# Patient Record
Sex: FEMALE | Race: BLACK OR AFRICAN AMERICAN | Employment: FULL TIME
[De-identification: names, ages, dates, MRNs, and addresses within clinical notes are randomized per-mention and may not be internally consistent; named-entity substitution may affect disease eponyms.]

---

## 2017-01-06 ENCOUNTER — OFFICE VISIT (OUTPATIENT)
Dept: INTERNAL MEDICINE | Facility: CLINIC | Age: 52
End: 2017-01-06

## 2017-01-06 VITALS
DIASTOLIC BLOOD PRESSURE: 85 MMHG | BODY MASS INDEX: 28.79 KG/M2 | HEIGHT: 68 IN | WEIGHT: 190 LBS | HEART RATE: 82 BPM | SYSTOLIC BLOOD PRESSURE: 123 MMHG

## 2017-01-06 DIAGNOSIS — E78.5 DYSLIPIDEMIA: ICD-10-CM

## 2017-01-06 DIAGNOSIS — R73.03 PREDIABETES: Primary | ICD-10-CM

## 2017-01-06 DIAGNOSIS — R06.02 SHORTNESS OF BREATH: ICD-10-CM

## 2017-01-06 PROCEDURE — 99213 OFFICE O/P EST LOW 20 MIN: CPT | Performed by: INTERNAL MEDICINE

## 2017-01-11 RX ORDER — DICLOFENAC SODIUM 75 MG/1
75 TABLET, DELAYED RELEASE ORAL 2 TIMES DAILY
Qty: 60 TABLET | Refills: 3 | Status: SHIPPED | OUTPATIENT
Start: 2017-01-11 | End: 2017-05-28 | Stop reason: SDUPTHER

## 2017-03-21 RX ORDER — VERAPAMIL HYDROCHLORIDE 40 MG/1
TABLET ORAL
Qty: 90 TABLET | Refills: 3 | Status: SHIPPED | OUTPATIENT
Start: 2017-03-21 | End: 2017-05-30 | Stop reason: SDUPTHER

## 2017-05-12 ENCOUNTER — HOSPITAL ENCOUNTER (OUTPATIENT)
Age: 52
Setting detail: SPECIMEN
Discharge: HOME | End: 2017-05-12
Payer: COMMERCIAL

## 2017-05-12 DIAGNOSIS — E78.5 DYSLIPIDEMIA: ICD-10-CM

## 2017-05-12 LAB
ALBUMIN SERPL-MCNC: 4.3 G/DL (ref 3.5–5.2)
ALBUMIN/GLOBULIN RATIO: 1 (ref 1–2.5)
ALP BLD-CCNC: 95 U/L (ref 35–104)
ALT SERPL-CCNC: 23 U/L (ref 5–33)
ANION GAP SERPL CALCULATED.3IONS-SCNC: 14 MMOL/L (ref 9–17)
AST SERPL-CCNC: 17 U/L
BILIRUB SERPL-MCNC: 0.38 MG/DL (ref 0.3–1.2)
BUN BLDV-MCNC: 10 MG/DL (ref 6–20)
BUN/CREAT BLD: ABNORMAL (ref 9–20)
CALCIUM SERPL-MCNC: 9.5 MG/DL (ref 8.6–10.4)
CHLORIDE BLD-SCNC: 101 MMOL/L (ref 98–107)
CHOLESTEROL/HDL RATIO: 3.8
CHOLESTEROL: 196 MG/DL
CO2: 26 MMOL/L (ref 20–31)
CREAT SERPL-MCNC: 0.67 MG/DL (ref 0.5–0.9)
GFR AFRICAN AMERICAN: >60 ML/MIN
GFR NON-AFRICAN AMERICAN: >60 ML/MIN
GFR SERPL CREATININE-BSD FRML MDRD: ABNORMAL ML/MIN/{1.73_M2}
GFR SERPL CREATININE-BSD FRML MDRD: ABNORMAL ML/MIN/{1.73_M2}
GLUCOSE BLD-MCNC: 87 MG/DL (ref 70–99)
HDLC SERPL-MCNC: 52 MG/DL
LDL CHOLESTEROL: 132 MG/DL (ref 0–130)
POTASSIUM SERPL-SCNC: 3.9 MMOL/L (ref 3.7–5.3)
SODIUM BLD-SCNC: 141 MMOL/L (ref 135–144)
TOTAL PROTEIN: 8.7 G/DL (ref 6.4–8.3)
TRIGL SERPL-MCNC: 62 MG/DL
VLDLC SERPL CALC-MCNC: ABNORMAL MG/DL (ref 1–30)

## 2017-05-12 PROCEDURE — 80053 COMPREHEN METABOLIC PANEL: CPT

## 2017-05-12 PROCEDURE — 36415 COLL VENOUS BLD VENIPUNCTURE: CPT

## 2017-05-12 PROCEDURE — 80061 LIPID PANEL: CPT

## 2017-05-30 ENCOUNTER — OFFICE VISIT (OUTPATIENT)
Dept: INTERNAL MEDICINE | Age: 52
End: 2017-05-30
Payer: COMMERCIAL

## 2017-05-30 VITALS
HEIGHT: 68 IN | HEART RATE: 72 BPM | BODY MASS INDEX: 28.79 KG/M2 | DIASTOLIC BLOOD PRESSURE: 96 MMHG | SYSTOLIC BLOOD PRESSURE: 137 MMHG | WEIGHT: 190 LBS

## 2017-05-30 DIAGNOSIS — R73.03 PREDIABETES: Primary | ICD-10-CM

## 2017-05-30 DIAGNOSIS — Z12.31 ENCOUNTER FOR SCREENING MAMMOGRAM FOR BREAST CANCER: ICD-10-CM

## 2017-05-30 DIAGNOSIS — E78.5 DYSLIPIDEMIA: ICD-10-CM

## 2017-05-30 DIAGNOSIS — H02.826 EYELID CYST, LEFT: ICD-10-CM

## 2017-05-30 PROBLEM — H02.829 EYELID CYST: Status: ACTIVE | Noted: 2017-05-30

## 2017-05-30 PROCEDURE — 99214 OFFICE O/P EST MOD 30 MIN: CPT | Performed by: INTERNAL MEDICINE

## 2017-05-30 RX ORDER — VERAPAMIL HYDROCHLORIDE 40 MG/1
40 TABLET ORAL 3 TIMES DAILY
Qty: 90 TABLET | Refills: 5 | Status: SHIPPED | OUTPATIENT
Start: 2017-05-30 | End: 2017-10-02 | Stop reason: SDUPTHER

## 2017-05-30 RX ORDER — DICLOFENAC SODIUM 75 MG/1
75 TABLET, DELAYED RELEASE ORAL 2 TIMES DAILY
Qty: 60 TABLET | Refills: 3 | Status: SHIPPED | OUTPATIENT
Start: 2017-05-30 | End: 2017-10-02 | Stop reason: SDUPTHER

## 2017-05-30 RX ORDER — DICLOFENAC SODIUM 75 MG/1
TABLET, DELAYED RELEASE ORAL
Qty: 60 TABLET | Refills: 3 | Status: SHIPPED | OUTPATIENT
Start: 2017-05-30 | End: 2017-05-30 | Stop reason: SDUPTHER

## 2017-07-25 ENCOUNTER — HOSPITAL ENCOUNTER (OUTPATIENT)
Dept: MAMMOGRAPHY | Age: 52
Discharge: HOME | End: 2017-07-25
Payer: COMMERCIAL

## 2017-07-25 DIAGNOSIS — Z12.31 ENCOUNTER FOR SCREENING MAMMOGRAM FOR BREAST CANCER: ICD-10-CM

## 2017-07-25 PROCEDURE — 77063 BREAST TOMOSYNTHESIS BI: CPT

## 2017-08-22 ENCOUNTER — OFFICE VISIT (OUTPATIENT)
Dept: INTERNAL MEDICINE | Age: 52
End: 2017-08-22
Payer: COMMERCIAL

## 2017-08-22 VITALS
DIASTOLIC BLOOD PRESSURE: 84 MMHG | WEIGHT: 196 LBS | HEART RATE: 79 BPM | BODY MASS INDEX: 29.7 KG/M2 | SYSTOLIC BLOOD PRESSURE: 118 MMHG | HEIGHT: 68 IN

## 2017-08-22 DIAGNOSIS — G56.02 CARPAL TUNNEL SYNDROME OF LEFT WRIST: ICD-10-CM

## 2017-08-22 DIAGNOSIS — J06.9 UPPER RESPIRATORY TRACT INFECTION, UNSPECIFIED TYPE: Primary | ICD-10-CM

## 2017-08-22 PROCEDURE — 99213 OFFICE O/P EST LOW 20 MIN: CPT | Performed by: STUDENT IN AN ORGANIZED HEALTH CARE EDUCATION/TRAINING PROGRAM

## 2017-08-22 RX ORDER — GUAIFENESIN 600 MG/1
600 TABLET, EXTENDED RELEASE ORAL 2 TIMES DAILY
Qty: 30 TABLET | Refills: 0 | Status: SHIPPED | OUTPATIENT
Start: 2017-08-22 | End: 2017-09-18 | Stop reason: ALTCHOICE

## 2017-08-22 RX ORDER — ACETAMINOPHEN 500 MG
500 TABLET ORAL EVERY 6 HOURS PRN
Qty: 45 TABLET | Refills: 0 | Status: SHIPPED | OUTPATIENT
Start: 2017-08-22 | End: 2019-10-23

## 2017-08-22 RX ORDER — LORATADINE 10 MG/1
10 TABLET ORAL DAILY
Qty: 15 TABLET | Refills: 0 | Status: SHIPPED | OUTPATIENT
Start: 2017-08-22 | End: 2017-09-18 | Stop reason: ALTCHOICE

## 2017-08-22 ASSESSMENT — PATIENT HEALTH QUESTIONNAIRE - PHQ9
4. FEELING TIRED OR HAVING LITTLE ENERGY: 0
SUM OF ALL RESPONSES TO PHQ9 QUESTIONS 1 & 2: 0
9. THOUGHTS THAT YOU WOULD BE BETTER OFF DEAD, OR OF HURTING YOURSELF: 0
SUM OF ALL RESPONSES TO PHQ QUESTIONS 1-9: 2
8. MOVING OR SPEAKING SO SLOWLY THAT OTHER PEOPLE COULD HAVE NOTICED. OR THE OPPOSITE, BEING SO FIGETY OR RESTLESS THAT YOU HAVE BEEN MOVING AROUND A LOT MORE THAN USUAL: 0
1. LITTLE INTEREST OR PLEASURE IN DOING THINGS: 0
6. FEELING BAD ABOUT YOURSELF - OR THAT YOU ARE A FAILURE OR HAVE LET YOURSELF OR YOUR FAMILY DOWN: 0
3. TROUBLE FALLING OR STAYING ASLEEP: 1
10. IF YOU CHECKED OFF ANY PROBLEMS, HOW DIFFICULT HAVE THESE PROBLEMS MADE IT FOR YOU TO DO YOUR WORK, TAKE CARE OF THINGS AT HOME, OR GET ALONG WITH OTHER PEOPLE: 2
2. FEELING DOWN, DEPRESSED OR HOPELESS: 0
5. POOR APPETITE OR OVEREATING: 1
7. TROUBLE CONCENTRATING ON THINGS, SUCH AS READING THE NEWSPAPER OR WATCHING TELEVISION: 0

## 2017-08-30 ENCOUNTER — TELEPHONE (OUTPATIENT)
Dept: INTERNAL MEDICINE | Age: 52
End: 2017-08-30

## 2017-08-30 RX ORDER — AZITHROMYCIN 250 MG/1
TABLET, FILM COATED ORAL
Qty: 1 PACKET | Refills: 0 | Status: SHIPPED | OUTPATIENT
Start: 2017-08-30 | End: 2017-09-09

## 2017-08-30 RX ORDER — GUAIFENESIN/DEXTROMETHORPHAN 100-10MG/5
5 SYRUP ORAL 3 TIMES DAILY PRN
Qty: 120 ML | Refills: 0 | Status: SHIPPED | OUTPATIENT
Start: 2017-08-30 | End: 2017-09-09

## 2017-09-18 ENCOUNTER — OFFICE VISIT (OUTPATIENT)
Dept: FAMILY MEDICINE CLINIC | Age: 52
End: 2017-09-18
Payer: COMMERCIAL

## 2017-09-18 VITALS
TEMPERATURE: 97.7 F | HEIGHT: 66 IN | RESPIRATION RATE: 20 BRPM | WEIGHT: 187 LBS | DIASTOLIC BLOOD PRESSURE: 87 MMHG | SYSTOLIC BLOOD PRESSURE: 139 MMHG | OXYGEN SATURATION: 100 % | BODY MASS INDEX: 30.05 KG/M2 | HEART RATE: 82 BPM

## 2017-09-18 DIAGNOSIS — J98.8 RESPIRATORY TRACT INFECTION: Primary | ICD-10-CM

## 2017-09-18 PROCEDURE — 99213 OFFICE O/P EST LOW 20 MIN: CPT | Performed by: FAMILY MEDICINE

## 2017-09-18 RX ORDER — ALBUTEROL SULFATE 90 UG/1
2 AEROSOL, METERED RESPIRATORY (INHALATION) EVERY 6 HOURS PRN
Qty: 1 INHALER | Refills: 3 | Status: SHIPPED | OUTPATIENT
Start: 2017-09-18 | End: 2019-10-23

## 2017-09-18 RX ORDER — BROMPHENIRAMINE MALEATE, PSEUDOEPHEDRINE HYDROCHLORIDE, AND DEXTROMETHORPHAN HYDROBROMIDE 2; 30; 10 MG/5ML; MG/5ML; MG/5ML
5 SYRUP ORAL 3 TIMES DAILY
Qty: 180 ML | Refills: 0 | Status: SHIPPED | OUTPATIENT
Start: 2017-09-18 | End: 2019-10-23

## 2017-09-18 RX ORDER — FLUTICASONE PROPIONATE 50 MCG
1 SPRAY, SUSPENSION (ML) NASAL 2 TIMES DAILY
Qty: 1 BOTTLE | Refills: 2 | Status: SHIPPED | OUTPATIENT
Start: 2017-09-18 | End: 2017-12-01 | Stop reason: SDUPTHER

## 2017-09-18 RX ORDER — PREDNISONE 50 MG/1
50 TABLET ORAL DAILY
Qty: 7 TABLET | Refills: 0 | Status: SHIPPED | OUTPATIENT
Start: 2017-09-18 | End: 2017-09-25

## 2017-09-18 RX ORDER — LEVOFLOXACIN 500 MG/1
500 TABLET, FILM COATED ORAL DAILY
Qty: 10 TABLET | Refills: 0 | Status: SHIPPED | OUTPATIENT
Start: 2017-09-18 | End: 2017-09-28

## 2017-09-18 ASSESSMENT — ENCOUNTER SYMPTOMS
STRIDOR: 0
SORE THROAT: 1
SINUS PRESSURE: 1
COUGH: 1
TROUBLE SWALLOWING: 0
SHORTNESS OF BREATH: 1
CHOKING: 0
RHINORRHEA: 1
WHEEZING: 1
ALLERGIC/IMMUNOLOGIC NEGATIVE: 1
GASTROINTESTINAL NEGATIVE: 1
EYES NEGATIVE: 1

## 2017-10-02 ENCOUNTER — OFFICE VISIT (OUTPATIENT)
Dept: INTERNAL MEDICINE | Age: 52
End: 2017-10-02
Payer: COMMERCIAL

## 2017-10-02 VITALS
BODY MASS INDEX: 29.73 KG/M2 | DIASTOLIC BLOOD PRESSURE: 84 MMHG | HEART RATE: 97 BPM | HEIGHT: 66 IN | WEIGHT: 185 LBS | SYSTOLIC BLOOD PRESSURE: 119 MMHG

## 2017-10-02 DIAGNOSIS — Z23 NEED FOR IMMUNIZATION AGAINST INFLUENZA: ICD-10-CM

## 2017-10-02 DIAGNOSIS — H02.826 EYELID CYST, LEFT: ICD-10-CM

## 2017-10-02 DIAGNOSIS — J30.2 SEASONAL ALLERGIC RHINITIS, UNSPECIFIED ALLERGIC RHINITIS TRIGGER: ICD-10-CM

## 2017-10-02 DIAGNOSIS — Z11.59 NEED FOR HEPATITIS C SCREENING TEST: ICD-10-CM

## 2017-10-02 DIAGNOSIS — Z11.4 SCREENING FOR HIV (HUMAN IMMUNODEFICIENCY VIRUS): ICD-10-CM

## 2017-10-02 DIAGNOSIS — R73.03 PREDIABETES: Primary | ICD-10-CM

## 2017-10-02 PROCEDURE — 99214 OFFICE O/P EST MOD 30 MIN: CPT | Performed by: INTERNAL MEDICINE

## 2017-10-02 PROCEDURE — 90471 IMMUNIZATION ADMIN: CPT | Performed by: INTERNAL MEDICINE

## 2017-10-02 PROCEDURE — 90688 IIV4 VACCINE SPLT 0.5 ML IM: CPT | Performed by: INTERNAL MEDICINE

## 2017-10-02 RX ORDER — DICLOFENAC SODIUM 75 MG/1
75 TABLET, DELAYED RELEASE ORAL 2 TIMES DAILY
Qty: 60 TABLET | Refills: 3 | Status: SHIPPED | OUTPATIENT
Start: 2017-10-02 | End: 2018-03-07 | Stop reason: SDUPTHER

## 2017-10-02 RX ORDER — VERAPAMIL HYDROCHLORIDE 40 MG/1
40 TABLET ORAL 3 TIMES DAILY
Qty: 90 TABLET | Refills: 5 | Status: SHIPPED | OUTPATIENT
Start: 2017-10-02 | End: 2018-09-27 | Stop reason: SDUPTHER

## 2017-10-02 NOTE — PATIENT INSTRUCTIONS
Medications e-scribed to pharmacy of patient's choice. LABORATORY INSTRUCTIONS    Your doctor has ordered blood or urine testing. You can get this testing done at the Lab located on the first floor of the [facility], or at any other [facility]. Please stop at Main Registration, before going to the lab, as you must be registered first.     Please get this lab done before your next visit. You may eat or drink before this test.    OC-Light hemoccult collection kit given to patient and procedure explained. Return To Clinic 12/1/17. After Visit Summary given and reviewed. JF    It is very important for your care that you keep your appointment. If for some reason you are unable to keep your appointment it is equally important that you call our office at [phone] to cancel your appointment and reschedule. Failure to do so may result in your termination from our practice.

## 2017-10-02 NOTE — MR AVS SNAPSHOT
After Visit Summary             Alejandro Emerson   10/2/2017 1:30 PM   Office Visit    Description:  Female : 1965   Provider:  Sung Mckenzie MD   Department:  [facility] and Future Appointments         Below is a list of your follow-up and future appointments. This may not be a complete list as you may have made appointments directly with providers that we are not aware of or your providers may have made some for you. Please call your providers to confirm appointments. It is important to keep your appointments. Please bring your current insurance card, photo ID, co-pay, and all medication bottles to your appointment. If self-pay, payment is expected at the time of service. Your To-Do List     Future Appointments Provider Department Dept Phone    2017 9:30 AM MD ANA ROSA [address] [phone]    Please arrive 15 minutes prior to appointment time, bring insurance card and photo ID. Future Orders Complete By Expires    Hemoglobin A1C [LAB90 Custom]  2017 10/2/2018    HIV-1 And HIV-2 Antibodies [PEV559 Custom]  2017 10/2/2018    Hepatitis C Antibody [DOS915 Custom]  1/10/2018 10/2/2018    Comments:    Please get this labwork done before your next visit. Follow-Up    Return in about 2 months (around 2017).          Information from Your Visit        Department     Name Address Phone Fax    ANA ROSA [address] [phone]      You Were Seen for:         Comments    Prediabetes   [[id number]]         Vital Signs     Blood Pressure Pulse Height Weight Body Mass Index Smoking Status    119/84 (Site: Left Arm, Position: Sitting, Cuff Size: Medium Adult) 97 5' 6\" (1.676 m) 185 lb (83.9 kg) 29.86 kg/m2 Never Smoker      Additional Information about your Body Mass Index (BMI) Your BMI as listed above is considered overweight (25.0-29.9). BMI is an estimate of body fat, calculated from your height and weight. The higher your BMI, the greater your risk of heart disease, high blood pressure, type 2 diabetes, stroke, gallstones, arthritis, sleep apnea, and certain cancers. BMI is not perfect. It may overestimate body fat in athletes and people who are more muscular. If your body fat is high you can improve your BMI by decreasing your calorie intake and becoming more physically active. Learn more at: [email or web address]          Instructions    Medications e-scribed to pharmacy of patient's choice. LABORATORY INSTRUCTIONS    Your doctor has ordered blood or urine testing. You can get this testing done at the Lab located on the first floor of the [facility], or at any other [facility]. Please stop at Main Registration, before going to the lab, as you must be registered first.     Please get this lab done before your next visit. You may eat or drink before this test.    OC-Light hemoccult collection kit given to patient and procedure explained. Return To Clinic 12/1/17. After Visit Summary given and reviewed. JF    It is very important for your care that you keep your appointment. If for some reason you are unable to keep your appointment it is equally important that you call our office at [phone] to cancel your appointment and reschedule. Failure to do so may result in your termination from our practice. Today's Medication Changes          These changes are accurate as of: 10/2/17  2:08 PM.  If you have any questions, ask your nurse or doctor.                STOP taking these medications           Wrist Brace Misc   Stopped by:  Anju Pal MD            Where to Get Your Medications      These medications were sent to [id number] [facility], [address] [place] [address] [address] [place] [address]     Phone:  [phone]     diclofenac 75 MG EC tablet    verapamil 40 MG tablet               Your Current Medications Are              verapamil (CALAN) 40 MG tablet Take 1 tablet by mouth 3 times daily    diclofenac (VOLTAREN) 75 MG EC tablet Take 1 tablet by mouth 2 times daily    fluticasone (FLONASE) 50 MCG/ACT nasal spray 1 spray by Nasal route 2 times daily for 14 days    brompheniramine-pseudoephedrine-DM 30-2-10 MG/5ML syrup Take 5 mLs by mouth three times daily    albuterol sulfate HFA (VENTOLIN HFA) 108 (90 Base) MCG/ACT inhaler Inhale 2 puffs into the lungs every 6 hours as needed for Wheezing    acetaminophen (TYLENOL) 500 MG tablet Take 1 tablet by mouth every 6 hours as needed for Pain      Allergies           No Known Allergies      We Ordered/Performed the following           INFLUENZA, QUADV, 6 MO AND OLDER, IM, MDV, 0.5ML (FLULAVAL QUADV)     [place] ADMIN INFLUENZA VIRUS VAC          Additional Information        Basic Information     Date Of Birth Sex Race Ethnicity Preferred Language    1965 Female Black Non-/Non  English      Problem List as of 10/2/2017  Date Reviewed: 9/18/2017                Prediabetes    Eyelid cyst    Dyslipidemia      Immunizations as of 10/2/2017     Name Date    Influenza, Jaqueline Oropeza, 3 Years and older, IM 12/12/2016    Influenza, Quadv, 6 mo and older, IM (FLULAVAL QUADV) 10/2/2017    Tdap (Boostrix, Adacel) 5/17/2016      Preventive Care        Date Due    Hepatitis C screening is recommended for all adults regardless of risk factors born between [facility][1] at least once (lifetime) who have never been tested. 1965    HIV screening is recommended for all people regardless of risk factors  aged 15-65 years at least once (lifetime) who have never been HIV tested.  12/1/1979    Colon Cancer Stool Test 8/4/2017    Pap Smear 5/17/2018 Mammograms are recommended every 2 years for low/average risk patients aged 48 - 69, and every year for high risk patients per updated national guidelines. However these guidelines can be individualized by your provider. 7/25/2019    Cholesterol Screening 5/12/2022    Tetanus Combination Vaccine (2 - Td) 5/17/2026            MyChart Signup           Our records indicate that you have declined MyChart signup.

## 2017-10-02 NOTE — PROGRESS NOTES
Subjective:      Patient ID: Lori Lozano is a 45 y.o. female. HPI  patient is here to  follow-up on  Urgent care visit for URI-was given Flonase, Levaquin, prednisone-symptoms much better  Was seen in our office in August 2017 for URI and congestion  Patient was prescribed Flonase but she does not use it on a regular basis    Other medical problems include  H/o mitral valve prolapse, PSVT- on verapamil by last PCP. Does not see cardio  H/o OA-both hips, lower back-stable on Diclofenac  Dyslipidemia not on any meds-repeat lipids appeared much better  Prediabetes-A1c 5.9 in August 2016-currently not on any medications-doing lifestyle modifications    C/o painless lump on left upper eyelid-not increasing in size. she thinks it probably went down in size    Needs screening for HIV, Hep C. Needs flu shot    Review of Systems   Negative for fever  Respiratory: Negative for cough,  wheezing and stridor. Cardiovascular: Negative for chest pain, palpitations and leg swelling. Gastrointestinal: Negative for nausea, vomiting, abdominal pain, diarrhea and constipation. Objective:   Physical Exam  Constitutional: She is oriented to person, place, and time. She appears well-developed. No distress. Eyes- a small swelling present on left eyelid. Non-tender   Pulmonary/Chest: Effort normal and breath sounds normal. No stridor. No respiratory distress. no wheezes. no rales. Abdominal: Soft. Bowel sounds are normal.  no distension. There is no tenderness. There is no rebound and no guarding. Musculoskeletal:  no edema and no tenderness. Assessment: 1. Mitral valve prolapse  2. OA-bilateral hips, LBP  3. Eyelid swelling-most likely a benign cyst-stable  4. Dyslipidemia  5. Prediabetes  6. Needs flu shot  7. Needs screening for HIV and hep C, needs screening for colon cancer  8. Allergic rhinitis  Plan:   1. Continue verapamil (i think she is on it for SVT prophylaxis)  2. Continue Diclofenac.  CMP reviewed from 05/17  3. Continue to monitor. Will refer to opthalmology if increasing in size   4. Continue lifestyle modifications. Lipids reviewed from 05/17  5. Continue lifestyle modifications  6. Flu shot  7. HIV antibody, hep C antibody, fit card  8. I have advised her to use the Flonase every day, she voiced understanding  9. Last PAP neg 09/15. TDAP-05/16 FOBT-neg-08/16 Does not smoke. Arcoqqkqr-jna-89/17  10. Return in about 2 months (around 12/2/2017).

## 2017-11-07 ENCOUNTER — TELEPHONE (OUTPATIENT)
Dept: INTERNAL MEDICINE | Age: 52
End: 2017-11-07

## 2017-11-07 NOTE — TELEPHONE ENCOUNTER
Pt given OC-Light Hemoccult test kit 10/2/17; it has not been returned within  2 weeks. PC to pt; LM on  for pt to ángel back.

## 2017-11-08 LAB
CONTROL: NORMAL
HEMOCCULT STL QL: NEGATIVE

## 2017-11-16 DIAGNOSIS — Z12.11 ENCOUNTER FOR SCREENING COLONOSCOPY: Primary | ICD-10-CM

## 2017-11-16 PROCEDURE — 82274 ASSAY TEST FOR BLOOD FECAL: CPT | Performed by: INTERNAL MEDICINE

## 2017-11-25 ENCOUNTER — HOSPITAL ENCOUNTER (OUTPATIENT)
Age: 52
Discharge: HOME | End: 2017-11-25
Payer: COMMERCIAL

## 2017-11-25 DIAGNOSIS — Z11.59 NEED FOR HEPATITIS C SCREENING TEST: ICD-10-CM

## 2017-11-25 DIAGNOSIS — Z11.4 SCREENING FOR HIV (HUMAN IMMUNODEFICIENCY VIRUS): ICD-10-CM

## 2017-11-25 DIAGNOSIS — R73.03 PREDIABETES: ICD-10-CM

## 2017-11-25 LAB
HEPATITIS C ANTIBODY: NONREACTIVE
HIV AG/AB: NONREACTIVE

## 2017-11-25 PROCEDURE — 86803 HEPATITIS C AB TEST: CPT

## 2017-11-25 PROCEDURE — 87389 HIV-1 AG W/HIV-1&-2 AB AG IA: CPT

## 2017-11-25 PROCEDURE — 83036 HEMOGLOBIN GLYCOSYLATED A1C: CPT

## 2017-11-25 PROCEDURE — 36415 COLL VENOUS BLD VENIPUNCTURE: CPT

## 2017-11-26 LAB
ESTIMATED AVERAGE GLUCOSE: 120 MG/DL
HBA1C MFR BLD: 5.8 % (ref 4–6)

## 2017-12-01 ENCOUNTER — OFFICE VISIT (OUTPATIENT)
Dept: INTERNAL MEDICINE | Age: 52
End: 2017-12-01
Payer: COMMERCIAL

## 2017-12-01 VITALS
SYSTOLIC BLOOD PRESSURE: 125 MMHG | DIASTOLIC BLOOD PRESSURE: 95 MMHG | HEART RATE: 85 BPM | HEIGHT: 68 IN | BODY MASS INDEX: 27.28 KG/M2 | WEIGHT: 180 LBS

## 2017-12-01 DIAGNOSIS — J98.8 RESPIRATORY TRACT INFECTION: ICD-10-CM

## 2017-12-01 PROCEDURE — 99213 OFFICE O/P EST LOW 20 MIN: CPT | Performed by: INTERNAL MEDICINE

## 2017-12-01 RX ORDER — FLUTICASONE PROPIONATE 50 MCG
1 SPRAY, SUSPENSION (ML) NASAL 2 TIMES DAILY
Qty: 1 BOTTLE | Refills: 2 | Status: SHIPPED | OUTPATIENT
Start: 2017-12-01 | End: 2019-10-23

## 2017-12-01 NOTE — PROGRESS NOTES
Subjective:      Patient ID: Nick Dos Santos is a 47 y.o. female. HPI  patient is here to  follow-up on  Allergic rhinitis-Now she is using Flonase on a regular basis and symptoms are much better  H/o mitral valve prolapse, PSVT- on verapamil by last PCP. Does not see cardio  H/o OA-both hips, lower back-stable on Diclofenac  Dyslipidemia not on any meds-repeat lipids appeared much better  Prediabetes-A1c 5.8 in Nov 2017-currently not on any medications-doing lifestyle modifications-has been losing weight successfully     painless lump on left upper eyelid-not increasing in size. she thinks it probably went down in size    Review of Systems   Negative for fever  Respiratory: Negative for cough,  wheezing and stridor. Cardiovascular: Negative for chest pain, palpitations and leg swelling. Gastrointestinal: Negative for nausea, vomiting, abdominal pain, diarrhea and constipation. Objective:   Physical Exam  Constitutional: She is oriented to person, place, and time. She appears well-developed. No distress. Eyes- a small swelling present on left eyelid. Non-tender   Pulmonary/Chest: Effort normal and breath sounds normal. No stridor. No respiratory distress. no wheezes. no rales. Abdominal: Soft. Bowel sounds are normal.  no distension. There is no tenderness. There is no rebound and no guarding. Musculoskeletal:  no edema and no tenderness. Assessment: 1. Mitral valve prolapse  2. OA-bilateral hips, LBP  3. Eyelid swelling-most likely a benign cyst-stable  4. Dyslipidemia  5. Prediabetes  6. Allergic rhinitis  Plan:   1. Continue verapamil (i think she is on it for SVT prophylaxis)  2. Continue Diclofenac. CMP reviewed from 05/17  3. Continue to monitor. Will refer to opthalmology if increasing in size   4. Continue lifestyle modifications. Lipids reviewed from 05/17  5. Continue lifestyle modifications  6. Continue using Flonase every day  7. Last PAP neg 09/15.   TDAP-05/16 FOBT-neg-08/16 Does not smoke. Ohxyokvmw-zfo-71/17  HIV, Hep C-neg-11/17  8. Return in about 4 months (around 4/1/2018).

## 2017-12-01 NOTE — PATIENT INSTRUCTIONS
Follow-up appointment scheduled for 4/2/18, AVS given to patient. It is very important that you keep your appointments, please contact us if you are unable to keep your scheduled appointment.  Thank you bp

## 2018-03-07 RX ORDER — DICLOFENAC SODIUM 75 MG/1
TABLET, DELAYED RELEASE ORAL
Qty: 60 TABLET | Refills: 3 | Status: SHIPPED | OUTPATIENT
Start: 2018-03-07 | End: 2018-07-31 | Stop reason: SDUPTHER

## 2018-03-07 NOTE — TELEPHONE ENCOUNTER
E-scribe request from [employer] for Diclofenac 75 mg.      Health Maintenance   Topic Date Due    Shingles Vaccine (1 of 2 - 2 Dose Series) 12/01/2014    Cervical cancer screen  05/17/2018    Colon Cancer Screen FIT/FOBT  11/08/2018    A1C test (Diabetic or Prediabetic)  11/25/2018    Breast cancer screen  07/25/2019    Lipid screen  05/12/2022    DTaP/Tdap/Td vaccine (2 - Td) 05/17/2026    Flu vaccine  Completed    Hepatitis C screen  Completed    HIV screen  Completed             (applicable per patient's age: Cancer Screenings, Depression Screening, Fall Risk Screening, Immunizations)    Hemoglobin A1C (%)   Date Value   11/25/2017 5.8   08/24/2016 5.9     LDL Cholesterol (mg/dL)   Date Value   05/12/2017 132 (H)     AST (U/L)   Date Value   05/12/2017 17     ALT (U/L)   Date Value   05/12/2017 23     BUN (mg/dL)   Date Value   05/12/2017 10      (goal A1C is < 7)   (goal LDL is <100) need 30-50% reduction from baseline     BP Readings from Last 3 Encounters:   12/01/17 (!) 125/95   10/02/17 119/84   09/18/17 139/87    (goal /80)      All Future Testing planned in [facility]:      Next Visit Date:  Future Appointments  Date Time Provider Malena Amanda   4/2/2018 9:30 AM Jonathan Reed MD [facility]            Patient Active Problem List:     Prediabetes     Eyelid cyst     Dyslipidemia

## 2018-09-17 ENCOUNTER — HOSPITAL ENCOUNTER (OUTPATIENT)
Age: 53
Setting detail: SPECIMEN
Discharge: HOME | End: 2018-09-17
Payer: COMMERCIAL

## 2018-09-17 LAB
-: NORMAL
ALBUMIN SERPL-MCNC: 4.4 G/DL (ref 3.5–5.2)
ALBUMIN/GLOBULIN RATIO: 1.1 (ref 1–2.5)
ALP BLD-CCNC: 91 U/L (ref 35–104)
ALT SERPL-CCNC: 20 U/L (ref 5–33)
AMORPHOUS: NORMAL
ANION GAP SERPL CALCULATED.3IONS-SCNC: 12 MMOL/L (ref 9–17)
AST SERPL-CCNC: 21 U/L
BACTERIA: NORMAL
BILIRUB SERPL-MCNC: 0.21 MG/DL (ref 0.3–1.2)
BILIRUBIN URINE: NEGATIVE
BUN BLDV-MCNC: 13 MG/DL (ref 6–20)
BUN/CREAT BLD: ABNORMAL (ref 9–20)
CALCIUM SERPL-MCNC: 9.3 MG/DL (ref 8.6–10.4)
CASTS UA: NORMAL /LPF (ref 0–8)
CHLORIDE BLD-SCNC: 102 MMOL/L (ref 98–107)
CHOLESTEROL/HDL RATIO: 3.8
CHOLESTEROL: 199 MG/DL
CO2: 26 MMOL/L (ref 20–31)
COLOR: YELLOW
COMMENT UA: ABNORMAL
CREAT SERPL-MCNC: 0.75 MG/DL (ref 0.5–0.9)
CRYSTALS, UA: NORMAL /HPF
EPITHELIAL CELLS UA: NORMAL /HPF (ref 0–5)
GFR AFRICAN AMERICAN: >60 ML/MIN
GFR NON-AFRICAN AMERICAN: >60 ML/MIN
GFR SERPL CREATININE-BSD FRML MDRD: ABNORMAL ML/MIN/{1.73_M2}
GFR SERPL CREATININE-BSD FRML MDRD: ABNORMAL ML/MIN/{1.73_M2}
GLUCOSE BLD-MCNC: 82 MG/DL (ref 70–99)
GLUCOSE URINE: NEGATIVE
HCT VFR BLD CALC: 41.8 % (ref 36.3–47.1)
HDLC SERPL-MCNC: 52 MG/DL
HEMOGLOBIN: 12.8 G/DL (ref 11.9–15.1)
KETONES, URINE: NEGATIVE
LDL CHOLESTEROL: 134 MG/DL (ref 0–130)
LEUKOCYTE ESTERASE, URINE: ABNORMAL
MCH RBC QN AUTO: 27.8 PG (ref 25.2–33.5)
MCHC RBC AUTO-ENTMCNC: 30.6 G/DL (ref 28.4–34.8)
MCV RBC AUTO: 90.9 FL (ref 82.6–102.9)
MUCUS: NORMAL
NITRITE, URINE: NEGATIVE
NRBC AUTOMATED: 0 PER 100 WBC
OTHER OBSERVATIONS UA: NORMAL
PDW BLD-RTO: 14.8 % (ref 11.8–14.4)
PH UA: 6 (ref 5–8)
PLATELET # BLD: 334 K/UL (ref 138–453)
PMV BLD AUTO: 10.3 FL (ref 8.1–13.5)
POTASSIUM SERPL-SCNC: 4.1 MMOL/L (ref 3.7–5.3)
PROTEIN UA: NEGATIVE
RBC # BLD: 4.6 M/UL (ref 3.95–5.11)
RBC UA: NORMAL /HPF (ref 0–4)
RENAL EPITHELIAL, UA: NORMAL /HPF
SODIUM BLD-SCNC: 140 MMOL/L (ref 135–144)
SPECIFIC GRAVITY UA: 1.02 (ref 1–1.03)
TOTAL PROTEIN: 8.5 G/DL (ref 6.4–8.3)
TRICHOMONAS: NORMAL
TRIGL SERPL-MCNC: 65 MG/DL
TURBIDITY: CLEAR
URINE HGB: NEGATIVE
UROBILINOGEN, URINE: NORMAL
VLDLC SERPL CALC-MCNC: ABNORMAL MG/DL (ref 1–30)
WBC # BLD: 3.6 K/UL (ref 3.5–11.3)
WBC UA: NORMAL /HPF (ref 0–5)
YEAST: NORMAL

## 2018-09-18 LAB
CULTURE: NORMAL
Lab: NORMAL
SPECIMEN DESCRIPTION: NORMAL
STATUS: NORMAL

## 2018-09-27 RX ORDER — VERAPAMIL HYDROCHLORIDE 40 MG/1
TABLET ORAL
Qty: 90 TABLET | Refills: 2 | Status: SHIPPED | OUTPATIENT
Start: 2018-09-27 | End: 2019-12-27

## 2018-09-27 NOTE — TELEPHONE ENCOUNTER
escrib request for VERAPAMIL 40 MG TABLET patient does not have scheduled apt contacted pt left message for pt to contact to schedule. Health Maintenance   Topic Date Due    Shingles Vaccine (1 of 2 - 2 Dose Series) 12/01/2014    Cervical cancer screen  05/17/2018    Flu vaccine (1) 09/01/2018    Colon Cancer Screen FIT/FOBT  11/08/2018    A1C test (Diabetic or Prediabetic)  11/25/2018    Breast cancer screen  07/25/2019    Lipid screen  09/17/2023    DTaP/Tdap/Td vaccine (2 - Td) 05/17/2026    Hepatitis C screen  Completed    HIV screen  Completed             (applicable per patient's age: Cancer Screenings, Depression Screening, Fall Risk Screening, Immunizations)    Hemoglobin A1C (%)   Date Value   11/25/2017 5.8   08/24/2016 5.9     LDL Cholesterol (mg/dL)   Date Value   09/17/2018 134 (H)     AST (U/L)   Date Value   09/17/2018 21     ALT (U/L)   Date Value   09/17/2018 20     BUN (mg/dL)   Date Value   09/17/2018 13      (goal A1C is < 7)   (goal LDL is <100) need 30-50% reduction from baseline     BP Readings from Last 3 Encounters:   12/01/17 (!) 125/95   10/02/17 119/84   09/18/17 139/87    (goal /80)      All Future Testing planned in [facility]:      Next Visit Date:  No future appointments.          Patient Active Problem List:     Prediabetes     Eyelid cyst     Dyslipidemia

## 2019-03-04 RX ORDER — VERAPAMIL HYDROCHLORIDE 40 MG/1
TABLET ORAL
Qty: 90 TABLET | Refills: 2 | OUTPATIENT
Start: 2019-03-04

## 2019-09-28 LAB
AVERAGE GLUCOSE: 120
HBA1C MFR BLD: 5.8 %

## 2019-10-23 ENCOUNTER — OFFICE VISIT (OUTPATIENT)
Dept: FAMILY MEDICINE CLINIC | Age: 54
End: 2019-10-23
Payer: COMMERCIAL

## 2019-10-23 VITALS
BODY MASS INDEX: 27.47 KG/M2 | TEMPERATURE: 97.7 F | HEART RATE: 87 BPM | WEIGHT: 175 LBS | OXYGEN SATURATION: 97 % | SYSTOLIC BLOOD PRESSURE: 138 MMHG | DIASTOLIC BLOOD PRESSURE: 80 MMHG | RESPIRATION RATE: 16 BRPM | HEIGHT: 67 IN

## 2019-10-23 DIAGNOSIS — G57.01 PIRIFORMIS SYNDROME OF RIGHT SIDE: ICD-10-CM

## 2019-10-23 DIAGNOSIS — M54.31 BILATERAL SCIATICA: Primary | ICD-10-CM

## 2019-10-23 DIAGNOSIS — R73.03 PRE-DIABETES: ICD-10-CM

## 2019-10-23 DIAGNOSIS — G89.29 CHRONIC BILATERAL LOW BACK PAIN WITHOUT SCIATICA: ICD-10-CM

## 2019-10-23 DIAGNOSIS — I34.1 MITRAL VALVE PROLAPSE: ICD-10-CM

## 2019-10-23 DIAGNOSIS — M54.32 BILATERAL SCIATICA: Primary | ICD-10-CM

## 2019-10-23 DIAGNOSIS — M51.36 DEGENERATIVE LUMBAR DISC: ICD-10-CM

## 2019-10-23 DIAGNOSIS — M15.3 POST-TRAUMATIC OSTEOARTHRITIS OF MULTIPLE JOINTS: ICD-10-CM

## 2019-10-23 DIAGNOSIS — M54.50 CHRONIC BILATERAL LOW BACK PAIN WITHOUT SCIATICA: ICD-10-CM

## 2019-10-23 DIAGNOSIS — M51.36 L4-L5 DISC BULGE: ICD-10-CM

## 2019-10-23 PROCEDURE — 96372 THER/PROPH/DIAG INJ SC/IM: CPT | Performed by: INTERNAL MEDICINE

## 2019-10-23 PROCEDURE — 99214 OFFICE O/P EST MOD 30 MIN: CPT | Performed by: INTERNAL MEDICINE

## 2019-10-23 RX ORDER — KETOROLAC TROMETHAMINE 30 MG/ML
60 INJECTION, SOLUTION INTRAMUSCULAR; INTRAVENOUS ONCE
Status: COMPLETED | OUTPATIENT
Start: 2019-10-23 | End: 2019-10-23

## 2019-10-23 RX ORDER — METAXALONE 800 MG/1
800 TABLET ORAL 3 TIMES DAILY
Qty: 90 TABLET | Refills: 5 | Status: SHIPPED | OUTPATIENT
Start: 2019-10-23 | End: 2019-11-22

## 2019-10-23 RX ADMIN — KETOROLAC TROMETHAMINE 60 MG: 30 INJECTION, SOLUTION INTRAMUSCULAR; INTRAVENOUS at 16:55

## 2019-10-23 ASSESSMENT — PATIENT HEALTH QUESTIONNAIRE - PHQ9
SUM OF ALL RESPONSES TO PHQ QUESTIONS 1-9: 0
2. FEELING DOWN, DEPRESSED OR HOPELESS: 0
SUM OF ALL RESPONSES TO PHQ QUESTIONS 1-9: 0
SUM OF ALL RESPONSES TO PHQ9 QUESTIONS 1 & 2: 0
1. LITTLE INTEREST OR PLEASURE IN DOING THINGS: 0

## 2019-10-26 ASSESSMENT — ENCOUNTER SYMPTOMS
COLOR CHANGE: 0
VOMITING: 0
NAUSEA: 0
ABDOMINAL PAIN: 0
STRIDOR: 0
DIARRHEA: 0
CHEST TIGHTNESS: 0
WHEEZING: 0
RECTAL PAIN: 0
CHOKING: 0
CONSTIPATION: 0
BACK PAIN: 1
BOWEL INCONTINENCE: 0
COUGH: 0
BLOOD IN STOOL: 0
SHORTNESS OF BREATH: 0

## 2019-11-15 ENCOUNTER — OFFICE VISIT (OUTPATIENT)
Dept: FAMILY MEDICINE CLINIC | Age: 54
End: 2019-11-15
Payer: COMMERCIAL

## 2019-11-15 VITALS
HEART RATE: 65 BPM | OXYGEN SATURATION: 98 % | DIASTOLIC BLOOD PRESSURE: 95 MMHG | TEMPERATURE: 97.8 F | SYSTOLIC BLOOD PRESSURE: 147 MMHG

## 2019-11-15 DIAGNOSIS — M54.16 LUMBAR RADICULOPATHY, ACUTE: Primary | ICD-10-CM

## 2019-11-15 PROCEDURE — 99213 OFFICE O/P EST LOW 20 MIN: CPT | Performed by: NURSE PRACTITIONER

## 2019-11-15 RX ORDER — METHYLPREDNISOLONE 4 MG/1
TABLET ORAL
Qty: 1 KIT | Refills: 0 | Status: SHIPPED | OUTPATIENT
Start: 2019-11-15 | End: 2019-11-21

## 2019-11-26 ENCOUNTER — INITIAL CONSULT (OUTPATIENT)
Dept: PAIN MANAGEMENT | Age: 54
End: 2019-11-26
Payer: COMMERCIAL

## 2019-11-26 VITALS
SYSTOLIC BLOOD PRESSURE: 122 MMHG | DIASTOLIC BLOOD PRESSURE: 83 MMHG | WEIGHT: 177 LBS | OXYGEN SATURATION: 99 % | HEIGHT: 67 IN | BODY MASS INDEX: 27.78 KG/M2 | HEART RATE: 87 BPM

## 2019-11-26 DIAGNOSIS — M47.816 LUMBAR FACET ARTHROPATHY: ICD-10-CM

## 2019-11-26 DIAGNOSIS — M54.41 CHRONIC BILATERAL LOW BACK PAIN WITH RIGHT-SIDED SCIATICA: Primary | ICD-10-CM

## 2019-11-26 DIAGNOSIS — M51.36 DDD (DEGENERATIVE DISC DISEASE), LUMBAR: ICD-10-CM

## 2019-11-26 DIAGNOSIS — G89.29 CHRONIC BILATERAL LOW BACK PAIN WITH RIGHT-SIDED SCIATICA: Primary | ICD-10-CM

## 2019-11-26 PROCEDURE — 99244 OFF/OP CNSLTJ NEW/EST MOD 40: CPT | Performed by: ANESTHESIOLOGY

## 2019-11-26 RX ORDER — GABAPENTIN 300 MG/1
300 CAPSULE ORAL NIGHTLY
Qty: 30 CAPSULE | Refills: 1 | Status: SHIPPED | OUTPATIENT
Start: 2019-11-26 | End: 2019-12-09 | Stop reason: ALTCHOICE

## 2019-11-26 RX ORDER — SENNOSIDES 8.6 MG
650 CAPSULE ORAL EVERY 8 HOURS PRN
COMMUNITY

## 2019-11-26 ASSESSMENT — ENCOUNTER SYMPTOMS
BACK PAIN: 1
RESPIRATORY NEGATIVE: 1
SINUS PAIN: 0

## 2019-12-09 ENCOUNTER — OFFICE VISIT (OUTPATIENT)
Dept: FAMILY MEDICINE CLINIC | Age: 54
End: 2019-12-09
Payer: COMMERCIAL

## 2019-12-09 VITALS
RESPIRATION RATE: 16 BRPM | DIASTOLIC BLOOD PRESSURE: 90 MMHG | TEMPERATURE: 97.4 F | HEART RATE: 90 BPM | HEIGHT: 67 IN | OXYGEN SATURATION: 99 % | SYSTOLIC BLOOD PRESSURE: 148 MMHG | BODY MASS INDEX: 27.91 KG/M2 | WEIGHT: 177.8 LBS

## 2019-12-09 DIAGNOSIS — M54.50 CHRONIC BILATERAL LOW BACK PAIN WITHOUT SCIATICA: ICD-10-CM

## 2019-12-09 DIAGNOSIS — M54.16 LUMBAR RADICULOPATHY, ACUTE: Primary | ICD-10-CM

## 2019-12-09 DIAGNOSIS — G89.29 CHRONIC BILATERAL LOW BACK PAIN WITHOUT SCIATICA: ICD-10-CM

## 2019-12-09 DIAGNOSIS — D72.819 LEUKOPENIA, UNSPECIFIED TYPE: ICD-10-CM

## 2019-12-09 DIAGNOSIS — M15.3 POST-TRAUMATIC OSTEOARTHRITIS OF MULTIPLE JOINTS: ICD-10-CM

## 2019-12-09 PROCEDURE — 99213 OFFICE O/P EST LOW 20 MIN: CPT | Performed by: INTERNAL MEDICINE

## 2019-12-09 ASSESSMENT — ENCOUNTER SYMPTOMS
COLOR CHANGE: 0
CHOKING: 0
CONSTIPATION: 0
STRIDOR: 0
BOWEL INCONTINENCE: 0
DIARRHEA: 0
SHORTNESS OF BREATH: 0
ABDOMINAL PAIN: 0
CHEST TIGHTNESS: 0
WHEEZING: 0
COUGH: 0
BACK PAIN: 1

## 2019-12-26 ENCOUNTER — TELEPHONE (OUTPATIENT)
Dept: PAIN MANAGEMENT | Age: 54
End: 2019-12-26

## 2019-12-26 DIAGNOSIS — D72.819 LEUKOPENIA, UNSPECIFIED TYPE: ICD-10-CM

## 2019-12-26 DIAGNOSIS — M54.50 CHRONIC BILATERAL LOW BACK PAIN WITHOUT SCIATICA: ICD-10-CM

## 2019-12-26 DIAGNOSIS — M54.16 LUMBAR RADICULOPATHY, ACUTE: ICD-10-CM

## 2019-12-26 DIAGNOSIS — G89.29 CHRONIC BILATERAL LOW BACK PAIN WITHOUT SCIATICA: ICD-10-CM

## 2019-12-27 ENCOUNTER — TELEPHONE (OUTPATIENT)
Dept: FAMILY MEDICINE CLINIC | Age: 54
End: 2019-12-27

## 2019-12-27 DIAGNOSIS — M15.3 POST-TRAUMATIC OSTEOARTHRITIS OF MULTIPLE JOINTS: ICD-10-CM

## 2019-12-27 DIAGNOSIS — M54.16 LUMBAR RADICULOPATHY, ACUTE: Primary | ICD-10-CM

## 2019-12-27 DIAGNOSIS — G89.29 CHRONIC BILATERAL LOW BACK PAIN WITHOUT SCIATICA: ICD-10-CM

## 2019-12-27 DIAGNOSIS — M54.50 CHRONIC BILATERAL LOW BACK PAIN WITHOUT SCIATICA: ICD-10-CM

## 2019-12-27 RX ORDER — VERAPAMIL HYDROCHLORIDE 40 MG/1
TABLET ORAL
Qty: 90 TABLET | Refills: 2 | Status: SHIPPED | OUTPATIENT
Start: 2019-12-27 | End: 2020-03-20

## 2019-12-29 RX ORDER — DICLOFENAC SODIUM 75 MG/1
TABLET, DELAYED RELEASE ORAL
Qty: 180 TABLET | Refills: 0 | Status: SHIPPED | OUTPATIENT
Start: 2019-12-29 | End: 2020-03-20

## 2019-12-30 DIAGNOSIS — D70.9 NEUTROPENIA, UNSPECIFIED TYPE (HCC): Primary | ICD-10-CM

## 2020-01-13 ENCOUNTER — HOSPITAL ENCOUNTER (OUTPATIENT)
Age: 55
Setting detail: SPECIMEN
Discharge: HOME | End: 2020-01-13
Payer: COMMERCIAL

## 2020-01-13 LAB
ABSOLUTE EOS #: 0.09 K/UL (ref 0–0.4)
ABSOLUTE IMMATURE GRANULOCYTE: 0 K/UL (ref 0–0.3)
ABSOLUTE LYMPH #: 1.93 K/UL (ref 1–4.8)
ABSOLUTE MONO #: 0.12 K/UL (ref 0.1–0.8)
BASOPHILS # BLD: 2 % (ref 0–2)
BASOPHILS ABSOLUTE: 0.06 K/UL (ref 0–0.2)
DIFFERENTIAL TYPE: ABNORMAL
EOSINOPHILS RELATIVE PERCENT: 3 % (ref 1–4)
FOLATE: 18.6 NG/ML
HCT VFR BLD CALC: 37.9 % (ref 36.3–47.1)
HEMOGLOBIN: 12.2 G/DL (ref 11.9–15.1)
IMMATURE GRANULOCYTES: 0 %
LYMPHOCYTES # BLD: 67 % (ref 24–44)
MCH RBC QN AUTO: 28.5 PG (ref 25.2–33.5)
MCHC RBC AUTO-ENTMCNC: 32.2 G/DL (ref 28.4–34.8)
MCV RBC AUTO: 88.6 FL (ref 82.6–102.9)
MONOCYTES # BLD: 4 % (ref 1–7)
MORPHOLOGY: NORMAL
NRBC AUTOMATED: 0 PER 100 WBC
PDW BLD-RTO: 13.8 % (ref 11.8–14.4)
PLATELET # BLD: 364 K/UL (ref 138–453)
PLATELET ESTIMATE: ABNORMAL
PMV BLD AUTO: 9.5 FL (ref 8.1–13.5)
RBC # BLD: 4.28 M/UL (ref 3.95–5.11)
RBC # BLD: ABNORMAL 10*6/UL
SEG NEUTROPHILS: 24 % (ref 36–66)
SEGMENTED NEUTROPHILS ABSOLUTE COUNT: 0.7 K/UL (ref 1.8–7.7)
TSH SERPL DL<=0.05 MIU/L-ACNC: 3.86 MIU/L (ref 0.3–5)
VITAMIN B-12: 813 PG/ML (ref 232–1245)
WBC # BLD: 2.9 K/UL (ref 3.5–11.3)
WBC # BLD: ABNORMAL 10*3/UL

## 2020-01-15 LAB
FLOW CYTOMETRY BL: NORMAL
SURGICAL PATHOLOGY REPORT: NORMAL

## 2020-01-17 LAB
SEND OUT REPORT: NORMAL
TEST NAME: NORMAL

## 2020-02-03 NOTE — TELEPHONE ENCOUNTER
Moe Navarro is requesting a refill on the following medications:   Requested Prescriptions     Pending Prescriptions Disp Refills    gabapentin (NEURONTIN) 300 MG capsule 30 capsule 1     Sig: Take 1 capsule by mouth nightly for 30 doses. Last OV 11/26/19    Future Appointments   Date Time Provider Malena Amanda   2/11/2020  8:00 AM MD [facility]       OARRS report sent to Dr. Dilan Tyler through alternative route for review.

## 2020-02-04 RX ORDER — GABAPENTIN 300 MG/1
300 CAPSULE ORAL NIGHTLY
Qty: 30 CAPSULE | Refills: 1 | Status: SHIPPED | OUTPATIENT
Start: 2020-02-04 | End: 2020-02-11 | Stop reason: SDUPTHER

## 2020-02-11 ENCOUNTER — OFFICE VISIT (OUTPATIENT)
Dept: PAIN MANAGEMENT | Age: 55
End: 2020-02-11
Payer: COMMERCIAL

## 2020-02-11 VITALS
DIASTOLIC BLOOD PRESSURE: 91 MMHG | OXYGEN SATURATION: 100 % | BODY MASS INDEX: 28.06 KG/M2 | HEART RATE: 87 BPM | HEIGHT: 67 IN | SYSTOLIC BLOOD PRESSURE: 139 MMHG | WEIGHT: 178.8 LBS

## 2020-02-11 PROBLEM — M51.369 DDD (DEGENERATIVE DISC DISEASE), LUMBAR: Status: ACTIVE | Noted: 2020-02-11

## 2020-02-11 PROBLEM — M51.36 DDD (DEGENERATIVE DISC DISEASE), LUMBAR: Status: ACTIVE | Noted: 2020-02-11

## 2020-02-11 PROBLEM — G89.29 CHRONIC BILATERAL LOW BACK PAIN WITH BILATERAL SCIATICA: Status: ACTIVE | Noted: 2020-02-11

## 2020-02-11 PROBLEM — Z79.899 MEDICATION MANAGEMENT: Chronic | Status: ACTIVE | Noted: 2020-02-11

## 2020-02-11 PROBLEM — M54.42 CHRONIC BILATERAL LOW BACK PAIN WITH BILATERAL SCIATICA: Status: ACTIVE | Noted: 2020-02-11

## 2020-02-11 PROBLEM — M54.41 CHRONIC BILATERAL LOW BACK PAIN WITH BILATERAL SCIATICA: Status: ACTIVE | Noted: 2020-02-11

## 2020-02-11 PROCEDURE — 99213 OFFICE O/P EST LOW 20 MIN: CPT | Performed by: ANESTHESIOLOGY

## 2020-02-11 RX ORDER — GABAPENTIN 300 MG/1
300 CAPSULE ORAL NIGHTLY
Qty: 60 CAPSULE | Refills: 1 | Status: SHIPPED | OUTPATIENT
Start: 2020-02-11 | End: 2020-03-17 | Stop reason: SDUPTHER

## 2020-02-11 RX ORDER — METAXALONE 800 MG/1
800 TABLET ORAL 3 TIMES DAILY
COMMUNITY
Start: 2020-01-11 | End: 2020-05-11

## 2020-02-11 ASSESSMENT — ENCOUNTER SYMPTOMS
DIARRHEA: 0
CONSTIPATION: 0
SHORTNESS OF BREATH: 0

## 2020-02-11 NOTE — PROGRESS NOTES
The patient is a 53 y. o. Non-/non  female. Chief Complaint   Patient presents with    Follow-up     Would like to discuss procedure vs. surgery    Medication Refill    Leg Pain     Bilateral         HPI   77-year-old woman who was seen in my clinic 3 months back for chronic low back pain and right-sided sciatica  Patient was advised for lumbar epidural injection it was a started on gabapentin  She canceled her injections  Currently taking gabapentin l  states that she find it little bit helpful    Medication Refill:     Pain score Today:  7  Adverse effects (Constipation / Nausea / Sedation / sexual Dysfunction / others) : None  Mood: good  Sleep pattern and quality: good  Activity level: fair    Pill count Today: N/A  Last dose taken  N/A  OARRS report reviewed today: yes  ER/Hospitalizations/PCP visit related to pain since last visit:no   Any legal problems e.g. DUI etc.:No  Satisfied with current management: Yes, but would like to do something more    Opioid Contract: n/a   Last Urine Dug screen dated: n/a    Lab Results   Component Value Date    LABA1C 5.8 09/28/2019     Lab Results   Component Value Date     11/25/2017       Past Medical History, Past Surgical History, Social History, Allergies and Medications reviewed and updated in EPIC as indicated    Family History reviewed and is noncontributory.             Past Medical History:   Diagnosis Date    Arthritis     Chronic bilateral low back pain with bilateral sciatica 2/11/2020    DDD (degenerative disc disease), lumbar 2/11/2020    MVP (mitral valve prolapse)       Past Surgical History:   Procedure Laterality Date    BREAST SURGERY Bilateral     biopsy    CHOLECYSTECTOMY      COLONOSCOPY      ENDOMETRIAL ABLATION      TUBAL LIGATION       Social History     Socioeconomic History    Marital status:      Spouse name: None    Number of children: None    Years of education: None    Highest education level: None Occupational History    None   Social Needs    Financial resource strain: None    Food insecurity:     Worry: None     Inability: None    Transportation needs:     Medical: None     Non-medical: None   Tobacco Use    Smoking status: Never Smoker    Smokeless tobacco: Never Used   Substance and Sexual Activity    Alcohol use: Yes     Comment: rare    Drug use: No    Sexual activity: None   Lifestyle    Physical activity:     Days per week: None     Minutes per session: None    Stress: None   Relationships    Social connections:     Talks on phone: None     Gets together: None     Attends Taoism service: None     Active member of club or organization: None     Attends meetings of clubs or organizations: None     Relationship status: None    Intimate partner violence:     Fear of current or ex partner: None     Emotionally abused: None     Physically abused: None     Forced sexual activity: None   Other Topics Concern    None   Social History Narrative    None     Family History   Problem Relation Age of Onset    High Blood Pressure Mother     High Cholesterol Mother     Other Mother         blood clots      No Known Allergies  Patient has no known allergies. Vitals:    02/11/20 0802   BP: (!) 139/91   Pulse: 87   SpO2: 100%     Current Outpatient Medications   Medication Sig Dispense Refill    metaxalone (SKELAXIN) 800 MG tablet Take 800 mg by mouth 3 times daily      gabapentin (NEURONTIN) 300 MG capsule Take 1 capsule by mouth nightly for 30 doses.  60 capsule 1    diclofenac (VOLTAREN) 75 MG EC tablet take 1 tablet by mouth twice a day 180 tablet 0    verapamil (CALAN) 40 MG tablet take 1 tablet by mouth three times a day 90 tablet 2    acetaminophen (TYLENOL) 650 MG extended release tablet Take 650 mg by mouth every 8 hours as needed for Pain      Elastic Bandages & Supports (LUMBAR BACK BRACE/SUPPORT PAD) MISC 1 Units by Does not apply route daily Pneumatic off loadng lumbar brace 1 each 0     No current facility-administered medications for this visit. Review of Systems   Constitutional: Negative for chills and fever. Respiratory: Negative for shortness of breath. Cardiovascular: Negative for chest pain, palpitations and leg swelling. Gastrointestinal: Negative for constipation and diarrhea. Genitourinary: Negative for difficulty urinating. Musculoskeletal:        Bilateral leg pain   Neurological: Negative for dizziness, light-headedness and headaches. Objective:  General Appearance:  Well-appearing, in no acute distress, in pain and uncomfortable. Vital signs: (most recent): Blood pressure (!) 139/91, pulse 87, height 5' 7\" (1.702 m), weight 178 lb 12.8 oz (81.1 kg), SpO2 100 %. Vital signs are normal.  No fever. Output: Producing urine and producing stool. HEENT: Normal HEENT exam.    Lungs:  Normal effort and normal respiratory rate. Breath sounds clear to auscultation. She is not in respiratory distress. Heart: Normal rate. Abdomen: Abdomen is soft. Extremities: Normal range of motion. Neurological: Patient is alert and oriented to person, place and time. Normal strength. Patient has normal reflexes, normal muscle tone and normal coordination. Pupils:  Pupils are equal, round, and reactive to light. Pupils are equal.   Skin:  Warm, dry and pale. No rash, ecchymosis, cyanosis or ulceration.       Assessment & Plan   74-year-old woman with 3-year history of low back pain  Pain radiates down right leg all the way to the foot  Associated symptoms include intermittent right leg and numbness  No focal neurological deficit  Negative for red flags  No previous lumbar spine injection history  No previous lumbar spine surgical history  Did physical therapy in 2018  Have tried oral steroid pack, muscle relaxant and NSAIDs    MR lumbar without wjuprwft27/23/2019  [employer] System  Result Narrative   Clinical: Back pain and abnormal    Examination:  MRI Lumbar spine    Procedure:  Multiplanar multisequence MR imaging performed through the lumbar spine. Comparison:  X-ray lumbar spine 3/23/2019    Findings:    Lumbar vertebral body height and signal and alignment are normal. Conus medullaris is unremarkable. At L1-2, L2-3, L3-4, there is no disc herniation or spinal stenosis.  Disc space height preserved. At L4-5, there is mild disc bulge and mild facet hypertrophic changes.  No spinal stenosis. At L5-S1, there is mild degenerative change.  There is moderate disc bulge and fairly severe bilateral facet hypertrophic changes. IMPRESSION:  1.  L5-S1 and to a lesser degree L4-5 degenerative changes and disc bulge. 2.  No spinal stenosis. 1. Medication management    2. DDD (degenerative disc disease), lumbar    3. Chronic bilateral low back pain with bilateral sciatica        Will increase gabapentin to twice a day    Epidural steroid injection discussed again today  Information material provided  Patient will contact us if she decide to proceed with this treatment option      No orders of the defined types were placed in this encounter. Orders Placed This Encounter   Medications    gabapentin (NEURONTIN) 300 MG capsule     Sig: Take 1 capsule by mouth nightly for 30 doses. Dispense:  60 capsule     Refill:  1      Controlled Substance Monitoring:    Acute and Chronic Pain Monitoring:   RX Monitoring 2/11/2020   Periodic Controlled Substance Monitoring No signs of potential drug abuse or diversion identified. ;Possible medication side effects, risk of tolerance/dependence & alternative treatments discussed.            Electronically signed by Wiley Grigsby MD on 2/11/2020 at 8:25 AM

## 2020-03-10 RX ORDER — GABAPENTIN 300 MG/1
CAPSULE ORAL
Qty: 60 CAPSULE | Refills: 1 | OUTPATIENT
Start: 2020-03-10

## 2020-03-17 RX ORDER — GABAPENTIN 300 MG/1
CAPSULE ORAL
Qty: 60 CAPSULE | Refills: 1 | OUTPATIENT
Start: 2020-03-17

## 2020-03-17 RX ORDER — GABAPENTIN 300 MG/1
300 CAPSULE ORAL NIGHTLY
Qty: 60 CAPSULE | Refills: 1 | Status: SHIPPED | OUTPATIENT
Start: 2020-03-17 | End: 2020-04-10 | Stop reason: SDUPTHER

## 2020-03-17 NOTE — TELEPHONE ENCOUNTER
Kelly Flores is requesting a refill on the following medications:   Requested Prescriptions     Pending Prescriptions Disp Refills    gabapentin (NEURONTIN) 300 MG capsule 60 capsule 1     Sig: Take 1 capsule by mouth nightly for 30 doses. Last OV 2/11/2020    Future Appointments   Date Time Provider Malena Amanda   4/10/2020  8:20 AM GONZÁLEZ Hammer CNP [facility]       OARRS report sent to Dr. Nayan Tomlinson through alternative route for review.

## 2020-03-20 RX ORDER — DICLOFENAC SODIUM 75 MG/1
TABLET, DELAYED RELEASE ORAL
Qty: 180 TABLET | Refills: 0 | Status: SHIPPED | OUTPATIENT
Start: 2020-03-20 | End: 2020-05-26

## 2020-03-20 RX ORDER — VERAPAMIL HYDROCHLORIDE 40 MG/1
TABLET ORAL
Qty: 90 TABLET | Refills: 2 | Status: SHIPPED | OUTPATIENT
Start: 2020-03-20 | End: 2020-05-26

## 2020-04-06 ENCOUNTER — TELEPHONE (OUTPATIENT)
Dept: PAIN MANAGEMENT | Age: 55
End: 2020-04-06

## 2020-04-10 ENCOUNTER — TELEMEDICINE (OUTPATIENT)
Dept: PAIN MANAGEMENT | Age: 55
End: 2020-04-10
Payer: COMMERCIAL

## 2020-04-10 VITALS — HEIGHT: 67 IN | WEIGHT: 177 LBS | BODY MASS INDEX: 27.78 KG/M2

## 2020-04-10 PROCEDURE — 99213 OFFICE O/P EST LOW 20 MIN: CPT | Performed by: ANESTHESIOLOGY

## 2020-04-10 RX ORDER — GABAPENTIN 300 MG/1
300 CAPSULE ORAL 3 TIMES DAILY
Qty: 90 CAPSULE | Refills: 1 | Status: SHIPPED | OUTPATIENT
Start: 2020-04-16 | End: 2020-06-09 | Stop reason: SDUPTHER

## 2020-04-10 ASSESSMENT — ENCOUNTER SYMPTOMS
RESPIRATORY NEGATIVE: 1
BACK PAIN: 1

## 2020-05-11 RX ORDER — METAXALONE 800 MG/1
TABLET ORAL
Qty: 90 TABLET | Refills: 5 | Status: SHIPPED | OUTPATIENT
Start: 2020-05-11 | End: 2020-12-07

## 2020-05-26 RX ORDER — DICLOFENAC SODIUM 75 MG/1
TABLET, DELAYED RELEASE ORAL
Qty: 180 TABLET | Refills: 0 | Status: SHIPPED | OUTPATIENT
Start: 2020-05-26 | End: 2020-06-17

## 2020-05-26 RX ORDER — VERAPAMIL HYDROCHLORIDE 40 MG/1
TABLET ORAL
Qty: 90 TABLET | Refills: 2 | Status: SHIPPED | OUTPATIENT
Start: 2020-05-26 | End: 2020-06-23

## 2020-05-28 ENCOUNTER — TELEPHONE (OUTPATIENT)
Dept: PAIN MANAGEMENT | Age: 55
End: 2020-05-28

## 2020-06-09 ENCOUNTER — TELEMEDICINE (OUTPATIENT)
Dept: PAIN MANAGEMENT | Age: 55
End: 2020-06-09
Payer: COMMERCIAL

## 2020-06-09 PROCEDURE — 99214 OFFICE O/P EST MOD 30 MIN: CPT | Performed by: ANESTHESIOLOGY

## 2020-06-09 RX ORDER — GABAPENTIN 300 MG/1
300 CAPSULE ORAL 3 TIMES DAILY
Qty: 90 CAPSULE | Refills: 0 | Status: SHIPPED | OUTPATIENT
Start: 2020-06-09 | End: 2020-08-14

## 2020-06-09 ASSESSMENT — ENCOUNTER SYMPTOMS
BACK PAIN: 1
RESPIRATORY NEGATIVE: 1

## 2020-06-09 NOTE — PROGRESS NOTES
Ceftin [cefuroxime axetil]   There were no vitals filed for this visit. Current Outpatient Medications   Medication Sig Dispense Refill    gabapentin (NEURONTIN) 300 MG capsule Take 1 capsule by mouth 3 times daily for 30 doses. 90 capsule 0    verapamil (CALAN) 40 MG tablet take 1 tablet by mouth three times a day 90 tablet 2    diclofenac (VOLTAREN) 75 MG EC tablet take 1 tablet by mouth twice a day 180 tablet 0    metaxalone (SKELAXIN) 800 MG tablet take 1 tablet by mouth three times a day 90 tablet 5    acetaminophen (TYLENOL) 650 MG extended release tablet Take 650 mg by mouth every 8 hours as needed for Pain      Elastic Bandages & Supports (LUMBAR BACK BRACE/SUPPORT PAD) MISC 1 Units by Does not apply route daily Pneumatic off loadng lumbar brace 1 each 0     No current facility-administered medications for this visit. Review of Systems   Constitutional: Negative. Respiratory: Negative. Musculoskeletal: Positive for back pain. Neurological: Positive for numbness. Objective:  General Appearance:  Well-appearing, in no acute distress, uncomfortable and in pain. Vital signs: (most recent): There were no vitals taken for this visit. Output: Producing urine and producing stool. HEENT: (No visible masses in neck  Range of motion appears normal on cervical spine  External ears appears normal)    Lungs:  Normal effort. She is not in respiratory distress. Neurological: Patient is alert and oriented to person, place and time.  (Able to follow command    Psych  Mood is good  Affect is normal). Skin:  No rash or cyanosis.       Assessment & Plan     This is a 67-year-old woman with history of chronic lower back pain  Onset more than 2 years ago  Pain is radiating down right leg all the way to the top of the foot  Associated symptoms include numbness and tingling  Pain aggravated with standing lifting bending twisting turning  Pain is constant intensity fluctuate between 6-8 over 10  Denies any loss of bladder or bowel control  No history of fever chills or weight loss    Patient completed physical therapy and also tried chiropractic treatment  Medications tried include NSAIDs muscle relaxant and gabapentin    None of these interventions have helped manage her symptoms  She is using a lumbar back brace  She had an MRI lumbar spine in October 2019 that showed lumbar degenerative disc disease  Patient had a recent consultation by the spine surgeon who did not advise for any surgery and recommended for epidural injection    Currently she is taking gabapentin 300 mg 3 times a day, denies any side effects  No significant changes in symptoms with the use of medication  1. Chronic bilateral low back pain with right-sided sciatica    2. DDD (degenerative disc disease), lumbar    3. Medication management        -Continue Neurontin  -Continue lumbar back brace Will schedule for right-sided lumbar epidural injection x2  -2 to 3 weeks apart please schedule patient for lumbar epidural injection x2 2 weeks apart and then office visit with me few weeks after the second injection please schedule patient for lumbar epidural injection x2 2 weeks apart and then office visit with me  Orders Placed This Encounter   Procedures    OR INJECT ANES/STEROID FORAMEN LUMBAR/SACRAL W IMG GUIDE ,1 LEVEL      Orders Placed This Encounter   Medications    gabapentin (NEURONTIN) 300 MG capsule     Sig: Take 1 capsule by mouth 3 times daily for 30 doses. Dispense:  90 capsule     Refill:  0        Controlled Substance Monitoring:    Acute and Chronic Pain Monitoring:   RX Monitoring 6/9/2020   Periodic Controlled Substance Monitoring No signs of potential drug abuse or diversion identified. ;Possible medication side effects, risk of tolerance/dependence & alternative treatments discussed.          Mitchel Huntley is a 53 y.o. female being evaluated by a Virtual Visit (video visit) encounter to address concerns as

## 2020-06-12 ENCOUNTER — TELEPHONE (OUTPATIENT)
Dept: PAIN MANAGEMENT | Age: 55
End: 2020-06-12

## 2020-06-17 RX ORDER — DICLOFENAC SODIUM 75 MG/1
TABLET, DELAYED RELEASE ORAL
Qty: 180 TABLET | Refills: 0 | Status: SHIPPED | OUTPATIENT
Start: 2020-06-17 | End: 2020-09-10

## 2020-06-23 RX ORDER — VERAPAMIL HYDROCHLORIDE 40 MG/1
TABLET ORAL
Qty: 90 TABLET | Refills: 2 | Status: SHIPPED | OUTPATIENT
Start: 2020-06-23 | End: 2020-12-31

## 2020-06-28 ENCOUNTER — HOSPITAL ENCOUNTER (OUTPATIENT)
Dept: PREADMISSION TESTING | Age: 55
Setting detail: SPECIMEN
Discharge: HOME | End: 2020-07-02
Payer: COMMERCIAL

## 2020-06-28 PROCEDURE — U0004 COV-19 TEST NON-CDC HGH THRU: HCPCS

## 2020-06-29 LAB
SARS-COV-2, PCR: NOT DETECTED
SARS-COV-2, RAPID: NORMAL
SARS-COV-2: NORMAL
SOURCE: NORMAL

## 2020-07-10 ENCOUNTER — TELEPHONE (OUTPATIENT)
Dept: PAIN MANAGEMENT | Age: 55
End: 2020-07-10

## 2020-07-10 NOTE — TELEPHONE ENCOUNTER
Pt called in concerning her upcoming appts listed on my chart. I tried to reach the office and no one was available. Pt stated she has the following appts. 07/12 Covid Screening  07/15 Procedure follow up  07/16 Procedure    Please confirm with pt the correct appts.

## 2020-07-12 ENCOUNTER — HOSPITAL ENCOUNTER (OUTPATIENT)
Dept: PREADMISSION TESTING | Age: 55
Setting detail: SPECIMEN
Discharge: HOME | End: 2020-07-16
Payer: COMMERCIAL

## 2020-07-12 PROCEDURE — U0004 COV-19 TEST NON-CDC HGH THRU: HCPCS

## 2020-07-13 ENCOUNTER — PATIENT MESSAGE (OUTPATIENT)
Dept: PAIN MANAGEMENT | Age: 55
End: 2020-07-13

## 2020-07-13 RX ORDER — GABAPENTIN 300 MG/1
CAPSULE ORAL
Qty: 90 CAPSULE | Refills: 0 | OUTPATIENT
Start: 2020-07-13

## 2020-07-14 ENCOUNTER — TELEPHONE (OUTPATIENT)
Dept: PRIMARY CARE CLINIC | Age: 55
End: 2020-07-14

## 2020-07-14 LAB
SARS-COV-2, PCR: NOT DETECTED
SARS-COV-2, RAPID: NORMAL
SARS-COV-2: NORMAL
SOURCE: NORMAL

## 2020-07-16 ENCOUNTER — APPOINTMENT (OUTPATIENT)
Dept: GENERAL RADIOLOGY | Age: 55
End: 2020-07-16
Attending: ANESTHESIOLOGY
Payer: COMMERCIAL

## 2020-07-16 ENCOUNTER — HOSPITAL ENCOUNTER (OUTPATIENT)
Age: 55
Setting detail: OUTPATIENT SURGERY
Discharge: HOME | End: 2020-07-16
Attending: ANESTHESIOLOGY | Admitting: ANESTHESIOLOGY
Payer: COMMERCIAL

## 2020-07-16 VITALS
HEIGHT: 67 IN | TEMPERATURE: 97.2 F | OXYGEN SATURATION: 99 % | DIASTOLIC BLOOD PRESSURE: 73 MMHG | BODY MASS INDEX: 27.94 KG/M2 | RESPIRATION RATE: 16 BRPM | WEIGHT: 178 LBS | HEART RATE: 70 BPM | SYSTOLIC BLOOD PRESSURE: 122 MMHG

## 2020-07-16 PROBLEM — M54.41 CHRONIC BILATERAL LOW BACK PAIN WITH RIGHT-SIDED SCIATICA: Chronic | Status: ACTIVE | Noted: 2020-02-11

## 2020-07-16 PROBLEM — G89.29 CHRONIC BILATERAL LOW BACK PAIN WITH RIGHT-SIDED SCIATICA: Chronic | Status: ACTIVE | Noted: 2020-02-11

## 2020-07-16 PROCEDURE — 99152 MOD SED SAME PHYS/QHP 5/>YRS: CPT | Performed by: ANESTHESIOLOGY

## 2020-07-16 PROCEDURE — 6360000002 HC RX W HCPCS: Performed by: ANESTHESIOLOGY

## 2020-07-16 PROCEDURE — 2500000003 HC RX 250 WO HCPCS: Performed by: ANESTHESIOLOGY

## 2020-07-16 PROCEDURE — 3600000050 HC PAIN LEVEL 1 BASE: Performed by: ANESTHESIOLOGY

## 2020-07-16 PROCEDURE — 2580000003 HC RX 258: Performed by: ANESTHESIOLOGY

## 2020-07-16 PROCEDURE — 64484 NJX AA&/STRD TFRM EPI L/S EA: CPT | Performed by: ANESTHESIOLOGY

## 2020-07-16 PROCEDURE — 7100000011 HC PHASE II RECOVERY - ADDTL 15 MIN: Performed by: ANESTHESIOLOGY

## 2020-07-16 PROCEDURE — 7100000010 HC PHASE II RECOVERY - FIRST 15 MIN: Performed by: ANESTHESIOLOGY

## 2020-07-16 PROCEDURE — 3209999900 FLUORO FOR SURGICAL PROCEDURES

## 2020-07-16 PROCEDURE — 6360000004 HC RX CONTRAST MEDICATION: Performed by: ANESTHESIOLOGY

## 2020-07-16 PROCEDURE — 2709999900 HC NON-CHARGEABLE SUPPLY: Performed by: ANESTHESIOLOGY

## 2020-07-16 PROCEDURE — 64483 NJX AA&/STRD TFRM EPI L/S 1: CPT | Performed by: ANESTHESIOLOGY

## 2020-07-16 RX ORDER — MIDAZOLAM HYDROCHLORIDE 1 MG/ML
INJECTION INTRAMUSCULAR; INTRAVENOUS PRN
Status: DISCONTINUED | OUTPATIENT
Start: 2020-07-16 | End: 2020-07-16 | Stop reason: ALTCHOICE

## 2020-07-16 RX ORDER — SODIUM CHLORIDE 0.9 % (FLUSH) 0.9 %
10 SYRINGE (ML) INJECTION EVERY 12 HOURS SCHEDULED
Status: DISCONTINUED | OUTPATIENT
Start: 2020-07-16 | End: 2020-07-16 | Stop reason: HOSPADM

## 2020-07-16 RX ORDER — SODIUM CHLORIDE 0.9 % (FLUSH) 0.9 %
10 SYRINGE (ML) INJECTION PRN
Status: DISCONTINUED | OUTPATIENT
Start: 2020-07-16 | End: 2020-07-16 | Stop reason: HOSPADM

## 2020-07-16 RX ORDER — LIDOCAINE HYDROCHLORIDE 10 MG/ML
INJECTION, SOLUTION INFILTRATION; PERINEURAL PRN
Status: DISCONTINUED | OUTPATIENT
Start: 2020-07-16 | End: 2020-07-16 | Stop reason: ALTCHOICE

## 2020-07-16 RX ORDER — FENTANYL CITRATE 50 UG/ML
INJECTION, SOLUTION INTRAMUSCULAR; INTRAVENOUS PRN
Status: DISCONTINUED | OUTPATIENT
Start: 2020-07-16 | End: 2020-07-16 | Stop reason: ALTCHOICE

## 2020-07-16 RX ORDER — DEXAMETHASONE SODIUM PHOSPHATE 10 MG/ML
INJECTION INTRAMUSCULAR; INTRAVENOUS PRN
Status: DISCONTINUED | OUTPATIENT
Start: 2020-07-16 | End: 2020-07-16 | Stop reason: ALTCHOICE

## 2020-07-16 RX ADMIN — SODIUM CHLORIDE, PRESERVATIVE FREE 10 ML: 5 INJECTION INTRAVENOUS at 08:06

## 2020-07-16 ASSESSMENT — PAIN DESCRIPTION - FREQUENCY: FREQUENCY: CONTINUOUS

## 2020-07-16 ASSESSMENT — PAIN DESCRIPTION - DESCRIPTORS: DESCRIPTORS: ACHING

## 2020-07-16 ASSESSMENT — PAIN DESCRIPTION - ORIENTATION: ORIENTATION: LOWER

## 2020-07-16 ASSESSMENT — PAIN SCALES - GENERAL
PAINLEVEL_OUTOF10: 3
PAINLEVEL_OUTOF10: 1

## 2020-07-16 ASSESSMENT — PAIN DESCRIPTION - LOCATION: LOCATION: BACK

## 2020-07-16 ASSESSMENT — PAIN DESCRIPTION - PAIN TYPE: TYPE: CHRONIC PAIN

## 2020-07-16 NOTE — OP NOTE
Operative Note      Patient: Kirill Goodwin  YOB: 1965  MRN: [id number]    Date of Procedure: 7/16/2020    Pre-Op Diagnosis: DX CHRONIC CHRISTIAN LOW BACK PAIN WITH RIGHT SIDE SCIATICA, DDD LUMBAR    Post-Op Diagnosis: Same       Procedure(s):  RIGHT L4 AND L5 EPIDURAL STEROID INJECTION    Surgeon(s):  Katie Granda MD    Assistant:   * No surgical staff found *    Anesthesia: IV Sedation    Estimated Blood Loss (mL): Minimal    Complications: None    Specimens:   * No specimens in log *    Implants:  * No implants in log *      Drains: * No LDAs found *    Findings: n/a    Detailed Description of Procedure:     Patient Name: Kirill Goodwin   YOB: 1965  Room/Bed: [facility] OR Pool/NONE  Medical Record Number: [id number]  Date: 7/16/2020       Preoperative Diagnosis:    Chronic lower back pain with right-sided sciatica    Postoperative diagnosis:   Chronic lower back pain with right-sided sciatica    Blood Loss: none    Procedure Performed:  Transforaminal lumbar epidural steroid injection at the levels of L4 and L5 on the Right side under fluoroscopic guidance. Procedure: The Patient was seen in the preop area, chart was reviewed, informed consent was obtained. Patient was taken to procedure room and was placed in prone position. Vital signs were monitored through out the  Procedure. A time out was completed. The skin over the back was prepped and draped in sterile manner. The target point was marked in ipsilateral obliques just below the pedicle at the target levels. Skin and deep tissues were anesthetized with 1 % lidocaine. A 20-gauge, spinal needle was advanced  under fluoroscopy guidance in AP / Oblique and lateral views, such that the tip of the needle lies in the neuroforamina at about the 6 o'clock position under the pedicle of the target vertebra. This was confirmed with AP and lateral views of the fluoroscopy.      Then after negative aspiration contrast dye Omnipaque-180 was injected with live fluoroscopy in AP views that showed  spread of the contrast in the epidural space  and no vascular runoff or intrathecal spread. Finally 3 ml of treatment solution containing 5 ml of  1 % PF lidocaine and 1 ml of dexamethasone 10 mg / ml was injected. The needle was removed and a Band-Aid was placed over the needle  insertion site. The patient's vital signs remained stable and the patient tolerated the procedure well. The same procedure was then repeated at right at L 5 level with same technique. The remaining 3 ml of treatment solution was injected at that. The total dose of steroid injected today was dexamethasone 10 mg. Electronically signed by Jade Aponte MD on 7/16/2020 at 9:26 AM  SEDATION NOTE:    ASA CLASSIFICATION  2  MP   CLASSIFICATION  2    · Moderate intravenous conscious sedation was supervised by Dr. Meka Schilling  . The patient was independently monitored by a Registered Nurse assigned to the Procedure Room  . Monitoring included automated blood pressure, continuous EKG, Capnography and continuous pulse oximetry. . The detailed Conscious Record is permanently stored in the [place] [id number].      The following is the conscious sedation record;  Start Time:  0913  End times:  0925  Duration:  12 minutes  MEDS GIVEN 2 MG VERSED  MCG FENTANYL          Electronically signed by Jade Aponte MD on 7/16/2020 at 9:25 AM

## 2020-07-16 NOTE — H&P
History and Physical Service   [facility] 12    HISTORY AND PHYSICAL EXAMINATION            Date of Evaluation: 7/16/2020  Patient name:  Jurgen Clark  MRN:   [id number]  YOB: 1965  PCP:    Roberth Rios DO    History Obtained From:     Patient    History of Present Illness: This is Jurgen Clark a 53 y.o. female who presents today for a right L4 and L5 epidural steroid injection by Dr. Jojo Valencia due to chronic bilateral low back pain with right side sciatica, lumbar DDD. The patient's chief complaint is constant, 4-9/10 low back pain that radiates to bilateral legs. The pt has had low back pain for the past year. The pain is not aggravated by anything in particular; and is minimally relieved with Voltaren and Tylenol. Bilateral legs have numbness and tingling. Pt denies weakness in her legs. Pt denies loss of bowel or urinary function. Denies fever, chills, chest pain, dyspnea, rashes, open sores, and wounds. Pt denies history of diabetes. Voltaren was last taken on 07/15/2020. Past Medical History:     Past Medical History:   Diagnosis Date    Arthritis     Chronic bilateral low back pain with bilateral sciatica 2/11/2020    DDD (degenerative disc disease), lumbar 2/11/2020    MVP (mitral valve prolapse)         Past Surgical History:     Past Surgical History:   Procedure Laterality Date    BREAST SURGERY Bilateral     biopsy    CHOLECYSTECTOMY      COLONOSCOPY      ENDOMETRIAL ABLATION      TUBAL LIGATION          Medications Prior to Admission:     Prior to Admission medications    Medication Sig Start Date End Date Taking?  Authorizing Provider   magnesium citrate solution Take 296 mLs by mouth once   Yes Historical Provider, MD   verapamil (CALAN) 40 MG tablet take 1 tablet by mouth three times a day 6/23/20  Yes GONZÁLEZ Campos CNP   diclofenac (VOLTAREN) 75 MG EC tablet take 1 tablet by mouth twice a day 6/17/20  Yes Roberth Rios DO gabapentin (NEURONTIN) 300 MG capsule Take 1 capsule by mouth 3 times daily for 30 doses. 6/9/20 7/16/20 Yes Laura Cheng MD   [facility]) 800 MG tablet take 1 tablet by mouth three times a day 5/11/20  Yes Manuela Villela DO   acetaminophen (TYLENOL) 650 MG extended release tablet Take 650 mg by mouth every 8 hours as needed for Pain   Yes Historical Provider, MD   Elastic Bandages & Supports (LUMBAR BACK BRACE/SUPPORT PAD) MISC 1 Units by Does not apply route daily Pneumatic off loadng lumbar brace 11/26/19 11/25/20  Laura Cheng MD        Allergies:     Ceftin [cefuroxime axetil]    Social History:     Tobacco:    reports that she has never smoked. She has never used smokeless tobacco.  Alcohol:      reports current alcohol use. Drug Use:  reports no history of drug use. Family History:     Family History   Problem Relation Age of Onset    High Blood Pressure Mother     High Cholesterol Mother     Other Mother         blood clots        Review of Systems:     Positive and Negative as described in HPI. CONSTITUTIONAL: Negative for fevers, chills, sweats, fatigue, and weight loss. HEENT: Pt wears glasses. Negative for hearing changes, rhinorrhea, and throat pain. RESPIRATORY: Negative for shortness of breath, cough, congestion, and wheezing. CARDIOVASCULAR: History of MVP. Negative for chest pain, blood clot, irregular heartbeat, and palpitations. GASTROINTESTINAL: Acid reflux. Negative for nausea, vomiting, diarrhea, constipation, change in bowel habits, and abdominal pain. GENITOURINARY: Negative for difficulty of urination, burning with urination, and frequency. INTEGUMENT: Negative for rash, skin lesions, and easy bruising. HEMATOLOGIC/LYMPHATIC: Negative for swelling/edema. ALLERGIC/IMMUNOLOGIC: Negative for urticaria and itching. ENDOCRINE: Negative for increase in drinking, increase in urination, and heat or cold intolerance. MUSCULOSKELETAL: See HPI.    NEUROLOGICAL: Numbness and tingling in bilateral legs. Negative for headaches, dizziness, and lightheadedness. BEHAVIOR/PSYCH: Anxiety. Negative for depression. Physical Exam:   BP (!) 154/94   Pulse 77   Temp 96.9 °F (36.1 °C) (Temporal)   Resp 20   Ht 5' 7\" (1.702 m)   Wt 178 lb (80.7 kg)   SpO2 100%   BMI 27.88 kg/m²   No LMP recorded. Patient has had an ablation. No obstetric history on file. No results for input(s): POCGLU in the last 72 hours. General Appearance:  Alert, well appearing, and in no acute distress. Mental status: Oriented to person, place, and time. Head: Normocephalic and atraumatic. Eye: No icterus, redness, pupils equal and reactive, extraocular eye movements intact, and conjunctiva clear. Ear: Hearing grossly intact. Nose: No drainage noted. Mouth: Mucous membranes moist.  Neck: Supple and no carotid bruits noted. Lungs: Bilateral equal air entry, clear to auscultation, no wheezing, rales or rhonchi, and normal effort. Cardiovascular: Normal rate, regular rhythm, no murmur, gallop, and rub. Abdomen: Soft, non-tender, non-distended, and active bowel sounds. Neurologic: Normal speech and cranial nerves II through XII grossly intact. Strength 5/5 bilaterally. Skin: No gross lesions, rashes, bruising, or bleeding on exposed skin area. Extremities: Posterior tibial pulses 2+ bilaterally. No pedal edema. No calf tenderness with palpation. Psych: Normal affect. Investigations:      Laboratory Testing:  No results found for this or any previous visit (from the past 24 hour(s)). No results for input(s): HGB, HCT, WBC, MCV, PLATELET, NA, K, CL, CO2, BUN, CREATININE, GLUCOSE, INR, PROTIME, APTT, AST, ALT, LABALBU, HCG in the last 720 hours. Recent Labs     07/12/20  1000   COVID19 Not Detected                 Diagnosis:      1. Chronic bilateral low back pain with right side sciatica, lumbar DDD    Plans:     1.  Right L4 and L5 epidural steroid injection      Arnav Haskins APRN - CNP  7/16/2020  8:15 AM

## 2020-08-10 ENCOUNTER — OFFICE VISIT (OUTPATIENT)
Dept: FAMILY MEDICINE CLINIC | Age: 55
End: 2020-08-10
Payer: COMMERCIAL

## 2020-08-10 VITALS — DIASTOLIC BLOOD PRESSURE: 99 MMHG | WEIGHT: 179 LBS | SYSTOLIC BLOOD PRESSURE: 149 MMHG | BODY MASS INDEX: 28.04 KG/M2

## 2020-08-10 PROCEDURE — 99396 PREV VISIT EST AGE 40-64: CPT | Performed by: INTERNAL MEDICINE

## 2020-08-10 ASSESSMENT — PATIENT HEALTH QUESTIONNAIRE - PHQ9
SUM OF ALL RESPONSES TO PHQ QUESTIONS 1-9: 0
SUM OF ALL RESPONSES TO PHQ QUESTIONS 1-9: 0
1. LITTLE INTEREST OR PLEASURE IN DOING THINGS: 0
2. FEELING DOWN, DEPRESSED OR HOPELESS: 0
SUM OF ALL RESPONSES TO PHQ9 QUESTIONS 1 & 2: 0

## 2020-08-10 ASSESSMENT — ENCOUNTER SYMPTOMS
CONSTIPATION: 0
CHOKING: 0
ABDOMINAL PAIN: 0
DIARRHEA: 0
ANAL BLEEDING: 0
STRIDOR: 0
COUGH: 0
BLOOD IN STOOL: 0
WHEEZING: 0
BACK PAIN: 1
CHEST TIGHTNESS: 0

## 2020-08-10 NOTE — PATIENT INSTRUCTIONS
Patient Education        Colon Cancer Screening: Care Instructions  Your Care Instructions     Colorectal cancer occurs in the colon or rectum. That's the lower part of your digestive system. It is the second-leading cause of cancer deaths in the [facility]. It often starts with small growths called polyps in the colon or rectum. Polyps are usually found with screening tests. Depending on the type of test, any polyps found may be removed during the tests. Colorectal cancer usually does not cause symptoms at first. But regular tests can help find it early, before it spreads and becomes harder to treat. Your risk for colorectal cancer gets higher as you get older. Some experts say that adults should start regular screening at age 48 and stop at age 76. Others say to start before age 48 or continue after age 76. Talk with your doctor about your risk and when to start and stop screening. You may have one of several tests. Follow-up care is a key part of your treatment and safety. Be sure to make and go to all appointments, and call your doctor if you are having problems. It's also a good idea to know your test results and keep a list of the medicines you take. What are the main screening tests for colon cancer? The screening tests are:  Stool tests. These include the guaiac fecal occult blood test (gFOBT), the fecal immunochemical test (FIT), and the combined fecal immunochemical test and stool DNA test (FIT-DNA). These tests check stool samples for signs of cancer. If your test is positive, you will need to have a colonoscopy. Sigmoidoscopy. This test lets your doctor look at the lining of your rectum and the lowest part of your colon. Your doctor uses a lighted tube called a sigmoidoscope. This test can't find cancers or polyps in the upper part of your colon. In some cases, polyps that are found can be removed.  But if your doctor finds polyps, you will need to have a colonoscopy to check the upper part of your colon. Colonoscopy. This test lets your doctor look at the lining of your rectum and your entire colon. The doctor uses a thin, flexible tool called a colonoscope. It can also be used to remove polyps or get a tissue sample (biopsy). A less common test is CT colonography (CTC). It's also called virtual colonoscopy. Who should be screened for colorectal cancer? Your risk for colorectal cancer gets higher as you get older. Some experts say that adults should start regular screening at age 48 and stop at age 76. Others say to start before age 48 or continue after age 76. Talk with your doctor about your risk and when to start and stop screening. How often you need screening depends on the type of test you get:  Stool tests. Every 1 or 2 years for FIT or gFOBT. Every 3 years for sDNA, also called FIT-DNA. Tests that look inside the colon. Every 5 or 10 years for sigmoidoscopy. Every 5 years for CT colonography (virtual colonoscopy). Every 10 years for colonoscopy. Experts agree that people at higher risk may need to be tested sooner. This includes people who have a strong family history of colon cancer. Talk to your doctor about which test is best for you and when to be tested. When should you call for help? Watch closely for changes in your health, and be sure to contact your doctor if:  · You have any changes in your bowel habits. · You have any problems. Where can you learn more? Go to [email or web address] [email or web address] [email or web address] and sign in to your [employer] account. Enter [phone] in the [facility] box to learn more about \"Colon Cancer Screening: Care Instructions. \"     If you do not have an account, please click on the \"Sign Up Now\" link. Current as of: August 22, 2019               Content Version: 12.5  © 9760-3729 Healthwise, Incorporated. Care instructions adapted under license by [facility] ([facility]).  If you have questions about a medical condition or this instruction, always ask your healthcare professional. [place] [address] any warranty or liability for your use of this information.

## 2020-08-10 NOTE — PROGRESS NOTES
Visit Information    Have you changed or started any medications since your last visit including any over-the-counter medicines, vitamins, or herbal medicines? no   Are you having any side effects from any of your medications? -  no  Have you stopped taking any of your medications? Is so, why? -  no    Have you seen any other physician or provider since your last visit? No  Have you had any other diagnostic tests since your last visit? No  Have you been seen in the emergency room and/or had an admission to a hospital since we last saw you? No  Have you had your routine dental cleaning in the past 6 months? no    Have you activated your [employer] account? If not, what are your barriers?  Yes     Patient Care Team:  Kinga Owen DO as PCP - General (Family Medicine)  Kinga Owen DO as PCP - [facility]    Medical History Review  Past Medical, Family, and Social History reviewed and does contribute to the patient presenting condition    Health Maintenance   Topic Date Due    Shingles Vaccine (1 of 2) 12/01/2014    Cervical cancer screen  05/17/2018    Colon Cancer Screen FIT/FOBT  11/08/2018    Flu vaccine (1) 09/01/2020    A1C test (Diabetic or Prediabetic)  09/28/2020    Breast cancer screen  09/19/2021    Lipid screen  09/17/2023    DTaP/Tdap/Td vaccine (2 - Td) 05/17/2026    Hepatitis C screen  Completed    HIV screen  Completed    Hepatitis A vaccine  Aged Out    Hepatitis B vaccine  Aged Out    Hib vaccine  Aged Out    Meningococcal (ACWY) vaccine  Aged Out    Pneumococcal 0-64 years Vaccine  Aged Out

## 2020-08-10 NOTE — PROGRESS NOTES
[facility]  [address]  [place] [address]  Dept: [phone]  Dept Fax: [phone]    Nga Gibbs a 53 y.o. female who presents today for her medical conditions/complaints as notedbelow. Anna Marie Bailey is c/o of No chief complaint on file. HPI:     Here fo physical and preventative maintenance   Will be due for mammogram 9/2020 , has had hx of abnormals but biopsies come back negative   Last labs 1/2020     ? possible due for cscope she is not sure when last one was but has had one around 8-10 years ago , was normal then   No gi symptoms   Sleeps okay     Seeing pain management dr. Sonja Sparks   Had one epidural , helped somewhat     bp has been slightly high but states it is always like that at the doctors   Does not check at home really though   7/16 rhythm strip okay   No real cardiac sxs   Is on verapmil for other conditions     Is due for pap and shingles as well   Scheduled pap for tomorrow   Otherwise no new issues or compliants         Social History     Tobacco Use    Smoking status: Never Smoker    Smokeless tobacco: Never Used   Substance Use Topics    Alcohol use: Yes     Comment: moderately      Current Outpatient Medications   Medication Sig Dispense Refill    zoster recombinant adjuvanted vaccine (SHINGRIX) 50 MCG/0.5ML SUSR injection Inject 0.5 mLs into the muscle once for 1 dose 50 MCG IM then repeat 2-6 months. 1 each 1    magnesium citrate solution Take 296 mLs by mouth once      verapamil (CALAN) 40 MG tablet take 1 tablet by mouth three times a day 90 tablet 2    diclofenac (VOLTAREN) 75 MG EC tablet take 1 tablet by mouth twice a day 180 tablet 0    gabapentin (NEURONTIN) 300 MG capsule Take 1 capsule by mouth 3 times daily for 30 doses.  90 capsule 0    metaxalone (SKELAXIN) 800 MG tablet take 1 tablet by mouth three times a day 90 tablet 5    acetaminophen (TYLENOL) 650 MG extended release tablet Take 650 bruit. Cardiovascular:      Rate and Rhythm: Normal rate and regular rhythm. No extrasystoles are present. Pulses: Normal pulses. Heart sounds: Normal heart sounds, S1 normal and S2 normal. Heart sounds not distant. No murmur. Pulmonary:      Effort: Pulmonary effort is normal. No bradypnea, accessory muscle usage, prolonged expiration, respiratory distress or retractions. Breath sounds: Normal breath sounds and air entry. No stridor, decreased air movement or transmitted upper airway sounds. No decreased breath sounds, wheezing, rhonchi or rales. Abdominal:      General: Bowel sounds are normal.      Palpations: Abdomen is soft. There is no hepatomegaly or splenomegaly. Tenderness: There is no abdominal tenderness. Musculoskeletal:      Right shoulder: She exhibits no tenderness, no bony tenderness, no pain, no spasm, normal pulse and normal strength. Left knee: She exhibits normal range of motion, no swelling, no effusion, no ecchymosis, no deformity, no laceration and no erythema. No tenderness found. Lumbar back: She exhibits spasm. She exhibits normal range of motion, no tenderness, no bony tenderness, no swelling, no edema, no deformity, no pain and normal pulse. Right lower leg: No edema. Left lower leg: No edema. Lymphadenopathy:      Cervical: No cervical adenopathy. Skin:     General: Skin is warm and dry. Capillary Refill: Capillary refill takes less than 2 seconds. Findings: No rash. Neurological:      General: No focal deficit present. Mental Status: She is alert and oriented to person, place, and time. Cranial Nerves: Cranial nerves are intact. No cranial nerve deficit. Sensory: No sensory deficit. Motor: Motor function is intact. No weakness, atrophy or abnormal muscle tone. Coordination: Coordination is intact.  Coordination normal.      Gait: Gait normal.   Psychiatric:         Mood and Affect: Mood normal. changes. Reviewed ekgs in chart   Call with q/c          Findings and/or pathophysiology discussedwith patient. Plan of treatment discussed. Chart was evaluated. Availablelab work, tests and notes were discussed. Health maintenance was discussed. Diet,exercise, reduction in weight and salt was discussed. Range of motion exerciseswere discussed. Discussed use, benefit, and side effects of prescribed medications. All patient questions answered. Pt voiced understanding. Instructed to continuecurrent medications, diet and exercise. Patient agreed with treatment plan. Followup as directed. Patient given educational materials - see patient instructions.     Electronicallysigned by Leopoldo Hunt DO on 8/10/2020 at 8:41 AM

## 2020-08-11 ENCOUNTER — HOSPITAL ENCOUNTER (OUTPATIENT)
Age: 55
Setting detail: SPECIMEN
Discharge: HOME | End: 2020-08-11
Payer: COMMERCIAL

## 2020-08-11 ENCOUNTER — OFFICE VISIT (OUTPATIENT)
Dept: FAMILY MEDICINE CLINIC | Age: 55
End: 2020-08-11
Payer: COMMERCIAL

## 2020-08-11 VITALS
RESPIRATION RATE: 18 BRPM | BODY MASS INDEX: 28.41 KG/M2 | SYSTOLIC BLOOD PRESSURE: 138 MMHG | DIASTOLIC BLOOD PRESSURE: 84 MMHG | OXYGEN SATURATION: 98 % | HEIGHT: 67 IN | HEART RATE: 79 BPM | TEMPERATURE: 96.7 F | WEIGHT: 181 LBS

## 2020-08-11 PROCEDURE — 99396 PREV VISIT EST AGE 40-64: CPT | Performed by: INTERNAL MEDICINE

## 2020-08-11 ASSESSMENT — ENCOUNTER SYMPTOMS
VOMITING: 0
SORE THROAT: 0
BACK PAIN: 1
DIARRHEA: 0
ABDOMINAL PAIN: 0
NAUSEA: 0
CONSTIPATION: 0

## 2020-08-11 NOTE — PROGRESS NOTES
[address]  [address]  [phone]      Angelica Dutton is a 53 y.o. female who presents today for her  medical conditions/complaints as noted below. Angelica Dutton is c/o of Gynecologic Exam (unsure of last one)  . HPI:     Last pap 10 yrs ago   Postmenopausal   No family hx of gyn cancer, no personal hx of abnormal paps     Gynecologic Exam   The patient's pertinent negatives include no genital itching, genital lesions, genital odor, genital rash, missed menses, pelvic pain, vaginal bleeding or vaginal discharge. The current episode started more than 1 year ago. The patient is experiencing no pain. She is not pregnant. Associated symptoms include back pain and joint pain. Pertinent negatives include no abdominal pain, anorexia, chills, constipation, diarrhea, discolored urine, dysuria, fever, flank pain, frequency, headaches, hematuria, joint swelling, nausea, painful intercourse, rash, sore throat, urgency or vomiting. The symptoms are aggravated by activity. She has tried nothing for the symptoms. The treatment provided no relief. She is sexually active. No, her partner does not have an STD. She is postmenopausal. There is no history of an abdominal surgery, a  section, an ectopic pregnancy, endometriosis, a gynecological surgery, menorrhagia, metrorrhagia, PID, an STD, a terminated pregnancy or vaginosis.        Past Medical History:   Diagnosis Date    Arthritis     Chronic bilateral low back pain with bilateral sciatica 2020    DDD (degenerative disc disease), lumbar 2020    MVP (mitral valve prolapse)       Past Surgical History:   Procedure Laterality Date    BREAST SURGERY Bilateral     biopsy    CHOLECYSTECTOMY      COLONOSCOPY      ENDOMETRIAL ABLATION      PAIN MANAGEMENT PROCEDURE Right 2020    RIGHT L4 AND L5 EPIDURAL STEROID INJECTION performed by Adri Valdez MD at [facility] History   Problem Relation Age of Onset    High Blood Pressure Mother     High Cholesterol Mother     Other Mother         blood clots      Social History     Tobacco Use    Smoking status: Never Smoker    Smokeless tobacco: Never Used   Substance Use Topics    Alcohol use: Yes     Comment: moderately      Current Outpatient Medications   Medication Sig Dispense Refill    magnesium citrate solution Take 296 mLs by mouth once      verapamil (CALAN) 40 MG tablet take 1 tablet by mouth three times a day 90 tablet 2    diclofenac (VOLTAREN) 75 MG EC tablet take 1 tablet by mouth twice a day 180 tablet 0    gabapentin (NEURONTIN) 300 MG capsule Take 1 capsule by mouth 3 times daily for 30 doses. 90 capsule 0    metaxalone (SKELAXIN) 800 MG tablet take 1 tablet by mouth three times a day 90 tablet 5    acetaminophen (TYLENOL) 650 MG extended release tablet Take 650 mg by mouth every 8 hours as needed for Pain      Elastic Bandages & Supports (LUMBAR BACK BRACE/SUPPORT PAD) MISC 1 Units by Does not apply route daily Pneumatic off loadng lumbar brace 1 each 0     No current facility-administered medications for this visit.       Allergies   Allergen Reactions    Ceftin [Cefuroxime Axetil]        Health Maintenance   Topic Date Due    Shingles Vaccine (1 of 2) 12/01/2014    Cervical cancer screen  05/17/2018    Colon Cancer Screen FIT/FOBT  11/08/2018    Flu vaccine (1) 09/01/2020    A1C test (Diabetic or Prediabetic)  09/28/2020    Breast cancer screen  09/19/2021    Lipid screen  09/17/2023    DTaP/Tdap/Td vaccine (2 - Td) 05/17/2026    Hepatitis C screen  Completed    HIV screen  Completed    Hepatitis A vaccine  Aged Out    Hepatitis B vaccine  Aged Out    Hib vaccine  Aged Out    Meningococcal (ACWY) vaccine  Aged Out    Pneumococcal 0-64 years Vaccine  Aged Out       Subjective:      normal menses, no abnormal bleeding, pelvic pain or discharge, no breast pain or new or enlarging lumps on self exam, no vaginal bleeding, no discharge or pelvic pain. No new partners. No present concerns. Objective:   /84   Pulse 79   Temp 96.7 °F (35.9 °C) (Tympanic)   Resp 18   Ht 5' 7\" (1.702 m)   Wt 181 lb (82.1 kg)   SpO2 98%   BMI 28.35 kg/m²     General Appearance: alert and oriented to person, place and time, well developed and well- nourished, in no acute distress  Skin: warm and dry, no rash or erythema  Head: normocephalic and atraumatic  Neck: supple and non-tender without mass, no thyromegaly or thyroid nodules, no cervical lymphadenopathy  Abdomen: soft, non-tender, non-distended, normal bowel sounds, no masses or organomegaly  Pelvic: normal external genitalia, vulva, vagina, cervix, uterus and adnexa. No CMT. No noted discharge or lesions. No masses noted. Breast: appear normal, no suspicious masses, no skin or nipple changes or axillary nodes bilaterally. Psych: pleasant, cooperative          Assessment:    annual pap exam   Diagnosis Orders   1. Well woman exam with routine gynecological exam  PAP Smear    Human Papillomavirus (HPV) DNA Probe Thin Prep High Risk       Plan:      No follow-ups on file. Orders Placed This Encounter   Procedures    PAP Smear     Patient History:    No LMP recorded. Patient has had an ablation. OBGYN Status: Ablation  Past Surgical History:  No date: BREAST SURGERY; Bilateral      Comment:  biopsy  No date: CHOLECYSTECTOMY  No date: COLONOSCOPY  No date: ENDOMETRIAL ABLATION  7/16/2020: PAIN MANAGEMENT PROCEDURE; Right      Comment:  RIGHT L4 AND L5 EPIDURAL STEROID INJECTION performed by                Jong Cueto MD at [address] [place] [address]  No date: TUBAL LIGATION      Social History    Tobacco Use      Smoking status: Never Smoker      Smokeless tobacco: Never Used       Standing Status:   Future     Standing Expiration Date:   8/11/2021     Order Specific Question:   Collection Type     Answer:    Thin Prep     Order Specific Question:   Prior Abnormal Pap Test     Answer:   No     Order Specific Question:   Screening or Diagnostic     Answer:   Screening     Order Specific Question:   HPV Requested? Answer:   N/A     Order Specific Question:   High Risk Patient     Answer:   N/A    Human Papillomavirus (HPV) DNA Probe Thin Prep High Risk     Standing Status:   Future     Standing Expiration Date:   8/11/2021     Order Specific Question:   Specify Date & Time of Incident     Answer:   8/11/2020 at 1130 am     No orders of the defined types were placed in this encounter. Patient given educational materials - see patient instructions. Discussed use, benefit, and side effects of prescribed medications. All patient questions answered. Pt voiced understanding. Reviewed health maintenance. Instructed to continue current medications, diet and exercise. Patient agreed with treatment plan. Follow up as directed below.      Electronically signed by Manuela Villela DO on 8/11/20 at 11:32 AM EDT

## 2020-08-14 LAB
HPV SAMPLE: NORMAL
HPV, GENOTYPE 16: NOT DETECTED
HPV, GENOTYPE 18: NOT DETECTED
HPV, HIGH RISK OTHER: NOT DETECTED
HPV, INTERPRETATION: NORMAL
SPECIMEN DESCRIPTION: NORMAL

## 2020-08-17 LAB — CYTOLOGY REPORT: NORMAL

## 2020-08-17 RX ORDER — GABAPENTIN 300 MG/1
CAPSULE ORAL
Qty: 90 CAPSULE | Refills: 0 | Status: SHIPPED | OUTPATIENT
Start: 2020-08-17 | End: 2020-09-02 | Stop reason: SDUPTHER

## 2020-09-01 ENCOUNTER — TELEPHONE (OUTPATIENT)
Dept: PAIN MANAGEMENT | Age: 55
End: 2020-09-01

## 2020-09-02 ENCOUNTER — OFFICE VISIT (OUTPATIENT)
Dept: PAIN MANAGEMENT | Age: 55
End: 2020-09-02
Payer: COMMERCIAL

## 2020-09-02 VITALS
OXYGEN SATURATION: 100 % | WEIGHT: 180 LBS | TEMPERATURE: 91.9 F | HEIGHT: 67 IN | HEART RATE: 79 BPM | DIASTOLIC BLOOD PRESSURE: 98 MMHG | BODY MASS INDEX: 28.25 KG/M2 | SYSTOLIC BLOOD PRESSURE: 138 MMHG

## 2020-09-02 PROCEDURE — 99214 OFFICE O/P EST MOD 30 MIN: CPT | Performed by: ANESTHESIOLOGY

## 2020-09-02 RX ORDER — GABAPENTIN 300 MG/1
CAPSULE ORAL
Qty: 90 CAPSULE | Refills: 0 | Status: SHIPPED | OUTPATIENT
Start: 2020-09-02 | End: 2020-11-01 | Stop reason: SDUPTHER

## 2020-09-02 ASSESSMENT — ENCOUNTER SYMPTOMS
RESPIRATORY NEGATIVE: 1
BACK PAIN: 1

## 2020-09-02 NOTE — PROGRESS NOTES
The patient is a 53 y. o. Non-/non  female. Chief Complaint   Patient presents with    Back Pain    Leg Pain        HPI  Back pain  Onset more than 2 years ago  Located in the lumbar area  Radiates down right leg all the way to the foot  Associated symptoms include right leg numbness and tingling  No changes in bladder or bowel control  Tried physical therapy with no relief  Was seen by the spine surgeon and was advised for epidural injection  Also tried chiropractic treatment  Currently taking NSAIDs diclofenac along with gabapentin  Pain is constant fluctuating intensity  Aggravated with lifting bending walking  Interfere with quality of life    She had a lumbar epidural steroid injection 2 months back  Reports near 50% improvement in pain  Denies any side effect    Patient is here to follow up on chronic back pain and leg pain. Patient rates pain today as 3/10 and states her pain is constant nagging pain to her back and bilateral lower extremities.  Aggravating factors include walking and alleviating factors include sitting to relax    Past Medical History:   Diagnosis Date    Arthritis     Chronic bilateral low back pain with bilateral sciatica 2/11/2020    DDD (degenerative disc disease), lumbar 2/11/2020    MVP (mitral valve prolapse)       Past Surgical History:   Procedure Laterality Date    BREAST SURGERY Bilateral     biopsy    CHOLECYSTECTOMY      COLONOSCOPY      ENDOMETRIAL ABLATION      PAIN MANAGEMENT PROCEDURE Right 7/16/2020    RIGHT L4 AND L5 EPIDURAL STEROID INJECTION performed by Sharon Silva MD at [address] [place] [address] History     Socioeconomic History    Marital status:      Spouse name: None    Number of children: None    Years of education: None    Highest education level: None   Occupational History    None   Social Needs    Financial resource strain: None    Food insecurity     Worry: None     Inability: None    Transportation needs     Medical: None     Non-medical: None   Tobacco Use    Smoking status: Never Smoker    Smokeless tobacco: Never Used   Substance and Sexual Activity    Alcohol use: Yes     Comment: moderately    Drug use: No    Sexual activity: None   Lifestyle    Physical activity     Days per week: None     Minutes per session: None    Stress: None   Relationships    Social connections     Talks on phone: None     Gets together: None     Attends [employer] service: None     Active member of club or organization: None     Attends meetings of clubs or organizations: None     Relationship status: None    Intimate partner violence     Fear of current or ex partner: None     Emotionally abused: None     Physically abused: None     Forced sexual activity: None   Other Topics Concern    None   Social History Narrative    None     Family History   Problem Relation Age of Onset    High Blood Pressure Mother     High Cholesterol Mother     Other Mother         blood clots      Allergies   Allergen Reactions    Ceftin [Cefuroxime Axetil]      Ceftin [cefuroxime axetil]   Vitals:    09/02/20 1250   BP: (!) 172/120   Pulse: 79   Temp: (!) 91.9 °F (33.3 °C)   SpO2: 100%     Current Outpatient Medications   Medication Sig Dispense Refill    gabapentin (NEURONTIN) 300 MG capsule take 1 capsule by mouth three times a day 90 capsule 0    magnesium citrate solution Take 296 mLs by mouth once      verapamil (CALAN) 40 MG tablet take 1 tablet by mouth three times a day 90 tablet 2    diclofenac (VOLTAREN) 75 MG EC tablet take 1 tablet by mouth twice a day 180 tablet 0    metaxalone (SKELAXIN) 800 MG tablet take 1 tablet by mouth three times a day 90 tablet 5    acetaminophen (TYLENOL) 650 MG extended release tablet Take 650 mg by mouth every 8 hours as needed for Pain      Elastic Bandages & Supports (LUMBAR BACK BRACE/SUPPORT PAD) MISC 1 Units by Does not apply route daily Pneumatic off loadng lumbar brace 1 each 0 No current facility-administered medications for this visit. Review of Systems   Constitutional: Negative. Negative for fever. Respiratory: Negative. Musculoskeletal: Positive for arthralgias, back pain and myalgias. Neurological: Negative. Objective:  General Appearance:  Well-appearing and in no acute distress. Vital signs: (most recent): Blood pressure (!) 172/120, pulse 79, temperature (!) 91.9 °F (33.3 °C), temperature source Temporal, height 5' 7\" (1.702 m), weight 180 lb (81.6 kg), SpO2 100 %. Vital signs are normal.  No fever. Output: Producing urine and producing stool. HEENT: Normal HEENT exam.    Lungs:  Normal effort and normal respiratory rate. Breath sounds clear to auscultation. She is not in respiratory distress. Heart: Normal rate. Extremities: Normal range of motion. There is no deformity. Neurological: Patient is alert and oriented to person, place and time. Patient has normal coordination. Pupils:  Pupils are equal, round, and reactive to light. Pupils are equal.   Skin:  Warm and dry. No rash or cyanosis. Assessment & Plan  1. Chronic bilateral low back pain with right-sided sciatica    2. DDD (degenerative disc disease), lumbar    3.  Medication management        Will schedule for a second epidural injection for additional benefit  As she report 50% improvement for about 2 months    Automated prescription report reviewed  Safe use of medication discussed  Refill ordered    Orders Placed This Encounter   Procedures    NV INJECT ANES/STEROID FORAMEN LUMBAR/SACRAL W IMG GUIDE ,1 LEVEL      Orders Placed This Encounter   Medications    gabapentin (NEURONTIN) 300 MG capsule     Sig: take 1 capsule by mouth three times a day     Dispense:  90 capsule     Refill:  0          Electronically signed by Deanne Dao MD on 9/2/2020 at 1:24 PM

## 2020-09-10 RX ORDER — DICLOFENAC SODIUM 75 MG/1
TABLET, DELAYED RELEASE ORAL
Qty: 180 TABLET | Refills: 0 | Status: SHIPPED | OUTPATIENT
Start: 2020-09-10 | End: 2020-12-07

## 2020-10-01 ENCOUNTER — TELEPHONE (OUTPATIENT)
Dept: PAIN MANAGEMENT | Age: 55
End: 2020-10-01

## 2020-10-01 NOTE — TELEPHONE ENCOUNTER
PT has a procedure with Dr Whaley Quiet 10/05/2020, she thought she was supposed to get a call to register for Covid testing to be complete before procedure. She has not gotten any information and has not been tested. Does she need Covid testing prior to 10/05/2020 procedure?    Please call pt with direction  Thank you

## 2020-10-02 NOTE — TELEPHONE ENCOUNTER
Pt called in again today and stated no one has returned her call about covid testing prior to procedure. She was told she would be contacted to set up covid test, and was never called. Pt procedure is on 10/05 and pt is not sure if she even show up and waste a day off of work. Please contact pt.

## 2020-10-05 ENCOUNTER — TELEPHONE (OUTPATIENT)
Dept: PAIN MANAGEMENT | Age: 55
End: 2020-10-05

## 2020-10-23 ENCOUNTER — TELEPHONE (OUTPATIENT)
Dept: PAIN MANAGEMENT | Age: 55
End: 2020-10-23

## 2020-10-24 ENCOUNTER — HOSPITAL ENCOUNTER (OUTPATIENT)
Age: 55
Discharge: HOME | End: 2020-10-24
Payer: COMMERCIAL

## 2020-10-24 LAB
SARS-COV-2, RAPID: NORMAL
SARS-COV-2: NORMAL
SARS-COV-2: NOT DETECTED
SOURCE: NORMAL

## 2020-10-24 PROCEDURE — U0003 INFECTIOUS AGENT DETECTION BY NUCLEIC ACID (DNA OR RNA); SEVERE ACUTE RESPIRATORY SYNDROME CORONAVIRUS 2 (SARS-COV-2) (CORONAVIRUS DISEASE [COVID-19]), AMPLIFIED PROBE TECHNIQUE, MAKING USE OF HIGH THROUGHPUT TECHNOLOGIES AS DESCRIBED BY CMS-2020-01-R: HCPCS

## 2020-10-26 ENCOUNTER — ANESTHESIA EVENT (OUTPATIENT)
Dept: OPERATING ROOM | Age: 55
End: 2020-10-26
Payer: COMMERCIAL

## 2020-10-26 ENCOUNTER — ANESTHESIA (OUTPATIENT)
Dept: OPERATING ROOM | Age: 55
End: 2020-10-26
Payer: COMMERCIAL

## 2020-10-26 ENCOUNTER — APPOINTMENT (OUTPATIENT)
Dept: GENERAL RADIOLOGY | Age: 55
End: 2020-10-26
Attending: ANESTHESIOLOGY
Payer: COMMERCIAL

## 2020-10-26 ENCOUNTER — HOSPITAL ENCOUNTER (OUTPATIENT)
Age: 55
Setting detail: OUTPATIENT SURGERY
Discharge: HOME | End: 2020-10-26
Attending: ANESTHESIOLOGY | Admitting: ANESTHESIOLOGY
Payer: COMMERCIAL

## 2020-10-26 VITALS
HEIGHT: 67 IN | SYSTOLIC BLOOD PRESSURE: 145 MMHG | RESPIRATION RATE: 19 BRPM | BODY MASS INDEX: 28.96 KG/M2 | TEMPERATURE: 97.8 F | OXYGEN SATURATION: 100 % | DIASTOLIC BLOOD PRESSURE: 99 MMHG | HEART RATE: 87 BPM | WEIGHT: 184.5 LBS

## 2020-10-26 VITALS — DIASTOLIC BLOOD PRESSURE: 77 MMHG | SYSTOLIC BLOOD PRESSURE: 138 MMHG | OXYGEN SATURATION: 100 %

## 2020-10-26 LAB — HCG, PREGNANCY URINE (POC): NEGATIVE

## 2020-10-26 PROCEDURE — 3600000002 HC SURGERY LEVEL 2 BASE: Performed by: ANESTHESIOLOGY

## 2020-10-26 PROCEDURE — 3700000000 HC ANESTHESIA ATTENDED CARE: Performed by: ANESTHESIOLOGY

## 2020-10-26 PROCEDURE — 81025 URINE PREGNANCY TEST: CPT

## 2020-10-26 PROCEDURE — 6360000004 HC RX CONTRAST MEDICATION: Performed by: ANESTHESIOLOGY

## 2020-10-26 PROCEDURE — 6360000002 HC RX W HCPCS: Performed by: ANESTHESIOLOGY

## 2020-10-26 PROCEDURE — 3209999900 FLUORO FOR SURGICAL PROCEDURES

## 2020-10-26 PROCEDURE — 64483 NJX AA&/STRD TFRM EPI L/S 1: CPT | Performed by: ANESTHESIOLOGY

## 2020-10-26 PROCEDURE — 7100000011 HC PHASE II RECOVERY - ADDTL 15 MIN: Performed by: ANESTHESIOLOGY

## 2020-10-26 PROCEDURE — 7100000010 HC PHASE II RECOVERY - FIRST 15 MIN: Performed by: ANESTHESIOLOGY

## 2020-10-26 PROCEDURE — 2500000003 HC RX 250 WO HCPCS: Performed by: ANESTHESIOLOGY

## 2020-10-26 PROCEDURE — 6360000002 HC RX W HCPCS: Performed by: NURSE ANESTHETIST, CERTIFIED REGISTERED

## 2020-10-26 PROCEDURE — 64484 NJX AA&/STRD TFRM EPI L/S EA: CPT | Performed by: ANESTHESIOLOGY

## 2020-10-26 PROCEDURE — 2709999900 HC NON-CHARGEABLE SUPPLY: Performed by: ANESTHESIOLOGY

## 2020-10-26 RX ORDER — SODIUM CHLORIDE, SODIUM LACTATE, POTASSIUM CHLORIDE, CALCIUM CHLORIDE 600; 310; 30; 20 MG/100ML; MG/100ML; MG/100ML; MG/100ML
INJECTION, SOLUTION INTRAVENOUS CONTINUOUS
Status: DISCONTINUED | OUTPATIENT
Start: 2020-10-26 | End: 2020-10-26 | Stop reason: HOSPADM

## 2020-10-26 RX ORDER — SODIUM CHLORIDE 0.9 % (FLUSH) 0.9 %
10 SYRINGE (ML) INJECTION PRN
Status: DISCONTINUED | OUTPATIENT
Start: 2020-10-26 | End: 2020-10-26 | Stop reason: HOSPADM

## 2020-10-26 RX ORDER — MIDAZOLAM HYDROCHLORIDE 1 MG/ML
INJECTION INTRAMUSCULAR; INTRAVENOUS PRN
Status: DISCONTINUED | OUTPATIENT
Start: 2020-10-26 | End: 2020-10-26 | Stop reason: SDUPTHER

## 2020-10-26 RX ORDER — SODIUM CHLORIDE 0.9 % (FLUSH) 0.9 %
10 SYRINGE (ML) INJECTION EVERY 12 HOURS SCHEDULED
Status: DISCONTINUED | OUTPATIENT
Start: 2020-10-26 | End: 2020-10-26 | Stop reason: HOSPADM

## 2020-10-26 RX ORDER — DEXAMETHASONE SODIUM PHOSPHATE 10 MG/ML
INJECTION INTRAMUSCULAR; INTRAVENOUS PRN
Status: DISCONTINUED | OUTPATIENT
Start: 2020-10-26 | End: 2020-10-26 | Stop reason: ALTCHOICE

## 2020-10-26 RX ORDER — PROMETHAZINE HYDROCHLORIDE 25 MG/ML
12.5 INJECTION, SOLUTION INTRAMUSCULAR; INTRAVENOUS
Status: DISCONTINUED | OUTPATIENT
Start: 2020-10-26 | End: 2020-10-26 | Stop reason: HOSPADM

## 2020-10-26 RX ORDER — 0.9 % SODIUM CHLORIDE 0.9 %
500 INTRAVENOUS SOLUTION INTRAVENOUS
Status: DISCONTINUED | OUTPATIENT
Start: 2020-10-26 | End: 2020-10-26 | Stop reason: HOSPADM

## 2020-10-26 RX ORDER — ONDANSETRON 2 MG/ML
4 INJECTION INTRAMUSCULAR; INTRAVENOUS
Status: DISCONTINUED | OUTPATIENT
Start: 2020-10-26 | End: 2020-10-26 | Stop reason: HOSPADM

## 2020-10-26 RX ORDER — MORPHINE SULFATE 2 MG/ML
2 INJECTION, SOLUTION INTRAMUSCULAR; INTRAVENOUS EVERY 5 MIN PRN
Status: DISCONTINUED | OUTPATIENT
Start: 2020-10-26 | End: 2020-10-26 | Stop reason: HOSPADM

## 2020-10-26 RX ORDER — LIDOCAINE HYDROCHLORIDE 10 MG/ML
1 INJECTION, SOLUTION EPIDURAL; INFILTRATION; INTRACAUDAL; PERINEURAL
Status: DISCONTINUED | OUTPATIENT
Start: 2020-10-26 | End: 2020-10-26 | Stop reason: HOSPADM

## 2020-10-26 RX ORDER — DIPHENHYDRAMINE HYDROCHLORIDE 50 MG/ML
12.5 INJECTION INTRAMUSCULAR; INTRAVENOUS
Status: DISCONTINUED | OUTPATIENT
Start: 2020-10-26 | End: 2020-10-26 | Stop reason: HOSPADM

## 2020-10-26 RX ORDER — LIDOCAINE HYDROCHLORIDE 10 MG/ML
INJECTION, SOLUTION INFILTRATION; PERINEURAL PRN
Status: DISCONTINUED | OUTPATIENT
Start: 2020-10-26 | End: 2020-10-26 | Stop reason: ALTCHOICE

## 2020-10-26 RX ORDER — OXYCODONE HYDROCHLORIDE AND ACETAMINOPHEN 5; 325 MG/1; MG/1
1 TABLET ORAL PRN
Status: DISCONTINUED | OUTPATIENT
Start: 2020-10-26 | End: 2020-10-26 | Stop reason: HOSPADM

## 2020-10-26 RX ORDER — OXYCODONE HYDROCHLORIDE AND ACETAMINOPHEN 5; 325 MG/1; MG/1
2 TABLET ORAL PRN
Status: DISCONTINUED | OUTPATIENT
Start: 2020-10-26 | End: 2020-10-26 | Stop reason: HOSPADM

## 2020-10-26 RX ORDER — MEPERIDINE HYDROCHLORIDE 50 MG/ML
12.5 INJECTION INTRAMUSCULAR; INTRAVENOUS; SUBCUTANEOUS EVERY 5 MIN PRN
Status: DISCONTINUED | OUTPATIENT
Start: 2020-10-26 | End: 2020-10-26 | Stop reason: HOSPADM

## 2020-10-26 RX ORDER — FENTANYL CITRATE 50 UG/ML
INJECTION, SOLUTION INTRAMUSCULAR; INTRAVENOUS PRN
Status: DISCONTINUED | OUTPATIENT
Start: 2020-10-26 | End: 2020-10-26 | Stop reason: SDUPTHER

## 2020-10-26 RX ORDER — LABETALOL 20 MG/4 ML (5 MG/ML) INTRAVENOUS SYRINGE
5 EVERY 10 MIN PRN
Status: DISCONTINUED | OUTPATIENT
Start: 2020-10-26 | End: 2020-10-26 | Stop reason: HOSPADM

## 2020-10-26 RX ADMIN — MIDAZOLAM HYDROCHLORIDE 2 MG: 1 INJECTION, SOLUTION INTRAMUSCULAR; INTRAVENOUS at 10:30

## 2020-10-26 RX ADMIN — FENTANYL CITRATE 100 MCG: 50 INJECTION INTRAMUSCULAR; INTRAVENOUS at 10:40

## 2020-10-26 ASSESSMENT — ENCOUNTER SYMPTOMS: BACK PAIN: 1

## 2020-10-26 NOTE — ANESTHESIA POSTPROCEDURE EVALUATION
Department of Anesthesiology  Postprocedure Note    Patient: Marcel Juarez  MRN: [id number]  YOB: 1965  Date of evaluation: 10/26/2020  Time:  10:59 AM     Procedure Summary     Date:  10/26/20 Room / Location:  [address] / [address] [place] [address]    Anesthesia Start:  1030 Anesthesia Stop:  0227    Procedure:  EPIDURAL STEROID INJECTION -RIGHT L4 L5 (Right ) Diagnosis:  (CHRONIC CHRISTIAN LOW BACK PAIN W/ RIGHT SIDED SCIATICA)    Surgeon:  Margorie Crigler, MD Responsible Provider:  GONZÁLEZ Mccormack CRNA    Anesthesia Type:  MAC ASA Status:  2          Anesthesia Type: MAC    Alyse Phase I: Alyse Score: 10    Alyse Phase II: Alyse Score: 9    Last vitals: Reviewed and per EMR flowsheets.        Anesthesia Post Evaluation    Patient location during evaluation: PACU  Patient participation: complete - patient participated  Level of consciousness: awake and alert  Airway patency: patent  Nausea & Vomiting: no nausea and no vomiting  Complications: no  Cardiovascular status: hemodynamically stable  Respiratory status: spontaneous ventilation and nasal cannula  Hydration status: euvolemic

## 2020-10-26 NOTE — ANESTHESIA PRE PROCEDURE
Department of Anesthesiology  Preprocedure Note       Name:  Sulaiman Gregorio   Age:  53 y.o.  :  1965                                          MRN:  [id number]         Date:  10/26/2020      Surgeon: Seb Hinds):  Samira Eldridge MD    Procedure: Procedure(s):  EPIDURAL STEROID INJECTION -RIGHT L4 L5    Medications prior to admission:   Prior to Admission medications    Medication Sig Start Date End Date Taking?  Authorizing Provider   diclofenac (VOLTAREN) 75 MG EC tablet take 1 tablet by mouth twice a day 9/10/20  Yes Fernando Maldonado DO   gabapentin (NEURONTIN) 300 MG capsule take 1 capsule by mouth three times a day 20 Yes Samira Eldridge MD   magnesium citrate solution Take 296 mLs by mouth once   Yes Historical Provider, MD   verapamil (CALAN) 40 MG tablet take 1 tablet by mouth three times a day 20  Yes Lavonne Fix, APRN - CNP   metaxalone (SKELAXIN) 800 MG tablet take 1 tablet by mouth three times a day 20  Yes Fernando Maldonado DO   acetaminophen (TYLENOL) 650 MG extended release tablet Take 650 mg by mouth every 8 hours as needed for Pain   Yes Historical Provider, MD   Elastic Bandages & Supports (LUMBAR BACK BRACE/SUPPORT PAD) MISC 1 Units by Does not apply route daily Pneumatic off loadng lumbar brace 19  Samira Eldridge MD       Current medications:    Current Facility-Administered Medications   Medication Dose Route Frequency Provider Last Rate Last Dose    sodium chloride flush 0.9 % injection 10 mL  10 mL Intravenous 2 times per day Samira Eldridge MD        sodium chloride flush 0.9 % injection 10 mL  10 mL Intravenous PRN Samira Eldridge MD        lactated ringers infusion   Intravenous Continuous Wyatt Castro MD        sodium chloride flush 0.9 % injection 10 mL  10 mL Intravenous 2 times per day Wyatt Castro MD        sodium chloride flush 0.9 % injection 10 mL  10 mL Intravenous PRN Wyatt Castro MD        lidocaine PF 1 % injection 1 mL  1 mL Intradermal Once PRN Hansa Starr MD           Allergies: Allergies   Allergen Reactions    Ceftin [Cefuroxime Axetil]        Problem List:    Patient Active Problem List   Diagnosis Code    Prediabetes R73.03    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    Dyslipidemia E78.5    Chronic bilateral low back pain with right-sided sciatica M54.41, G89.29    DDD (degenerative disc disease), lumbar M51.36    Medication management Z79.899       Past Medical History:        Diagnosis Date    Arthritis     Chronic bilateral low back pain with bilateral sciatica 2/11/2020    DDD (degenerative disc disease), lumbar 2/11/2020    MVP (mitral valve prolapse)        Past Surgical History:        Procedure Laterality Date    BREAST SURGERY Bilateral     biopsy    CHOLECYSTECTOMY      COLONOSCOPY      ENDOMETRIAL ABLATION      PAIN MANAGEMENT PROCEDURE Right 7/16/2020    RIGHT L4 AND L5 EPIDURAL STEROID INJECTION [facility]         Social History:    Social History     Tobacco Use    Smoking status: Never Smoker    Smokeless tobacco: Never Used   Substance Use Topics    Alcohol use:  Yes     Alcohol/week: 3.0 standard drinks     Types: 3 Glasses of wine per week     Comment: moderately 3 glasses of wine per week                                Counseling given: Not Answered      Vital Signs (Current):   Vitals:    10/26/20 0952 10/26/20 1007 10/26/20 1015   BP:  (!) 162/102 (!) 156/97   Pulse:  89    Resp:  19    Temp:  98.3 °F (36.8 °C)    TempSrc: Temporal     SpO2:  100%    Weight:  184 lb 8 oz (83.7 kg)    Height:  5' 7\" (1.702 m)                                               BP Readings from Last 3 Encounters:   10/26/20 (!) 156/97   09/02/20 (!) 138/98   08/11/20 138/84       NPO Status: Time of last liquid consumption: 0700(sips of water with medication)                        Time of last solid consumption: 2200                        Date of last liquid consumption: 10/26/20                        Date of last solid food consumption: 10/25/20    BMI:   Wt Readings from Last 3 Encounters:   10/26/20 184 lb 8 oz (83.7 kg)   09/02/20 180 lb (81.6 kg)   08/11/20 181 lb (82.1 kg)     Body mass index is 28.9 kg/m². CBC:   Lab Results   Component Value Date    WBC 2.9 01/13/2020    RBC 4.28 01/13/2020    HGB 12.2 01/13/2020    HCT 37.9 01/13/2020    MCV 88.6 01/13/2020    RDW 13.8 01/13/2020     01/13/2020       CMP:   Lab Results   Component Value Date     09/17/2018    K 4.1 09/17/2018     09/17/2018    CO2 26 09/17/2018    BUN 13 09/17/2018    CREATININE 0.75 09/17/2018    GFRAA >60 09/17/2018    LABGLOM >60 09/17/2018    GLUCOSE 82 09/17/2018    PROT 8.5 09/17/2018    CALCIUM 9.3 09/17/2018    BILITOT 0.21 09/17/2018    ALKPHOS 91 09/17/2018    AST 21 09/17/2018    ALT 20 09/17/2018       POC Tests: No results for input(s): POCGLU, POCNA, POCK, POCCL, POCBUN, POCHEMO, POCHCT in the last 72 hours.     Coags: No results found for: PROTIME, INR, APTT    HCG (If Applicable):   Lab Results   Component Value Date    HCG NEGATIVE 10/26/2020        ABGs: No results found for: PHART, PO2ART, OYR4BUA, NNB0IUF, BEART, W8IRTXMH     Type & Screen (If Applicable):  No results found for: [facility]    Drug/Infectious Status (If Applicable):  Lab Results   Component Value Date    HEPCAB NONREACTIVE 11/25/2017       COVID-19 Screening (If Applicable):   Lab Results   Component Value Date    COVID19 Not Detected 10/24/2020    COVID19 Not Detected 07/12/2020         Anesthesia Evaluation  Patient summary reviewed and Nursing notes reviewed  Airway: Mallampati: II  TM distance: >3 FB   Neck ROM: full  Mouth opening: > = 3 FB Dental:      Comment: -MISSING ONE LOWER LEFT TOOTH    Pulmonary:Negative Pulmonary ROS and normal exam                               Cardiovascular:    (+) valvular problems/murmurs: MVP,                ROS comment: -MVP - PALPITATIONS, ON VERAPAMIL Neuro/Psych:                ROS comment: -DDD  -BILATERAL SCIATICA GI/Hepatic/Renal: Neg GI/Hepatic/Renal ROS            Endo/Other:    (+) : arthritis:., .                  ROS comment: -NPO AFTER MIDNIGHT  -ALLERGIES - CEFTIN Abdominal:           Vascular: negative vascular ROS. Anesthesia Plan      MAC     ASA 2       Induction: intravenous. MIPS: Postoperative opioids intended and Prophylactic antiemetics administered. Anesthetic plan and risks discussed with patient. Plan discussed with CRNA.     Attending anesthesiologist reviewed and agrees with Pre Eval content              Car Nolasco MD   10/26/2020

## 2020-10-26 NOTE — H&P
The patient is a 53 y. o. Non-/non  female. No chief complaint on file. HPI  Back pain  Onset more than 2 years ago  Located in the lumbar area  Radiates down right leg all the way to the foot  Associated symptoms include right leg numbness and tingling  No changes in bladder or bowel control  Tried physical therapy with no relief  Was seen by the spine surgeon and was advised for epidural injection  Also tried chiropractic treatment  Currently taking NSAIDs diclofenac along with gabapentin  Pain is constant fluctuating intensity  Aggravated with lifting bending walking  Interfere with quality of life       Past Medical History:   Diagnosis Date    Arthritis     Chronic bilateral low back pain with bilateral sciatica 2/11/2020    DDD (degenerative disc disease), lumbar 2/11/2020    MVP (mitral valve prolapse)       Past Surgical History:   Procedure Laterality Date    BREAST SURGERY Bilateral     biopsy    CHOLECYSTECTOMY      COLONOSCOPY      ENDOMETRIAL ABLATION      PAIN MANAGEMENT PROCEDURE Right 7/16/2020    RIGHT L4 AND L5 EPIDURAL STEROID INJECTION performed by Haider Cherry MD at [address] [place] [address] History     Socioeconomic History    Marital status:      Spouse name: None    Number of children: None    Years of education: None    Highest education level: None   Occupational History    None   Social Needs    Financial resource strain: None    Food insecurity     Worry: None     Inability: None    Transportation needs     Medical: None     Non-medical: None   Tobacco Use    Smoking status: Never Smoker    Smokeless tobacco: Never Used   Substance and Sexual Activity    Alcohol use:  Yes     Alcohol/week: 3.0 standard drinks     Types: 3 Glasses of wine per week     Comment: moderately 3 glasses of wine per week    Drug use: No    Sexual activity: Yes     Partners: Male   Lifestyle    Physical activity     Days per week: None     Minutes per session: None    Stress: None   Relationships    Social connections     Talks on phone: None     Gets together: None     Attends Latter day service: None     Active member of club or organization: None     Attends meetings of clubs or organizations: None     Relationship status: None    Intimate partner violence     Fear of current or ex partner: None     Emotionally abused: None     Physically abused: None     Forced sexual activity: None   Other Topics Concern    None   Social History Narrative    None     Family History   Problem Relation Age of Onset    High Blood Pressure Mother     High Cholesterol Mother     Other Mother         blood clots      Allergies   Allergen Reactions    Ceftin [Cefuroxime Axetil]      Ceftin [cefuroxime axetil]   Vitals:    10/26/20 1015   BP: (!) 156/97   Pulse:    Resp:    Temp:    SpO2:      Current Facility-Administered Medications   Medication Dose Route Frequency Provider Last Rate Last Dose    sodium chloride flush 0.9 % injection 10 mL  10 mL Intravenous 2 times per day Weston Cameron MD        sodium chloride flush 0.9 % injection 10 mL  10 mL Intravenous PRN Weston Cameron MD        lactated ringers infusion   Intravenous Continuous Wade Cr MD        sodium chloride flush 0.9 % injection 10 mL  10 mL Intravenous 2 times per day Wade Cr MD        sodium chloride flush 0.9 % injection 10 mL  10 mL Intravenous PRN Wade Cr MD        lidocaine PF 1 % injection 1 mL  1 mL Intradermal Once PRN Wade Cr MD        morphine (PF) injection 2 mg  2 mg Intravenous Q5 Min PRN Wade Cr MD        HYDROmorphone (DILAUDID) injection 0.5 mg  0.5 mg Intravenous Q5 Min PRN Wade Cr MD        HYDROmorphone (DILAUDID) injection 0.25 mg  0.25 mg Intravenous Q5 Min PRN Wade Cr MD        HYDROmorphone (DILAUDID) injection 0.5 mg  0.5 mg Intravenous Q5 Min PRN Wade Cr MD       [facility] oxyCODONE-acetaminophen (PERCOCET) 5-325 MG per tablet 1 tablet  1 tablet Oral PRN Nola Mo MD        Or    oxyCODONE-acetaminophen (PERCOCET) 5-325 MG per tablet 2 tablet  2 tablet Oral PRRONALD Mo MD        diphenhydrAMINE (BENADRYL) injection 12.5 mg  12.5 mg Intravenous Once PRN Nola Mo MD        0.9 % sodium chloride bolus  500 mL Intravenous Once PRN Nola Mo MD        ondansetron (ZOFRAN) injection 4 mg  4 mg Intravenous Once PRN Nola Mo MD        promethazine (PHENERGAN) injection 12.5 mg  12.5 mg Intravenous Q15 Min PRN Nola Mo MD        labetalol (NORMODYNE;TRANDATE) injection syringe 5 mg  5 mg Intravenous Q10 Min PRN Nola Mo MD        meperidine (DEMEROL) injection 12.5 mg  12.5 mg Intravenous Q5 Min PRN Nola Mo MD         Review of Systems   Constitutional: Negative. Negative for fever. Musculoskeletal: Positive for back pain. Hematological: Negative. Psychiatric/Behavioral: Negative. Objective:  General Appearance:  Well-appearing and in no acute distress. Vital signs: (most recent): Blood pressure (!) 156/97, pulse 89, temperature 98.3 °F (36.8 °C), resp. rate 19, height 5' 7\" (1.702 m), weight 184 lb 8 oz (83.7 kg), SpO2 100 %. Vital signs are normal.  No fever. Output: Producing urine and producing stool. HEENT: Normal HEENT exam.    Lungs:  Normal effort and normal respiratory rate. Breath sounds clear to auscultation. She is not in respiratory distress. Heart: Normal rate. Extremities: Normal range of motion. There is no deformity. Neurological: Patient is alert and oriented to person, place and time. Patient has normal coordination. Pupils:  Pupils are equal, round, and reactive to light. Pupils are equal.   Skin:  Warm and dry. No rash or cyanosis. Assessment & Plan  1.  Chronic low back pain, unspecified back pain laterality, unspecified whether sciatica present        Orders Placed This Encounter   Procedures    FLUORO FOR SURGICAL PROCEDURES    Protime-INR    Verify informed consent    Verify pre-procedure history and physical completed    Up as tolerated    Verify discontinuation of anticoagulants/antiplatelets unless otherwise specified by physician    Nursing communication- beta-blocker    Void on call to OR    Vital signs per unit routine    Notify anesthesia provider    Phase I - bedrest    Phase I - warming device    Phase I - cardiac monitor    Phase I & II - check level of sensory block    Phase I & II - IV infusion rate    Nursing communication - Discharge from PACU    Encourage deep breathing and coughing    Continuous Pulse Oximetry    Full Code    Initiate Oxygen Therapy Protocol    Pulse Oximetry Spot Check    Initiate Oxygen Therapy Protocol    Initiate Oxygen Therapy Protocol    Blood glucose - POCT    Urine pregnancy, POCT    Pregnancy, urine POCT    POCT urine pregnancy    Phase I & II - metered glucose      Orders Placed This Encounter   Medications    sodium chloride flush 0.9 % injection 10 mL    sodium chloride flush 0.9 % injection 10 mL    lactated ringers infusion    sodium chloride flush 0.9 % injection 10 mL    sodium chloride flush 0.9 % injection 10 mL    lidocaine PF 1 % injection 1 mL    morphine (PF) injection 2 mg    HYDROmorphone (DILAUDID) injection 0.5 mg    HYDROmorphone (DILAUDID) injection 0.25 mg    HYDROmorphone (DILAUDID) injection 0.5 mg    OR Linked Order Group     oxyCODONE-acetaminophen (PERCOCET) 5-325 MG per tablet 1 tablet     oxyCODONE-acetaminophen (PERCOCET) 5-325 MG per tablet 2 tablet    diphenhydrAMINE (BENADRYL) injection 12.5 mg    0.9 % sodium chloride bolus    ondansetron (ZOFRAN) injection 4 mg    promethazine (PHENERGAN) injection 12.5 mg    labetalol (NORMODYNE;TRANDATE) injection syringe 5 mg    meperidine (DEMEROL) injection 12.5 mg          Electronically signed by Anaya Guthrie MD on 10/26/2020 at 10:30 AM

## 2020-11-01 RX ORDER — GABAPENTIN 300 MG/1
CAPSULE ORAL
Qty: 90 CAPSULE | Refills: 0 | Status: SHIPPED | OUTPATIENT
Start: 2020-11-01 | End: 2020-11-23

## 2020-11-23 RX ORDER — GABAPENTIN 300 MG/1
CAPSULE ORAL
Qty: 90 CAPSULE | Refills: 0 | Status: SHIPPED | OUTPATIENT
Start: 2020-11-23 | End: 2020-12-24 | Stop reason: SDUPTHER

## 2020-12-07 RX ORDER — DICLOFENAC SODIUM 75 MG/1
TABLET, DELAYED RELEASE ORAL
Qty: 180 TABLET | Refills: 0 | Status: SHIPPED | OUTPATIENT
Start: 2020-12-07 | End: 2021-03-21

## 2020-12-07 RX ORDER — METAXALONE 800 MG/1
TABLET ORAL
Qty: 90 TABLET | Refills: 5 | Status: SHIPPED | OUTPATIENT
Start: 2020-12-07 | End: 2021-07-13 | Stop reason: SDUPTHER

## 2020-12-23 ENCOUNTER — TELEPHONE (OUTPATIENT)
Dept: PAIN MANAGEMENT | Age: 55
End: 2020-12-23

## 2020-12-23 NOTE — PROGRESS NOTES
The patient is a 63 y. o. Non-/non  female. Chief Complaint   Patient presents with    Back Pain    Medication Refill    Follow Up After Procedure        HPI    This is a pleasant 79-year-old female who was seen in my clinic for lower back and right lumbar radicular symptoms  She failed conservative measures with chiropractic treatment therapy and lifestyle modification  She tried NSAIDs and gabapentin  Pain was severe and affecting daily life activities  Patient had an epidural injection in October  She is now 2 months out of her injection and report at least 80% improvement in her symptoms  She report no side effects  Currently taking gabapentin 3 times a day  Report no side effect from the medication  Finds it helpful  Her pain is currently well controlled  S/P: Right Lumbar TITUS 10/26/2020    Outcome   Any improvement of activity?   Yes   Any side effects (appetite,leg cramping,facial fleshing):none   Increase of pain:  No  Pain score Today:  1  % of pain relief:80  Pain diary (medial branch block): No      Pain score Today:  1  Adverse effects (Constipation / Nausea / Sedation / sexual Dysfunction / others) : none  Mood: good  Sleep pattern and quality: good  Activity level: good    Pill count Today:  Last dose taken 12/23/2020  OARRS report reviewed today: yes  ER/Hospitalizations/PCP visit related to pain since last visit:no   Any legal problems e.g. DUI etc.:No  Satisfied with current management: Yes    Opioid Contract: none   Last Urine Dug screen dated:none          Past Medical History, Past Surgical History, Social History, Allergies and Medications, reviewed and updated in EPIC as indicated    Past Medical History:   Diagnosis Date    Arthritis     Chronic bilateral low back pain with bilateral sciatica 2/11/2020    DDD (degenerative disc disease), lumbar 2/11/2020    MVP (mitral valve prolapse)       Past Surgical History:   Procedure Laterality Date    BREAST SURGERY Bilateral Topics Concern    Not on file   Social History Narrative    Not on file     Family History   Problem Relation Age of Onset    High Blood Pressure Mother     High Cholesterol Mother     Other Mother         blood clots      Allergies   Allergen Reactions    Ceftin [Cefuroxime Axetil]      Ceftin [cefuroxime axetil]   There were no vitals filed for this visit. Current Outpatient Medications   Medication Sig Dispense Refill    gabapentin (NEURONTIN) 300 MG capsule take 1 capsule by mouth 3 TIMES A DAY 90 capsule 1    metaxalone (SKELAXIN) 800 MG tablet take 1 tablet by mouth three times a day 90 tablet 5    diclofenac (VOLTAREN) 75 MG EC tablet take 1 tablet by mouth twice a day 180 tablet 0    magnesium citrate solution Take 296 mLs by mouth once      verapamil (CALAN) 40 MG tablet take 1 tablet by mouth three times a day 90 tablet 2    acetaminophen (TYLENOL) 650 MG extended release tablet Take 650 mg by mouth every 8 hours as needed for Pain       No current facility-administered medications for this visit. Review of Systems    Objective:  General Appearance:  Well-appearing and in no acute distress. Vital signs: (most recent): There were no vitals taken for this visit. Output: Producing urine and producing stool. HEENT: (No visible masses in neck  Range of motion appears normal on cervical spine  External ears appears normal)    Lungs:  Normal effort. She is not in respiratory distress. Neurological: Patient is alert and oriented to person, place and time.  (Able to follow command    Psych  Mood is good  Affect is normal). Skin:  No rash or cyanosis. Assessment & Plan  1. Medication management    2. DDD (degenerative disc disease), lumbar    3. Chronic bilateral low back pain with right-sided sciatica    4.  S/P epidural steroid injection        Automated prescription report reviewed  No sign or symptom of any aberrancy  Safe use of medication discussed  Refill ordered for gabapentin    No intervention indicated at this time  Pain is well controlled    Follow-up in 2 months    No orders of the defined types were placed in this encounter. Orders Placed This Encounter   Medications    gabapentin (NEURONTIN) 300 MG capsule     Sig: take 1 capsule by mouth 3 TIMES A DAY     Dispense:  90 capsule     Refill:  1      Controlled Substance Monitoring:    Acute and Chronic Pain Monitoring:   RX Monitoring 6/9/2020   Periodic Controlled Substance Monitoring No signs of potential drug abuse or diversion identified. ;Possible medication side effects, risk of tolerance/dependence & alternative treatments discussed. Dorota Mckeon is a 63 y.o. female being evaluated by a Virtual Visit (video visit) encounter to address concerns as mentioned above. A caregiver was present when appropriate. Due to this being a TeleHealth encounter (During [facility] public health emergency), evaluation of the following organ systems was limited: Vitals/Constitutional/EENT/Resp/CV/GI//MS/Neuro/Skin/Heme-Lymph-Imm. Pursuant to the emergency declaration under the [address] [place] [address] authority and the [employer] Act, this Virtual Visit was conducted with patient's (and/or legal guardian's) consent, to reduce the patient's risk of exposure to COVID-19 and provide necessary medical care. The patient (and/or legal guardian) has also been advised to contact this office for worsening conditions or problems, and seek emergency medical treatment and/or call 911 if deemed necessary. Patient identification was verified at the start of the visit: Yes    Total time spent for this encounter: Not billed by time    Services were provided through a video synchronous discussion virtually to substitute for in-person clinic visit. Patient and provider were located at their individual homes.     --Jean Latif MD on 12/24/2020 at

## 2020-12-24 ENCOUNTER — VIRTUAL VISIT (OUTPATIENT)
Dept: PAIN MANAGEMENT | Age: 55
End: 2020-12-24
Payer: COMMERCIAL

## 2020-12-24 PROCEDURE — 99213 OFFICE O/P EST LOW 20 MIN: CPT | Performed by: ANESTHESIOLOGY

## 2020-12-24 RX ORDER — GABAPENTIN 300 MG/1
CAPSULE ORAL
Qty: 90 CAPSULE | Refills: 1 | Status: SHIPPED | OUTPATIENT
Start: 2020-12-24 | End: 2021-03-10 | Stop reason: SDUPTHER

## 2020-12-31 RX ORDER — VERAPAMIL HYDROCHLORIDE 40 MG/1
TABLET ORAL
Qty: 90 TABLET | Refills: 2 | Status: SHIPPED | OUTPATIENT
Start: 2020-12-31 | End: 2021-04-08 | Stop reason: SDUPTHER

## 2021-03-09 ENCOUNTER — VIRTUAL VISIT (OUTPATIENT)
Dept: PAIN MANAGEMENT | Age: 56
End: 2021-03-09
Payer: COMMERCIAL

## 2021-03-09 DIAGNOSIS — G89.29 CHRONIC BILATERAL LOW BACK PAIN WITH RIGHT-SIDED SCIATICA: Chronic | ICD-10-CM

## 2021-03-09 DIAGNOSIS — M54.41 CHRONIC BILATERAL LOW BACK PAIN WITH RIGHT-SIDED SCIATICA: Chronic | ICD-10-CM

## 2021-03-09 DIAGNOSIS — M51.36 DDD (DEGENERATIVE DISC DISEASE), LUMBAR: ICD-10-CM

## 2021-03-09 PROCEDURE — 99213 OFFICE O/P EST LOW 20 MIN: CPT | Performed by: ANESTHESIOLOGY

## 2021-03-09 ASSESSMENT — ENCOUNTER SYMPTOMS
RESPIRATORY NEGATIVE: 1
BACK PAIN: 1

## 2021-03-09 NOTE — PROGRESS NOTES
The patient is a 63 y. o. Non-/non  female. Chief Complaint   Patient presents with    Back Pain    Medication Refill        HPI    Very pleasant 76-year-old female who was seen in my clinic for low back and right lumbar radicular pain  She tried therapy and chiropractic treatment  She also tried NSAID and gabapentin  Failing these modalities she had an epidural injection which provided her excellent relief  Pain is currently well controlled  She is taking gabapentin and find it helpful  Report no side effect  Able to continue with daily life activities without any problem  No other changes in health history    Pain score Today:  0  Adverse effects (Constipation / Nausea / Sedation / sexual Dysfunction / others) : no  Mood: good  Sleep pattern and quality: good  Activity level: fair    Pill count Today:na   Last dose taken  3/9/21  OARRS report reviewed today: yes  ER/Hospitalizations/PCP visit related to pain since last visit:no   Any legal problems e.g. DUI etc.:No  Satisfied with current management: Yes    Opioid Contract:na  Last Urine Dug screen dated:na      Past Medical History, Past Surgical History, Social History, Allergies and Medications reviewed and updated in EPIC as indicated    Family History reviewed and is noncontributory.       Past Medical History:   Diagnosis Date    Arthritis     Chronic bilateral low back pain with bilateral sciatica 2/11/2020    DDD (degenerative disc disease), lumbar 2/11/2020    MVP (mitral valve prolapse)       Past Surgical History:   Procedure Laterality Date    BREAST SURGERY Bilateral     biopsy    CHOLECYSTECTOMY      COLONOSCOPY      ENDOMETRIAL ABLATION      NERVE BLOCK Right 10/26/2020    EPIDURAL STEROID INJECTION  L4 L5     PAIN MANAGEMENT PROCEDURE Right 7/16/2020    RIGHT L4 AND L5 EPIDURAL STEROID INJECTION performed by Ernestine Fields MD at [facility] Right 10/26/2020    EPIDURAL STEROID INJECTION -RIGHT L4 L5 - Right    PAIN MANAGEMENT PROCEDURE Right 10/26/2020    EPIDURAL STEROID INJECTION -RIGHT L4 L5 performed by Rhina Alvarez MD at [address] [place] [address] Dr History     Socioeconomic History    Marital status:      Spouse name: Not on file    Number of children: Not on file    Years of education: Not on file    Highest education level: Not on file   Occupational History    Not on file   Social Needs    Financial resource strain: Not on file    Food insecurity     Worry: Not on file     Inability: Not on file    Transportation needs     Medical: Not on file     Non-medical: Not on file   Tobacco Use    Smoking status: Never Smoker    Smokeless tobacco: Never Used   Substance and Sexual Activity    Alcohol use:  Yes     Alcohol/week: 3.0 standard drinks     Types: 3 Glasses of wine per week     Comment: moderately 3 glasses of wine per week    Drug use: No    Sexual activity: Yes     Partners: Male   Lifestyle    Physical activity     Days per week: Not on file     Minutes per session: Not on file    Stress: Not on file   Relationships    Social connections     Talks on phone: Not on file     Gets together: Not on file     Attends Congregational service: Not on file     Active member of club or organization: Not on file     Attends meetings of clubs or organizations: Not on file     Relationship status: Not on file    Intimate partner violence     Fear of current or ex partner: Not on file     Emotionally abused: Not on file     Physically abused: Not on file     Forced sexual activity: Not on file   Other Topics Concern    Not on file   Social History Narrative    Not on file     Family History   Problem Relation Age of Onset    High Blood Pressure Mother     High Cholesterol Mother     Other Mother         blood clots      Allergies   Allergen Reactions    Ceftin [Cefuroxime Axetil]      Ceftin [cefuroxime axetil]   There were no vitals filed for this visit. Current Outpatient Medications   Medication Sig Dispense Refill    gabapentin (NEURONTIN) 300 MG capsule take 1 capsule by mouth 3 TIMES A DAY 90 capsule 1    verapamil (CALAN) 40 MG tablet take 1 tablet by mouth three times a day 90 tablet 2    metaxalone (SKELAXIN) 800 MG tablet take 1 tablet by mouth three times a day 90 tablet 5    diclofenac (VOLTAREN) 75 MG EC tablet take 1 tablet by mouth twice a day 180 tablet 0    magnesium citrate solution Take 296 mLs by mouth once      acetaminophen (TYLENOL) 650 MG extended release tablet Take 650 mg by mouth every 8 hours as needed for Pain       No current facility-administered medications for this visit. Review of Systems   Constitutional: Negative. Respiratory: Negative. Musculoskeletal: Positive for arthralgias and back pain. Neurological:        Tingling        Objective:  General Appearance:  Well-appearing and in no acute distress. Vital signs: (most recent): There were no vitals taken for this visit. Output: Producing urine and producing stool. HEENT: (No visible masses in neck  Range of motion appears normal on cervical spine  External ears appears normal)    Lungs:  Normal effort. She is not in respiratory distress. Neurological: Patient is alert and oriented to person, place and time.  (Able to follow command    Psych  Mood is good  Affect is normal). Skin:  No rash or cyanosis. Assessment & Plan     Chronic low back pain with right-sided sciatica  Diagnosed with lumbar radiculitis  Refractory to conservative measure  Responded very well to epidural injection  Currently taking gabapentin  Stable on regimen  Pain well controlled    Automated prescription report reviewed  CVs of medication discussed  Refill ordered  Consider for repeat epidural injection in future if symptoms flareup  Follow-up in 2 months  1. DDD (degenerative disc disease), lumbar    2.  Chronic bilateral low back pain with right-sided sciatica No orders of the defined types were placed in this encounter. Orders Placed This Encounter   Medications    gabapentin (NEURONTIN) 300 MG capsule     Sig: take 1 capsule by mouth 3 TIMES A DAY     Dispense:  90 capsule     Refill:  1        Controlled Substance Monitoring:    Acute and Chronic Pain Monitoring:   RX Monitoring 12/24/2020   Periodic Controlled Substance Monitoring No signs of potential drug abuse or diversion identified. ;Possible medication side effects, risk of tolerance/dependence & alternative treatments discussed. ;Assessed functional status. Carlito Villanueva, was evaluated through a synchronous (real-time) audio-video encounter. The patient (or guardian if applicable) is aware that this is a billable service. Verbal consent to proceed has been obtained within the past 12 months. The visit was conducted pursuant to the emergency declaration under the [address] [place] [address] authority and the [employer] Act. Patient identification was verified, and a caregiver was present when appropriate. The patient was located in a state where the provider was credentialed to provide care. Total time spent for this encounter: Not billed by time    --James Coker MD on 3/10/2021 at 11:32 AM    An electronic signature was used to authenticate this note.           Electronically signed by James Coker MD on 3/10/2021 at 11:31 AM

## 2021-03-10 RX ORDER — GABAPENTIN 300 MG/1
CAPSULE ORAL
Qty: 90 CAPSULE | Refills: 1 | Status: SHIPPED | OUTPATIENT
Start: 2021-03-10 | End: 2021-05-10 | Stop reason: SDUPTHER

## 2021-03-21 RX ORDER — DICLOFENAC SODIUM 75 MG/1
TABLET, DELAYED RELEASE ORAL
Qty: 180 TABLET | Refills: 0 | Status: SHIPPED | OUTPATIENT
Start: 2021-03-21 | End: 2021-06-15

## 2021-04-08 RX ORDER — VERAPAMIL HYDROCHLORIDE 40 MG/1
TABLET ORAL
Qty: 90 TABLET | Refills: 2 | Status: SHIPPED | OUTPATIENT
Start: 2021-04-08 | End: 2021-07-28 | Stop reason: SDUPTHER

## 2021-05-10 ENCOUNTER — OFFICE VISIT (OUTPATIENT)
Dept: PAIN MANAGEMENT | Age: 56
End: 2021-05-10
Payer: COMMERCIAL

## 2021-05-10 VITALS
BODY MASS INDEX: 28.72 KG/M2 | WEIGHT: 183 LBS | HEIGHT: 67 IN | DIASTOLIC BLOOD PRESSURE: 87 MMHG | OXYGEN SATURATION: 98 % | HEART RATE: 88 BPM | SYSTOLIC BLOOD PRESSURE: 133 MMHG

## 2021-05-10 DIAGNOSIS — M54.41 CHRONIC BILATERAL LOW BACK PAIN WITH RIGHT-SIDED SCIATICA: Chronic | ICD-10-CM

## 2021-05-10 DIAGNOSIS — M51.36 DDD (DEGENERATIVE DISC DISEASE), LUMBAR: ICD-10-CM

## 2021-05-10 DIAGNOSIS — G89.29 CHRONIC BILATERAL LOW BACK PAIN WITH RIGHT-SIDED SCIATICA: Chronic | ICD-10-CM

## 2021-05-10 PROCEDURE — 99213 OFFICE O/P EST LOW 20 MIN: CPT | Performed by: NURSE PRACTITIONER

## 2021-05-10 RX ORDER — GABAPENTIN 300 MG/1
CAPSULE ORAL
Qty: 90 CAPSULE | Refills: 2 | Status: SHIPPED | OUTPATIENT
Start: 2021-05-10 | End: 2021-08-09 | Stop reason: SDUPTHER

## 2021-05-10 ASSESSMENT — ENCOUNTER SYMPTOMS
CONSTIPATION: 0
BACK PAIN: 1
SHORTNESS OF BREATH: 0
COUGH: 0
RESPIRATORY NEGATIVE: 1

## 2021-05-10 NOTE — PROGRESS NOTES
Patient is here today to review medication contract. Chief Complaint   Patient presents with    Back Pain    Medication Refill         [facility]     low back and right lumbar radicular pain  She tried therapy and chiropractic treatment  She also tried NSAID and gabapentin  Failing these modalities she had an epidural injection 10/2020 which provided her excellent relief  Pain is currently well controlled  She is taking gabapentin and finds it helpful  Reporting no side effect  Able to continue with daily life activities without any problem  No other changes in health history    HPI:   Back Pain  This is a chronic problem. The current episode started more than 1 year ago. The problem occurs constantly. The problem is unchanged. The pain is present in the lumbar spine. The quality of the pain is described as aching. The pain radiates to the right thigh. The pain is at a severity of 3/10. The symptoms are aggravated by position, standing and bending (walking ). Associated symptoms include numbness (right thigh). Pertinent negatives include no chest pain or fever. Risk factors include lack of exercise. She has tried bed rest, heat, home exercises, NSAIDs and analgesics for the symptoms. The treatment provided mild relief. Medication Refill: Gabapentin     Pain score Today:  3  Adverse effects (Constipation / Nausea / Sedation / sexual Dysfunction / others) : no   Mood: good  Sleep pattern and quality: good  Activity level: fair    Pill count Today:na   Last dose taken  5/10/21  OARRS report reviewed today: yes  ER/Hospitalizations/PCP visit related to pain since last visit:no   Any legal problems e.g. DUI etc.:No  Satisfied with current management: Yes    Opioid Contract:na   Last Urine Dug screen dated:na       Past Medical History, Past Surgical History, Social History, Allergies and Medications reviewed and updated in EPIC as indicated    Family History reviewed and is noncontributory.            Past Neurological:      Mental Status: She is alert and oriented to person, place, and time. Assessment:  Problem List Items Addressed This Visit     Chronic bilateral low back pain with right-sided sciatica (Chronic)    DDD (degenerative disc disease), lumbar             Treatment Plan:  Patient relates current medications are helping the pain. Patient reports taking pain medications as prescribed, denies obtaining medications from different sources and denies use of illegal drugs. Patient denies side effects from medications like nausea, vomiting, constipation or drowsiness. Patient reports current activities of daily living are possible due to medications and would like to continue them. As always, we encourage daily stretching and strengthening exercises, and recommend minimizing use of pain medications unless patient cannot get through daily activities due to pain.      Script written for gabapentin  Follow up appointment made for 3 months

## 2021-07-02 RX ORDER — DICLOFENAC SODIUM 75 MG/1
TABLET, DELAYED RELEASE ORAL
Qty: 60 TABLET | Refills: 0 | Status: SHIPPED | OUTPATIENT
Start: 2021-07-02 | End: 2021-08-23

## 2021-07-13 ENCOUNTER — OFFICE VISIT (OUTPATIENT)
Dept: FAMILY MEDICINE CLINIC | Age: 56
End: 2021-07-13
Payer: COMMERCIAL

## 2021-07-13 VITALS
WEIGHT: 185 LBS | TEMPERATURE: 97.4 F | SYSTOLIC BLOOD PRESSURE: 124 MMHG | OXYGEN SATURATION: 99 % | DIASTOLIC BLOOD PRESSURE: 74 MMHG | BODY MASS INDEX: 29.03 KG/M2 | HEART RATE: 74 BPM | HEIGHT: 67 IN

## 2021-07-13 DIAGNOSIS — E78.5 DYSLIPIDEMIA: ICD-10-CM

## 2021-07-13 DIAGNOSIS — M54.41 CHRONIC BILATERAL LOW BACK PAIN WITH RIGHT-SIDED SCIATICA: Primary | ICD-10-CM

## 2021-07-13 DIAGNOSIS — R07.9 CHEST PAIN, UNSPECIFIED TYPE: ICD-10-CM

## 2021-07-13 DIAGNOSIS — G89.29 CHRONIC BILATERAL LOW BACK PAIN WITH RIGHT-SIDED SCIATICA: Primary | ICD-10-CM

## 2021-07-13 PROCEDURE — 93000 ELECTROCARDIOGRAM COMPLETE: CPT | Performed by: PHYSICIAN ASSISTANT

## 2021-07-13 PROCEDURE — 99214 OFFICE O/P EST MOD 30 MIN: CPT | Performed by: PHYSICIAN ASSISTANT

## 2021-07-13 RX ORDER — METAXALONE 800 MG/1
TABLET ORAL
Qty: 90 TABLET | Refills: 1 | Status: SHIPPED | OUTPATIENT
Start: 2021-07-13 | End: 2021-10-05

## 2021-07-13 ASSESSMENT — PATIENT HEALTH QUESTIONNAIRE - PHQ9
SUM OF ALL RESPONSES TO PHQ QUESTIONS 1-9: 0
1. LITTLE INTEREST OR PLEASURE IN DOING THINGS: 0
SUM OF ALL RESPONSES TO PHQ9 QUESTIONS 1 & 2: 0
SUM OF ALL RESPONSES TO PHQ QUESTIONS 1-9: 0
2. FEELING DOWN, DEPRESSED OR HOPELESS: 0
SUM OF ALL RESPONSES TO PHQ QUESTIONS 1-9: 0

## 2021-07-13 ASSESSMENT — ENCOUNTER SYMPTOMS
NAUSEA: 0
ABDOMINAL PAIN: 0
WHEEZING: 0
VOMITING: 0
CONSTIPATION: 0
SHORTNESS OF BREATH: 0
DIARRHEA: 0
COUGH: 0
COLOR CHANGE: 0

## 2021-07-13 NOTE — PROGRESS NOTES
Visit Information    Have you changed or started any medications since your last visit including any over-the-counter medicines, vitamins, or herbal medicines? no   Are you having any side effects from any of your medications? -  no  Have you stopped taking any of your medications? Is so, why? -  no    Have you seen any other physician or provider since your last visit? No  Have you had any other diagnostic tests since your last visit? No  Have you been seen in the emergency room and/or had an admission to a hospital since we last saw you? No  Have you had your routine dental cleaning in the past 6 months? no    Have you activated your [employer] account? If not, what are your barriers?  Yes     Patient Care Team:  Gardenia Sanchez PA-C as PCP - General (Physician Assistant)  Lady DO Aurelio as PCP - [facility]    Medical History Review  Past Medical, Family, and Social History reviewed and  contribute to the patient presenting condition    Health Maintenance   Topic Date Due    COVID-19 Vaccine (1) Never done    A1C test (Diabetic or Prediabetic)  09/28/2020    Shingles Vaccine (2 of 2) 07/04/2021    Flu vaccine (1) 09/01/2021    Breast cancer screen  10/27/2022    Colon cancer screen fecal DNA test (Cologuard)  08/23/2023    Lipid screen  09/17/2023    Cervical cancer screen  08/11/2025    DTaP/Tdap/Td vaccine (2 - Td or Tdap) 05/17/2026    Hepatitis C screen  Completed    HIV screen  Completed    Hepatitis A vaccine  Aged Out    Hepatitis B vaccine  Aged Out    Hib vaccine  Aged Out    Meningococcal (ACWY) vaccine  Aged Out    Pneumococcal 0-64 years Vaccine  Aged Out

## 2021-07-13 NOTE — PROGRESS NOTES
[id number] [facility]  [address]  [address] [facility] [id number]  Dept: [phone]  Dept Fax: [phone]    Salvatore Bangura is a 63 y.o. female who presents today for her medical conditions/complaintsas noted below. Salvatore Bangura is c/o of   Chief Complaint   Patient presents with    Back Pain     lower back pain         HPI:     HPI    Patient new to provider today. 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 She spends time with her grandkids during the summer. Patient follows with Dr. Connie Pina for pain management. Patient with history of chronic back pain the last 3 years. He is the only specialist she presently follows with. Previous PCP managed her pap testing  She reports feeling well. Has had had a left lateral chest pain that comes and goes for many years. She states some days noticeable then will not feel it for some time. She has had prior EKG that were in normal limits. She reports no activity restriction, no exercise intolerance, no SOB. Pain only lasts a few seconds and tends to occur in the same location.  No known trauma around time of pain    Hemoglobin A1C (%)   Date Value   09/28/2019 5.8   11/25/2017 5.8   08/24/2016 5.9             ( goal A1Cis < 7)   No results found for: LABMICR  LDL Cholesterol (mg/dL)   Date Value   09/17/2018 134 (H)   05/12/2017 132 (H)   08/24/2016 146 (H)       (goal LDL is <100)   AST (U/L)   Date Value   09/17/2018 21     ALT (U/L)   Date Value   09/17/2018 20     BUN (mg/dL)   Date Value   09/17/2018 13     BP Readings from Last 3 Encounters:   07/13/21 124/74   05/10/21 133/87   10/26/20 (!) 145/99          (goal 120/80)    Past Medical History:   Diagnosis Date    Arthritis     Chronic bilateral low back pain with bilateral sciatica 2/11/2020    DDD (degenerative disc disease), lumbar 2/11/2020    MVP (mitral valve prolapse)       Past Surgical History:   Procedure Laterality Date    BREAST SURGERY Bilateral     biopsy    CHOLECYSTECTOMY      COLONOSCOPY      ENDOMETRIAL ABLATION      NERVE BLOCK Right 10/26/2020    EPIDURAL STEROID INJECTION  L4 L5     PAIN MANAGEMENT PROCEDURE Right 7/16/2020    RIGHT L4 AND L5 EPIDURAL STEROID INJECTION performed by Melissa Mccarthy MD at [address] [facility] Right 10/26/2020    EPIDURAL STEROID INJECTION -RIGHT L4 L5 - Right    PAIN MANAGEMENT PROCEDURE Right 10/26/2020    EPIDURAL STEROID INJECTION -RIGHT L4 L5 performed by Melissa Mccarthy MD at [address]         Family History   Problem Relation Age of Onset    High Blood Pressure Mother     High Cholesterol Mother     Other Mother         blood clots     Other Father         alcoholism    Arthritis Sister     No Known Problems Other        Social History     Tobacco Use    Smoking status: Never Smoker    Smokeless tobacco: Never Used   Substance Use Topics    Alcohol use: Yes     Alcohol/week: 3.0 standard drinks     Types: 3 Glasses of wine per week     Comment: moderately 3 glasses of wine per week      Current Outpatient Medications   Medication Sig Dispense Refill    diclofenac (VOLTAREN) 75 MG EC tablet take 1 tablet by mouth twice a day 60 tablet 0    gabapentin (NEURONTIN) 300 MG capsule take 1 capsule by mouth 3 TIMES A DAY 90 capsule 2    verapamil (CALAN) 40 MG tablet take 1 tablet by mouth three times a day 90 tablet 2    metaxalone (SKELAXIN) 800 MG tablet take 1 tablet by mouth three times a day 90 tablet 5    magnesium citrate solution Take 296 mLs by mouth once      acetaminophen (TYLENOL) 650 MG extended release tablet Take 650 mg by mouth every 8 hours as needed for Pain       No current facility-administered medications for this visit.      No Known Allergies    Health Maintenance   Topic Date Due    A1C test (Diabetic or Prediabetic)  09/28/2020    Shingles Vaccine (2 of 2) 07/04/2021    Flu vaccine (1) 09/01/2021    Breast cancer screen  10/27/2022    Colon cancer screen fecal DNA test (Cologuard)  08/23/2023    Lipid screen  09/17/2023    Cervical cancer screen  08/11/2025    DTaP/Tdap/Td vaccine (2 - Td or Tdap) 05/17/2026    COVID-19 Vaccine  Completed    Hepatitis C screen  Completed    HIV screen  Completed    Hepatitis A vaccine  Aged Out    Hepatitis B vaccine  Aged Out    Hib vaccine  Aged Out    Meningococcal (ACWY) vaccine  Aged Out    Pneumococcal 0-64 years Vaccine  Aged Out       Subjective:     Review of Systems   Constitutional: Negative for activity change, appetite change, fatigue, fever and unexpected weight change. /74 (Site: Left Upper Arm, Position: Sitting, Cuff Size: Medium Adult)   Pulse 74   Temp 97.4 °F (36.3 °C) (Tympanic)   Ht 5' 7\" (1.702 m)   Wt 185 lb (83.9 kg)   SpO2 99%   BMI 28.98 kg/m²    Respiratory: Negative for cough, shortness of breath and wheezing. Cardiovascular: Positive for chest pain (intermittently). Negative for palpitations. Gastrointestinal: Negative for abdominal pain, constipation, diarrhea, nausea and vomiting. Skin: Negative for color change, pallor, rash and wound. Neurological: Negative for weakness. Hematological: Negative for adenopathy. Psychiatric/Behavioral: The patient is not nervous/anxious. Objective:     Physical Exam  Vitals and nursing note reviewed. Constitutional:       General: She is not in acute distress. Appearance: Normal appearance. She is well-developed. She is not ill-appearing. HENT:      Head: Normocephalic and atraumatic. Cardiovascular:      Rate and Rhythm: Normal rate and regular rhythm. Heart sounds: No murmur heard. Pulmonary:      Effort: Pulmonary effort is normal. No respiratory distress. Breath sounds: Normal breath sounds. No wheezing, rhonchi or rales. Chest:       Skin:     General: Skin is warm and dry. Coloration: Skin is not pale.       Findings: No erythema or rash. Neurological:      Mental Status: She is alert and oriented to person, place, and time. Psychiatric:         Mood and Affect: Mood normal.         Behavior: Behavior normal.         Thought Content: Thought content normal.         Judgment: Judgment normal.       /74 (Site: Left Upper Arm, Position: Sitting, Cuff Size: Medium Adult)   Pulse 74   Temp 97.4 °F (36.3 °C) (Tympanic)   Ht 5' 7\" (1.702 m)   Wt 185 lb (83.9 kg)   SpO2 99%   BMI 28.98 kg/m²     Assessment:       Diagnosis Orders   1. Chronic bilateral low back pain with right-sided sciatica     2. Dyslipidemia  Comprehensive Metabolic Panel    Lipid Panel    CBC Auto Differential    Hemoglobin A1C   3. Chest pain, unspecified type  EKG 12 Lead             Plan:      Return in about 6 months (around 1/13/2022) for med review. Cont with pain management as planned  Will update screening labs, await results  EKG here due to chest pain was wnl. Suspect more of a chest wall pain that she is feeling. Encouraged her to be cautious with any heavy lifting to that area. If persisting/changing she will follow up in office/use ER if any severe changes  Pt agreed with treatment plan    Orders Placed This Encounter   Procedures    Comprehensive Metabolic Panel     Standing Status:   Future     Standing Expiration Date:   7/13/2022    Lipid Panel     Standing Status:   Future     Standing Expiration Date:   7/13/2022     Order Specific Question:   Is Patient Fasting?/# of Hours     Answer:   yes    CBC Auto Differential     Standing Status:   Future     Standing Expiration Date:   7/13/2022    Hemoglobin A1C     Standing Status:   Future     Standing Expiration Date:   7/13/2022    EKG 12 Lead     Order Specific Question:   Reason for Exam?     Answer:   Chest pain     No orders of the defined types were placed in this encounter. Patient given educational materials - see patient instructions. Discussed use, benefit, and side effects of prescribed medications. All patientquestions answered. Pt voiced understanding. Reviewed health maintenance. Instructedto continue current medications, diet and exercise. Patient agreed with treatmentplan. Follow up as directed.      Electronically signed by Cheryl Calderon PA-C on 7/13/2021 at 11:18 AM

## 2021-07-28 RX ORDER — VERAPAMIL HYDROCHLORIDE 40 MG/1
TABLET ORAL
Qty: 90 TABLET | Refills: 2 | Status: SHIPPED | OUTPATIENT
Start: 2021-07-28 | End: 2021-11-08

## 2021-08-06 ASSESSMENT — ENCOUNTER SYMPTOMS
BACK PAIN: 1
RESPIRATORY NEGATIVE: 1

## 2021-08-06 NOTE — PROGRESS NOTES
Chief Complaint   Patient presents with    Back Pain    Medication Refill         [facility]     low back and right lumbar radicular pain  She tried therapy and chiropractic treatment  She also tried NSAID and gabapentin  Failing these modalities she had an epidural injection 10/2020 which provided her excellent relief  Pain is currently well controlled  She is taking gabapentin and finds it helpful  Reporting no side effect  Able to continue with daily life activities without any problem  No other changes in health history    HPI:   Back Pain  This is a chronic problem. The current episode started more than 1 year ago. The problem occurs constantly. The pain radiates to the right thigh, left thigh, right foot, right knee, left foot and left knee. The pain is at a severity of 1/10. The pain is mild. The symptoms are aggravated by bending, position and standing. Associated symptoms include numbness, paresthesias and tingling. Pertinent negatives include no chest pain or fever. She has tried analgesics for the symptoms. The treatment provided mild relief. Medication Refill: Gabapentin    Pain score Today:  1  Adverse effects (Constipation / Nausea / Sedation / sexual Dysfunction / others) : none  Mood: good  Sleep pattern and quality: good  Activity level: good    Pill count Today:n/a  Last dose taken  8/6/21  OARRS report reviewed today: yes  ER/Hospitalizations/PCP visit related to pain since last visit:no   Any legal problems e.g. DUI etc.:No  Satisfied with current management: Yes    Opioid Contract:none  Last Urine Dug screen dated:none    Lab Results   Component Value Date    LABA1C 5.8 09/28/2019     Lab Results   Component Value Date     11/25/2017       Past Medical History, Past Surgical History, Social History, Allergies and Medications reviewed and updated in EPIC as indicated    Family History reviewed and is noncontributory.              Past Medical History:   Diagnosis Date    Arthritis     Chronic bilateral low back pain with bilateral sciatica 2/11/2020    DDD (degenerative disc disease), lumbar 2/11/2020    MVP (mitral valve prolapse)        Past Surgical History:   Procedure Laterality Date    BREAST SURGERY Bilateral     biopsy    CHOLECYSTECTOMY      COLONOSCOPY      ENDOMETRIAL ABLATION      NERVE BLOCK Right 10/26/2020    EPIDURAL STEROID INJECTION  L4 L5     PAIN MANAGEMENT PROCEDURE Right 7/16/2020    RIGHT L4 AND L5 EPIDURAL STEROID INJECTION performed by Ana Christianson MD at [address] Right 10/26/2020    EPIDURAL STEROID INJECTION -RIGHT L4 L5 - Right    PAIN MANAGEMENT PROCEDURE Right 10/26/2020    EPIDURAL STEROID INJECTION -RIGHT L4 L5 performed by Ana Christianson MD at [address]         No Known Allergies      Current Outpatient Medications:     verapamil (CALAN) 40 MG tablet, take 1 tablet by mouth three times a day, Disp: 90 tablet, Rfl: 2    metaxalone (SKELAXIN) 800 MG tablet, take 1 tablet by mouth three times a day PRN spasm, Disp: 90 tablet, Rfl: 1    diclofenac (VOLTAREN) 75 MG EC tablet, take 1 tablet by mouth twice a day, Disp: 60 tablet, Rfl: 0    gabapentin (NEURONTIN) 300 MG capsule, take 1 capsule by mouth 3 TIMES A DAY, Disp: 90 capsule, Rfl: 2    acetaminophen (TYLENOL) 650 MG extended release tablet, Take 650 mg by mouth every 8 hours as needed for Pain, Disp: , Rfl:     magnesium citrate solution, Take 296 mLs by mouth once (Patient not taking: Reported on 8/6/2021), Disp: , Rfl:     Family History   Problem Relation Age of Onset    High Blood Pressure Mother     High Cholesterol Mother     Other Mother         blood clots     Other Father         alcoholism    Arthritis Sister     No Known Problems Other        Social History     Socioeconomic History    Marital status:      Spouse name: Not on file    Number of children: Not on file    Years of education: Not on file  Highest education level: Not on file   Occupational History    Not on file   Tobacco Use    Smoking status: Never Smoker    Smokeless tobacco: Never Used   Vaping Use    Vaping Use: Never used   Substance and Sexual Activity    Alcohol use: Yes     Alcohol/week: 3.0 standard drinks     Types: 3 Glasses of wine per week     Comment: moderately 3 glasses of wine per week    Drug use: No    Sexual activity: Yes     Partners: Male   Other Topics Concern    Not on file   Social History Narrative    Not on file     Social Determinants of Health     Financial Resource Strain:     Difficulty of Paying Living Expenses:    Food Insecurity:     Worried About Running Out of Food in the Last Year:     Ran Out of Food in the Last Year:    Transportation Needs:     Lack of Transportation (Medical):  Lack of Transportation (Non-Medical):    Physical Activity:     Days of Exercise per Week:     Minutes of Exercise per Session:    Stress:     Feeling of Stress :    Social Connections:     Frequency of Communication with Friends and Family:     Frequency of Social Gatherings with Friends and Family:     Attends Jewish Services:     Active Member of Clubs or Organizations:     Attends Club or Organization Meetings:     Marital Status:    Intimate Partner Violence:     Fear of Current or Ex-Partner:     Emotionally Abused:     Physically Abused:     Sexually Abused:        Review of Systems:  Review of Systems   Constitutional: Negative. Negative for chills and fever. Cardiovascular: Negative for chest pain. Respiratory: Negative. Negative for cough and shortness of breath. Musculoskeletal: Positive for back pain. Gastrointestinal: Negative for constipation. Neurological: Positive for numbness, paresthesias and tingling.        Physical Exam:  BP (!) 136/91 Comment: advised patient to call pcp  Pulse 80   Ht 5' 7\" (1.702 m)   Wt 183 lb (83 kg)   SpO2 99%   BMI 28.66 kg/m²     Physical Exam  Cardiovascular:      Rate and Rhythm: Normal rate. Pulmonary:      Effort: Pulmonary effort is normal.   Musculoskeletal:         General: Normal range of motion. Skin:     General: Skin is warm and dry. Neurological:      Mental Status: She is alert and oriented to person, place, and time. Assessment:  Problem List Items Addressed This Visit     Chronic bilateral low back pain with right-sided sciatica (Chronic)    DDD (degenerative disc disease), lumbar             Treatment Plan:  Patient relates current medications are helping the pain. Patient reports taking pain medications as prescribed, denies obtaining medications from different sources and denies use of illegal drugs. Patient denies side effects from medications like nausea, vomiting, constipation or drowsiness. Patient reports current activities of daily living are possible due to medications and would like to continue them. As always, we encourage daily stretching and strengthening exercises, and recommend minimizing use of pain medications unless patient cannot get through daily activities due to pain.       Script written for gabapentin  Consider repeating injection if pain worsens  Follow up appointment made for 3 months

## 2021-08-09 ENCOUNTER — OFFICE VISIT (OUTPATIENT)
Dept: PAIN MANAGEMENT | Age: 56
End: 2021-08-09
Payer: COMMERCIAL

## 2021-08-09 VITALS
WEIGHT: 183 LBS | BODY MASS INDEX: 28.72 KG/M2 | DIASTOLIC BLOOD PRESSURE: 91 MMHG | HEART RATE: 80 BPM | SYSTOLIC BLOOD PRESSURE: 136 MMHG | HEIGHT: 67 IN | OXYGEN SATURATION: 99 %

## 2021-08-09 DIAGNOSIS — M54.41 CHRONIC BILATERAL LOW BACK PAIN WITH RIGHT-SIDED SCIATICA: Chronic | ICD-10-CM

## 2021-08-09 DIAGNOSIS — M51.36 DDD (DEGENERATIVE DISC DISEASE), LUMBAR: ICD-10-CM

## 2021-08-09 DIAGNOSIS — G89.29 CHRONIC BILATERAL LOW BACK PAIN WITH RIGHT-SIDED SCIATICA: Chronic | ICD-10-CM

## 2021-08-09 PROCEDURE — 99213 OFFICE O/P EST LOW 20 MIN: CPT | Performed by: NURSE PRACTITIONER

## 2021-08-09 RX ORDER — GABAPENTIN 300 MG/1
CAPSULE ORAL
Qty: 90 CAPSULE | Refills: 2 | Status: SHIPPED | OUTPATIENT
Start: 2021-08-09 | End: 2021-11-29 | Stop reason: SDUPTHER

## 2021-08-09 ASSESSMENT — ENCOUNTER SYMPTOMS
SHORTNESS OF BREATH: 0
COUGH: 0
CONSTIPATION: 0

## 2021-08-23 RX ORDER — DICLOFENAC SODIUM 75 MG/1
TABLET, DELAYED RELEASE ORAL
Qty: 60 TABLET | Refills: 0 | Status: SHIPPED | OUTPATIENT
Start: 2021-08-23 | End: 2021-09-20

## 2021-09-20 RX ORDER — DICLOFENAC SODIUM 75 MG/1
TABLET, DELAYED RELEASE ORAL
Qty: 60 TABLET | Refills: 0 | Status: SHIPPED | OUTPATIENT
Start: 2021-09-20 | End: 2021-09-26

## 2021-09-26 RX ORDER — DICLOFENAC SODIUM 75 MG/1
TABLET, DELAYED RELEASE ORAL
Qty: 60 TABLET | Refills: 0 | Status: SHIPPED | OUTPATIENT
Start: 2021-09-26 | End: 2021-11-15

## 2021-10-05 RX ORDER — METAXALONE 800 MG/1
TABLET ORAL
Qty: 90 TABLET | Refills: 1 | Status: SHIPPED | OUTPATIENT
Start: 2021-10-05 | End: 2021-12-08

## 2021-10-25 ENCOUNTER — TELEPHONE (OUTPATIENT)
Dept: FAMILY MEDICINE CLINIC | Age: 56
End: 2021-10-25

## 2021-10-25 DIAGNOSIS — Z12.31 BREAST CANCER SCREENING BY MAMMOGRAM: Primary | ICD-10-CM

## 2021-10-25 NOTE — TELEPHONE ENCOUNTER
Can you place a new order for a mamm.  Patient going to [facility].

## 2021-11-08 RX ORDER — VERAPAMIL HYDROCHLORIDE 40 MG/1
TABLET ORAL
Qty: 90 TABLET | Refills: 2 | Status: SHIPPED | OUTPATIENT
Start: 2021-11-08 | End: 2022-02-14

## 2021-11-15 RX ORDER — DICLOFENAC SODIUM 75 MG/1
TABLET, DELAYED RELEASE ORAL
Qty: 60 TABLET | Refills: 0 | Status: SHIPPED | OUTPATIENT
Start: 2021-11-15 | End: 2021-12-27

## 2021-11-16 DIAGNOSIS — Z12.31 BREAST CANCER SCREENING BY MAMMOGRAM: ICD-10-CM

## 2021-11-29 DIAGNOSIS — M51.36 DDD (DEGENERATIVE DISC DISEASE), LUMBAR: ICD-10-CM

## 2021-11-29 DIAGNOSIS — G89.29 CHRONIC BILATERAL LOW BACK PAIN WITH RIGHT-SIDED SCIATICA: Chronic | ICD-10-CM

## 2021-11-29 DIAGNOSIS — M54.41 CHRONIC BILATERAL LOW BACK PAIN WITH RIGHT-SIDED SCIATICA: Chronic | ICD-10-CM

## 2021-11-29 RX ORDER — GABAPENTIN 300 MG/1
300 CAPSULE ORAL 3 TIMES DAILY
Qty: 60 CAPSULE | Refills: 0 | Status: SHIPPED | OUTPATIENT
Start: 2021-11-29 | End: 2021-12-17 | Stop reason: SDUPTHER

## 2021-11-29 NOTE — TELEPHONE ENCOUNTER
Eve Rehman is requesting a refill on the following medications:   Requested Prescriptions     Pending Prescriptions Disp Refills    gabapentin (NEURONTIN) 300 MG capsule 90 capsule 2     Sig: take 1 capsule by mouth 3 TIMES A DAY       Last OV 8/9/21. N ext appointment 12/17/21    Future Appointments   Date Time Provider Malena Amanda   12/17/2021  4:20 PM GONZÁLEZ Mercedes CNP [facility]   1/4/2022  3:30 PM BEBE Mac [facility]       OARRS report sent to Dr. Heide Calero through alternative route for review.

## 2021-11-30 NOTE — TELEPHONE ENCOUNTER
LVM informing that patient that refill was sent to pharmacy for enough medication to last until her appt with Farhan Little on 12/17/21 and that pt will need to keep that appointment to get the remainder of her prescription

## 2021-12-08 RX ORDER — METAXALONE 800 MG/1
TABLET ORAL
Qty: 90 TABLET | Refills: 1 | Status: SHIPPED | OUTPATIENT
Start: 2021-12-08 | End: 2022-01-27 | Stop reason: ALTCHOICE

## 2021-12-17 ENCOUNTER — OFFICE VISIT (OUTPATIENT)
Dept: PAIN MANAGEMENT | Age: 56
End: 2021-12-17
Payer: COMMERCIAL

## 2021-12-17 VITALS
WEIGHT: 183 LBS | SYSTOLIC BLOOD PRESSURE: 138 MMHG | OXYGEN SATURATION: 100 % | HEIGHT: 67 IN | RESPIRATION RATE: 16 BRPM | DIASTOLIC BLOOD PRESSURE: 89 MMHG | BODY MASS INDEX: 28.72 KG/M2 | HEART RATE: 75 BPM

## 2021-12-17 DIAGNOSIS — M51.36 DDD (DEGENERATIVE DISC DISEASE), LUMBAR: ICD-10-CM

## 2021-12-17 DIAGNOSIS — G89.29 CHRONIC BILATERAL LOW BACK PAIN WITH RIGHT-SIDED SCIATICA: Chronic | ICD-10-CM

## 2021-12-17 DIAGNOSIS — M54.41 CHRONIC BILATERAL LOW BACK PAIN WITH RIGHT-SIDED SCIATICA: Chronic | ICD-10-CM

## 2021-12-17 PROCEDURE — 99213 OFFICE O/P EST LOW 20 MIN: CPT | Performed by: NURSE PRACTITIONER

## 2021-12-17 RX ORDER — GABAPENTIN 300 MG/1
300 CAPSULE ORAL 3 TIMES DAILY
Qty: 60 CAPSULE | Refills: 2 | Status: SHIPPED | OUTPATIENT
Start: 2021-12-19 | End: 2022-02-01 | Stop reason: SDUPTHER

## 2021-12-17 ASSESSMENT — ENCOUNTER SYMPTOMS
CONSTIPATION: 0
SHORTNESS OF BREATH: 0
COUGH: 0
BACK PAIN: 1
RESPIRATORY NEGATIVE: 1

## 2021-12-17 NOTE — PROGRESS NOTES
Patient is here today to review medication contract. Chief Complaint   Patient presents with    Back Pain    Medication Refill       PMH     low back and right lumbar radicular pain  She tried therapy and chiropractic treatment  She also tried NSAID and gabapentin  Failing these modalities she had an epidural injection 10/2020 which provided her excellent relief - not ready to repeat d/t financial cost  Pain is currently well controlled  taking gabapentin   Able to continue with daily life activities without any problem  No other changes in health history      HPI:   Back Pain  This is a chronic problem. The current episode started more than 1 year ago. The problem occurs constantly. The problem is unchanged. The pain is present in the lumbar spine and gluteal (rt hip groin). The quality of the pain is described as aching. The pain is at a severity of 3/10. The symptoms are aggravated by sitting, standing, position and bending. Associated symptoms include numbness and paresthesias (occasionally). Pertinent negatives include no chest pain or fever. Risk factors include menopause, obesity and poor posture. She has tried analgesics and home exercises for the symptoms.      Medication Refill: gabapentin    Pain score Today:  3  Adverse effects (Constipation / Nausea / Sedation / sexual Dysfunction / others) : no  Mood: good  Sleep pattern and quality: good  Activity level: good    Pill count Today:n/a  Last dose taken 12/17/2021 1pm  OARRS report reviewed today: yes  ER/Hospitalizations/PCP visit related to pain since last visit:no   Any legal problems e.g. DUI etc.:No  Satisfied with current management: okay      Lab Results   Component Value Date    LABA1C 5.8 09/28/2019     Lab Results   Component Value Date     11/25/2017       Past Medical History, Past Surgical History, Social History, Allergies and Medications reviewed and updated in EPIC as indicated    Family History reviewed and is noncontributory. Past Medical History:   Diagnosis Date    Arthritis     Chronic bilateral low back pain with bilateral sciatica 2/11/2020    DDD (degenerative disc disease), lumbar 2/11/2020    MVP (mitral valve prolapse)        Past Surgical History:   Procedure Laterality Date    BREAST SURGERY Bilateral     biopsy    CHOLECYSTECTOMY      COLONOSCOPY      ENDOMETRIAL ABLATION      NERVE BLOCK Right 10/26/2020    EPIDURAL STEROID INJECTION  L4 L5     PAIN MANAGEMENT PROCEDURE Right 7/16/2020    RIGHT L4 AND L5 EPIDURAL STEROID INJECTION performed by Ian Campo MD at [facility] Right 10/26/2020    EPIDURAL STEROID INJECTION -RIGHT L4 L5 - Right    PAIN MANAGEMENT PROCEDURE Right 10/26/2020    EPIDURAL STEROID INJECTION -RIGHT L4 L5 performed by Ian Campo MD at [address]         No Known Allergies      Current Outpatient Medications:     [START ON 12/19/2021] gabapentin (NEURONTIN) 300 MG capsule, Take 1 capsule by mouth 3 times daily for 20 days.  take 1 capsule by mouth 3 TIMES A DAY, Disp: 60 capsule, Rfl: 2    metaxalone (SKELAXIN) 800 MG tablet, take 1 tablet by mouth three times a day if needed for muscle spasm, Disp: 90 tablet, Rfl: 1    diclofenac (VOLTAREN) 75 MG EC tablet, take 1 tablet by mouth twice a day, Disp: 60 tablet, Rfl: 0    verapamil (CALAN) 40 MG tablet, take 1 tablet by mouth three times a day, Disp: 90 tablet, Rfl: 2    magnesium citrate solution, Take 296 mLs by mouth once , Disp: , Rfl:     acetaminophen (TYLENOL) 650 MG extended release tablet, Take 650 mg by mouth every 8 hours as needed for Pain, Disp: , Rfl:     Family History   Problem Relation Age of Onset    High Blood Pressure Mother     High Cholesterol Mother     Other Mother         blood clots     Other Father         alcoholism    Arthritis Sister     No Known Problems Other        Social History     Socioeconomic History    Marital status:      Spouse name: Not on file    Number of children: Not on file    Years of education: Not on file    Highest education level: Not on file   Occupational History    Not on file   Tobacco Use    Smoking status: Never Smoker    Smokeless tobacco: Never Used   Vaping Use    Vaping Use: Never used   Substance and Sexual Activity    Alcohol use: Yes     Alcohol/week: 3.0 standard drinks     Types: 3 Glasses of wine per week     Comment: moderately 3 glasses of wine per week    Drug use: No    Sexual activity: Yes     Partners: Male   Other Topics Concern    Not on file   Social History Narrative    Not on file     Social Determinants of Health     Financial Resource Strain:     Difficulty of Paying Living Expenses: Not on file   Food Insecurity:     Worried About Running Out of Food in the Last Year: Not on file    Reji of Food in the Last Year: Not on file   Transportation Needs:     Lack of Transportation (Medical): Not on file    Lack of Transportation (Non-Medical):  Not on file   Physical Activity:     Days of Exercise per Week: Not on file    Minutes of Exercise per Session: Not on file   Stress:     Feeling of Stress : Not on file   Social Connections:     Frequency of Communication with Friends and Family: Not on file    Frequency of Social Gatherings with Friends and Family: Not on file    Attends Taoist Services: Not on file    Active Member of [address] [place] [address] or Organizations: Not on file    Attends Club or Organization Meetings: Not on file    Marital Status: Not on file   Intimate Partner Violence:     Fear of Current or Ex-Partner: Not on file    Emotionally Abused: Not on file    Physically Abused: Not on file    Sexually Abused: Not on file   Housing Stability:     Unable to Pay for Housing in the Last Year: Not on file    Number of [place] in the Last Year: Not on file    Unstable Housing in the Last Year: Not on file       Review of Systems:  Review of Systems   Constitutional: Negative. Negative for chills and fever. Cardiovascular: Negative for chest pain. Respiratory: Negative. Negative for cough and shortness of breath. Musculoskeletal: Positive for back pain and stiffness. Gastrointestinal: Negative for constipation. Neurological: Positive for numbness and paresthesias (occasionally). Physical Exam:  /89 (Site: Right Upper Arm, Position: Sitting)   Pulse 75   Resp 16   Ht 5' 7\" (1.702 m)   Wt 183 lb (83 kg)   SpO2 100%   BMI 28.66 kg/m²     Physical Exam  Cardiovascular:      Rate and Rhythm: Normal rate. Pulmonary:      Effort: Pulmonary effort is normal.   Musculoskeletal:         General: Normal range of motion. Skin:     General: Skin is warm and dry. Neurological:      Mental Status: She is alert and oriented to person, place, and time. Assessment:  Problem List Items Addressed This Visit     Chronic bilateral low back pain with right-sided sciatica (Chronic)    Relevant Medications    gabapentin (NEURONTIN) 300 MG capsule (Start on 12/19/2021)    DDD (degenerative disc disease), lumbar    Relevant Medications    gabapentin (NEURONTIN) 300 MG capsule (Start on 12/19/2021)             Treatment Plan:  Patient relates current medications are helping the pain. Patient reports taking pain medications as prescribed, denies obtaining medications from different sources and denies use of illegal drugs. Patient denies side effects from medications like nausea, vomiting, constipation or drowsiness. Patient reports current activities of daily living are possible due to medications and would like to continue them. As always, we encourage daily stretching and strengthening exercises, and recommend minimizing use of pain medications unless patient cannot get through daily activities due to pain.      Script written for gabapentin  Consider repeating injection if pain worsens  Follow up appointment made for 3 months

## 2021-12-27 RX ORDER — DICLOFENAC SODIUM 75 MG/1
TABLET, DELAYED RELEASE ORAL
Qty: 60 TABLET | Refills: 0 | Status: SHIPPED | OUTPATIENT
Start: 2021-12-27 | End: 2022-01-24

## 2021-12-31 LAB
ALBUMIN SERPL-MCNC: 4.4 G/DL
ALP BLD-CCNC: 81 U/L
ALT SERPL-CCNC: 16 U/L
ANION GAP SERPL CALCULATED.3IONS-SCNC: 11 MMOL/L
AST SERPL-CCNC: 19 U/L
AVERAGE GLUCOSE: 123
BASOPHILS ABSOLUTE: 0 /ΜL
BASOPHILS RELATIVE PERCENT: 1.3 %
BILIRUB SERPL-MCNC: 0.4 MG/DL (ref 0.1–1.4)
BUN BLDV-MCNC: 18 MG/DL
CALCIUM SERPL-MCNC: 9.5 MG/DL
CHLORIDE BLD-SCNC: 106 MMOL/L
CHOLESTEROL, TOTAL: 209 MG/DL
CHOLESTEROL/HDL RATIO: 3.7
CO2: 25 MMOL/L
CREAT SERPL-MCNC: 0.79 MG/DL
EOSINOPHILS ABSOLUTE: 0.1 /ΜL
EOSINOPHILS RELATIVE PERCENT: 2.8 %
GFR CALCULATED: NORMAL
GLUCOSE BLD-MCNC: 88 MG/DL
HBA1C MFR BLD: 5.9 %
HCT VFR BLD CALC: 36.8 % (ref 36–46)
HDLC SERPL-MCNC: 56 MG/DL (ref 35–70)
HEMOGLOBIN: 12.1 G/DL (ref 12–16)
LDL CHOLESTEROL CALCULATED: 143 MG/DL (ref 0–160)
LYMPHOCYTES ABSOLUTE: 1.3 /ΜL
LYMPHOCYTES RELATIVE PERCENT: 37.5 %
MCH RBC QN AUTO: 28.1 PG
MCHC RBC AUTO-ENTMCNC: 32.8 G/DL
MCV RBC AUTO: 86 FL
MONOCYTES ABSOLUTE: 0.3 /ΜL
MONOCYTES RELATIVE PERCENT: 9.3 %
NEUTROPHILS ABSOLUTE: 1.8 /ΜL
NEUTROPHILS RELATIVE PERCENT: 49.1 %
NONHDLC SERPL-MCNC: NORMAL MG/DL
PDW BLD-RTO: 15 %
PLATELET # BLD: 322 K/ΜL
PMV BLD AUTO: 8.1 FL
POTASSIUM SERPL-SCNC: 3.7 MMOL/L
RBC # BLD: 4.3 10^6/ΜL
SODIUM BLD-SCNC: 142 MMOL/L
TOTAL PROTEIN: 7.7
TRIGL SERPL-MCNC: 51 MG/DL
VLDLC SERPL CALC-MCNC: 10 MG/DL
WBC # BLD: 3.6 10^3/ML

## 2022-01-13 DIAGNOSIS — E78.5 DYSLIPIDEMIA: ICD-10-CM

## 2022-01-24 RX ORDER — DICLOFENAC SODIUM 75 MG/1
TABLET, DELAYED RELEASE ORAL
Qty: 60 TABLET | Refills: 0 | Status: SHIPPED | OUTPATIENT
Start: 2022-01-24 | End: 2022-01-25 | Stop reason: SDUPTHER

## 2022-01-25 RX ORDER — DICLOFENAC SODIUM 75 MG/1
TABLET, DELAYED RELEASE ORAL
Qty: 60 TABLET | Refills: 0 | Status: SHIPPED | OUTPATIENT
Start: 2022-01-25 | End: 2022-02-16 | Stop reason: SDUPTHER

## 2022-01-27 ENCOUNTER — HOSPITAL ENCOUNTER (EMERGENCY)
Age: 57
Discharge: HOME | End: 2022-01-27
Attending: EMERGENCY MEDICINE
Payer: COMMERCIAL

## 2022-01-27 VITALS
OXYGEN SATURATION: 100 % | RESPIRATION RATE: 16 BRPM | DIASTOLIC BLOOD PRESSURE: 100 MMHG | WEIGHT: 180 LBS | SYSTOLIC BLOOD PRESSURE: 154 MMHG | TEMPERATURE: 98.2 F | BODY MASS INDEX: 28.25 KG/M2 | HEIGHT: 67 IN | HEART RATE: 88 BPM

## 2022-01-27 DIAGNOSIS — M54.50 ACUTE EXACERBATION OF CHRONIC LOW BACK PAIN: Primary | ICD-10-CM

## 2022-01-27 DIAGNOSIS — G89.29 ACUTE EXACERBATION OF CHRONIC LOW BACK PAIN: Primary | ICD-10-CM

## 2022-01-27 PROCEDURE — 6360000002 HC RX W HCPCS: Performed by: EMERGENCY MEDICINE

## 2022-01-27 PROCEDURE — 99284 EMERGENCY DEPT VISIT MOD MDM: CPT

## 2022-01-27 PROCEDURE — 6370000000 HC RX 637 (ALT 250 FOR IP): Performed by: EMERGENCY MEDICINE

## 2022-01-27 PROCEDURE — 96372 THER/PROPH/DIAG INJ SC/IM: CPT

## 2022-01-27 RX ORDER — HYDROCODONE BITARTRATE AND ACETAMINOPHEN 5; 325 MG/1; MG/1
1 TABLET ORAL EVERY 6 HOURS PRN
Qty: 16 TABLET | Refills: 0 | Status: SHIPPED | OUTPATIENT
Start: 2022-01-27 | End: 2022-01-30

## 2022-01-27 RX ORDER — CYCLOBENZAPRINE HCL 10 MG
10 TABLET ORAL 3 TIMES DAILY PRN
Qty: 21 TABLET | Refills: 0 | Status: SHIPPED | OUTPATIENT
Start: 2022-01-27 | End: 2022-02-06

## 2022-01-27 RX ORDER — KETOROLAC TROMETHAMINE 30 MG/ML
30 INJECTION, SOLUTION INTRAMUSCULAR; INTRAVENOUS ONCE
Status: COMPLETED | OUTPATIENT
Start: 2022-01-27 | End: 2022-01-27

## 2022-01-27 RX ORDER — HYDROCODONE BITARTRATE AND ACETAMINOPHEN 5; 325 MG/1; MG/1
2 TABLET ORAL ONCE
Status: COMPLETED | OUTPATIENT
Start: 2022-01-27 | End: 2022-01-27

## 2022-01-27 RX ORDER — ORPHENADRINE CITRATE 30 MG/ML
60 INJECTION INTRAMUSCULAR; INTRAVENOUS ONCE
Status: COMPLETED | OUTPATIENT
Start: 2022-01-27 | End: 2022-01-27

## 2022-01-27 RX ORDER — PREDNISONE 50 MG/1
50 TABLET ORAL DAILY
Qty: 5 TABLET | Refills: 0 | Status: SHIPPED | OUTPATIENT
Start: 2022-01-27 | End: 2022-02-01

## 2022-01-27 RX ADMIN — KETOROLAC TROMETHAMINE 30 MG: 30 INJECTION, SOLUTION INTRAMUSCULAR at 08:33

## 2022-01-27 RX ADMIN — HYDROCODONE BITARTRATE AND ACETAMINOPHEN 2 TABLET: 5; 325 TABLET ORAL at 08:33

## 2022-01-27 RX ADMIN — ORPHENADRINE CITRATE 60 MG: 30 INJECTION INTRAMUSCULAR; INTRAVENOUS at 08:34

## 2022-01-27 ASSESSMENT — ENCOUNTER SYMPTOMS
RHINORRHEA: 0
NAUSEA: 0
EYE DISCHARGE: 0
COLOR CHANGE: 0
EYE REDNESS: 0
VOMITING: 0
BACK PAIN: 1
DIARRHEA: 0
COUGH: 0
SHORTNESS OF BREATH: 0
SORE THROAT: 0

## 2022-01-27 ASSESSMENT — PAIN SCALES - GENERAL
PAINLEVEL_OUTOF10: 7
PAINLEVEL_OUTOF10: 5
PAINLEVEL_OUTOF10: 8

## 2022-01-27 ASSESSMENT — PAIN DESCRIPTION - LOCATION: LOCATION: BACK

## 2022-01-27 NOTE — ED PROVIDER NOTES
EMERGENCY DEPARTMENT ENCOUNTER    Pt Name: Ralph Dougherty  MRN: [id number]  [place] 1965  Date of evaluation: 1/27/22  CHIEF COMPLAINT       Chief Complaint   Patient presents with    Back Pain     lower back pain sts h/o chronic issues/ in pain management     HISTORY OF PRESENT ILLNESS   61year-old presents with complaints of back pain. Patient denies any falls or injuries. She states that she was at work last night, when she came home from work she was sitting on the couch when she got up from the couch she had difficulties moving. She denies any specific falls or injuries, she has no dysuria or hematuria, she denies fevers or chills. Patient describes symptoms as severe. No incontinence to urine or stool. She denies fevers or chills. REVIEW OF SYSTEMS     Review of Systems   Constitutional: Negative for chills and fever. HENT: Negative for rhinorrhea and sore throat. Eyes: Negative for discharge, redness and visual disturbance. Respiratory: Negative for cough and shortness of breath. Cardiovascular: Negative for chest pain, palpitations and leg swelling. Gastrointestinal: Negative for diarrhea, nausea and vomiting. Musculoskeletal: Positive for back pain. Negative for arthralgias, myalgias and neck pain. Skin: Negative for color change and rash. Neurological: Negative for seizures, weakness and headaches. Psychiatric/Behavioral: Negative for hallucinations, self-injury and suicidal ideas.      PASTMEDICAL HISTORY     Past Medical History:   Diagnosis Date    Arthritis     Chronic bilateral low back pain with bilateral sciatica 2/11/2020    DDD (degenerative disc disease), lumbar 2/11/2020    MVP (mitral valve prolapse)      Past Problem List  Patient Active Problem List   Diagnosis Code    Prediabetes R73.03    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    Dyslipidemia E78.5    Chronic bilateral low back pain with right-sided sciatica M54.41, G89.29    DDD (degenerative disc disease), lumbar M51.36    Medication management Z79.899     SURGICAL HISTORY       Past Surgical History:   Procedure Laterality Date    BREAST SURGERY Bilateral     biopsy    CHOLECYSTECTOMY      COLONOSCOPY      ENDOMETRIAL ABLATION      NERVE BLOCK Right 10/26/2020    EPIDURAL STEROID INJECTION  L4 L5     PAIN MANAGEMENT PROCEDURE Right 2020    RIGHT L4 AND L5 EPIDURAL STEROID INJECTION performed by Staci Kellogg MD at [facility] Right 10/26/2020    EPIDURAL STEROID INJECTION -RIGHT L4 L5 - Right    PAIN MANAGEMENT PROCEDURE Right 10/26/2020    EPIDURAL STEROID INJECTION -RIGHT L4 L5 performed by Staci Kellogg MD at [place] [address]       Discharge Medication List as of 2022  9:05 AM      CONTINUE these medications which have NOT CHANGED    Details   diclofenac (VOLTAREN) 75 MG EC tablet 1 tablet po BID with food, prn pain, Disp-60 tablet, R-0Normal      gabapentin (NEURONTIN) 300 MG capsule Take 1 capsule by mouth 3 times daily for 20 days. take 1 capsule by mouth 3 TIMES A DAY, Disp-60 capsule, R-2Normal      verapamil (CALAN) 40 MG tablet take 1 tablet by mouth three times a day, Disp-90 tablet, R-2Normal      magnesium citrate solution Take 296 mLs by mouth once Historical Med      acetaminophen (TYLENOL) 650 MG extended release tablet Take 650 mg by mouth every 8 hours as needed for PainHistorical Med           ALLERGIES     has No Known Allergies. FAMILY HISTORY     She indicated that her mother is . She indicated that her father is . She indicated that her sister is alive. She indicated that her other is alive. SOCIAL HISTORY       Social History     Tobacco Use    Smoking status: Never Smoker    Smokeless tobacco: Never Used   Vaping Use    Vaping Use: Never used   Substance Use Topics    Alcohol use:  Yes     Alcohol/week: 3.0 standard drinks     Types: 3 Glasses of wine per week Comment: moderately 3 glasses of wine per week    Drug use: No     PHYSICAL EXAM     INITIAL VITALS: BP (!) 154/100   Pulse 88   Temp 98.2 °F (36.8 °C) (Oral)   Resp 16   Ht 5' 7\" (1.702 m)   Wt 180 lb (81.6 kg)   SpO2 100%   BMI 28.19 kg/m²    Physical Exam  Constitutional:       Appearance: Normal appearance. HENT:      Head: Normocephalic and atraumatic. Eyes:      Extraocular Movements: Extraocular movements intact. Pupils: Pupils are equal, round, and reactive to light. Cardiovascular:      Rate and Rhythm: Normal rate and regular rhythm. Pulmonary:      Effort: Pulmonary effort is normal.      Breath sounds: Normal breath sounds. Abdominal:      General: Abdomen is flat. Palpations: Abdomen is soft. Tenderness: There is no abdominal tenderness. Neurological:      Mental Status: She is alert. Sensory: Sensation is intact. Motor: Motor function is intact. Deep Tendon Reflexes:      Reflex Scores:       Patellar reflexes are 2+ on the right side and 2+ on the left side. MEDICAL DECISION MAKIN-year-old presents with complaints of back pain. No fall or injury. Plan is pain control and reevaluation. I do not believe she requires imaging. Nothing to suggest epidural abscess or epidural hematoma. 9:22 AM EST  Patient's symptoms improved significantly. She will be discharged home with a work note, pain control and outpatient follow-up. CRITICAL CARE:       PROCEDURES:    Procedures    DIAGNOSTIC RESULTS   EKG:All EKG's are interpreted by the Emergency Department Physician who either signs or Co-signs this chart in the absence of a cardiologist.        RADIOLOGY:All plain film, CT, MRI, and formal ultrasound images (except ED bedside ultrasound) are read by the radiologist, see reports below, unless otherwisenoted in MDM or here.   No orders to display     LABS: All lab results were reviewed by myself, and all abnormals are listed below. Labs Reviewed - No data to display    EMERGENCY DEPARTMENTCOURSE:         Vitals:    Vitals:    01/27/22 0747   BP: (!) 154/100   Pulse: 88   Resp: 16   Temp: 98.2 °F (36.8 °C)   TempSrc: Oral   SpO2: 100%   Weight: 180 lb (81.6 kg)   Height: 5' 7\" (1.702 m)       The patient was given the following medications while in the emergency department:  Orders Placed This Encounter   Medications    ketorolac (TORADOL) injection 30 mg    orphenadrine (NORFLEX) injection 60 mg    HYDROcodone-acetaminophen (NORCO) 5-325 MG per tablet 2 tablet    cyclobenzaprine (FLEXERIL) 10 MG tablet     Sig: Take 1 tablet by mouth 3 times daily as needed for Muscle spasms     Dispense:  21 tablet     Refill:  0    predniSONE (DELTASONE) 50 MG tablet     Sig: Take 1 tablet by mouth daily for 5 days     Dispense:  5 tablet     Refill:  0    HYDROcodone-acetaminophen (NORCO) 5-325 MG per tablet     Sig: Take 1 tablet by mouth every 6 hours as needed for Pain for up to 3 days. Dispense:  16 tablet     Refill:  0     CONSULTS:  None    FINAL IMPRESSION      1. Acute exacerbation of chronic low back pain          DISPOSITION/PLAN   DISPOSITION Decision To Discharge 01/27/2022 09:03:55 AM      PATIENT REFERRED TO:  Jessie Chowdhury PA-C  [address] [place] [address] [address]  [phone]    Schedule an appointment as soon as possible for a visit in 2 days      DISCHARGE MEDICATIONS:  Discharge Medication List as of 1/27/2022  9:05 AM      START taking these medications    Details   cyclobenzaprine (FLEXERIL) 10 MG tablet Take 1 tablet by mouth 3 times daily as needed for Muscle spasms, Disp-21 tablet, R-0Print      predniSONE (DELTASONE) 50 MG tablet Take 1 tablet by mouth daily for 5 days, Disp-5 tablet, R-0Print      HYDROcodone-acetaminophen (NORCO) 5-325 MG per tablet Take 1 tablet by mouth every 6 hours as needed for Pain for up to 3 days. , Disp-16 tablet, R-0Print           The care is provided during an unprecedented national emergency due to the novel coronavirus, COVID 19.   MD Jeanine Soto MD  01/27/22 9916

## 2022-01-27 NOTE — Clinical Note
Kalie Pritchard was seen and treated in our emergency department on 1/27/2022. She may return to work on 01/29/2022. If you have any questions or concerns, please don't hesitate to call.       Olayinka Sanchez MD

## 2022-02-01 ENCOUNTER — OFFICE VISIT (OUTPATIENT)
Dept: PAIN MANAGEMENT | Age: 57
End: 2022-02-01
Payer: COMMERCIAL

## 2022-02-01 VITALS
OXYGEN SATURATION: 98 % | SYSTOLIC BLOOD PRESSURE: 138 MMHG | HEART RATE: 86 BPM | WEIGHT: 185 LBS | BODY MASS INDEX: 28.98 KG/M2 | DIASTOLIC BLOOD PRESSURE: 91 MMHG

## 2022-02-01 DIAGNOSIS — G89.29 CHRONIC BILATERAL LOW BACK PAIN WITH RIGHT-SIDED SCIATICA: Chronic | ICD-10-CM

## 2022-02-01 DIAGNOSIS — M54.41 CHRONIC BILATERAL LOW BACK PAIN WITH RIGHT-SIDED SCIATICA: Chronic | ICD-10-CM

## 2022-02-01 DIAGNOSIS — M54.50 ACUTE EXACERBATION OF CHRONIC LOW BACK PAIN: Primary | ICD-10-CM

## 2022-02-01 DIAGNOSIS — M51.36 DDD (DEGENERATIVE DISC DISEASE), LUMBAR: ICD-10-CM

## 2022-02-01 DIAGNOSIS — G89.29 ACUTE EXACERBATION OF CHRONIC LOW BACK PAIN: Primary | ICD-10-CM

## 2022-02-01 PROCEDURE — 99214 OFFICE O/P EST MOD 30 MIN: CPT | Performed by: ANESTHESIOLOGY

## 2022-02-01 RX ORDER — HYDROCODONE BITARTRATE AND ACETAMINOPHEN 5; 325 MG/1; MG/1
1 TABLET ORAL EVERY 6 HOURS PRN
COMMUNITY
End: 2022-03-15

## 2022-02-01 RX ORDER — GABAPENTIN 300 MG/1
300 CAPSULE ORAL 3 TIMES DAILY
Qty: 60 CAPSULE | Refills: 1 | Status: SHIPPED | OUTPATIENT
Start: 2022-02-01 | End: 2022-03-18 | Stop reason: SDUPTHER

## 2022-02-01 ASSESSMENT — ENCOUNTER SYMPTOMS
BACK PAIN: 1
GASTROINTESTINAL NEGATIVE: 1

## 2022-02-01 NOTE — PROGRESS NOTES
The patient is a 61 y. o. Non- / non  female. Chief Complaint   Patient presents with    Back Pain        HPI     Back pain  Chronic onset many years ago located in the lower lumbar area across midline right more than left  Occasional radiation down right leg  Had MRI lumbar spine in 2019  Therapy few years ago  Currently on gabapentin and chronic NSAID therapy along with muscle relaxant  She was doing okay until last week when she had a sudden severe flareup of her back pain  No particular injury on with the onset of that pain  She had to go to emergency room  She was treated with steroids  On her last day today that has improved her pain a little bit  She denies any changes in bladder or bowel control  No dermatomal radiation with this flareup      Patient is here for follow up after ER visit.   Pain  level is a 4  Location is back & legs  Pain is constant  Bending Aggravates the pain  Laying down alleviates the pain  Radiates down right mainly sometimes both    Past Medical History:   Diagnosis Date    Arthritis     Chronic bilateral low back pain with bilateral sciatica 2/11/2020    DDD (degenerative disc disease), lumbar 2/11/2020    MVP (mitral valve prolapse)         Past Surgical History:   Procedure Laterality Date    BREAST SURGERY Bilateral     biopsy    CHOLECYSTECTOMY      COLONOSCOPY      ENDOMETRIAL ABLATION      NERVE BLOCK Right 10/26/2020    EPIDURAL STEROID INJECTION  L4 L5     PAIN MANAGEMENT PROCEDURE Right 7/16/2020    RIGHT L4 AND L5 EPIDURAL STEROID INJECTION performed by Ramon Atwood MD at [address] Right 10/26/2020    EPIDURAL STEROID INJECTION -RIGHT L4 L5 - Right    PAIN MANAGEMENT PROCEDURE Right 10/26/2020    EPIDURAL STEROID INJECTION -RIGHT L4 L5 performed by Ramon Awtood MD at [address] History     Socioeconomic History    Marital status:      Spouse name: None    Number of children: None    Years of education: None    Highest education level: None   Occupational History    None   Tobacco Use    Smoking status: Never Smoker    Smokeless tobacco: Never Used   Vaping Use    Vaping Use: Never used   Substance and Sexual Activity    Alcohol use: Yes     Alcohol/week: 3.0 standard drinks     Types: 3 Glasses of wine per week     Comment: moderately 3 glasses of wine per week    Drug use: No    Sexual activity: Yes     Partners: Male   Other Topics Concern    None   Social History Narrative    None     Social Determinants of Health     Financial Resource Strain:     Difficulty of Paying Living Expenses: Not on file   Food Insecurity:     Worried About Running Out of Food in the Last Year: Not on file    Reji of Food in the Last Year: Not on file   Transportation Needs:     Lack of Transportation (Medical): Not on file    Lack of Transportation (Non-Medical):  Not on file   Physical Activity:     Days of Exercise per Week: Not on file    Minutes of Exercise per Session: Not on file   Stress:     Feeling of Stress : Not on file   Social Connections:     Frequency of Communication with Friends and Family: Not on file    Frequency of Social Gatherings with Friends and Family: Not on file    Attends Anabaptism Services: Not on file    Active Member of [address] [place] [address] [employer] or Organizations: Not on file    Attends Club or Organization Meetings: Not on file    Marital Status: Not on file   Intimate Partner Violence:     Fear of Current or Ex-Partner: Not on file    Emotionally Abused: Not on file    Physically Abused: Not on file    Sexually Abused: Not on file   Housing Stability:     Unable to Pay for Housing in the Last Year: Not on file    Number of [place] in the Last Year: Not on file    Unstable Housing in the Last Year: Not on file       Family History   Problem Relation Age of Onset    High Blood Pressure Mother     High Cholesterol Mother    [place] Other Mother blood clots     Other Father         alcoholism    Arthritis Sister     No Known Problems Other        No Known Allergies    Vitals:    02/01/22 0927   BP: (!) 138/91   Pulse: 86   SpO2: 98%       Current Outpatient Medications   Medication Sig Dispense Refill    gabapentin (NEURONTIN) 300 MG capsule Take 1 capsule by mouth 3 times daily for 20 days. take 1 capsule by mouth 3 TIMES A DAY 60 capsule 1    HYDROcodone-acetaminophen (NORCO) 5-325 MG per tablet Take 1 tablet by mouth every 6 hours as needed for Pain.  cyclobenzaprine (FLEXERIL) 10 MG tablet Take 1 tablet by mouth 3 times daily as needed for Muscle spasms 21 tablet 0    predniSONE (DELTASONE) 50 MG tablet Take 1 tablet by mouth daily for 5 days 5 tablet 0    diclofenac (VOLTAREN) 75 MG EC tablet 1 tablet po BID with food, prn pain 60 tablet 0    verapamil (CALAN) 40 MG tablet take 1 tablet by mouth three times a day 90 tablet 2    magnesium citrate solution Take 296 mLs by mouth once       acetaminophen (TYLENOL) 650 MG extended release tablet Take 650 mg by mouth every 8 hours as needed for Pain       No current facility-administered medications for this visit. Review of Systems   Constitutional: Negative. Negative for fever. Gastrointestinal: Negative. Musculoskeletal: Positive for back pain. Neurological: Positive for numbness. Negative for weakness. Objective:  General Appearance:  Uncomfortable, in pain, well-appearing and in no acute distress. Vital signs: (most recent): Blood pressure (!) 138/91, pulse 86, weight 185 lb (83.9 kg), SpO2 98 %. Vital signs are normal.  No fever. Output: Producing urine and producing stool. HEENT: Normal HEENT exam.    Lungs:  Normal effort and normal respiratory rate. She is not in respiratory distress. Heart: Normal rate. Extremities: Normal range of motion. There is no deformity. Neurological: Patient is alert and oriented to person, place and time.   Patient has normal coordination. Pupils:  Pupils are equal, round, and reactive to light. Pupils are equal.   Skin:  Warm and dry. No rash or cyanosis. Assessment & Plan   Back pain  Chronic onset many years ago located in the lower lumbar area across midline right more than left  Occasional radiation down right leg  Had MRI lumbar spine in 2019  Therapy few years ago  Currently on gabapentin and chronic NSAID therapy along with muscle relaxant  She was doing okay until last week when she had a sudden severe flareup of her back pain  No particular injury on with the onset of that pain  She had to go to emergency room  She was treated with steroids  On her last day today that has improved her pain a little bit  She denies any changes in bladder or bowel control  No dermatomal radiation with this flareup    1. Acute exacerbation of chronic low back pain    2. DDD (degenerative disc disease), lumbar    3. Chronic bilateral low back pain with right-sided sciatica        Continue gabapentin  Risk of long-term NSAIDs discussed with patient    Will refer patient for physical therapy  MRI lumbar spine after therapy if pain persist  Consider for appropriate interventional procedure or surgical referral after review of the diagnostic work-up      Orders Placed This Encounter   Procedures    MRI LUMBAR SPINE [address]    Ambulatory referral to Physical Therapy      Orders Placed This Encounter   Medications    gabapentin (NEURONTIN) 300 MG capsule     Sig: Take 1 capsule by mouth 3 times daily for 20 days. take 1 capsule by mouth 3 TIMES A DAY     Dispense:  60 capsule     Refill:  1        .         Electronically signed by Rosenda Cerna MD on 2/1/2022 at 9:44 AM

## 2022-02-07 ENCOUNTER — HOSPITAL ENCOUNTER (OUTPATIENT)
Dept: PHYSICAL THERAPY | Facility: CLINIC | Age: 57
Setting detail: THERAPIES SERIES
Discharge: HOME | End: 2022-02-07
Payer: COMMERCIAL

## 2022-02-07 PROCEDURE — 97162 PT EVAL MOD COMPLEX 30 MIN: CPT

## 2022-02-07 NOTE — CONSULTS
[] [address]  [address]  [phone]  F: [phone] [x] [address]  [facility]   [address]  P: [phone]  F: [phone] [] [place]  [address]  P: [phone]  F: [phone] [] [address]  P: [phone]  F: [phone] [] [address]  [facility]   [address]   [place]: [phone]  F: [phone]      Physical Therapy Spine Evaluation    Date:  2022  Patient: Deyvi Hernandez  : 1965  MRN: [id number]  Physician: Clarice Tam MD     Insurance: [employer]  Medical Diagnosis: DDD lumbar spine, acute exacerbation    Rehab Codes: M54.17; R29.3; M62.591; R20.2; Onset Date: 2016    Next 's appt. : 3/14/22 pain management    Subjective:   CC:LBP , buttock and R groin. At times has tingling to R foot. Has it now but it is mild. This is present less than 50% of the time. Pain is reduced with medications. HPI: (onset date) has had arthritis in the lumbar spine. Pt was diagnosed with sciatica several years ago and did yoga and exercise which kept it in check but since COVID hasn't been doing it and symptoms have increased. Has had low back injection, one each year.      PMHx: [x] MI/Heart Problems: MVP  [x] Arthritis [x] Other: chronic bilateral LBP with B sciatica: R nn block              [x] Refer to full medical chart  In EPIC       Comorbidities:   [x] history of LBP with R LE symptoms   [x] Other: OA hip and back   [] Other:   [] Other:     Tests:  [x] MRI: ordered      Medications: [x] Refer to full medical record  [x] Other: gabapentin, hydrocodone-not taking, diclofenac  Allergies:   [x] None [] Other:    Function:  Hand Dominance  [x] Right  In what type of home [x]  One story      Bathroom has a   [x] Tub/shower combo has to lift leg to get in   Washing machine is on  [x] Basement;  is helping   [employer]   Job Status [x]  Normal duty      Work activities/duties Teacher of special education; once a week on the treadmill       ADL/IADL Previous level of function Current level of function Who currently assists the patient with task   Bathing  [x] Independent [x] Independent    Dress/grooming [x] Independent [x] Independent    Transfer/mobility [x] Independent [x] Independent May have to assist lifting R LE   Feeding [x] Independent [x] Independent    Toileting [x] Independent [x] Independent    Driving [x] Independent [x] Independent    Housekeeping [x] Independent [x] Independent Bracing self with arms to do any bending and lifting out of cupboards   Grocery shop/meal prep [x] Independent [x] Independent Difficult to transfer heavy items     Gait Prior level of function Current level of function    [x] Independent [x] Independent   Device: [x] Independent [x] Independent     Pain:  [x] Yes  Location: lumbar/R thigh/groin Pain Rating: (0-10 scale) 6/10  Pain altered Tx:    [x] No      Symptoms:   [x] Worsening   Better:    [x] Sit    [x] Other: stretching, lying down: standing and reaching over head and bending over to touch toes. Worse:   [x] Sit in uncomfortable chairs      [x]Stand    [x] Walk longer distance         Sleep: [x] OK 5-6 hrs per night. Side sleeper    Objective:   STRENGTH  ROM    Left Right Cervical    L1-2 Hip Flex 5 2+ Flexion    Hip Abd 4 2+ Extension    Hip extension 3- 2      Hip IR/ER 5/5 4+/4+      L3-4 Knee Ext 5 4 Rotation L R   Ankle inv/ever 4+/4+ 4+/4+      L4 Ankle DF 5 4+ Sidebend L R   L5 EHL 5 5 Retraction    S1 Plant.  Flex- single calf raise 10+ 3 Lumbar    Abdominals lower abdominals 90  Flexion WNL   Erector Spinae 4-  Extension stiffness      Rotation L  R      Sidebend L R      UE/LE Hip ROM WNL WFL                                     TESTS (+/-) LEFT RIGHT Not Tested   SLR [] sit [] supine - - []   Hamstring (SLR) 95 95 []   SKTC - Feels good []   DKTC - Feels good []   Slump/Dural - - []   SI JT unilateral L sacral extension  []   DIANA - +LB pain []   Joint Mobility -  []   Zunilda Tests ? Pain ? Pain No Change Not Tested   RFIS [] [x] [] []   BARBARA [] [] [x] []   RFIL [] [x] [] []   REIL [] [] [x] []   SLS 20+ sec ea with +hip drop bilaterally   Squat to 80 degrees wit difficulty getting up due to weakness  Good hip flexor flexibility  OBSERVATION No Deficit Deficit Not Tested Comments   Posture       Forward Head [x] [] []    Rounded Shoulders [x] [] []    Kyphosis [x] [] []    Lordosis [x] [] []    Lateral Shift [x] [] []    Scoliosis [] [x] [] Upper lumbar/lower thoracic L convexity   Iliac Crest [x] [] []    PSIS [] [] []    ASIS [] [] []    Genu Valgus [] [] []    Genu Varus [] [x] []    Slumped Sitting [] [x] [] Easily correctable    Palpation [x] [] []    Sensation [] [x] [] Tingling R LE to toes       Functional Test: OSWESTRY Score: 36% functionally impaired     Comments:    Assessment:  Patient would benefit from skilled physical therapy services in order to: improve hip, core, spine strength so the patient has a reduction in pain and improved ability to perform daily tasks. 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 The patient has dealt with this pain for at least the last 6 years and recently it has been worsening. She has received injections in the spine over the last 2 years. Besides the pain, she is finding it difficult to carry heavy items, difficulty lifting the R LE without arm assist and difficulty getting up/off of low seats. She feels better in sitting than standing.  She has not been as active with exercise since the beginning of the pandemic. She used to do YOGA and other exercises. Now she is going to the gym about 1 time per week to do a few exercises and walk on the treadmill. During her examination she was found to have a high lumbar scoliosis and a sacral anomaly. Her overall flexibility is good with stiffness in spine extension and DIANA position. Flexion relieved symptoms. She has good balance but significant weakness of the hips (flex, ext, abd) primarily on the right, but also to a lesser degree on the left. She has significant weakness of the lower abdominal muscles and mild weakness of the spine extensors. The patient has good spine mobility. Therapy will concentrate on strengthening, posture and body mechanics and assessing and treating fascial and pelvic/spine anomalies. She may benefit from dry needling and she was given information regarding this treatment. Problems:    [x] ? Pain: 6/10 lumbar spine, R thigh with n/t to toes  [x] ? ROM: mild hip tightness, lacks full spine extension  [x] ? Strength: significant core weakness, R hip flexors, B hip abductors and extensors, spine extension weakness  [x] ? Function: difficulty getting up from low seats; pain with prolonged sitting and standing, walking; difficulty with lifting; has to lift R LE with hands to get into tub/car  [x] Other:  Sacral/pelvic anomalies    STG: (to be met in 6 treatments)  1. ? Pain: reduce to below 6/10 with less frequent, intense LE symptoms  2. ? ROM: tolerate flexibility ex of spine and pelvis and hips to help reduce pain and improve fascial gliding  3. ? Strength: improve hip strength on R by at least 1/3 grade so there is less stress on LB with a reduction in pain  4. ? Function: be aware of proper sitting posture and ways to relieve discomfort with prolonged standing for improved quality of life  5. Patient to be independent with home exercise program as demonstrated by performance with correct form without cues.     LTG: (to be met in 12 treatments)  1. Pain below 4/10 with minimal R LE symptoms  2. Good lower abdominal strength  3. At least 4+/5 hip and spine strength for improved support of musculoskeletal structure with a reduction in pain  4. Function improved to below 20% affected per OSWESTRY with ability to lift heavier items correctly with less pain  5. Be able to get into/out of the tub and car without having to lift the R LE with her hands    Patient goals:\"decreasing pain\"    Rehab Potential:  [x] Good    Suggested Professional Referral:  [x] No   Barriers to Goal Achievement:  [x] No    Domestic Concerns:  [x] No      Pt. Education:  [x] Plans/Goals, Risks/Benefits discussed  [x] Home exercise program  Method of Education: [x] Verbal  [x] Demo : ta contraction, DKTC [] Written  Comprehension of Education:  [x] Verbalizes understanding. [x] Demonstrates understanding. [] Needs Review. [] Demonstrates/verbalizes understanding of HEP/Ed previously given. Treatment Plan:  [x] Therapeutic Exercise   80322    [x] Therapeutic Activity  72035   [x] Electrical Stimulation Unattended  03482   [x] Lumbar/Cervical Traction  [id number]   [x] Manual Therapy  69409   [x] Instruction in HEP    [x] Cold/hotpack   [x] Dry Needling, 3 or more muscles  31499       Frequency:  2 x/week for 12 visits    Todays Treatment:  Modalities:   Precautions:  Exercises:  Exercise Reps/ Time Weight/ Level Comments   ta contraction 3 5 sec    DKTC 1 10 sec                      Other: information issued on dry needling    Specific Instructions for next treatment: core and R hip strength for flexion, abd and extension; spine strength; flexibility of spine, hip ROM. ? spinal traction; ? Dry needling; jenae to do manual assessment/treatment      Evaluation Complexity:  History (Personal factors, comorbidities) [] 0 [x] 1-2 [] 3+   Exam (limitations, restrictions) [] 1-2 [] 3 [x] 4+   Clinical presentation (progression) [] Stable [x] Evolving  [] Unstable   Decision Making [] Low [x] Moderate [] High    [] Low Complexity [x] Moderate Complexity [] High Complexity       Treatment Charges: Mins Units   [x] Evaluation       []  Low       [x]  Moderate       []  High 45 1   []  Modalities     [x]  Ther Exercise 5 0   []  Manual Therapy     []  Ther Activities     []  Aquatics     []  Vasocompression     []  Other       TOTAL TREATMENT TIME: 45 min    Time in: 5:00 pm      Time out: 5:50 pm    Electronically signed by: Olivia Lemon PT        Physician Signature:________________________________Date:__________________  By signing above or cosigning this note, I have reviewed this plan of care and certify a need for medically necessary rehabilitation services.      *PLEASE SIGN ABOVE AND FAX BACK ALL PAGES*

## 2022-02-07 NOTE — PLAN OF CARE
[] [facility]) - [facility]  [facility]  [address]  [phone]  F: [phone] [x] [address]  [facility]   [address]  P: [phone]  F: [phone] [] [address]  Therapy  [address]  P: [phone]  F: [phone] [] [address]  P: [phone]  F: [phone] [] [address]  [facility]   [address]   [place]: [phone]  F: [phone]        Physical Therapy Plan of Care    Date:  2022  Patient: Kendy Wilder  : 1965  MRN: [id number]  Physician: Rosenda Cerna MD                                    Insurance: [employer] H[employer]  Medical Diagnosis: DDD lumbar spine, acute exacerbation                         Rehab Codes: M54.17; R29.3; M62.591; R20.2; Onset Date: 2016               Next 's appt. : 3/14/22 pain management    Assessment:  Patient would benefit from skilled physical therapy services in order to: improve hip, core, spine strength so the patient has a reduction in pain and improved ability to perform daily tasks.     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 The patient has dealt with this pain for at least the last 6 years and recently it has been worsening. She has received injections in the spine over the last 2 years. Besides the pain, she is finding it difficult to carry heavy items, difficulty lifting the R LE without arm assist and difficulty getting up/off of low seats. She feels better in sitting than standing. She has not been as active with exercise since the beginning of the pandemic.  She used to do YOGA and other exercises. Now she is going to the gym about 1 time per week to do a few exercises and walk on the treadmill. During her examination she was found to have a high lumbar scoliosis and a sacral anomaly. Her overall flexibility is good with stiffness in spine extension and DIANA position. Flexion relieved symptoms. She has good balance but significant weakness of the hips (flex, ext, abd) primarily on the right, but also to a lesser degree on the left. She has significant weakness of the lower abdominal muscles and mild weakness of the spine extensors. The patient has good spine mobility. Therapy will concentrate on strengthening, posture and body mechanics and assessing and treating fascial and pelvic/spine anomalies. She may benefit from dry needling and she was given information regarding this treatment. Problems:    [x]? ? Pain: 6/10 lumbar spine, R thigh with n/t to toes  [x]? ? ROM: mild hip tightness, lacks full spine extension  [x]? ? Strength: significant core weakness, R hip flexors, B hip abductors and extensors, spine extension weakness  [x]? ? Function: difficulty getting up from low seats; pain with prolonged sitting and standing, walking; difficulty with lifting; has to lift R LE with hands to get into tub/car  [x]? Other:  Sacral/pelvic anomalies     STG: (to be met in 6 treatments)  1. ? Pain: reduce to below 6/10 with less frequent, intense LE symptoms  2. ? ROM: tolerate flexibility ex of spine and pelvis and hips to help reduce pain and improve fascial gliding  3. ? Strength: improve hip strength on R by at least 1/3 grade so there is less stress on LB with a reduction in pain  4. ? Function: be aware of proper sitting posture and ways to relieve discomfort with prolonged standing for improved quality of life  5. Patient to be independent with home exercise program as demonstrated by performance with correct form without cues.     LTG: (to be met in 12 treatments)  1.  Pain below 4/10 with minimal R LE symptoms  2. Good lower abdominal strength  3. At least 4+/5 hip and spine strength for improved support of musculoskeletal structure with a reduction in pain  4. Function improved to below 20% affected per OSWESTRY with ability to lift heavier items correctly with less pain  5. Be able to get into/out of the tub and car without having to lift the R LE with her hands     Patient goals:\"decreasing pain\"     Rehab Potential:  [x]? Good    Suggested Professional Referral:  [x]? No   Barriers to Goal Achievement:  [x]? No    Domestic Concerns:  [x]? No      Treatment Plan:  [x]? Therapeutic Exercise   35330               [x]? Therapeutic Activity  27841   [x]? Electrical Stimulation Unattended  [id number]   [x]? Lumbar/Cervical Traction  [id number]   [x]? Manual Therapy  27875   [x]? Instruction in HEP         [x]? Cold/hotpack   [x]? Dry Needling, 3 or more muscles  27629         Frequency:  2 x/week for 12 visits    Electronically signed by: Reji Milligan PT        Physician Signature:________________________________Date:__________________  By signing above or cosigning this note, I have reviewed this plan of care and certify a need for medically necessary rehabilitation services.      *PLEASE SIGN ABOVE AND FAX BACK ALL PAGES*

## 2022-02-14 ENCOUNTER — HOSPITAL ENCOUNTER (OUTPATIENT)
Dept: PHYSICAL THERAPY | Facility: CLINIC | Age: 57
Setting detail: THERAPIES SERIES
Discharge: HOME | End: 2022-02-14
Payer: COMMERCIAL

## 2022-02-14 PROCEDURE — 97012 MECHANICAL TRACTION THERAPY: CPT

## 2022-02-14 PROCEDURE — 97110 THERAPEUTIC EXERCISES: CPT

## 2022-02-14 NOTE — FLOWSHEET NOTE
[] [address]  [address]  [phone]  F: [phone] [x] [address]  [facility]   [address]  P: [phone]  F: [phone] [] [place]  [address]  P: [phone]  F: [phone] [] [address]  P: [phone]  F: [phone] [] [address]  [facility]   [address]   [place]: [phone]  F: [phone]      Physical Therapy Daily Treatment Note    Date:  2022  Patient Name:  Kevyn Sanz    :  1965  MRN: [id number]  Physician: Sylwia Cadena MD            Insurance: [employer] H[employer]  Medical Diagnosis: DDD lumbar spine, acute exacerbation                         Rehab Codes: M54.17; R29.3; M62.591; R20.2; Onset Date: 2016               Next 's appt. : 3/14/22 pain management  Visit# / total visits: 2   Cancels/No Shows: 0/0    Subjective:    Pain:  [x] Yes  [] No Location: Bilateral LE's right thigh Pain Rating: (0-10 scale) 4/10  Pain altered Tx:  [x] No  [] Yes  Action:  Comments: Pt reports falling on the ice approximately an hour prior to her appointment time. Pt wearing a waist  in an attempt to decrease pain. Todays Treatment:  Modalities: 15 min, 60 lbs/15 lbs and 60/20 table unlocked supine, feet propped.    Precautions:  Exercises: Access Code: [email or web address]  Exercise Reps/ Time Weight/ Level Comments   Supine      ta contraction 10x 5 sec  with breathing    DKTC 10x 10 sec     SKTC 10x         Marching  10x    added 2/14   TrA Fallout 1 leg at a time 10x ea   added 2/14 cues to go slow   SLR 10x    added 2/14   TrA Walkouts 5x   added 2/14   Other: information issued on dry needling     Specific Instructions for next treatment: core and R hip strength for flexion, abd and extension; spine strength; flexibility of spine, hip ROM. ? spinal traction; ? Dry needling; jenae to do manual assessment/treatment    Treatment Charges: Mins Units   []  Modalities     [x]  Ther Exercise 25 2   []  Manual Therapy     []  Ther Activities     []  Aquatics     []  Vasocompression     [x]  Other: Traction 15 1   Total Treatment time 40 3       Assessment: [x] Progressing toward goals. Pt arrives to PT shortly after falling at work on the ice. Educated patient on deep core strength and transverse abdominals. Also educated patient on centralization and the purpose and goal of traction. Also discussed strengthening activities at the gym that would and wouldn't be appropriate at this time. Exercises completed first as indicated above. After traction patient reported that she felt \"pretty okay\". Log rolling also reviewed this date to help decrease pain and improve transfer technique. [] No change. [] Other:    [x] Patient would continue to benefit from skilled physical therapy services in order to: improve hip, core, spine strength  so the patient has a reduction in pain and improved ability to perform daily tasks. STG: (to be met in 6 treatments)  1. ? Pain: reduce to below 6/10 with less frequent, intense LE symptoms  2. ? ROM: tolerate flexibility ex of spine and pelvis and hips to help reduce pain and improve fascial gliding  3. ? Strength: improve hip strength on R by at least 1/3 grade so there is less stress on LB with a reduction in pain  4. ? Function: be aware of proper sitting posture and ways to relieve discomfort with prolonged standing for improved quality of life  5. Patient to be independent with home exercise program as demonstrated by performance with correct form without cues.     LTG: (to be met in 12 treatments)  1. Pain below 4/10 with minimal R LE symptoms  2. Good lower abdominal strength  3.  At least 4+/5 hip and spine strength for improved support of musculoskeletal structure with a reduction in pain  4. Function improved to below 20% affected per OSWESTRY with ability to lift heavier items correctly with less pain  5. Be able to get into/out of the tub and car without having to lift the R LE with her hands     Patient goals:\"decreasing pain\"  Pt. Education:  [x] Yes  [] No  [x] Reviewed Prior HEP/Ed  Method of Education: [x] Verbal  [x] Demo  [x] Written  Access Code: [email or web address]  URL: [email or web address]  Date: 02/14/2022  Prepared by: Meri Castro    Exercises  Supine Transversus Abdominis Bracing - Hands on Stomach - 1 x daily - 7 x weekly - 3 sets - 10 reps - 5 hold  Supine Transversus Abdominis Bracing with Double Leg Fallout - 1 x daily - 7 x weekly - 3 sets - 10 reps  Supine Transversus Abdominis Bracing - Hands on Thighs - 1 x daily - 7 x weekly - 3 sets - 10 reps - 5 hold    Comprehension of Education:  [x] Verbalizes understanding. [] Demonstrates understanding. [] Needs review. [x] Demonstrates/verbalizes HEP/Ed previously given. Plan: [x] Continue current frequency toward long and short term goals.     [x] Specific Instructions for subsequent treatments:       Time In: 5:08 pm            Time Out: 6:07    Electronically signed by:  Emma Reynoso PTA

## 2022-02-16 ENCOUNTER — TELEMEDICINE (OUTPATIENT)
Dept: FAMILY MEDICINE CLINIC | Age: 57
End: 2022-02-16
Payer: COMMERCIAL
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
## 2022-02-16 DIAGNOSIS — G89.29 CHRONIC RIGHT-SIDED LOW BACK PAIN WITH RIGHT-SIDED SCIATICA: Primary | ICD-10-CM

## 2022-02-16 DIAGNOSIS — M25.551 CHRONIC PAIN OF BOTH HIPS: ICD-10-CM

## 2022-02-16 DIAGNOSIS — G89.29 CHRONIC PAIN OF BOTH HIPS: ICD-10-CM

## 2022-02-16 DIAGNOSIS — M25.552 CHRONIC PAIN OF BOTH HIPS: ICD-10-CM

## 2022-02-16 DIAGNOSIS — M54.41 CHRONIC RIGHT-SIDED LOW BACK PAIN WITH RIGHT-SIDED SCIATICA: Primary | ICD-10-CM

## 2022-02-16 DIAGNOSIS — M54.41 ACUTE RIGHT-SIDED LOW BACK PAIN WITH RIGHT-SIDED SCIATICA: ICD-10-CM

## 2022-02-16 PROCEDURE — 99442 PR PHYS/QHP TELEPHONE EVALUATION 11-20 MIN: CPT | Performed by: FAMILY MEDICINE

## 2022-02-16 RX ORDER — DICLOFENAC SODIUM 75 MG/1
TABLET, DELAYED RELEASE ORAL
Qty: 60 TABLET | Refills: 0 | Status: SHIPPED | OUTPATIENT
Start: 2022-02-16 | End: 2022-04-17

## 2022-02-16 RX ORDER — METAXALONE 800 MG/1
TABLET ORAL
Qty: 90 TABLET | Refills: 0 | Status: SHIPPED | OUTPATIENT
Start: 2022-02-16 | End: 2022-03-21

## 2022-02-16 RX ORDER — VERAPAMIL HYDROCHLORIDE 40 MG/1
TABLET ORAL
Qty: 90 TABLET | Refills: 2 | Status: SHIPPED | OUTPATIENT
Start: 2022-02-16 | End: 2022-08-29

## 2022-02-16 SDOH — ECONOMIC STABILITY: FOOD INSECURITY: WITHIN THE PAST 12 MONTHS, THE FOOD YOU BOUGHT JUST DIDN'T LAST AND YOU DIDN'T HAVE MONEY TO GET MORE.: NEVER TRUE

## 2022-02-16 SDOH — ECONOMIC STABILITY: FOOD INSECURITY: WITHIN THE PAST 12 MONTHS, YOU WORRIED THAT YOUR FOOD WOULD RUN OUT BEFORE YOU GOT MONEY TO BUY MORE.: NEVER TRUE

## 2022-02-16 ASSESSMENT — PATIENT HEALTH QUESTIONNAIRE - PHQ9
1. LITTLE INTEREST OR PLEASURE IN DOING THINGS: 0
2. FEELING DOWN, DEPRESSED OR HOPELESS: 0
SUM OF ALL RESPONSES TO PHQ QUESTIONS 1-9: 0
SUM OF ALL RESPONSES TO PHQ9 QUESTIONS 1 & 2: 0
SUM OF ALL RESPONSES TO PHQ QUESTIONS 1-9: 0

## 2022-02-16 ASSESSMENT — SOCIAL DETERMINANTS OF HEALTH (SDOH): HOW HARD IS IT FOR YOU TO PAY FOR THE VERY BASICS LIKE FOOD, HOUSING, MEDICAL CARE, AND HEATING?: NOT HARD AT ALL

## 2022-02-16 NOTE — TELEPHONE ENCOUNTER
----- Message from Chuyita Little sent at 2/16/2022  8:41 AM EST -----  Subject: Refill Request    QUESTIONS  Name of Medication? Other - metaxalone 800 mg  Patient-reported dosage and instructions? three times a day, daily   How many days do you have left? 13  Preferred Pharmacy? [address] phone number (if available)? [phone]  ---------------------------------------------------------------------------  --------------,  Name of Medication? verapamil (CALAN) 40 MG tablet  Patient-reported dosage and instructions? one pill by mouth,x 3 daily  How many days do you have left? 28  Preferred Pharmacy? [address] phone number (if available)? [phone]  ---------------------------------------------------------------------------  --------------,  Name of Medication? diclofenac (VOLTAREN) 75 MG EC tablet  Patient-reported dosage and instructions? twice a daily, daily  How many days do you have left? 28  Preferred Pharmacy? [address] phone number (if available)? [phone]  ---------------------------------------------------------------------------  --------------  CALL BACK INFO  What is the best way for the office to contact you? OK to leave message on   voicemail  Preferred Call Back Phone Number?  [phone]

## 2022-02-17 ENCOUNTER — HOSPITAL ENCOUNTER (OUTPATIENT)
Dept: PHYSICAL THERAPY | Facility: CLINIC | Age: 57
Setting detail: THERAPIES SERIES
Discharge: HOME | End: 2022-02-17
Payer: COMMERCIAL

## 2022-02-21 ENCOUNTER — HOSPITAL ENCOUNTER (OUTPATIENT)
Age: 57
Discharge: HOME | End: 2022-02-23
Payer: COMMERCIAL

## 2022-02-21 ENCOUNTER — HOSPITAL ENCOUNTER (OUTPATIENT)
Dept: GENERAL RADIOLOGY | Age: 57
Discharge: HOME | End: 2022-02-23
Payer: COMMERCIAL

## 2022-02-21 DIAGNOSIS — G89.29 CHRONIC RIGHT-SIDED LOW BACK PAIN WITH RIGHT-SIDED SCIATICA: ICD-10-CM

## 2022-02-21 DIAGNOSIS — M54.41 CHRONIC RIGHT-SIDED LOW BACK PAIN WITH RIGHT-SIDED SCIATICA: ICD-10-CM

## 2022-02-21 DIAGNOSIS — G89.29 CHRONIC PAIN OF BOTH HIPS: ICD-10-CM

## 2022-02-21 DIAGNOSIS — M25.552 CHRONIC PAIN OF BOTH HIPS: ICD-10-CM

## 2022-02-21 DIAGNOSIS — M25.551 CHRONIC PAIN OF BOTH HIPS: ICD-10-CM
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
## 2022-02-21 DIAGNOSIS — M54.41 ACUTE RIGHT-SIDED LOW BACK PAIN WITH RIGHT-SIDED SCIATICA: ICD-10-CM

## 2022-02-21 PROCEDURE — 73521 X-RAY EXAM HIPS BI 2 VIEWS: CPT

## 2022-02-21 PROCEDURE — 72100 X-RAY EXAM L-S SPINE 2/3 VWS: CPT

## 2022-02-23 ENCOUNTER — HOSPITAL ENCOUNTER (OUTPATIENT)
Dept: PHYSICAL THERAPY | Facility: CLINIC | Age: 57
Setting detail: THERAPIES SERIES
Discharge: HOME | End: 2022-02-23
Payer: COMMERCIAL

## 2022-02-23 PROCEDURE — 97110 THERAPEUTIC EXERCISES: CPT

## 2022-02-23 PROCEDURE — 97012 MECHANICAL TRACTION THERAPY: CPT

## 2022-02-23 NOTE — PROGRESS NOTES
Katelyn Ferrara is a 61 y.o. female evaluated via telephone on 2/16/2022. Consent:  She and/or health care decision maker is aware that that she may receive a bill for this telephone service, which includes applicable co-pays, depending on her insurance coverage, and has provided verbal consent to proceed. Documentation:  I communicated with the patient and/or health care decision maker about multiple issues. Details of this discussion including any medical advice provided:     Problem List Items Addressed This Visit     None      Visit Diagnoses     Chronic right-sided low back pain with right-sided sciatica    -  Primary    Relevant Orders    XR LUMBAR SPINE (2-3 VIEWS) (Completed)    Acute right-sided low back pain with right-sided sciatica        Relevant Orders    XR LUMBAR SPINE (2-3 VIEWS) (Completed)    Chronic pain of both hips        Relevant Orders    XR LUMBAR SPINE (2-3 VIEWS) (Completed)    XR HIP BILATERAL W AP PELVIS (2 VIEWS) (Completed)    Fall due to slipping on ice or snow, initial encounter        Relevant Orders    XR LUMBAR SPINE (2-3 VIEWS) (Completed)    XR HIP BILATERAL W AP PELVIS (2 VIEWS) (Completed)            I affirm this is a Patient Initiated Episode with a Patient who has not had a related appointment within my department in the past 7 days or scheduled within the next 24 hours. Patient identification was verified at the start of the visit: Yes    Total Time: minutes: 11-20 minutes    Katelyn Ferrara was evaluated through a synchronous (real-time) audio encounter. The patient was located at home in a state where the provider was licensed to provide care.     Note: not billable if this call serves to triage the patient into an appointment for the relevant concern      Samira Gooden DO

## 2022-02-23 NOTE — PATIENT INSTRUCTIONS
Patient Education        Preventing Falls: Care Instructions  Your Care Instructions     Getting around your home safely can be a challenge if you have injuries or health problems that make it easy for you to fall. Loose rugs and furniture in walkways are among the dangers for many older people who have problems walking or who have poor eyesight. People who have conditions such as arthritis, osteoporosis, or dementia also have to be careful not to fall. You can make your home safer with a few simple measures. Follow-up care is a key part of your treatment and safety. Be sure to make and go to all appointments, and call your doctor if you are having problems. It's also a good idea to know your test results and keep a list of the medicines you take. How can you care for yourself at home? Taking care of yourself  · You may get dizzy if you do not drink enough water. To prevent dehydration, drink plenty of fluids. Choose water and other clear liquids. If you have kidney, heart, or liver disease and have to limit fluids, talk with your doctor before you increase the amount of fluids you drink. · Exercise regularly to improve your strength, muscle tone, and balance. Walk if you can. Swimming may be a good choice if you cannot walk easily. · Have your vision and hearing checked each year or any time you notice a change. If you have trouble seeing and hearing, you might not be able to avoid objects and could lose your balance. · Know the side effects of the medicines you take. Ask your doctor or pharmacist whether the medicines you take can affect your balance. Sleeping pills or sedatives can affect your balance. · Limit the amount of alcohol you drink. Alcohol can impair your balance and other senses. · Ask your doctor whether calluses or corns on your feet need to be removed. If you wear loose-fitting shoes because of calluses or corns, you can lose your balance and fall.   · Talk to your doctor if you have numbness in your feet. Preventing falls at home  · Remove raised doorway thresholds, throw rugs, and clutter. Repair loose carpet or raised areas in the floor. · Move furniture and electrical cords to keep them out of walking paths. · Use nonskid floor wax, and wipe up spills right away, especially on ceramic tile floors. · If you use a walker or cane, put rubber tips on it. If you use crutches, clean the bottoms of them regularly with an abrasive pad, such as steel wool. · Keep your house well lit, especially [place], and outside walkways. Use night-lights in areas such as hallways and bathrooms. Add extra light switches or use remote switches (such as switches that go on or off when you clap your hands) to make it easier to turn lights on if you have to get up during the night. · Install sturdy handrails on stairways. · Move items in your cabinets so that the things you use a lot are on the lower shelves (about waist level). · Keep a cordless phone and a flashlight with new batteries by your bed. If possible, put a phone in each of the main rooms of your house, or carry a cell phone in case you fall and cannot reach a phone. Or, you can wear a device around your neck or wrist. You push a button that sends a signal for help. · Wear low-heeled shoes that fit well and give your feet good support. Use footwear with nonskid soles. Check the heels and soles of your shoes for wear. Repair or replace worn heels or soles. · Do not wear socks without shoes on wood floors. · Walk on the grass when the sidewalks are slippery. If you live in an area that gets snow and ice in the winter, sprinkle salt on slippery steps and sidewalks. Preventing falls in the bath  · Install grab bars and nonskid mats inside and outside your shower or tub and near the toilet and sinks. · Use shower chairs and bath benches. · Use a hand-held shower head that will allow you to sit while showering.   · Get into a tub or shower by putting the weaker leg in first. Get out of a tub or shower with your strong side first.  · Repair loose toilet seats and consider installing a raised toilet seat to make getting on and off the toilet easier. · Keep your bathroom door unlocked while you are in the shower. Where can you learn more? Go to [email or web address] and sign in to your [employer] account. Enter [phone] in the [facility] box to learn more about \"Preventing Falls: Care Instructions. \"     If you do not have an account, please click on the \"Sign Up Now\" link. Current as of: September 8, 2021               Content Version: 13.1  © 2984-3174 Healthwise, Incorporated. Care instructions adapted under license by [facility] ([facility]). If you have questions about a medical condition or this instruction, always ask your healthcare professional. [place] [address] any warranty or liability for your use of this information.

## 2022-02-23 NOTE — FLOWSHEET NOTE
[] [facility] [facility]  [address]  [phone]  F: [phone] [x] [address]  [facility]   [address]  P: [phone]  F: [phone] [] [place]  [address] [facility]  P: [phone]  F: [phone] [] [address]  P: [phone]  F: [phone] [] [address]  [facility]   [address]   [place]: [phone]  F: [phone]      Physical Therapy Daily Treatment Note    Date:  2022  Patient Name:  Jag Last    :  1965  MRN: [id number]  Physician: Sharona Gaytan MD            Insurance: [employer] [place] [employer] H[employer]  Medical Diagnosis: DDD lumbar spine, acute exacerbation                         Rehab Codes: M54.17; R29.3; M62.591; R20.2; Onset Date: 2016               Next 's appt. : 3/14/22 pain management  Visit# / total visits: 3/12   Cancels/No Shows: 1/0    Subjective:    Pain:  [x] Yes  [] No Location: Bilateral LE's right thigh Pain Rating: (0-10 scale) 7/10  Pain altered Tx:  [x] No  [] Yes  Action:  Comments: Pt reports her pain is about the same. She feels like it may be worse since having a long time in between appts. Todays Treatment:  Modalities: 15 min, 60 lbs/15 lbs and 60/20 table unlocked supine, feet propped.    Precautions:  Exercises: Access Code: [email or web address]  Exercise Reps/ Time Weight/ Level Comments   Supine      LTR 5x5\" ea  Added 2/   ta contraction 10x 5 sec  with breathing    DKTC 10x 10 sec HEP   SKTC 10x     HEP   Marching  10x    added 2/14   TrA Fallout 1 leg at a time 10x ea   added 2/14 cues to go slow   SLR 10x    added 2/14   TrA Walkouts 5x   added 2/14   HS stretch  20\"x3 ea  Added 2/23 left tighter than right   Piriformis (fig 4) 20\"x3 ea Added 2/23 bilat  Strap needed for LLE          Prone       Quad strecth 20\"x3 ea  Added 2/23   Other: information issued on dry needling     Specific Instructions for next treatment: core and R hip strength for flexion, abd and extension; spine strength; flexibility of spine, hip ROM. ? spinal traction; ? Dry needling; jenae to do manual assessment/treatment    Treatment Charges: Mins Units   []  Modalities     [x]  Ther Exercise 30 2   []  Manual Therapy     []  Ther Activities     []  Aquatics     []  Vasocompression     [x]  Other: Traction 15 1   Total Treatment time 45 3       Assessment: [x] Progressing toward goals. Treatment started with traction this date followed by exercies. Patient felt comfortable with traction last time and therefore no increases were made. Added LTR as well as quad, piriformis, and, hamstring stretching this date to help improve hip ROM. Patient encouraged to complete FULL HEP in order to receive benefit of PT, as she is only completing DKTC at home and no strengthening exercises currently. This translates into poor recall of exercises and little change in symptoms. Pt on phone for most of treatment either reading or texting. Left leg globally tighter than RLE when completing new stretches this date. [] No change. [] Other:    [x] Patient would continue to benefit from skilled physical therapy services in order to: improve hip, core, spine strength  so the patient has a reduction in pain and improved ability to perform daily tasks.     STG: (to be met in 6 treatments)  1. ? Pain: reduce to below 6/10 with less frequent, intense LE symptoms  2. ? ROM: tolerate flexibility ex of spine and pelvis and hips to help reduce pain and improve fascial gliding  3. ? Strength: improve hip strength on R by at least 1/3 grade so there is less stress on LB with a reduction in pain  4. ? Function: be aware of proper sitting posture and ways to relieve discomfort with prolonged standing for improved quality of life  5. Patient to be independent with home exercise program as demonstrated by performance with correct form without cues.     LTG: (to be met in 12 treatments)  1. Pain below 4/10 with minimal R LE symptoms  2. Good lower abdominal strength  3. At least 4+/5 hip and spine strength for improved support of musculoskeletal structure with a reduction in pain  4. Function improved to below 20% affected per OSWESTRY with ability to lift heavier items correctly with less pain  5. Be able to get into/out of the tub and car without having to lift the R LE with her hands     Patient goals:\"decreasing pain\"  Pt. Education:  [x] Yes  [] No  [x] Reviewed Prior HEP/Ed  Method of Education: [x] Verbal  [x] Demo  [] Written  2/23- importance of HEP    Access Code: [email or web address]  URL: [email or web address]  Date: 02/14/2022  Prepared by: Marlen Burnett    Exercises  Supine Transversus Abdominis Bracing - Hands on Stomach - 1 x daily - 7 x weekly - 3 sets - 10 reps - 5 hold  Supine Transversus Abdominis Bracing with Double Leg Fallout - 1 x daily - 7 x weekly - 3 sets - 10 reps  Supine Transversus Abdominis Bracing - Hands on Thighs - 1 x daily - 7 x weekly - 3 sets - 10 reps - 5 hold    Comprehension of Education:  [x] Verbalizes understanding. [] Demonstrates understanding. [] Needs review. [x] Demonstrates/verbalizes HEP/Ed previously given. Plan: [x] Continue current frequency toward long and short term goals.     [x] Specific Instructions for subsequent treatments:       Time In: 5:05 pm            Time Out: 6:00  Electronically signed by:  Tavo Plascencia PTA

## 2022-02-28 ENCOUNTER — HOSPITAL ENCOUNTER (OUTPATIENT)
Dept: PHYSICAL THERAPY | Facility: CLINIC | Age: 57
Setting detail: THERAPIES SERIES
Discharge: HOME | End: 2022-02-28
Payer: COMMERCIAL

## 2022-02-28 PROCEDURE — 97140 MANUAL THERAPY 1/> REGIONS: CPT

## 2022-02-28 PROCEDURE — 97012 MECHANICAL TRACTION THERAPY: CPT

## 2022-02-28 NOTE — FLOWSHEET NOTE
[] [facility]  [address]  [phone]  F: [phone] [x] [address]  [place] [address]   [address]  P: [phone]  F: [phone] [] [place]  [address]  P: [phone]  F: [phone] [] [address]  P: [phone]  F: [phone] [] [address]  [facility]   [address]   [place]: [phone]  F: [phone]      Physical Therapy Daily Treatment Note    Date:  2022  Patient Name:  Deyvi Hernandez    :  1965  MRN: [id number]  Physician: Clarice Tam MD            Insurance: [employer] [place] [employer] HMO  Medical Diagnosis: DDD lumbar spine, acute exacerbation                         Rehab Codes: M54.17; R29.3; M62.591; R20.2; Onset Date: 2016               Next 's appt. : 3/14/22 pain management  Visit# / total visits:    Cancels/No Shows: 1/0    Subjective:    Pain:  [x] Yes  [] No Location: Bilateral LE's right thigh Pain Rating: (0-10 scale) 4/10  Pain altered Tx:  [x] No  [] Yes  Action:  Comments: feels exercises are helping. Less of a pain and more of a \"hurt\". More discomfort on L side.      Todays Treatment:  Modalities: 15 min, 65 lbs/15 lbs and 60/20 table unlocked supine, legs propped on stool  Precautions:  Exercises: Access Code: [email or web address]  Not 22, manual work done  Exercise Reps/ Time Weight/ Level Comments   Supine      LTR 5x5\" ea  Added    ta contraction 10x 5 sec  with breathing    DKTC 10x 10 sec HEP   SKTC 10x     HEP   Marching  10x    added 2/   TrA Fallout 1 leg at a time 10x ea   added 2/ cues to go slow   SLR 10x    added 2/14   TrA Walkouts 5x   added 2/14   HS stretch  20\"x3 ea  Added  left tighter than right   Piriformis (fig 4) 20\"x3 ea  Added 2/23 bilat  Strap needed for LLE          Prone       Quad strecth 20\"x3 ea  Added 2/23   Other:   Muscle Energy Technique: +Lstork; +L ff  Legs even in sitting; L long in supine; R unilateral sacral extension; type I L prominent lumbar rotation; ERSL at L4 and 5 corrected in R sidelying;       not today: information issued on dry needling     Specific Instructions for next treatment: core and R hip strength for flexion, abd and extension; spine strength; flexibility of spine, hip ROM. ? spinal traction; ? Dry needling; jenae to do manual assessment/treatment    Treatment Charges: Mins Units   []  Modalities     []  Ther Exercise     [x]  Manual Therapy 30 2   []  Ther Activities     []  Aquatics     []  Vasocompression     [x]  Other: Traction 15 1   Total Treatment time 45 3       Assessment: [x] Progressing toward goals. The patient is improving with her home program. Evaluation and treatment of pelvis and lumbar spine performed this date. The patient had sacral and lumbar anomalies that were mostly resolved. The patient did have discomfort and difficulty changing positions as is needed for these techniques. The patient tolerated increased traction this date. Continue to progress with stability, core strengthening and stretches as well as manual and traction. [] No change. [] Other:    [x] Patient would continue to benefit from skilled physical therapy services in order to: improve hip, core, spine strength  so the patient has a reduction in pain and improved ability to perform daily tasks.     STG: (to be met in 6 treatments)  1. ? Pain: reduce to below 6/10 with less frequent, intense LE symptoms  2. ? ROM: tolerate flexibility ex of spine and pelvis and hips to help reduce pain and improve fascial gliding  3. ? Strength: improve hip strength on R by at least 1/3 grade so there is less stress on LB with a reduction in pain  4. ? Function: be aware of proper sitting posture and ways to relieve discomfort with prolonged standing for improved quality of life  5. Patient to be independent with home exercise program as demonstrated by performance with correct form without cues.     LTG: (to be met in 12 treatments)  1. Pain below 4/10 with minimal R LE symptoms  2. Good lower abdominal strength  3. At least 4+/5 hip and spine strength for improved support of musculoskeletal structure with a reduction in pain  4. Function improved to below 20% affected per OSWESTRY with ability to lift heavier items correctly with less pain  5. Be able to get into/out of the tub and car without having to lift the R LE with her hands     Patient goals:\"decreasing pain\"  Pt. Education:  [] Yes  [] No  [x] Reviewed Prior HEP/Ed  Method of Education: [x] Verbal  [] Demo  [] Written  2/23- importance of HEP    Access Code: [email or web address]  URL: [email or web address]  Date: 02/14/2022  Prepared by: Maria Teresa Ayala    Exercises  Supine Transversus Abdominis Bracing - Hands on Stomach - 1 x daily - 7 x weekly - 3 sets - 10 reps - 5 hold  Supine Transversus Abdominis Bracing with Double Leg Fallout - 1 x daily - 7 x weekly - 3 sets - 10 reps  Supine Transversus Abdominis Bracing - Hands on Thighs - 1 x daily - 7 x weekly - 3 sets - 10 reps - 5 hold    Comprehension of Education:  [x] Verbalizes understanding. [] Demonstrates understanding. [] Needs review. [] Demonstrates/verbalizes HEP/Ed previously given. Plan: [x] Continue current frequency toward long and short term goals.     [x] Specific Instructions for subsequent treatments:       Time In: 5:01 pm            Time Out: 5:55 pm  Electronically signed by:  Barbra Curry PT

## 2022-03-03 ENCOUNTER — HOSPITAL ENCOUNTER (OUTPATIENT)
Dept: PHYSICAL THERAPY | Facility: CLINIC | Age: 57
Setting detail: THERAPIES SERIES
Discharge: HOME | End: 2022-03-03
Payer: COMMERCIAL

## 2022-03-03 PROCEDURE — 97140 MANUAL THERAPY 1/> REGIONS: CPT

## 2022-03-03 NOTE — FLOWSHEET NOTE
[] [address]  [address]  [phone]  F: [phone] [x] [address]  [facility]   [address]  P: [phone]  F: [phone] [] [place]  [address]  P: [phone]  F: [phone] [] [address]  P: [phone]  F: [phone] [] [address]  [facility]   [address]   [place]: [phone]  F: [phone]      Physical Therapy Daily Treatment Note    Date:  3/3/2022  Patient Name:  Toan Rodriguez    :  1965  MRN: [id number]  Physician: Roosevelt Li MD            Insurance: [employer] [place] Plan O  Medical Diagnosis: DDD lumbar spine, acute exacerbation                         Rehab Codes: M54.17; R29.3; M62.591; R20.2; Onset Date: 2016               Next 's appt. : 3/14/22 pain management  Visit# / total visits:    Cancels/No Shows: 1/0    Subjective:    Pain:  [x] Yes  [] No Location: Bilateral LE's right thigh Pain Rating: (0-10 scale) 4/10  Pain altered Tx:  [] No  [x] Yes  Action: hold traction  Comments: pt felt like she had difficulty moving after last session. Pt feels if she could get rid of the stiffness/discomfort int he groin she would be doing well.      Todays Treatment: 3/3/22: pt able to flex R hip in sitting through full ROM, so at least 3/5 strength  Modalities: discontinue: 15 min, 65 lbs/15 lbs and 60/20 table unlocked supine, legs propped on stool  Precautions:  Exercises: Access Code: [email or web address]  Not 22, manual work done  Exercise Reps/ Time Weight/ Level Comments   Supine      LTR 5x5\" ea  Added    ta contraction 10x 5 sec  with breathing    DKTC 10x 10 sec HEP   SKTC 10x     HEP   Marching  10x    added    TrA Fallout 1 leg at a time 10x ea   added 2/14 cues to go slow   SLR 10x    added 2/14   TrA Walkouts 5x   added 2/14   HS stretch  20\"x3 ea  Added 2/23 left tighter than right   Piriformis (fig 4) 20\"x3 ea  Added 2/23 bilat  Strap needed for LLE          Prone       Quad strecth 20\"x3 ea  Added 2/23   Other:     Standing General fascial listening: forward/R hip; sitting general listening: forward; at legs: R LE vibration    Neural manipulation: supine: R femoral nn mobilization x 3 with tightness ntoed and released. Prone release from sacrum to T8 and sacrum to occiput. Sacral ligament releases: much tightness especially on R side. local fascial listening: anterior parietal peritoneum, especially at level of ilium; mobility to area performed. At end of session, posterior parietal peritoneum release with patient in standing, leaning over plinth. The patient with greater tightness R ilium region that was mobilized. Myofascial release: T7 mm to gluteals with tightness especially R side of sacrum. Not today: Muscle Energy Technique: +Lstork; +L ff  Legs even in sitting; L long in supine; R unilateral sacral extension; type I L prominent lumbar rotation; ERSL at L4 and 5 corrected in R sidelying;       not today: information issued on dry needling     Specific Instructions for next treatment: core and R hip strength for flexion, abd and extension; spine strength; flexibility of spine, hip ROM. ? spinal traction; ? Dry needling; jenae to do manual assessment/treatment    Treatment Charges: Mins Units   []  Modalities     []  Ther Exercise     [x]  Manual Therapy 54 4   []  Ther Activities     []  Aquatics     []  Vasocompression     []  Other: Traction     Total Treatment time 54 4       Assessment: [x] Progressing toward goals. [] No change. [x] Other: pt with improved hip flexion strength noted, but still with many fascial restrictions and difficulty moving. Decent releases obtained. Will see how carryover is for her.  The patient did not respond well to traction so this is going to be discontinued. [x] Patient would continue to benefit from skilled physical therapy services in order to: improve hip, core, spine strength  so the patient has a reduction in pain and improved ability to perform daily tasks. STG: (to be met in 6 treatments)  1. ? Pain: reduce to below 6/10 with less frequent, intense LE symptoms  2. ? ROM: tolerate flexibility ex of spine and pelvis and hips to help reduce pain and improve fascial gliding  3. ? Strength: improve hip strength on R by at least 1/3 grade so there is less stress on LB with a reduction in pain  4. ? Function: be aware of proper sitting posture and ways to relieve discomfort with prolonged standing for improved quality of life  5. Patient to be independent with home exercise program as demonstrated by performance with correct form without cues.     LTG: (to be met in 12 treatments)  1. Pain below 4/10 with minimal R LE symptoms  2. Good lower abdominal strength  3. At least 4+/5 hip and spine strength for improved support of musculoskeletal structure with a reduction in pain  4. Function improved to below 20% affected per OSWESTRY with ability to lift heavier items correctly with less pain  5. Be able to get into/out of the tub and car without having to lift the R LE with her hands     Patient goals:\"decreasing pain\"  Pt. Education:  [] Yes  [x] No  [] Reviewed Prior HEP/Ed  Method of Education: [] Verbal  [] Demo  [] Written  2/23- importance of HEP    Access Code: [email or web address]  URL: [email or web address]  Date: 02/14/2022  Prepared by: Donna Mckeon    Exercises  Supine Transversus Abdominis Bracing - Hands on Stomach - 1 x daily - 7 x weekly - 3 sets - 10 reps - 5 hold  Supine Transversus Abdominis Bracing with Double Leg Fallout - 1 x daily - 7 x weekly - 3 sets - 10 reps  Supine Transversus Abdominis Bracing - Hands on Thighs - 1 x daily - 7 x weekly - 3 sets - 10 reps - 5 hold    Comprehension of Education:  [] Verbalizes understanding. [] Demonstrates understanding. [] Needs review. [] Demonstrates/verbalizes HEP/Ed previously given. Plan: [x] Continue current frequency toward long and short term goals.     [x] Specific Instructions for subsequent treatments:       Time In: 4:57 pm            Time Out: 5:54 pm  Electronically signed by:  Altagracia Birmingham PT

## 2022-03-07 ENCOUNTER — HOSPITAL ENCOUNTER (OUTPATIENT)
Dept: PHYSICAL THERAPY | Facility: CLINIC | Age: 57
Setting detail: THERAPIES SERIES
Discharge: HOME | End: 2022-03-07
Payer: COMMERCIAL

## 2022-03-07 PROCEDURE — 97140 MANUAL THERAPY 1/> REGIONS: CPT

## 2022-03-07 NOTE — FLOWSHEET NOTE
[] [address]  [address]  [phone]  F: [phone] [x] [address]  [facility]   [address]  P: [phone]  F: [phone] [] [place]  [address]  P: [phone]  F: [phone] [] [address]  P: [phone]  F: [phone] [] [address]  [facility]   [address]   [place]: [phone]  F: [phone]      Physical Therapy Daily Treatment Note    Date:  3/7/2022  Patient Name:  Tim Chaves    :  1965  MRN: [id number]  Physician: Lloyd Brown MD            Insurance: [employer] [place] [employer]  Medical Diagnosis: DDD lumbar spine, acute exacerbation                         Rehab Codes: M54.17; R29.3; M62.591; R20.2; Onset Date: 2016               Next 's appt. : 3/14/22 pain management  Visit# / total visits:    Cancels/No Shows: 1/0    Subjective:    Pain:  [x] Yes  [] No Location: Bilateral LE's right thigh Pain Rating: (0-10 scale) 8/10  Pain altered Tx:  [] No  [x] Yes  Action: hold traction, add HP  Comments: pt felt great after last session. The symptoms all came back. Pt mostly with achiness in anterior thighs with the R more than the left. LB doesn't feel as irritated anymore. All pain is in legs with cramping. Pt does report she had a fall a while back, since starting therapy but before the last session.     Todays Treatment:   3/7/22: hip flexion 2+/5; hip abduction 2/5.   3/3/22: pt able to flex R hip in sitting through full ROM, so at least 3/5 strength  Modalities: HP to LB and thighs at end of session due to discomfort    discontinue: 15 min, 65 lbs/15 lbs and 60/20 table unlocked supine, legs propped on stool  Precautions:  Exercises: Access Code: [email or web address]  Not 03/07/22, manual work done  Exercise Reps/ Time Weight/ Level Comments   Supine      LTR 5x5\" ea  Added 2/23   ta contraction 10x 5 sec  with breathing    DKTC 10x 10 sec HEP   SKTC 10x     HEP   Marching  10x    added 2/14   TrA Fallout 1 leg at a time 10x ea   added 2/14 cues to go slow   SLR 10x    added 2/14   TrA Walkouts 5x   added 2/14   HS stretch  20\"x3 ea  Added 2/23 left tighter than right   Piriformis (fig 4) 20\"x3 ea  Added 2/23 bilat  Strap needed for LLE          Prone       Quad strecth 20\"x3 ea  Added 2/23   Other:     Standing General fascial listening: forward/R hip; sitting general listening: forward; at legs: R LE vibration    Neural manipulation: supine: R femoral nn mobilization x 3 with tightness ntoed and released. Prone release from sacrum to T8 and sacrum to occiput. Sacral ligament releases: much tightness especially on L side. Not today: local fascial listening: anterior parietal peritoneum, especially at level of ilium; mobility to area performed. At end of session, posterior parietal peritoneum release with patient in standing, leaning over plinth. The patient with greater tightness R ilium region that was mobilized. Myofascial release: T7 mm to gluteals with tightness especially L side of sacrum. Not today: Muscle [employer] Technique: even in prone and supine     not today: information issued on dry needling     Specific Instructions for next treatment: core and R hip strength for flexion, abd and extension; spine strength; flexibility of spine, hip ROM. ? spinal traction; ? Dry needling; jenae to do manual assessment/treatment    Treatment Charges: Mins Units   [x]  Modalities     []  Ther Exercise     [x]  Manual Therapy 46 3   []  Ther Activities     []  Aquatics     []  Vasocompression     []  Other: Traction     Total Treatment time 46 3       Assessment: [x] Progressing toward goals. [] No change.      [x] Other: pt with poor tolerance to therapy today with continued soreness. L side of SI was more tender today. Discussed dry needling with patient and we will check insurance. [x] Patient would continue to benefit from skilled physical therapy services in order to: improve hip, core, spine strength  so the patient has a reduction in pain and improved ability to perform daily tasks. STG: (to be met in 6 treatments)  1. ? Pain: reduce to below 6/10 with less frequent, intense LE symptoms: ONGOING 3/7/22   2. ? ROM: tolerate flexibility ex of spine and pelvis and hips to help reduce pain and improve fascial gliding: MET 3/7/22  3. ? Strength: improve hip strength on R by at least 1/3 grade so there is less stress on LB with a reduction in pain: ONGOING 3/7/22  4. ? Function: be aware of proper sitting posture and ways to relieve discomfort with prolonged standing for improved quality of life: ONGOING 3/7/22  5. Patient to be independent with home exercise program as demonstrated by performance with correct form without cues.: ONGOING 3/7/22     LTG: (to be met in 12 treatments)  1. Pain below 4/10 with minimal R LE symptoms  2. Good lower abdominal strength  3. At least 4+/5 hip and spine strength for improved support of musculoskeletal structure with a reduction in pain  4. Function improved to below 20% affected per OSWESTRY with ability to lift heavier items correctly with less pain  5. Be able to get into/out of the tub and car without having to lift the R LE with her hands     Patient goals:\"decreasing pain\"  Pt. Education:  [] Yes  [x] No  [] Reviewed Prior HEP/Ed  Method of Education: [] Verbal  [] Demo  [] Written  2/23- importance of HEP    Access Code: [email or web address]  URL: [email or web address]  Date: 02/14/2022  Prepared by: Neida Loredo    Exercises  Supine Transversus Abdominis Bracing - Hands on Stomach - 1 x daily - 7 x weekly - 3 sets - 10 reps - 5 hold  Supine Transversus Abdominis Bracing with Double Leg Fallout - 1 x daily - 7 x weekly - 3 sets - 10 reps  Supine Transversus Abdominis Bracing - Hands on Thighs - 1 x daily - 7 x weekly - 3 sets - 10 reps - 5 hold    Comprehension of Education:  [] Verbalizes understanding. [] Demonstrates understanding. [] Needs review. [] Demonstrates/verbalizes HEP/Ed previously given. Plan: [x] Continue current frequency toward long and short term goals.     [x] Specific Instructions for subsequent treatments:       Time In: 5:00 pm           Time Out: 6:05 pm  Electronically signed by:  Cj Olsen PT

## 2022-03-09 ENCOUNTER — TELEPHONE (OUTPATIENT)
Dept: FAMILY MEDICINE CLINIC | Age: 57
End: 2022-03-09

## 2022-03-10 ENCOUNTER — HOSPITAL ENCOUNTER (OUTPATIENT)
Dept: PHYSICAL THERAPY | Facility: CLINIC | Age: 57
Setting detail: THERAPIES SERIES
Discharge: HOME | End: 2022-03-10
Payer: COMMERCIAL

## 2022-03-10 PROCEDURE — 97110 THERAPEUTIC EXERCISES: CPT

## 2022-03-10 PROCEDURE — 97140 MANUAL THERAPY 1/> REGIONS: CPT

## 2022-03-10 NOTE — FLOWSHEET NOTE
[] [address]  [address]  [phone]  F: [phone] [x] [address]  [place] [address]   [address]  P: [phone]  F: [phone] [] [place]  [address]  P: [phone]  F: [phone] [] [address]  P: [phone]  F: [phone] [] [address]  [facility]   [address]   [place]: [phone]  F: [phone]      Physical Therapy Daily Treatment Note    Date:  3/10/2022  Patient Name:  Mikayla Sevilla    :  1965  MRN: [id number]  Physician: Jennifer Schultz MD            Insurance: [employer] [place] [employer] HMO  Medical Diagnosis: DDD lumbar spine, acute exacerbation                         Rehab Codes: M54.17; R29.3; M62.591; R20.2; Onset Date: 2016               Next 's appt. : 3/14/22 pain management    3/15/22 Doctor  Visit# / total visits:    Cancels/No Shows: 1/0    Subjective:    Pain:  [x] Yes  [] No Location: Bilateral LE's right thigh Pain Rating: (0-10 scale) 6/10  Pain altered Tx:  [] No  [x] Yes  Action:   Comments: Pt reports being better than her last visit but has trouble verbalizing how she is feeling. Sometimes worse after her exercises. She feels like she in minimally better.         Todays Treatment:   3/7/22: hip flexion 2+/5; hip abduction 2/5.   3/3/22: pt able to flex R hip in sitting through full ROM, so at least 3/5 strength       Modalities: HP to LB and thighs at end of session due to discomfort 10 min supine  Precautions:  Exercises: Access Code: [email or web address]  Completed 3/10  Exercise Reps/ Time Weight/ Level Comments   Supine      PPT 20x5\" hold  Added 3/10   LTR 5x5\" ea  Added    ta contraction 10x 5 sec  with breathing    DKTC 10x 10 sec HEP   SKTC 10x     HEP   Marching  10x    added 2/14   TrA Fallout 1 leg at a time 10x ea   added 2/14 cues to go slow   SLR 10x    added 2/14   TrA Walkouts 5x   added 2/14   HS stretch  20\"x3 ea  Added 2/23 left tighter than right   Piriformis (fig 4) 20\"x3 ea  Added 2/23 bilat  Strap needed for LLE          Prone       Quad strecth 20\"x3 ea  Added 2/23   Other:   Lebron Alexander: hypervolt to bilateral glutes, focus on R 10 min total post exercises    NOT TODAY:  Standing General fascial listening: forward/R hip; sitting general listening: forward; at legs: R LE vibration    Neural manipulation: supine: R femoral nn mobilization x 3 with tightness ntoed and released. Prone release from sacrum to T8 and sacrum to occiput. Sacral ligament releases: much tightness especially on L side. Not today: local fascial listening: anterior parietal peritoneum, especially at level of ilium; mobility to area performed. At end of session, posterior parietal peritoneum release with patient in standing, leaning over plinth. The patient with greater tightness R ilium region that was mobilized. Myofascial release: T7 mm to gluteals with tightness especially L side of sacrum   Not today: Muscle [employer] Technique: even in prone and supine     not today: information issued on dry needling           Specific Instructions for next treatment: core and R hip strength for flexion, abd and extension; spine strength; flexibility of spine, hip ROM. ? spinal traction; ? Dry needling; jenae to do manual assessment/treatment     Treatment Charges: Mins Units   [x]  Modalities:HP 10 0   [x]  Ther Exercise 30 2   [x]  Manual Therapy 10 1   []  Ther Activities     []  Aquatics     []  Vasocompression     []  Other: Traction     Total Treatment time 40 3       Assessment: [x] Progressing toward goals. Pt frustrated with lack of progress. Initiated treatment with core strength and general flexibility. Minimal progressions.  Completed manual PT with hypervolt to bilateral glutes with a focus on R. Heat again provided after treatment for discomfort/pain. [] No change. [x] Other: pt with poor tolerance to therapy today with continued soreness. L side of SI was more tender today. Discussed dry needling with patient and we will check insurance. [x] Patient would continue to benefit from skilled physical therapy services in order to: improve hip, core, spine strength  so the patient has a reduction in pain and improved ability to perform daily tasks. STG: (to be met in 6 treatments)  1. ? Pain: reduce to below 6/10 with less frequent, intense LE symptoms: ONGOING 3/7/22   2. ? ROM: tolerate flexibility ex of spine and pelvis and hips to help reduce pain and improve fascial gliding: MET 3/7/22  3. ? Strength: improve hip strength on R by at least 1/3 grade so there is less stress on LB with a reduction in pain: ONGOING 3/7/22  4. ? Function: be aware of proper sitting posture and ways to relieve discomfort with prolonged standing for improved quality of life: ONGOING 3/7/22  5. Patient to be independent with home exercise program as demonstrated by performance with correct form without cues.: ONGOING 3/7/22     LTG: (to be met in 12 treatments)  1. Pain below 4/10 with minimal R LE symptoms  2. Good lower abdominal strength  3. At least 4+/5 hip and spine strength for improved support of musculoskeletal structure with a reduction in pain  4. Function improved to below 20% affected per OSWESTRY with ability to lift heavier items correctly with less pain  5. Be able to get into/out of the tub and car without having to lift the R LE with her hands     Patient goals:\"decreasing pain\"  Pt. Education:  [] Yes  [x] No  [] Reviewed Prior HEP/Ed  Method of Education: [] Verbal  [] Demo  [] Written  2/23- importance of HEP    Access Code: [email or web address]  URL: [email or web address]  Date: 02/14/2022  Prepared by: Lorenzo Chaudhary    Exercises  Supine Transversus Abdominis Bracing - Hands on Stomach - 1 x daily - 7 x weekly - 3 sets - 10 reps - 5 hold  Supine Transversus Abdominis Bracing with Double Leg Fallout - 1 x daily - 7 x weekly - 3 sets - 10 reps  Supine Transversus Abdominis Bracing - Hands on Thighs - 1 x daily - 7 x weekly - 3 sets - 10 reps - 5 hold    Comprehension of Education:  [] Verbalizes understanding. [] Demonstrates understanding. [] Needs review. [] Demonstrates/verbalizes HEP/Ed previously given. Plan: [x] Continue current frequency toward long and short term goals.     [x] Specific Instructions for subsequent treatments:       Time In: 5:00 pm           Time Out: 5:55 pm      Electronically signed by:  Vadim Good PTA

## 2022-03-14 ENCOUNTER — HOSPITAL ENCOUNTER (OUTPATIENT)
Dept: PHYSICAL THERAPY | Facility: CLINIC | Age: 57
Setting detail: THERAPIES SERIES
Discharge: HOME | End: 2022-03-14
Payer: COMMERCIAL

## 2022-03-14 PROCEDURE — 97110 THERAPEUTIC EXERCISES: CPT

## 2022-03-14 NOTE — FLOWSHEET NOTE
[] [address]  [address]  [phone]  F: [phone] [x] [address]  [address]\[facility]\"" 36   [address]  P: [phone]  F: [phone] [] [place]  [address]  P: [phone]  F: [phone] [] [email or web address]  P: [phone]  F: [phone] [] [address]  [facility]   [address]   [place]: [phone]  F: [phone]      Physical Therapy Daily Treatment Note    Date:  3/14/2022  Patient Name:  Ralph Dougherty    :  1965  MRN: [id number]  Physician: Terry Wright MD            Insurance: [employer] [place] Plan HMO  Medical Diagnosis: DDD lumbar spine, acute exacerbation                         Rehab Codes: M54.17; R29.3; M62.591; R20.2; Onset Date: 2016               Next 's appt. : 3/14/22 pain management  Visit# / total visits:    Cancels/No Shows: 1/0    Subjective:    Pain:  [x] Yes  [] No Location: Bilateral LE's right thigh Pain Rating: (0-10 scale) 8/10  Pain altered Tx:  [] No  [x] Yes  Action: hold traction, add HP  Comments:7/10 through the evening after previous session. Over the weekend it was a struggle with pain. States it is constant and achy. Todays Treatment:   3/7/22: hip flexion 2+/5; hip abduction 2/5.   3/3/22: pt able to flex R hip in sitting through full ROM, so at least 3/5 strength  Modalities: HP to LB 10 min beginning of session.      discontinue: 15 min, 65 lbs/15 lbs and 60/20 table unlocked supine, legs propped on stool  Precautions:  Exercises: Access Code: [email or web address]  Exercise Reps/ Time Weight/ Level Comments   Supine      LTR 5x5\" ea  Added    ta contraction 10x 5 sec  with breathing    DKTC 10x 10 sec HEP   SKTC 5x10\" ea   HEP, inc 3/14 Marching  10x2    added 2/14 , inc 3/14   TrA Fallout 1 leg at a time 10x ea   added 2/14 cues to go slow   SLR 10x    added 2/14   TrA Walkouts 10x   added 2/14 , inc 3/14   HS stretch  20\"x3 ea  Added 2/23 left tighter than right   Piriformis (fig 4) 20\"x3 ea  Added 2/23 bilat  Strap needed for LLE          sidelying      clams 15xea A Added 3/14         Prone       Quad strecth 20\"x3 ea  Added 2/23         Sitting      Flexion ball roll out 10x3\"  Added 3/14         Other:     Not today:Standing General fascial listening: forward/R hip; sitting general listening: forward; at legs: R LE vibration    Not today:Neural manipulation: supine: R femoral nn mobilization x 3 with tightness ntoed and released. Prone release from sacrum to T8 and sacrum to occiput. Sacral ligament releases: much tightness especially on L side. Not today: local fascial listening: anterior parietal peritoneum, especially at level of ilium; mobility to area performed. At end of session, posterior parietal peritoneum release with patient in standing, leaning over plinth. The patient with greater tightness R ilium region that was mobilized. Not today: Myofascial release: T7 mm to gluteals with tightness especially L side of sacrum. Not today: Muscle Energy Technique: even in prone and supine    Not today: information issued on dry needling     Specific Instructions for next treatment: core and R hip strength for flexion, abd and extension; spine strength; flexibility of spine, hip ROM. ? spinal traction; ? Dry needling; jenae to do manual assessment/treatment    Treatment Charges: Mins Units   [x]  Modalities- HP  10 -   []  Ther Exercise 45 3   [x]  Manual Therapy     []  Ther Activities     []  Aquatics     []  Vasocompression     []  Other: Traction     Total Treatment time 45 3       Assessment: [] Progressing toward goals. [x] No change.  Initiated session with HP to Lumbar spine to aid in relaxation and improve tolerance to 02/14/2022  Prepared by: Larayne Hamman    Exercises  Supine Transversus Abdominis Bracing - Hands on Stomach - 1 x daily - 7 x weekly - 3 sets - 10 reps - 5 hold  Supine Transversus Abdominis Bracing with Double Leg Fallout - 1 x daily - 7 x weekly - 3 sets - 10 reps  Supine Transversus Abdominis Bracing - Hands on Thighs - 1 x daily - 7 x weekly - 3 sets - 10 reps - 5 hold    Comprehension of Education:  [] Verbalizes understanding. [] Demonstrates understanding. [] Needs review. [] Demonstrates/verbalizes HEP/Ed previously given. Plan: [x] Continue current frequency toward long and short term goals.     [x] Specific Instructions for subsequent treatments:       Time In: 5:05 pm           Time Out: 6:05 pm  Electronically signed by:  Gilberto Mcguire PTA

## 2022-03-15 ENCOUNTER — OFFICE VISIT (OUTPATIENT)
Dept: FAMILY MEDICINE CLINIC | Age: 57
End: 2022-03-15
Payer: COMMERCIAL

## 2022-03-15 VITALS
HEART RATE: 90 BPM | BODY MASS INDEX: 28.38 KG/M2 | WEIGHT: 181.2 LBS | SYSTOLIC BLOOD PRESSURE: 140 MMHG | DIASTOLIC BLOOD PRESSURE: 90 MMHG | OXYGEN SATURATION: 99 %

## 2022-03-15 DIAGNOSIS — G89.29 CHRONIC BILATERAL LOW BACK PAIN WITH RIGHT-SIDED SCIATICA: Primary | Chronic | ICD-10-CM

## 2022-03-15 DIAGNOSIS — M51.36 DDD (DEGENERATIVE DISC DISEASE), LUMBAR: ICD-10-CM

## 2022-03-15 DIAGNOSIS — M54.41 CHRONIC BILATERAL LOW BACK PAIN WITH RIGHT-SIDED SCIATICA: Primary | Chronic | ICD-10-CM

## 2022-03-15 PROCEDURE — 96372 THER/PROPH/DIAG INJ SC/IM: CPT | Performed by: FAMILY MEDICINE

## 2022-03-15 PROCEDURE — 99213 OFFICE O/P EST LOW 20 MIN: CPT | Performed by: FAMILY MEDICINE

## 2022-03-15 RX ORDER — KETOROLAC TROMETHAMINE 30 MG/ML
60 INJECTION, SOLUTION INTRAMUSCULAR; INTRAVENOUS ONCE
Status: COMPLETED | OUTPATIENT
Start: 2022-03-15 | End: 2022-03-15

## 2022-03-15 RX ORDER — PREDNISONE 20 MG/1
60 TABLET ORAL DAILY
Qty: 30 TABLET | Refills: 0 | Status: SHIPPED | OUTPATIENT
Start: 2022-03-15 | End: 2022-03-29 | Stop reason: SDUPTHER

## 2022-03-15 RX ADMIN — KETOROLAC TROMETHAMINE 60 MG: 30 INJECTION, SOLUTION INTRAMUSCULAR; INTRAVENOUS at 16:32

## 2022-03-15 ASSESSMENT — PATIENT HEALTH QUESTIONNAIRE - PHQ9
SUM OF ALL RESPONSES TO PHQ9 QUESTIONS 1 & 2: 0
2. FEELING DOWN, DEPRESSED OR HOPELESS: 0
1. LITTLE INTEREST OR PLEASURE IN DOING THINGS: 0
SUM OF ALL RESPONSES TO PHQ QUESTIONS 1-9: 0

## 2022-03-15 NOTE — PATIENT INSTRUCTIONS
Patient Education        Herniated Disc: Care Instructions  Your Care Instructions     The bones that form the spine in your back are cushioned by small discs. If a disc is damaged, it may bulge or break open (herniate). A herniated disc can result from normal wear and tear as we age or from an injury or disease. If a herniated disc irritates or presses on a nerve, it can cause pain and numbness in your leg (sciatica) and/or back pain. You may be able to heal your herniated disc with a few weeks or months of rest, medicine, and exercises. In some cases, you may need surgery. Follow-up care is a key part of your treatment and safety. Be sure to make and go to all appointments, and call your doctor if you are having problems. It's also a good idea to know your test results and keep a list of the medicines you take. How can you care for yourself at home? · Take your medicines exactly as prescribed. Call your doctor if you think you are having a problem with your medicine. · Ask your doctor if you can take an over-the-counter pain medicine, such as acetaminophen (Tylenol), ibuprofen (Advil, Motrin), or naproxen (Aleve). Read and follow all instructions on the label. · Do not take two or more pain medicines at the same time unless the doctor told you to. Many pain medicines have acetaminophen, which is Tylenol. Too much acetaminophen (Tylenol) can be harmful. · Rest your back if your pain is severe. · Avoid movements and positions that increase your pain or numbness. · Try taking short walks and doing light activities that do not cause pain. Even if you are feeling some pain, it is important to keep your muscles active and strong. · Use heat or ice to relieve pain. ? To apply heat, put a warm water bottle, heating pad set on low, or warm cloth on your back. Do not go to sleep with a heating pad on your skin. ? To use ice, put ice or a cold pack on the area for 10 to 20 minutes at a time.  Put a thin cloth between the ice and your skin. · Your doctor may recommend a physical therapy program, where you learn exercises to do at home. These exercises strengthen the muscles that support your lower back and prevent reinjury. · Stay at a healthy weight. This may reduce the load on your back. · Quit smoking if you smoke. If you need help quitting, talk to your doctor about stop-smoking programs and medicines. These can increase your chances of quitting for good. · To avoid hurting your back when lifting:  ? Lift with your legs, not your back, by squatting and bending your knees. Avoid bending forward at the waist when lifting. ? Rise slowly. ? Keep the load as close to your body as possible, at the level of your navel. ? Avoid turning or twisting your body while holding a heavy object. ? Get help if you need to lift a heavy object. Never lift a heavy object above shoulder level. When should you call for help? Call 911 anytime you think you may need emergency care. For example, call if:    · You are unable to move a leg at all. Call your doctor now or seek immediate medical care if:    · You have new or worse symptoms in your arms, legs, chest, belly, or buttocks. Symptoms may include:  ? Numbness or tingling. ? Weakness. ? Pain.     · You lose bladder or bowel control. Watch closely for changes in your health, and be sure to contact your doctor if:    · You are not getting better as expected. Where can you learn more? Go to https://[employer].Sagebin. org and sign in to your [employer] account. Enter F534 in the [facility] box to learn more about \"Herniated Disc: Care Instructions. \"     If you do not have an account, please click on the \"Sign Up Now\" link. Current as of: July 1, 2021               Content Version: 13.1  © 2006-2021 Healthwise, Incorporated. Care instructions adapted under license by [facility] ([facility]).  If you have questions about a medical condition or this instruction, always ask your healthcare professional. [place] [address] any warranty or liability for your use of this information.

## 2022-03-17 ENCOUNTER — HOSPITAL ENCOUNTER (OUTPATIENT)
Dept: PHYSICAL THERAPY | Facility: CLINIC | Age: 57
Setting detail: THERAPIES SERIES
Discharge: HOME | End: 2022-03-17
Payer: COMMERCIAL

## 2022-03-17 PROCEDURE — 97110 THERAPEUTIC EXERCISES: CPT

## 2022-03-17 NOTE — FLOWSHEET NOTE
[] [facility]  [address]  [phone]  F: [phone] [x] [address]  [facility]   [address]  P: [phone]  F: [phone] [] [place]  [address]  P: [phone]  F: [phone] [] [address]  P: [phone]  F: [phone] [] [address]  [facility]   [address]   [place]: [phone]  F: [phone]      Physical Therapy Daily Treatment Note    Date:  3/17/2022  Patient Name:  Mikayla Sevilla    :  1965  MRN: [id number]  Physician: Jennifer Schultz MD            Insurance: [employer] [place] Plan O  Medical Diagnosis: DDD lumbar spine, acute exacerbation                         Rehab Codes: M54.17; R29.3; M62.591; R20.2; Onset Date: 2016               Next 's appt. : 3/14/22 pain management  Visit# / total visits:    Cancels/No Shows: 1/0    Subjective:    Pain:  [x] Yes  [] No Location: Bilateral LE's right thigh Pain Rating: (0-10 scale) 3-4/10  Pain altered Tx:  [] No  [x] Yes  Action: hold traction, add HP  Comments: saw MD, started prednisone yesterday; MRI next Thursday. Todays Treatment:   3/17/22: full standing spine flexion: limited extension 10; discomfort in groin with SKTC; L lbp with diana on R: SLR - bilaterally; L DIANA tight with pain groin and L hip  3/7/22: hip flexion 2+/5; hip abduction 2/5.   3/3/22: pt able to flex R hip in sitting through full ROM, so at least 3/5 strength  Modalities: HP to LB 10 min beginning of session.      discontinue: 15 min, 65 lbs/15 lbs and 60/20 table unlocked supine, legs propped on stool  Precautions:  Exercises: Access Code: [email or web address] bolded 22   Exercise Reps/ Time Weight/ Level Comments   Supine      LTR 5x5\" ea  Added 2/23   ta contraction 10x 5 sec  with breathing    DKTC 10x 10 sec HEP   SKTC 5x10\" ea   HEP, inc 3/14   Marching  10x2    added 2/14 , inc 3/14   TrA Fallout 1 leg at a time 10x ea   added 2/14 cues to go slow   SLR 10x    added 2/14   TrA Walkouts 10x   added 2/14 , inc 3/14   HS stretch  20\"x3 ea Multiple angles Added 2/23 left tighter than right   Piriformis (fig 4) 20\"x3 ea  Added 2/23 bilat  Strap needed for LLE    Butterfly stretch 2 30 sec Started 3/17/22               sidelying      clams 15xea A Added 3/14   Hip abduction 10 A Started 3/17/22         Prone       Quad strecth 20\"x3 ea  Added 2/23   Spine extension 10 A Started 3/17/22   Alt arm/leg lift 10 A Started 3/17/22   Sitting      Flexion ball roll out 10x3\"  Added 3/14         stand      Hip flexor stretch 5 10 sec Started 3/17/22         Other:     Not today:Standing General fascial listening: forward/R hip; sitting general listening: forward; at legs: R LE vibration    Not today:Neural manipulation: supine: R femoral nn mobilization x 3 with tightness ntoed and released. Prone release from sacrum to T8 and sacrum to occiput. Sacral ligament releases: much tightness especially on L side. Not today: local fascial listening: anterior parietal peritoneum, especially at level of ilium; mobility to area performed. At end of session, posterior parietal peritoneum release with patient in standing, leaning over plinth. The patient with greater tightness R ilium region that was mobilized. Not today: Myofascial release: T7 mm to gluteals with tightness especially L side of sacrum. Not today: [employer] Technique: even in prone and supine    Not today: information issued on dry needling     Specific Instructions for next treatment: core and R hip strength for flexion, abd and extension; spine strength; flexibility of spine, hip ROM. ?  spinal traction; ? Dry needling; jenae to do manual assessment/treatment    Treatment Charges: Mins Units   []  Modalities- HP      [x]  Ther Exercise 50 3   []  Manual Therapy     []  Ther Activities     []  Aquatics     []  Vasocompression     []  Other: Traction     Total Treatment time 50 3       Assessment: [] Progressing toward goals. [x] No change. The patient with less pain since starting prednisone. Overall pt with antalgia coming in to clinic and poor flexibility. Still with symptoms in thighs. The patient was advanced with her home program and updated (sent via text). The patient had good tolerance with careful movement. Pt with continued weakness in hips and spine, core. The patient will be checked in a couple of weeks to see how she does with her home program prior to discharge. [] Other:     [x] Patient would continue to benefit from skilled physical therapy services in order to: improve hip, core, spine strength  so the patient has a reduction in pain and improved ability to perform daily tasks. STG: (to be met in 6 treatments)  1. ? Pain: reduce to below 6/10 with less frequent, intense LE symptoms: ONGOING 3/7/22   2. ? ROM: tolerate flexibility ex of spine and pelvis and hips to help reduce pain and improve fascial gliding: MET 3/7/22  3. ? Strength: improve hip strength on R by at least 1/3 grade so there is less stress on LB with a reduction in pain: ONGOING 3/7/22  4. ? Function: be aware of proper sitting posture and ways to relieve discomfort with prolonged standing for improved quality of life: ONGOING 3/7/22  5. Patient to be independent with home exercise program as demonstrated by performance with correct form without cues.: ONGOING 3/7/22     LTG: (to be met in 12 treatments)  1. Pain below 4/10 with minimal R LE symptoms  2. Good lower abdominal strength  3. At least 4+/5 hip and spine strength for improved support of musculoskeletal structure with a reduction in pain  4.  Function improved to below 20% affected per OSWESTRY with ability to lift heavier items correctly with less pain  5. Be able to get into/out of the tub and car without having to lift the R LE with her hands     Patient goals:\"decreasing pain\"  Pt. Education:  [] Yes  [x] No  [] Reviewed Prior HEP/Ed  Method of Education: [] Verbal  [] Demo  [] Written  2/23- importance of HEP    Access Code: [email or web address]  URL: [employer]/  Date: 02/14/2022  Prepared by: Candi Jackson    Exercises  Supine Transversus Abdominis Bracing - Hands on Stomach - 1 x daily - 7 x weekly - 3 sets - 10 reps - 5 hold  Supine Transversus Abdominis Bracing with Double Leg Fallout - 1 x daily - 7 x weekly - 3 sets - 10 reps  Supine Transversus Abdominis Bracing - Hands on Thighs - 1 x daily - 7 x weekly - 3 sets - 10 reps - 5 hold    Date: 03/17/2022  Prepared by: Mckenna Ervin    Exercises  Supine Transversus Abdominis Bracing - Hands on Stomach - 1 x daily - 5 x weekly - 3 sets - 10 reps - 5 hold  Supine Transversus Abdominis Bracing with Double Leg Fallout - 1 x daily - 5 x weekly - 3 sets - 10 reps  Supine Lower Trunk Rotation - 1 x daily - 7 x weekly - 1 sets - 5 reps - 3 hold  Supine Double Knee to Chest - 1 x daily - 7 x weekly - 1 sets - 5 reps - 10 hold  Supine March with Alternating Leg Lifts - 1 x daily - 5 x weekly - 1 sets - 10 reps  Supine Active Straight Leg Raise - 1 x daily - 5 x weekly - 1-2 sets - 10 reps  Supine Hamstring Stretch with Strap - 1 x daily - 7 x weekly - 1 sets - 2 reps - 60 hold  Supine Butterfly Groin Stretch - 1 x daily - 7 x weekly - 1 sets - 3 reps - 30 hold  Supine Piriformis Stretch - 1 x daily - 7 x weekly - 1 sets - 3 reps - 15 hold  Hip Flexor Stretch with Chair - 1 x daily - 7 x weekly - 1 sets - 5 reps - 10 hold  Beginner Clam - 1 x daily - 5 x weekly - 1-3 sets - 10 reps  Sidelying Hip Abduction - 1 x daily - 5 x weekly - 1-3 sets - 10 reps  Prone Alternating Arm and Leg Lifts - 1 x daily - 5 x weekly - 1 sets - 10 reps  Upper Back Extension Off Table - 1 x daily - 5 x weekly - 1 sets - 10 reps  Cat-Camel to Child's Pose - 1 x daily - 7 x weekly - 1 sets - 5 reps    Comprehension of Education:  [x] Verbalizes understanding. [x] Demonstrates understanding. [x] Needs review. [] Demonstrates/verbalizes HEP/Ed previously given. Plan: [x] Continue current frequency toward long and short term goals.     [x] Specific Instructions for subsequent treatments:       Time In: 4:58 pm           Time Out: 5:53 pm    Electronically signed by:  Tavia Guaman PT

## 2022-03-18 ENCOUNTER — OFFICE VISIT (OUTPATIENT)
Dept: PAIN MANAGEMENT | Age: 57
End: 2022-03-18
Payer: COMMERCIAL

## 2022-03-18 VITALS
DIASTOLIC BLOOD PRESSURE: 93 MMHG | HEART RATE: 72 BPM | SYSTOLIC BLOOD PRESSURE: 153 MMHG | HEIGHT: 67 IN | BODY MASS INDEX: 28.79 KG/M2 | OXYGEN SATURATION: 100 % | WEIGHT: 183.4 LBS

## 2022-03-18 DIAGNOSIS — M51.36 DDD (DEGENERATIVE DISC DISEASE), LUMBAR: ICD-10-CM

## 2022-03-18 DIAGNOSIS — M54.50 ACUTE EXACERBATION OF CHRONIC LOW BACK PAIN: ICD-10-CM

## 2022-03-18 DIAGNOSIS — G89.29 ACUTE EXACERBATION OF CHRONIC LOW BACK PAIN: ICD-10-CM

## 2022-03-18 PROCEDURE — 99213 OFFICE O/P EST LOW 20 MIN: CPT | Performed by: NURSE PRACTITIONER

## 2022-03-18 RX ORDER — GABAPENTIN 300 MG/1
300 CAPSULE ORAL 3 TIMES DAILY
Qty: 90 CAPSULE | Refills: 2 | Status: SHIPPED | OUTPATIENT
Start: 2022-03-18 | End: 2022-04-25 | Stop reason: SDUPTHER

## 2022-03-18 ASSESSMENT — ENCOUNTER SYMPTOMS
DIARRHEA: 0
VOMITING: 0
CONSTIPATION: 0
BACK PAIN: 1
SORE THROAT: 0
SHORTNESS OF BREATH: 0
COUGH: 0
WHEEZING: 0
NAUSEA: 0

## 2022-03-18 NOTE — PROGRESS NOTES
Chief Complaint   Patient presents with    Medication Refill     gabapentin    Back Pain       PMH     low back and right lumbar radicular pain hip buttock area occ numbness in right foot. Pain worsens with prolonged activity standing sitting walking  Denies loss of control of bowel or bladder  She tried therapy and chiropractic treatment  She is also prescribed NSAID and gabapentin and now a steroid from her PCP that has helped some  Currently in PT after exacerbation of lumbar pain last month but does not feel it is helping with her pain  Her PCP has already ordered MRI and has appt next thurs to complete     HPI:   Back Pain  This is a chronic problem. The current episode started more than 1 year ago. The problem occurs constantly. The problem has been gradually worsening since onset. The pain is present in the sacro-iliac and gluteal (right). The quality of the pain is described as aching. The pain is at a severity of 4/10. The pain is mild. The pain is worse during the day. The symptoms are aggravated by sitting, standing and position (walking). Associated symptoms include numbness (rt foot at times). Pertinent negatives include no chest pain, fever or headaches. Risk factors include obesity, poor posture and lack of exercise. She has tried analgesics, NSAIDs and heat for the symptoms. The treatment provided mild relief.      Medication Refill: Gabapentin     Pain score Today:  4  Adverse effects (Constipation / Nausea / Sedation / sexual Dysfunction / others) : no  Mood: good  Sleep pattern and quality: good  Activity level: good    Last dose taken  03/18/2022 noon  OARRS report reviewed today: yes  ER/Hospitalizations/PCP visit related to pain since last visit:no   Any legal problems e.g. DUI etc.:No  Satisfied with current management: ok        Lab Results   Component Value Date    LABA1C 5.9 12/31/2021     Lab Results   Component Value Date     11/25/2017       Past Medical History, Past Surgical History, Social History, Allergies and Medications reviewed and updated in EPIC as indicated    Family History reviewed and is noncontributory. Past Medical History:   Diagnosis Date    Arthritis     Chronic bilateral low back pain with bilateral sciatica 2/11/2020    DDD (degenerative disc disease), lumbar 2/11/2020    MVP (mitral valve prolapse)        Past Surgical History:   Procedure Laterality Date    BREAST SURGERY Bilateral     biopsy    CHOLECYSTECTOMY      COLONOSCOPY      ENDOMETRIAL ABLATION      NERVE BLOCK Right 10/26/2020    EPIDURAL STEROID INJECTION  L4 L5     PAIN MANAGEMENT PROCEDURE Right 7/16/2020    RIGHT L4 AND L5 EPIDURAL STEROID INJECTION performed by Arthur Yuan MD at [address] [place] [address] Right 10/26/2020    EPIDURAL STEROID INJECTION -RIGHT L4 L5 - Right    PAIN MANAGEMENT PROCEDURE Right 10/26/2020    EPIDURAL STEROID INJECTION -RIGHT L4 L5 performed by Arthur Yuan MD at [address] [facility]         No Known Allergies      Current Outpatient Medications:     gabapentin (NEURONTIN) 300 MG capsule, Take 1 capsule by mouth 3 times daily for 30 days.  take 1 capsule by mouth 3 TIMES A DAY, Disp: 90 capsule, Rfl: 2    predniSONE (DELTASONE) 20 MG tablet, Take 3 tablets by mouth daily for 10 days, Disp: 30 tablet, Rfl: 0    verapamil (CALAN) 40 MG tablet, 1 tablet po TID, Disp: 90 tablet, Rfl: 2    diclofenac (VOLTAREN) 75 MG EC tablet, 1 tablet po BID with food, prn pain, Disp: 60 tablet, Rfl: 0    metaxalone (SKELAXIN) 800 MG tablet, 1 tablet po TID prn spasm, Disp: 90 tablet, Rfl: 0    magnesium citrate solution, Take 296 mLs by mouth once , Disp: , Rfl:     acetaminophen (TYLENOL) 650 MG extended release tablet, Take 650 mg by mouth every 8 hours as needed for Pain, Disp: , Rfl:     Family History   Problem Relation Age of Onset    High Blood Pressure Mother     High Cholesterol Mother    [employer] Other Mother blood clots     Other Father         alcoholism    Arthritis Sister     No Known Problems Other        Social History     Socioeconomic History    Marital status:      Spouse name: Not on file    Number of children: Not on file    Years of education: Not on file    Highest education level: Not on file   Occupational History    Not on file   Tobacco Use    Smoking status: Never Smoker    Smokeless tobacco: Never Used   Vaping Use    Vaping Use: Never used   Substance and Sexual Activity    Alcohol use: Yes     Alcohol/week: 3.0 standard drinks     Types: 3 Glasses of wine per week     Comment: moderately 3 glasses of wine per week    Drug use: No    Sexual activity: Yes     Partners: Male   Other Topics Concern    Not on file   Social History Narrative    Not on file     Social Determinants of Health     Financial Resource Strain: Low Risk     Difficulty of Paying Living Expenses: Not hard at all   Food Insecurity: No Food Insecurity    Worried About Running Out of Food in the Last Year: Never true    Reji of Food in the Last Year: Never true   Transportation Needs:     Lack of Transportation (Medical): Not on file    Lack of Transportation (Non-Medical):  Not on file   Physical Activity:     Days of Exercise per Week: Not on file    Minutes of Exercise per Session: Not on file   Stress:     Feeling of Stress : Not on file   Social Connections:     Frequency of Communication with Friends and Family: Not on file    Frequency of Social Gatherings with Friends and Family: Not on file    Attends Faith Services: Not on file    Active Member of Clubs or Organizations: Not on file    Attends Club or Organization Meetings: Not on file    Marital Status: Not on file   Intimate Partner Violence:     Fear of Current or Ex-Partner: Not on file    Emotionally Abused: Not on file    Physically Abused: Not on file    Sexually Abused: Not on file   Housing Stability:     Unable to Pay for Housing in the Last Year: Not on file    Number of Places Lived in the Last Year: Not on file    Unstable Housing in the Last Year: Not on file       Review of Systems:  Review of Systems   Constitutional: Negative for chills and fever. HENT: Negative for congestion and sore throat. Cardiovascular: Negative for chest pain. Respiratory: Negative for cough, shortness of breath and wheezing. Musculoskeletal: Positive for back pain, muscle cramps and stiffness. Gastrointestinal: Negative for constipation, diarrhea, nausea and vomiting. Neurological: Positive for numbness (rt foot at times). Negative for dizziness and headaches. Physical Exam:  BP (!) 153/93   Pulse 72   Ht 5' 7\" (1.702 m)   Wt 183 lb 6.4 oz (83.2 kg)   SpO2 100%   BMI 28.72 kg/m²     Physical Exam  Cardiovascular:      Rate and Rhythm: Normal rate. Pulmonary:      Effort: Pulmonary effort is normal.   Musculoskeletal:         General: Normal range of motion. Skin:     General: Skin is warm and dry. Neurological:      Mental Status: She is alert and oriented to person, place, and time. Assessment:  Problem List Items Addressed This Visit     DDD (degenerative disc disease), lumbar    Relevant Medications    gabapentin (NEURONTIN) 300 MG capsule      Other Visit Diagnoses     Acute exacerbation of chronic low back pain        Relevant Medications    gabapentin (NEURONTIN) 300 MG capsule             Treatment Plan:  Patient relates current medications are helping the pain. Patient reports taking pain medications as prescribed, denies obtaining medications from different sources and denies use of illegal drugs. Patient denies side effects from medications like nausea, vomiting, constipation or drowsiness. Patient reports current activities of daily living are possible due to medications and would like to continue them.      As always, we encourage daily stretching and strengthening exercises, and recommend minimizing use of pain medications unless patient cannot get through daily activities due to pain.     Script written for gabapentin  Follow up appointment made for 4 weeks to discuss MRI findings and POC

## 2022-03-21 ENCOUNTER — APPOINTMENT (OUTPATIENT)
Dept: PHYSICAL THERAPY | Facility: CLINIC | Age: 57
End: 2022-03-21
Payer: COMMERCIAL

## 2022-03-21 RX ORDER — METAXALONE 800 MG/1
TABLET ORAL
Qty: 90 TABLET | Refills: 0 | Status: SHIPPED | OUTPATIENT
Start: 2022-03-21 | End: 2022-04-22 | Stop reason: SDUPTHER

## 2022-03-24 ENCOUNTER — HOSPITAL ENCOUNTER (OUTPATIENT)
Dept: MRI IMAGING | Age: 57
Discharge: HOME | End: 2022-03-26
Payer: COMMERCIAL

## 2022-03-24 DIAGNOSIS — M54.41 CHRONIC BILATERAL LOW BACK PAIN WITH RIGHT-SIDED SCIATICA: Chronic | ICD-10-CM

## 2022-03-24 DIAGNOSIS — G89.29 CHRONIC BILATERAL LOW BACK PAIN WITH RIGHT-SIDED SCIATICA: Chronic | ICD-10-CM

## 2022-03-24 PROCEDURE — 72148 MRI LUMBAR SPINE W/O DYE: CPT

## 2022-03-25 ENCOUNTER — TELEPHONE (OUTPATIENT)
Dept: FAMILY MEDICINE CLINIC | Age: 57
End: 2022-03-25

## 2022-03-28 ENCOUNTER — HOSPITAL ENCOUNTER (OUTPATIENT)
Dept: PHYSICAL THERAPY | Facility: CLINIC | Age: 57
Setting detail: THERAPIES SERIES
Discharge: HOME | End: 2022-03-28
Payer: COMMERCIAL

## 2022-03-28 NOTE — FLOWSHEET NOTE
[] [facility]) [facility]  [address]    [phone]  F: [phone]   [x] [address]  [facility] [address]   [address]  P: [phone]  F: [phone]  [] [address]  [address]  P: [phone]  F: [phone] [] [address]  P: [phone]  F: [phone]  [] [address]  [address] [facility] [address]   [address]   [place]: [phone]  F: [phone]   [] [facility]  [address]  [place]: [phone]   F: [phone]     Physical Therapy Cancel/No Show note    Date: 3/28/2022  Patient: Kenia Johnson  : 1965  MRN: [id number]    Cancels/No Shows to date: 2 cx    For today's appointment patient:    [x]  Cancelled    [] Rescheduled appointment    [] No-show     Reason given by patient:    []  Patient ill    []  Conflicting appointment    [] No transportation      [] Conflict with work    [] No reason given    [] Weather related    [] COVID-19    [x] Other: waiting for MRI results and will call to schedule     Comments:        [] Next appointment was confirmed    Electronically signed by: Denis Alfaro PT

## 2022-03-29 DIAGNOSIS — G89.29 CHRONIC BILATERAL LOW BACK PAIN WITH RIGHT-SIDED SCIATICA: Chronic | ICD-10-CM

## 2022-03-29 DIAGNOSIS — M54.41 CHRONIC BILATERAL LOW BACK PAIN WITH RIGHT-SIDED SCIATICA: Chronic | ICD-10-CM

## 2022-03-29 RX ORDER — PREDNISONE 20 MG/1
TABLET ORAL
Qty: 18 TABLET | Refills: 0 | Status: SHIPPED | OUTPATIENT
Start: 2022-03-29 | End: 2022-04-07

## 2022-04-04 ASSESSMENT — ENCOUNTER SYMPTOMS
BOWEL INCONTINENCE: 0
ABDOMINAL PAIN: 0
BACK PAIN: 1

## 2022-04-04 NOTE — PROGRESS NOTES
AP[facility] Progress Note    Date:3/15/2022         Patient Name:Ann Hardy     YOB: 1965     Age:56 y.o. Assessment/Plan        Problem List Items Addressed This Visit        Nervous and Auditory    Chronic bilateral low back pain with right-sided sciatica - Primary (Chronic)      Uncontrolled  Toradol shot  Prednisone course  MRI  Reviewed xray  PT         Relevant Orders    MRI LUMBAR SPINE WO CONTRAST (Completed)       Musculoskeletal and Integument    DDD (degenerative disc disease), lumbar      Uncontrolled  Toradol shot  Prednisone course  MRI  Reviewed xray  PT                Return in about 3 months (around 6/15/2022). Electronically signed by Lu Wiseman DO on 4/4/22       Total time spent was between Time personally spent assessing and managing the patient on the date of service: Est: 20-29 minutes (02287) mins. This included time spent reviewing the patient's medical record (e.g., recent visits, labs, and studies); seeing the patient in the office (face-to-face time); ordering medications, studies, procedures, or referrals; calling the patient or family later in the day with results and further recommendations; and documenting the visit in the medical record. Cristo Rivas is a 61 y.o. female presenting today for   Chief Complaint   Patient presents with    New Patient   . Back Pain  This is a chronic problem. The current episode started more than 1 month ago. The problem occurs constantly. The problem has been gradually worsening since onset. The pain is present in the lumbar spine. The quality of the pain is described as aching. The pain is severe. The symptoms are aggravated by bending and position. Associated symptoms include leg pain and paresthesias.  Pertinent negatives include no abdominal pain, bladder incontinence, bowel incontinence, chest pain, dysuria, fever, headaches, numbness, paresis, pelvic pain, perianal numbness, tingling, weakness or weight loss. She has tried NSAIDs and home exercises (PT) for the symptoms. The treatment provided no relief. Review of Systems   Review of Systems   Constitutional: Negative for fever and weight loss. Cardiovascular: Negative for chest pain. Gastrointestinal: Negative for abdominal pain and bowel incontinence. Genitourinary: Negative for bladder incontinence, dysuria and pelvic pain. Musculoskeletal: Positive for back pain. Neurological: Positive for paresthesias. Negative for tingling, weakness, numbness and headaches. All other systems reviewed and are negative. Medications     Current Outpatient Medications   Medication Sig Dispense Refill    verapamil (CALAN) 40 MG tablet 1 tablet po TID 90 tablet 2    diclofenac (VOLTAREN) 75 MG EC tablet 1 tablet po BID with food, prn pain 60 tablet 0    magnesium citrate solution Take 296 mLs by mouth once       acetaminophen (TYLENOL) 650 MG extended release tablet Take 650 mg by mouth every 8 hours as needed for Pain      predniSONE (DELTASONE) 20 MG tablet Take 3 tablets by mouth daily for 3 days, THEN 2 tablets daily for 3 days, THEN 1 tablet daily for 3 days. 18 tablet 0    metaxalone (SKELAXIN) 800 MG tablet TAKE ONE TABLET BY MOUTH THREE TIMES A DAY AS NEEDED FOR SPASMS 90 tablet 0    gabapentin (NEURONTIN) 300 MG capsule Take 1 capsule by mouth 3 times daily for 30 days. take 1 capsule by mouth 3 TIMES A DAY 90 capsule 2     No current facility-administered medications for this visit. Past History    Past Medical History:   has a past medical history of Arthritis, Chronic bilateral low back pain with bilateral sciatica, DDD (degenerative disc disease), lumbar, and MVP (mitral valve prolapse). Social History:   reports that she has never smoked. She has never used smokeless tobacco. She reports current alcohol use of about 3.0 standard drinks of alcohol per week. She reports that she does not use drugs.      Family History: Family History   Problem Relation Age of Onset    High Blood Pressure Mother     High Cholesterol Mother     Other Mother         blood clots     Other Father         alcoholism    Arthritis Sister     No Known Problems Other        Surgical History:   Past Surgical History:   Procedure Laterality Date    BREAST SURGERY Bilateral     biopsy    CHOLECYSTECTOMY      COLONOSCOPY      ENDOMETRIAL ABLATION      NERVE BLOCK Right 10/26/2020    EPIDURAL STEROID INJECTION  L4 L5     PAIN MANAGEMENT PROCEDURE Right 7/16/2020    RIGHT L4 AND L5 EPIDURAL STEROID INJECTION performed by Felicity Dean MD at [facility] [address] Right 10/26/2020    EPIDURAL STEROID INJECTION -RIGHT L4 L5 - Right    PAIN MANAGEMENT PROCEDURE Right 10/26/2020    EPIDURAL STEROID INJECTION -RIGHT L4 L5 performed by Felicity Dean MD at [facility] [address]          Physical Examination      Vitals:  BP (!) 140/90 (Site: Left Upper Arm, Position: Sitting, Cuff Size: Medium Adult)   Pulse 90   Wt 181 lb 3.2 oz (82.2 kg)   SpO2 99%   BMI 28.38 kg/m²     Physical Exam  Vitals and nursing note reviewed. Constitutional:       General: She is not in acute distress. Appearance: Normal appearance. She is normal weight. She is not ill-appearing, toxic-appearing or diaphoretic. HENT:      Head: Normocephalic and atraumatic. Eyes:      General: No scleral icterus. Right eye: No discharge. Left eye: No discharge. Extraocular Movements: Extraocular movements intact. Conjunctiva/sclera: Conjunctivae normal.   Cardiovascular:      Rate and Rhythm: Normal rate and regular rhythm. Pulses: Normal pulses. Heart sounds: Normal heart sounds. No murmur heard. No friction rub. No gallop. Pulmonary:      Effort: Pulmonary effort is normal. No respiratory distress. Breath sounds: Normal breath sounds. No stridor. No wheezing, rhonchi or rales.    Chest:      Chest wall: No tenderness. Musculoskeletal:      Lumbar back: Spasms and tenderness present. No swelling, edema, deformity, signs of trauma, lacerations or bony tenderness. Decreased range of motion. Negative right straight leg raise test and negative left straight leg raise test. No scoliosis. Back:    Neurological:      Mental Status: She is alert and oriented to person, place, and time. Mental status is at baseline. Deep Tendon Reflexes:      Reflex Scores:       Patellar reflexes are 2+ on the right side and 2+ on the left side. Achilles reflexes are 2+ on the right side and 2+ on the left side. Psychiatric:         Mood and Affect: Mood normal.         Behavior: Behavior normal.         Thought Content: Thought content normal.         Judgment: Judgment normal.         Labs/Imaging/Diagnostics   Labs:  Hemoglobin A1C   Date Value Ref Range Status   12/31/2021 5.9 % Final       Imaging Last 24 Hours:  MRI LUMBAR SPINE WO CONTRAST  Narrative: EXAMINATION:  MRI OF THE LUMBAR SPINE WITHOUT CONTRAST, 3/24/2022 5:07 pm    TECHNIQUE:  Multiplanar multisequence MRI of the lumbar spine was performed without the  administration of intravenous contrast.    COMPARISON:  Radiographs on 02/21/2022    HISTORY:  ORDERING SYSTEM PROVIDED HISTORY: Chronic bilateral low back pain with  right-sided sciatica. TECHNOLOGIST PROVIDED HISTORY: See ICD-10  What is the sedation requirement?->None  Reason for Exam: Chronic and worsening low back pain  Additional signs and symptoms: Right leg pain  Relevant Medical/Surgical History: No known injury to low back    FINDINGS:  BONES/ALIGNMENT: There is grade 2 anterolisthesis of L5 on S1, likely related  to severe facet arthropathy. No pars defect is identified. Alignment of the  lumbar spine is otherwise normal.  There is mild heterogeneity in the marrow  with several hemangiomas, most pronounced at L1. There are no areas of  marrow edema to suggest an acute fracture.     SPINAL CORD: The conus tip terminates near the level of the L2 vertebral  body. The cauda equina nerve roots are unremarkable. SOFT TISSUES: No paraspinal mass identified. L1-L2: There is no significant disc herniation, spinal canal stenosis or  neural foraminal narrowing. L2-L3: There is no significant disc herniation, spinal canal stenosis or  neural foraminal narrowing. L3-L4: There is no significant disc herniation, spinal canal stenosis or  neural foraminal narrowing. L4-L5: Bilateral facet arthropathy. No disc herniation or central spinal  canal stenosis. No right foraminal narrowing. Left foraminal disc  protrusion measures 2 mm with minimal left foraminal narrowing. L5-S1: There is uncovering of the posterior margin of the disc with a disc  bulge lateralizing to the left. Bilateral facet arthropathy. Moderate  central spinal canal narrowing. Severe bilateral foraminal narrowing with  contact of the undersurface of the exiting L5 nerve roots. Impression: 1. Severe facet arthropathy at L5-S1 with grade 2 anterolisthesis of L5 on  S1. Moderate central spinal canal narrowing at this level with severe  bilateral foraminal narrowing and contact of the exiting S1 nerve roots.

## 2022-04-12 ENCOUNTER — TELEPHONE (OUTPATIENT)
Dept: FAMILY MEDICINE CLINIC | Age: 57
End: 2022-04-12

## 2022-04-12 ENCOUNTER — TELEPHONE (OUTPATIENT)
Dept: PAIN MANAGEMENT | Age: 57
End: 2022-04-12

## 2022-04-12 DIAGNOSIS — M54.41 CHRONIC BILATERAL LOW BACK PAIN WITH RIGHT-SIDED SCIATICA: Primary | ICD-10-CM

## 2022-04-12 DIAGNOSIS — G89.29 CHRONIC BILATERAL LOW BACK PAIN WITH RIGHT-SIDED SCIATICA: Primary | ICD-10-CM

## 2022-04-12 DIAGNOSIS — M51.36 DDD (DEGENERATIVE DISC DISEASE), LUMBAR: ICD-10-CM

## 2022-04-12 NOTE — TELEPHONE ENCOUNTER
I would advise her then to call her pain doctor and inquire about increasing Gabapentin as I think that could help her pain.  Unfortunately she should not increase the amount of Diclofenac

## 2022-04-12 NOTE — TELEPHONE ENCOUNTER
Pt advised, info given. PT stated she takes Diclofenac twice daily for pain and was wondering if this could be taken 3x a day to help with the pain or the dosage upped.

## 2022-04-12 NOTE — TELEPHONE ENCOUNTER
----- Message from Georgia Gottlieb sent at 4/12/2022 11:40 AM EDT -----  Subject: Message to Provider    QUESTIONS  Information for Provider? PT made appointment with the specialist she was   referred to for the back pain and isn't able get in until June and she is   wondering if there is another location she can get be referred to maybe   get in sooner because she is having issues with walking from the pain   ---------------------------------------------------------------------------  --------------  CALL BACK INFO  What is the best way for the office to contact you? OK to leave message on   voicemail  Preferred Call Back Phone Number? [phone]  ---------------------------------------------------------------------------  --------------  SCRIPT ANSWERS  Relationship to Patient?  Self

## 2022-04-12 NOTE — TELEPHONE ENCOUNTER
Unfortunately 75mg twice per day is the max dose for Diclofenac. We could increase the Gabapentin and see if that helps. What dose is she currently taking?

## 2022-04-12 NOTE — TELEPHONE ENCOUNTER
I entered a referral to Dr. Glenn Parikh from ortho who works on low back issues.  She can see if they can get her in earlier

## 2022-04-12 NOTE — TELEPHONE ENCOUNTER
Patient called back and said her pain doctor prescribes her the Gabapentin and she takes 300 Mg 3 times a day

## 2022-04-14 ENCOUNTER — TELEPHONE (OUTPATIENT)
Dept: FAMILY MEDICINE CLINIC | Age: 57
End: 2022-04-14

## 2022-04-14 NOTE — TELEPHONE ENCOUNTER
Patient called stating that the last few days legs are weak, having hard time walking, starting a while ago, but getting worse. Has an appointment on 04/18/2022 wants to know if there is anything she can due until then     She did call her pain management and they wont change gabapentin until her appointment with them.

## 2022-04-15 NOTE — TELEPHONE ENCOUNTER
Her pain management provider needs to either get her in sooner or call her in something in addition. As far as pain, there is not much I can do for her unfortunately.  If symptoms worsen, she may need to go to the ED

## 2022-04-17 RX ORDER — DICLOFENAC SODIUM 75 MG/1
TABLET, DELAYED RELEASE ORAL
Qty: 60 TABLET | Refills: 0 | Status: ON HOLD | OUTPATIENT
Start: 2022-04-17 | End: 2022-05-16

## 2022-04-18 ENCOUNTER — OFFICE VISIT (OUTPATIENT)
Dept: FAMILY MEDICINE CLINIC | Age: 57
End: 2022-04-18
Payer: COMMERCIAL

## 2022-04-18 VITALS
HEART RATE: 110 BPM | DIASTOLIC BLOOD PRESSURE: 100 MMHG | BODY MASS INDEX: 28.04 KG/M2 | WEIGHT: 179 LBS | OXYGEN SATURATION: 98 % | SYSTOLIC BLOOD PRESSURE: 140 MMHG

## 2022-04-18 DIAGNOSIS — M54.41 CHRONIC BILATERAL LOW BACK PAIN WITH RIGHT-SIDED SCIATICA: Chronic | ICD-10-CM

## 2022-04-18 DIAGNOSIS — M47.817 LUMBOSACRAL SPONDYLOSIS WITHOUT MYELOPATHY: ICD-10-CM

## 2022-04-18 DIAGNOSIS — R73.03 PREDIABETES: ICD-10-CM

## 2022-04-18 DIAGNOSIS — E78.5 DYSLIPIDEMIA: ICD-10-CM

## 2022-04-18 DIAGNOSIS — G89.29 CHRONIC BILATERAL LOW BACK PAIN WITH RIGHT-SIDED SCIATICA: Chronic | ICD-10-CM

## 2022-04-18 DIAGNOSIS — M51.26 LUMBAR DISC HERNIATION: ICD-10-CM

## 2022-04-18 DIAGNOSIS — M51.36 DDD (DEGENERATIVE DISC DISEASE), LUMBAR: Primary | ICD-10-CM

## 2022-04-18 PROBLEM — I34.1 MITRAL VALVE PROLAPSE: Status: ACTIVE | Noted: 2018-03-09

## 2022-04-18 PROCEDURE — 99214 OFFICE O/P EST MOD 30 MIN: CPT | Performed by: FAMILY MEDICINE

## 2022-04-18 RX ORDER — CYCLOBENZAPRINE HCL 5 MG
5 TABLET ORAL 2 TIMES DAILY PRN
Qty: 10 TABLET | Refills: 0 | Status: SHIPPED | OUTPATIENT
Start: 2022-04-18 | End: 2022-04-28

## 2022-04-18 ASSESSMENT — ENCOUNTER SYMPTOMS
BOWEL INCONTINENCE: 0
EYES NEGATIVE: 1
GASTROINTESTINAL NEGATIVE: 1
RESPIRATORY NEGATIVE: 1
ABDOMINAL PAIN: 0
BACK PAIN: 1
SHORTNESS OF BREATH: 0
ALLERGIC/IMMUNOLOGIC NEGATIVE: 1

## 2022-04-18 ASSESSMENT — PATIENT HEALTH QUESTIONNAIRE - PHQ9
SUM OF ALL RESPONSES TO PHQ QUESTIONS 1-9: 0
1. LITTLE INTEREST OR PLEASURE IN DOING THINGS: 0
SUM OF ALL RESPONSES TO PHQ QUESTIONS 1-9: 0
2. FEELING DOWN, DEPRESSED OR HOPELESS: 0
SUM OF ALL RESPONSES TO PHQ QUESTIONS 1-9: 0
SUM OF ALL RESPONSES TO PHQ9 QUESTIONS 1 & 2: 0
SUM OF ALL RESPONSES TO PHQ QUESTIONS 1-9: 0

## 2022-04-18 NOTE — PATIENT INSTRUCTIONS
Patient Education        Herniated Disc: Care Instructions  Your Care Instructions     The bones that form the spine in your back are cushioned by small discs. If a disc is damaged, it may bulge or break open (herniate). A herniated disc can result from normal wear and tear as we age or from an injury or disease. If a herniated disc irritates or presses on a nerve, it can cause pain and numbnessin your leg (sciatica) and/or back pain. You may be able to heal your herniated disc with a few weeks or months of rest,medicine, and exercises. In some cases, you may need surgery. Follow-up care is a key part of your treatment and safety. Be sure to make and go to all appointments, and call your doctor if you are having problems. It's also a good idea to know your test results and keep alist of the medicines you take. How can you care for yourself at home?  Take your medicines exactly as prescribed. Call your doctor if you think you are having a problem with your medicine.  Ask your doctor if you can take an over-the-counter pain medicine, such as acetaminophen (Tylenol), ibuprofen (Advil, Motrin), or naproxen (Aleve). Read and follow all instructions on the label.  Do not take two or more pain medicines at the same time unless the doctor told you to. Many pain medicines have acetaminophen, which is Tylenol. Too much acetaminophen (Tylenol) can be harmful.  Rest your back if your pain is severe.  Avoid movements and positions that increase your pain or numbness.  Try taking short walks and doing light activities that do not cause pain. Even if you are feeling some pain, it is important to keep your muscles active and strong.  Use heat or ice to relieve pain. ? To apply heat, put a warm water bottle, heating pad set on low, or warm cloth on your back. Do not go to sleep with a heating pad on your skin. ? To use ice, put ice or a cold pack on the area for 10 to 20 minutes at a time.  Put a thin cloth between the ice and your skin.  Your doctor may recommend a physical therapy program, where you learn exercises to do at home. These exercises strengthen the muscles that support your lower back and prevent reinjury.  Stay at a healthy weight. This may reduce the load on your back.  Quit smoking if you smoke. If you need help quitting, talk to your doctor about stop-smoking programs and medicines. These can increase your chances of quitting for good.  To avoid hurting your back when lifting:  ? Lift with your legs, not your back, by squatting and bending your knees. Avoid bending forward at the waist when lifting. ? Rise slowly. ? Keep the load as close to your body as possible, at the level of your navel. ? Avoid turning or twisting your body while holding a heavy object. ? Get help if you need to lift a heavy object. Never lift a heavy object above shoulder level. When should you call for help? Call 911 anytime you think you may need emergency care. For example, call if:     You are unable to move a leg at all. Call your doctor now or seek immediate medical care if:     You have new or worse symptoms in your arms, legs, chest, belly, or buttocks. Symptoms may include:  ? Numbness or tingling. ? Weakness. ? Pain.      You lose bladder or bowel control. Watch closely for changes in your health, and be sure to contact your doctor if:     You are not getting better as expected. Where can you learn more? Go to https://Crow[employer] alberto.[employer]. org and sign in to your [employer] account. Enter F534 in the M360LOHAS outdoors box to learn more about \"Herniated Disc: Care Instructions. \"     If you do not have an account, please click on the \"Sign Up Now\" link. Current as of: July 1, 2021               Content Version: 13.2  © 4573-9055 Healthwise, Incorporated. Care instructions adapted under license by [facility] ([facility]).  If you have questions about a medical condition or this instruction, always ask your healthcare professional. [place] [address] any warranty or liability for your use of this information.

## 2022-04-18 NOTE — PROGRESS NOTES
APSO Progress Note    Date:4/18/2022         Patient Name:Ann Hardy     YOB: 1965     Age:56 y.o. Assessment/Plan        Problem List Items Addressed This Visit        Nervous and Auditory    Chronic bilateral low back pain with right-sided sciatica (Chronic)     Uncontrolled   Has appt with ortho tomorrow and pain management Thursday         Relevant Medications    cyclobenzaprine (FLEXERIL) 5 MG tablet       Musculoskeletal and Integument    DDD (degenerative disc disease), lumbar - Primary     Uncontrolled   Has appt with ortho tomorrow and pain management Thursday         Relevant Medications    cyclobenzaprine (FLEXERIL) 5 MG tablet    Lumbosacral spondylosis without myelopathy      Uncontrolled   Has appt with ortho tomorrow and pain management Thursday         Relevant Medications    cyclobenzaprine (FLEXERIL) 5 MG tablet    Lumbar disc herniation     Uncontrolled   Has appt with ortho tomorrow and pain management Thursday            Other    Prediabetes      Borderline controlled, continue current treatment plan         Dyslipidemia      Borderline controlled, continue current treatment plan                Return in about 4 days (around 4/22/2022). Electronically signed by Marni Khan DO on 4/18/22       Total time spent was between Time personally spent assessing and managing the patient on the date of service: Est: 30-39 minutes (52300) mins. This included time spent reviewing the patient's medical record (e.g., recent visits, labs, and studies); seeing the patient in the office (face-to-face time); ordering medications, studies, procedures, or referrals; calling the patient or family later in the day with results and further recommendations; and documenting the visit in the medical record. Priscilla Simpson is a 61 y.o. female presenting today for   Chief Complaint   Patient presents with    Extremity Weakness   . Back Pain  This is a chronic problem. The current episode started more than 1 month ago. The problem occurs constantly. The problem has been gradually worsening since onset. The pain is present in the lumbar spine. The quality of the pain is described as aching. The pain is severe. The symptoms are aggravated by bending and position. Associated symptoms include leg pain and paresthesias. Pertinent negatives include no abdominal pain, bladder incontinence, bowel incontinence, chest pain, dysuria, fever, headaches, numbness, paresis, pelvic pain, perianal numbness, tingling, weakness or weight loss. She has tried NSAIDs and home exercises (PT) for the symptoms. The treatment provided no relief. Hyperlipidemia  This is a chronic problem. The current episode started more than 1 year ago. She has no history of chronic renal disease, diabetes, hypothyroidism, liver disease, obesity or nephrotic syndrome. Associated symptoms include leg pain. Pertinent negatives include no chest pain, focal sensory loss, focal weakness, myalgias or shortness of breath. Current antihyperlipidemic treatment includes diet change. Diabetes  She presents for her follow-up diabetic visit. Diabetes type: prediabetes. Her disease course has been stable. There are no hypoglycemic associated symptoms. Pertinent negatives for hypoglycemia include no headaches. There are no diabetic associated symptoms. Pertinent negatives for diabetes include no chest pain, no weakness and no weight loss. There are no hypoglycemic complications. Symptoms are stable. There are no diabetic complications. Current diabetic treatment includes diet. Review of Systems   Review of Systems   Constitutional: Negative. Negative for fever and weight loss. HENT: Negative. Eyes: Negative. Respiratory: Negative. Negative for shortness of breath. Cardiovascular: Negative. Negative for chest pain. Gastrointestinal: Negative. Negative for abdominal pain and bowel incontinence.    Endocrine: Arthritis Sister     No Known Problems Other        Surgical History:   Past Surgical History:   Procedure Laterality Date    BREAST SURGERY Bilateral     biopsy    CHOLECYSTECTOMY      COLONOSCOPY      ENDOMETRIAL ABLATION      NERVE BLOCK Right 10/26/2020    EPIDURAL STEROID INJECTION  L4 L5     PAIN MANAGEMENT PROCEDURE Right 7/16/2020    RIGHT L4 AND L5 EPIDURAL STEROID INJECTION performed by Ceferino Sparks MD at [address] Right 10/26/2020    EPIDURAL STEROID INJECTION -RIGHT L4 L5 - Right    PAIN MANAGEMENT PROCEDURE Right 10/26/2020    EPIDURAL STEROID INJECTION -RIGHT L4 L5 performed by Ceferino Sparks MD at [address]          Physical Examination      Vitals:  BP (!) 140/100 (Site: Left Upper Arm, Position: Sitting, Cuff Size: Medium Adult)   Pulse 110   Wt 179 lb (81.2 kg)   SpO2 98%   BMI 28.04 kg/m²     Physical Exam  Vitals and nursing note reviewed. Constitutional:       General: She is not in acute distress. Appearance: Normal appearance. She is normal weight. She is not ill-appearing, toxic-appearing or diaphoretic. HENT:      Head: Normocephalic and atraumatic. Eyes:      General: No scleral icterus. Right eye: No discharge. Left eye: No discharge. Extraocular Movements: Extraocular movements intact. Conjunctiva/sclera: Conjunctivae normal.   Cardiovascular:      Rate and Rhythm: Normal rate and regular rhythm. Pulses: Normal pulses. Heart sounds: Normal heart sounds. No murmur heard. No friction rub. No gallop. Pulmonary:      Effort: Pulmonary effort is normal. No respiratory distress. Breath sounds: Normal breath sounds. No stridor. No wheezing, rhonchi or rales. Chest:      Chest wall: No tenderness. Musculoskeletal:      Lumbar back: Spasms and tenderness present. No swelling, edema, deformity, signs of trauma, lacerations or bony tenderness. Decreased range of motion. Negative right straight leg raise test and negative left straight leg raise test. No scoliosis. Back:    Neurological:      Mental Status: She is alert and oriented to person, place, and time. Mental status is at baseline. Deep Tendon Reflexes:      Reflex Scores:       Patellar reflexes are 2+ on the right side and 2+ on the left side. Achilles reflexes are 2+ on the right side and 2+ on the left side. Psychiatric:         Mood and Affect: Mood normal.         Behavior: Behavior normal.         Thought Content: Thought content normal.         Judgment: Judgment normal.         Labs/Imaging/Diagnostics   Labs:  Hemoglobin A1C   Date Value Ref Range Status   12/31/2021 5.9 % Final       Imaging Last 24 Hours:  MRI LUMBAR SPINE WO CONTRAST  Narrative: EXAMINATION:  MRI OF THE LUMBAR SPINE WITHOUT CONTRAST, 3/24/2022 5:07 pm    TECHNIQUE:  Multiplanar multisequence MRI of the lumbar spine was performed without the  administration of intravenous contrast.    COMPARISON:  Radiographs on 02/21/2022    HISTORY:  ORDERING SYSTEM PROVIDED HISTORY: Chronic bilateral low back pain with  right-sided sciatica. TECHNOLOGIST PROVIDED HISTORY: See ICD-10  What is the sedation requirement?->None  Reason for Exam: Chronic and worsening low back pain  Additional signs and symptoms: Right leg pain  Relevant Medical/Surgical History: No known injury to low back    FINDINGS:  BONES/ALIGNMENT: There is grade 2 anterolisthesis of L5 on S1, likely related  to severe facet arthropathy. No pars defect is identified. Alignment of the  lumbar spine is otherwise normal.  There is mild heterogeneity in the marrow  with several hemangiomas, most pronounced at L1. There are no areas of  marrow edema to suggest an acute fracture. SPINAL CORD: The conus tip terminates near the level of the L2 vertebral  body. The cauda equina nerve roots are unremarkable. SOFT TISSUES: No paraspinal mass identified.     L1-L2: There is no significant disc herniation, spinal canal stenosis or  neural foraminal narrowing. L2-L3: There is no significant disc herniation, spinal canal stenosis or  neural foraminal narrowing. L3-L4: There is no significant disc herniation, spinal canal stenosis or  neural foraminal narrowing. L4-L5: Bilateral facet arthropathy. No disc herniation or central spinal  canal stenosis. No right foraminal narrowing. Left foraminal disc  protrusion measures 2 mm with minimal left foraminal narrowing. L5-S1: There is uncovering of the posterior margin of the disc with a disc  bulge lateralizing to the left. Bilateral facet arthropathy. Moderate  central spinal canal narrowing. Severe bilateral foraminal narrowing with  contact of the undersurface of the exiting L5 nerve roots. Impression: 1. Severe facet arthropathy at L5-S1 with grade 2 anterolisthesis of L5 on  S1. Moderate central spinal canal narrowing at this level with severe  bilateral foraminal narrowing and contact of the exiting S1 nerve roots.

## 2022-04-19 ENCOUNTER — OFFICE VISIT (OUTPATIENT)
Dept: ORTHOPEDIC SURGERY | Age: 57
End: 2022-04-19
Payer: COMMERCIAL

## 2022-04-19 VITALS — HEIGHT: 67 IN | BODY MASS INDEX: 28.09 KG/M2 | RESPIRATION RATE: 14 BRPM | WEIGHT: 179 LBS

## 2022-04-19 DIAGNOSIS — M43.10 ACQUIRED SPONDYLOLISTHESIS: Primary | ICD-10-CM

## 2022-04-19 DIAGNOSIS — M51.26 LUMBAR DISC HERNIATION: ICD-10-CM

## 2022-04-19 DIAGNOSIS — M51.36 DDD (DEGENERATIVE DISC DISEASE), LUMBAR: ICD-10-CM

## 2022-04-19 DIAGNOSIS — M47.817 LUMBOSACRAL SPONDYLOSIS WITHOUT MYELOPATHY: ICD-10-CM

## 2022-04-19 PROCEDURE — 99203 OFFICE O/P NEW LOW 30 MIN: CPT | Performed by: ORTHOPAEDIC SURGERY

## 2022-04-19 NOTE — PROGRESS NOTES
Patient ID: Valentin Rodrigues is a 61 y.o. female    Chief Compliant:  Chief Complaint   Patient presents with    Lower Back Pain        Diagnostic imaging:    Lateral flexion and extension x-rays patient was 18 mm spondylolisthesis L5-S1 significant disc base collapse disc appears to go into a slight amount of kyphosis on forward flexion    MRI lumbar spine is reviewed the spondylolisthesis looks to be about 50% reduced patient with severe biforaminal stenosis patient appears to be having fairly active reactive endplate changes    Assessment and Plan:  1. Lumbosacral spondylosis without myelopathy    2. Lumbar disc herniation    3. DDD (degenerative disc disease), lumbar      Patient with L5-S1 spondylolisthesis     We will proceed with L5-S1 PDIF    Patient with failure of physical therapy lumbar epidural steroid injections    We discussed risks of surgery and recovery process. Risks include infection, bleeding, neurologic injury, systemic and anesthetic complications, no relief of pain, need for future surgery. Follow up 2 weeks after surgery    HPI:  This is a 61 y.o. female who presents to the clinic today as a new patient for lower back evaluation. Patient with 4 month hx of gradually worsening severe lower back pain with right greater than left posterior leg dyesthesias. Patient has underwent PT without relief. She has underwent 2 LESI with moderate relief    Patient is not a smoker. Review of Systems   All other systems reviewed and are negative.       Past History:    Current Outpatient Medications:     cyclobenzaprine (FLEXERIL) 5 MG tablet, Take 1 tablet by mouth 2 times daily as needed for Muscle spasms, Disp: 10 tablet, Rfl: 0    diclofenac (VOLTAREN) 75 MG EC tablet, take 1 tablet by mouth twice a day, Disp: 60 tablet, Rfl: 0    metaxalone (SKELAXIN) 800 MG tablet, TAKE ONE TABLET BY MOUTH THREE TIMES A DAY AS NEEDED FOR SPASMS, Disp: 90 tablet, Rfl: 0    gabapentin (NEURONTIN) 300 MG capsule, Take 1 capsule by mouth 3 times daily for 30 days. take 1 capsule by mouth 3 TIMES A DAY, Disp: 90 capsule, Rfl: 2    verapamil (CALAN) 40 MG tablet, 1 tablet po TID, Disp: 90 tablet, Rfl: 2    magnesium citrate solution, Take 296 mLs by mouth once , Disp: , Rfl:     acetaminophen (TYLENOL) 650 MG extended release tablet, Take 650 mg by mouth every 8 hours as needed for Pain, Disp: , Rfl:   No Known Allergies  Social History     Socioeconomic History    Marital status:      Spouse name: Not on file    Number of children: Not on file    Years of education: Not on file    Highest education level: Not on file   Occupational History    Not on file   Tobacco Use    Smoking status: Never Smoker    Smokeless tobacco: Never Used   Vaping Use    Vaping Use: Never used   Substance and Sexual Activity    Alcohol use: Yes     Alcohol/week: 3.0 standard drinks     Types: 3 Glasses of wine per week     Comment: moderately 3 glasses of wine per week    Drug use: No    Sexual activity: Yes     Partners: Male   Other Topics Concern    Not on file   Social History Narrative    Not on file     Social Determinants of Health     Financial Resource Strain: Low Risk     Difficulty of Paying Living Expenses: Not hard at all   Food Insecurity: No Food Insecurity    Worried About Running Out of Food in the Last Year: Never true    Reji of Food in the Last Year: Never true   Transportation Needs:     Lack of Transportation (Medical): Not on file    Lack of Transportation (Non-Medical):  Not on file   Physical Activity:     Days of Exercise per Week: Not on file    Minutes of Exercise per Session: Not on file   Stress:     Feeling of Stress : Not on file   Social Connections:     Frequency of Communication with Friends and Family: Not on file    Frequency of Social Gatherings with Friends and Family: Not on file    Attends Zoroastrian Services: Not on file    Active Member of Clubs or Organizations: Not on file    Attends Club or Organization Meetings: Not on file    Marital Status: Not on file   Intimate Partner Violence:     Fear of Current or Ex-Partner: Not on file    Emotionally Abused: Not on file    Physically Abused: Not on file    Sexually Abused: Not on file   Housing Stability:     Unable to Pay for Housing in the Last Year: Not on file    Number of [place] in the Last Year: Not on file    Unstable Housing in the Last Year: Not on file     Past Medical History:   Diagnosis Date    Arthritis     Chronic bilateral low back pain with bilateral sciatica 2/11/2020    DDD (degenerative disc disease), lumbar 2/11/2020    MVP (mitral valve prolapse)      Past Surgical History:   Procedure Laterality Date    BREAST SURGERY Bilateral     biopsy    CHOLECYSTECTOMY      COLONOSCOPY      ENDOMETRIAL ABLATION      NERVE BLOCK Right 10/26/2020    EPIDURAL STEROID INJECTION  L4 L5     PAIN MANAGEMENT PROCEDURE Right 7/16/2020    RIGHT L4 AND L5 EPIDURAL STEROID INJECTION performed by Octavia Sanches MD at [address] Right 10/26/2020    EPIDURAL STEROID INJECTION -RIGHT L4 L5 - Right    PAIN MANAGEMENT PROCEDURE Right 10/26/2020    EPIDURAL STEROID INJECTION -RIGHT L4 L5 performed by Octavia Sanches MD at [address]       Family History   Problem Relation Age of Onset    High Blood Pressure Mother     High Cholesterol Mother     Other Mother         blood clots     Other Father         alcoholism    Arthritis Sister     No Known Problems Other         Physical Exam:  Vitals signs and nursing note reviewed. Constitutional:       Appearance: well-developed. HENT:      Head: Normocephalic and atraumatic. Nose: Nose normal.   Eyes:      Conjunctiva/sclera: Conjunctivae normal.   Neck:      Musculoskeletal: Normal range of motion and neck supple. Pulmonary:      Effort: Pulmonary effort is normal. No respiratory distress. Musculoskeletal:      Comments: Normal gait     Skin:     General: Skin is warm and dry. Neurological:      Mental Status: Alert and oriented to person, place, and time. Sensory: No sensory deficit. Psychiatric:         Behavior: Behavior normal.         Thought Content: Thought content normal.        Provider Attestation:  Gee Owens, personally performed the services described in this documentation. All medical record entries made by the scribe were at my direction and in my presence. I have reviewed the chart and discharge instructions and agree that the records reflect my personal performance and is accurate and complete. Jo Westfall MD 4/19/22       Scribe Attestation:  By signing my name below, Kinjal Araceli, attest that this documentation has been prepared under the direction and in the presence of Dr. Glen Navarrete. Electronically signed: Lizz Jerome, 4/19/22     Please note that this chart was generated using voice recognition Dragon dictation software. Although every effort was made to ensure the accuracy of this automated transcription, some errors in transcription may have occurred.

## 2022-04-22 ENCOUNTER — PATIENT MESSAGE (OUTPATIENT)
Dept: FAMILY MEDICINE CLINIC | Age: 57
End: 2022-04-22

## 2022-04-22 RX ORDER — METAXALONE 800 MG/1
TABLET ORAL
Qty: 90 TABLET | Refills: 0 | Status: SHIPPED | OUTPATIENT
Start: 2022-04-22 | End: 2022-04-25 | Stop reason: SDUPTHER

## 2022-04-22 NOTE — TELEPHONE ENCOUNTER
From: Jessica Gallardo  To: Dr. Vincent Gordillo  Sent: 4/22/2022 10:45 AM EDT  Subject: Refill prescription     My prescription for Metaxalone has no refills. Can you fill script please? It would go to [facility] [employer]. Thank you.

## 2022-04-25 ENCOUNTER — OFFICE VISIT (OUTPATIENT)
Dept: PAIN MANAGEMENT | Age: 57
End: 2022-04-25
Payer: COMMERCIAL

## 2022-04-25 ENCOUNTER — TELEPHONE (OUTPATIENT)
Dept: FAMILY MEDICINE CLINIC | Age: 57
End: 2022-04-25

## 2022-04-25 VITALS
OXYGEN SATURATION: 100 % | DIASTOLIC BLOOD PRESSURE: 96 MMHG | HEART RATE: 110 BPM | WEIGHT: 178.8 LBS | SYSTOLIC BLOOD PRESSURE: 136 MMHG | BODY MASS INDEX: 28.06 KG/M2 | HEIGHT: 67 IN

## 2022-04-25 DIAGNOSIS — G89.29 ACUTE EXACERBATION OF CHRONIC LOW BACK PAIN: ICD-10-CM

## 2022-04-25 DIAGNOSIS — M54.50 ACUTE EXACERBATION OF CHRONIC LOW BACK PAIN: ICD-10-CM

## 2022-04-25 DIAGNOSIS — M51.36 DDD (DEGENERATIVE DISC DISEASE), LUMBAR: ICD-10-CM

## 2022-04-25 PROCEDURE — 99213 OFFICE O/P EST LOW 20 MIN: CPT | Performed by: NURSE PRACTITIONER

## 2022-04-25 RX ORDER — GABAPENTIN 400 MG/1
400 CAPSULE ORAL 3 TIMES DAILY
Qty: 90 CAPSULE | Refills: 2 | Status: SHIPPED | OUTPATIENT
Start: 2022-04-25 | End: 2022-07-22

## 2022-04-25 RX ORDER — METAXALONE 800 MG/1
TABLET ORAL
Qty: 90 TABLET | Refills: 0 | Status: ON HOLD | OUTPATIENT
Start: 2022-04-25 | End: 2022-05-16

## 2022-04-25 ASSESSMENT — ENCOUNTER SYMPTOMS
SHORTNESS OF BREATH: 0
COUGH: 0
SORE THROAT: 0
BACK PAIN: 1
WHEEZING: 0
CONSTIPATION: 0

## 2022-04-25 NOTE — LETTER
[facility]  [address]  [place] [address] [facility] [address]  Phone: [phone]  Fax: [phone]    Chavez Vidal,         April 28, 2022     Patient: Johann Em   YOB: 1965       To Whom It May Concern: It is my medical opinion that Deandra Belle should remain out of work until after her surgery on 5/16/22. If you have any questions or concerns, please don't hesitate to call.     Sincerely,        Chavez Vidal, DO

## 2022-04-25 NOTE — TELEPHONE ENCOUNTER
----- Message from Pham Michaels sent at 4/25/2022  9:11 AM EDT -----  Subject: Message to Provider    QUESTIONS  Information for Provider? Pt had to reschedule due to pcp not in today   4/25. Pt needs to know if she will need to be off for her surgery? Pt said   she is having surgery on 5/16. She sees pain management today at 9:40. Pt   said she was supposed to see at her appt today if she will need to be off   through the end of the school year. Pt need to know since today is the   last day for her Dr harkins.   ---------------------------------------------------------------------------  --------------  Norman PRADO  What is the best way for the office to contact you? OK to leave message on   voicemail  Preferred Call Back Phone Number? [phone]  ---------------------------------------------------------------------------  --------------  SCRIPT ANSWERS  Relationship to Patient?  Self

## 2022-04-25 NOTE — PROGRESS NOTES
Chief Complaint   Patient presents with    Back Pain    Medication Refill     Gabapentin       PMH     Pt c/o low back and right lumbar radicular pain hip buttock area occ numbness in right foot. And now down anterior lower legs to ankles. Pain worsens with prolonged activity standing sitting walking  Denies loss of control of bowel or bladder  She has tried LESI  therapy and chiropractic treatment with minimal relief  She is also prescribed NSAID and gabapentin and now a steroid from her PCP that has helped some  MRI shows bulging disc on the left side of L4/L5 and L5/S1 affecting both sides of the L5 nerve roots  She saw Dr Bhatia Levo 4/2022 with flex ext xr done with 18 mm spondylolisthesis L5-S1 significant disc base collapse disc appears to go into a slight amount of kyphosis on forward flexion      Has surgery scheduled for May 16. HPI:   Back Pain  This is a chronic problem. The current episode started more than 1 year ago. The problem occurs constantly. The problem has been gradually worsening since onset. The pain is present in the lumbar spine (bilat ant.lower leg). The quality of the pain is described as aching, burning and stabbing. The pain radiates to the left foot and right foot. The pain is at a severity of 6/10. Associated symptoms include leg pain, numbness and paresthesias. Pertinent negatives include no chest pain or fever. The treatment provided mild relief.      Medication Refill: Gabapentin    Pain score Today:  6  Adverse effects (Constipation / Nausea / Sedation / sexual Dysfunction / others) : no  Mood: fair  Sleep pattern and quality: good  Activity level: poor    Last dose taken  Gabapentin 04/25/2022 AM  OARRS report reviewed today: yes  ER/Hospitalizations/PCP visit related to pain since last visit:no   Any legal problems e.g. DUI etc.:No  Satisfied with current management: Yes      Lab Results   Component Value Date    LABA1C 5.9 12/31/2021     Lab Results   Component Value Date     11/25/2017       Past Medical History, Past Surgical History, Social History, Allergies and Medications reviewed and updated in EPIC as indicated    Family History reviewed and is noncontributory. Past Medical History:   Diagnosis Date    Arthritis     Chronic bilateral low back pain with bilateral sciatica 2/11/2020    DDD (degenerative disc disease), lumbar 2/11/2020    MVP (mitral valve prolapse)        Past Surgical History:   Procedure Laterality Date    BREAST SURGERY Bilateral     biopsy    CHOLECYSTECTOMY      COLONOSCOPY      ENDOMETRIAL ABLATION      NERVE BLOCK Right 10/26/2020    EPIDURAL STEROID INJECTION  L4 L5     PAIN MANAGEMENT PROCEDURE Right 7/16/2020    RIGHT L4 AND L5 EPIDURAL STEROID INJECTION performed by Ceferino Sparks MD at [place] [address] Right 10/26/2020    EPIDURAL STEROID INJECTION -RIGHT L4 L5 - Right    PAIN MANAGEMENT PROCEDURE Right 10/26/2020    EPIDURAL STEROID INJECTION -RIGHT L4 L5 performed by Ceferino Sparks MD at [facility]         No Known Allergies      Current Outpatient Medications:     gabapentin (NEURONTIN) 400 MG capsule, Take 1 capsule by mouth 3 times daily for 30 days.  take 1 capsule by mouth 3 TIMES A DAY, Disp: 90 capsule, Rfl: 2    metaxalone (SKELAXIN) 800 MG tablet, TAKE ONE TABLET BY MOUTH THREE TIMES A DAY AS NEEDED FOR SPASMS, Disp: 90 tablet, Rfl: 0    cyclobenzaprine (FLEXERIL) 5 MG tablet, Take 1 tablet by mouth 2 times daily as needed for Muscle spasms, Disp: 10 tablet, Rfl: 0    diclofenac (VOLTAREN) 75 MG EC tablet, take 1 tablet by mouth twice a day, Disp: 60 tablet, Rfl: 0    verapamil (CALAN) 40 MG tablet, 1 tablet po TID, Disp: 90 tablet, Rfl: 2    magnesium citrate solution, Take 296 mLs by mouth once , Disp: , Rfl:     acetaminophen (TYLENOL) 650 MG extended release tablet, Take 650 mg by mouth every 8 hours as needed for Pain, Disp: , Rfl:     Family History   Problem Relation Age of Onset    High Blood Pressure Mother     High Cholesterol Mother     Other Mother         blood clots     Other Father         alcoholism    Arthritis Sister     No Known Problems Other        Social History     Socioeconomic History    Marital status:      Spouse name: Not on file    Number of children: Not on file    Years of education: Not on file    Highest education level: Not on file   Occupational History    Not on file   Tobacco Use    Smoking status: Never Smoker    Smokeless tobacco: Never Used   Vaping Use    Vaping Use: Never used   Substance and Sexual Activity    Alcohol use: Yes     Alcohol/week: 3.0 standard drinks     Types: 3 Glasses of wine per week     Comment: moderately 3 glasses of wine per week    Drug use: No    Sexual activity: Yes     Partners: Male   Other Topics Concern    Not on file   Social History Narrative    Not on file     Social Determinants of Health     Financial Resource Strain: Low Risk     Difficulty of Paying Living Expenses: Not hard at all   Food Insecurity: No Food Insecurity    Worried About Running Out of Food in the Last Year: Never true    Reji of Food in the Last Year: Never true   Transportation Needs:     Lack of Transportation (Medical): Not on file    Lack of Transportation (Non-Medical):  Not on file   Physical Activity:     Days of Exercise per Week: Not on file    Minutes of Exercise per Session: Not on file   Stress:     Feeling of Stress : Not on file   Social Connections:     Frequency of Communication with Friends and Family: Not on file    Frequency of Social Gatherings with Friends and Family: Not on file    Attends Yazdanism Services: Not on file    Active Member of Clubs or Organizations: Not on file    Attends Club or Organization Meetings: Not on file    Marital Status: Not on file   Intimate Partner Violence:     Fear of Current or Ex-Partner: Not on file   [employer] Abused: Not on file    Physically Abused: Not on file    Sexually Abused: Not on file   Housing Stability:     Unable to Pay for Housing in the Last Year: Not on file    Number of Places Lived in the Last Year: Not on file    Unstable Housing in the Last Year: Not on file       Review of Systems:  Review of Systems   Constitutional: Negative for chills and fever. HENT: Negative for congestion and sore throat. Cardiovascular: Negative for chest pain. Respiratory: Negative for cough, shortness of breath and wheezing. Musculoskeletal: Positive for back pain and stiffness. Gastrointestinal: Negative for constipation. Neurological: Positive for numbness and paresthesias. Physical Exam:  BP (!) 136/96   Pulse 110   Ht 5' 7\" (1.702 m)   Wt 178 lb 12.8 oz (81.1 kg)   SpO2 100%   BMI 28.00 kg/m²     Physical Exam  Cardiovascular:      Rate and Rhythm: Normal rate. Pulmonary:      Effort: Pulmonary effort is normal.   Musculoskeletal:         General: Normal range of motion. Skin:     General: Skin is warm and dry. Neurological:      Mental Status: She is alert and oriented to person, place, and time. Assessment:  Problem List Items Addressed This Visit     DDD (degenerative disc disease), lumbar    Relevant Medications    gabapentin (NEURONTIN) 400 MG capsule    metaxalone (SKELAXIN) 800 MG tablet      Other Visit Diagnoses     Acute exacerbation of chronic low back pain        Relevant Medications    gabapentin (NEURONTIN) 400 MG capsule    metaxalone (SKELAXIN) 800 MG tablet             Treatment Plan:  Patient relates current medications are helping the pain. Patient reports taking pain medications as prescribed, denies obtaining medications from different sources and denies use of illegal drugs. Patient denies side effects from medications like nausea, vomiting, constipation or drowsiness.  Patient reports current activities of daily living are possible due to medications and would like to continue them. As always, we encourage daily stretching and strengthening exercises, and recommend minimizing use of pain medications unless patient cannot get through daily activities due to pain. Script written for gabapentin and increased to 400mg  Pt has 4 day fill of [place] from [facility] visit - plans to try that for pain control while waiting for surgery  Follow up appointment made for 3 months    I have reviewed the chief complaint and history of present illness (including ROS and PFSH) and vital documentation by my staff and I agree with their documentation and have added where applicable.

## 2022-04-28 NOTE — TELEPHONE ENCOUNTER
Pt advised, letter up at the front.  Also dropped off fmla paperwork I advised we have a 7 day turn around

## 2022-05-01 ASSESSMENT — ENCOUNTER SYMPTOMS: BACK PAIN: 1

## 2022-05-01 NOTE — H&P
HISTORY and [id number]       NAME:  Jessica Gallardo  MRN: [id number]   YOB: 1965   Date: 5/2/2022   Age: 61 y.o. Gender: female     COMPLAINT AND PRESENT HISTORY:   Jessica Gallardo is 61 y.o.,  female, presents for pre-anesthesia/admission testing for LUMBAR L5-S1 DECOMPRESSION FUSION POSTERIOR per Dr. Niki Aguilera. Primary dx: SPONDYLOLISTHESIS W/DDD. HPI:  Back Pain  Chronicity: Patient states lower back pain started about 3-4 years ago, states severity worsened December 2021- denies any specific falls/traumas- but did fall on ice February 2022. The current episode started more than 1 year ago. The problem occurs constantly. The problem has been gradually worsening since onset. The pain is present in the lumbar spine, gluteal and sacro-iliac. The quality of the pain is described as burning and stabbing. Radiates to: RLE most of the time, sometimes both, states b/l shin pain. The pain is at a severity of 8/10. Exacerbated by: States sometimes walking helps, sometimes sitting- but is also aggravated by this. Associated symptoms include chest pain (States a \"few times\"- notes once or twice/week- noted last night- advised discussion w/PCP- denies chest pain, denies radiation/SOB/sweating- advised emergent tx if chest pain returns), leg pain, pelvic pain and tingling (B/l LE's). Pertinent negatives include no abdominal pain, bladder incontinence, bowel incontinence, dysuria, fever, headaches or numbness. She has tried analgesics and muscle relaxant (PT, injections) for the symptoms.      Testing completed r/t condition:  X-ray lumbar spine 4-19-22:  Narrative   Lateral flexion and extension x-rays patient was 18 mm spondylolisthesis    L5-S1 significant disc base collapse disc appears to go into a slight    amount of kyphosis on forward flexion     MRI lumbar spine, 3-24-22:  Narrative   EXAMINATION:   MRI OF THE LUMBAR SPINE WITHOUT CONTRAST, 3/24/2022 5:07 pm     TECHNIQUE:   Multiplanar multisequence MRI of the lumbar spine was performed without the   administration of intravenous contrast.       COMPARISON:   Radiographs on 02/21/2022       HISTORY:   ORDERING SYSTEM PROVIDED HISTORY: Chronic bilateral low back pain with   right-sided sciatica.    TECHNOLOGIST PROVIDED HISTORY: See ICD-10   What is the sedation requirement?->None   Reason for Exam: Chronic and worsening low back pain   Additional signs and symptoms: Right leg pain   Relevant Medical/Surgical History: No known injury to low back       FINDINGS:   BONES/ALIGNMENT: There is grade 2 anterolisthesis of L5 on S1, likely related   to severe facet arthropathy.  No pars defect is identified.  Alignment of the   lumbar spine is otherwise normal.  There is mild heterogeneity in the marrow   with several hemangiomas, most pronounced at L1.  There are no areas of   marrow edema to suggest an acute fracture.       SPINAL CORD: The conus tip terminates near the level of the L2 vertebral   body.  The cauda equina nerve roots are unremarkable.       SOFT TISSUES: No paraspinal mass identified.       L1-L2: There is no significant disc herniation, spinal canal stenosis or   neural foraminal narrowing.       L2-L3: There is no significant disc herniation, spinal canal stenosis or   neural foraminal narrowing.       L3-L4: There is no significant disc herniation, spinal canal stenosis or   neural foraminal narrowing.       L4-L5: Bilateral facet arthropathy.  No disc herniation or central spinal   canal stenosis.  No right foraminal narrowing.  Left foraminal disc   protrusion measures 2 mm with minimal left foraminal narrowing.       L5-S1: There is uncovering of the posterior margin of the disc with a disc   bulge lateralizing to the left.  Bilateral facet arthropathy.  Moderate   central spinal canal narrowing.  Severe bilateral foraminal narrowing with   contact of the undersurface of the exiting L5 nerve roots.         Impression   1. Severe facet arthropathy at L5-S1 with grade 2 anterolisthesis of L5 on   S1.  Moderate central spinal canal narrowing at this level with severe   bilateral foraminal narrowing and contact of the exiting S1 nerve roots.         Review of additional significant medical hx:  MVP, see ROS r/t chest pain: States MVP dx'd about 10 years ago- tx w/Verapamil- not sure when last ECHO was. Denies current/recent palpitations, dizziness, leg swelling, headache. States she is deep breathing r/t pain- not sure if SOB. States chest pain is sporadic- every once in a while- thinks she discussed it with a former PCP. States she has never had a stress test in the past. Her BP is elevated today- she states over the past 1-2 years, BP has been elevated- denies any formal dx of HTN. States BP is typically 140's/80's. States she did do routine exercise until back pain worsened, states has not been very active since this time. Highest BP noted during today's appointment was 172/122, but did improve to 148/102 on re-check by the end of appointment (see flow sheet). Current medications r/t condition: Verapamil  BP Readings from Last 3 Encounters:   05/02/22 (!) 148/102   04/25/22 (!) 136/96   04/18/22 (!) 140/100     PREDIABETES:  Lab Results   Component Value Date    LABA1C 5.9 12/31/2021     Lab Results   Component Value Date     11/25/2017     Denies hx of MRSA infection. Denies hx of blood clots. Denies hx of any personal or family hx of complications w/anesthesia.    PAST MEDICAL HISTORY     Past Medical History:   Diagnosis Date    Abnormal EKG     Arthritis     Chest pain     Chronic bilateral low back pain with bilateral sciatica 02/11/2020    DDD (degenerative disc disease), lumbar 02/11/2020    Dyslipidemia     Elevated BP without diagnosis of hypertension     Hay fever     Lumbosacral spondylosis without myelopathy 4/18/2022    MVP (mitral valve prolapse)     Pre-diabetes     Snoring SURGICAL HISTORY       Past Surgical History:   Procedure Laterality Date    BREAST SURGERY Bilateral     biopsy    CHOLECYSTECTOMY      COLONOSCOPY      ENDOMETRIAL ABLATION      EYE SURGERY Bilateral     chalazion removed    PAIN MANAGEMENT PROCEDURE Right 7/16/2020    RIGHT L4 AND L5 EPIDURAL STEROID INJECTION performed by Stormy Duong MD at [place] [id number] Right 10/26/2020    EPIDURAL STEROID INJECTION -RIGHT L4 L5 performed by Stormy Duong MD at [address] [place] [address]       Social History     Socioeconomic History    Marital status:      Spouse name: None    Number of children: None    Years of education: None    Highest education level: None   Occupational History    None   Tobacco Use    Smoking status: Never Smoker    Smokeless tobacco: Never Used   Vaping Use    Vaping Use: Never used   Substance and Sexual Activity    Alcohol use: Yes     Alcohol/week: 3.0 standard drinks     Types: 3 Glasses of wine per week     Comment: moderately 3 glasses of wine per week    Drug use: No    Sexual activity: Yes     Partners: Male   Other Topics Concern    None   Social History Narrative    None     Social Determinants of Health     Financial Resource Strain: Low Risk     Difficulty of Paying Living Expenses: Not hard at all   Food Insecurity: No Food Insecurity    Worried About Running Out of Food in the Last Year: Never true    Reji of Food in the Last Year: Never true   Transportation Needs:     Lack of Transportation (Medical): Not on file    Lack of Transportation (Non-Medical):  Not on file   Physical Activity:     Days of Exercise per Week: Not on file    Minutes of Exercise per Session: Not on file   Stress:     Feeling of Stress : Not on file   Social Connections:     Frequency of Communication with Friends and Family: Not on file    Frequency of Social Gatherings with Friends and Family: Not on file   Lakeshia Martinez Attends Sabianist Services: Not on file    Active Member of Clubs or Organizations: Not on file    Attends Club or Organization Meetings: Not on file    Marital Status: Not on file   Intimate Partner Violence:     Fear of Current or Ex-Partner: Not on file    Emotionally Abused: Not on file    Physically Abused: Not on file    Sexually Abused: Not on file   Housing Stability:     Unable to Pay for Housing in the Last Year: Not on file    Number of [place] in the Last Year: Not on file    Unstable Housing in the Last Year: Not on file       REVIEW OF SYSTEMS    No Known Allergies    Current Outpatient Medications on File Prior to Encounter   Medication Sig Dispense Refill    HYDROcodone-acetaminophen (NORCO) 5-325 MG per tablet Take 1 tablet by mouth every 8 hours as needed for Pain.  cyclobenzaprine (FLEXERIL) 5 MG tablet Take 5 mg by mouth 2 times daily as needed for Muscle spasms      gabapentin (NEURONTIN) 400 MG capsule Take 1 capsule by mouth 3 times daily for 30 days. take 1 capsule by mouth 3 TIMES A DAY 90 capsule 2    metaxalone (SKELAXIN) 800 MG tablet TAKE ONE TABLET BY MOUTH THREE TIMES A DAY AS NEEDED FOR SPASMS 90 tablet 0    diclofenac (VOLTAREN) 75 MG EC tablet take 1 tablet by mouth twice a day 60 tablet 0    verapamil (CALAN) 40 MG tablet 1 tablet po TID 90 tablet 2    magnesium citrate solution Take 296 mLs by mouth once       acetaminophen (TYLENOL) 650 MG extended release tablet Take 650 mg by mouth every 8 hours as needed for Pain       No current facility-administered medications on file prior to encounter. Review of Systems   Constitutional: Negative for chills and fever. HENT: Negative for congestion, ear pain, rhinorrhea, sore throat and trouble swallowing. Respiratory: Negative for cough, shortness of breath and wheezing. Apnea: + snoring.     Cardiovascular: Positive for chest pain (States a \"few times\"- notes once or twice/week- noted last night- advised discussion w/PCP- denies chest pain, denies radiation/SOB/sweating- advised emergent tx if chest pain returns). Negative for palpitations and leg swelling. Gastrointestinal: Negative for abdominal pain, anal bleeding, blood in stool, bowel incontinence, constipation, diarrhea, nausea and vomiting. Genitourinary: Positive for pelvic pain. Negative for bladder incontinence, dysuria and frequency. Musculoskeletal: Positive for arthralgias, back pain and gait problem. See HPI. Skin: Negative for rash. Neurological: Positive for tingling (B/l LE's). Negative for dizziness, numbness and headaches. Hematological: Does not bruise/bleed easily. GENERAL PHYSICAL EXAM     Vitals: BP (!) 148/102 Comment: ROBINSON manual  Pulse 92   Temp 97.9 °F (36.6 °C) (Infrared)   Resp 16   Ht 5' 7\" (1.702 m)   Wt 180 lb (81.6 kg)   SpO2 100%   BMI 28.19 kg/m²               Physical Exam  Vitals reviewed. Constitutional:       General: She is not in acute distress. Appearance: She is well-developed. She is not ill-appearing, toxic-appearing or diaphoretic. HENT:      Head: Normocephalic. Right Ear: External ear normal.      Left Ear: External ear normal.      Nose: Nose normal.      Mouth/Throat:      Pharynx: No oropharyngeal exudate or posterior oropharyngeal erythema. Tonsils: No tonsillar abscesses. Eyes:      General:         Right eye: No discharge. Left eye: No discharge. Conjunctiva/sclera: Conjunctivae normal.      Pupils: Pupils are equal, round, and reactive to light. Cardiovascular:      Rate and Rhythm: Regular rhythm. Tachycardia present. Pulses: Intact distal pulses. Heart sounds: Normal heart sounds. Comments: 104 BPM per auscultation. Pulmonary:      Effort: Pulmonary effort is normal. No accessory muscle usage or respiratory distress. Breath sounds: Normal breath sounds. No decreased breath sounds, wheezing, rhonchi or rales. Abdominal:      General: Bowel sounds are normal. There is no distension. Palpations: Abdomen is soft. There is no mass. Tenderness: There is no abdominal tenderness. There is no guarding or rebound. Musculoskeletal:      Lumbar back: Tenderness (More right sided, no erythema/warmth) and bony tenderness present. No swelling, edema or signs of trauma. Decreased range of motion. Right lower leg: No swelling or tenderness. No edema. Left lower leg: No swelling or tenderness. No edema. Comments: Negative Pantera's sign b/l. Patient appears to be in visible pain during encounter, deep breathing at times. Lymphadenopathy:      Cervical: No cervical adenopathy. Skin:     General: Skin is warm and dry. Neurological:      Mental Status: She is alert and oriented to person, place, and time. Gait: Gait abnormal (Antalgic).    Psychiatric:         Behavior: Behavior normal.         LAB REVIEW     Lab Results   Component Value Date     05/02/2022    K 3.6 (L) 05/02/2022     05/02/2022    CO2 25 05/02/2022    BUN 15 05/02/2022    CREATININE 0.62 05/02/2022    GLUCOSE 112 (H) 05/02/2022    CALCIUM 8.9 05/02/2022    PROT 8.5 (H) 09/17/2018    LABALBU 4.4 12/31/2021    BILITOT 0.4 12/31/2021    ALKPHOS 81 12/31/2021    AST 19 12/31/2021    ALT 16 12/31/2021    LABGLOM >60 05/02/2022    GFRAA >60 05/02/2022     Lab Results   Component Value Date    WBC 3.7 05/02/2022    HGB 12.7 05/02/2022    HCT 37.3 05/02/2022    MCV 85.0 05/02/2022     05/02/2022     PRELIMINARY EKG REVIEW, DATE: 5-2-22     NSR  POSSIBLE LEFT ATRIAL ENLARGEMENT  LEFT VENTRICULAR HYPERTROPHY  ABNORMAL ECG  87 BPM    SURGERY / PROVISIONAL DIAGNOSES:      LUMBAR L5-S1 DECOMPRESSION FUSION POSTERIOR    SPONDYLOLISTHESIS W/DDD    Patient Active Problem List    Diagnosis Date Noted    Lumbosacral spondylosis without myelopathy 04/18/2022    Lumbar disc herniation 04/18/2022    Chronic bilateral low back pain with right-sided sciatica 02/11/2020    DDD (degenerative disc disease), lumbar 02/11/2020    Medication management 02/11/2020    Mitral valve prolapse 03/09/2018    Prediabetes 05/30/2017    Eyelid cyst 05/30/2017    Dyslipidemia 05/30/2017         CLEARANCE: Dr. Criss Plaza, anesthesia, was contacted and informed of patient's history and planned surgery (specifically discussed extremely high BP, and hx of chest pain). Medical clearance requested. Surgery scheduling will notify Dr. Chucky Herr' office who will be responsible for making sure the clearance is obtained and is in the chart for surgery. Encouraged patient to call PCP tomorrow to schedule appointment ASAP- discuss HTN, chest pain (seek emergent tx for any reoccurring chest pain, patient verbalized understanding).     Total time spent on encounter- PAT provider minutes: 31-40 minutes     GONZÁLEZ Christensen CNP on 5/2/2022 at 6:43 PM

## 2022-05-01 NOTE — H&P (VIEW-ONLY)
HISTORY and [id number]       NAME:  Sri Jaramillo  MRN: [id number]   YOB: 1965   Date: 5/2/2022   Age: 61 y.o. Gender: female     COMPLAINT AND PRESENT HISTORY:   Sri Jaramillo is 61 y.o.,  female, presents for pre-anesthesia/admission testing for LUMBAR L5-S1 DECOMPRESSION FUSION POSTERIOR per Dr. Carrera. Primary dx: SPONDYLOLISTHESIS W/DDD. HPI:  Back Pain  Chronicity: Patient states lower back pain started about 3-4 years ago, states severity worsened December 2021- denies any specific falls/traumas- but did fall on ice February 2022. The current episode started more than 1 year ago. The problem occurs constantly. The problem has been gradually worsening since onset. The pain is present in the lumbar spine, gluteal and sacro-iliac. The quality of the pain is described as burning and stabbing. Radiates to: RLE most of the time, sometimes both, states b/l shin pain. The pain is at a severity of 8/10. Exacerbated by: States sometimes walking helps, sometimes sitting- but is also aggravated by this. Associated symptoms include chest pain (States a \"few times\"- notes once or twice/week- noted last night- advised discussion w/PCP- denies chest pain, denies radiation/SOB/sweating- advised emergent tx if chest pain returns), leg pain, pelvic pain and tingling (B/l LE's). Pertinent negatives include no abdominal pain, bladder incontinence, bowel incontinence, dysuria, fever, headaches or numbness. She has tried analgesics and muscle relaxant (PT, injections) for the symptoms.      Testing completed r/t condition:  X-ray lumbar spine 4-19-22:  Narrative   Lateral flexion and extension x-rays patient was 18 mm spondylolisthesis    L5-S1 significant disc base collapse disc appears to go into a slight    amount of kyphosis on forward flexion     MRI lumbar spine, 3-24-22:  Narrative   EXAMINATION:   MRI OF THE LUMBAR SPINE WITHOUT CONTRAST, 3/24/2022 5:07 pm     TECHNIQUE:   Multiplanar multisequence MRI of the lumbar spine was performed without the   administration of intravenous contrast.       COMPARISON:   Radiographs on 02/21/2022       HISTORY:   ORDERING SYSTEM PROVIDED HISTORY: Chronic bilateral low back pain with   right-sided sciatica.    TECHNOLOGIST PROVIDED HISTORY: See ICD-10   What is the sedation requirement?->None   Reason for Exam: Chronic and worsening low back pain   Additional signs and symptoms: Right leg pain   Relevant Medical/Surgical History: No known injury to low back       FINDINGS:   BONES/ALIGNMENT: There is grade 2 anterolisthesis of L5 on S1, likely related   to severe facet arthropathy.  No pars defect is identified.  Alignment of the   lumbar spine is otherwise normal.  There is mild heterogeneity in the marrow   with several hemangiomas, most pronounced at L1.  There are no areas of   marrow edema to suggest an acute fracture.       SPINAL CORD: The conus tip terminates near the level of the L2 vertebral   body.  The cauda equina nerve roots are unremarkable.       SOFT TISSUES: No paraspinal mass identified.       L1-L2: There is no significant disc herniation, spinal canal stenosis or   neural foraminal narrowing.       L2-L3: There is no significant disc herniation, spinal canal stenosis or   neural foraminal narrowing.       L3-L4: There is no significant disc herniation, spinal canal stenosis or   neural foraminal narrowing.       L4-L5: Bilateral facet arthropathy.  No disc herniation or central spinal   canal stenosis.  No right foraminal narrowing.  Left foraminal disc   protrusion measures 2 mm with minimal left foraminal narrowing.       L5-S1: There is uncovering of the posterior margin of the disc with a disc   bulge lateralizing to the left.  Bilateral facet arthropathy.  Moderate   central spinal canal narrowing.  Severe bilateral foraminal narrowing with   contact of the undersurface of the exiting L5 nerve roots.         Impression   1. Severe facet arthropathy at L5-S1 with grade 2 anterolisthesis of L5 on   S1.  Moderate central spinal canal narrowing at this level with severe   bilateral foraminal narrowing and contact of the exiting S1 nerve roots.         Review of additional significant medical hx:  MVP, see ROS r/t chest pain: States MVP dx'd about 10 years ago- tx w/Verapamil- not sure when last ECHO was. Denies current/recent palpitations, dizziness, leg swelling, headache. States she is deep breathing r/t pain- not sure if SOB. States chest pain is sporadic- every once in a while- thinks she discussed it with a former PCP. States she has never had a stress test in the past. Her BP is elevated today- she states over the past 1-2 years, BP has been elevated- denies any formal dx of HTN. States BP is typically 140's/80's. States she did do routine exercise until back pain worsened, states has not been very active since this time. Highest BP noted during today's appointment was 172/122, but did improve to 148/102 on re-check by the end of appointment (see flow sheet). Current medications r/t condition: Verapamil  BP Readings from Last 3 Encounters:   05/02/22 (!) 148/102   04/25/22 (!) 136/96   04/18/22 (!) 140/100     PREDIABETES:  Lab Results   Component Value Date    LABA1C 5.9 12/31/2021     Lab Results   Component Value Date     11/25/2017     Denies hx of MRSA infection. Denies hx of blood clots. Denies hx of any personal or family hx of complications w/anesthesia.    PAST MEDICAL HISTORY     Past Medical History:   Diagnosis Date    Abnormal EKG     Arthritis     Chest pain     Chronic bilateral low back pain with bilateral sciatica 02/11/2020    DDD (degenerative disc disease), lumbar 02/11/2020    Dyslipidemia     Elevated BP without diagnosis of hypertension     Hay fever     Lumbosacral spondylosis without myelopathy 4/18/2022    MVP (mitral valve prolapse)     Pre-diabetes     Snoring SURGICAL HISTORY       Past Surgical History:   Procedure Laterality Date    BREAST SURGERY Bilateral     biopsy    CHOLECYSTECTOMY      COLONOSCOPY      ENDOMETRIAL ABLATION      EYE SURGERY Bilateral     chalazion removed    PAIN MANAGEMENT PROCEDURE Right 7/16/2020    RIGHT L4 AND L5 EPIDURAL STEROID INJECTION performed by Talia Jefferson MD at [address] [place] [address] Right 10/26/2020    EPIDURAL STEROID INJECTION -RIGHT L4 L5 performed by Talia Jefferson MD at [address] [facility]       Social History     Socioeconomic History    Marital status:      Spouse name: None    Number of children: None    Years of education: None    Highest education level: None   Occupational History    None   Tobacco Use    Smoking status: Never Smoker    Smokeless tobacco: Never Used   Vaping Use    Vaping Use: Never used   Substance and Sexual Activity    Alcohol use: Yes     Alcohol/week: 3.0 standard drinks     Types: 3 Glasses of wine per week     Comment: moderately 3 glasses of wine per week    Drug use: No    Sexual activity: Yes     Partners: Male   Other Topics Concern    None   Social History Narrative    None     Social Determinants of Health     Financial Resource Strain: Low Risk     Difficulty of Paying Living Expenses: Not hard at all   Food Insecurity: No Food Insecurity    Worried About Running Out of Food in the Last Year: Never true    Reji of Food in the Last Year: Never true   Transportation Needs:     Lack of Transportation (Medical): Not on file    Lack of Transportation (Non-Medical):  Not on file   Physical Activity:     Days of Exercise per Week: Not on file    Minutes of Exercise per Session: Not on file   Stress:     Feeling of Stress : Not on file   Social Connections:     Frequency of Communication with Friends and Family: Not on file    Frequency of Social Gatherings with Friends and Family: Not on file   Cain Saucedo Attends Confucianism Services: Not on file    Active Member of Clubs or Organizations: Not on file    Attends Club or Organization Meetings: Not on file    Marital Status: Not on file   Intimate Partner Violence:     Fear of Current or Ex-Partner: Not on file    Emotionally Abused: Not on file    Physically Abused: Not on file    Sexually Abused: Not on file   Housing Stability:     Unable to Pay for Housing in the Last Year: Not on file    Number of [place] in the Last Year: Not on file    Unstable Housing in the Last Year: Not on file       REVIEW OF SYSTEMS    No Known Allergies    Current Outpatient Medications on File Prior to Encounter   Medication Sig Dispense Refill    HYDROcodone-acetaminophen (NORCO) 5-325 MG per tablet Take 1 tablet by mouth every 8 hours as needed for Pain.  cyclobenzaprine (FLEXERIL) 5 MG tablet Take 5 mg by mouth 2 times daily as needed for Muscle spasms      gabapentin (NEURONTIN) 400 MG capsule Take 1 capsule by mouth 3 times daily for 30 days. take 1 capsule by mouth 3 TIMES A DAY 90 capsule 2    metaxalone (SKELAXIN) 800 MG tablet TAKE ONE TABLET BY MOUTH THREE TIMES A DAY AS NEEDED FOR SPASMS 90 tablet 0    diclofenac (VOLTAREN) 75 MG EC tablet take 1 tablet by mouth twice a day 60 tablet 0    verapamil (CALAN) 40 MG tablet 1 tablet po TID 90 tablet 2    magnesium citrate solution Take 296 mLs by mouth once       acetaminophen (TYLENOL) 650 MG extended release tablet Take 650 mg by mouth every 8 hours as needed for Pain       No current facility-administered medications on file prior to encounter. Review of Systems   Constitutional: Negative for chills and fever. HENT: Negative for congestion, ear pain, rhinorrhea, sore throat and trouble swallowing. Respiratory: Negative for cough, shortness of breath and wheezing. Apnea: + snoring.     Cardiovascular: Positive for chest pain (States a \"few times\"- notes once or twice/week- noted last night- advised discussion w/PCP- denies chest pain, denies radiation/SOB/sweating- advised emergent tx if chest pain returns). Negative for palpitations and leg swelling. Gastrointestinal: Negative for abdominal pain, anal bleeding, blood in stool, bowel incontinence, constipation, diarrhea, nausea and vomiting. Genitourinary: Positive for pelvic pain. Negative for bladder incontinence, dysuria and frequency. Musculoskeletal: Positive for arthralgias, back pain and gait problem. See HPI. Skin: Negative for rash. Neurological: Positive for tingling (B/l LE's). Negative for dizziness, numbness and headaches. Hematological: Does not bruise/bleed easily. GENERAL PHYSICAL EXAM     Vitals: BP (!) 148/102 Comment: ROBINSON manual  Pulse 92   Temp 97.9 °F (36.6 °C) (Infrared)   Resp 16   Ht 5' 7\" (1.702 m)   Wt 180 lb (81.6 kg)   SpO2 100%   BMI 28.19 kg/m²               Physical Exam  Vitals reviewed. Constitutional:       General: She is not in acute distress. Appearance: She is well-developed. She is not ill-appearing, toxic-appearing or diaphoretic. HENT:      Head: Normocephalic. Right Ear: External ear normal.      Left Ear: External ear normal.      Nose: Nose normal.      Mouth/Throat:      Pharynx: No oropharyngeal exudate or posterior oropharyngeal erythema. Tonsils: No tonsillar abscesses. Eyes:      General:         Right eye: No discharge. Left eye: No discharge. Conjunctiva/sclera: Conjunctivae normal.      Pupils: Pupils are equal, round, and reactive to light. Cardiovascular:      Rate and Rhythm: Regular rhythm. Tachycardia present. Pulses: Intact distal pulses. Heart sounds: Normal heart sounds. Comments: 104 BPM per auscultation. Pulmonary:      Effort: Pulmonary effort is normal. No accessory muscle usage or respiratory distress. Breath sounds: Normal breath sounds. No decreased breath sounds, wheezing, rhonchi or rales. Abdominal:      General: Bowel sounds are normal. There is no distension. Palpations: Abdomen is soft. There is no mass. Tenderness: There is no abdominal tenderness. There is no guarding or rebound. Musculoskeletal:      Lumbar back: Tenderness (More right sided, no erythema/warmth) and bony tenderness present. No swelling, edema or signs of trauma. Decreased range of motion. Right lower leg: No swelling or tenderness. No edema. Left lower leg: No swelling or tenderness. No edema. Comments: Negative Pantera's sign b/l. Patient appears to be in visible pain during encounter, deep breathing at times. Lymphadenopathy:      Cervical: No cervical adenopathy. Skin:     General: Skin is warm and dry. Neurological:      Mental Status: She is alert and oriented to person, place, and time. Gait: Gait abnormal (Antalgic).    Psychiatric:         Behavior: Behavior normal.         LAB REVIEW     Lab Results   Component Value Date     05/02/2022    K 3.6 (L) 05/02/2022     05/02/2022    CO2 25 05/02/2022    BUN 15 05/02/2022    CREATININE 0.62 05/02/2022    GLUCOSE 112 (H) 05/02/2022    CALCIUM 8.9 05/02/2022    PROT 8.5 (H) 09/17/2018    LABALBU 4.4 12/31/2021    BILITOT 0.4 12/31/2021    ALKPHOS 81 12/31/2021    AST 19 12/31/2021    ALT 16 12/31/2021    LABGLOM >60 05/02/2022    GFRAA >60 05/02/2022     Lab Results   Component Value Date    WBC 3.7 05/02/2022    HGB 12.7 05/02/2022    HCT 37.3 05/02/2022    MCV 85.0 05/02/2022     05/02/2022     PRELIMINARY EKG REVIEW, DATE: 5-2-22     NSR  POSSIBLE LEFT ATRIAL ENLARGEMENT  LEFT VENTRICULAR HYPERTROPHY  ABNORMAL ECG  87 BPM    SURGERY / PROVISIONAL DIAGNOSES:      LUMBAR L5-S1 DECOMPRESSION FUSION POSTERIOR    SPONDYLOLISTHESIS W/DDD    Patient Active Problem List    Diagnosis Date Noted    Lumbosacral spondylosis without myelopathy 04/18/2022    Lumbar disc herniation 04/18/2022    Chronic bilateral low back pain with right-sided sciatica 02/11/2020    DDD (degenerative disc disease), lumbar 02/11/2020    Medication management 02/11/2020    Mitral valve prolapse 03/09/2018    Prediabetes 05/30/2017    Eyelid cyst 05/30/2017    Dyslipidemia 05/30/2017         CLEARANCE: Dr. Tori Golden, anesthesia, was contacted and informed of patient's history and planned surgery (specifically discussed extremely high BP, and hx of chest pain). Medical clearance requested. Surgery scheduling will notify Dr. Naila Nickerson' office who will be responsible for making sure the clearance is obtained and is in the chart for surgery. Encouraged patient to call PCP tomorrow to schedule appointment ASAP- discuss HTN, chest pain (seek emergent tx for any reoccurring chest pain, patient verbalized understanding).     Total time spent on encounter- PAT provider minutes: 31-40 minutes     GONZÁLEZ Messer CNP on 5/2/2022 at 6:43 PM

## 2022-05-02 ENCOUNTER — HOSPITAL ENCOUNTER (OUTPATIENT)
Dept: PREADMISSION TESTING | Age: 57
Discharge: HOME | End: 2022-05-06
Payer: COMMERCIAL

## 2022-05-02 VITALS
RESPIRATION RATE: 16 BRPM | HEART RATE: 92 BPM | DIASTOLIC BLOOD PRESSURE: 102 MMHG | SYSTOLIC BLOOD PRESSURE: 148 MMHG | OXYGEN SATURATION: 100 % | HEIGHT: 67 IN | TEMPERATURE: 97.9 F | BODY MASS INDEX: 28.25 KG/M2 | WEIGHT: 180 LBS

## 2022-05-02 LAB
ABSOLUTE EOS #: 0.19 K/UL (ref 0–0.4)
ABSOLUTE LYMPH #: 1.22 K/UL (ref 1–4.8)
ABSOLUTE MONO #: 0.26 K/UL (ref 0.1–1.3)
ANION GAP SERPL CALCULATED.3IONS-SCNC: 10 MMOL/L (ref 9–17)
BASOPHILS # BLD: 0 % (ref 0–2)
BASOPHILS ABSOLUTE: 0 K/UL (ref 0–0.2)
BUN BLDV-MCNC: 15 MG/DL (ref 6–20)
CALCIUM SERPL-MCNC: 8.9 MG/DL (ref 8.6–10.4)
CHLORIDE BLD-SCNC: 103 MMOL/L (ref 98–107)
CO2: 25 MMOL/L (ref 20–31)
CREAT SERPL-MCNC: 0.62 MG/DL (ref 0.5–0.9)
EOSINOPHILS RELATIVE PERCENT: 5 % (ref 0–4)
GFR AFRICAN AMERICAN: >60 ML/MIN
GFR NON-AFRICAN AMERICAN: >60 ML/MIN
GFR SERPL CREATININE-BSD FRML MDRD: ABNORMAL ML/MIN/{1.73_M2}
GLUCOSE BLD-MCNC: 112 MG/DL (ref 70–99)
HCT VFR BLD CALC: 37.3 % (ref 36–46)
HEMOGLOBIN: 12.7 G/DL (ref 12–16)
LYMPHOCYTES # BLD: 33 % (ref 24–44)
MCH RBC QN AUTO: 28.9 PG (ref 26–34)
MCHC RBC AUTO-ENTMCNC: 33.9 G/DL (ref 31–37)
MCV RBC AUTO: 85 FL (ref 80–100)
MONOCYTES # BLD: 7 % (ref 1–7)
MORPHOLOGY: ABNORMAL
PDW BLD-RTO: 15.2 % (ref 11.5–14.9)
PLATELET # BLD: 422 K/UL (ref 150–450)
PMV BLD AUTO: 7.2 FL (ref 6–12)
POTASSIUM SERPL-SCNC: 3.6 MMOL/L (ref 3.7–5.3)
RBC # BLD: 4.39 M/UL (ref 4–5.2)
SEG NEUTROPHILS: 55 % (ref 36–66)
SEGMENTED NEUTROPHILS ABSOLUTE COUNT: 2.03 K/UL (ref 1.3–9.1)
SODIUM BLD-SCNC: 138 MMOL/L (ref 135–144)
WBC # BLD: 3.7 K/UL (ref 3.5–11)

## 2022-05-02 PROCEDURE — 93005 ELECTROCARDIOGRAM TRACING: CPT | Performed by: NURSE PRACTITIONER

## 2022-05-02 PROCEDURE — 85025 COMPLETE CBC W/AUTO DIFF WBC: CPT

## 2022-05-02 PROCEDURE — 80048 BASIC METABOLIC PNL TOTAL CA: CPT

## 2022-05-02 PROCEDURE — 36415 COLL VENOUS BLD VENIPUNCTURE: CPT

## 2022-05-02 PROCEDURE — APPSS45 APP SPLIT SHARED TIME 31-45 MINUTES: Performed by: NURSE PRACTITIONER

## 2022-05-02 RX ORDER — CYCLOBENZAPRINE HCL 5 MG
5 TABLET ORAL 2 TIMES DAILY PRN
COMMUNITY
End: 2022-05-25

## 2022-05-02 RX ORDER — HYDROCODONE BITARTRATE AND ACETAMINOPHEN 5; 325 MG/1; MG/1
1 TABLET ORAL EVERY 8 HOURS PRN
Status: ON HOLD | COMMUNITY
End: 2022-05-16

## 2022-05-02 ASSESSMENT — PAIN SCALES - GENERAL: PAINLEVEL_OUTOF10: 8

## 2022-05-02 ASSESSMENT — PAIN DESCRIPTION - LOCATION: LOCATION: BACK

## 2022-05-02 ASSESSMENT — ENCOUNTER SYMPTOMS
VOMITING: 0
DIARRHEA: 0
CONSTIPATION: 0
SHORTNESS OF BREATH: 0
COUGH: 0
ABDOMINAL PAIN: 0
RHINORRHEA: 0
BLOOD IN STOOL: 0
NAUSEA: 0
TROUBLE SWALLOWING: 0
ANAL BLEEDING: 0
WHEEZING: 0
SORE THROAT: 0
BOWEL INCONTINENCE: 0

## 2022-05-02 ASSESSMENT — PAIN DESCRIPTION - DESCRIPTORS: DESCRIPTORS: ACHING

## 2022-05-02 ASSESSMENT — PAIN DESCRIPTION - PAIN TYPE: TYPE: CHRONIC PAIN

## 2022-05-03 ENCOUNTER — TELEPHONE (OUTPATIENT)
Dept: FAMILY MEDICINE CLINIC | Age: 57
End: 2022-05-03

## 2022-05-03 LAB
EKG ATRIAL RATE: 87 BPM
EKG P AXIS: 47 DEGREES
EKG P-R INTERVAL: 166 MS
EKG Q-T INTERVAL: 370 MS
EKG QRS DURATION: 88 MS
EKG QTC CALCULATION (BAZETT): 445 MS
EKG R AXIS: -10 DEGREES
EKG T AXIS: 42 DEGREES
EKG VENTRICULAR RATE: 87 BPM

## 2022-05-03 PROCEDURE — 93010 ELECTROCARDIOGRAM REPORT: CPT | Performed by: INTERNAL MEDICINE

## 2022-05-03 NOTE — TELEPHONE ENCOUNTER
Today has been a week since patient has dropped off [place] paperwork, can you please work on this so we can get her paperwork faxed over.

## 2022-05-04 ENCOUNTER — OFFICE VISIT (OUTPATIENT)
Dept: FAMILY MEDICINE CLINIC | Age: 57
End: 2022-05-04
Payer: COMMERCIAL

## 2022-05-04 VITALS
BODY MASS INDEX: 28.06 KG/M2 | RESPIRATION RATE: 16 BRPM | WEIGHT: 178.8 LBS | HEIGHT: 67 IN | OXYGEN SATURATION: 98 % | SYSTOLIC BLOOD PRESSURE: 158 MMHG | DIASTOLIC BLOOD PRESSURE: 102 MMHG | HEART RATE: 117 BPM

## 2022-05-04 DIAGNOSIS — M51.26 LUMBAR DISC HERNIATION: ICD-10-CM

## 2022-05-04 DIAGNOSIS — E78.5 DYSLIPIDEMIA: ICD-10-CM

## 2022-05-04 DIAGNOSIS — I10 PRIMARY HYPERTENSION: Primary | ICD-10-CM

## 2022-05-04 DIAGNOSIS — G89.29 CHRONIC BILATERAL LOW BACK PAIN WITH RIGHT-SIDED SCIATICA: Chronic | ICD-10-CM

## 2022-05-04 DIAGNOSIS — M54.41 CHRONIC BILATERAL LOW BACK PAIN WITH RIGHT-SIDED SCIATICA: Chronic | ICD-10-CM

## 2022-05-04 DIAGNOSIS — R73.03 PREDIABETES: ICD-10-CM

## 2022-05-04 PROCEDURE — 99214 OFFICE O/P EST MOD 30 MIN: CPT | Performed by: FAMILY MEDICINE

## 2022-05-04 RX ORDER — LOSARTAN POTASSIUM 50 MG/1
50 TABLET ORAL DAILY
Qty: 30 TABLET | Refills: 5 | Status: SHIPPED | OUTPATIENT
Start: 2022-05-04 | End: 2022-10-13

## 2022-05-04 NOTE — PATIENT INSTRUCTIONS
Patient Education        DASH Diet: Care Instructions  Your Care Instructions     The DASH diet is an eating plan that can help lower your blood pressure. DASH stands for Dietary Approaches to Stop Hypertension. Hypertension is high bloodpressure. The DASH diet focuses on eating foods that are high in calcium, potassium, and magnesium. These nutrients can lower blood pressure. The foods that are highest in these nutrients are fruits, vegetables, low-fat dairy products, nuts, seeds, and legumes. But taking calcium, potassium, and magnesium supplements instead of eating foods that are high in those nutrients does not have the same effect. The DASH diet also includes whole grains, fish, and poultry. The DASH diet is one of several lifestyle changes your doctor may recommend to lower your high blood pressure. Your doctor may also want you to decrease the amount of sodium in your diet. Lowering sodium while following the DASH dietcan lower blood pressure even further than just the DASH diet alone. Follow-up care is a key part of your treatment and safety. Be sure to make and go to all appointments, and call your doctor if you are having problems. It's also a good idea to know your test results and keep alist of the medicines you take. How can you care for yourself at home? Following the DASH diet   Eat 4 to 5 servings of fruit each day. A serving is 1 medium-sized piece of fruit, ½ cup chopped or canned fruit, 1/4 cup dried fruit, or 4 ounces (½ cup) of fruit juice. Choose fruit more often than fruit juice.  Eat 4 to 5 servings of vegetables each day. A serving is 1 cup of lettuce or raw leafy vegetables, ½ cup of chopped or cooked vegetables, or 4 ounces (½ cup) of vegetable juice. Choose vegetables more often than vegetable juice.  Get 2 to 3 servings of low-fat and fat-free dairy each day. A serving is 8 ounces of milk, 1 cup of yogurt, or 1 ½ ounces of cheese.  Eat 6 to 8 servings of grains each day.  A serving is 1 slice of bread, 1 ounce of dry cereal, or ½ cup of cooked rice, pasta, or cooked cereal. Try to choose whole-grain products as much as possible.  Limit lean meat, poultry, and fish to 2 servings each day. A serving is 3 ounces, about the size of a deck of cards.  Eat 4 to 5 servings of nuts, seeds, and legumes (cooked dried beans, lentils, and split peas) each week. A serving is 1/3 cup of nuts, 2 tablespoons of seeds, or ½ cup of cooked beans or peas.  Limit fats and oils to 2 to 3 servings each day. A serving is 1 teaspoon of vegetable oil or 2 tablespoons of salad dressing.  Limit sweets and added sugars to 5 servings or less a week. A serving is 1 tablespoon jelly or jam, ½ cup sorbet, or 1 cup of lemonade.  Eat less than 2,300 milligrams (mg) of sodium a day. If you limit your sodium to 1,500 mg a day, you can lower your blood pressure even more.  Be aware that all of these are the suggested number of servings for people who eat 1,800 to 2,000 calories a day. Your recommended number of servings may be different if you need more or fewer calories. Tips for success   Start small. Do not try to make dramatic changes to your diet all at once. You might feel that you are missing out on your favorite foods and then be more likely to not follow the plan. Make small changes, and stick with them. Once those changes become habit, add a few more changes.  Try some of the following:  ? Make it a goal to eat a fruit or vegetable at every meal and at snacks. This will make it easy to get the recommended amount of fruits and vegetables each day. ? Try yogurt topped with fruit and nuts for a snack or healthy dessert. ? Add lettuce, tomato, cucumber, and onion to sandwiches. ? Combine a ready-made pizza crust with low-fat mozzarella cheese and lots of vegetable toppings. Try using tomatoes, squash, spinach, broccoli, carrots, cauliflower, and onions. ?  Have a variety of cut-up vegetables with a low-fat dip as an appetizer instead of chips and dip. ? Sprinkle sunflower seeds or chopped almonds over salads. Or try adding chopped walnuts or almonds to cooked vegetables. ? Try some vegetarian meals using beans and peas. Add garbanzo or kidney beans to salads. Make burritos and tacos with mashed yañez beans or black beans. Where can you learn more? Go to https://[employer]. org and sign in to your [employer] account. Enter S956 in the [employer] box to learn more about \"DASH Diet: Care Instructions. \"     If you do not have an account, please click on the \"Sign Up Now\" link. Current as of: January 10, 2022               Content Version: 13.2  © 7468-1509 [employer]. Care instructions adapted under license by [facility] ([facility]). If you have questions about a medical condition or this instruction, always ask your healthcare professional. [address] any warranty or liability for your use of this information.

## 2022-05-11 ENCOUNTER — TELEMEDICINE (OUTPATIENT)
Dept: FAMILY MEDICINE CLINIC | Age: 57
End: 2022-05-11
Payer: COMMERCIAL

## 2022-05-11 ENCOUNTER — TELEPHONE (OUTPATIENT)
Dept: PAIN MANAGEMENT | Age: 57
End: 2022-05-11

## 2022-05-11 ENCOUNTER — TELEPHONE (OUTPATIENT)
Dept: ORTHOPEDIC SURGERY | Age: 57
End: 2022-05-11

## 2022-05-11 DIAGNOSIS — G89.29 CHRONIC BILATERAL LOW BACK PAIN WITH RIGHT-SIDED SCIATICA: Chronic | ICD-10-CM

## 2022-05-11 DIAGNOSIS — M51.26 LUMBAR DISC HERNIATION: ICD-10-CM

## 2022-05-11 DIAGNOSIS — M54.41 CHRONIC BILATERAL LOW BACK PAIN WITH RIGHT-SIDED SCIATICA: Chronic | ICD-10-CM

## 2022-05-11 DIAGNOSIS — I10 PRIMARY HYPERTENSION: Primary | ICD-10-CM

## 2022-05-11 PROCEDURE — 99442 PR PHYS/QHP TELEPHONE EVALUATION 11-20 MIN: CPT | Performed by: FAMILY MEDICINE

## 2022-05-11 ASSESSMENT — PATIENT HEALTH QUESTIONNAIRE - PHQ9
SUM OF ALL RESPONSES TO PHQ QUESTIONS 1-9: 0
2. FEELING DOWN, DEPRESSED OR HOPELESS: 0
1. LITTLE INTEREST OR PLEASURE IN DOING THINGS: 0
SUM OF ALL RESPONSES TO PHQ9 QUESTIONS 1 & 2: 0

## 2022-05-11 ASSESSMENT — ENCOUNTER SYMPTOMS
BACK PAIN: 1
ORTHOPNEA: 0
BLURRED VISION: 0
RESPIRATORY NEGATIVE: 1
ABDOMINAL PAIN: 0
BOWEL INCONTINENCE: 0
GASTROINTESTINAL NEGATIVE: 1
SHORTNESS OF BREATH: 0
EYES NEGATIVE: 1
ALLERGIC/IMMUNOLOGIC NEGATIVE: 1

## 2022-05-11 NOTE — PROGRESS NOTES
[facility] Progress Note    Date:5/4/2022         Patient Name:Ann Hardy     YOB: 1965     Age:56 y.o. Assessment/Plan        Problem List Items Addressed This Visit        Circulatory    Primary hypertension - Primary    Relevant Medications    losartan (COZAAR) 50 MG tablet       Nervous and Auditory    Chronic bilateral low back pain with right-sided sciatica (Chronic)      Monitored by specialist- no acute findings meriting change in the plan   Awaiting surgery            Musculoskeletal and Integument    Lumbar disc herniation      Monitored by specialist- no acute findings meriting change in the plan            Other    Prediabetes      Borderline controlled, continue current treatment plan         Dyslipidemia      Borderline controlled, continue current treatment plan                Return in about 1 week (around 5/11/2022) for virtual visit. Electronically signed by Laney Blanchard DO on 5/11/22       Total time spent was between Time personally spent assessing and managing the patient on the date of service: Est: 30-39 minutes (36266) mins. This included time spent reviewing the patient's medical record (e.g., recent visits, labs, and studies); seeing the patient in the office (face-to-face time); ordering medications, studies, procedures, or referrals; calling the patient or family later in the day with results and further recommendations; and documenting the visit in the medical record. Mahogany Cottrell is a 61 y.o. female presenting today for   Chief Complaint   Patient presents with    Hypertension     Blood pressure reads 151/99 x2 days ago, Denies any symptoms    . Hypertension  This is a new problem. The current episode started more than 1 month ago. The problem has been gradually worsening since onset. The problem is uncontrolled.  Pertinent negatives include no anxiety, blurred vision, chest pain, headaches, malaise/fatigue, neck pain, orthopnea, palpitations, peripheral edema, PND, shortness of breath or sweats. Past treatments include nothing. The current treatment provides no improvement. There is no history of chronic renal disease. Back Pain  This is a chronic problem. The current episode started more than 1 month ago. The problem occurs constantly. The problem has been gradually worsening since onset. The pain is present in the lumbar spine. The quality of the pain is described as aching. The pain is severe. The symptoms are aggravated by bending and position. Associated symptoms include leg pain and paresthesias. Pertinent negatives include no abdominal pain, bladder incontinence, bowel incontinence, chest pain, dysuria, fever, headaches, numbness, paresis, pelvic pain, perianal numbness, tingling, weakness or weight loss. She has tried NSAIDs and home exercises (PT) for the symptoms. The treatment provided no relief. Hyperlipidemia  This is a chronic problem. The current episode started more than 1 year ago. She has no history of chronic renal disease, diabetes, hypothyroidism, liver disease, obesity or nephrotic syndrome. Associated symptoms include leg pain. Pertinent negatives include no chest pain, focal sensory loss, focal weakness, myalgias or shortness of breath. Current antihyperlipidemic treatment includes diet change. Diabetes  She presents for her follow-up diabetic visit. Diabetes type: prediabetes. Her disease course has been stable. There are no hypoglycemic associated symptoms. Pertinent negatives for hypoglycemia include no headaches or sweats. There are no diabetic associated symptoms. Pertinent negatives for diabetes include no blurred vision, no chest pain, no weakness and no weight loss. There are no hypoglycemic complications. Symptoms are stable. There are no diabetic complications. Current diabetic treatment includes diet. Review of Systems   Review of Systems   Constitutional: Negative.   Negative for fever, malaise/fatigue and weight loss. HENT: Negative. Eyes: Negative. Negative for blurred vision. Respiratory: Negative. Negative for shortness of breath. Cardiovascular: Negative. Negative for chest pain, palpitations, orthopnea and PND. Gastrointestinal: Negative. Negative for abdominal pain and bowel incontinence. Endocrine: Negative. Genitourinary: Negative. Negative for bladder incontinence, dysuria and pelvic pain. Musculoskeletal: Positive for back pain. Negative for myalgias and neck pain. Skin: Negative. Allergic/Immunologic: Negative. Neurological: Positive for paresthesias. Negative for tingling, focal weakness, weakness, numbness and headaches. Hematological: Negative. Psychiatric/Behavioral: Negative. All other systems reviewed and are negative. Medications     Current Outpatient Medications   Medication Sig Dispense Refill    losartan (COZAAR) 50 MG tablet Take 1 tablet by mouth daily 30 tablet 5    HYDROcodone-acetaminophen (NORCO) 5-325 MG per tablet Take 1 tablet by mouth every 8 hours as needed for Pain.  cyclobenzaprine (FLEXERIL) 5 MG tablet Take 5 mg by mouth 2 times daily as needed for Muscle spasms      gabapentin (NEURONTIN) 400 MG capsule Take 1 capsule by mouth 3 times daily for 30 days. take 1 capsule by mouth 3 TIMES A DAY 90 capsule 2    metaxalone (SKELAXIN) 800 MG tablet TAKE ONE TABLET BY MOUTH THREE TIMES A DAY AS NEEDED FOR SPASMS 90 tablet 0    diclofenac (VOLTAREN) 75 MG EC tablet take 1 tablet by mouth twice a day 60 tablet 0    verapamil (CALAN) 40 MG tablet 1 tablet po TID 90 tablet 2    magnesium citrate solution Take 296 mLs by mouth once       acetaminophen (TYLENOL) 650 MG extended release tablet Take 650 mg by mouth every 8 hours as needed for Pain       No current facility-administered medications for this visit.        Past History    Past Medical History:   has a past medical history of Abnormal EKG, Arthritis, Chest pain, Chronic bilateral low back pain with bilateral sciatica, DDD (degenerative disc disease), lumbar, Dyslipidemia, Elevated BP without diagnosis of hypertension, Hay fever, Lumbosacral spondylosis without myelopathy, MVP (mitral valve prolapse), Pre-diabetes, and Snoring. Social History:   reports that she has never smoked. She has never used smokeless tobacco. She reports current alcohol use of about 3.0 standard drinks of alcohol per week. She reports that she does not use drugs. Family History:   Family History   Problem Relation Age of Onset    High Blood Pressure Mother     High Cholesterol Mother     Other Mother         blood clots, denies any known clotting d/o    Other Father         alcoholism    Arthritis Sister     No Known Problems Other        Surgical History:   Past Surgical History:   Procedure Laterality Date    BREAST SURGERY Bilateral     biopsy    CHOLECYSTECTOMY      COLONOSCOPY      ENDOMETRIAL ABLATION      EYE SURGERY Bilateral     chalazion removed    PAIN MANAGEMENT PROCEDURE Right 7/16/2020    RIGHT L4 AND L5 EPIDURAL STEROID INJECTION performed by Jean Latif MD at [facility] Right 10/26/2020    EPIDURAL STEROID INJECTION -RIGHT L4 L5 performed by Jean Latif MD at [address]          Physical Examination      Vitals:  BP (!) 158/102   Pulse 117   Resp 16   Ht 5' 7\" (1.702 m)   Wt 178 lb 12.8 oz (81.1 kg)   SpO2 98%   BMI 28.00 kg/m²     Physical Exam  Vitals and nursing note reviewed. Constitutional:       General: She is not in acute distress. Appearance: Normal appearance. She is normal weight. She is not ill-appearing, toxic-appearing or diaphoretic. HENT:      Head: Normocephalic and atraumatic. Eyes:      General: No scleral icterus. Right eye: No discharge. Left eye: No discharge. Extraocular Movements: Extraocular movements intact.       Conjunctiva/sclera: Conjunctivae normal.   Cardiovascular:      Rate and Rhythm: Normal rate and regular rhythm. Pulses: Normal pulses. Heart sounds: Normal heart sounds. No murmur heard. No friction rub. No gallop. Pulmonary:      Effort: Pulmonary effort is normal. No respiratory distress. Breath sounds: Normal breath sounds. No stridor. No wheezing, rhonchi or rales. Chest:      Chest wall: No tenderness. Musculoskeletal:      Lumbar back: Spasms and tenderness present. No swelling, edema, deformity, signs of trauma, lacerations or bony tenderness. Decreased range of motion. Negative right straight leg raise test and negative left straight leg raise test. No scoliosis. Back:    Neurological:      Mental Status: She is alert and oriented to person, place, and time. Mental status is at baseline. Deep Tendon Reflexes:      Reflex Scores:       Patellar reflexes are 2+ on the right side and 2+ on the left side. Achilles reflexes are 2+ on the right side and 2+ on the left side. Psychiatric:         Mood and Affect: Mood normal.         Behavior: Behavior normal.         Thought Content:  Thought content normal.         Judgment: Judgment normal.         Labs/Imaging/Diagnostics   Labs:  Hemoglobin A1C   Date Value Ref Range Status   12/31/2021 5.9 % Final       Imaging Last 24 Hours:  XR LUMBAR SPINE FLEXION AND EXTENSION ONLY  Lateral flexion and extension x-rays patient was 18 mm spondylolisthesis   L5-S1 significant disc base collapse disc appears to go into a slight   amount of kyphosis on forward flexion

## 2022-05-11 NOTE — PATIENT INSTRUCTIONS
Patient Education        DASH Diet: Care Instructions  Your Care Instructions     The DASH diet is an eating plan that can help lower your blood pressure. DASH stands for Dietary Approaches to Stop Hypertension. Hypertension is high bloodpressure. The DASH diet focuses on eating foods that are high in calcium, potassium, and magnesium. These nutrients can lower blood pressure. The foods that are highest in these nutrients are fruits, vegetables, low-fat dairy products, nuts, seeds, and legumes. But taking calcium, potassium, and magnesium supplements instead of eating foods that are high in those nutrients does not have the same effect. The DASH diet also includes whole grains, fish, and poultry. The DASH diet is one of several lifestyle changes your doctor may recommend to lower your high blood pressure. Your doctor may also want you to decrease the amount of sodium in your diet. Lowering sodium while following the DASH dietcan lower blood pressure even further than just the DASH diet alone. Follow-up care is a key part of your treatment and safety. Be sure to make and go to all appointments, and call your doctor if you are having problems. It's also a good idea to know your test results and keep alist of the medicines you take. How can you care for yourself at home? Following the DASH diet   Eat 4 to 5 servings of fruit each day. A serving is 1 medium-sized piece of fruit, ½ cup chopped or canned fruit, 1/4 cup dried fruit, or 4 ounces (½ cup) of fruit juice. Choose fruit more often than fruit juice.  Eat 4 to 5 servings of vegetables each day. A serving is 1 cup of lettuce or raw leafy vegetables, ½ cup of chopped or cooked vegetables, or 4 ounces (½ cup) of vegetable juice. Choose vegetables more often than vegetable juice.  Get 2 to 3 servings of low-fat and fat-free dairy each day. A serving is 8 ounces of milk, 1 cup of yogurt, or 1 ½ ounces of cheese.  Eat 6 to 8 servings of grains each day.  A serving is 1 slice of bread, 1 ounce of dry cereal, or ½ cup of cooked rice, pasta, or cooked cereal. Try to choose whole-grain products as much as possible.  Limit lean meat, poultry, and fish to 2 servings each day. A serving is 3 ounces, about the size of a deck of cards.  Eat 4 to 5 servings of nuts, seeds, and legumes (cooked dried beans, lentils, and split peas) each week. A serving is 1/3 cup of nuts, 2 tablespoons of seeds, or ½ cup of cooked beans or peas.  Limit fats and oils to 2 to 3 servings each day. A serving is 1 teaspoon of vegetable oil or 2 tablespoons of salad dressing.  Limit sweets and added sugars to 5 servings or less a week. A serving is 1 tablespoon jelly or jam, ½ cup sorbet, or 1 cup of lemonade.  Eat less than 2,300 milligrams (mg) of sodium a day. If you limit your sodium to 1,500 mg a day, you can lower your blood pressure even more.  Be aware that all of these are the suggested number of servings for people who eat 1,800 to 2,000 calories a day. Your recommended number of servings may be different if you need more or fewer calories. Tips for success   Start small. Do not try to make dramatic changes to your diet all at once. You might feel that you are missing out on your favorite foods and then be more likely to not follow the plan. Make small changes, and stick with them. Once those changes become habit, add a few more changes.  Try some of the following:  ? Make it a goal to eat a fruit or vegetable at every meal and at snacks. This will make it easy to get the recommended amount of fruits and vegetables each day. ? Try yogurt topped with fruit and nuts for a snack or healthy dessert. ? Add lettuce, tomato, cucumber, and onion to sandwiches. ? Combine a ready-made pizza crust with low-fat mozzarella cheese and lots of vegetable toppings. Try using tomatoes, squash, spinach, broccoli, carrots, cauliflower, and onions. ?  Have a variety of cut-up vegetables with a low-fat dip as an appetizer instead of chips and dip. ? Sprinkle sunflower seeds or chopped almonds over salads. Or try adding chopped walnuts or almonds to cooked vegetables. ? Try some vegetarian meals using beans and peas. Add garbanzo or kidney beans to salads. Make burritos and tacos with mashed yañez beans or black beans. Where can you learn more? Go to https://[employer]. org and sign in to your [employer] account. Enter L974 in the [facility] box to learn more about \"DASH Diet: Care Instructions. \"     If you do not have an account, please click on the \"Sign Up Now\" link. Current as of: January 10, 2022               Content Version: 13.2  © 0663-7684 [employer]. Care instructions adapted under license by [facility] ([facility]). If you have questions about a medical condition or this instruction, always ask your healthcare professional. [address] any warranty or liability for your use of this information.

## 2022-05-11 NOTE — PROGRESS NOTES
Ivy Gonzalez is a 61 y.o. female evaluated via telephone on 5/11/2022 for Follow-up (BP medication check )  . Documentation:  I communicated with the patient and/or health care decision maker about htn and back pain. Started losartan and BP has come down nicely. Today it was 108/91. Awaiting back surgery with Dr. Dean Chaudhary on 5/16/22  Details of this discussion including any medical advice provided:   Problem List Items Addressed This Visit        Circulatory    Primary hypertension - Primary      Well-controlled, continue current medications, continue current treatment plan, medication adherence emphasized and lifestyle modifications recommended            Nervous and Auditory    Chronic bilateral low back pain with right-sided sciatica (Chronic)      Monitored by specialist- no acute findings meriting change in the plan   Awaiting surgery            Musculoskeletal and Integument    Lumbar disc herniation      Monitored by specialist- no acute findings meriting change in the plan   Awaiting surgery                 Total Time: minutes: 11-20 minutes    Ivy Gonzalez was evaluated through a synchronous (real-time) audio encounter. Patient identification was verified at the start of the visit. She (or guardian if applicable) is aware that this is a billable service, which includes applicable co-pays. This visit was conducted with the patient's (and/or legal guardian's) verbal consent. She has not had a related appointment within my department in the past 7 days or scheduled within the next 24 hours. The patient was located in a state where the provider was licensed to provide care.     Note: not billable if this call serves to triage the patient into an appointment for the relevant concern    Vic Daley DO

## 2022-05-11 NOTE — TELEPHONE ENCOUNTER
Ty from pts PCP office called in wanting to know if pt needs clearance from Dr. William Mcgraw for her upcoming surgery with Dr. Alona Leiva can be reached at [phone]

## 2022-05-12 RX ORDER — DICLOFENAC SODIUM 75 MG/1
TABLET, DELAYED RELEASE ORAL
Qty: 60 TABLET | Refills: 0 | OUTPATIENT
Start: 2022-05-12

## 2022-05-13 ENCOUNTER — ANESTHESIA EVENT (OUTPATIENT)
Dept: OPERATING ROOM | Age: 57
DRG: 455 | End: 2022-05-13
Payer: COMMERCIAL

## 2022-05-16 ENCOUNTER — APPOINTMENT (OUTPATIENT)
Dept: GENERAL RADIOLOGY | Age: 57
DRG: 455 | End: 2022-05-16
Attending: ORTHOPAEDIC SURGERY
Payer: COMMERCIAL

## 2022-05-16 ENCOUNTER — HOSPITAL ENCOUNTER (INPATIENT)
Age: 57
LOS: 1 days | Discharge: HOME | DRG: 455 | End: 2022-05-17
Attending: ORTHOPAEDIC SURGERY | Admitting: ORTHOPAEDIC SURGERY
Payer: COMMERCIAL

## 2022-05-16 ENCOUNTER — ANESTHESIA (OUTPATIENT)
Dept: OPERATING ROOM | Age: 57
DRG: 455 | End: 2022-05-16
Payer: COMMERCIAL

## 2022-05-16 DIAGNOSIS — R52 PAIN: ICD-10-CM

## 2022-05-16 DIAGNOSIS — M43.10 ACQUIRED SPONDYLOLISTHESIS: Primary | ICD-10-CM

## 2022-05-16 PROBLEM — M54.9 BACK PAIN: Status: ACTIVE | Noted: 2022-05-16

## 2022-05-16 LAB
POTASSIUM SERPL-SCNC: 3.5 MMOL/L (ref 3.7–5.3)
REASON FOR REJECTION: NORMAL
ZZ NTE CLEAN UP: ORDERED TEST: NORMAL
ZZ NTE WITH NAME CLEAN UP: SPECIMEN SOURCE: NORMAL

## 2022-05-16 PROCEDURE — 6370000000 HC RX 637 (ALT 250 FOR IP): Performed by: ORTHOPAEDIC SURGERY

## 2022-05-16 PROCEDURE — 6360000002 HC RX W HCPCS: Performed by: NURSE ANESTHETIST, CERTIFIED REGISTERED

## 2022-05-16 PROCEDURE — 7100000000 HC PACU RECOVERY - FIRST 15 MIN: Performed by: ORTHOPAEDIC SURGERY

## 2022-05-16 PROCEDURE — C1889 IMPLANT/INSERT DEVICE, NOC: HCPCS | Performed by: ORTHOPAEDIC SURGERY

## 2022-05-16 PROCEDURE — 3209999900 FLUORO FOR SURGICAL PROCEDURES

## 2022-05-16 PROCEDURE — 3700000000 HC ANESTHESIA ATTENDED CARE: Performed by: ORTHOPAEDIC SURGERY

## 2022-05-16 PROCEDURE — 6360000002 HC RX W HCPCS: Performed by: ORTHOPAEDIC SURGERY

## 2022-05-16 PROCEDURE — 2580000003 HC RX 258: Performed by: ANESTHESIOLOGY

## 2022-05-16 PROCEDURE — 2500000003 HC RX 250 WO HCPCS: Performed by: NURSE ANESTHETIST, CERTIFIED REGISTERED

## 2022-05-16 PROCEDURE — 3600000003 HC SURGERY LEVEL 3 BASE: Performed by: ORTHOPAEDIC SURGERY

## 2022-05-16 PROCEDURE — 0SG30AJ FUSION OF LUMBOSACRAL JOINT WITH INTERBODY FUSION DEVICE, POSTERIOR APPROACH, ANTERIOR COLUMN, OPEN APPROACH: ICD-10-PCS | Performed by: ORTHOPAEDIC SURGERY

## 2022-05-16 PROCEDURE — 0SB40ZZ EXCISION OF LUMBOSACRAL DISC, OPEN APPROACH: ICD-10-PCS | Performed by: ORTHOPAEDIC SURGERY

## 2022-05-16 PROCEDURE — 3700000001 HC ADD 15 MINUTES (ANESTHESIA): Performed by: ORTHOPAEDIC SURGERY

## 2022-05-16 PROCEDURE — 84132 ASSAY OF SERUM POTASSIUM: CPT

## 2022-05-16 PROCEDURE — 0SG3071 FUSION OF LUMBOSACRAL JOINT WITH AUTOLOGOUS TISSUE SUBSTITUTE, POSTERIOR APPROACH, POSTERIOR COLUMN, OPEN APPROACH: ICD-10-PCS | Performed by: ORTHOPAEDIC SURGERY

## 2022-05-16 PROCEDURE — 1200000000 HC SEMI PRIVATE

## 2022-05-16 PROCEDURE — 3600000013 HC SURGERY LEVEL 3 ADDTL 15MIN: Performed by: ORTHOPAEDIC SURGERY

## 2022-05-16 PROCEDURE — 2709999900 HC NON-CHARGEABLE SUPPLY: Performed by: ORTHOPAEDIC SURGERY

## 2022-05-16 PROCEDURE — 2720000010 HC SURG SUPPLY STERILE: Performed by: ORTHOPAEDIC SURGERY

## 2022-05-16 PROCEDURE — 2580000003 HC RX 258: Performed by: ORTHOPAEDIC SURGERY

## 2022-05-16 PROCEDURE — 36415 COLL VENOUS BLD VENIPUNCTURE: CPT

## 2022-05-16 PROCEDURE — C1713 ANCHOR/SCREW BN/BN,TIS/BN: HCPCS | Performed by: ORTHOPAEDIC SURGERY

## 2022-05-16 PROCEDURE — 01NB0ZZ RELEASE LUMBAR NERVE, OPEN APPROACH: ICD-10-PCS | Performed by: ORTHOPAEDIC SURGERY

## 2022-05-16 PROCEDURE — 7100000001 HC PACU RECOVERY - ADDTL 15 MIN: Performed by: ORTHOPAEDIC SURGERY

## 2022-05-16 PROCEDURE — 6360000002 HC RX W HCPCS: Performed by: ANESTHESIOLOGY

## 2022-05-16 DEVICE — GRAFT BNE CHIP 30 CC FD CORTICAL CANC: Type: IMPLANTABLE DEVICE | Site: SPINE LUMBAR | Status: FUNCTIONAL

## 2022-05-16 DEVICE — SPACER SPNL 15 DEG LG 28X10 MM STRL PROLIFT: Type: IMPLANTABLE DEVICE | Site: SPINE LUMBAR | Status: FUNCTIONAL

## 2022-05-16 DEVICE — IMPLANTABLE DEVICE: Type: IMPLANTABLE DEVICE | Site: SPINE LUMBAR | Status: FUNCTIONAL

## 2022-05-16 DEVICE — SET SCR SPNL POLYAX ATR EVEREST: Type: IMPLANTABLE DEVICE | Site: SPINE LUMBAR | Status: FUNCTIONAL

## 2022-05-16 DEVICE — ROD SPNL CONTOURED 5.5X45 MM LUMBAR TI DENALI: Type: IMPLANTABLE DEVICE | Site: SPINE LUMBAR | Status: FUNCTIONAL

## 2022-05-16 RX ORDER — VERAPAMIL HYDROCHLORIDE 40 MG/1
40 TABLET ORAL EVERY 8 HOURS SCHEDULED
Status: DISCONTINUED | OUTPATIENT
Start: 2022-05-16 | End: 2022-05-17 | Stop reason: HOSPADM

## 2022-05-16 RX ORDER — FENTANYL CITRATE 50 UG/ML
INJECTION, SOLUTION INTRAMUSCULAR; INTRAVENOUS PRN
Status: DISCONTINUED | OUTPATIENT
Start: 2022-05-16 | End: 2022-05-16 | Stop reason: SDUPTHER

## 2022-05-16 RX ORDER — SODIUM CHLORIDE 9 MG/ML
INJECTION, SOLUTION INTRAVENOUS PRN
Status: DISCONTINUED | OUTPATIENT
Start: 2022-05-16 | End: 2022-05-16 | Stop reason: HOSPADM

## 2022-05-16 RX ORDER — SUCCINYLCHOLINE/SOD CL,ISO/PF 200MG/10ML
SYRINGE (ML) INTRAVENOUS PRN
Status: DISCONTINUED | OUTPATIENT
Start: 2022-05-16 | End: 2022-05-16 | Stop reason: SDUPTHER

## 2022-05-16 RX ORDER — GLYCOPYRROLATE 0.2 MG/ML
INJECTION INTRAMUSCULAR; INTRAVENOUS PRN
Status: DISCONTINUED | OUTPATIENT
Start: 2022-05-16 | End: 2022-05-16 | Stop reason: SDUPTHER

## 2022-05-16 RX ORDER — CYCLOBENZAPRINE HCL 10 MG
10 TABLET ORAL 3 TIMES DAILY PRN
Status: DISCONTINUED | OUTPATIENT
Start: 2022-05-16 | End: 2022-05-17 | Stop reason: HOSPADM

## 2022-05-16 RX ORDER — PROMETHAZINE HYDROCHLORIDE 25 MG/1
12.5 TABLET ORAL EVERY 6 HOURS PRN
Status: DISCONTINUED | OUTPATIENT
Start: 2022-05-16 | End: 2022-05-17 | Stop reason: HOSPADM

## 2022-05-16 RX ORDER — SODIUM CHLORIDE, SODIUM LACTATE, POTASSIUM CHLORIDE, CALCIUM CHLORIDE 600; 310; 30; 20 MG/100ML; MG/100ML; MG/100ML; MG/100ML
INJECTION, SOLUTION INTRAVENOUS CONTINUOUS
Status: DISCONTINUED | OUTPATIENT
Start: 2022-05-16 | End: 2022-05-16 | Stop reason: HOSPADM

## 2022-05-16 RX ORDER — MAGNESIUM 30 MG
1 TABLET ORAL 3 TIMES DAILY
COMMUNITY

## 2022-05-16 RX ORDER — OXYCODONE HYDROCHLORIDE 10 MG/1
10 TABLET ORAL EVERY 4 HOURS PRN
Status: DISCONTINUED | OUTPATIENT
Start: 2022-05-16 | End: 2022-05-17 | Stop reason: HOSPADM

## 2022-05-16 RX ORDER — GABAPENTIN 400 MG/1
400 CAPSULE ORAL 3 TIMES DAILY
Status: DISCONTINUED | OUTPATIENT
Start: 2022-05-16 | End: 2022-05-17 | Stop reason: HOSPADM

## 2022-05-16 RX ORDER — ONDANSETRON 2 MG/ML
4 INJECTION INTRAMUSCULAR; INTRAVENOUS EVERY 6 HOURS PRN
Status: DISCONTINUED | OUTPATIENT
Start: 2022-05-16 | End: 2022-05-17 | Stop reason: HOSPADM

## 2022-05-16 RX ORDER — MORPHINE SULFATE 2 MG/ML
2 INJECTION, SOLUTION INTRAMUSCULAR; INTRAVENOUS
Status: DISCONTINUED | OUTPATIENT
Start: 2022-05-16 | End: 2022-05-17 | Stop reason: HOSPADM

## 2022-05-16 RX ORDER — KETAMINE HYDROCHLORIDE 50 MG/ML
INJECTION, SOLUTION, CONCENTRATE INTRAMUSCULAR; INTRAVENOUS PRN
Status: DISCONTINUED | OUTPATIENT
Start: 2022-05-16 | End: 2022-05-16 | Stop reason: SDUPTHER

## 2022-05-16 RX ORDER — TRANEXAMIC ACID 100 MG/ML
INJECTION, SOLUTION INTRAVENOUS PRN
Status: DISCONTINUED | OUTPATIENT
Start: 2022-05-16 | End: 2022-05-16 | Stop reason: SDUPTHER

## 2022-05-16 RX ORDER — MIDAZOLAM HYDROCHLORIDE 1 MG/ML
INJECTION INTRAMUSCULAR; INTRAVENOUS PRN
Status: DISCONTINUED | OUTPATIENT
Start: 2022-05-16 | End: 2022-05-16 | Stop reason: SDUPTHER

## 2022-05-16 RX ORDER — MORPHINE SULFATE 4 MG/ML
4 INJECTION, SOLUTION INTRAMUSCULAR; INTRAVENOUS
Status: DISCONTINUED | OUTPATIENT
Start: 2022-05-16 | End: 2022-05-17 | Stop reason: HOSPADM

## 2022-05-16 RX ORDER — ONDANSETRON 2 MG/ML
4 INJECTION INTRAMUSCULAR; INTRAVENOUS
Status: DISCONTINUED | OUTPATIENT
Start: 2022-05-16 | End: 2022-05-16 | Stop reason: HOSPADM

## 2022-05-16 RX ORDER — ONDANSETRON 2 MG/ML
INJECTION INTRAMUSCULAR; INTRAVENOUS PRN
Status: DISCONTINUED | OUTPATIENT
Start: 2022-05-16 | End: 2022-05-16 | Stop reason: SDUPTHER

## 2022-05-16 RX ORDER — OXYCODONE HYDROCHLORIDE 5 MG/1
5 TABLET ORAL EVERY 4 HOURS PRN
Status: DISCONTINUED | OUTPATIENT
Start: 2022-05-16 | End: 2022-05-17 | Stop reason: HOSPADM

## 2022-05-16 RX ORDER — DEXAMETHASONE SODIUM PHOSPHATE 4 MG/ML
INJECTION, SOLUTION INTRA-ARTICULAR; INTRALESIONAL; INTRAMUSCULAR; INTRAVENOUS; SOFT TISSUE PRN
Status: DISCONTINUED | OUTPATIENT
Start: 2022-05-16 | End: 2022-05-16 | Stop reason: SDUPTHER

## 2022-05-16 RX ORDER — PROPOFOL 10 MG/ML
INJECTION, EMULSION INTRAVENOUS PRN
Status: DISCONTINUED | OUTPATIENT
Start: 2022-05-16 | End: 2022-05-16 | Stop reason: SDUPTHER

## 2022-05-16 RX ORDER — SODIUM CHLORIDE 9 MG/ML
INJECTION, SOLUTION INTRAVENOUS PRN
Status: DISCONTINUED | OUTPATIENT
Start: 2022-05-16 | End: 2022-05-17 | Stop reason: HOSPADM

## 2022-05-16 RX ORDER — HYDROMORPHONE HCL 110MG/55ML
PATIENT CONTROLLED ANALGESIA SYRINGE INTRAVENOUS PRN
Status: DISCONTINUED | OUTPATIENT
Start: 2022-05-16 | End: 2022-05-16 | Stop reason: SDUPTHER

## 2022-05-16 RX ORDER — SODIUM CHLORIDE 0.9 % (FLUSH) 0.9 %
5-40 SYRINGE (ML) INJECTION PRN
Status: DISCONTINUED | OUTPATIENT
Start: 2022-05-16 | End: 2022-05-16 | Stop reason: HOSPADM

## 2022-05-16 RX ORDER — LIDOCAINE HYDROCHLORIDE 10 MG/ML
1 INJECTION, SOLUTION EPIDURAL; INFILTRATION; INTRACAUDAL; PERINEURAL
Status: DISCONTINUED | OUTPATIENT
Start: 2022-05-16 | End: 2022-05-16 | Stop reason: HOSPADM

## 2022-05-16 RX ORDER — SODIUM CHLORIDE 0.9 % (FLUSH) 0.9 %
5-40 SYRINGE (ML) INJECTION EVERY 12 HOURS SCHEDULED
Status: DISCONTINUED | OUTPATIENT
Start: 2022-05-16 | End: 2022-05-16 | Stop reason: HOSPADM

## 2022-05-16 RX ORDER — POTASSIUM CHLORIDE 7.45 MG/ML
10 INJECTION INTRAVENOUS
Status: COMPLETED | OUTPATIENT
Start: 2022-05-16 | End: 2022-05-16

## 2022-05-16 RX ORDER — SODIUM CHLORIDE 0.9 % (FLUSH) 0.9 %
5-40 SYRINGE (ML) INJECTION EVERY 12 HOURS SCHEDULED
Status: DISCONTINUED | OUTPATIENT
Start: 2022-05-16 | End: 2022-05-17 | Stop reason: HOSPADM

## 2022-05-16 RX ORDER — DIPHENHYDRAMINE HYDROCHLORIDE 50 MG/ML
12.5 INJECTION INTRAMUSCULAR; INTRAVENOUS
Status: DISCONTINUED | OUTPATIENT
Start: 2022-05-16 | End: 2022-05-16 | Stop reason: HOSPADM

## 2022-05-16 RX ORDER — METOPROLOL TARTRATE 5 MG/5ML
INJECTION INTRAVENOUS PRN
Status: DISCONTINUED | OUTPATIENT
Start: 2022-05-16 | End: 2022-05-16 | Stop reason: SDUPTHER

## 2022-05-16 RX ORDER — LOSARTAN POTASSIUM 50 MG/1
50 TABLET ORAL DAILY
Status: DISCONTINUED | OUTPATIENT
Start: 2022-05-17 | End: 2022-05-17 | Stop reason: HOSPADM

## 2022-05-16 RX ORDER — VANCOMYCIN HYDROCHLORIDE 1 G/20ML
INJECTION, POWDER, LYOPHILIZED, FOR SOLUTION INTRAVENOUS PRN
Status: DISCONTINUED | OUTPATIENT
Start: 2022-05-16 | End: 2022-05-16 | Stop reason: ALTCHOICE

## 2022-05-16 RX ORDER — ACETAMINOPHEN 325 MG/1
650 TABLET ORAL EVERY 6 HOURS
Status: DISCONTINUED | OUTPATIENT
Start: 2022-05-16 | End: 2022-05-17 | Stop reason: HOSPADM

## 2022-05-16 RX ORDER — SODIUM CHLORIDE 0.9 % (FLUSH) 0.9 %
5-40 SYRINGE (ML) INJECTION PRN
Status: DISCONTINUED | OUTPATIENT
Start: 2022-05-16 | End: 2022-05-17 | Stop reason: HOSPADM

## 2022-05-16 RX ORDER — MAGNESIUM 30 MG
30 TABLET ORAL 3 TIMES DAILY
Status: DISCONTINUED | OUTPATIENT
Start: 2022-05-16 | End: 2022-05-16 | Stop reason: RX

## 2022-05-16 RX ORDER — LIDOCAINE HYDROCHLORIDE 20 MG/ML
INJECTION, SOLUTION EPIDURAL; INFILTRATION; INTRACAUDAL; PERINEURAL PRN
Status: DISCONTINUED | OUTPATIENT
Start: 2022-05-16 | End: 2022-05-16 | Stop reason: SDUPTHER

## 2022-05-16 RX ADMIN — VERAPAMIL HYDROCHLORIDE 40 MG: 40 TABLET ORAL at 18:49

## 2022-05-16 RX ADMIN — PHENYLEPHRINE HYDROCHLORIDE 100 MCG: 10 INJECTION INTRAVENOUS at 13:20

## 2022-05-16 RX ADMIN — POTASSIUM CHLORIDE 10 MEQ: 7.46 INJECTION, SOLUTION INTRAVENOUS at 13:03

## 2022-05-16 RX ADMIN — Medication 10 ML: at 16:32

## 2022-05-16 RX ADMIN — FENTANYL CITRATE 100 MCG: 50 INJECTION, SOLUTION INTRAMUSCULAR; INTRAVENOUS at 11:12

## 2022-05-16 RX ADMIN — PROPOFOL 60 MCG/KG/MIN: 10 INJECTION, EMULSION INTRAVENOUS at 12:36

## 2022-05-16 RX ADMIN — PROPOFOL 20 MCG/KG/MIN: 10 INJECTION, EMULSION INTRAVENOUS at 11:18

## 2022-05-16 RX ADMIN — PHENYLEPHRINE HYDROCHLORIDE 100 MCG: 10 INJECTION INTRAVENOUS at 12:49

## 2022-05-16 RX ADMIN — OXYCODONE HYDROCHLORIDE 5 MG: 5 TABLET ORAL at 21:09

## 2022-05-16 RX ADMIN — POTASSIUM CHLORIDE 10 MEQ: 7.46 INJECTION, SOLUTION INTRAVENOUS at 12:27

## 2022-05-16 RX ADMIN — CYCLOBENZAPRINE 10 MG: 10 TABLET, FILM COATED ORAL at 21:08

## 2022-05-16 RX ADMIN — GABAPENTIN 400 MG: 400 CAPSULE ORAL at 18:32

## 2022-05-16 RX ADMIN — KETAMINE HYDROCHLORIDE 25 MG: 50 INJECTION, SOLUTION INTRAMUSCULAR; INTRAVENOUS at 12:14

## 2022-05-16 RX ADMIN — PROPOFOL 200 MG: 10 INJECTION, EMULSION INTRAVENOUS at 11:12

## 2022-05-16 RX ADMIN — Medication 120 MG: at 11:13

## 2022-05-16 RX ADMIN — LIDOCAINE HYDROCHLORIDE 80 MG: 20 INJECTION, SOLUTION EPIDURAL; INFILTRATION; INTRACAUDAL; PERINEURAL at 11:11

## 2022-05-16 RX ADMIN — TRANEXAMIC ACID 1000 MG: 100 INJECTION INTRAVENOUS at 11:25

## 2022-05-16 RX ADMIN — MIDAZOLAM 2 MG: 1 INJECTION INTRAMUSCULAR; INTRAVENOUS at 11:20

## 2022-05-16 RX ADMIN — SODIUM CHLORIDE, PRESERVATIVE FREE 10 ML: 5 INJECTION INTRAVENOUS at 21:09

## 2022-05-16 RX ADMIN — PHENYLEPHRINE HYDROCHLORIDE 100 MCG: 10 INJECTION INTRAVENOUS at 12:22

## 2022-05-16 RX ADMIN — KETAMINE HYDROCHLORIDE 25 MG: 50 INJECTION, SOLUTION INTRAMUSCULAR; INTRAVENOUS at 11:26

## 2022-05-16 RX ADMIN — PHENYLEPHRINE HYDROCHLORIDE 100 MCG: 10 INJECTION INTRAVENOUS at 12:57

## 2022-05-16 RX ADMIN — CEFAZOLIN SODIUM 2000 MG: 10 INJECTION, POWDER, FOR SOLUTION INTRAVENOUS at 19:00

## 2022-05-16 RX ADMIN — ACETAMINOPHEN 650 MG: 325 TABLET ORAL at 18:32

## 2022-05-16 RX ADMIN — ONDANSETRON 4 MG: 2 INJECTION INTRAMUSCULAR; INTRAVENOUS at 11:19

## 2022-05-16 RX ADMIN — PHENYLEPHRINE HYDROCHLORIDE 100 MCG: 10 INJECTION INTRAVENOUS at 13:07

## 2022-05-16 RX ADMIN — METOPROLOL TARTRATE 2.5 MG: 1 INJECTION, SOLUTION INTRAVENOUS at 13:25

## 2022-05-16 RX ADMIN — SODIUM CHLORIDE, POTASSIUM CHLORIDE, SODIUM LACTATE AND CALCIUM CHLORIDE: 600; 310; 30; 20 INJECTION, SOLUTION INTRAVENOUS at 10:47

## 2022-05-16 RX ADMIN — SODIUM CHLORIDE, POTASSIUM CHLORIDE, SODIUM LACTATE AND CALCIUM CHLORIDE: 600; 310; 30; 20 INJECTION, SOLUTION INTRAVENOUS at 14:05

## 2022-05-16 RX ADMIN — CEFAZOLIN 2000 MG: 10 INJECTION, POWDER, FOR SOLUTION INTRAVENOUS at 11:19

## 2022-05-16 RX ADMIN — DEXAMETHASONE SODIUM PHOSPHATE 8 MG: 4 INJECTION, SOLUTION INTRAMUSCULAR; INTRAVENOUS at 11:19

## 2022-05-16 RX ADMIN — GLYCOPYRROLATE 0.2 MG: 0.2 INJECTION INTRAMUSCULAR; INTRAVENOUS at 11:20

## 2022-05-16 RX ADMIN — HYDROMORPHONE HYDROCHLORIDE 2 MG: 2 INJECTION, SOLUTION INTRAMUSCULAR; INTRAVENOUS; SUBCUTANEOUS at 11:20

## 2022-05-16 ASSESSMENT — PAIN DESCRIPTION - FREQUENCY: FREQUENCY: INTERMITTENT

## 2022-05-16 ASSESSMENT — PAIN DESCRIPTION - LOCATION: LOCATION: BACK

## 2022-05-16 ASSESSMENT — PAIN DESCRIPTION - DESCRIPTORS
DESCRIPTORS: ACHING
DESCRIPTORS: ACHING;SHARP;SHOOTING;THROBBING
DESCRIPTORS: DULL;THROBBING
DESCRIPTORS: DULL

## 2022-05-16 ASSESSMENT — PAIN SCALES - GENERAL
PAINLEVEL_OUTOF10: 10
PAINLEVEL_OUTOF10: 10
PAINLEVEL_OUTOF10: 0
PAINLEVEL_OUTOF10: 4

## 2022-05-16 ASSESSMENT — PAIN DESCRIPTION - ONSET: ONSET: GRADUAL

## 2022-05-16 ASSESSMENT — PAIN DESCRIPTION - ORIENTATION: ORIENTATION: LOWER

## 2022-05-16 ASSESSMENT — PAIN - FUNCTIONAL ASSESSMENT
PAIN_FUNCTIONAL_ASSESSMENT: PREVENTS OR INTERFERES SOME ACTIVE ACTIVITIES AND ADLS
PAIN_FUNCTIONAL_ASSESSMENT: 0-10

## 2022-05-16 ASSESSMENT — PAIN DESCRIPTION - PAIN TYPE: TYPE: SURGICAL PAIN

## 2022-05-16 NOTE — BRIEF OP NOTE
Brief Postoperative Note      Patient: Thomas Blount  YOB: 1965  MRN: [id number]    Date of Procedure: 5/16/2022    Pre-Op Diagnosis: SPONDYLOLISTHESIS W/DDD    Post-Op Diagnosis: Same       Procedure(s):  LUMBAR L5-S1 DECOMPRESSION FUSION POSTERIOR    Surgeon(s):  Hoda Wiley MD    Assistant:  * No surgical staff found *    Anesthesia: General    Estimated Blood Loss (mL): 141     Complications: None    Specimens:   * No specimens in log *    Implants:  Implant Name Type Inv.  Item Serial No.  Lot No. LRB No. Used Action   GRAFT BNE CHIP 30 CC FD CORTICAL CANC - [id number]  GRAFT BNE CHIP 30 CC FD CORTICAL CANC [id number] [facility]-WD [id number] N/A 1 Implanted   SPACER SPNL 15 DEG LG 28X10 MM STRL PROLIFT - [id number]  SPACER SPNL 15 DEG LG 28X10 MM STRL PROLIFT  JEN SPINE HOWM-WD [id number] N/A 1 Implanted   SCREW SPNL POLYAX 5.5X30 MM BELKIS FEN INVASIVE EVEREST - [id number]  SCREW SPNL POLYAX 5.5X30 MM BELKIS FEN INVASIVE EVEREST  JEN SPINE HOWM-WD  N/A 2 Implanted   SCREW SPNL POLYAX 5.5X45 MM BELKIS FEN INVASIVE EVEREST - [id number]  SCREW SPNL POLYAX 5.5X45 MM BELKIS FEN INVASIVE EVEREST  JEN SPINE HOWM-WD  N/A 2 Implanted   SET SCR SPNL POLYAX ATR EVEREST - [id number]  SET SCR SPNL POLYAX ATR EVEREST  [employer] INC-WD  N/A 4 Implanted   BROOKE SPNL CONTOURED 5.5X45 MM LUMBAR TI JAKE - [id number]  BROOKE SPNL CONTOURED 5.5X45 MM LUMBAR TI JAKE  JEN SPINE HOWM-WD  N/A 2 Implanted         Drains: * No LDAs found *    Findings: see dictation    Electronically signed by Hoda Wiley MD on 5/16/2022 at 2:19 PM

## 2022-05-16 NOTE — PROGRESS NOTES
Patient arrived to floor in room 2045. Admission questions answered by . Patient is still drowsy from surgery. Unable to complete full HTT assessment. Will try again later. Call light in reach.

## 2022-05-16 NOTE — PROGRESS NOTES
Pharmacy Note    Imelda Black was ordered magnesium 30 mg. As per the [address] [place] [address], herbals and certain dietary supplements will be discontinued.   The herbal or dietary supplement may be continued after discharge from the hospital.    Suzi Higgins, PharmD, [facility] 5/16/2022 4:10 PM

## 2022-05-16 NOTE — PLAN OF CARE
Problem: Discharge Planning  Goal: Discharge to home or other facility with appropriate resources  Outcome: Progressing     Problem: Pain  Goal: Verbalizes/displays adequate comfort level or baseline comfort level  Outcome: Progressing  Note: No c/o pain this shift. Problem: Safety - Adult  Goal: Free from fall injury  Outcome: Progressing  Note: Call light in reach and hourly checks completed.      Problem: ABCDS Injury Assessment  Goal: Absence of physical injury  Outcome: Progressing

## 2022-05-16 NOTE — ANESTHESIA POSTPROCEDURE EVALUATION
POST- ANESTHESIA EVALUATION       Pt Name: Pierre Delatorre  MRN: [id number]  [place]: 1965  Date of evaluation: 5/16/2022  Time:  3:06 PM      /88   Pulse 71   Temp 97.5 °F (36.4 °C) (Infrared)   Resp (!) 7   Ht 5' 7\" (1.702 m)   Wt 178 lb (80.7 kg)   SpO2 100%   BMI 27.88 kg/m²      Consciousness Level  Awake  Cardiopulmonary Status  Stable  Pain Adequately Treated YES  Nausea / Vomiting  NO  Adequate Hydration  YES  Anesthesia Related Complications NONE      Electronically signed by Antonio García MD on 5/16/2022 at 3:06 PM       Department of Anesthesiology  Postprocedure Note    Patient: Pierre Delatorre  MRN: [id number]  [place]: 1965  Date of evaluation: 5/16/2022  Time:  3:05 PM     Procedure Summary     Date: 05/16/22 Room / Location: [address] [place] [address] Lia Black  [facility]    Anesthesia Start: 1108 Anesthesia Stop: 0782    Procedure: LUMBAR L5-S1 DECOMPRESSION FUSION POSTERIOR (N/A Spine Lumbar) Diagnosis: (SPONDYLOLISTHESIS W/DDD)    Surgeons: [place] MD Responsible Provider: Antonio García MD    Anesthesia Type: general ASA Status: 2          Anesthesia Type: general    Alyse Phase I: Alyse Score: 5    Alyse Phase II:      Last vitals: Reviewed and per EMR flowsheets.        Anesthesia Post Evaluation

## 2022-05-16 NOTE — ANESTHESIA PRE PROCEDURE
Department of Anesthesiology  Preprocedure Note       Name:  Salvatore Bangura   Age:  61 y.o.  :  1965                                          MRN:  [id number]         Date:  2022      Surgeon: Jeanette Hanna):  Ayala Guillory MD    Procedure: Procedure(s):  LUMBAR L5-S1 DECOMPRESSION FUSION POSTERIOR    Medications prior to admission:   Prior to Admission medications    Medication Sig Start Date End Date Taking? Authorizing Provider   losartan (COZAAR) 50 MG tablet Take 1 tablet by mouth daily 22   Sanjay Bajwa DO   HYDROcodone-acetaminophen [facility]) 5-325 MG per tablet Take 1 tablet by mouth every 8 hours as needed for Pain. Historical Provider, MD   cyclobenzaprine (FLEXERIL) 5 MG tablet Take 5 mg by mouth 2 times daily as needed for Muscle spasms    Historical Provider, MD   gabapentin (NEURONTIN) 400 MG capsule Take 1 capsule by mouth 3 times daily for 30 days.  take 1 capsule by mouth 3 TIMES A DAY 22  GONZÁLEZ Garcia CNP   metaxalone (SKELAXIN) 800 MG tablet TAKE ONE TABLET BY MOUTH THREE TIMES A DAY AS NEEDED FOR SPASMS 22   GONZÁLEZ Garcia CNP   diclofenac (VOLTAREN) 75 MG EC tablet take 1 tablet by mouth twice a day 22   Aileen Killian PA-C   verapamil (CALAN) 40 MG tablet 1 tablet po TID 22   Aileen PAL Fornroman PA-C   magnesium citrate solution Take 296 mLs by mouth once     Historical Provider, MD   acetaminophen (TYLENOL) 650 MG extended release tablet Take 650 mg by mouth every 8 hours as needed for Pain    Historical Provider, MD       Current medications:    Current Facility-Administered Medications   Medication Dose Route Frequency Provider Last Rate Last Admin    lidocaine PF 1 % injection 1 mL  1 mL IntraDERmal Once PRN Dixie Edmondson MD        lactated ringers infusion   IntraVENous Continuous Dixie Edmondson MD        sodium chloride flush 0.9 % injection 5-40 mL  5-40 mL IntraVENous 2 times per day Dixie Edmondson MD        sodium chloride flush 0.9 % injection 5-40 mL  5-40 mL IntraVENous PRN Dennis Fischer MD        0.9 % sodium chloride infusion   IntraVENous PRN Dennis Fischer MD        ceFAZolin (ANCEF) 2000 mg in dextrose 5 % 50 mL IVPB  2,000 mg IntraVENous Once Eladio Phoenix MD           Allergies:  No Known Allergies    Problem List:    Patient Active Problem List   Diagnosis Code    Prediabetes R73.03    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    Dyslipidemia E78.5    Chronic bilateral low back pain with right-sided sciatica M54.41, G89.29    DDD (degenerative disc disease), lumbar M51.36    Medication management Z79.899    Lumbosacral spondylosis without myelopathy M47.817    Mitral valve prolapse I34.1    Lumbar disc herniation M51.26    Primary hypertension I10       Past Medical History:        Diagnosis Date    Abnormal EKG     Arthritis     Chest pain     Chronic bilateral low back pain with bilateral sciatica 02/11/2020    DDD (degenerative disc disease), lumbar 02/11/2020    Dyslipidemia     Elevated BP without diagnosis of hypertension     Hay fever     Lumbosacral spondylosis without myelopathy 4/18/2022    MVP (mitral valve prolapse)     Pre-diabetes     Snoring        Past Surgical History:        Procedure Laterality Date    BREAST SURGERY Bilateral     biopsy    CHOLECYSTECTOMY      COLONOSCOPY      ENDOMETRIAL ABLATION      EYE SURGERY Bilateral     chalazion removed    PAIN MANAGEMENT PROCEDURE Right 7/16/2020    RIGHT L4 AND L5 EPIDURAL STEROID INJECTION performed by Deangelo Kline MD at [address] [place] [address] Right 10/26/2020    EPIDURAL STEROID INJECTION -RIGHT L4 L5 performed by Deangelo Kline MD at [facility]         Social History:    Social History     Tobacco Use    Smoking status: Never Smoker    Smokeless tobacco: Never Used   Substance Use Topics    Alcohol use:  Yes     Alcohol/week: 3.0 standard drinks     Types: 3 Glasses of wine per week     Comment: moderately 3 glasses of wine per week                                Counseling given: Not Answered      Vital Signs (Current): There were no vitals filed for this visit. BP Readings from Last 3 Encounters:   05/02/22 (!) 148/102   05/04/22 (!) 158/102   04/25/22 (!) 136/96       NPO Status:                                                                                 BMI:   Wt Readings from Last 3 Encounters:   05/02/22 180 lb (81.6 kg)   05/04/22 178 lb 12.8 oz (81.1 kg)   04/25/22 178 lb 12.8 oz (81.1 kg)     There is no height or weight on file to calculate BMI.    CBC:   Lab Results   Component Value Date    WBC 3.7 05/02/2022    RBC 4.39 05/02/2022    HGB 12.7 05/02/2022    HCT 37.3 05/02/2022    MCV 85.0 05/02/2022    RDW 15.2 05/02/2022     05/02/2022       CMP:   Lab Results   Component Value Date     05/02/2022    K 3.6 05/02/2022     05/02/2022    CO2 25 05/02/2022    BUN 15 05/02/2022    CREATININE 0.62 05/02/2022    GFRAA >60 05/02/2022    LABGLOM >60 05/02/2022    GLUCOSE 112 05/02/2022    PROT 8.5 09/17/2018    CALCIUM 8.9 05/02/2022    BILITOT 0.4 12/31/2021    ALKPHOS 81 12/31/2021    AST 19 12/31/2021    ALT 16 12/31/2021       POC Tests: No results for input(s): POCGLU, POCNA, POCK, POCCL, POCBUN, POCHEMO, POCHCT in the last 72 hours.     Coags: No results found for: PROTIME, INR, APTT    HCG (If Applicable):   Lab Results   Component Value Date    HCG NEGATIVE 10/26/2020        ABGs: No results found for: PHART, PO2ART, QUY5YHB, ISD0KZC, BEART, R6WWIMER     Type & Screen (If Applicable):  No results found for: LABABO, LABRH    Drug/Infectious Status (If Applicable):  Lab Results   Component Value Date    HEPCAB NONREACTIVE 11/25/2017       COVID-19 Screening (If Applicable):   Lab Results   Component Value Date    COVID19 Not Detected 10/24/2020    COVID19 Not Detected 07/12/2020           Anesthesia Evaluation  Patient summary reviewed and Nursing notes reviewed no history of anesthetic complications:   Airway: Mallampati: II  TM distance: >3 FB   Neck ROM: full  Mouth opening: > = 3 FB Dental: normal exam         Pulmonary:normal exam  breath sounds clear to auscultation                             Cardiovascular:    (+) hypertension:, valvular problems/murmurs: MVP, hyperlipidemia      ECG reviewed  Rhythm: regular  Rate: normal                    Neuro/Psych:   (+) neuromuscular disease:,              ROS comment: DDD - Lumbar GI/Hepatic/Renal:             Endo/Other:                     Abdominal:             Vascular: negative vascular ROS. Other Findings:             Anesthesia Plan      general     ASA 2       Induction: intravenous. MIPS: Postoperative opioids intended and Prophylactic antiemetics administered. Anesthetic plan and risks discussed with patient. Plan discussed with CRNA. Last potassium a little low - will repeat serum potassium but will proceed without waiting for result.         Gisela Moses MD   5/16/2022

## 2022-05-17 VITALS
HEIGHT: 67 IN | RESPIRATION RATE: 16 BRPM | WEIGHT: 178 LBS | OXYGEN SATURATION: 100 % | SYSTOLIC BLOOD PRESSURE: 107 MMHG | HEART RATE: 100 BPM | DIASTOLIC BLOOD PRESSURE: 71 MMHG | TEMPERATURE: 99.3 F | BODY MASS INDEX: 27.94 KG/M2

## 2022-05-17 PROCEDURE — 6370000000 HC RX 637 (ALT 250 FOR IP): Performed by: ORTHOPAEDIC SURGERY

## 2022-05-17 PROCEDURE — 99223 1ST HOSP IP/OBS HIGH 75: CPT | Performed by: INTERNAL MEDICINE

## 2022-05-17 PROCEDURE — 2580000003 HC RX 258: Performed by: ORTHOPAEDIC SURGERY

## 2022-05-17 PROCEDURE — 97161 PT EVAL LOW COMPLEX 20 MIN: CPT

## 2022-05-17 PROCEDURE — 97530 THERAPEUTIC ACTIVITIES: CPT

## 2022-05-17 PROCEDURE — 97116 GAIT TRAINING THERAPY: CPT

## 2022-05-17 PROCEDURE — 6360000002 HC RX W HCPCS: Performed by: ORTHOPAEDIC SURGERY

## 2022-05-17 PROCEDURE — 99024 POSTOP FOLLOW-UP VISIT: CPT | Performed by: ORTHOPAEDIC SURGERY

## 2022-05-17 PROCEDURE — 6370000000 HC RX 637 (ALT 250 FOR IP): Performed by: INTERNAL MEDICINE

## 2022-05-17 RX ORDER — POTASSIUM CHLORIDE 20 MEQ/1
40 TABLET, EXTENDED RELEASE ORAL PRN
Status: DISCONTINUED | OUTPATIENT
Start: 2022-05-17 | End: 2022-05-17 | Stop reason: HOSPADM

## 2022-05-17 RX ORDER — POTASSIUM CHLORIDE 7.45 MG/ML
10 INJECTION INTRAVENOUS PRN
Status: DISCONTINUED | OUTPATIENT
Start: 2022-05-17 | End: 2022-05-17 | Stop reason: HOSPADM

## 2022-05-17 RX ORDER — OXYCODONE HYDROCHLORIDE AND ACETAMINOPHEN 5; 325 MG/1; MG/1
1 TABLET ORAL EVERY 6 HOURS PRN
Qty: 28 TABLET | Refills: 0 | Status: SHIPPED | OUTPATIENT
Start: 2022-05-17 | End: 2022-06-04 | Stop reason: SDUPTHER

## 2022-05-17 RX ADMIN — CEFAZOLIN SODIUM 2000 MG: 10 INJECTION, POWDER, FOR SOLUTION INTRAVENOUS at 02:46

## 2022-05-17 RX ADMIN — LOSARTAN POTASSIUM 50 MG: 50 TABLET, FILM COATED ORAL at 08:34

## 2022-05-17 RX ADMIN — VERAPAMIL HYDROCHLORIDE 40 MG: 40 TABLET ORAL at 02:43

## 2022-05-17 RX ADMIN — POTASSIUM CHLORIDE 40 MEQ: 20 TABLET, EXTENDED RELEASE ORAL at 12:10

## 2022-05-17 RX ADMIN — VERAPAMIL HYDROCHLORIDE 40 MG: 40 TABLET ORAL at 12:10

## 2022-05-17 RX ADMIN — ACETAMINOPHEN 650 MG: 325 TABLET ORAL at 05:59

## 2022-05-17 RX ADMIN — GABAPENTIN 400 MG: 400 CAPSULE ORAL at 02:41

## 2022-05-17 RX ADMIN — OXYCODONE HYDROCHLORIDE 5 MG: 5 TABLET ORAL at 08:35

## 2022-05-17 RX ADMIN — SODIUM CHLORIDE, PRESERVATIVE FREE 10 ML: 5 INJECTION INTRAVENOUS at 08:35

## 2022-05-17 RX ADMIN — GABAPENTIN 400 MG: 400 CAPSULE ORAL at 08:34

## 2022-05-17 RX ADMIN — ACETAMINOPHEN 650 MG: 325 TABLET ORAL at 12:10

## 2022-05-17 RX ADMIN — ACETAMINOPHEN 650 MG: 325 TABLET ORAL at 00:31

## 2022-05-17 RX ADMIN — OXYCODONE HYDROCHLORIDE 5 MG: 5 TABLET ORAL at 02:43

## 2022-05-17 ASSESSMENT — PAIN DESCRIPTION - DESCRIPTORS
DESCRIPTORS: DULL
DESCRIPTORS: DULL

## 2022-05-17 ASSESSMENT — PAIN DESCRIPTION - ORIENTATION: ORIENTATION: MID;LOWER

## 2022-05-17 ASSESSMENT — PAIN SCALES - GENERAL
PAINLEVEL_OUTOF10: 4
PAINLEVEL_OUTOF10: 5
PAINLEVEL_OUTOF10: 4
PAINLEVEL_OUTOF10: 4

## 2022-05-17 ASSESSMENT — PAIN DESCRIPTION - LOCATION: LOCATION: BACK

## 2022-05-17 NOTE — PLAN OF CARE
Problem: Discharge Planning  Goal: Discharge to home or other facility with appropriate resources  5/17/2022 1800 by Mara Brock RN  Outcome: Completed  5/17/2022 0414 by Layne Briggs RN  Outcome: Progressing     Problem: Pain  Goal: Verbalizes/displays adequate comfort level or baseline comfort level  5/17/2022 1800 by Mara Brock RN  Outcome: Completed  5/17/2022 0414 by Layne Briggs RN  Outcome: Progressing     Problem: Safety - Adult  Goal: Free from fall injury  5/17/2022 1800 by Mara Brock RN  Outcome: Completed  5/17/2022 0414 by Layne Briggs RN  Outcome: Progressing     Problem: ABCDS Injury Assessment  Goal: Absence of physical injury  5/17/2022 1800 by Mara Brock RN  Outcome: Completed  5/17/2022 0414 by Layne Briggs RN  Outcome: Progressing

## 2022-05-17 NOTE — ANESTHESIA POSTPROCEDURE EVALUATION
Department of Anesthesiology  Postprocedure Note    Patient: Shu Malik  MRN: [id number]  [place]: 1965  Date of evaluation: 5/17/2022  Time:  12:53 PM     Procedure Summary     Date: 05/16/22 Room / Location: [address] / [address] [facility]     Anesthesia Start: 1108 Anesthesia Stop: 4625    Procedure: LUMBAR L5-S1 DECOMPRESSION FUSION POSTERIOR (N/A Spine Lumbar) Diagnosis: (SPONDYLOLISTHESIS W/DDD)    Surgeons: Eladio Phoenix MD Responsible Provider: Lebron Magallon MD    Anesthesia Type: general ASA Status: 2          Anesthesia Type: general    Alyse Phase I: Alyse Score: 9    Alyse Phase II:      Last vitals: Reviewed and per EMR flowsheets. Anesthesia Post Evaluation    Comments: POD #1. Patient seen at bedside. No anesthesia complications reported.

## 2022-05-17 NOTE — DISCHARGE SUMMARY
Discharge Summary    Attending Physician: Deon Ortiz MD  Admit Date: 5/16/2022  Discharge Date:    Primary Care Physician: William Horn DO    Admitting Diagnosis:  Principal Problem:    Back pain  Active Problems:    Acquired spondylolisthesis  Resolved Problems:    * No resolved hospital problems. *        Discharge Diagnoses:  Principal Problem:    Back pain  Active Problems:    Acquired spondylolisthesis  Resolved Problems:    * No resolved hospital problems. *         Past Medical History:   Diagnosis Date    Abnormal EKG     Arthritis     Chest pain     Chronic bilateral low back pain with bilateral sciatica 02/11/2020    DDD (degenerative disc disease), lumbar 02/11/2020    Dyslipidemia     Elevated BP without diagnosis of hypertension     Hay fever     Lumbosacral spondylosis without myelopathy 4/18/2022    MVP (mitral valve prolapse)     Pre-diabetes     Snoring        Procedures Performed and Findings  Procedure(s):  LUMBAR L5-S1 DECOMPRESSION FUSION POSTERIOR     Consultations Obtained  IP CONSULT TO [facility]  uncomplicated    Discharge Medications       Medication List      START taking these medications    oxyCODONE-acetaminophen 5-325 MG per tablet  Commonly known as: Percocet  Take 1 tablet by mouth every 6 hours as needed for Pain for up to 7 days. Intended supply: 7 days. Take lowest dose possible to manage pain        CONTINUE taking these medications    acetaminophen 650 MG extended release tablet  Commonly known as: TYLENOL     cyclobenzaprine 5 MG tablet  Commonly known as: FLEXERIL     gabapentin 400 MG capsule  Commonly known as: NEURONTIN  Take 1 capsule by mouth 3 times daily for 30 days.  take 1 capsule by mouth 3 TIMES A DAY     losartan 50 MG tablet  Commonly known as: COZAAR  Take 1 tablet by mouth daily     magnesium 30 MG tablet     verapamil 40 MG tablet  Commonly known as: CALAN  1 tablet po TID        STOP taking these medications diclofenac 75 MG EC tablet  Commonly known as: VOLTAREN     HYDROcodone-acetaminophen 5-325 MG per tablet  Commonly known as: NORCO     magnesium citrate solution     metaxalone 800 MG tablet  Commonly known as: SKELAXIN           Where to Get Your Medications      You can get these medications from any pharmacy    Bring a paper prescription for each of these medications  · oxyCODONE-acetaminophen 5-325 MG per tablet          Discharge Condition  Stable       Activity on Discharge  As tolerated       Discharge Disposition:  Home    Discharge Instructions  See Orders    Follow-Up Scheduled    No follow-up provider specified.     Electronically signed by Muna Floyd MD on 5/17/2022 at 7:38 AM

## 2022-05-17 NOTE — PROGRESS NOTES
Physical Therapy  Facility/Department: [place] [address]  Physical Therapy Initial Assessment    Name: Sulaiman Gregorio  : 1965  MRN: [id number]  Date of Service: 2022    Discharge Recommendations:   (pt may require non-skilled Physical Assist at home prn)   PT Equipment Recommendations  Equipment Needed: Yes  Mobility Devices: Walker  Other: Keshia Traore for improved balance and safety at home      Patient Diagnosis(es): The primary encounter diagnosis was Acquired spondylolisthesis. A diagnosis of Pain was also pertinent to this visit. Past Medical History:  has a past medical history of Abnormal EKG, Arthritis, Chest pain, Chronic bilateral low back pain with bilateral sciatica, DDD (degenerative disc disease), lumbar, Dyslipidemia, Elevated BP without diagnosis of hypertension, Hay fever, Lumbosacral spondylosis without myelopathy, MVP (mitral valve prolapse), Pre-diabetes, and Snoring. Past Surgical History:  has a past surgical history that includes Tubal ligation; Breast surgery (Bilateral); Cholecystectomy; Colonoscopy; Endometrial ablation; Pain management procedure (Right, 2020); Pain management procedure (Right, 10/26/2020); eye surgery (Bilateral); and lumbar fusion (N/A, 2022). Assessment   Assessment: pt demonstrates inc pain, mildly impaired BLE strength, and mildly impaired balance S/P Lumbar spinal surgery; CGA for bed mobility practicing log roll technique; CGA for transfers with RW; and CGA for ambulation x 70' with RW progressing to SBA, and CGA for negotiation of single box step; At this current functional level the pt would be safe to return home with Assist from  as needed and most definitely during stair negotiation. PT services would benefit the patient to address identified Deficits and to progress pt towards prior level of functional independence.   Treatment Diagnosis: Impaired functional mobility and inc pain 2* spinal surgery post-op Day 1  Specific Instructions for Next Treatment: Progress ambulation and stair climbing; reinforce spinal precautions. Therapy Prognosis: Good  Decision Making: Medium Complexity  History: H/O chronic back pain; HTN  Exam: ROM, MMT, Balance, and functional mobility assessments  Clinical Presentation: pt is pleasant and cooperative throughout; Limited most by pain at this time requiring inc time to complete all mobility tasks  Barriers to Learning: none identified  Requires PT Follow-Up: Yes  Activity Tolerance  Activity Tolerance: Patient limited by pain  Activity Tolerance Comments: Pt tolerates activity well overall however requires increased time for all mobility tasks secondary to inc pain with mobility. Plan   Plan  Plan: 2 times a day 7 days a week  Plan weeks: 1-2 days  Specific Instructions for Next Treatment: Progress ambulation and stair climbing; reinforce spinal precautions. Current Treatment Recommendations: Strengthening,Balance training,Functional mobility training,Transfer training,Gait training,Stair training,Pain management,Safety education & training,Patient/Caregiver education & training,Equipment evaluation, education, & procurement,Therapeutic activities  Safety Devices  Type of Devices: All fall risk precautions in place,Call light within reach,Gait belt,Patient at risk for falls,Left in chair,Nurse notified (nurse Tj Kathleen)     Restrictions  Restrictions/Precautions  Restrictions/Precautions: Surgical Protocols,Fall Risk,General Precautions,Up as Tolerated  Required Braces or Orthoses?: No  Implants present? : Metal implants (Spinal fusion cage)  Position Activity Restriction  Spinal Precautions: Avoid Excessive bending/lifting/twisting of spine.   Other position/activity restrictions: Spinal Precautions     Subjective   Pain: pt reports 3/10 pain in back radiating to L leg; Quality of pain is burning;   General  Chart Reviewed: Yes  Patient assessed for rehabilitation services?: Yes  Additional prn  Vision/Hearing  Vision Exceptions: Wears glasses for reading  Hearing: Within functional limits    Cognition   Orientation  Overall Orientation Status: Within Normal Limits  Orientation Level: Oriented X4     Objective   Pulse: 89  BP: 118/82  MAP (Calculated): 94  Resp: 16  SpO2: 100 %     Observation/Palpation  Posture: Good  Observation: Pt resting in bed upon arrival and retires to bedside chair at end of session; Incision dressing intact; no drainage noted from incision        AROM RLE (degrees)  RLE AROM: WFL  AROM LLE (degrees)  LLE AROM : WFL  AROM RUE (degrees)  RUE AROM : WFL  AROM LUE (degrees)  LUE AROM : WFL  Strength RLE  Strength RLE: WFL  Comment: Grossly 4/5 strength  Strength LLE  Strength LLE: Exception  Comment: ankle DF/PF strength 4-/5; otherwise 4/5 strength throughout  Strength RUE  Strength RUE: WFL  Strength LUE  Strength LUE: WFL     Sensation  Overall Sensation Status: WNL (Pt denies N/T at present; had N/T prior to procedure)     Bed mobility  Rolling to Right: Stand by assistance  Supine to Sit: Contact guard assistance  Sit to Supine: Unable to assess (Pt in chair at end of session)  Scooting: Stand by assistance (seated; to EOB)  Bed Mobility Comments: HOB flattened per home setting for practice; Log roll technique employed with good carry over demo after writer provides Edu on technique; Sandee Silva for hands placement; inc time needed 2* pain  Transfers  Sit to Stand: Contact guard assistance  Stand to sit: Contact guard assistance  Bed to Chair: Contact guard assistance  Stand Pivot Transfers: Contact guard assistance  Comment: Pt demos transfers with and without RW; Appearing more steady with RW but demos fair balance without. VCs for safe hands placement with good carry over.     Ambulation  Surface: level tile  Device: No Device  Assistance: Contact guard assistance  Quality of Gait: Slow and guarded; tendency to reach for external supports within environment; mildy unsteady  Gait Deviations: Decreased step length;Decreased step height;Decreased arm swing;Decreased head and trunk rotation  Distance: 15'x2  Comments: Mild unsteadiness with amb, non-functional gait speed. More Ambulation?: Yes  Ambulation 2  Surface - 2: level tile  Device 2: Rolling Walker  Assistance 2: Stand by assistance;Contact guard assistance  Quality of Gait 2: Improved steadiness, BUE maintained on RW; Inc step lengths (even stepping) and slightly improved speed. Gait Deviations: Slow Elva;Decreased head and trunk rotation  Distance: 79'  Comments: Pt tolerates further distance within room and hallway using RW and CGA progressing to SBA; demonstrating improved safety/balance/gait mechanics with use of RW   Stairs/Curb  Stairs?: Yes  Stairs  # Steps : 1  Stairs Height: 6\"  Rails: Left ascending  Device: No Device  Assistance: Contact guard assistance  Comment: Pt negotiates single box step x1 with Countertop as railing; CGA for safety; pt demos controlled step negotiation. Deferred further steps d/t increasing pain with movement. Balance  Posture: Good  Sitting - Static: Good  Sitting - Dynamic: Fair  Standing - Static: Fair;+ (RW)  Standing - Dynamic: Fair;+ (RW)  Comments: Standing balance assessed with RW; and without. Pt demo's improved balance with RW use in static/dynamic standing. Exercise Treatment: Progressive ambulation with RW; Transfer and gait training with Rolling walker; Bed mobility log rolling to maintain spinal precautions; Edu on spinal Precautions and progressive activity expectations at home.   A/AROM Exercises: Seated Ankle Pumps x20, Dominga Scott          AM-PAC Score  AM-PAC Inpatient Mobility Raw Score : 18 (05/17/22 0845)  AM-PAC Inpatient T-Scale Score : 43.63 (05/17/22 0845)  Mobility Inpatient CMS 0-100% Score: 46.58 (05/17/22 0845)  Mobility Inpatient CMS G-Code Modifier : CK (05/17/22 0845)          Goals  Short Term Goals  Time Frame for Short term goals: 2 days  Short term goal 1: pt to demo independent bed mobility on flat bed  Short term goal 2: pt to demo Mod I transfers with RW using safe hands placement  Short term goal 3: pt to ambulate 150' with RW  Short term goal 4: pt to negotiate 3 steps with out railing and SBA to allow for safe home access.   Patient Goals   Patient goals : to go home       Education  Patient Education  Education Given To: Patient  Education Provided: Role of Therapy;Plan of Care;Home Exercise Program;Precautions;Transfer Training;Equipment  Education Method: Demonstration;Verbal;Teach Back  Barriers to Learning: None  Education Outcome: Verbalized understanding;Demonstrated understanding;Continued education needed      Therapy Time   Individual Concurrent Group Co-treatment   Time In 0845         Time Out 0932         Minutes 47         Timed Code Treatment Minutes: Arnel 81, PT

## 2022-05-17 NOTE — OP NOTE
The screws were,  therefore, loosened. The laminectomy was then completed at L5 with significant bilateral  partial medial facetectomies and foraminotomies. .  After decompressing,  bilateral annulotomies were then completed. At this juncture, it  appeared that the patient's spondylolisthesis was better reduced. The patient already had preoperatively some cystic formations into the  endplates at L5 and S1 visible on fluoro. Gradually the disc was taken  down with combination of scalpel, intradiskal pituitaries, curettes, and  paddle kady. After acceptable disc space prep, 15-degree lordotic expandable cage  then packed into position, slightly expanded. The cage tended to want  to be at a little bit of a tilt, likely favoring a cystic area in the  vertebrae. The disc space was then impaction grafted. The screw rods  were slightly compressed and the screws were torqued. Final AP and  lateral fluoroscopic images were obtained showing satisfactory graft  hardware placement. Lateral gutters were grafted with the minimal  amount of remaining local autograft and crushed cortical cancellous  allograft. A gram of vancomycin was placed in the depths of wound. Deep fascia was reapproximated with #2 Vicryl sutures, subcuticular  layer with 2-0 Vicryl, followed by skin staples. It should be noted that neuromonitoring performed throughout the case. No significant abnormalities. All screws were stemmed post placement. Estimated blood loss 300. COMPLICATIONS ARISE DURING THE OPERATION:  None noted.         GALO Bella    D: 05/16/2022 14:34:00       T: 05/16/2022 14:37:26     [email or web address]  Job#: [id number]     Doc#: [id number]    CC:

## 2022-05-17 NOTE — CONSULTS
[address] Internal Medicine  Elvira Arguelles MD; Landy Negro MD; Federica Stahl MD; MD Uriel Purcell MD; MD [facility]   [address] / HISTORY AND PHYSICAL EXAMINATION            Date:   5/17/2022  Patient name:  Shanna Burrows  Date of admission:  5/16/2022  9:41 AM  MRN:   [id number]  Account:  [de-identified]  YOB: 1965  PCP:    Marni Khan DO  Room:   2045/2045-01  Code Status:    Full Code    Physician Requesting Consult: Lorna Dowell MD    Reason for Consult: htn  hypokalemia    Chief Complaint:     No chief complaint on file.  htn  hypokalemia    History Obtained From:     Pt medical record and nursing staff    History of Present Illness:     HTN  Onset more than 2 years ago  kelle mild to mod  Controlled with current po meds  Not associated with headaches or blurry vision  No chest pain    hypokalemia  back pian post lumber decompression      Past Medical History:     Past Medical History:   Diagnosis Date    Abnormal EKG     Arthritis     Chest pain     Chronic bilateral low back pain with bilateral sciatica 02/11/2020    DDD (degenerative disc disease), lumbar 02/11/2020    Dyslipidemia     Elevated BP without diagnosis of hypertension     Hay fever     Lumbosacral spondylosis without myelopathy 4/18/2022    MVP (mitral valve prolapse)     Pre-diabetes     Snoring         Past Surgical History:     Past Surgical History:   Procedure Laterality Date    BREAST SURGERY Bilateral     biopsy    CHOLECYSTECTOMY      COLONOSCOPY      ENDOMETRIAL ABLATION      EYE SURGERY Bilateral     chalazion removed    LUMBAR FUSION N/A 5/16/2022    LUMBAR L5-S1 DECOMPRESSION FUSION POSTERIOR performed by Lorna Dowell MD at [facility] [address] Right 7/16/2020    RIGHT L4 AND L5 EPIDURAL STEROID INJECTION performed by Dianna Heredia MD at [facility] [address] Right 10/26/2020    EPIDURAL STEROID INJECTION -RIGHT L4 L5 performed by Haider Cherry MD at [address]          Medications Prior to Admission:     Prior to Admission medications    Medication Sig Start Date End Date Taking? Authorizing Provider   oxyCODONE-acetaminophen (PERCOCET) 5-325 MG per tablet Take 1 tablet by mouth every 6 hours as needed for Pain for up to 7 days. Intended supply: 7 days. Take lowest dose possible to manage pain 5/17/22 5/24/22 Yes Alexandrea Evans MD   magnesium 30 MG tablet Take 1 tablet by mouth in the morning, at noon, and at bedtime Supplement magnesium over the counter   Yes Historical Provider, MD   losartan (COZAAR) 50 MG tablet Take 1 tablet by mouth daily 5/4/22   Rosy Vasquez DO   cyclobenzaprine (FLEXERIL) 5 MG tablet Take 5 mg by mouth 2 times daily as needed for Muscle spasms    Historical Provider, MD   gabapentin (NEURONTIN) 400 MG capsule Take 1 capsule by mouth 3 times daily for 30 days. take 1 capsule by mouth 3 TIMES A DAY 4/25/22 5/25/22  GONZÁLEZ Chandra - CNP   verapamil (CALAN) 40 MG tablet 1 tablet po TID 2/16/22   Aileen Killian PA-C   acetaminophen (TYLENOL) 650 MG extended release tablet Take 650 mg by mouth every 8 hours as needed for Pain    Historical Provider, MD        Allergies:     Patient has no known allergies. Social History:     Tobacco:    reports that she has never smoked. She has never used smokeless tobacco.  Alcohol:      reports current alcohol use of about 3.0 standard drinks of alcohol per week. Drug Use:  reports no history of drug use.     Family History:     Family History   Problem Relation Age of Onset    High Blood Pressure Mother     High Cholesterol Mother     Other Mother         blood clots, denies any known clotting d/o    Other Father         alcoholism    Arthritis Sister     No Known Problems Other        Review of Systems:     Positive and Negative as described in HPI. CONSTITUTIONAL:  negative for fevers, chills, sweats, fatigue, weight loss  HEENT:  negative for vision, hearing changes, runny nose, throat pain  RESPIRATORY:  negative for shortness of breath, cough, congestion, wheezing. CARDIOVASCULAR:  negative for chest pain, palpitations. GASTROINTESTINAL:  negative for nausea, vomiting, diarrhea, constipation, change in bowel habits, abdominal pain   GENITOURINARY:  negative for difficulty of urination, burning with urination, frequency   INTEGUMENT:  negative for rash, skin lesions, easy bruising   HEMATOLOGIC/LYMPHATIC:  negative for swelling/edema   ALLERGIC/IMMUNOLOGIC:  negative for urticaria , itching  ENDOCRINE:  negative increase in drinking, increase in urination, hot or cold intolerance  MUSCULOSKELETAL: Status post lumbar decompression surgery pain is controlled 2 out of 10 negative joint pains, muscle aches, swelling of joints  NEUROLOGICAL:  negative for headaches, dizziness, lightheadedness, numbness, pain, tingling extremities  BEHAVIOR/PSYCH:  negative for depression, anxiety    Physical Exam:     /82   Pulse 89   Temp 98.8 °F (37.1 °C)   Resp 16   Ht 5' 7\" (1.702 m)   Wt 178 lb (80.7 kg)   SpO2 100%   BMI 27.88 kg/m²   Temp (24hrs), Av.9 °F (36.6 °C), Min:97.5 °F (36.4 °C), Max:98.8 °F (37.1 °C)    No results for input(s): POCGLU in the last 72 hours. Intake/Output Summary (Last 24 hours) at 2022 1155  Last data filed at 2022 0245  Gross per 24 hour   Intake 1590 ml   Output 300 ml   Net 1290 ml       General Appearance:  alert, well appearing, and in no acute distress  Mental status: oriented to person, place, and time with normal affect  Head:  normocephalic, atraumatic.   Eye: no icterus, redness, pupils equal and reactive, extraocular eye movements intact, conjunctiva clear  Ear: normal external ear, no discharge, hearing intact  Nose:  no drainage noted  Mouth: mucous membranes moist  Neck: supple, no carotid bruits, thyroid not palpable  Lungs: Bilateral equal air entry, clear to ausculation, no wheezing, rales or rhonchi, normal effort  Cardiovascular: normal rate, regular rhythm, no murmur, gallop, rub. Abdomen: Soft, nontender, nondistended, normal bowel sounds, no hepatomegaly or splenomegaly  Neurologic: There are no new focal motor or sensory deficits, normal muscle tone and bulk, no abnormal sensation, normal speech, cranial nerves II through XII grossly intact  Skin: No gross lesions, rashes, bruising or bleeding on exposed skin area  Extremities:  peripheral pulses palpable, no pedal edema or calf pain with palpation  Positive for back pain lumbar decompression surgery  Psych: normal affect    Investigations:      Laboratory Testing:  No results found for this or any previous visit (from the past 24 hour(s)). Imaging/Diagonstics:  No results found. Assessment :      Hospital Problems           Last Modified POA    * (Principal) Back pain 5/16/2022 Yes    Primary hypertension 5/17/2022 Yes    Acquired spondylolisthesis 5/16/2022 Yes    DDD (degenerative disc disease), lumbar 5/17/2022 Yes    Mitral valve prolapse 5/17/2022 Yes    Overview Signed 4/18/2022  1:35 PM by Miguel Thompson DO     Last Assessment & Plan:   Formatting of this note might be different from the original.  Manages with Calan. Plan:     Lumbar spinal stenosis status post a lumbar decompression surgery pain controlled  Hypertension meds reviewed  Hypokalemia potassium 3.1 replace oral      Consultations:   Mee Bright MD  5/17/2022  11:55 AM    Copy sent to Dr. Miguel Thompson DO    Please note that this chart was generated using voice recognition Dragon dictation software. Although every effort was made to ensure the accuracy of this automated transcription, some errors in transcription may have occurred.

## 2022-05-17 NOTE — CARE COORDINATION
CASE MANAGEMENT NOTE:    Admission Date:  5/16/2022 Jessica Gallardo is a 61 y.o.  female    Admitted for : Back pain [M54.9]    Met with:  Patient and Family    PCP:  Dr. Lira Shape:  [place] Postal      Is patient alert and oriented at time of discussion:  Yes    Current Residence/ Living Arrangements:  independently at home with spouse and drives             Current Services PTA:  No    Does patient go to outpatient dialysis: No  If yes, location and chair time: n/a    Is patient agreeable to VNS: No    Freedom of choice provided:  Yes    List of [address] provided: No    VNS chosen:  NA    DME:  None, needs walker. Karlee Lainez ordered from [facility] [place] and requested delivery to room 2045 for d/c home today. Home Oxygen: No    Nebulizer: No    CPAP/BIPAP: No    Supplier: N/A    Potential Assistance Needed: No    SNF needed: No    Freedom of choice and list provided: NA    Pharmacy:  [facility] on [address]       Is patient currently receiving oral anticoagulation therapy? No    Is the Patient an [employer] [facility] with Readmission Risk Score greater than 14%? No  If yes, pt needs a follow up appointment made within 7 days. Family Members/Caregivers that pt would like involved in their care:    Yes    If yes, list name here:  [address]    Transportation Provider:  Patient and Family             Discharge Plan:  Home without needs.   POD#1LUMBAR L5-S1 DECOMPRESSION FUSION POSTERIOR                Electronically signed by: Gianna Lawrence RN on 5/17/2022 at 11:35 AM

## 2022-05-17 NOTE — CARE COORDINATION
[address] Encounter Date/Time: 2022 [address] Account: [de-identified]    MRN: [id number]    Patient: Hollie Queen    Contact Serial #: [id number]      ENCOUNTER          Patient Class: I Private Enc? No Unit RM BD: [address]    Hospital Service: MED   Encounter DX: Back pain [M54.9]   ADM Provider: Ashwin Tamayo MD   Procedure: IA ARTHRODESIS COMBINED *   ATT Provider: Ashwin Tamayo MD   REF Provider:        Admission DX: Back pain and DX codes: M54.9      PATIENT                 Name: Hollie Queen : 1965 (61 yrs)   Address: [address] [place] [address] Sex: Female   [place] city: [address] [place] [address]         Marital Status:    Employer: [employer]   Primary Care Provider: Margo Rubio DO         Primary Phone: [phone]   EMERGENCY CONTACT   Contact Name Legal Guardian? Relationship to Patient Home Phone Work Phone   1. Naseem Hardy  2. *No Contact Specified*      Spouse    [phone]            GUARANTOR            Guarantor: Hollie Queen     : 1965   Address: [address] Sex: Female   [place] [address]     Relation to Patient: Self       Home Phone: [phone]   Guarantor ID: [id number]       Work Phone:     Guarantor Employer:  [employer]         Status: FULL TIME      COVERAGE  PRIMARY INSURANCE   Payor: [employer] Plan: HEALTH [employer] PLAN HMO   Payor Address: [address] [address]       Group Number: [id number] Insurance Type: INDEMNITY   Subscriber Name: Antonio Zapata : 1965   Subscriber ID: [id number] Pat. Rel. to Sub: Self   SECONDARY INSURANCE   Payor:   Plan:     Payor Address:  ,           Group Number:   Insurance Type:     Subscriber Name:   Subscriber :     Subscriber ID:   Pat.  Rel. to Sub:             CSN: [id number]           CSN                                    Req/Control # [Problem retrieving Specimen ID] Order Date:  May 17, 2022  [id number]                                          Patient Information      Name:  Lilibeth Louie  :  1965  Age:  61 y.o. Address:  [address] [place] [address]   Zip:  [address]  PCP: Lina Blanchard DO Sex:  F  SSN: [id number]  Home Phone: [phone]  Work Phone:  [phone]  Patient MRN:  [id number]    Alt Patient ID:  [id number]  PCP Phone: [phone]       Authorizing Provider Information       AUTHORIZING PROVIDER: Adrianne Osborn MD  Physician ID: [id number]  NPI:  [id number]  Site:   Address: [place] [address], [facility] [address] Zip: [address]  Phone: [phone]  Fax: [phone]             EQUIPMENT ORDERED  DME Order for Scout Alvares (ORD   #:   [id number]) Priority  Routine Class  Hospital Performed        Associated Diagnosis:  Acquired spondylolisthesis (M43.10 [ICD-10-CM])        Comments:    You must complete the order parameters below and add the medical necessity documentation for this DME in a separate note.     Folding Walker with Wheels     Current patient weight: Weight: 178 lb (80.7 kg)  Current patient height: Height: 5' 7\" (170.2 cm)  Diagnosis: s/p back surgery  Duration: Purchase            Scheduling Instructions:                                 Specimen Source             Collection Date    Collection Time    Order Status    Expected Date                 Electronically Signed By  Adrianne Osborn MD Date  May 17, 2022  11:18 AM              Responsible Party Stephen Rick     Guar-ActID   Relationship Account Type Home Phone   AMINA CUNHA A - [phone]* [address] [facility] Self P/F [phone]   Work Phone   [address]     Primary Insurance  Insurance/Subscriber ID:  [id number]  [address] Name:  AMINA CUNHA              Relationship to Patient: SelfSigned ABN: N    Payor Name:  [employer]   Plan:  HEALTH [employer] PLAN HMO   Group: [id number]  Worker's Comp Date of Injury:

## 2022-05-17 NOTE — PLAN OF CARE
Problem: Discharge Planning  Goal: Discharge to home or other facility with appropriate resources  5/17/2022 0414 by Sea Bell RN  Outcome: Progressing  5/16/2022 1648 by Sumeet Gregory RN  Outcome: Progressing     Problem: Pain  Goal: Verbalizes/displays adequate comfort level or baseline comfort level  5/17/2022 0414 by Sea Bell RN  Outcome: Progressing  5/16/2022 1648 by Sumeet Gregory RN  Outcome: Progressing  Note: No c/o pain this shift. Problem: Safety - Adult  Goal: Free from fall injury  5/17/2022 0414 by Sea Bell RN  Outcome: Progressing  5/16/2022 1648 by Sumeet Gregory RN  Outcome: Progressing  Note: Call light in reach and hourly checks completed.      Problem: ABCDS Injury Assessment  Goal: Absence of physical injury  5/17/2022 0414 by Sea Bell RN  Outcome: Progressing  5/16/2022 1648 by Sumeet Gregory RN  Outcome: Progressing

## 2022-05-17 NOTE — PROGRESS NOTES
Surgical Progress Note    POD: 1    Patient doing well  Vitals:    22 0640   BP: 118/82   Pulse: 89   Resp: 16   Temp: 98.8 °F (37.1 °C)   SpO2: 100%      Temp (24hrs), Av.8 °F (36.6 °C), Min:97.5 °F (36.4 °C), Max:98.8 °F (37.1 °C)       Pain Control legs better  No unusual nausea    Exam: doing well        Lungs:  No respiratory distress    Labs reviewed:  Labs:           Na/K/Cl/CO2:  --/3.5/--/-- ( 1115)    I/O last 3 completed shifts:   In: 2639 [P.O.:590; I.V.:1000]  Out: 300 [Blood:300]    Assessment:    Patient Active Problem List   Diagnosis    Prediabetes    Eyelid cyst    Dyslipidemia    Chronic bilateral low back pain with right-sided sciatica    DDD (degenerative disc disease), lumbar    Medication management    Lumbosacral spondylosis without myelopathy    Mitral valve prolapse    Lumbar disc herniation    Primary hypertension    Acquired spondylolisthesis    Back pain       Plan:  See my orders    Harshal Bucio MD MD  2022 7:35 AM

## 2022-05-25 RX ORDER — CYCLOBENZAPRINE HCL 5 MG
TABLET ORAL
Qty: 10 TABLET | Refills: 5 | Status: SHIPPED | OUTPATIENT
Start: 2022-05-25 | End: 2022-06-28

## 2022-05-25 NOTE — TELEPHONE ENCOUNTER
Ny Sky is calling to request a refill on the following medication(s):    Medication Request:  Requested Prescriptions     Pending Prescriptions Disp Refills    cyclobenzaprine (FLEXERIL) 5 MG tablet [Pharmacy Med Name: CYCLOBENZAPRINE 5 MG TABLET] 10 tablet      Sig: take 1 tablet by mouth twice a day if needed for muscle spasm       Last Visit Date (If Applicable):  3/39/1791    Next Visit Date:    6/20/2022

## 2022-05-31 ENCOUNTER — OFFICE VISIT (OUTPATIENT)
Dept: ORTHOPEDIC SURGERY | Age: 57
End: 2022-05-31

## 2022-05-31 VITALS — BODY MASS INDEX: 27.94 KG/M2 | HEIGHT: 67 IN | RESPIRATION RATE: 14 BRPM | WEIGHT: 178 LBS

## 2022-05-31 DIAGNOSIS — M47.817 LUMBOSACRAL SPONDYLOSIS WITHOUT MYELOPATHY: Primary | ICD-10-CM

## 2022-05-31 DIAGNOSIS — M43.10 ACQUIRED SPONDYLOLISTHESIS: ICD-10-CM

## 2022-05-31 DIAGNOSIS — M51.26 LUMBAR DISC HERNIATION: ICD-10-CM

## 2022-05-31 DIAGNOSIS — M51.36 DDD (DEGENERATIVE DISC DISEASE), LUMBAR: ICD-10-CM

## 2022-05-31 PROCEDURE — 99024 POSTOP FOLLOW-UP VISIT: CPT | Performed by: ORTHOPAEDIC SURGERY

## 2022-05-31 NOTE — PROGRESS NOTES
Patient ID: Moises Martinez is a 61 y.o. female    Chief Compliant:  Chief Complaint   Patient presents with    Post-Op Check        Diagnostic imaging:    AP lateral lumbar spine status post L5-S1 PLIF spondylolisthesis is approximately 50% reduced to space height is elevated modestly patient's cage is a little bit in the inferior endplate of L5 consistent with intraoperative x-rays no substantial change laterals a little bit oblique compared with intraoperative x-ray     Assessment and Plan:  1. Lumbosacral spondylosis without myelopathy    2. Lumbar disc herniation    3. DDD (degenerative disc disease), lumbar    4. Acquired spondylolisthesis      2 weeks post PLIF mild residual right leg pain post fall a few days ago however otherwise recovering well    Follow up 4 weeks    HPI:  This is a 61 y.o. female who presents to the clinic today for 2 weeks post L5-S1 PLIF 5/16/22. Patient is recovering well post operatively with initially excellent relief of leg pain post operatively until she fell a few days ago. She notes mild right leg pain and moderate post operative soreness today    Patient is ambulating via walker    Review of Systems   All other systems reviewed and are negative. Past History:    Current Outpatient Medications:     cyclobenzaprine (FLEXERIL) 5 MG tablet, take 1 tablet by mouth twice a day if needed for muscle spasm, Disp: 10 tablet, Rfl: 5    magnesium 30 MG tablet, Take 1 tablet by mouth in the morning, at noon, and at bedtime Supplement magnesium over the counter, Disp: , Rfl:     losartan (COZAAR) 50 MG tablet, Take 1 tablet by mouth daily, Disp: 30 tablet, Rfl: 5    gabapentin (NEURONTIN) 400 MG capsule, Take 1 capsule by mouth 3 times daily for 30 days.  take 1 capsule by mouth 3 TIMES A DAY, Disp: 90 capsule, Rfl: 2    verapamil (CALAN) 40 MG tablet, 1 tablet po TID, Disp: 90 tablet, Rfl: 2    acetaminophen (TYLENOL) 650 MG extended release tablet, Take 650 mg by mouth every 8 hours as needed for Pain, Disp: , Rfl:   No Known Allergies  Social History     Socioeconomic History    Marital status:      Spouse name: Not on file    Number of children: Not on file    Years of education: Not on file    Highest education level: Not on file   Occupational History    Not on file   Tobacco Use    Smoking status: Never Smoker    Smokeless tobacco: Never Used   Vaping Use    Vaping Use: Never used   Substance and Sexual Activity    Alcohol use: Yes     Alcohol/week: 3.0 standard drinks     Types: 3 Glasses of wine per week     Comment: moderately 3 glasses of wine per week    Drug use: No    Sexual activity: Yes     Partners: Male   Other Topics Concern    Not on file   Social History Narrative    Not on file     Social Determinants of Health     Financial Resource Strain: Low Risk     Difficulty of Paying Living Expenses: Not hard at all   Food Insecurity: No Food Insecurity    Worried About Running Out of Food in the Last Year: Never true    Reji of Food in the Last Year: Never true   Transportation Needs:     Lack of Transportation (Medical): Not on file    Lack of Transportation (Non-Medical):  Not on file   Physical Activity:     Days of Exercise per Week: Not on file    Minutes of Exercise per Session: Not on file   Stress:     Feeling of Stress : Not on file   Social Connections:     Frequency of Communication with Friends and Family: Not on file    Frequency of Social Gatherings with Friends and Family: Not on file    Attends Baptism Services: Not on file    Active Member of Clubs or Organizations: Not on file    Attends Club or Organization Meetings: Not on file    Marital Status: Not on file   Intimate Partner Violence:     Fear of Current or Ex-Partner: Not on file    Emotionally Abused: Not on file    Physically Abused: Not on file    Sexually Abused: Not on file   Housing Stability:     Unable to Pay for Housing in the Last Year: Not on file    Number of Places Lived in the Last Year: Not on file    Unstable Housing in the Last Year: Not on file     Past Medical History:   Diagnosis Date    Abnormal EKG     Arthritis     Chest pain     Chronic bilateral low back pain with bilateral sciatica 02/11/2020    DDD (degenerative disc disease), lumbar 02/11/2020    Dyslipidemia     Elevated BP without diagnosis of hypertension     Hay fever     Lumbosacral spondylosis without myelopathy 4/18/2022    MVP (mitral valve prolapse)     Pre-diabetes     Snoring      Past Surgical History:   Procedure Laterality Date    BREAST SURGERY Bilateral     biopsy    CHOLECYSTECTOMY      COLONOSCOPY      ENDOMETRIAL ABLATION      EYE SURGERY Bilateral     chalazion removed    LUMBAR FUSION N/A 5/16/2022    LUMBAR L5-S1 DECOMPRESSION FUSION POSTERIOR performed by Noe Crockett MD at [facility] Right 7/16/2020    RIGHT L4 AND L5 EPIDURAL STEROID INJECTION performed by Jarrod Stallings MD at [facility] Right 10/26/2020    EPIDURAL STEROID INJECTION -RIGHT L4 L5 performed by Jarrod Stallings MD at [address]       Family History   Problem Relation Age of Onset    High Blood Pressure Mother     High Cholesterol Mother     Other Mother         blood clots, denies any known clotting d/o    Other Father         alcoholism    Arthritis Sister     No Known Problems Other         Physical Exam:  Vitals signs and nursing note reviewed. Constitutional:       Appearance: well-developed. HENT:      Head: Normocephalic and atraumatic. Nose: Nose normal.   Eyes:      Conjunctiva/sclera: Conjunctivae normal.   Neck:      Musculoskeletal: Normal range of motion and neck supple. Pulmonary:      Effort: Pulmonary effort is normal. No respiratory distress. Musculoskeletal:      Comments: Normal gait     Skin:     General: Skin is warm and dry.    Neurological:      Mental Status: Alert and oriented to person, place, and time. Sensory: No sensory deficit. Psychiatric:         Behavior: Behavior normal.         Thought Content: Thought content normal.    [place] discontinued Incision is healing well. No signs of infection    Provider Attestation:  Brunilda Owens, personally performed the services described in this documentation. All medical record entries made by the scribe were at my direction and in my presence. I have reviewed the chart and discharge instructions and agree that the records reflect my personal performance and is accurate and complete. Tere Rollins MD 5/31/22       Scribe Attestation:  By signing my name below, Leandro Servin, attest that this documentation has been prepared under the direction and in the presence of Dr. Agnieszka Yuan. Electronically signed: Lizz Ríos, 5/31/22     Please note that this chart was generated using voice recognition Dragon dictation software. Although every effort was made to ensure the accuracy of this automated transcription, some errors in transcription may have occurred.

## 2022-06-03 ENCOUNTER — TELEPHONE (OUTPATIENT)
Dept: ORTHOPEDIC SURGERY | Age: 57
End: 2022-06-03

## 2022-06-03 DIAGNOSIS — M43.10 ACQUIRED SPONDYLOLISTHESIS: ICD-10-CM

## 2022-06-03 NOTE — TELEPHONE ENCOUNTER
Patient called in requesting pain medication as she is experiencing pain in her upper thigh and would like some relief please advise thank you!  [facility] [address]

## 2022-06-04 RX ORDER — OXYCODONE HYDROCHLORIDE AND ACETAMINOPHEN 5; 325 MG/1; MG/1
1 TABLET ORAL EVERY 6 HOURS PRN
Qty: 28 TABLET | Refills: 0 | Status: SHIPPED | OUTPATIENT
Start: 2022-06-04 | End: 2022-06-11

## 2022-06-20 ENCOUNTER — OFFICE VISIT (OUTPATIENT)
Dept: FAMILY MEDICINE CLINIC | Age: 57
End: 2022-06-20
Payer: COMMERCIAL

## 2022-06-20 VITALS
HEIGHT: 67 IN | SYSTOLIC BLOOD PRESSURE: 128 MMHG | OXYGEN SATURATION: 99 % | BODY MASS INDEX: 27.91 KG/M2 | DIASTOLIC BLOOD PRESSURE: 82 MMHG | WEIGHT: 177.8 LBS | TEMPERATURE: 97.8 F | HEART RATE: 93 BPM

## 2022-06-20 DIAGNOSIS — E78.5 DYSLIPIDEMIA: ICD-10-CM

## 2022-06-20 DIAGNOSIS — R73.03 PREDIABETES: ICD-10-CM

## 2022-06-20 DIAGNOSIS — M51.36 DDD (DEGENERATIVE DISC DISEASE), LUMBAR: ICD-10-CM

## 2022-06-20 DIAGNOSIS — Z09 HOSPITAL DISCHARGE FOLLOW-UP: Primary | ICD-10-CM

## 2022-06-20 DIAGNOSIS — G89.29 CHRONIC BILATERAL LOW BACK PAIN WITH RIGHT-SIDED SCIATICA: Chronic | ICD-10-CM

## 2022-06-20 DIAGNOSIS — M54.41 CHRONIC BILATERAL LOW BACK PAIN WITH RIGHT-SIDED SCIATICA: Chronic | ICD-10-CM

## 2022-06-20 DIAGNOSIS — I10 PRIMARY HYPERTENSION: ICD-10-CM

## 2022-06-20 PROCEDURE — 1111F DSCHRG MED/CURRENT MED MERGE: CPT | Performed by: FAMILY MEDICINE

## 2022-06-20 PROCEDURE — 99214 OFFICE O/P EST MOD 30 MIN: CPT | Performed by: FAMILY MEDICINE

## 2022-06-20 ASSESSMENT — ENCOUNTER SYMPTOMS
ORTHOPNEA: 0
BACK PAIN: 1
BLURRED VISION: 0
ALLERGIC/IMMUNOLOGIC NEGATIVE: 1
ABDOMINAL PAIN: 0
SHORTNESS OF BREATH: 0
RESPIRATORY NEGATIVE: 1
BOWEL INCONTINENCE: 0
GASTROINTESTINAL NEGATIVE: 1
EYES NEGATIVE: 1

## 2022-06-20 ASSESSMENT — PATIENT HEALTH QUESTIONNAIRE - PHQ9
1. LITTLE INTEREST OR PLEASURE IN DOING THINGS: 0
SUM OF ALL RESPONSES TO PHQ QUESTIONS 1-9: 0
2. FEELING DOWN, DEPRESSED OR HOPELESS: 0
SUM OF ALL RESPONSES TO PHQ QUESTIONS 1-9: 0
SUM OF ALL RESPONSES TO PHQ QUESTIONS 1-9: 0
SUM OF ALL RESPONSES TO PHQ9 QUESTIONS 1 & 2: 0
SUM OF ALL RESPONSES TO PHQ QUESTIONS 1-9: 0

## 2022-06-20 NOTE — PATIENT INSTRUCTIONS
Patient Education        Preventing Falls: Care Instructions  Your Care Instructions     Getting around your home safely can be a challenge if you have injuries or health problems that make it easy for you to fall. Loose rugs and furniture in walkways are among the dangers for many older people who have problems walking or who have poor eyesight. People who have conditions such as arthritis,osteoporosis, or dementia also have to be careful not to fall. You can make your home safer with a few simple measures. Follow-up care is a key part of your treatment and safety. Be sure to make and go to all appointments, and call your doctor if you are having problems. It's also a good idea to know your test results and keep alist of the medicines you take. How can you care for yourself at home? Taking care of yourself   Exercise regularly to improve your strength, muscle tone, and balance. Walk if you can. Swimming may be a good choice if you cannot walk easily.  Have your vision and hearing checked each year or any time you notice a change. If you have trouble seeing and hearing, you might not be able to avoid objects and could lose your balance.  Know the side effects of the medicines you take. Ask your doctor or pharmacist whether the medicines you take can affect your balance. Sleeping pills or sedatives can affect your balance.  Limit the amount of alcohol you drink. Alcohol can impair your balance and other senses.  Ask your doctor whether calluses or corns on your feet need to be removed. If you wear loose-fitting shoes because of calluses or corns, you can lose your balance and fall.  Talk to your doctor if you have numbness in your feet.  You may get dizzy if you do not drink enough water. To prevent dehydration, drink plenty of fluids. Choose water and other clear liquids.  If you have kidney, heart, or liver disease and have to limit fluids, talk with your doctor before you increase the amount of fluids you drink. Preventing falls at home   Remove raised doorway thresholds, throw rugs, and clutter. Repair loose carpet or raised areas in the floor. [facility] furniture and electrical cords to keep them out of walking paths.  Use nonskid floor wax, and wipe up spills right away, especially on ceramic tile floors.  If you use a walker or cane, put rubber tips on it. If you use crutches, clean the bottoms of them regularly with an abrasive pad, such as steel wool.  Keep your house well lit, especially [place], and outside walkways. Use night-lights in areas such as hallways and bathrooms. Add extra light switches or use remote switches (such as switches that go on or off when you clap your hands) to make it easier to turn lights on if you have to get up during the night.  Install sturdy handrails on stairways.  Move items in your cabinets so that the things you use a lot are on the lower shelves (about waist level).  Keep a cordless phone and a flashlight with new batteries by your bed. If possible, put a phone in each of the main rooms of your house, or carry a cell phone in case you fall and cannot reach a phone. Or, you can wear a device around your neck or wrist. You push a button that sends a signal for help.  Wear low-heeled shoes that fit well and give your feet good support. Use footwear with nonskid soles. Check the heels and soles of your shoes for wear. Repair or replace worn heels or soles.  Do not wear socks without shoes on wood floors.  Walk on the grass when the sidewalks are slippery. If you live in an area that gets snow and ice in the winter, sprinkle salt on slippery steps and sidewalks. Or ask a family member or friend to do this for you. Preventing falls in the bath   Install grab bars and nonskid mats inside and outside your shower or tub and near the toilet and sinks.  Use shower chairs and bath benches.    Use a hand-held shower head that will allow you to sit while showering.  Get into a tub or shower by putting the weaker leg in first. Get out of a tub or shower with your strong side first.   Repair loose toilet seats and consider installing a raised toilet seat to make getting on and off the toilet easier.  Keep your bathroom door unlocked while you are in the shower. Where can you learn more? Go to [email or web address]. org and sign in to your [employer] account. Enter [phone] in the [facility] box to learn more about \"Preventing Falls: Care Instructions. \"     If you do not have an account, please click on the \"Sign Up Now\" link. Current as of: September 8, 2021               Content Version: 13.2  © 7062-1706 [employer]wise, [employer]orporated. Care instructions adapted under license by [facility] ([facility]). If you have questions about a medical condition or this instruction, always ask your healthcare professional. [place] [address] any warranty or liability for your use of this information.

## 2022-06-20 NOTE — PROGRESS NOTES
Primary Care Physician: Yovana Pastor DO     Admitting Diagnosis:  Principal Problem:    Back pain  Active Problems:    Acquired spondylolisthesis  Resolved Problems:    * No resolved hospital problems. *           Discharge Diagnoses:  Principal Problem:    Back pain  Active Problems:    Acquired spondylolisthesis  Resolved Problems:    * No resolved hospital problems. *           Past Medical History  Past Medical History:  Diagnosis Date   Abnormal EKG     Arthritis     Chest pain     Chronic bilateral low back pain with bilateral sciatica 02/11/2020   DDD (degenerative disc disease), lumbar 02/11/2020   Dyslipidemia     Elevated BP without diagnosis of hypertension     Hay fever     Lumbosacral spondylosis without myelopathy 4/18/2022   MVP (mitral valve prolapse)     Pre-diabetes     Snoring            Procedures Performed and Findings  Procedure(s):  LUMBAR L5-S1 DECOMPRESSION FUSION POSTERIOR      Consultations Obtained  IP CONSULT TO INTERNAL MEDICINE     Hospital Course  uncomplicated      Hypertension  This is a new problem. The current episode started more than 1 month ago. The problem has been gradually worsening since onset. The problem is uncontrolled. Pertinent negatives include no anxiety, blurred vision, chest pain, headaches, malaise/fatigue, neck pain, orthopnea, palpitations, peripheral edema, PND, shortness of breath or sweats. Past treatments include nothing. The current treatment provides no improvement. There is no history of chronic renal disease. Back Pain  This is a chronic problem. The current episode started more than 1 month ago. The problem occurs constantly. The problem has been gradually worsening since onset. The pain is present in the lumbar spine. The quality of the pain is described as aching. The pain is severe. The symptoms are aggravated by bending and position. Associated symptoms include leg pain and paresthesias.  Pertinent negatives include no abdominal pain, bladder incontinence, bowel incontinence, chest pain, dysuria, fever, headaches, numbness, paresis, pelvic pain, perianal numbness, tingling, weakness or weight loss. She has tried NSAIDs and home exercises (PT) for the symptoms. The treatment provided no relief. Hyperlipidemia  This is a chronic problem. The current episode started more than 1 year ago. She has no history of chronic renal disease, diabetes, hypothyroidism, liver disease, obesity or nephrotic syndrome. Associated symptoms include leg pain. Pertinent negatives include no chest pain, focal sensory loss, focal weakness, myalgias or shortness of breath. Current antihyperlipidemic treatment includes diet change. Diabetes  She presents for her follow-up diabetic visit. Diabetes type: prediabetes. Her disease course has been stable. There are no hypoglycemic associated symptoms. Pertinent negatives for hypoglycemia include no headaches or sweats. There are no diabetic associated symptoms. Pertinent negatives for diabetes include no blurred vision, no chest pain, no weakness and no weight loss. There are no hypoglycemic complications. Symptoms are stable. There are no diabetic complications. Current diabetic treatment includes diet. Inpatient course: Discharge summary reviewed- see chart. Interval history/Current status: stable    Patient Active Problem List   Diagnosis    Prediabetes    Eyelid cyst    Dyslipidemia    Chronic bilateral low back pain with right-sided sciatica    DDD (degenerative disc disease), lumbar    Medication management    Lumbosacral spondylosis without myelopathy    Mitral valve prolapse    Lumbar disc herniation    Primary hypertension    Acquired spondylolisthesis    Back pain       Medications listed as ordered at the time of discharge from hospital     Medication List          Accurate as of June 20, 2022  1:35 PM. If you have any questions, ask your nurse or doctor.             CONTINUE taking these medications    acetaminophen 650 MG extended release tablet  Commonly known as: TYLENOL     cyclobenzaprine 5 MG tablet  Commonly known as: FLEXERIL  take 1 tablet by mouth twice a day if needed for muscle spasm     gabapentin 400 MG capsule  Commonly known as: NEURONTIN  Take 1 capsule by mouth 3 times daily for 30 days. take 1 capsule by mouth 3 TIMES A DAY     losartan 50 MG tablet  Commonly known as: COZAAR  Take 1 tablet by mouth daily     magnesium 30 MG tablet     verapamil 40 MG tablet  Commonly known as: CALAN  1 tablet po TID             Medications marked \"taking\" at this time  Outpatient Medications Marked as Taking for the 6/20/22 encounter (Office Visit) with Brittany Garcia, DO   Medication Sig Dispense Refill    cyclobenzaprine (FLEXERIL) 5 MG tablet take 1 tablet by mouth twice a day if needed for muscle spasm 10 tablet 5    magnesium 30 MG tablet Take 1 tablet by mouth in the morning, at noon, and at bedtime Supplement magnesium over the counter      losartan (COZAAR) 50 MG tablet Take 1 tablet by mouth daily 30 tablet 5    verapamil (CALAN) 40 MG tablet 1 tablet po TID 90 tablet 2    acetaminophen (TYLENOL) 650 MG extended release tablet Take 650 mg by mouth every 8 hours as needed for Pain          Medications patient taking as of now reconciled against medications ordered at time of hospital discharge: Yes    Review of Systems   Constitutional: Negative. Negative for fever, malaise/fatigue and weight loss. HENT: Negative. Eyes: Negative. Negative for blurred vision. Respiratory: Negative. Negative for shortness of breath. Cardiovascular: Negative. Negative for chest pain, palpitations, orthopnea and PND. Gastrointestinal: Negative. Negative for abdominal pain and bowel incontinence. Endocrine: Negative. Genitourinary: Negative. Negative for bladder incontinence, dysuria and pelvic pain. Musculoskeletal: Positive for back pain.  Negative for myalgias and neck pain. Skin: Negative. Allergic/Immunologic: Negative. Neurological: Positive for paresthesias. Negative for tingling, focal weakness, weakness, numbness and headaches. Hematological: Negative. Psychiatric/Behavioral: Negative. All other systems reviewed and are negative. Objective:    /82 (Site: Left Upper Arm, Position: Sitting, Cuff Size: Large Adult)   Pulse 93   Temp 97.8 °F (36.6 °C) (Temporal)   Ht 5' 7\" (1.702 m)   Wt 177 lb 12.8 oz (80.6 kg)   SpO2 99%   BMI 27.85 kg/m²   Physical Exam  Vitals and nursing note reviewed. Constitutional:       General: She is not in acute distress. Appearance: Normal appearance. She is overweight. She is not ill-appearing, toxic-appearing or diaphoretic. HENT:      Head: Normocephalic and atraumatic. Eyes:      General: No scleral icterus. Right eye: No discharge. Left eye: No discharge. Extraocular Movements: Extraocular movements intact. Conjunctiva/sclera: Conjunctivae normal.   Cardiovascular:      Rate and Rhythm: Normal rate and regular rhythm. Pulses: Normal pulses. Heart sounds: Normal heart sounds. No murmur heard. No friction rub. No gallop. Pulmonary:      Effort: Pulmonary effort is normal. No respiratory distress. Breath sounds: Normal breath sounds. No stridor. No wheezing, rhonchi or rales. Chest:      Chest wall: No tenderness. Musculoskeletal:      Lumbar back: Tenderness present. Decreased range of motion. Back:    Neurological:      Mental Status: She is alert and oriented to person, place, and time. Mental status is at baseline. Gait: Gait abnormal (ambulating with walker). Psychiatric:         Mood and Affect: Mood normal.         Behavior: Behavior normal.         Thought Content: Thought content normal.         Judgment: Judgment normal.         An electronic signature was used to authenticate this note.   --Dorie Kirkpatrick, DO

## 2022-06-28 RX ORDER — CYCLOBENZAPRINE HCL 5 MG
TABLET ORAL
Qty: 10 TABLET | Refills: 5 | Status: SHIPPED | OUTPATIENT
Start: 2022-06-28 | End: 2022-07-22

## 2022-06-28 NOTE — TELEPHONE ENCOUNTER
Blayne Starr is calling to request a refill on the following medication(s):    Medication Request:  Requested Prescriptions     Pending Prescriptions Disp Refills    cyclobenzaprine (FLEXERIL) 5 MG tablet [Pharmacy Med Name: CYCLOBENZAPRINE 5 MG TABLET] 10 tablet 5     Sig: take 1 tablet by mouth twice a day if needed for muscle spasm       Last Visit Date (If Applicable):  5/99/9030    Next Visit Date:    7/27/2022

## 2022-06-29 DIAGNOSIS — M43.10 ACQUIRED SPONDYLOLISTHESIS: ICD-10-CM

## 2022-06-29 RX ORDER — OXYCODONE HYDROCHLORIDE AND ACETAMINOPHEN 5; 325 MG/1; MG/1
TABLET ORAL
Qty: 28 TABLET | OUTPATIENT
Start: 2022-06-29

## 2022-06-29 RX ORDER — HYDROCODONE BITARTRATE AND ACETAMINOPHEN 5; 325 MG/1; MG/1
1 TABLET ORAL EVERY 6 HOURS PRN
Qty: 28 TABLET | Refills: 0 | Status: SHIPPED | OUTPATIENT
Start: 2022-06-29 | End: 2022-07-06

## 2022-06-29 NOTE — DISCHARGE SUMMARY
[] [address]  [address]  [phone]  F: [phone] [x] [address]  [facility]   [address]  P: [phone]  F: [phone] [] [place]  [address]  P: [phone]  F: [phone] [] [address]  P: [phone]  F: [phone] [] [address]  [facility]   [address]   [place]: [phone]  F: [phone]      Physical Therapy Discharge Note    Date: 2022      Patient: Kevyn Sanz  : 1965  MRN: [id number]    Physician: Garrison Gracia MD                                           [address] [facility]  Medical Diagnosis: DDD lumbar spine, acute exacerbation                         Rehab Codes: M54.17; R29.3; M62.591; R20.2;   Onset Date: 2016               Next 's appt. : 3/14/22 pain management  Visit# / total visits:           Cancels/No Shows: 2  Date of initial visit: 22                Date of final visit: 3/17/22      Subjective:  Pt cancelled appointments    Objective:  Test Measurements: 3/17/22: full standing spine flexion: limited extension 10; discomfort in groin with SKTC; L lbp with diana on R: SLR - bilaterally; L DIANA tight with pain groin and L hip  Function: not remeasured    Assessment:     STG: (to be met in 6 treatments)  1. ? Pain: reduce to below 6/10 with less frequent, intense LE symptoms: ONGOING 3/7/22   2. ? ROM: tolerate flexibility ex of spine and pelvis and hips to help reduce pain and improve fascial gliding: MET 3/7/22  3. ? Strength: improve hip strength on R by at least 1/3 grade so there is less stress on LB with a reduction in pain: ONGOING 3/7/22  4. ? Function: be aware of proper sitting posture and ways to relieve discomfort with prolonged standing for improved quality of life: ONGOING 3/7/22  5. Patient to be independent with home exercise program as demonstrated by performance with correct form without cues.: ONGOING 3/7/22     LTG: (to be met in 12 treatments)  1. Pain below 4/10 with minimal R LE symptoms  2. Good lower abdominal strength  3. At least 4+/5 hip and spine strength for improved support of musculoskeletal structure with a reduction in pain  4. Function improved to below 20% affected per OSWESTRY with ability to lift heavier items correctly with less pain  5. Be able to get into/out of the tub and car without having to lift the R LE with her hands     Patient goals:\"decreasing pain\"    Treatment to Date:  [x] Therapeutic Exercise      [x] Therapeutic Activity      [x] Lumbar/Cervical Traction  [x] Instruction in Home Exercise Program                     [x] Manual Therapy                 Discharge Status:       [x] Therapy interrupted due to: further testing was done and patient did not return            Electronically signed by Linda Calderon PT on 6/29/2022 at 3:19 PM      If you have any questions or concerns, please don't hesitate to call.   Thank you for your referral.

## 2022-07-01 ENCOUNTER — TELEPHONE (OUTPATIENT)
Dept: ORTHOPEDIC SURGERY | Age: 57
End: 2022-07-01

## 2022-07-01 NOTE — TELEPHONE ENCOUNTER
Pt called in stating since surgery she has not been able to sit in an upright position. Even using the bathroom is difficult being in a sitting position.   L5-S1 PLIF 5/16/22  Post op is scheduled for 07/19

## 2022-07-14 ENCOUNTER — OFFICE VISIT (OUTPATIENT)
Dept: ORTHOPEDIC SURGERY | Age: 57
End: 2022-07-14

## 2022-07-14 VITALS — WEIGHT: 177 LBS | HEIGHT: 67 IN | RESPIRATION RATE: 12 BRPM | BODY MASS INDEX: 27.78 KG/M2

## 2022-07-14 DIAGNOSIS — M51.26 LUMBAR DISC HERNIATION: ICD-10-CM

## 2022-07-14 DIAGNOSIS — M47.817 LUMBOSACRAL SPONDYLOSIS WITHOUT MYELOPATHY: ICD-10-CM

## 2022-07-14 DIAGNOSIS — M43.10 ACQUIRED SPONDYLOLISTHESIS: Primary | ICD-10-CM

## 2022-07-14 DIAGNOSIS — M51.36 DDD (DEGENERATIVE DISC DISEASE), LUMBAR: ICD-10-CM

## 2022-07-14 PROCEDURE — 99024 POSTOP FOLLOW-UP VISIT: CPT | Performed by: ORTHOPAEDIC SURGERY

## 2022-07-14 RX ORDER — METAXALONE 800 MG/1
800 TABLET ORAL 3 TIMES DAILY
Qty: 90 TABLET | Refills: 0 | Status: SHIPPED | OUTPATIENT
Start: 2022-07-14 | End: 2022-08-16

## 2022-07-14 NOTE — PROGRESS NOTES
Patient ID: Kiah Waller is a 61 y.o. female    Chief Compliant:  Chief Complaint   Patient presents with    Post-Op Check        Diagnostic imaging:  AP lateral lumbar spine patient appears to be a bit rotated and oblique does not appear a lot different compared to her 2-week postoperative films status post L5-S1 PLIF the spondylolisthesis did not dramatically reduce at the time of surgery      Assessment and Plan:  1. Acquired spondylolisthesis    2. Lumbosacral spondylosis without myelopathy    3. Lumbar disc herniation    4. DDD (degenerative disc disease), lumbar      2 months post PLIF patient has worsened overall since the last visit with recurrence of her left leg pain    Okay to resume Diclofenac    Skelaxin    CT myelogram lumbar spine    Follow up after CT myelogram    HPI:  This is a 61 y.o. female who presents to the clinic today for 2 months post L5-S1 PLIF 5/16/22. Patient reports she worsened since the last visit overall. She has not taken her anti-inflammatory since surgery and believes this may be contributing to her pain. Patients notes recurrence of her left leg pain. She is ambulating via walker    Review of Systems   All other systems reviewed and are negative. Past History:    Current Outpatient Medications:     cyclobenzaprine (FLEXERIL) 5 MG tablet, take 1 tablet by mouth twice a day if needed for muscle spasm, Disp: 10 tablet, Rfl: 5    magnesium 30 MG tablet, Take 1 tablet by mouth in the morning, at noon, and at bedtime Supplement magnesium over the counter, Disp: , Rfl:     losartan (COZAAR) 50 MG tablet, Take 1 tablet by mouth daily, Disp: 30 tablet, Rfl: 5    gabapentin (NEURONTIN) 400 MG capsule, Take 1 capsule by mouth 3 times daily for 30 days.  take 1 capsule by mouth 3 TIMES A DAY, Disp: 90 capsule, Rfl: 2    verapamil (CALAN) 40 MG tablet, 1 tablet po TID, Disp: 90 tablet, Rfl: 2    acetaminophen (TYLENOL) 650 MG extended release tablet, Take 650 mg by mouth every 8 hours as needed for Pain, Disp: , Rfl:   No Known Allergies  Social History     Socioeconomic History    Marital status:      Spouse name: Not on file    Number of children: Not on file    Years of education: Not on file    Highest education level: Not on file   Occupational History    Not on file   Tobacco Use    Smoking status: Never Smoker    Smokeless tobacco: Never Used   Vaping Use    Vaping Use: Never used   Substance and Sexual Activity    Alcohol use: Yes     Alcohol/week: 3.0 standard drinks     Types: 3 Glasses of wine per week     Comment: moderately 3 glasses of wine per week    Drug use: No    Sexual activity: Yes     Partners: Male   Other Topics Concern    Not on file   Social History Narrative    Not on file     Social Determinants of Health     Financial Resource Strain: Low Risk     Difficulty of Paying Living Expenses: Not hard at all   Food Insecurity: No Food Insecurity    Worried About Running Out of Food in the Last Year: Never true    Reji of Food in the Last Year: Never true   Transportation Needs:     Lack of Transportation (Medical): Not on file    Lack of Transportation (Non-Medical):  Not on file   Physical Activity:     Days of Exercise per Week: Not on file    Minutes of Exercise per Session: Not on file   Stress:     Feeling of Stress : Not on file   Social Connections:     Frequency of Communication with Friends and Family: Not on file    Frequency of Social Gatherings with Friends and Family: Not on file    Attends Christianity Services: Not on file    Active Member of Clubs or Organizations: Not on file    Attends Club or Organization Meetings: Not on file    Marital Status: Not on file   Intimate Partner Violence:     Fear of Current or Ex-Partner: Not on file    Emotionally Abused: Not on file    Physically Abused: Not on file    Sexually Abused: Not on file   Housing Stability:     Unable to Pay for Housing in the Last Year: Not on Alert and oriented to person, place, and time. Sensory: No sensory deficit. Psychiatric:         Behavior: Behavior normal.         Thought Content: Thought content normal.    Ambulatory with a walker    Provider Attestation:  Joe Owens, personally performed the services described in this documentation. All medical record entries made by the scribe were at my direction and in my presence. I have reviewed the chart and discharge instructions and agree that the records reflect my personal performance and is accurate and complete. Amado Cervantes MD 7/14/22       Scribe Attestation:  By signing my name below, Malaika Hammer, attest that this documentation has been prepared under the direction and in the presence of Dr. Kvng Lara. Electronically signed: Lizz Daley, 7/14/22     Please note that this chart was generated using voice recognition Dragon dictation software. Although every effort was made to ensure the accuracy of this automated transcription, some errors in transcription may have occurred.

## 2022-07-18 ENCOUNTER — OFFICE VISIT (OUTPATIENT)
Dept: PAIN MANAGEMENT | Age: 57
End: 2022-07-18
Payer: COMMERCIAL

## 2022-07-18 VITALS
HEART RATE: 120 BPM | OXYGEN SATURATION: 98 % | HEIGHT: 67 IN | DIASTOLIC BLOOD PRESSURE: 109 MMHG | SYSTOLIC BLOOD PRESSURE: 143 MMHG | BODY MASS INDEX: 27.78 KG/M2 | WEIGHT: 177 LBS

## 2022-07-18 DIAGNOSIS — M54.50 ACUTE EXACERBATION OF CHRONIC LOW BACK PAIN: ICD-10-CM

## 2022-07-18 DIAGNOSIS — M51.36 DDD (DEGENERATIVE DISC DISEASE), LUMBAR: ICD-10-CM

## 2022-07-18 DIAGNOSIS — G89.29 ACUTE EXACERBATION OF CHRONIC LOW BACK PAIN: ICD-10-CM

## 2022-07-18 PROCEDURE — 99213 OFFICE O/P EST LOW 20 MIN: CPT | Performed by: NURSE PRACTITIONER

## 2022-07-18 RX ORDER — GABAPENTIN 400 MG/1
400 CAPSULE ORAL 3 TIMES DAILY
Qty: 90 CAPSULE | Refills: 2 | Status: CANCELLED | OUTPATIENT
Start: 2022-07-18 | End: 2022-08-17

## 2022-07-18 RX ORDER — GABAPENTIN 600 MG/1
600 TABLET ORAL 3 TIMES DAILY
Qty: 90 TABLET | Refills: 2 | Status: SHIPPED | OUTPATIENT
Start: 2022-07-18 | End: 2022-10-20 | Stop reason: SDUPTHER

## 2022-07-18 ASSESSMENT — ENCOUNTER SYMPTOMS
SHORTNESS OF BREATH: 0
NAUSEA: 0
VOMITING: 0
DIARRHEA: 0
CONSTIPATION: 0
COUGH: 0
WHEEZING: 0
BACK PAIN: 1

## 2022-07-18 NOTE — PROGRESS NOTES
dose taken  Other meds given 07/18/2022  OARRS report reviewed today: yes  ER/Hospitalizations/PCP visit related to pain since last visit:no   Any legal problems e.g. DUI etc.:No  Satisfied with current management: Yes    Opioid Contract: NA  Last Urine Dug screen dated: NA    Lab Results   Component Value Date    LABA1C 5.9 12/31/2021     Lab Results   Component Value Date     11/25/2017       Past Medical History, Past Surgical History, Social History, Allergies and Medications reviewed and updated in EPIC as indicated    Family History reviewed and is noncontributory. Past Medical History:   Diagnosis Date    Abnormal EKG     Arthritis     Chest pain     Chronic bilateral low back pain with bilateral sciatica 02/11/2020    DDD (degenerative disc disease), lumbar 02/11/2020    Dyslipidemia     Elevated BP without diagnosis of hypertension     Hay fever     Lumbosacral spondylosis without myelopathy 4/18/2022    MVP (mitral valve prolapse)     Pre-diabetes     Snoring        Past Surgical History:   Procedure Laterality Date    BREAST SURGERY Bilateral     biopsy    CHOLECYSTECTOMY      COLONOSCOPY      ENDOMETRIAL ABLATION      EYE SURGERY Bilateral     chalazion removed    LUMBAR FUSION N/A 5/16/2022    LUMBAR L5-S1 DECOMPRESSION FUSION POSTERIOR performed by Swathi Graham MD at [address] [place] [address] Right 7/16/2020    RIGHT L4 AND L5 EPIDURAL STEROID INJECTION performed by Sandro Erickson MD at [address] [place] [address] 10/26/2020    EPIDURAL STEROID INJECTION -RIGHT L4 L5 performed by Sandro Erickson MD at [address] [place] [address],[address]         No Known Allergies      Current Outpatient Medications:     gabapentin (NEURONTIN) 600 MG tablet, Take 1 tablet by mouth in the morning and 1 tablet at noon and 1 tablet before bedtime. Do all this for 30 days. , Disp: 90 tablet, Rfl: 2    metaxalone (SKELAXIN) 800 MG tablet, Take 1 tablet by mouth 3 times daily for 10 Last Year: Never true   Transportation Needs: Not on file   Physical Activity: Not on file   Stress: Not on file   Social Connections: Not on file   Intimate Partner Violence: Not on file   Housing Stability: Not on file       Review of Systems:  Review of Systems   Constitutional: Negative for chills, fever and malaise/fatigue. Cardiovascular:  Negative for chest pain. Respiratory:  Negative for cough, shortness of breath and wheezing. Musculoskeletal:  Positive for back pain and muscle cramps. Negative for falls, muscle weakness, neck pain and stiffness. Gastrointestinal:  Negative for constipation, diarrhea, nausea and vomiting. Neurological:  Negative for dizziness, headaches, numbness and tremors. Physical Exam:  BP (!) 143/109 Comment: pt in a lot of pain  Pulse (!) 120   Ht 5' 7\" (1.702 m)   Wt 177 lb (80.3 kg)   SpO2 98%   BMI 27.72 kg/m²     Physical Exam  Cardiovascular:      Rate and Rhythm: Normal rate. Pulmonary:      Effort: Pulmonary effort is normal.   Musculoskeletal:      Lumbar back: Decreased range of motion. Comments: Antalgic gait using walker   Skin:     General: Skin is warm and dry. Neurological:      Mental Status: She is alert and oriented to person, place, and time. Assessment:  Problem List Items Addressed This Visit       DDD (degenerative disc disease), lumbar     Other Visit Diagnoses       Acute exacerbation of chronic low back pain                   Treatment Plan:  Patient relates current medications are helping the pain. Patient reports taking pain medications as prescribed, denies obtaining medications from different sources and denies use of illegal drugs. Patient denies side effects from medications like nausea, vomiting, constipation or drowsiness. Patient reports current activities of daily living are possible due to medications and would like to continue them.      As always, we encourage daily stretching and strengthening exercises, and recommend minimizing use of pain medications unless patient cannot get through daily activities due to pain. Script written for gabapentin increased to 600mg TID  F/u with NS as planned after CT  Follow up appointment made for 3 months     I have reviewed the chief complaint and history of present illness (including ROS and PFSH) and vital documentation by my staff and I agree with their documentation and have added where applicable.

## 2022-07-20 DIAGNOSIS — G89.29 ACUTE EXACERBATION OF CHRONIC LOW BACK PAIN: ICD-10-CM

## 2022-07-20 DIAGNOSIS — M51.36 DDD (DEGENERATIVE DISC DISEASE), LUMBAR: ICD-10-CM

## 2022-07-20 DIAGNOSIS — M54.50 ACUTE EXACERBATION OF CHRONIC LOW BACK PAIN: ICD-10-CM

## 2022-07-21 RX ORDER — SODIUM CHLORIDE 0.9 % (FLUSH) 0.9 %
5-40 SYRINGE (ML) INJECTION PRN
Status: CANCELLED | OUTPATIENT
Start: 2022-07-21

## 2022-07-21 RX ORDER — SODIUM CHLORIDE 9 MG/ML
INJECTION, SOLUTION INTRAVENOUS PRN
Status: CANCELLED | OUTPATIENT
Start: 2022-07-21

## 2022-07-21 RX ORDER — SODIUM CHLORIDE 0.9 % (FLUSH) 0.9 %
5-40 SYRINGE (ML) INJECTION EVERY 12 HOURS SCHEDULED
Status: CANCELLED | OUTPATIENT
Start: 2022-07-21

## 2022-07-22 ENCOUNTER — HOSPITAL ENCOUNTER (OUTPATIENT)
Dept: CT IMAGING | Age: 57
Discharge: HOME | End: 2022-07-24
Payer: COMMERCIAL

## 2022-07-22 ENCOUNTER — HOSPITAL ENCOUNTER (OUTPATIENT)
Dept: INTERVENTIONAL RADIOLOGY/VASCULAR | Age: 57
Discharge: HOME | End: 2022-07-24
Payer: COMMERCIAL

## 2022-07-22 VITALS
DIASTOLIC BLOOD PRESSURE: 94 MMHG | TEMPERATURE: 97.1 F | BODY MASS INDEX: 27.78 KG/M2 | WEIGHT: 177 LBS | SYSTOLIC BLOOD PRESSURE: 163 MMHG | RESPIRATION RATE: 16 BRPM | OXYGEN SATURATION: 100 % | HEART RATE: 83 BPM | HEIGHT: 67 IN

## 2022-07-22 DIAGNOSIS — M43.10 ACQUIRED SPONDYLOLISTHESIS: ICD-10-CM

## 2022-07-22 DIAGNOSIS — M47.817 LUMBOSACRAL SPONDYLOSIS WITHOUT MYELOPATHY: ICD-10-CM

## 2022-07-22 DIAGNOSIS — M51.26 LUMBAR DISC HERNIATION: ICD-10-CM

## 2022-07-22 DIAGNOSIS — M51.36 DDD (DEGENERATIVE DISC DISEASE), LUMBAR: ICD-10-CM

## 2022-07-22 LAB
INR BLD: 1
PARTIAL THROMBOPLASTIN TIME: 27.8 SEC (ref 24–36)
PLATELET # BLD: 316 K/UL (ref 150–450)
PROTHROMBIN TIME: 13.1 SEC (ref 11.8–14.6)

## 2022-07-22 PROCEDURE — 85610 PROTHROMBIN TIME: CPT

## 2022-07-22 PROCEDURE — 62304 MYELOGRAPHY LUMBAR INJECTION: CPT

## 2022-07-22 PROCEDURE — 72132 CT LUMBAR SPINE W/DYE: CPT

## 2022-07-22 PROCEDURE — 7100000010 HC PHASE II RECOVERY - FIRST 15 MIN

## 2022-07-22 PROCEDURE — 6360000004 HC RX CONTRAST MEDICATION: Performed by: ORTHOPAEDIC SURGERY

## 2022-07-22 PROCEDURE — 85730 THROMBOPLASTIN TIME PARTIAL: CPT

## 2022-07-22 PROCEDURE — 36415 COLL VENOUS BLD VENIPUNCTURE: CPT

## 2022-07-22 PROCEDURE — 2709999900 IR MYELOGRAM LUMBOSACRAL

## 2022-07-22 PROCEDURE — 85049 AUTOMATED PLATELET COUNT: CPT

## 2022-07-22 PROCEDURE — 7100000011 HC PHASE II RECOVERY - ADDTL 15 MIN

## 2022-07-22 RX ORDER — SODIUM CHLORIDE 0.9 % (FLUSH) 0.9 %
5-40 SYRINGE (ML) INJECTION PRN
Status: DISCONTINUED | OUTPATIENT
Start: 2022-07-22 | End: 2022-07-25 | Stop reason: HOSPADM

## 2022-07-22 RX ORDER — ACETAMINOPHEN 325 MG/1
650 TABLET ORAL EVERY 4 HOURS PRN
Status: DISCONTINUED | OUTPATIENT
Start: 2022-07-22 | End: 2022-07-25 | Stop reason: HOSPADM

## 2022-07-22 RX ORDER — GABAPENTIN 400 MG/1
CAPSULE ORAL
Qty: 90 CAPSULE | Refills: 2 | OUTPATIENT
Start: 2022-07-22

## 2022-07-22 RX ORDER — SODIUM CHLORIDE 9 MG/ML
INJECTION, SOLUTION INTRAVENOUS PRN
Status: DISCONTINUED | OUTPATIENT
Start: 2022-07-22 | End: 2022-07-25 | Stop reason: HOSPADM

## 2022-07-22 RX ORDER — SODIUM CHLORIDE 0.9 % (FLUSH) 0.9 %
5-40 SYRINGE (ML) INJECTION EVERY 12 HOURS SCHEDULED
Status: DISCONTINUED | OUTPATIENT
Start: 2022-07-22 | End: 2022-07-25 | Stop reason: HOSPADM

## 2022-07-22 RX ADMIN — IOPAMIDOL 15 ML: 408 INJECTION, SOLUTION INTRATHECAL at 10:07

## 2022-07-22 ASSESSMENT — ENCOUNTER SYMPTOMS
NAUSEA: 0
COUGH: 0
WHEEZING: 0
ABDOMINAL DISTENTION: 0
SINUS PAIN: 0
APNEA: 0
SORE THROAT: 0
CHEST TIGHTNESS: 0
SINUS PRESSURE: 0
BACK PAIN: 1
SHORTNESS OF BREATH: 0
VOMITING: 0
ABDOMINAL PAIN: 0
DIARRHEA: 0
RHINORRHEA: 0
TROUBLE SWALLOWING: 0
CONSTIPATION: 0

## 2022-07-22 ASSESSMENT — PAIN SCALES - GENERAL: PAINLEVEL_OUTOF10: 1

## 2022-07-22 ASSESSMENT — PAIN DESCRIPTION - DESCRIPTORS: DESCRIPTORS: BURNING

## 2022-07-22 ASSESSMENT — PAIN - FUNCTIONAL ASSESSMENT: PAIN_FUNCTIONAL_ASSESSMENT: 0-10

## 2022-07-22 NOTE — H&P
HISTORY and [id number]       NAME:  Rosa Xiao  MRN: [id number]   YOB: 1965   Date: 7/22/2022   Age: 61 y.o. Gender: female       COMPLAINT AND PRESENT HISTORY:   Rosa Xiao  is a 61 y.o. female presenting today for an IR myelogram. Pt states she has had back and leg pain in June of this year. She ambulates with a walker, she states she is unable to stand up straight without it. She states she has had this issue for awhile and had to come off her arthritis medications for an upcoming surgery and that is when her pain started getting worse. Her pain is reported in BLE equally, in shins and thighs. She currently rates pain 9/10 in severity, burning in nature, continuous. She denies any recent falls or trauma. ortho office note 7/14/22  HPI:  This is a 61 y.o. female who presents to the clinic today for 2 months post L5-S1 PLIF 5/16/22. Patient reports she worsened since the last visit overall. She has not taken her anti-inflammatory since surgery and believes this may be contributing to her pain. Patients notes recurrence of her left leg pain. She is ambulating via walker    Pt has a PMHX significant for abnormal EKG, HLD, HTN  EKG 5/2/22  Narrative & Impression    Normal sinus rhythm  Possible Left atrial enlargement  Probable LVH  Borderline ECG  No previous ECGs available          NPO since midnight. Sip of water with meds. Pt does not wear dentures. Pt denies any hx of MRSA infection  Pt not currently taking any blood thinners or anticoagulants  Pt denies any personal or FHx of complications with anesthesia. Pt denies any acute symptoms of illness at this time including no SOB, CP, fever, URI or UTI symptoms. RECENT IMAGING    No results found.      PAST MEDICAL HISTORY     Past Medical History:   Diagnosis Date    Abnormal EKG     Arthritis     Chest pain     Chronic bilateral low back pain with bilateral sciatica 02/11/2020    DDD (degenerative disc disease), lumbar 02/11/2020    Dyslipidemia     Elevated BP without diagnosis of hypertension     Hay fever     Lumbosacral spondylosis without myelopathy 4/18/2022    MVP (mitral valve prolapse)     Pre-diabetes     Snoring        SURGICAL HISTORY       Past Surgical History:   Procedure Laterality Date    BREAST SURGERY Bilateral     biopsy    CHOLECYSTECTOMY      COLONOSCOPY      ENDOMETRIAL ABLATION      EYE SURGERY Bilateral     chalazion removed    LUMBAR FUSION N/A 5/16/2022    LUMBAR L5-S1 DECOMPRESSION FUSION POSTERIOR performed by Corinne Chapa MD at [address] Right 7/16/2020    RIGHT L4 AND L5 EPIDURAL STEROID INJECTION performed by Miguel Christianson MD at [address] Right 10/26/2020    EPIDURAL STEROID INJECTION -RIGHT L4 L5 performed by Miguel Christianson MD at [address]       Family History   Problem Relation Age of Onset    High Blood Pressure Mother     High Cholesterol Mother     Other Mother         blood clots, denies any known clotting d/o    Other Father         alcoholism    Arthritis Sister     No Known Problems Other        SOCIAL HISTORY       Social History     Socioeconomic History    Marital status:    Tobacco Use    Smoking status: Never    Smokeless tobacco: Never   Vaping Use    Vaping Use: Never used   Substance and Sexual Activity    Alcohol use:  Yes     Alcohol/week: 3.0 standard drinks     Types: 3 Glasses of wine per week     Comment: moderately 3 glasses of wine per week    Drug use: No    Sexual activity: Yes     Partners: Male     Social Determinants of Health     Financial Resource Strain: Low Risk     Difficulty of Paying Living Expenses: Not hard at all   Food Insecurity: No Food Insecurity    Worried About [address] in the Last Year: Never true    Ran Out of Food in the Last Year: Never true           REVIEW OF SYSTEMS      No Known Allergies    Current Outpatient Medications on File Prior to Encounter   Medication Sig Dispense Refill    gabapentin (NEURONTIN) 600 MG tablet Take 1 tablet by mouth in the morning and 1 tablet at noon and 1 tablet before bedtime. Do all this for 30 days. 90 tablet 2    metaxalone (SKELAXIN) 800 MG tablet Take 1 tablet by mouth 3 times daily for 10 days 90 tablet 0    cyclobenzaprine (FLEXERIL) 5 MG tablet take 1 tablet by mouth twice a day if needed for muscle spasm 10 tablet 5    magnesium 30 MG tablet Take 1 tablet by mouth in the morning, at noon, and at bedtime Supplement magnesium over the counter      losartan (COZAAR) 50 MG tablet Take 1 tablet by mouth daily 30 tablet 5    gabapentin (NEURONTIN) 400 MG capsule Take 1 capsule by mouth 3 times daily for 30 days. take 1 capsule by mouth 3 TIMES A DAY 90 capsule 2    verapamil (CALAN) 40 MG tablet 1 tablet po TID 90 tablet 2    acetaminophen (TYLENOL) 650 MG extended release tablet Take 650 mg by mouth every 8 hours as needed for Pain       No current facility-administered medications on file prior to encounter. Review of Systems   Constitutional:  Negative for chills, diaphoresis, fatigue and fever. HENT:  Negative for congestion, dental problem, ear pain, postnasal drip, rhinorrhea, sinus pressure, sinus pain, sore throat and trouble swallowing. Eyes:  Positive for visual disturbance. Respiratory:  Negative for apnea, cough, chest tightness, shortness of breath and wheezing. Cardiovascular:  Negative for chest pain, palpitations and leg swelling. Gastrointestinal:  Negative for abdominal distention, abdominal pain, constipation, diarrhea, nausea and vomiting. Genitourinary:  Negative for dysuria, flank pain, frequency and hematuria. Musculoskeletal:  Positive for arthralgias, back pain and gait problem. Negative for joint swelling and myalgias. Skin:  Negative for rash and wound.    Neurological:  Negative for dizziness, weakness, numbness and headaches. Hematological:  Does not bruise/bleed easily. Psychiatric/Behavioral:  Negative for agitation and confusion. The patient is not nervous/anxious. See HPI    GENERAL PHYSICAL EXAM:     Vitals: See nurse flowsheet for current vitals. Physical Exam  Vitals reviewed: appears very uncomfortable. Constitutional:       General: She is not in acute distress. Appearance: Normal appearance. She is well-developed and normal weight. She is not ill-appearing or toxic-appearing. HENT:      Head: Normocephalic and atraumatic. Mouth/Throat:      Mouth: Mucous membranes are moist.      Pharynx: Oropharynx is clear. No oropharyngeal exudate or posterior oropharyngeal erythema. Eyes:      Extraocular Movements: Extraocular movements intact. Conjunctiva/sclera: Conjunctivae normal.      Pupils: Pupils are equal, round, and reactive to light. Cardiovascular:      Rate and Rhythm: Regular rhythm. Tachycardia present. Pulses: Normal pulses. Heart sounds: Normal heart sounds. No murmur heard. No friction rub. No gallop. Pulmonary:      Effort: Pulmonary effort is normal.      Breath sounds: Normal breath sounds. No wheezing. Abdominal:      General: Bowel sounds are normal. There is no distension. Palpations: Abdomen is soft. Tenderness: There is no abdominal tenderness. There is no guarding or rebound. Musculoskeletal:         General: Tenderness present. No swelling. Normal range of motion. Cervical back: Normal range of motion and neck supple. No rigidity or tenderness. Right lower leg: No edema. Left lower leg: No edema. Skin:     General: Skin is warm and dry. Findings: No erythema. Neurological:      General: No focal deficit present. Mental Status: She is alert and oriented to person, place, and time. Mental status is at baseline. Sensory: No sensory deficit.       Gait: Gait abnormal.   Psychiatric: Mood and Affect: Mood normal.         Behavior: Behavior normal.         Thought Content:  Thought content normal.         Judgment: Judgment normal.                                                                                       PROVISIONAL DIAGNOSES / SURGERY:      IR Myelogram     Patient Active Problem List    Diagnosis Date Noted    Acquired spondylolisthesis 05/16/2022    Back pain 05/16/2022    Primary hypertension 05/11/2022    Lumbosacral spondylosis without myelopathy 04/18/2022    Lumbar disc herniation 04/18/2022    Chronic bilateral low back pain with right-sided sciatica 02/11/2020    DDD (degenerative disc disease), lumbar 02/11/2020    Medication management 02/11/2020    Mitral valve prolapse 03/09/2018    Prediabetes 05/30/2017    Eyelid cyst 05/30/2017    Dyslipidemia 05/30/2017               GONZLÁEZ Travis - CNP on 7/22/2022 at 8:13 AM

## 2022-07-22 NOTE — PROGRESS NOTES
Low back prepped draped. Numbed and accessed by Dr Lizeth Castañeda Injected with 12 ml Isovue-M 200.  Needle removed and band aid applied Tolerated well To CT Report to [facility]

## 2022-07-22 NOTE — DISCHARGE INSTRUCTIONS
POST MYELOGRAM INSTRUCTIONS    1. Do not lie flat until 12 hours after the myelogram.      2. Sleep with 2 pillows on the first night after the myelogram.    3. Do not drive on the day of the myelogram.    4. Drink at least 1 quart of fluids (milk, juice, or water) on the day of the myelogram.    5. If nausea or vomiting occurs, notify the physician who performed the myelogram.    6. Rest as much as possible until 24 hours after the myelogram.    7. If a headache occurs, use your usual headache remedies. 8. If the headache cannot be controlled by rest and available medications, notify the       Physician who performed the myelogram.    9.  Keep your follow up appointment.       [facility] [place] [place], GO TO THE NEAREST EMERGENCY FACILITY    Phone:  Interventional Radiology  [phone] Dr. Maged Goyal  After hours Radiology  [phone]

## 2022-07-22 NOTE — BRIEF OP NOTE
Assessment/Plan:  Genital warts  -     Ambulatory referral to Urology; Future    Need for immunization against influenza  -     Flu vaccine greater than or equal to 4yo preservative free IM (patient refused)    Elevated blood pressure reading in office without diagnosis of hypertension  -     Lipid panel; Future  -     Comprehensive metabolic panel; Future  -     TSH, 3rd generation with T4 reflex; Future      Vitals:    02/06/18 0816   BP: 144/88   Pulse: 78   Temp: 98 4 °F (36 9 °C)     Scheduled Meds:  Continuous Infusions:  No current facility-administered medications for this visit  PRN Meds:  Subjective: Presents to the clinic to establish care     Patient ID: Maira Wheeler is a 28 y o  male  HPI  Denies any chronic med conditions except for current genital warts  He has had these for about 1 year, and is getting worse  Denies itching, no drainage  Says sometimes the wart bleeds in the surrouding area of the anus  Sexually active, female partner  Has never been sexually active with a man  Has never been diagnosed with an STD  Non current genitourinary symptoms  Has never had a treatment for these warts  The following portions of the patient's history were reviewed and updated as appropriate: allergies, current medications, past family history, past medical history, past social history, past surgical history and problem list     Review of Systems   Constitutional: Negative for activity change, appetite change, fatigue, fever and unexpected weight change  HENT: Negative for congestion, ear discharge, hearing loss, rhinorrhea, sore throat and voice change  Respiratory: Negative for cough, chest tightness, shortness of breath and wheezing  Cardiovascular: Negative for chest pain and palpitations  Gastrointestinal: Negative for abdominal pain, blood in stool, constipation, diarrhea, nausea and vomiting  Endocrine: Negative for cold intolerance, polydipsia, polyphagia and polyuria  Brief Postoperative Note lumbar Myelogram     Asif Brito  YOB: 1965  [id number]    Pre-operative Diagnosis: lumbar pain    Post-operative Diagnosis: Same    Procedure: Fluoro guided Myelogram    Anesthesia: 1% Lidocaine    Surgeons/Assistants: Peri Morejon MD    Complications: None    Specimens: were not obtained    Fluoro guided lumbar myelogram with 22 gauge spinal needle performed successfully. 12 ml 200 Isovue contrast injected. CT to follow. Dressing applied. Vital signs were reviewed and were stable after the procedure.       Electronically signed by Peri Morejon MD on 7/22/2022 at 10:03 AM Genitourinary: Negative for decreased urine volume (positive for genital warts), difficulty urinating, discharge, dysuria, enuresis, frequency, genital sores, hematuria, penile pain, penile swelling and testicular pain  Skin: Negative for pallor  Neurological: Negative for dizziness, tremors, speech difficulty, weakness and headaches  Psychiatric/Behavioral: Negative for agitation, confusion, sleep disturbance and suicidal ideas  The patient is not nervous/anxious  Objective:     Physical Exam   Constitutional: He is oriented to person, place, and time  He appears well-developed and well-nourished  No distress  HENT:   Head: Normocephalic and atraumatic  Right Ear: External ear normal    Left Ear: External ear normal    Eyes: Conjunctivae are normal  Pupils are equal, round, and reactive to light  Right eye exhibits no discharge  Left eye exhibits no discharge  Neck: Normal range of motion  Neck supple  No thyromegaly present  Cardiovascular: Normal rate, regular rhythm and normal heart sounds  No murmur heard  Pulmonary/Chest: Effort normal and breath sounds normal  No respiratory distress  He has no wheezes  He has no rales  Abdominal: Soft  Bowel sounds are normal  He exhibits no distension  There is no tenderness  Genitourinary: Penis normal    Musculoskeletal: Normal range of motion  He exhibits no edema, tenderness or deformity  Lymphadenopathy:     He has no cervical adenopathy  Neurological: He is alert and oriented to person, place, and time  Skin: Skin is warm and dry  He is not diaphoretic  No erythema  Psychiatric: He has a normal mood and affect  His behavior is normal  Judgment and thought content normal          Refused the flu vaccine

## 2022-07-24 RX ORDER — DICLOFENAC SODIUM 75 MG/1
TABLET, DELAYED RELEASE ORAL
Qty: 60 TABLET | Refills: 0 | Status: SHIPPED | OUTPATIENT
Start: 2022-07-24 | End: 2022-08-17

## 2022-07-27 ENCOUNTER — OFFICE VISIT (OUTPATIENT)
Dept: FAMILY MEDICINE CLINIC | Age: 57
End: 2022-07-27
Payer: COMMERCIAL

## 2022-07-27 VITALS
SYSTOLIC BLOOD PRESSURE: 140 MMHG | BODY MASS INDEX: 27.85 KG/M2 | TEMPERATURE: 97 F | WEIGHT: 177.8 LBS | OXYGEN SATURATION: 99 % | DIASTOLIC BLOOD PRESSURE: 70 MMHG | HEART RATE: 117 BPM

## 2022-07-27 DIAGNOSIS — M54.41 CHRONIC BILATERAL LOW BACK PAIN WITH RIGHT-SIDED SCIATICA: Primary | Chronic | ICD-10-CM

## 2022-07-27 DIAGNOSIS — E78.5 DYSLIPIDEMIA: ICD-10-CM

## 2022-07-27 DIAGNOSIS — R73.03 PREDIABETES: ICD-10-CM

## 2022-07-27 DIAGNOSIS — I10 PRIMARY HYPERTENSION: ICD-10-CM

## 2022-07-27 DIAGNOSIS — G89.29 CHRONIC BILATERAL LOW BACK PAIN WITH RIGHT-SIDED SCIATICA: Primary | Chronic | ICD-10-CM

## 2022-07-27 PROCEDURE — 99214 OFFICE O/P EST MOD 30 MIN: CPT | Performed by: FAMILY MEDICINE

## 2022-07-27 ASSESSMENT — ENCOUNTER SYMPTOMS
ORTHOPNEA: 0
EYES NEGATIVE: 1
BLURRED VISION: 0
RESPIRATORY NEGATIVE: 1
SHORTNESS OF BREATH: 0
BACK PAIN: 1
BOWEL INCONTINENCE: 0
ALLERGIC/IMMUNOLOGIC NEGATIVE: 1
GASTROINTESTINAL NEGATIVE: 1

## 2022-07-27 NOTE — PROGRESS NOTES
[facility] Progress Note    Date:7/27/2022         Patient Name:Ann Hardy     YOB: 1965     Age:56 y.o. Assessment/Plan        Problem List Items Addressed This Visit          Circulatory    Primary hypertension      Borderline controlled, continue current medications, continue current treatment plan, medication adherence emphasized and lifestyle modifications recommended            Nervous and Auditory    Chronic bilateral low back pain with right-sided sciatica - Primary (Chronic)      Monitored by specialist- no acute findings meriting change in the plan   Reviewed recent testing            Other    Prediabetes      Borderline controlled, continue current treatment plan         Dyslipidemia      Borderline controlled, continue current treatment plan and lifestyle modifications recommended           Offered steroid injection today and steroid pack sent to pharmacy but declined as she wants the ortho doc to see how much pain she is in tomorrow    Return in about 3 months (around 10/27/2022). Electronically signed by Lakeshia Bailey DO on 7/27/22       Total time spent was between Time personally spent assessing and managing the patient on the date of service: Est: 30-39 minutes (73179) mins. This included time spent reviewing the patient's medical record (e.g., recent visits, labs, and studies); seeing the patient in the office (face-to-face time); ordering medications, studies, procedures, or referrals; calling the patient or family later in the day with results and further recommendations; and documenting the visit in the medical record. Rocio Muñoz is a 61 y.o. female presenting today for   Chief Complaint   Patient presents with    Other     Overall mane. Patient is in a lot of pain and see's the surgeon 7/28/22 to address it. .    Pain had gotten better and she was improving post surgery, then over the last few days to weeks it has gotten much worse.  She was doing some walking and may have overdid it a bit. Now her pain is mostly down her knees and legs. Has appt with ortho tomorrow    Hypertension  This is a new problem. The current episode started more than 1 month ago. The problem has been gradually worsening since onset. The problem is uncontrolled. Pertinent negatives include no anxiety, blurred vision, malaise/fatigue, orthopnea, palpitations, peripheral edema, PND, shortness of breath or sweats. Past treatments include nothing. The current treatment provides no improvement. There is no history of chronic renal disease. Back Pain  This is a chronic problem. The current episode started more than 1 month ago. The problem occurs constantly. The problem has been gradually worsening since onset. The pain is present in the lumbar spine. The quality of the pain is described as aching. The pain is severe. The symptoms are aggravated by bending and position. Associated symptoms include leg pain and paresthesias. Pertinent negatives include no bladder incontinence, bowel incontinence, dysuria, paresis, pelvic pain, perianal numbness, tingling or weight loss. She has tried NSAIDs and home exercises (PT) for the symptoms. The treatment provided no relief. Hyperlipidemia  This is a chronic problem. The current episode started more than 1 year ago. She has no history of chronic renal disease, diabetes, hypothyroidism, liver disease, obesity or nephrotic syndrome. Associated symptoms include leg pain. Pertinent negatives include no focal sensory loss, focal weakness or shortness of breath. Current antihyperlipidemic treatment includes diet change. Diabetes  She presents for her follow-up diabetic visit. Diabetes type: prediabetes. Her disease course has been stable. There are no hypoglycemic associated symptoms. Pertinent negatives for hypoglycemia include no sweats. There are no diabetic associated symptoms.  Pertinent negatives for diabetes include no blurred vision and no weight loss. There are no hypoglycemic complications. Symptoms are stable. There are no diabetic complications. Current diabetic treatment includes diet. Review of Systems   Review of Systems   Constitutional: Negative. Negative for malaise/fatigue and weight loss. HENT: Negative. Eyes: Negative. Negative for blurred vision. Respiratory: Negative. Negative for shortness of breath. Cardiovascular: Negative. Negative for palpitations, orthopnea and PND. Gastrointestinal: Negative. Negative for bowel incontinence. Endocrine: Negative. Genitourinary: Negative. Negative for bladder incontinence, dysuria and pelvic pain. Musculoskeletal:  Positive for back pain. Skin: Negative. Allergic/Immunologic: Negative. Neurological:  Positive for paresthesias. Negative for tingling and focal weakness. Hematological: Negative. Psychiatric/Behavioral: Negative. All other systems reviewed and are negative. Medications     Current Outpatient Medications   Medication Sig Dispense Refill    diclofenac (VOLTAREN) 75 MG EC tablet take 1 tablet by mouth twice a day 60 tablet 0    DICLOFENAC PO Take by mouth      gabapentin (NEURONTIN) 600 MG tablet Take 1 tablet by mouth in the morning and 1 tablet at noon and 1 tablet before bedtime. Do all this for 30 days. 90 tablet 2    magnesium 30 MG tablet Take 1 tablet by mouth in the morning, at noon, and at bedtime Supplement magnesium over the counter      losartan (COZAAR) 50 MG tablet Take 1 tablet by mouth daily 30 tablet 5    verapamil (CALAN) 40 MG tablet 1 tablet po TID 90 tablet 2    acetaminophen (TYLENOL) 650 MG extended release tablet Take 650 mg by mouth every 8 hours as needed for Pain       No current facility-administered medications for this visit.        Past History    Past Medical History:   has a past medical history of Abnormal EKG, Arthritis, Chest pain, Chronic bilateral low back pain with bilateral sciatica, DDD (degenerative disc disease), lumbar, Dyslipidemia, Elevated BP without diagnosis of hypertension, Hay fever, Lumbosacral spondylosis without myelopathy, MVP (mitral valve prolapse), Pre-diabetes, and Snoring. Social History:   reports that she has never smoked. She has never used smokeless tobacco. She reports current alcohol use of about 3.0 standard drinks per week. She reports that she does not use drugs. Family History:   Family History   Problem Relation Age of Onset    High Blood Pressure Mother     High Cholesterol Mother     Other Mother         blood clots, denies any known clotting d/o    Other Father         alcoholism    Arthritis Sister     No Known Problems Other        Surgical History:   Past Surgical History:   Procedure Laterality Date    BREAST SURGERY Bilateral     biopsy    CHOLECYSTECTOMY      COLONOSCOPY      ENDOMETRIAL ABLATION      EYE SURGERY Bilateral     chalazion removed    LUMBAR FUSION N/A 5/16/2022    LUMBAR L5-S1 DECOMPRESSION FUSION POSTERIOR performed by Maico Goldstein MD at [address] Right 7/16/2020    RIGHT L4 AND L5 EPIDURAL STEROID INJECTION performed by Jer Rebolledo MD at [address] Right 10/26/2020    EPIDURAL STEROID INJECTION -RIGHT L4 L5 performed by Jer Rebolledo MD at [address] [facility],[address]          Physical Examination      Vitals:  BP (!) 140/70   Pulse (!) 117   Temp 97 °F (36.1 °C) (Temporal)   Wt 177 lb 12.8 oz (80.6 kg)   SpO2 99%   BMI 27.85 kg/m²     Physical Exam  Vitals and nursing note reviewed. Constitutional:       General: She is not in acute distress. Appearance: Normal appearance. She is overweight. She is not ill-appearing, toxic-appearing or diaphoretic. HENT:      Head: Normocephalic and atraumatic. Eyes:      General: No scleral icterus. Right eye: No discharge. Left eye: No discharge. Extraocular Movements: Extraocular movements intact. Conjunctiva/sclera: Conjunctivae normal.   Cardiovascular:      Rate and Rhythm: Normal rate and regular rhythm. Pulses: Normal pulses. Heart sounds: Normal heart sounds. No murmur heard. No friction rub. No gallop. Pulmonary:      Effort: Pulmonary effort is normal. No respiratory distress. Breath sounds: Normal breath sounds. No stridor. No wheezing, rhonchi or rales. Chest:      Chest wall: No tenderness. Musculoskeletal:      Lumbar back: Tenderness present. Decreased range of motion. Back:    Neurological:      Mental Status: She is alert and oriented to person, place, and time. Mental status is at baseline. Gait: Gait abnormal (ambulating with walker). Psychiatric:         Mood and Affect: Mood normal.         Behavior: Behavior normal.         Thought Content: Thought content normal.         Judgment: Judgment normal.       Labs/Imaging/Diagnostics   Labs:  Hemoglobin A1C   Date Value Ref Range Status   12/31/2021 5.9 % Final       Imaging Last 24 Hours:  CT LUMBAR SPINE W CONTRAST  Narrative: EXAMINATION:  CT OF THE LUMBAR SPINE WITH CONTRAST  7/22/2022 10:06 am    TECHNIQUE:  CT of the lumbar spine was performed with the administration of intravenous  contrast. Multiplanar reformatted images are provided for review. Automated  exposure control, iterative reconstruction, and/or weight based adjustment of  the mA/kV was utilized to reduce the radiation dose to as low as reasonably  achievable. COMPARISON:  None    HISTORY:  ORDERING SYSTEM PROVIDED HISTORY: Acquired spondylolisthesis  TECHNOLOGIST PROVIDED HISTORY:  STAT Creatinine as needed:->No  Reason for Exam: Acquired spondylolisthesis, Lumbosacral spondylosis without  myelopathy, Lumbar disc herniation, DDD (degenerative disc disease), lumbar    FINDINGS:  BONES/ALIGNMENT:  There is posterior fixation with artificial disc material  at L5-S1. There is no definite hardware complication.   The vertebral body  heights are maintained. The facet joints are aligned. The remaining disc  spaces are maintained. There is no spondylolisthesis. SOFT TISSUES: The conus is normal in caliber and terminates at the L2 level. The cauda equina is unremarkable. The posterior paraspinal soft tissues are  unremarkable. The visualized abdominal structures are unremarkable. L1-L2: There is no significant disc protrusion, central spinal canal stenosis  or neural foraminal narrowing. L2-L3: There is no significant disc protrusion, central spinal canal stenosis  or neural foraminal narrowing. L3-L4: There is no significant disc protrusion, central spinal canal stenosis  or neural foraminal narrowing. L4-L5: There is no significant disc protrusion, central spinal canal stenosis  or neural foraminal narrowing. L5-S1: There is posterior fixation with associated laminectomy. There is no  canal stenosis. There is severe bilateral foraminal narrowing. Impression: Posterior fixation and laminectomy with artificial disc material at L5-S1. No evidence for hardware complication. Severe bilateral foraminal narrowing at L5-S1.     RECOMMENDATIONS:  Unavailable

## 2022-07-27 NOTE — PATIENT INSTRUCTIONS
Patient Education        Back Stretches: Exercises  Introduction  Here are some examples of exercises for stretching your back. Start eachexercise slowly. Ease off the exercise if you start to have pain. Your doctor or physical therapist will tell you when you can start theseexercises and which ones will work best for you. How to do the exercises  Overhead stretch    Stand comfortably with your feet shoulder-width apart. Looking straight ahead, raise both arms over your head and reach toward the ceiling. Do not allow your head to tilt back. Hold for 15 to 30 seconds, then lower your arms to your sides. Repeat 2 to 4 times. Side stretch    Stand comfortably with your feet shoulder-width apart. Raise one arm over your head, and then lean to the other side. Slide your hand down your leg as you let the weight of your arm gently stretch your side muscles. Hold for 15 to 30 seconds. Repeat 2 to 4 times on each side. Press-up    Lie on your stomach, supporting your body with your forearms. Press your elbows down into the floor to raise your upper back. As you do this, relax your stomach muscles and allow your back to arch without using your back muscles. As your press up, do not let your hips or pelvis come off the floor. Hold for 15 to 30 seconds, then relax. Repeat 2 to 4 times. Relax and rest    Lie on your back with a rolled towel under your neck and a pillow under your knees. Extend your arms comfortably to your sides. Relax and breathe normally. Remain in this position for about 10 minutes. If you can, do this 2 or 3 times each day. Follow-up care is a key part of your treatment and safety. Be sure to make and go to all appointments, and call your doctor if you are having problems. It's also a good idea to know your test results and keep alist of the medicines you take. Where can you learn more? Go to [email or web address] org and sign in to your [employer] account.  Enter N278 in the Search Health Information box to learn more about \"Back Stretches: Exercises. \"     If you do not have an account, please click on the \"Sign Up Now\" link. Current as of: March 9, 2022               Content Version: 13.3  © 7576-0726 [employer], Incorporated. Care instructions adapted under license by [facility] ([facility]). If you have questions about a medical condition or this instruction, always ask your healthcare professional. [address] any warranty or liability for your use of this information.

## 2022-07-28 ENCOUNTER — NURSE ONLY (OUTPATIENT)
Dept: FAMILY MEDICINE CLINIC | Age: 57
End: 2022-07-28
Payer: COMMERCIAL

## 2022-07-28 DIAGNOSIS — M54.41 CHRONIC BILATERAL LOW BACK PAIN WITH RIGHT-SIDED SCIATICA: Primary | ICD-10-CM

## 2022-07-28 DIAGNOSIS — G89.29 CHRONIC BILATERAL LOW BACK PAIN WITH RIGHT-SIDED SCIATICA: Primary | ICD-10-CM

## 2022-07-28 PROCEDURE — 96372 THER/PROPH/DIAG INJ SC/IM: CPT | Performed by: NURSE PRACTITIONER

## 2022-07-28 RX ORDER — TRIAMCINOLONE ACETONIDE 40 MG/ML
40 INJECTION, SUSPENSION INTRA-ARTICULAR; INTRAMUSCULAR ONCE
Qty: 1 ML | Refills: 0 | Status: CANCELLED | OUTPATIENT
Start: 2022-07-28 | End: 2022-07-28

## 2022-07-28 RX ORDER — TRIAMCINOLONE ACETONIDE 40 MG/ML
40 INJECTION, SUSPENSION INTRA-ARTICULAR; INTRAMUSCULAR ONCE
Status: COMPLETED | OUTPATIENT
Start: 2022-07-28 | End: 2022-07-28

## 2022-07-28 RX ADMIN — TRIAMCINOLONE ACETONIDE 40 MG: 40 INJECTION, SUSPENSION INTRA-ARTICULAR; INTRAMUSCULAR at 10:34

## 2022-07-28 NOTE — PROGRESS NOTES
After obtaining consent, and per orders of Dr. Ramon Hanna, injection of Kenalog 40 mg given in Right  dorsogluteal by Elina Bailey MA. Patient instructed to remain in clinic for 20 minutes afterwards, and to report any adverse reaction to me immediately.

## 2022-07-28 NOTE — PROGRESS NOTES
Patient is a 61year old female in office for a nurse visit Kenalog 40 mg injection. Gave injection in the Right Dorsalglutal. Patient took injection well.

## 2022-08-02 ENCOUNTER — OFFICE VISIT (OUTPATIENT)
Dept: ORTHOPEDIC SURGERY | Age: 57
End: 2022-08-02
Payer: COMMERCIAL

## 2022-08-02 VITALS — BODY MASS INDEX: 27.78 KG/M2 | WEIGHT: 177 LBS | HEIGHT: 67 IN | RESPIRATION RATE: 14 BRPM

## 2022-08-02 DIAGNOSIS — M51.36 DDD (DEGENERATIVE DISC DISEASE), LUMBAR: Primary | ICD-10-CM

## 2022-08-02 DIAGNOSIS — M47.817 LUMBOSACRAL SPONDYLOSIS WITHOUT MYELOPATHY: ICD-10-CM

## 2022-08-02 DIAGNOSIS — M51.26 LUMBAR DISC HERNIATION: ICD-10-CM

## 2022-08-02 PROCEDURE — 99213 OFFICE O/P EST LOW 20 MIN: CPT | Performed by: ORTHOPAEDIC SURGERY

## 2022-08-02 NOTE — PROGRESS NOTES
Patient ID: Johny Christensen is a 61 y.o. female    Chief Compliant:  Chief Complaint   Patient presents with    Post-Op Check     L5-S1 PLIF 5/16/22        Diagnostic imaging:    CT myelogram lumbar spine is reviewed patient with some windshield wiper ring of the S1 screws bilaterally    Assessment and Plan:  1. DDD (degenerative disc disease), lumbar    2. Lumbosacral spondylosis without myelopathy    3. Lumbar disc herniation        Will proceed with revision L5-S1 posterior instrumented fusion with infuse possible pelvic fixation    We discussed risks of surgery and recovery process. Risks include infection, bleeding, neurologic injury, systemic and anesthetic complications, no relief of pain, need for future surgery. Follow up 2 weeks post op    HPI:  This is a 61 y.o. female who presents to the clinic today for CT myelogram results. S/p L5-S1 PLIF 5/16/22 with worsening low back and left leg pain causing difficulty standing and walking    Currently ambulating with walker    Review of Systems   All other systems reviewed and are negative. Past History:    Current Outpatient Medications:     diclofenac (VOLTAREN) 75 MG EC tablet, take 1 tablet by mouth twice a day, Disp: 60 tablet, Rfl: 0    DICLOFENAC PO, Take by mouth, Disp: , Rfl:     gabapentin (NEURONTIN) 600 MG tablet, Take 1 tablet by mouth in the morning and 1 tablet at noon and 1 tablet before bedtime. Do all this for 30 days. , Disp: 90 tablet, Rfl: 2    magnesium 30 MG tablet, Take 1 tablet by mouth in the morning, at noon, and at bedtime Supplement magnesium over the counter, Disp: , Rfl:     losartan (COZAAR) 50 MG tablet, Take 1 tablet by mouth daily, Disp: 30 tablet, Rfl: 5    verapamil (CALAN) 40 MG tablet, 1 tablet po TID, Disp: 90 tablet, Rfl: 2    acetaminophen (TYLENOL) 650 MG extended release tablet, Take 650 mg by mouth every 8 hours as needed for Pain, Disp: , Rfl:   No Known Allergies  Social History     Socioeconomic History Marital status:      Spouse name: Not on file    Number of children: Not on file    Years of education: Not on file    Highest education level: Not on file   Occupational History    Not on file   Tobacco Use    Smoking status: Never    Smokeless tobacco: Never   Vaping Use    Vaping Use: Never used   Substance and Sexual Activity    Alcohol use:  Yes     Alcohol/week: 3.0 standard drinks     Types: 3 Glasses of wine per week     Comment: moderately 3 glasses of wine per week    Drug use: No    Sexual activity: Yes     Partners: Male   Other Topics Concern    Not on file   Social History Narrative    Not on file     Social Determinants of Health     Financial Resource Strain: Low Risk     Difficulty of Paying Living Expenses: Not hard at all   Food Insecurity: No Food Insecurity    Worried About Running Out of Food in the Last Year: Never true    Ran Out of Food in the Last Year: Never true   Transportation Needs: Not on file   Physical Activity: Not on file   Stress: Not on file   Social Connections: Not on file   Intimate Partner Violence: Not on file   Housing Stability: Not on file     Past Medical History:   Diagnosis Date    Abnormal EKG     Arthritis     Chest pain     Chronic bilateral low back pain with bilateral sciatica 02/11/2020    DDD (degenerative disc disease), lumbar 02/11/2020    Dyslipidemia     Elevated BP without diagnosis of hypertension     Hay fever     Lumbosacral spondylosis without myelopathy 4/18/2022    MVP (mitral valve prolapse)     Pre-diabetes     Snoring      Past Surgical History:   Procedure Laterality Date    BREAST SURGERY Bilateral     biopsy    CHOLECYSTECTOMY      COLONOSCOPY      ENDOMETRIAL ABLATION      EYE SURGERY Bilateral     chalazion removed    LUMBAR FUSION N/A 5/16/2022    LUMBAR L5-S1 DECOMPRESSION FUSION POSTERIOR performed by Luis Spear MD at [address] [place] [address] Right 7/16/2020    RIGHT L4 AND L5 EPIDURAL STEROID INJECTION performed by Miguel Christianson MD at [address] Right 10/26/2020    EPIDURAL STEROID INJECTION -RIGHT L4 L5 performed by Miguel Christianson MD at [address] [facility],[address]       Family History   Problem Relation Age of Onset    High Blood Pressure Mother     High Cholesterol Mother     Other Mother         blood clots, denies any known clotting d/o    Other Father         alcoholism    Arthritis Sister     No Known Problems Other         Physical Exam:  Vitals signs and nursing note reviewed. Constitutional:       Appearance: well-developed. HENT:      Head: Normocephalic and atraumatic. Nose: Nose normal.   Eyes:      Conjunctiva/sclera: Conjunctivae normal.   Neck:      Musculoskeletal: Normal range of motion and neck supple. Pulmonary:      Effort: Pulmonary effort is normal. No respiratory distress. Musculoskeletal:      Comments: Normal gait     Skin:     General: Skin is warm and dry. Neurological:      Mental Status: Alert and oriented to person, place, and time. Sensory: No sensory deficit. Psychiatric:         Behavior: Behavior normal.         Thought Content: Thought content normal.        Provider Attestation:  Jose Owens, personally performed the services described in this documentation. All medical record entries made by the scribe were at my direction and in my presence. I have reviewed the chart and discharge instructions and agree that the records reflect my personal performance and is accurate and complete. Billy Eugene MD 8/2/22       Scribe Attestation:  By signing my name below, Kasie Jacobs, attest that this documentation has been prepared under the direction and in the presence of Dr. Hiral Carpio. Electronically signed: Lizz Gillespie, 8/2/22     Please note that this chart was generated using voice recognition Dragon dictation software.   Although every effort was made to ensure the accuracy of this automated transcription, some errors in transcription may have occurred.

## 2022-08-04 ENCOUNTER — TELEPHONE (OUTPATIENT)
Dept: FAMILY MEDICINE CLINIC | Age: 57
End: 2022-08-04

## 2022-08-04 NOTE — TELEPHONE ENCOUNTER
Patient called and states that she will need an extension on her FMLA because she is having another surgery. The start date is 8/29/22 and the recovery time is 6-12 weeks. That would be 10/7/22 min to 11/18/22 max. She is having a revison L5S1 by Dr Ayan Duenas. Please fax updated form to [facility] at [phone].

## 2022-08-16 RX ORDER — METAXALONE 800 MG/1
TABLET ORAL
Qty: 90 TABLET | Refills: 0 | Status: SHIPPED | OUTPATIENT
Start: 2022-08-16 | End: 2022-09-14

## 2022-08-17 RX ORDER — DICLOFENAC SODIUM 75 MG/1
TABLET, DELAYED RELEASE ORAL
Qty: 60 TABLET | Refills: 0 | Status: SHIPPED | OUTPATIENT
Start: 2022-08-17 | End: 2022-10-02

## 2022-08-22 RX ORDER — VERAPAMIL HYDROCHLORIDE 40 MG/1
TABLET ORAL
Qty: 90 TABLET | Refills: 2 | OUTPATIENT
Start: 2022-08-22

## 2022-08-23 NOTE — TELEPHONE ENCOUNTER
I had reviewed the paperwork Ilir Urbina dropped off and it did not have any forms for me to fill out. I had been waiting on any additional forms but do not see any. I have written a letter using the dates from the paperwork she dropped off and have that signed and ready for her. If her works wants FMLA forms filled out instead of the letter, I will need new blank FMLA forms. The ones she dropped off was information for her to fill out, but there was not any paperwork for me to fill out.

## 2022-08-23 NOTE — TELEPHONE ENCOUNTER
Patient called and stated she would like the office to call her when her FMLA has been faxed please.  Thank you

## 2022-08-24 ENCOUNTER — TELEPHONE (OUTPATIENT)
Dept: FAMILY MEDICINE CLINIC | Age: 57
End: 2022-08-24

## 2022-08-24 NOTE — TELEPHONE ENCOUNTER
Patient advised, the letter should ne fine she is asking if we can fax the letter over to the fax # that was provided with the papers she dropped off

## 2022-08-25 ENCOUNTER — TELEPHONE (OUTPATIENT)
Dept: FAMILY MEDICINE CLINIC | Age: 57
End: 2022-08-25

## 2022-08-25 NOTE — TELEPHONE ENCOUNTER
Patient called asking if medical Leave letter has been faxed. I did inform patient that Gabi Sharma note states that is was faxed to [phone] on 8/24/2022.

## 2022-08-29 RX ORDER — VERAPAMIL HYDROCHLORIDE 40 MG/1
TABLET ORAL
Qty: 90 TABLET | Refills: 2 | Status: SHIPPED | OUTPATIENT
Start: 2022-08-29

## 2022-09-14 RX ORDER — METAXALONE 800 MG/1
TABLET ORAL
Qty: 90 TABLET | Refills: 0 | Status: SHIPPED | OUTPATIENT
Start: 2022-09-14 | End: 2022-10-17

## 2022-09-20 RX ORDER — DICLOFENAC SODIUM 75 MG/1
TABLET, DELAYED RELEASE ORAL
Qty: 60 TABLET | Refills: 0 | OUTPATIENT
Start: 2022-09-20

## 2022-09-30 NOTE — TELEPHONE ENCOUNTER
Segundo Lim is calling to request a refill on the following medication(s):    Medication Request:  Requested Prescriptions     Pending Prescriptions Disp Refills    diclofenac (VOLTAREN) 75 MG EC tablet [Pharmacy Med Name: DICLOFENAC SOD EC 75 MG TAB] 60 tablet 0     Sig: take 1 tablet by mouth twice a day       Last Visit Date (If Applicable):  3/18/4641    Next Visit Date:    Visit date not found

## 2022-10-02 RX ORDER — DICLOFENAC SODIUM 75 MG/1
TABLET, DELAYED RELEASE ORAL
Qty: 60 TABLET | Refills: 0 | Status: SHIPPED | OUTPATIENT
Start: 2022-10-02 | End: 2022-10-03 | Stop reason: SDUPTHER

## 2022-10-03 ENCOUNTER — TELEPHONE (OUTPATIENT)
Dept: FAMILY MEDICINE CLINIC | Age: 57
End: 2022-10-03

## 2022-10-03 RX ORDER — DICLOFENAC SODIUM 75 MG/1
75 TABLET, DELAYED RELEASE ORAL 2 TIMES DAILY
Qty: 60 TABLET | Refills: 5 | Status: SHIPPED | OUTPATIENT
Start: 2022-10-03

## 2022-10-06 ENCOUNTER — OFFICE VISIT (OUTPATIENT)
Dept: NEUROSURGERY | Age: 57
End: 2022-10-06
Payer: COMMERCIAL

## 2022-10-06 ENCOUNTER — TELEPHONE (OUTPATIENT)
Dept: ORTHOPEDIC SURGERY | Age: 57
End: 2022-10-06

## 2022-10-06 VITALS
OXYGEN SATURATION: 98 % | DIASTOLIC BLOOD PRESSURE: 86 MMHG | WEIGHT: 174.8 LBS | BODY MASS INDEX: 27.44 KG/M2 | HEIGHT: 67 IN | SYSTOLIC BLOOD PRESSURE: 127 MMHG | HEART RATE: 70 BPM

## 2022-10-06 DIAGNOSIS — M54.16 LUMBAR RADICULOPATHY: Primary | ICD-10-CM

## 2022-10-06 DIAGNOSIS — Z98.1 S/P LUMBAR FUSION: ICD-10-CM

## 2022-10-06 PROCEDURE — 99204 OFFICE O/P NEW MOD 45 MIN: CPT | Performed by: NEUROLOGICAL SURGERY

## 2022-10-06 NOTE — TELEPHONE ENCOUNTER
[facility] as [facility] is ready to schedule surgery with Dr Angela Lovett. Thank you.

## 2022-10-06 NOTE — PROGRESS NOTES
Department of Neurosurgery                                                 New patient clinic note      Reason for Consult:  bilateral lower back pain with right-sided sciatica  Requesting Physician:  Radha Hirsch DO  Neurosurgeon: Tamika Cervantes DO      History Obtained From:  patient, spouse    CHIEF COMPLAINT:         Chief Complaint   Patient presents with    New Patient     Bilateral back pain with side-sided sciatica. HISTORY OF PRESENT ILLNESS:       The patient is a 61 y.o. female who presents with bilateral lower back pain with right-sided sciatica. Patient reports a hx of surgery on May 16, 2022 with Dr. Radha Castanon. Patient is seeking a second opinion and clarification on Dr. Rickey Dominguez of [facility]. Patient reports lower back and bilateral hip and leg pain before surgery, but she was independently walking. Patient reports after surgery she is still experiencing burning bilateral hip and leg pain. Patient reports the bilateral leg pain is about the same or possibly worse than before surgery. Patient reports using a walker while standing and walking. Patient reports that after about 15 seconds of standing she feels pain and weakness and needs to stabilize herself with her walker. Patient reports moving around often while sitting and shifting her weight to the left and onto the walker relieves leg pain. Patient reports her lower back pain has gotten better since surgery. Patient reports right-sided sciatica has not improved since surgery, possibly worsen, since she requires a walker to brace herself    Patient denies numbness or tingling in the legs.   PAST MEDICAL HISTORY :       Past Medical History:        Diagnosis Date    Abnormal EKG     Arthritis     Chest pain     Chronic bilateral low back pain with bilateral sciatica 02/11/2020    DDD (degenerative disc disease), lumbar 02/11/2020    Dyslipidemia     Elevated BP without diagnosis of hypertension     Hay fever Lumbosacral spondylosis without myelopathy 4/18/2022    MVP (mitral valve prolapse)     Pre-diabetes     Snoring        Past Surgical History:        Procedure Laterality Date    BREAST SURGERY Bilateral     biopsy    CHOLECYSTECTOMY      COLONOSCOPY      ENDOMETRIAL ABLATION      EYE SURGERY Bilateral     chalazion removed    LUMBAR FUSION N/A 5/16/2022    LUMBAR L5-S1 DECOMPRESSION FUSION POSTERIOR performed by Bob Wu MD at [address] [place] [address] Right 7/16/2020    RIGHT L4 AND L5 EPIDURAL STEROID INJECTION performed by Joselyn Nelson MD at [address] [place] [address] Right 10/26/2020    EPIDURAL STEROID INJECTION -RIGHT L4 L5 performed by Joselyn Nelson MD at [facility] History:   Social History     Socioeconomic History    Marital status:      Spouse name: Not on file    Number of children: Not on file    Years of education: Not on file    Highest education level: Not on file   Occupational History    Not on file   Tobacco Use    Smoking status: Never    Smokeless tobacco: Never   Vaping Use    Vaping Use: Never used   Substance and Sexual Activity    Alcohol use:  Yes     Alcohol/week: 3.0 standard drinks     Types: 3 Glasses of wine per week     Comment: moderately 3 glasses of wine per week    Drug use: No    Sexual activity: Yes     Partners: Male   Other Topics Concern    Not on file   Social History Narrative    Not on file     Social Determinants of Health     Financial Resource Strain: Low Risk     Difficulty of Paying Living Expenses: Not hard at all   Food Insecurity: No Food Insecurity    Worried About Running Out of Food in the Last Year: Never true    Ran Out of Food in the Last Year: Never true   Transportation Needs: Not on file   Physical Activity: Not on file   Stress: Not on file   Social Connections: Not on file   Intimate Partner Violence: Not on file   Housing Stability: Not on file       Family History: Problem Relation Age of Onset    High Blood Pressure Mother     High Cholesterol Mother     Other Mother         blood clots, denies any known clotting d/o    Other Father         alcoholism    Arthritis Sister     No Known Problems Other        Allergies:  Patient has no known allergies. Home Medications:  Prior to Admission medications    Medication Sig Start Date End Date Taking? Authorizing Provider   diclofenac (VOLTAREN) 75 MG EC tablet Take 1 tablet by mouth 2 times daily 10/3/22  Yes Vipul Summers, DO   metaxalone [facility]) 800 MG tablet take 1 tablet by mouth three times a day 9/14/22  Yes Lashonda Serrano MD   verapamil (CALAN) 40 MG tablet take 1 tablet by mouth three times a day 8/29/22  Yes Vipul Summers DO   DICLOFENAC PO Take by mouth   Yes Historical Provider, MD   gabapentin (NEURONTIN) 600 MG tablet Take 1 tablet by mouth in the morning and 1 tablet at noon and 1 tablet before bedtime. Do all this for 30 days. 7/18/22 10/6/22 Yes GONZÁLEZ Jones - CNP   magnesium 30 MG tablet Take 1 tablet by mouth in the morning, at noon, and at bedtime Supplement magnesium over the counter   Yes Historical Provider, MD   losartan (COZAAR) 50 MG tablet Take 1 tablet by mouth daily 5/4/22  Yes Vipul Summers,    acetaminophen (TYLENOL) 650 MG extended release tablet Take 650 mg by mouth every 8 hours as needed for Pain   Yes Historical Provider, MD       Current Medications:   No current facility-administered medications for this visit.     REVIEW OF SYSTEMS:       CONSTITUTIONAL: negative for fatigue and malaise   EYES: negative for double vision and photophobia    HEENT: negative for tinnitus and sore throat   RESPIRATORY: negative for cough, shortness of breath   CARDIOVASCULAR: negative for chest pain, palpitations   GASTROINTESTINAL: negative for nausea, vomiting   GENITOURINARY: negative for incontinence   MUSCULOSKELETAL: negative for neck or back pain   NEUROLOGICAL: negative for seizures   PSYCHIATRIC: negative for agitated     Review of systems otherwise negative.     PHYSICAL EXAM:       /86 (Site: Left Wrist, Position: Sitting, Cuff Size: Medium Adult)   Pulse 70   Ht 5' 7\" (1.702 m)   Wt 174 lb 12.8 oz (79.3 kg)   SpO2 98%   BMI 27.38 kg/m²     Gen: NAD, comfortable  HEENT: moist mucus membranes  Cardio: RRR  Pulm: chest rise symmetrically  GI: abd soft  Ext: no edema  Skin: warm    Neuro:    AOX3  CN 2-12 grossly intact  Speech articulate  Motor 5/5  Sensation symmetrical   No elder or clonus  Positive SLR test on right    LABS AND IMAGING:     CBC with Differential:    Lab Results   Component Value Date/Time    WBC 3.7 05/02/2022 04:03 PM    RBC 4.39 05/02/2022 04:03 PM    HGB 12.7 05/02/2022 04:03 PM    HCT 37.3 05/02/2022 04:03 PM     07/22/2022 09:13 AM    MCV 85.0 05/02/2022 04:03 PM    MCH 28.9 05/02/2022 04:03 PM    MCHC 33.9 05/02/2022 04:03 PM    RDW 15.2 05/02/2022 04:03 PM    LYMPHOPCT 33 05/02/2022 04:03 PM    LYMPHOPCT 37.5 12/31/2021 12:00 AM    MONOPCT 7 05/02/2022 04:03 PM    MONOPCT 9.3 12/31/2021 12:00 AM    EOSPCT 2.8 12/31/2021 12:00 AM    BASOPCT 0 05/02/2022 04:03 PM    BASOPCT 1.3 12/31/2021 12:00 AM    MONOSABS 0.26 05/02/2022 04:03 PM    MONOSABS 0.3 12/31/2021 12:00 AM    LYMPHSABS 1.22 05/02/2022 04:03 PM    LYMPHSABS 1.3 12/31/2021 12:00 AM    EOSABS 0.19 05/02/2022 04:03 PM    EOSABS 0.1 12/31/2021 12:00 AM    BASOSABS 0.00 05/02/2022 04:03 PM    DIFFTYPE NOT REPORTED 01/13/2020 05:17 PM     BMP:    Lab Results   Component Value Date/Time     05/02/2022 04:03 PM    K 3.5 05/16/2022 11:15 AM     05/02/2022 04:03 PM    CO2 25 05/02/2022 04:03 PM    BUN 15 05/02/2022 04:03 PM    LABALBU 4.4 12/31/2021 12:00 AM    CREATININE 0.62 05/02/2022 04:03 PM    CALCIUM 8.9 05/02/2022 04:03 PM    GFRAA >60 05/02/2022 04:03 PM    LABGLOM >60 05/02/2022 04:03 PM    GLUCOSE 112 05/02/2022 04:03 PM       Radiology Review:    XR lumbar spine (2-3 views)  8/18/2022  Official read:  AP lateral lumbar spine patient appears to be a bit rotated and oblique   does not appear a lot different compared to her 2-week postoperative films   status post L5-S1 PLIF the spondylolisthesis did not dramatically reduce   at the time of surgery    IR myelogram lumbosacral  7/22/2022  Official read:  Successful fluoroscopic lumbar myelogram.  CT to follow. CT lumbar spine w contrast  7/24/2022  Official read:  Posterior fixation and laminectomy with artificial disc material at L5-S1. No evidence for hardware complication. Severe bilateral foraminal narrowing at L5-S1. My read:  CT lumbar show significant reduction of spondylolisthesis but there is loosing of L5 Screws    ASSESSMENT AND PLAN:       Patient Active Problem List   Diagnosis    Prediabetes    Eyelid cyst    Dyslipidemia    Chronic bilateral low back pain with right-sided sciatica    DDD (degenerative disc disease), lumbar    Medication management    Lumbosacral spondylosis without myelopathy    Mitral valve prolapse    Lumbar disc herniation    Primary hypertension    Acquired spondylolisthesis    Back pain    Lumbar radiculopathy    S/P lumbar fusion       Lumbar radiculopathy  S/P lumbar fusion     A/P:  This is a 61 y.o. female with    Diagnosis Orders   1. Lumbar radiculopathy        2. S/P lumbar fusion          She is 5 month S/P L5-S1 laminectomy and interbody fusion for grade 2 spondylolisthesis  Improved back pain, but radiculopathy seems to worse and still requiring a walker  Evidence of hardware loosing  Agree with 's plan for revision      I thoroughly discussed details of diagnosis, natural histories of disease, and treatment options. We discussed surgical and non-surgical options. I detailed the benefits, risks, recovery period, and alternatives of this surgery. I used the imaging to depict the surgery and diagnosis to the patient.      I showed the patient the imagings and explained the diagnosis and the various treatment options    I recommend continuing care with Dr. Angela Lovett. By signing my name below, Farooq Hunt DO, attest that this documentation has been prepared under the direction and in the presence of Pallavi Watson DO. Electronically signed: Maximiliano Gallegos [place], [id number] [address], 10/6/22       This note was created using voice recognition software. There may be inaccuracies of transcription  that are inadvertently overlooked prior to the signature. There is any questions about the transcription please contact me.

## 2022-10-13 DIAGNOSIS — I10 PRIMARY HYPERTENSION: ICD-10-CM

## 2022-10-13 RX ORDER — LOSARTAN POTASSIUM 50 MG/1
TABLET ORAL
Qty: 30 TABLET | Refills: 5 | Status: SHIPPED | OUTPATIENT
Start: 2022-10-13

## 2022-10-13 NOTE — TELEPHONE ENCOUNTER
Delisa Allison is calling to request a refill on the following medication(s):    Medication Request:  Requested Prescriptions     Pending Prescriptions Disp Refills    losartan (COZAAR) 50 MG tablet [Pharmacy Med Name: LOSARTAN POTASSIUM 50 MG TAB] 30 tablet 5     Sig: take 1 tablet by mouth once daily       Last Visit Date (If Applicable):  5/75/0409    Next Visit Date:    Visit date not found

## 2022-10-17 RX ORDER — METAXALONE 800 MG/1
TABLET ORAL
Qty: 90 TABLET | Refills: 0 | Status: SHIPPED | OUTPATIENT
Start: 2022-10-17

## 2022-10-17 RX ORDER — GABAPENTIN 600 MG/1
TABLET ORAL
Qty: 90 TABLET | Refills: 2 | OUTPATIENT
Start: 2022-10-17

## 2022-10-20 ENCOUNTER — TELEPHONE (OUTPATIENT)
Dept: FAMILY MEDICINE CLINIC | Age: 57
End: 2022-10-20

## 2022-10-20 ENCOUNTER — TELEPHONE (OUTPATIENT)
Dept: PAIN MANAGEMENT | Age: 57
End: 2022-10-20

## 2022-10-20 DIAGNOSIS — Z12.31 BREAST CANCER SCREENING BY MAMMOGRAM: Primary | ICD-10-CM

## 2022-10-20 RX ORDER — GABAPENTIN 600 MG/1
600 TABLET ORAL 3 TIMES DAILY
Qty: 90 TABLET | Refills: 2 | Status: SHIPPED | OUTPATIENT
Start: 2022-10-20 | End: 2022-11-19

## 2022-10-20 RX ORDER — METAXALONE 800 MG/1
TABLET ORAL
Qty: 90 TABLET | Refills: 0 | OUTPATIENT
Start: 2022-10-20

## 2022-10-20 NOTE — TELEPHONE ENCOUNTER
Pt called needs refill on Gabapentin 600 mg 1 tab am 1 tab noon 1 tab before bedtime. Next ov 10/31 please advise?

## 2022-10-20 NOTE — TELEPHONE ENCOUNTER
[facility] [address] [place] [id number]  [phone]    Patient:  Duane Barker  MRN: [id number]  :  1964  Age:  55 year old      CONSULTATION NOTE - NEUROSURGERY      CHIEF COMPLAINT  Chief Complaint   Patient presents with   • Surgical Followup     1st  post op crainotomy          Mr. Carter is evaluated today at the request of Dr. Suarez ref. provider found.    HISTORY OF PRESENT ILLNESS  Mr. Carter is a 55 year old male, who presents for evaluation after recent hernando hole evacuation of CSDH bilaterally    Currently making good progress without severe headache or seizures or weakness    Review of Systems    MEDICATIONS  Current Outpatient Medications   Medication Sig Dispense Refill   • hydrALAZINE (APRESOLINE) 25 MG tablet Take 1 tablet by mouth 3 times daily. 90 tablet 0   • butalbital-APAP-caffeine (FIORICET) -40 MG per tablet Take 1 tablet by mouth every 6 hours as needed for Headaches. 30 tablet 0   • esomeprazole (NEXIUM) 40 MG capsule Take 1 capsule by mouth daily (before breakfast). 90 capsule 3   • amlodipine-benazepril (LOTREL) 10-40 MG per capsule Take 1 capsule by mouth daily. 90 capsule 3   • nebivolol (BYSTOLIC) 5 MG tablet Take 1 tablet by mouth daily. 90 tablet 1   • acetaminophen-codeine (TYLENOL NO.3) 300-30 MG per tablet Take 1 tablet by mouth 2 times daily as needed for Pain. 14 tablet 0     No current facility-administered medications for this visit.        ALLERGIES  ALLERGIES:   Allergen Reactions   • Fish   (Food Or Med) ANAPHYLAXIS     Only perch fish   • Fish Oil ANAPHYLAXIS        HISTORIES  Past Medical History:   Diagnosis Date   • Seasonal allergies        Past Surgical History:   Procedure Laterality Date   • Nasal septum surgery         Family History   Problem Relation Age of Onset   • Cancer Mother         cervical   • Heart disease Father    • Hypertension Father    • Hypertension Brother        Social History     Tobacco Use   •  Patient is requesting a refill on Metaxalone to be sent to her pharmacy on file. Please advise if any concerns. Thank you. Smoking status: Never Smoker   • Smokeless tobacco: Never Used   Substance Use Topics   • Alcohol use: Yes   • Drug use: Never       Vitals:    04/23/19 0941   BP: 119/86   Pulse: 84   Resp: 18   Temp: 98 °F (36.7 °C)       Physical Exam    Wounds OK    No drift    GIL well    Good spirits    Walks OK      IMAGING  I have reviewed the pertinent imaging study reports. These are the pertinent findings:    [id number]    HISTORY:post op. SDH    TECHNIQUE:  CT of the head is obtained without contrast.    COMPARISONS: 04/06/2019    RESULTS:  Postoperative changes of hernando hole craniotomies bilaterally and evacuation of bilateral  holohemispheric subdural hematomas. Residual hemorrhage, and extra-axial fluid and  pneumocephalus evident. Bilateral scalp drainage are noted. These are adjacent to but not  extending into the intracranial compartment. There is reduction in overall mass effect with  reexpansion of the lateral and 3rd ventricles. Basal cisterns are preserved. Right frontal,  pterional, parietal scalp fluid. Exam is otherwise stable.    IMPRESSION:  Interval postoperative changes of bilateral hernando hole craniotomies and evacuation of subdural  hematomas. Please see above.          ASSESSMENT/PLAN  S06.5X9A Subdural hematoma (CMS/HCC)  (primary encounter diagnosis)    Mr. Carter is a 55 year old male, who presents for evaluation after recent hernando hole evacuation of CSDH bilaterally    Currently making good progress without severe headache or seizures or weakness      No follow-ups on file.          A copy of this consultation has been sent electronically to:   Dr. Suarez referring provider defined for this encounter.    Jose Miguel Chahal MD

## 2022-10-20 NOTE — TELEPHONE ENCOUNTER
Called pt and advised that rx was sent to her pharmacy on 10/17. She said that she was going to reach out to her pharmacy to check on the status of it.

## 2022-10-28 ENCOUNTER — TELEPHONE (OUTPATIENT)
Dept: FAMILY MEDICINE CLINIC | Age: 57
End: 2022-10-28

## 2022-10-28 DIAGNOSIS — Z12.31 BREAST CANCER SCREENING BY MAMMOGRAM: ICD-10-CM

## 2022-10-28 DIAGNOSIS — N63.0 MASS OF BREAST, UNSPECIFIED LATERALITY: Primary | ICD-10-CM

## 2022-10-28 NOTE — TELEPHONE ENCOUNTER
Patient called and stated that she had recently felt a lump in her breast and when pt was scheduling the [facility] they told her that it needs to be a different [facility] order due to the lump found in her breast. Pt stated that she does not remember the name of the order scheduling is asking for.  Thank you

## 2022-10-31 ENCOUNTER — OFFICE VISIT (OUTPATIENT)
Dept: PAIN MANAGEMENT | Age: 57
End: 2022-10-31
Payer: COMMERCIAL

## 2022-10-31 VITALS
HEART RATE: 100 BPM | SYSTOLIC BLOOD PRESSURE: 157 MMHG | OXYGEN SATURATION: 99 % | HEIGHT: 67 IN | BODY MASS INDEX: 27.31 KG/M2 | WEIGHT: 174 LBS | DIASTOLIC BLOOD PRESSURE: 116 MMHG

## 2022-10-31 DIAGNOSIS — M54.16 LUMBAR RADICULOPATHY: Primary | ICD-10-CM

## 2022-10-31 PROCEDURE — 3078F DIAST BP <80 MM HG: CPT | Performed by: NURSE PRACTITIONER

## 2022-10-31 PROCEDURE — 99213 OFFICE O/P EST LOW 20 MIN: CPT | Performed by: NURSE PRACTITIONER

## 2022-10-31 PROCEDURE — 3074F SYST BP LT 130 MM HG: CPT | Performed by: NURSE PRACTITIONER

## 2022-10-31 RX ORDER — GABAPENTIN 600 MG/1
600 TABLET ORAL 3 TIMES DAILY
Qty: 90 TABLET | Refills: 2 | Status: CANCELLED | OUTPATIENT
Start: 2022-10-31 | End: 2022-11-30

## 2022-10-31 ASSESSMENT — ENCOUNTER SYMPTOMS
SHORTNESS OF BREATH: 0
COUGH: 0
CONSTIPATION: 0
BACK PAIN: 1
WHEEZING: 0
SORE THROAT: 0
CHANGE IN BOWEL HABIT: 0
BOWEL INCONTINENCE: 0

## 2022-10-31 NOTE — PROGRESS NOTES
Chief Complaint   Patient presents with    Back Pain     Gabapentin       PMH     Pt c/o low back and right lumbar radicular pain hip buttock area occ numbness in right foot. She has tried LESI  therapy and chiropractic treatment with minimal relief  She is also prescribed NSAID and gabapentin  MRI showed bulging disc on the left side of L4/L5 and L5/S1 affecting both sides of the L5 nerve roots  She saw Dr Jess Klein 4/2022 with flex ext xr done with 18 mm spondylolisthesis L5-S1 significant disc base collapse disc appears to go into a slight amount of kyphosis on forward flexion. She had L5-S1 posterior  decompression fusion surgery with Dr. Jess Klein May 16. Continues with pain with Ct done and no plans for L5 S1 revision in Nov with appt [facility] to discuss futher options  with Dr Jess Klein    HPI:   Back Pain  This is a chronic problem. The current episode started more than 1 year ago. The problem occurs constantly. The problem has been gradually worsening since onset. The pain is present in the lumbar spine and sacro-iliac. The quality of the pain is described as aching, burning, cramping and shooting. The pain radiates to the right foot and left foot. The pain is at a severity of 5/10. The pain is moderate. The pain is Worse during the day. The symptoms are aggravated by bending, position and standing. Pertinent negatives include no bowel incontinence, chest pain or fever. Risk factors include menopause, obesity, poor posture, sedentary lifestyle and lack of exercise. She has tried analgesics, heat, home exercises, muscle relaxant and NSAIDs for the symptoms. The treatment provided mild relief.     Medication Refill: Gabapentin    Pain score Today:  5  Adverse effects (Constipation / Nausea / Sedation / sexual Dysfunction / others) : no  Mood: good  Sleep pattern and quality: fair  Activity level: fair    Last dose taken  10/31/2022 PM  OARRS report reviewed today: yes  ER/Hospitalizations/PCP visit related to pain since last visit:no   Any legal problems e.g. DUI etc.:No  Satisfied with current management: Yes      Hemoglobin A1C   Date Value Ref Range Status   12/31/2021 5.9 % Final       Past Medical History, Past Surgical History, Social History, Allergies and Medications reviewed and updated in EPIC as indicated    Family History reviewed and is noncontributory. Past Medical History:   Diagnosis Date    Abnormal EKG     Arthritis     Chest pain     Chronic bilateral low back pain with bilateral sciatica 02/11/2020    DDD (degenerative disc disease), lumbar 02/11/2020    Dyslipidemia     Elevated BP without diagnosis of hypertension     Hay fever     Lumbosacral spondylosis without myelopathy 4/18/2022    MVP (mitral valve prolapse)     Pre-diabetes     Snoring        Past Surgical History:   Procedure Laterality Date    BREAST SURGERY Bilateral     biopsy    CHOLECYSTECTOMY      COLONOSCOPY      ENDOMETRIAL ABLATION      EYE SURGERY Bilateral     chalazion removed    LUMBAR FUSION N/A 5/16/2022    LUMBAR L5-S1 DECOMPRESSION FUSION POSTERIOR performed by Cecile See MD at [facility] Right 7/16/2020    RIGHT L4 AND L5 EPIDURAL STEROID INJECTION performed by Stephanie Chase MD at [facility] Right 10/26/2020    EPIDURAL STEROID INJECTION -RIGHT L4 L5 performed by Stephanie Chase MD at [address] [place] [address],[address]         No Known Allergies      Current Outpatient Medications:     gabapentin (NEURONTIN) 600 MG tablet, Take 1 tablet by mouth 3 times daily for 30 days. , Disp: 90 tablet, Rfl: 2    metaxalone (SKELAXIN) 800 MG tablet, take 1 tablet by mouth three times a day, Disp: 90 tablet, Rfl: 0    losartan (COZAAR) 50 MG tablet, take 1 tablet by mouth once daily, Disp: 30 tablet, Rfl: 5    diclofenac (VOLTAREN) 75 MG EC tablet, Take 1 tablet by mouth 2 times daily, Disp: 60 tablet, Rfl: 5    verapamil (CALAN) 40 MG tablet, take 1 tablet by mouth three times a day, Disp: 90 tablet, Rfl: 2    DICLOFENAC PO, Take by mouth, Disp: , Rfl:     magnesium 30 MG tablet, Take 1 tablet by mouth in the morning, at noon, and at bedtime Supplement magnesium over the counter, Disp: , Rfl:     acetaminophen (TYLENOL) 650 MG extended release tablet, Take 650 mg by mouth every 8 hours as needed for Pain, Disp: , Rfl:     Family History   Problem Relation Age of Onset    High Blood Pressure Mother     High Cholesterol Mother     Other Mother         blood clots, denies any known clotting d/o    Other Father         alcoholism    Arthritis Sister     No Known Problems Other        Social History     Socioeconomic History    Marital status:      Spouse name: Not on file    Number of children: Not on file    Years of education: Not on file    Highest education level: Not on file   Occupational History    Not on file   Tobacco Use    Smoking status: Never    Smokeless tobacco: Never   Vaping Use    Vaping Use: Never used   Substance and Sexual Activity    Alcohol use: Yes     Alcohol/week: 3.0 standard drinks     Types: 3 Glasses of wine per week     Comment: moderately 3 glasses of wine per week    Drug use: No    Sexual activity: Yes     Partners: Male   Other Topics Concern    Not on file   Social History Narrative    Not on file     Social Determinants of Health     Financial Resource Strain: Low Risk     Difficulty of Paying Living Expenses: Not hard at all   Food Insecurity: No Food Insecurity    Worried About Running Out of Food in the Last Year: Never true    Ran Out of Food in the Last Year: Never true   Transportation Needs: Not on file   Physical Activity: Not on file   Stress: Not on file   Social Connections: Not on file   Intimate Partner Violence: Not on file   Housing Stability: Not on file       Review of Systems:  Review of Systems   Constitutional: Negative for chills and fever. HENT:  Negative for congestion and sore throat.     Cardiovascular:  Negative for chest pain. Respiratory:  Negative for cough, shortness of breath and wheezing. Musculoskeletal:  Positive for back pain. Gastrointestinal:  Negative for change in bowel habit, bowel incontinence and constipation. Physical Exam:  BP (!) 157/116   Pulse 100   Ht 5' 7\" (1.702 m)   Wt 174 lb (78.9 kg)   SpO2 99%   BMI 27.25 kg/m²     Physical Exam  Cardiovascular:      Rate and Rhythm: Normal rate. Pulmonary:      Effort: Pulmonary effort is normal.   Musculoskeletal:      Lumbar back: Decreased range of motion. Comments: Stooped posture - using walker      Skin:     General: Skin is warm and dry. Neurological:      Mental Status: She is alert and oriented to person, place, and time. Assessment:  Problem List Items Addressed This Visit       Lumbar radiculopathy - Primary          Treatment Plan:  Patient relates current medications are helping the pain. Patient reports taking pain medications as prescribed, denies obtaining medications from different sources and denies use of illegal drugs. Patient denies side effects from medications like nausea, vomiting, constipation or drowsiness. Patient reports current activities of daily living are possible due to medications and would like to continue them. As always, we encourage daily stretching and strengthening exercises, and recommend minimizing use of pain medications unless patient cannot get through daily activities due to pain. Script not needed  Follow up appointment made for 3 months    I have reviewed the chief complaint and history of present illness (including ROS and PFSH) and vital documentation by my staff and I agree with their documentation and have added where applicable.

## 2022-11-01 ENCOUNTER — OFFICE VISIT (OUTPATIENT)
Dept: ORTHOPEDIC SURGERY | Age: 57
End: 2022-11-01
Payer: COMMERCIAL

## 2022-11-01 VITALS — HEIGHT: 67 IN | RESPIRATION RATE: 12 BRPM | BODY MASS INDEX: 27.31 KG/M2 | WEIGHT: 174 LBS

## 2022-11-01 DIAGNOSIS — M51.36 DDD (DEGENERATIVE DISC DISEASE), LUMBAR: ICD-10-CM

## 2022-11-01 DIAGNOSIS — M51.26 LUMBAR DISC HERNIATION: Primary | ICD-10-CM

## 2022-11-01 DIAGNOSIS — M47.817 LUMBOSACRAL SPONDYLOSIS WITHOUT MYELOPATHY: ICD-10-CM

## 2022-11-01 PROCEDURE — 99213 OFFICE O/P EST LOW 20 MIN: CPT | Performed by: ORTHOPAEDIC SURGERY

## 2022-11-01 NOTE — PROGRESS NOTES
Patient ID: Megan Mace is a 61 y.o. female    Chief Compliant:  Chief Complaint   Patient presents with    Back Pain        Diagnostic imaging:    AP lateral lumbar spine no change in position patient with subsidence and recurrent spondylolisthesis L5-S1 when compared to postoperative x-ray    Assessment and Plan:  1. Lumbar disc herniation    2. DDD (degenerative disc disease), lumbar    3. Lumbosacral spondylosis without myelopathy      CT myelogram again reviewed patient with failure of fixation S1 with collapse of the cage into S1 and recurrent spondylolisthesis      Will proceed with revision L5-S1 posterior instrumented fusion with infuse possible pelvic fixation     We discussed risks of surgery and recovery process. Risks include infection, bleeding, neurologic injury, systemic and anesthetic complications, no relief of pain, need for future surgery. Follow up 2 weeks post op    HPI:  This is a 61 y.o. female who presents to the clinic today for questions regarding surgery. S/p L5-S1 PLIF 5/16/22 with worsening low back and left leg pain causing difficulty standing and walking. Currently ambulating with assistance via roll aid walker. Patient notes she followed for a second opinion. Patient states she has bi-radicular leg pain, new onset left leg pain. Review of Systems   All other systems reviewed and are negative. Past History:    Current Outpatient Medications:     gabapentin (NEURONTIN) 600 MG tablet, Take 1 tablet by mouth 3 times daily for 30 days. , Disp: 90 tablet, Rfl: 2    metaxalone (SKELAXIN) 800 MG tablet, take 1 tablet by mouth three times a day, Disp: 90 tablet, Rfl: 0    losartan (COZAAR) 50 MG tablet, take 1 tablet by mouth once daily, Disp: 30 tablet, Rfl: 5    diclofenac (VOLTAREN) 75 MG EC tablet, Take 1 tablet by mouth 2 times daily, Disp: 60 tablet, Rfl: 5    verapamil (CALAN) 40 MG tablet, take 1 tablet by mouth three times a day, Disp: 90 tablet, Rfl: 2 DICLOFENAC PO, Take by mouth, Disp: , Rfl:     magnesium 30 MG tablet, Take 1 tablet by mouth in the morning, at noon, and at bedtime Supplement magnesium over the counter, Disp: , Rfl:     acetaminophen (TYLENOL) 650 MG extended release tablet, Take 650 mg by mouth every 8 hours as needed for Pain, Disp: , Rfl:   No Known Allergies  Social History     Socioeconomic History    Marital status:      Spouse name: Not on file    Number of children: Not on file    Years of education: Not on file    Highest education level: Not on file   Occupational History    Not on file   Tobacco Use    Smoking status: Never    Smokeless tobacco: Never   Vaping Use    Vaping Use: Never used   Substance and Sexual Activity    Alcohol use:  Yes     Alcohol/week: 3.0 standard drinks     Types: 3 Glasses of wine per week     Comment: moderately 3 glasses of wine per week    Drug use: No    Sexual activity: Yes     Partners: Male   Other Topics Concern    Not on file   Social History Narrative    Not on file     Social Determinants of Health     Financial Resource Strain: Low Risk     Difficulty of Paying Living Expenses: Not hard at all   Food Insecurity: No Food Insecurity    Worried About Running Out of Food in the Last Year: Never true    Ran Out of Food in the Last Year: Never true   Transportation Needs: Not on file   Physical Activity: Not on file   Stress: Not on file   Social Connections: Not on file   Intimate Partner Violence: Not on file   Housing Stability: Not on file     Past Medical History:   Diagnosis Date    Abnormal EKG     Arthritis     Chest pain     Chronic bilateral low back pain with bilateral sciatica 02/11/2020    DDD (degenerative disc disease), lumbar 02/11/2020    Dyslipidemia     Elevated BP without diagnosis of hypertension     Hay fever     Lumbosacral spondylosis without myelopathy 4/18/2022    MVP (mitral valve prolapse)     Pre-diabetes     Snoring      Past Surgical History:   Procedure Laterality Date    BREAST SURGERY Bilateral     biopsy    CHOLECYSTECTOMY      COLONOSCOPY      ENDOMETRIAL ABLATION      EYE SURGERY Bilateral     chalazion removed    LUMBAR FUSION N/A 5/16/2022    LUMBAR L5-S1 DECOMPRESSION FUSION POSTERIOR performed by Nicola Isidro MD at [address] Right 7/16/2020    RIGHT L4 AND L5 EPIDURAL STEROID INJECTION performed by Gage Mccracken MD at [address] Right 10/26/2020    EPIDURAL STEROID INJECTION -RIGHT L4 L5 performed by Gage Mccracken MD at [address] [facility]       Family History   Problem Relation Age of Onset    High Blood Pressure Mother     High Cholesterol Mother     Other Mother         blood clots, denies any known clotting d/o    Other Father         alcoholism    Arthritis Sister     No Known Problems Other         Physical Exam:  Vitals signs and nursing note reviewed. Constitutional:       Appearance: well-developed. HENT:      Head: Normocephalic and atraumatic. Nose: Nose normal.   Eyes:      Conjunctiva/sclera: Conjunctivae normal.   Neck:      Musculoskeletal: Normal range of motion and neck supple. Pulmonary:      Effort: Pulmonary effort is normal. No respiratory distress. Musculoskeletal:      Comments: Normal gait     Skin:     General: Skin is warm and dry. Neurological:      Mental Status: Alert and oriented to person, place, and time. Sensory: No sensory deficit. Psychiatric:         Behavior: Behavior normal.         Thought Content: Thought content normal.        Provider Attestation:  Jorie Leyden Beeks, personally performed the services described in this documentation. All medical record entries made by the scribe were at my direction and in my presence. I have reviewed the chart and discharge instructions and agree that the records reflect my personal performance and is accurate and complete. [facility] [place], MD 11/1/22       Scribe Attestation:  By signing my name below, I, Kenyatta Jacques, attest that this documentation has been prepared under the direction and in the presence of Dr. Brandon Sun. Electronically signed: Lizz Hopkins, 11/1/22     Please note that this chart was generated using voice recognition Dragon dictation software. Although every effort was made to ensure the accuracy of this automated transcription, some errors in transcription may have occurred.

## 2022-11-02 ENCOUNTER — HOSPITAL ENCOUNTER (OUTPATIENT)
Dept: PREADMISSION TESTING | Age: 57
Discharge: HOME | End: 2022-11-06
Attending: ORTHOPAEDIC SURGERY | Admitting: ORTHOPAEDIC SURGERY
Payer: COMMERCIAL

## 2022-11-02 VITALS
HEIGHT: 67 IN | DIASTOLIC BLOOD PRESSURE: 98 MMHG | WEIGHT: 178 LBS | HEART RATE: 92 BPM | OXYGEN SATURATION: 100 % | TEMPERATURE: 98.4 F | BODY MASS INDEX: 27.94 KG/M2 | SYSTOLIC BLOOD PRESSURE: 136 MMHG | RESPIRATION RATE: 20 BRPM

## 2022-11-02 LAB
ABSOLUTE EOS #: 0 K/UL (ref 0–0.4)
ABSOLUTE LYMPH #: 1.51 K/UL (ref 1–4.8)
ABSOLUTE MONO #: 0.23 K/UL (ref 0.1–1.3)
ANION GAP SERPL CALCULATED.3IONS-SCNC: 12 MMOL/L (ref 9–17)
BASOPHILS # BLD: 0 % (ref 0–2)
BASOPHILS ABSOLUTE: 0 K/UL (ref 0–0.2)
BUN BLDV-MCNC: 13 MG/DL (ref 6–20)
CALCIUM SERPL-MCNC: 9.2 MG/DL (ref 8.6–10.4)
CHLORIDE BLD-SCNC: 101 MMOL/L (ref 98–107)
CO2: 26 MMOL/L (ref 20–31)
CREAT SERPL-MCNC: 0.82 MG/DL (ref 0.5–0.9)
EOSINOPHILS RELATIVE PERCENT: 0 % (ref 0–4)
GFR SERPL CREATININE-BSD FRML MDRD: >60 ML/MIN/1.73M2
GLUCOSE BLD-MCNC: 84 MG/DL (ref 70–99)
HCT VFR BLD CALC: 37.6 % (ref 36–46)
HEMOGLOBIN: 12.6 G/DL (ref 12–16)
LYMPHOCYTES # BLD: 52 % (ref 24–44)
MCH RBC QN AUTO: 28.7 PG (ref 26–34)
MCHC RBC AUTO-ENTMCNC: 33.4 G/DL (ref 31–37)
MCV RBC AUTO: 86 FL (ref 80–100)
MONOCYTES # BLD: 8 % (ref 1–7)
MORPHOLOGY: ABNORMAL
MORPHOLOGY: ABNORMAL
PDW BLD-RTO: 15.6 % (ref 11.5–14.9)
PLATELET # BLD: 318 K/UL (ref 150–450)
PMV BLD AUTO: 7.2 FL (ref 6–12)
POTASSIUM SERPL-SCNC: 4.1 MMOL/L (ref 3.7–5.3)
RBC # BLD: 4.37 M/UL (ref 4–5.2)
SEG NEUTROPHILS: 40 % (ref 36–66)
SEGMENTED NEUTROPHILS ABSOLUTE COUNT: 1.16 K/UL (ref 1.3–9.1)
SODIUM BLD-SCNC: 139 MMOL/L (ref 135–144)
WBC # BLD: 2.9 K/UL (ref 3.5–11)

## 2022-11-02 PROCEDURE — 93005 ELECTROCARDIOGRAM TRACING: CPT | Performed by: ANESTHESIOLOGY

## 2022-11-02 PROCEDURE — 85025 COMPLETE CBC W/AUTO DIFF WBC: CPT

## 2022-11-02 PROCEDURE — 36415 COLL VENOUS BLD VENIPUNCTURE: CPT

## 2022-11-02 PROCEDURE — APPSS45 APP SPLIT SHARED TIME 31-45 MINUTES: Performed by: NURSE PRACTITIONER

## 2022-11-02 PROCEDURE — 80048 BASIC METABOLIC PNL TOTAL CA: CPT

## 2022-11-02 ASSESSMENT — ENCOUNTER SYMPTOMS
SHORTNESS OF BREATH: 0
DIARRHEA: 0
NAUSEA: 0
ABDOMINAL PAIN: 0
SORE THROAT: 0
CONSTIPATION: 1
WHEEZING: 0
VOMITING: 0
COUGH: 0
BACK PAIN: 1

## 2022-11-02 NOTE — H&P
HISTORY and [address] [id number]       NAME:  Rina Hawkins  MRN: [id number]   YOB: 1965   Date: 11/2/2022   Age: 61 y.o. Gender: female       COMPLAINT AND PRESENT HISTORY:     Rina Hawkins is 61 y.o. female, here for preadmission testing related to lumbar disc herniation, painful hardware with scheduled REVISION L5-S1  POSTERIOR  INSTRUMENTED FUSION POSSIBLE PELVIC FIXATION per Dr. Svetlana Fitch. Pt s/p lumbar L5-S1 decompression posterior fusion in May, 2022. Initially had temporary relief of symptoms for about one month post operatively. The current symptoms started for approximately 5 months. Pt c/o pain to bilateral hips and legs with right being worse than left. Describes pain as intermittent burning. Rating pain 10/10. Takes gabapentin, skelaxin and tylenol with good relief. Sitting for prolonged periods aggravates pain. Heat helps alleviate symptoms. Pain does radiate to bilateral feet. She does have tingling to bilateral feet. Denies recent falls, injury or trauma. Denies redness, warmth or rashes. Pt reports increased constipation since symptoms began. She takes magnesium which does improve constipation. Pt does follow with pain management. PMHx includes:    MVP, chest pain, HTN, dyslipidemia: Takes losartan, verapamil. Pt reports intermittent chest pain without radiation, associated SOB, diaphoresis or nausea. Denies recent or current chest pain/pressure, palpitations, SOB, headaches, dizziness, lightheadedness, lower extremity edema and blurred vision. EKG today prelim results:  Normal sinus rhythm    BP Readings from Last 3 Encounters:   11/02/22 (!) 136/98   10/31/22 (!) 157/116   10/06/22 127/86     Prediabetes:     Hemoglobin A1C   Date Value Ref Range Status   12/31/2021 5.9 % Final     Denies personal and family history of complications with anesthesia.      Imaging and testing related to HPI     Lab Results   Component Value Date    WBC 2.9 (L) 11/02/2022    HGB 12.6 11/02/2022    HCT 37.6 11/02/2022    MCV 86.0 11/02/2022     11/02/2022     Lab Results   Component Value Date/Time     11/02/2022 01:15 PM    K 4.1 11/02/2022 01:15 PM     11/02/2022 01:15 PM    CO2 26 11/02/2022 01:15 PM    BUN 13 11/02/2022 01:15 PM    CREATININE 0.82 11/02/2022 01:15 PM    GLUCOSE 84 11/02/2022 01:15 PM    CALCIUM 9.2 11/02/2022 01:15 PM        Narrative   EXAMINATION:   CT OF THE LUMBAR SPINE WITH CONTRAST  7/22/2022 10:06 am       TECHNIQUE:   CT of the lumbar spine was performed with the administration of intravenous   contrast. Multiplanar reformatted images are provided for review. Automated   exposure control, iterative reconstruction, and/or weight based adjustment of   the mA/kV was utilized to reduce the radiation dose to as low as reasonably   achievable. COMPARISON:   None       HISTORY:   ORDERING SYSTEM PROVIDED HISTORY: Acquired spondylolisthesis   TECHNOLOGIST PROVIDED HISTORY:   STAT Creatinine as needed:->No   Reason for Exam: Acquired spondylolisthesis, Lumbosacral spondylosis without   myelopathy, Lumbar disc herniation, DDD (degenerative disc disease), lumbar       FINDINGS:   BONES/ALIGNMENT:  There is posterior fixation with artificial disc material   at L5-S1. There is no definite hardware complication. The vertebral body   heights are maintained. The facet joints are aligned. The remaining disc   spaces are maintained. There is no spondylolisthesis. SOFT TISSUES: The conus is normal in caliber and terminates at the L2 level. The cauda equina is unremarkable. The posterior paraspinal soft tissues are   unremarkable. The visualized abdominal structures are unremarkable. L1-L2: There is no significant disc protrusion, central spinal canal stenosis   or neural foraminal narrowing. L2-L3: There is no significant disc protrusion, central spinal canal stenosis   or neural foraminal narrowing.        L3-L4: There is no significant disc protrusion, central spinal canal stenosis   or neural foraminal narrowing. L4-L5: There is no significant disc protrusion, central spinal canal stenosis   or neural foraminal narrowing. L5-S1: There is posterior fixation with associated laminectomy. There is no   canal stenosis. There is severe bilateral foraminal narrowing. Impression   Posterior fixation and laminectomy with artificial disc material at L5-S1. No evidence for hardware complication. Severe bilateral foraminal narrowing at L5-S1. Narrative   EXAMINATION:   MRI OF THE LUMBAR SPINE WITHOUT CONTRAST, 3/24/2022 5:07 pm       TECHNIQUE:   Multiplanar multisequence MRI of the lumbar spine was performed without the   administration of intravenous contrast.       COMPARISON:   Radiographs on 02/21/2022       HISTORY:   ORDERING SYSTEM PROVIDED HISTORY: Chronic bilateral low back pain with   right-sided sciatica. TECHNOLOGIST PROVIDED HISTORY: See ICD-10   What is the sedation requirement?->None   Reason for Exam: Chronic and worsening low back pain   Additional signs and symptoms: Right leg pain   Relevant Medical/Surgical History: No known injury to low back       FINDINGS:   BONES/ALIGNMENT: There is grade 2 anterolisthesis of L5 on S1, likely related   to severe facet arthropathy. No pars defect is identified. Alignment of the   lumbar spine is otherwise normal.  There is mild heterogeneity in the marrow   with several hemangiomas, most pronounced at L1. There are no areas of   marrow edema to suggest an acute fracture. SPINAL CORD: The conus tip terminates near the level of the L2 vertebral   body. The cauda equina nerve roots are unremarkable. SOFT TISSUES: No paraspinal mass identified. L1-L2: There is no significant disc herniation, spinal canal stenosis or   neural foraminal narrowing.        L2-L3: There is no significant disc herniation, spinal canal stenosis or neural foraminal narrowing. L3-L4: There is no significant disc herniation, spinal canal stenosis or   neural foraminal narrowing. L4-L5: Bilateral facet arthropathy. No disc herniation or central spinal   canal stenosis. No right foraminal narrowing. Left foraminal disc   protrusion measures 2 mm with minimal left foraminal narrowing. L5-S1: There is uncovering of the posterior margin of the disc with a disc   bulge lateralizing to the left. Bilateral facet arthropathy. Moderate   central spinal canal narrowing. Severe bilateral foraminal narrowing with   contact of the undersurface of the exiting L5 nerve roots. Impression   1. Severe facet arthropathy at L5-S1 with grade 2 anterolisthesis of L5 on   S1. Moderate central spinal canal narrowing at this level with severe   bilateral foraminal narrowing and contact of the exiting S1 nerve roots.      PAST MEDICAL HISTORY     Past Medical History:   Diagnosis Date    Abnormal EKG     Arthritis     Chest pain     Chronic bilateral low back pain with bilateral sciatica 02/11/2020    DDD (degenerative disc disease), lumbar 02/11/2020    Dyslipidemia     Elevated BP without diagnosis of hypertension     Hay fever     Hypertension     Lumbosacral spondylosis without myelopathy 04/18/2022    MVP (mitral valve prolapse)     Pre-diabetes     Snoring        SURGICAL HISTORY       Past Surgical History:   Procedure Laterality Date    BREAST SURGERY Bilateral     biopsy    CHOLECYSTECTOMY      COLONOSCOPY      ENDOMETRIAL ABLATION      EYE SURGERY Bilateral     chalazion removed    LUMBAR FUSION N/A 5/16/2022    LUMBAR L5-S1 DECOMPRESSION FUSION POSTERIOR performed by David Cote MD at [facility] Right 7/16/2020    RIGHT L4 AND L5 EPIDURAL STEROID INJECTION performed by Alfa Fowler MD at [facility] Right 10/26/2020    EPIDURAL STEROID INJECTION -RIGHT L4 L5 performed by Alfa Fowler MD at [address] [place] [address]       Family History   Problem Relation Age of Onset    High Blood Pressure Mother     High Cholesterol Mother     Other Mother         blood clots, denies any known clotting d/o    Other Father         alcoholism    Arthritis Sister     No Known Problems Other        SOCIAL HISTORY       Social History     Socioeconomic History    Marital status:      Spouse name: None    Number of children: None    Years of education: None    Highest education level: None   Tobacco Use    Smoking status: Never    Smokeless tobacco: Never   Vaping Use    Vaping Use: Never used   Substance and Sexual Activity    Alcohol use: Yes     Alcohol/week: 3.0 standard drinks     Types: 3 Glasses of wine per week     Comment: moderately 3 glasses of wine per week    Drug use: No    Sexual activity: Yes     Partners: Male     Social Determinants of Health     Financial Resource Strain: Low Risk     Difficulty of Paying Living Expenses: Not hard at all   Food Insecurity: No Food Insecurity    Worried About Running Out of Food in the Last Year: Never true    Ran Out of Food in the Last Year: Never true        REVIEW OF SYSTEMS      No Known Allergies    Current Outpatient Medications on File Prior to Encounter   Medication Sig Dispense Refill    gabapentin (NEURONTIN) 600 MG tablet Take 1 tablet by mouth 3 times daily for 30 days.  90 tablet 2    metaxalone (SKELAXIN) 800 MG tablet take 1 tablet by mouth three times a day 90 tablet 0    losartan (COZAAR) 50 MG tablet take 1 tablet by mouth once daily 30 tablet 5    diclofenac (VOLTAREN) 75 MG EC tablet Take 1 tablet by mouth 2 times daily 60 tablet 5    verapamil (CALAN) 40 MG tablet take 1 tablet by mouth three times a day 90 tablet 2    DICLOFENAC PO Take by mouth      magnesium 30 MG tablet Take 1 tablet by mouth in the morning, at noon, and at bedtime Supplement magnesium over the counter      acetaminophen (TYLENOL) 650 MG extended release tablet Take 650 mg by mouth every 8 hours as needed for Pain       No current facility-administered medications on file prior to encounter. Review of Systems   Constitutional:  Negative for appetite change, chills, fever and unexpected weight change. HENT:  Negative for congestion, dental problem, hearing loss and sore throat. Eyes:  Positive for visual disturbance. Respiratory:  Negative for cough, shortness of breath and wheezing. Cardiovascular:  Negative for chest pain, palpitations and leg swelling. Gastrointestinal:  Positive for constipation. Negative for abdominal pain, diarrhea, nausea and vomiting. Genitourinary: Negative. Musculoskeletal:  Positive for back pain and gait problem (Using walker for ambulation assistance). Skin:  Negative for rash and wound. Neurological:  Negative for dizziness, speech difficulty, light-headedness and headaches. Hematological:  Does not bruise/bleed easily. Psychiatric/Behavioral: Negative. GENERAL PHYSICAL EXAM     Vitals: BP (!) 136/98   Pulse 92   Temp 98.4 °F (36.9 °C)   Resp 20   Ht 5' 7\" (1.702 m)   Wt 178 lb (80.7 kg)   SpO2 100%   BMI 27.88 kg/m²  Body mass index is 27.88 kg/m². Physical Exam  Constitutional:       General: She is not in acute distress. Appearance: She is well-developed. She is not ill-appearing. HENT:      Head: Normocephalic and atraumatic. Nose: Nose normal.      Mouth/Throat:      Mouth: Mucous membranes are moist.      Pharynx: Oropharynx is clear. No oropharyngeal exudate or posterior oropharyngeal erythema. Eyes:      General: No scleral icterus. Right eye: No discharge. Left eye: No discharge. Pupils: Pupils are equal, round, and reactive to light. Neck:      Trachea: No tracheal deviation. Cardiovascular:      Rate and Rhythm: Normal rate and regular rhythm. Heart sounds: Normal heart sounds.  No murmur heard.    No friction rub. No gallop. Pulmonary:      Effort: Pulmonary effort is normal. No respiratory distress. Breath sounds: Normal breath sounds. No wheezing, rhonchi or rales. Abdominal:      General: Bowel sounds are normal. There is no distension. Palpations: Abdomen is soft. Tenderness: There is no abdominal tenderness. There is no guarding. Musculoskeletal:      Cervical back: Neck supple. Right hip: Tenderness present. Decreased range of motion. Left hip: Tenderness present. Decreased range of motion. Right lower leg: No edema. Left lower leg: No edema. Skin:     General: Skin is warm and dry. Coloration: Skin is not jaundiced. Findings: No bruising, erythema or rash. Neurological:      General: No focal deficit present. Mental Status: She is alert and oriented to person, place, and time. Cranial Nerves: No cranial nerve deficit.       Gait: Gait normal.   Psychiatric:         Mood and Affect: Mood normal.      PROVISIONAL DIAGNOSES / SURGERY:      REVISION L5-S1  POSTERIOR  INSTRUMENTED FUSION POSSIBLE PELVIC FIXATION    LUMBAR DISC HERNIATION PAINFUL HARDWARE    Patient Active Problem List    Diagnosis Date Noted    Lumbar radiculopathy 10/06/2022    S/P lumbar fusion 10/06/2022    Acquired spondylolisthesis 05/16/2022    Back pain 05/16/2022    Primary hypertension 05/11/2022    Lumbosacral spondylosis without myelopathy 04/18/2022    Lumbar disc herniation 04/18/2022    Chronic bilateral low back pain with bilateral sciatica 02/11/2020    DDD (degenerative disc disease), lumbar 02/11/2020    Medication management 02/11/2020    Mitral valve prolapse 03/09/2018    Prediabetes 05/30/2017    Eyelid cyst 05/30/2017    Dyslipidemia 05/30/2017           Based on my personal evaluation of patient including review of patient's chart, medical clearance required for scheduled surgery d/t the following issues which are discussed in H&P above: Abnormal CBC. HTN, HLD, MVP, intermittent chest pain    Dr. Aarti Saleh' office will be responsible for making sure the clearance is obtained and is in the chart for surgery.       GONZÁLEZ Mulligan CNP on 11/2/2022 at 1:23 PM    Total time spent on encounter- PAT provider minutes: 31-40 minutes

## 2022-11-02 NOTE — DISCHARGE INSTRUCTIONS
Pre-op Instructions For Patient Being Admitted After Surgery    Medication Instructions:  Please stop herbs and any supplements now (includes vitamins and minerals). Please contact your surgeon and prescribing physician for pre-op instructions for any blood thinners. Stop Diclofenac as directed    If you have inhalers/aerosol treatments at home, please use them the morning of your surgery and bring the inhalers with you to the hospital.    Please take the following medications the morning of your surgery with a sip of water:    Losartan, Verapamil    Surgery Instructions:  After midnight before surgery:  Do not eat or drink anything, including water, mints, gum, and hard candy. You may brush your teeth without swallowing. No smoking, chewing tobacco, or street drugs. Please shower or bathe before surgery. If you were given Surgical Scrub Chlorhexidine Gluconate Liquid (CHG), please shower the night before and the morning of your surgery following the detailed instructions you received during your pre-admission visit. Please do not wear any cologne, lotion, powder, deodorant, jewelry, piercings, perfume, makeup, nail polish, hair accessories, or hair spray on the day of surgery. Wear loose comfortable clothing. Leave your valuables at home. Bring a storage case for any glasses/contacts. The Day of Surgery:  Arrive at [facility] AT [facility] Entr[facility] at the time directed by your surgeon and check in at the desk. If you have a living will or healthcare power of , please bring a copy. You will be taken to the pre-op holding area where you will be prepared for surgery. A physical assessment will be performed by a nurse practitioner or house officer.   Your IV will be started and you will meet your anesthesiologist.    When you go to surgery, your family will be directed to the surgical waiting room, where the doctor should speak with them after your surgery. You will be in the recovery room after surgery and taken to your room when you are stable. Please leave your suitcase in the car. Have your family member take it to your room after you are out of recovery. If you use a Bi-PAP or C-PAP machine, please bring it with you and leave it in the car until you are in your room.

## 2022-11-02 NOTE — H&P (VIEW-ONLY)
HISTORY and [facility] [address] [address]       NAME:  Selma Lynch  MRN: [id number]   YOB: 1965   Date: 11/2/2022   Age: 61 y.o. Gender: female       COMPLAINT AND PRESENT HISTORY:     Selma Lynch is 61 y.o. female, here for preadmission testing related to lumbar disc herniation, painful hardware with scheduled REVISION L5-S1  POSTERIOR  INSTRUMENTED FUSION POSSIBLE PELVIC FIXATION per Dr. Angela Lovett. Pt s/p lumbar L5-S1 decompression posterior fusion in May, 2022. Initially had temporary relief of symptoms for about one month post operatively. The current symptoms started for approximately 5 months. Pt c/o pain to bilateral hips and legs with right being worse than left. Describes pain as intermittent burning. Rating pain 10/10. Takes gabapentin, skelaxin and tylenol with good relief. Sitting for prolonged periods aggravates pain. Heat helps alleviate symptoms. Pain does radiate to bilateral feet. She does have tingling to bilateral feet. Denies recent falls, injury or trauma. Denies redness, warmth or rashes. Pt reports increased constipation since symptoms began. She takes magnesium which does improve constipation. Pt does follow with pain management. PMHx includes:    MVP, chest pain, HTN, dyslipidemia: Takes losartan, verapamil. Pt reports intermittent chest pain without radiation, associated SOB, diaphoresis or nausea. Denies recent or current chest pain/pressure, palpitations, SOB, headaches, dizziness, lightheadedness, lower extremity edema and blurred vision. EKG today prelim results:  Normal sinus rhythm    BP Readings from Last 3 Encounters:   11/02/22 (!) 136/98   10/31/22 (!) 157/116   10/06/22 127/86     Prediabetes:     Hemoglobin A1C   Date Value Ref Range Status   12/31/2021 5.9 % Final     Denies personal and family history of complications with anesthesia.      Imaging and testing related to HPI     Lab Results   Component Value Date    WBC 2.9 (L) 11/02/2022    HGB 12.6 11/02/2022    HCT 37.6 11/02/2022    MCV 86.0 11/02/2022     11/02/2022     Lab Results   Component Value Date/Time     11/02/2022 01:15 PM    K 4.1 11/02/2022 01:15 PM     11/02/2022 01:15 PM    CO2 26 11/02/2022 01:15 PM    BUN 13 11/02/2022 01:15 PM    CREATININE 0.82 11/02/2022 01:15 PM    GLUCOSE 84 11/02/2022 01:15 PM    CALCIUM 9.2 11/02/2022 01:15 PM        Narrative   EXAMINATION:   CT OF THE LUMBAR SPINE WITH CONTRAST  7/22/2022 10:06 am       TECHNIQUE:   CT of the lumbar spine was performed with the administration of intravenous   contrast. Multiplanar reformatted images are provided for review. Automated   exposure control, iterative reconstruction, and/or weight based adjustment of   the mA/kV was utilized to reduce the radiation dose to as low as reasonably   achievable. COMPARISON:   None       HISTORY:   ORDERING SYSTEM PROVIDED HISTORY: Acquired spondylolisthesis   TECHNOLOGIST PROVIDED HISTORY:   STAT Creatinine as needed:->No   Reason for Exam: Acquired spondylolisthesis, Lumbosacral spondylosis without   myelopathy, Lumbar disc herniation, DDD (degenerative disc disease), lumbar       FINDINGS:   BONES/ALIGNMENT:  There is posterior fixation with artificial disc material   at L5-S1. There is no definite hardware complication. The vertebral body   heights are maintained. The facet joints are aligned. The remaining disc   spaces are maintained. There is no spondylolisthesis. SOFT TISSUES: The conus is normal in caliber and terminates at the L2 level. The cauda equina is unremarkable. The posterior paraspinal soft tissues are   unremarkable. The visualized abdominal structures are unremarkable. L1-L2: There is no significant disc protrusion, central spinal canal stenosis   or neural foraminal narrowing. L2-L3: There is no significant disc protrusion, central spinal canal stenosis   or neural foraminal narrowing.        L3-L4: There is no significant disc protrusion, central spinal canal stenosis   or neural foraminal narrowing. L4-L5: There is no significant disc protrusion, central spinal canal stenosis   or neural foraminal narrowing. L5-S1: There is posterior fixation with associated laminectomy. There is no   canal stenosis. There is severe bilateral foraminal narrowing. Impression   Posterior fixation and laminectomy with artificial disc material at L5-S1. No evidence for hardware complication. Severe bilateral foraminal narrowing at L5-S1. Narrative   EXAMINATION:   MRI OF THE LUMBAR SPINE WITHOUT CONTRAST, 3/24/2022 5:07 pm       TECHNIQUE:   Multiplanar multisequence MRI of the lumbar spine was performed without the   administration of intravenous contrast.       COMPARISON:   Radiographs on 02/21/2022       HISTORY:   ORDERING SYSTEM PROVIDED HISTORY: Chronic bilateral low back pain with   right-sided sciatica. TECHNOLOGIST PROVIDED HISTORY: See ICD-10   What is the sedation requirement?->None   Reason for Exam: Chronic and worsening low back pain   Additional signs and symptoms: Right leg pain   Relevant Medical/Surgical History: No known injury to low back       FINDINGS:   BONES/ALIGNMENT: There is grade 2 anterolisthesis of L5 on S1, likely related   to severe facet arthropathy. No pars defect is identified. Alignment of the   lumbar spine is otherwise normal.  There is mild heterogeneity in the marrow   with several hemangiomas, most pronounced at L1. There are no areas of   marrow edema to suggest an acute fracture. SPINAL CORD: The conus tip terminates near the level of the L2 vertebral   body. The cauda equina nerve roots are unremarkable. SOFT TISSUES: No paraspinal mass identified. L1-L2: There is no significant disc herniation, spinal canal stenosis or   neural foraminal narrowing.        L2-L3: There is no significant disc herniation, spinal canal stenosis or neural foraminal narrowing. L3-L4: There is no significant disc herniation, spinal canal stenosis or   neural foraminal narrowing. L4-L5: Bilateral facet arthropathy. No disc herniation or central spinal   canal stenosis. No right foraminal narrowing. Left foraminal disc   protrusion measures 2 mm with minimal left foraminal narrowing. L5-S1: There is uncovering of the posterior margin of the disc with a disc   bulge lateralizing to the left. Bilateral facet arthropathy. Moderate   central spinal canal narrowing. Severe bilateral foraminal narrowing with   contact of the undersurface of the exiting L5 nerve roots. Impression   1. Severe facet arthropathy at L5-S1 with grade 2 anterolisthesis of L5 on   S1. Moderate central spinal canal narrowing at this level with severe   bilateral foraminal narrowing and contact of the exiting S1 nerve roots.      PAST MEDICAL HISTORY     Past Medical History:   Diagnosis Date    Abnormal EKG     Arthritis     Chest pain     Chronic bilateral low back pain with bilateral sciatica 02/11/2020    DDD (degenerative disc disease), lumbar 02/11/2020    Dyslipidemia     Elevated BP without diagnosis of hypertension     Hay fever     Hypertension     Lumbosacral spondylosis without myelopathy 04/18/2022    MVP (mitral valve prolapse)     Pre-diabetes     Snoring        SURGICAL HISTORY       Past Surgical History:   Procedure Laterality Date    BREAST SURGERY Bilateral     biopsy    CHOLECYSTECTOMY      COLONOSCOPY      ENDOMETRIAL ABLATION      EYE SURGERY Bilateral     chalazion removed    LUMBAR FUSION N/A 5/16/2022    LUMBAR L5-S1 DECOMPRESSION FUSION POSTERIOR performed by Levar Valerio MD at [address] [place] [address] Right 7/16/2020    RIGHT L4 AND L5 EPIDURAL STEROID INJECTION performed by Rafal Acuna MD at [address] [place] [address] Right 10/26/2020    EPIDURAL STEROID INJECTION -RIGHT L4 L5 performed by Rafal Acuna MD at [address] [place] [address]       Family History   Problem Relation Age of Onset    High Blood Pressure Mother     High Cholesterol Mother     Other Mother         blood clots, denies any known clotting d/o    Other Father         alcoholism    Arthritis Sister     No Known Problems Other        SOCIAL HISTORY       Social History     Socioeconomic History    Marital status:      Spouse name: None    Number of children: None    Years of education: None    Highest education level: None   Tobacco Use    Smoking status: Never    Smokeless tobacco: Never   Vaping Use    Vaping Use: Never used   Substance and Sexual Activity    Alcohol use: Yes     Alcohol/week: 3.0 standard drinks     Types: 3 Glasses of wine per week     Comment: moderately 3 glasses of wine per week    Drug use: No    Sexual activity: Yes     Partners: Male     Social Determinants of Health     Financial Resource Strain: Low Risk     Difficulty of Paying Living Expenses: Not hard at all   Food Insecurity: No Food Insecurity    Worried About Running Out of Food in the Last Year: Never true    Ran Out of Food in the Last Year: Never true        REVIEW OF SYSTEMS      No Known Allergies    Current Outpatient Medications on File Prior to Encounter   Medication Sig Dispense Refill    gabapentin (NEURONTIN) 600 MG tablet Take 1 tablet by mouth 3 times daily for 30 days.  90 tablet 2    metaxalone (SKELAXIN) 800 MG tablet take 1 tablet by mouth three times a day 90 tablet 0    losartan (COZAAR) 50 MG tablet take 1 tablet by mouth once daily 30 tablet 5    diclofenac (VOLTAREN) 75 MG EC tablet Take 1 tablet by mouth 2 times daily 60 tablet 5    verapamil (CALAN) 40 MG tablet take 1 tablet by mouth three times a day 90 tablet 2    DICLOFENAC PO Take by mouth      magnesium 30 MG tablet Take 1 tablet by mouth in the morning, at noon, and at bedtime Supplement magnesium over the counter      acetaminophen (TYLENOL) 650 MG extended release tablet Take 650 mg by mouth every 8 hours as needed for Pain       No current facility-administered medications on file prior to encounter. Review of Systems   Constitutional:  Negative for appetite change, chills, fever and unexpected weight change. HENT:  Negative for congestion, dental problem, hearing loss and sore throat. Eyes:  Positive for visual disturbance. Respiratory:  Negative for cough, shortness of breath and wheezing. Cardiovascular:  Negative for chest pain, palpitations and leg swelling. Gastrointestinal:  Positive for constipation. Negative for abdominal pain, diarrhea, nausea and vomiting. Genitourinary: Negative. Musculoskeletal:  Positive for back pain and gait problem (Using walker for ambulation assistance). Skin:  Negative for rash and wound. Neurological:  Negative for dizziness, speech difficulty, light-headedness and headaches. Hematological:  Does not bruise/bleed easily. Psychiatric/Behavioral: Negative. GENERAL PHYSICAL EXAM     Vitals: BP (!) 136/98   Pulse 92   Temp 98.4 °F (36.9 °C)   Resp 20   Ht 5' 7\" (1.702 m)   Wt 178 lb (80.7 kg)   SpO2 100%   BMI 27.88 kg/m²  Body mass index is 27.88 kg/m². Physical Exam  Constitutional:       General: She is not in acute distress. Appearance: She is well-developed. She is not ill-appearing. HENT:      Head: Normocephalic and atraumatic. Nose: Nose normal.      Mouth/Throat:      Mouth: Mucous membranes are moist.      Pharynx: Oropharynx is clear. No oropharyngeal exudate or posterior oropharyngeal erythema. Eyes:      General: No scleral icterus. Right eye: No discharge. Left eye: No discharge. Pupils: Pupils are equal, round, and reactive to light. Neck:      Trachea: No tracheal deviation. Cardiovascular:      Rate and Rhythm: Normal rate and regular rhythm. Heart sounds: Normal heart sounds.  No murmur heard.    No friction rub. No gallop. Pulmonary:      Effort: Pulmonary effort is normal. No respiratory distress. Breath sounds: Normal breath sounds. No wheezing, rhonchi or rales. Abdominal:      General: Bowel sounds are normal. There is no distension. Palpations: Abdomen is soft. Tenderness: There is no abdominal tenderness. There is no guarding. Musculoskeletal:      Cervical back: Neck supple. Right hip: Tenderness present. Decreased range of motion. Left hip: Tenderness present. Decreased range of motion. Right lower leg: No edema. Left lower leg: No edema. Skin:     General: Skin is warm and dry. Coloration: Skin is not jaundiced. Findings: No bruising, erythema or rash. Neurological:      General: No focal deficit present. Mental Status: She is alert and oriented to person, place, and time. Cranial Nerves: No cranial nerve deficit.       Gait: Gait normal.   Psychiatric:         Mood and Affect: Mood normal.      PROVISIONAL DIAGNOSES / SURGERY:      REVISION L5-S1  POSTERIOR  INSTRUMENTED FUSION POSSIBLE PELVIC FIXATION    LUMBAR DISC HERNIATION PAINFUL HARDWARE    Patient Active Problem List    Diagnosis Date Noted    Lumbar radiculopathy 10/06/2022    S/P lumbar fusion 10/06/2022    Acquired spondylolisthesis 05/16/2022    Back pain 05/16/2022    Primary hypertension 05/11/2022    Lumbosacral spondylosis without myelopathy 04/18/2022    Lumbar disc herniation 04/18/2022    Chronic bilateral low back pain with bilateral sciatica 02/11/2020    DDD (degenerative disc disease), lumbar 02/11/2020    Medication management 02/11/2020    Mitral valve prolapse 03/09/2018    Prediabetes 05/30/2017    Eyelid cyst 05/30/2017    Dyslipidemia 05/30/2017           Based on my personal evaluation of patient including review of patient's chart, medical clearance required for scheduled surgery d/t the following issues which are discussed in H&P above: Abnormal CBC. HTN, HLD, MVP, intermittent chest pain     [facility] [place] office will be responsible for making sure the clearance is obtained and is in the chart for surgery.       GONZÁLEZ Sawant - CNP on 11/2/2022 at 1:23 PM    Total time spent on encounter- PAT provider minutes: 31-40 minutes

## 2022-11-03 LAB
EKG ATRIAL RATE: 79 BPM
EKG P AXIS: 63 DEGREES
EKG P-R INTERVAL: 166 MS
EKG Q-T INTERVAL: 366 MS
EKG QRS DURATION: 86 MS
EKG QTC CALCULATION (BAZETT): 419 MS
EKG R AXIS: 32 DEGREES
EKG T AXIS: 34 DEGREES
EKG VENTRICULAR RATE: 79 BPM

## 2022-11-10 ENCOUNTER — OFFICE VISIT (OUTPATIENT)
Dept: FAMILY MEDICINE CLINIC | Age: 57
End: 2022-11-10
Payer: COMMERCIAL

## 2022-11-10 ENCOUNTER — HOSPITAL ENCOUNTER (OUTPATIENT)
Age: 57
Setting detail: SPECIMEN
Discharge: HOME | End: 2022-11-10

## 2022-11-10 VITALS
DIASTOLIC BLOOD PRESSURE: 98 MMHG | SYSTOLIC BLOOD PRESSURE: 158 MMHG | TEMPERATURE: 97.4 F | HEART RATE: 96 BPM | WEIGHT: 178 LBS | BODY MASS INDEX: 27.88 KG/M2 | OXYGEN SATURATION: 98 %

## 2022-11-10 DIAGNOSIS — D70.9 NEUTROPENIA, UNSPECIFIED TYPE (HCC): ICD-10-CM

## 2022-11-10 DIAGNOSIS — Z01.818 PREOPERATIVE CLEARANCE: ICD-10-CM

## 2022-11-10 DIAGNOSIS — I10 PRIMARY HYPERTENSION: ICD-10-CM

## 2022-11-10 DIAGNOSIS — Z01.818 PREOPERATIVE CLEARANCE: Primary | ICD-10-CM

## 2022-11-10 LAB
ABSOLUTE EOS #: 0.06 K/UL (ref 0–0.44)
ABSOLUTE IMMATURE GRANULOCYTE: <0.03 K/UL (ref 0–0.3)
ABSOLUTE LYMPH #: 1.85 K/UL (ref 1.1–3.7)
ABSOLUTE MONO #: 0.35 K/UL (ref 0.1–1.2)
BASOPHILS # BLD: 1 % (ref 0–2)
BASOPHILS ABSOLUTE: 0.04 K/UL (ref 0–0.2)
EOSINOPHILS RELATIVE PERCENT: 2 % (ref 1–4)
HCT VFR BLD CALC: 39.6 % (ref 36.3–47.1)
HEMOGLOBIN: 12.6 G/DL (ref 11.9–15.1)
IMMATURE GRANULOCYTES: 0 %
LYMPHOCYTES # BLD: 46 % (ref 24–43)
MCH RBC QN AUTO: 28.5 PG (ref 25.2–33.5)
MCHC RBC AUTO-ENTMCNC: 31.8 G/DL (ref 28.4–34.8)
MCV RBC AUTO: 89.6 FL (ref 82.6–102.9)
MONOCYTES # BLD: 9 % (ref 3–12)
NRBC AUTOMATED: 0 PER 100 WBC
PDW BLD-RTO: 14.6 % (ref 11.8–14.4)
PLATELET # BLD: 333 K/UL (ref 138–453)
PMV BLD AUTO: 9.3 FL (ref 8.1–13.5)
RBC # BLD: 4.42 M/UL (ref 3.95–5.11)
RBC # BLD: ABNORMAL 10*6/UL
SEG NEUTROPHILS: 42 % (ref 36–65)
SEGMENTED NEUTROPHILS ABSOLUTE COUNT: 1.68 K/UL (ref 1.5–8.1)
WBC # BLD: 4 K/UL (ref 3.5–11.3)

## 2022-11-10 PROCEDURE — 3074F SYST BP LT 130 MM HG: CPT

## 2022-11-10 PROCEDURE — 99213 OFFICE O/P EST LOW 20 MIN: CPT

## 2022-11-10 PROCEDURE — 3078F DIAST BP <80 MM HG: CPT

## 2022-11-10 RX ORDER — LOSARTAN POTASSIUM 100 MG/1
TABLET ORAL
Qty: 30 TABLET | Refills: 1 | Status: SHIPPED | OUTPATIENT
Start: 2022-11-10

## 2022-11-10 ASSESSMENT — PATIENT HEALTH QUESTIONNAIRE - PHQ9
SUM OF ALL RESPONSES TO PHQ QUESTIONS 1-9: 0
1. LITTLE INTEREST OR PLEASURE IN DOING THINGS: 0
SUM OF ALL RESPONSES TO PHQ QUESTIONS 1-9: 0
SUM OF ALL RESPONSES TO PHQ9 QUESTIONS 1 & 2: 0
2. FEELING DOWN, DEPRESSED OR HOPELESS: 0

## 2022-11-10 ASSESSMENT — ENCOUNTER SYMPTOMS
COUGH: 0
WHEEZING: 0
BACK PAIN: 1
VOMITING: 0
EYE DISCHARGE: 0
CHEST TIGHTNESS: 0
SORE THROAT: 0
DIARRHEA: 0
ABDOMINAL PAIN: 0
CONSTIPATION: 0
COLOR CHANGE: 0
NAUSEA: 0
SHORTNESS OF BREATH: 0

## 2022-11-10 NOTE — PROGRESS NOTES
[address]  Dept: [phone]    Ed Carina is a 61 y.o. female who presents for a preoperative physical examination. She is scheduled to have revision of L5-S1 fusion doneby Dr. Jose Raul Don at [facility] on 11/14/22. Any recent illnesses that will interfere with the upcoming surgery? no  Last surgical procedure - initial fusion 5/16. Type of anesthesia used general. Problems with the procedure or anesthesia? none    Conditioning (equivalent to 4 METs) --  1. Can you walk up 2 flights of stairs? no  2. Can you run a 'short distance'? no  3. Can you walk up a hill 1-2 blocks? no  4. Can you seda 2 bags of groceries up 1 flight of stairs? no  5. Can you walk 2-4 blocks (flat)?  no    Past Medical History:   Diagnosis Date    Abnormal EKG     Arthritis     Chest pain     Chronic bilateral low back pain with bilateral sciatica 02/11/2020    DDD (degenerative disc disease), lumbar 02/11/2020    Dyslipidemia     Elevated BP without diagnosis of hypertension     Hay fever     Hypertension     Lumbosacral spondylosis without myelopathy 04/18/2022    MVP (mitral valve prolapse)     Pre-diabetes     Snoring       Past Surgical History:   Procedure Laterality Date    BREAST SURGERY Bilateral     biopsy    CHOLECYSTECTOMY      COLONOSCOPY      ENDOMETRIAL ABLATION      EYE SURGERY Bilateral     chalazion removed    LUMBAR FUSION N/A 5/16/2022    LUMBAR L5-S1 DECOMPRESSION FUSION POSTERIOR performed by Sary Kapoor MD at [address] Right 7/16/2020    RIGHT L4 AND L5 EPIDURAL STEROID INJECTION performed by Tate Perea MD at [address] Right 10/26/2020    EPIDURAL STEROID INJECTION -RIGHT L4 L5 performed by Tate Perea MD at [address] [facility],[address]         Family History   Problem Relation Age of Onset    High Blood Pressure Mother     High Cholesterol Mother     Other Mother         blood clots, denies any known clotting d/o    Other Father         alcoholism    Arthritis Sister     No Known Problems Other        Social History     Tobacco Use    Smoking status: Never    Smokeless tobacco: Never   Substance Use Topics    Alcohol use: Yes     Alcohol/week: 3.0 standard drinks     Types: 3 Glasses of wine per week     Comment: moderately 3 glasses of wine per week      Current Outpatient Medications   Medication Sig Dispense Refill    losartan (COZAAR) 100 MG tablet take 1 tablet by mouth once daily 30 tablet 1    gabapentin (NEURONTIN) 600 MG tablet Take 1 tablet by mouth 3 times daily for 30 days. 90 tablet 2    metaxalone (SKELAXIN) 800 MG tablet take 1 tablet by mouth three times a day 90 tablet 0    diclofenac (VOLTAREN) 75 MG EC tablet Take 1 tablet by mouth 2 times daily 60 tablet 5    verapamil (CALAN) 40 MG tablet take 1 tablet by mouth three times a day 90 tablet 2    magnesium 30 MG tablet Take 1 tablet by mouth in the morning, at noon, and at bedtime Supplement magnesium over the counter      acetaminophen (TYLENOL) 650 MG extended release tablet Take 650 mg by mouth every 8 hours as needed for Pain      DICLOFENAC PO Take by mouth       No current facility-administered medications for this visit. No Known Allergies    :         :     Review of Systems   Constitutional:  Negative for activity change, appetite change, chills, fatigue, fever and unexpected weight change. HENT:  Negative for congestion, ear pain and sore throat. Eyes:  Negative for discharge and visual disturbance. Respiratory:  Negative for cough, chest tightness, shortness of breath and wheezing. Cardiovascular:  Negative for chest pain, palpitations and leg swelling. Gastrointestinal:  Negative for abdominal pain, constipation, diarrhea, nausea and vomiting. Genitourinary:  Negative for dysuria and pelvic pain. Musculoskeletal:  Positive for arthralgias and back pain.  Negative for gait problem and neck pain.   Skin:  Negative for color change, rash and wound. Neurological:  Negative for dizziness, seizures, weakness, numbness and headaches. Psychiatric/Behavioral:  Negative for behavioral problems and confusion.      :     Vitals:    11/10/22 1528   BP: (!) 158/98   Pulse: 96   Temp:    SpO2:        Body mass index is 27.88 kg/m². Physical Exam  Vitals and nursing note reviewed. Constitutional:       General: She is not in acute distress. Appearance: Normal appearance. She is well-developed. She is not ill-appearing, toxic-appearing or diaphoretic. HENT:      Head: Normocephalic and atraumatic. Right Ear: External ear normal.      Left Ear: External ear normal.      Nose: Nose normal.   Eyes:      General: No scleral icterus. Right eye: No discharge. Left eye: No discharge. Conjunctiva/sclera: Conjunctivae normal.      Pupils: Pupils are equal, round, and reactive to light. Neck:      Trachea: No tracheal deviation. Cardiovascular:      Rate and Rhythm: Regular rhythm. Tachycardia present. Heart sounds: Normal heart sounds. No murmur heard. No friction rub. No gallop. Pulmonary:      Effort: Pulmonary effort is normal. No tachypnea, accessory muscle usage or respiratory distress. Breath sounds: Normal breath sounds. No stridor. No decreased breath sounds, wheezing, rhonchi or rales. Abdominal:      Palpations: Abdomen is soft. Musculoskeletal:         General: No deformity. Normal range of motion. Cervical back: Normal, normal range of motion and neck supple. Thoracic back: Normal.      Lumbar back: Spasms, tenderness and bony tenderness present. Normal range of motion. Right lower leg: No edema. Left lower leg: No edema. Skin:     General: Skin is warm and dry. Coloration: Skin is not pale. Findings: No erythema or rash. Neurological:      Mental Status: She is alert and oriented to person, place, and time. GCS: GCS eye subscore is 4. GCS verbal subscore is 5. GCS motor subscore is 6. Gait: Gait is intact. Gait normal.   Psychiatric:         Speech: Speech normal.         Behavior: Behavior normal.         Thought Content: Thought content normal.         Judgment: Judgment normal.         :         1. Preoperative clearance    2. Neutropenia, unspecified type ([facility] [address]    3. Primary hypertension        :     1. Preoperative clearance  - CBC with Auto Differential; Future    2. Neutropenia, unspecified type ([address]  -idiopathic bouts of mildly low WBC, will recheck STAT to ensure stable for upcoming procedure    - CBC with Auto Differential; Future    3. Primary hypertension  -uncontrolled, pt no longer checks at home  -uncertain if uncontrolled d/t pain or underlying need for med change  -increase losartan to 100 mg daily for improved control, has been elevated at multiple recent appts  -pt to monitor at home to ensure better control    - losartan (COZAAR) 100 MG tablet; take 1 tablet by mouth once daily  Dispense: 30 tablet; Refill: 1@    Orders Placed This Encounter   Medications    losartan (COZAAR) 100 MG tablet     Sig: take 1 tablet by mouth once daily     Dispense:  30 tablet     Refill:  1         Revised Cardiac Risk Index (RCRI)-(one point for each)    . Diabetes requiring insulin  2. Risky surgery (intrathoracic, intraperitoneal,vascular)  3.Cad (including + test, EKG, etc)  4. Chf  5. Cva hx  6. Cr> 2.0    Score - for cardiac mortality  0 = 0.4%  1 = 1.0%  2 = 2.4-6.6%  or more = 5.4-11%    RCRI score is . ..0    0 -> go to OR  1-2 -surgery with beta blocker if itwill change mgmt &/or consider non invasive testing  >:   vascular surgery- consider further testing & beta blocker  Intermediaterisk surgery - to OR with possible beta blocker or consider noninvasive testing       Labs have been reviewed, will re-order STAT CBC to monitor WBC adequate for surgery.  Losartan increased for tighter BP control, may reduce post-op as pain control ideally improves. UPDATE: repeat CBC shows WBC WNL, will sign and fax clearance. She appears to be medically cleared at this time for surgery and anesthesia.       Electronically signed by GONZÁLEZ Lau CNP on 11/10/2022

## 2022-11-11 ENCOUNTER — ANESTHESIA EVENT (OUTPATIENT)
Dept: OPERATING ROOM | Age: 57
DRG: 460 | End: 2022-11-11
Payer: COMMERCIAL

## 2022-11-11 NOTE — PRE-PROCEDURE INSTRUCTIONS
Nothing to eat after midnight. Y  Are you taking any blood thinners? N When was the last day? Make sure to use Hibiclens prior to surgery. Y  Remove any jewelry and body piercings. Y  Do you wear glasses? Y If so, please bring a case to store them in. Are you having any Covid symptoms? N  Do you have any new rashes, infections, etc. that we should be aware of? N  Do you have a ride home the day of surgery? It cannot be a cab or medical transportation.  Y  Verify surgery time and what time to arrive at hospital. [id number]

## 2022-11-14 ENCOUNTER — HOSPITAL ENCOUNTER (INPATIENT)
Age: 57
LOS: 2 days | Discharge: HOME | DRG: 460 | End: 2022-11-16
Attending: ORTHOPAEDIC SURGERY | Admitting: ORTHOPAEDIC SURGERY
Payer: COMMERCIAL

## 2022-11-14 ENCOUNTER — APPOINTMENT (OUTPATIENT)
Dept: GENERAL RADIOLOGY | Age: 57
DRG: 460 | End: 2022-11-14
Attending: ORTHOPAEDIC SURGERY
Payer: COMMERCIAL

## 2022-11-14 ENCOUNTER — ANESTHESIA (OUTPATIENT)
Dept: OPERATING ROOM | Age: 57
DRG: 460 | End: 2022-11-14
Payer: COMMERCIAL

## 2022-11-14 DIAGNOSIS — N63.0 MASS OF BREAST, UNSPECIFIED LATERALITY: ICD-10-CM

## 2022-11-14 DIAGNOSIS — Z12.31 BREAST CANCER SCREENING BY MAMMOGRAM: ICD-10-CM

## 2022-11-14 DIAGNOSIS — M43.10 ACQUIRED SPONDYLOLISTHESIS: Primary | ICD-10-CM

## 2022-11-14 DIAGNOSIS — M51.26 LUMBAR DISC HERNIATION: ICD-10-CM

## 2022-11-14 DIAGNOSIS — T84.84XA PAINFUL ORTHOPAEDIC HARDWARE (HCC): ICD-10-CM

## 2022-11-14 PROCEDURE — 3209999900 FLUORO FOR SURGICAL PROCEDURES

## 2022-11-14 PROCEDURE — 88300 SURGICAL PATH GROSS: CPT

## 2022-11-14 PROCEDURE — 0QH304Z INSERTION OF INTERNAL FIXATION DEVICE INTO LEFT PELVIC BONE, OPEN APPROACH: ICD-10-PCS | Performed by: ORTHOPAEDIC SURGERY

## 2022-11-14 PROCEDURE — 3700000001 HC ADD 15 MINUTES (ANESTHESIA): Performed by: ORTHOPAEDIC SURGERY

## 2022-11-14 PROCEDURE — 3600000013 HC SURGERY LEVEL 3 ADDTL 15MIN: Performed by: ORTHOPAEDIC SURGERY

## 2022-11-14 PROCEDURE — 0QP004Z REMOVAL OF INTERNAL FIXATION DEVICE FROM LUMBAR VERTEBRA, OPEN APPROACH: ICD-10-PCS | Performed by: ORTHOPAEDIC SURGERY

## 2022-11-14 PROCEDURE — 6360000002 HC RX W HCPCS: Performed by: ORTHOPAEDIC SURGERY

## 2022-11-14 PROCEDURE — C1713 ANCHOR/SCREW BN/BN,TIS/BN: HCPCS | Performed by: ORTHOPAEDIC SURGERY

## 2022-11-14 PROCEDURE — 2500000003 HC RX 250 WO HCPCS

## 2022-11-14 PROCEDURE — 6370000000 HC RX 637 (ALT 250 FOR IP): Performed by: ANESTHESIOLOGY

## 2022-11-14 PROCEDURE — 2720000010 HC SURG SUPPLY STERILE: Performed by: ORTHOPAEDIC SURGERY

## 2022-11-14 PROCEDURE — 6360000002 HC RX W HCPCS: Performed by: ANESTHESIOLOGY

## 2022-11-14 PROCEDURE — 0SG3071 FUSION OF LUMBOSACRAL JOINT WITH AUTOLOGOUS TISSUE SUBSTITUTE, POSTERIOR APPROACH, POSTERIOR COLUMN, OPEN APPROACH: ICD-10-PCS | Performed by: ORTHOPAEDIC SURGERY

## 2022-11-14 PROCEDURE — 7100000001 HC PACU RECOVERY - ADDTL 15 MIN: Performed by: ORTHOPAEDIC SURGERY

## 2022-11-14 PROCEDURE — 6370000000 HC RX 637 (ALT 250 FOR IP): Performed by: ORTHOPAEDIC SURGERY

## 2022-11-14 PROCEDURE — 0QP104Z REMOVAL OF INTERNAL FIXATION DEVICE FROM SACRUM, OPEN APPROACH: ICD-10-PCS | Performed by: ORTHOPAEDIC SURGERY

## 2022-11-14 PROCEDURE — 7100000000 HC PACU RECOVERY - FIRST 15 MIN: Performed by: ORTHOPAEDIC SURGERY

## 2022-11-14 PROCEDURE — 2580000003 HC RX 258

## 2022-11-14 PROCEDURE — 2580000003 HC RX 258: Performed by: ANESTHESIOLOGY

## 2022-11-14 PROCEDURE — 1200000000 HC SEMI PRIVATE

## 2022-11-14 PROCEDURE — 2709999900 HC NON-CHARGEABLE SUPPLY: Performed by: ORTHOPAEDIC SURGERY

## 2022-11-14 PROCEDURE — 3700000000 HC ANESTHESIA ATTENDED CARE: Performed by: ORTHOPAEDIC SURGERY

## 2022-11-14 PROCEDURE — 6360000002 HC RX W HCPCS

## 2022-11-14 PROCEDURE — 2580000003 HC RX 258: Performed by: ORTHOPAEDIC SURGERY

## 2022-11-14 PROCEDURE — 3600000003 HC SURGERY LEVEL 3 BASE: Performed by: ORTHOPAEDIC SURGERY

## 2022-11-14 DEVICE — IMPLANTABLE DEVICE: Type: IMPLANTABLE DEVICE | Site: SPINE LUMBAR | Status: FUNCTIONAL

## 2022-11-14 DEVICE — BONE GRAFT KIT 7510200 INFUSE SMALL
Type: IMPLANTABLE DEVICE | Site: SPINE LUMBAR | Status: FUNCTIONAL
Brand: INFUSE® BONE GRAFT

## 2022-11-14 DEVICE — ROD SPNL L45MM DIA5.5MM POST TI LUM CNTOUR BK SMOOTH DENALI: Type: IMPLANTABLE DEVICE | Site: SPINE LUMBAR | Status: FUNCTIONAL

## 2022-11-14 DEVICE — GRAFT BNE CHIP FRZ DRY CANC CORT 1MM 8MM RANG 30CC READIGRFT: Type: IMPLANTABLE DEVICE | Site: SPINE LUMBAR | Status: FUNCTIONAL

## 2022-11-14 RX ORDER — SODIUM CHLORIDE, SODIUM LACTATE, POTASSIUM CHLORIDE, CALCIUM CHLORIDE 600; 310; 30; 20 MG/100ML; MG/100ML; MG/100ML; MG/100ML
INJECTION, SOLUTION INTRAVENOUS CONTINUOUS
Status: DISCONTINUED | OUTPATIENT
Start: 2022-11-14 | End: 2022-11-14 | Stop reason: HOSPADM

## 2022-11-14 RX ORDER — METOCLOPRAMIDE HYDROCHLORIDE 5 MG/ML
10 INJECTION INTRAMUSCULAR; INTRAVENOUS
Status: DISCONTINUED | OUTPATIENT
Start: 2022-11-14 | End: 2022-11-14

## 2022-11-14 RX ORDER — MAGNESIUM 30 MG
30 TABLET ORAL 3 TIMES DAILY
Status: DISCONTINUED | OUTPATIENT
Start: 2022-11-14 | End: 2022-11-14 | Stop reason: RX

## 2022-11-14 RX ORDER — LABETALOL HYDROCHLORIDE 5 MG/ML
10 INJECTION, SOLUTION INTRAVENOUS
Status: DISCONTINUED | OUTPATIENT
Start: 2022-11-14 | End: 2022-11-14

## 2022-11-14 RX ORDER — DIPHENHYDRAMINE HYDROCHLORIDE 50 MG/ML
12.5 INJECTION INTRAMUSCULAR; INTRAVENOUS
Status: DISCONTINUED | OUTPATIENT
Start: 2022-11-14 | End: 2022-11-14

## 2022-11-14 RX ORDER — REMIFENTANIL HYDROCHLORIDE 1 MG/ML
INJECTION, POWDER, LYOPHILIZED, FOR SOLUTION INTRAVENOUS CONTINUOUS PRN
Status: DISCONTINUED | OUTPATIENT
Start: 2022-11-14 | End: 2022-11-14 | Stop reason: SDUPTHER

## 2022-11-14 RX ORDER — HYDROMORPHONE HCL 110MG/55ML
PATIENT CONTROLLED ANALGESIA SYRINGE INTRAVENOUS PRN
Status: DISCONTINUED | OUTPATIENT
Start: 2022-11-14 | End: 2022-11-14 | Stop reason: SDUPTHER

## 2022-11-14 RX ORDER — VERAPAMIL HYDROCHLORIDE 80 MG/1
40 TABLET ORAL EVERY 8 HOURS SCHEDULED
Status: DISCONTINUED | OUTPATIENT
Start: 2022-11-14 | End: 2022-11-16 | Stop reason: HOSPADM

## 2022-11-14 RX ORDER — LOSARTAN POTASSIUM 50 MG/1
100 TABLET ORAL DAILY
Status: DISCONTINUED | OUTPATIENT
Start: 2022-11-15 | End: 2022-11-16 | Stop reason: HOSPADM

## 2022-11-14 RX ORDER — SUCCINYLCHOLINE/SOD CL,ISO/PF 200MG/10ML
SYRINGE (ML) INTRAVENOUS PRN
Status: DISCONTINUED | OUTPATIENT
Start: 2022-11-14 | End: 2022-11-14 | Stop reason: SDUPTHER

## 2022-11-14 RX ORDER — ONDANSETRON 2 MG/ML
4 INJECTION INTRAMUSCULAR; INTRAVENOUS EVERY 6 HOURS PRN
Status: DISCONTINUED | OUTPATIENT
Start: 2022-11-14 | End: 2022-11-16 | Stop reason: HOSPADM

## 2022-11-14 RX ORDER — SODIUM CHLORIDE 0.9 % (FLUSH) 0.9 %
5-40 SYRINGE (ML) INJECTION PRN
Status: DISCONTINUED | OUTPATIENT
Start: 2022-11-14 | End: 2022-11-14

## 2022-11-14 RX ORDER — SODIUM CHLORIDE 0.9 % (FLUSH) 0.9 %
5-40 SYRINGE (ML) INJECTION EVERY 12 HOURS SCHEDULED
Status: DISCONTINUED | OUTPATIENT
Start: 2022-11-14 | End: 2022-11-16 | Stop reason: HOSPADM

## 2022-11-14 RX ORDER — SODIUM CHLORIDE 0.9 % (FLUSH) 0.9 %
5-40 SYRINGE (ML) INJECTION PRN
Status: DISCONTINUED | OUTPATIENT
Start: 2022-11-14 | End: 2022-11-16 | Stop reason: HOSPADM

## 2022-11-14 RX ORDER — OXYCODONE HYDROCHLORIDE 5 MG/1
5 TABLET ORAL EVERY 4 HOURS PRN
Status: DISCONTINUED | OUTPATIENT
Start: 2022-11-14 | End: 2022-11-16 | Stop reason: HOSPADM

## 2022-11-14 RX ORDER — MIDAZOLAM HYDROCHLORIDE 1 MG/ML
INJECTION INTRAMUSCULAR; INTRAVENOUS PRN
Status: DISCONTINUED | OUTPATIENT
Start: 2022-11-14 | End: 2022-11-14 | Stop reason: SDUPTHER

## 2022-11-14 RX ORDER — GABAPENTIN 600 MG/1
600 TABLET ORAL 3 TIMES DAILY
Status: DISCONTINUED | OUTPATIENT
Start: 2022-11-14 | End: 2022-11-16 | Stop reason: HOSPADM

## 2022-11-14 RX ORDER — OXYCODONE HYDROCHLORIDE 10 MG/1
10 TABLET ORAL EVERY 4 HOURS PRN
Status: DISCONTINUED | OUTPATIENT
Start: 2022-11-14 | End: 2022-11-16 | Stop reason: HOSPADM

## 2022-11-14 RX ORDER — ACETAMINOPHEN 325 MG/1
650 TABLET ORAL EVERY 6 HOURS
Status: DISCONTINUED | OUTPATIENT
Start: 2022-11-14 | End: 2022-11-16 | Stop reason: HOSPADM

## 2022-11-14 RX ORDER — SODIUM CHLORIDE 0.9 % (FLUSH) 0.9 %
5-40 SYRINGE (ML) INJECTION PRN
Status: DISCONTINUED | OUTPATIENT
Start: 2022-11-14 | End: 2022-11-14 | Stop reason: HOSPADM

## 2022-11-14 RX ORDER — MORPHINE SULFATE 2 MG/ML
2 INJECTION, SOLUTION INTRAMUSCULAR; INTRAVENOUS
Status: DISCONTINUED | OUTPATIENT
Start: 2022-11-14 | End: 2022-11-16 | Stop reason: HOSPADM

## 2022-11-14 RX ORDER — FENTANYL CITRATE 0.05 MG/ML
25 INJECTION, SOLUTION INTRAMUSCULAR; INTRAVENOUS EVERY 5 MIN PRN
Status: DISCONTINUED | OUTPATIENT
Start: 2022-11-14 | End: 2022-11-14

## 2022-11-14 RX ORDER — HYDRALAZINE HYDROCHLORIDE 20 MG/ML
10 INJECTION INTRAMUSCULAR; INTRAVENOUS
Status: DISCONTINUED | OUTPATIENT
Start: 2022-11-14 | End: 2022-11-14

## 2022-11-14 RX ORDER — DEXAMETHASONE SODIUM PHOSPHATE 4 MG/ML
INJECTION, SOLUTION INTRA-ARTICULAR; INTRALESIONAL; INTRAMUSCULAR; INTRAVENOUS; SOFT TISSUE PRN
Status: DISCONTINUED | OUTPATIENT
Start: 2022-11-14 | End: 2022-11-14 | Stop reason: SDUPTHER

## 2022-11-14 RX ORDER — TRANEXAMIC ACID 100 MG/ML
INJECTION, SOLUTION INTRAVENOUS PRN
Status: DISCONTINUED | OUTPATIENT
Start: 2022-11-14 | End: 2022-11-14 | Stop reason: SDUPTHER

## 2022-11-14 RX ORDER — PROPOFOL 10 MG/ML
INJECTION, EMULSION INTRAVENOUS PRN
Status: DISCONTINUED | OUTPATIENT
Start: 2022-11-14 | End: 2022-11-14 | Stop reason: SDUPTHER

## 2022-11-14 RX ORDER — GLYCOPYRROLATE 0.2 MG/ML
INJECTION INTRAMUSCULAR; INTRAVENOUS PRN
Status: DISCONTINUED | OUTPATIENT
Start: 2022-11-14 | End: 2022-11-14 | Stop reason: SDUPTHER

## 2022-11-14 RX ORDER — ONDANSETRON 2 MG/ML
4 INJECTION INTRAMUSCULAR; INTRAVENOUS
Status: DISCONTINUED | OUTPATIENT
Start: 2022-11-14 | End: 2022-11-14

## 2022-11-14 RX ORDER — SODIUM CHLORIDE 9 MG/ML
25 INJECTION, SOLUTION INTRAVENOUS PRN
Status: DISCONTINUED | OUTPATIENT
Start: 2022-11-14 | End: 2022-11-14

## 2022-11-14 RX ORDER — CYCLOBENZAPRINE HCL 10 MG
10 TABLET ORAL 3 TIMES DAILY PRN
Status: DISCONTINUED | OUTPATIENT
Start: 2022-11-14 | End: 2022-11-16 | Stop reason: HOSPADM

## 2022-11-14 RX ORDER — KETAMINE HCL IN NACL, ISO-OSM 100MG/10ML
SYRINGE (ML) INJECTION PRN
Status: DISCONTINUED | OUTPATIENT
Start: 2022-11-14 | End: 2022-11-14 | Stop reason: SDUPTHER

## 2022-11-14 RX ORDER — SODIUM CHLORIDE 9 MG/ML
INJECTION, SOLUTION INTRAVENOUS PRN
Status: DISCONTINUED | OUTPATIENT
Start: 2022-11-14 | End: 2022-11-16 | Stop reason: HOSPADM

## 2022-11-14 RX ORDER — ONDANSETRON 2 MG/ML
INJECTION INTRAMUSCULAR; INTRAVENOUS PRN
Status: DISCONTINUED | OUTPATIENT
Start: 2022-11-14 | End: 2022-11-14 | Stop reason: SDUPTHER

## 2022-11-14 RX ORDER — FENTANYL CITRATE 50 UG/ML
INJECTION, SOLUTION INTRAMUSCULAR; INTRAVENOUS PRN
Status: DISCONTINUED | OUTPATIENT
Start: 2022-11-14 | End: 2022-11-14 | Stop reason: SDUPTHER

## 2022-11-14 RX ORDER — ACETAMINOPHEN 500 MG
1000 TABLET ORAL ONCE
Status: COMPLETED | OUTPATIENT
Start: 2022-11-14 | End: 2022-11-14

## 2022-11-14 RX ORDER — LIDOCAINE HYDROCHLORIDE 10 MG/ML
1 INJECTION, SOLUTION EPIDURAL; INFILTRATION; INTRACAUDAL; PERINEURAL
Status: DISCONTINUED | OUTPATIENT
Start: 2022-11-14 | End: 2022-11-14 | Stop reason: HOSPADM

## 2022-11-14 RX ORDER — SODIUM CHLORIDE 0.9 % (FLUSH) 0.9 %
5-40 SYRINGE (ML) INJECTION EVERY 12 HOURS SCHEDULED
Status: DISCONTINUED | OUTPATIENT
Start: 2022-11-14 | End: 2022-11-14

## 2022-11-14 RX ORDER — GABAPENTIN 300 MG/1
300 CAPSULE ORAL ONCE
Status: COMPLETED | OUTPATIENT
Start: 2022-11-14 | End: 2022-11-14

## 2022-11-14 RX ORDER — MORPHINE SULFATE 4 MG/ML
4 INJECTION, SOLUTION INTRAMUSCULAR; INTRAVENOUS
Status: DISCONTINUED | OUTPATIENT
Start: 2022-11-14 | End: 2022-11-16 | Stop reason: HOSPADM

## 2022-11-14 RX ORDER — LIDOCAINE HYDROCHLORIDE 20 MG/ML
INJECTION, SOLUTION EPIDURAL; INFILTRATION; INTRACAUDAL; PERINEURAL PRN
Status: DISCONTINUED | OUTPATIENT
Start: 2022-11-14 | End: 2022-11-14 | Stop reason: SDUPTHER

## 2022-11-14 RX ORDER — SODIUM CHLORIDE 9 MG/ML
INJECTION, SOLUTION INTRAVENOUS PRN
Status: DISCONTINUED | OUTPATIENT
Start: 2022-11-14 | End: 2022-11-14 | Stop reason: HOSPADM

## 2022-11-14 RX ORDER — PROMETHAZINE HYDROCHLORIDE 25 MG/1
12.5 TABLET ORAL EVERY 6 HOURS PRN
Status: DISCONTINUED | OUTPATIENT
Start: 2022-11-14 | End: 2022-11-16 | Stop reason: HOSPADM

## 2022-11-14 RX ORDER — SODIUM CHLORIDE 0.9 % (FLUSH) 0.9 %
5-40 SYRINGE (ML) INJECTION EVERY 12 HOURS SCHEDULED
Status: DISCONTINUED | OUTPATIENT
Start: 2022-11-14 | End: 2022-11-14 | Stop reason: HOSPADM

## 2022-11-14 RX ADMIN — PHENYLEPHRINE HYDROCHLORIDE 100 MCG: 10 INJECTION INTRAVENOUS at 10:16

## 2022-11-14 RX ADMIN — MIDAZOLAM 2 MG: 1 INJECTION INTRAMUSCULAR; INTRAVENOUS at 07:20

## 2022-11-14 RX ADMIN — PHENYLEPHRINE HYDROCHLORIDE 100 MCG: 10 INJECTION INTRAVENOUS at 08:09

## 2022-11-14 RX ADMIN — PHENYLEPHRINE HYDROCHLORIDE 50 MCG: 10 INJECTION INTRAVENOUS at 08:12

## 2022-11-14 RX ADMIN — TRANEXAMIC ACID 1000 MG: 1 INJECTION, SOLUTION INTRAVENOUS at 07:33

## 2022-11-14 RX ADMIN — SODIUM CHLORIDE, POTASSIUM CHLORIDE, SODIUM LACTATE AND CALCIUM CHLORIDE: 600; 310; 30; 20 INJECTION, SOLUTION INTRAVENOUS at 09:11

## 2022-11-14 RX ADMIN — Medication 20 MG: at 08:16

## 2022-11-14 RX ADMIN — GABAPENTIN 300 MG: 300 CAPSULE ORAL at 06:31

## 2022-11-14 RX ADMIN — HYDROMORPHONE HYDROCHLORIDE 0.5 MG: 1 INJECTION, SOLUTION INTRAMUSCULAR; INTRAVENOUS; SUBCUTANEOUS at 11:43

## 2022-11-14 RX ADMIN — CEFAZOLIN 2000 MG: 10 INJECTION, POWDER, FOR SOLUTION INTRAVENOUS at 15:04

## 2022-11-14 RX ADMIN — PHENYLEPHRINE HYDROCHLORIDE 200 MCG: 10 INJECTION INTRAVENOUS at 08:20

## 2022-11-14 RX ADMIN — HYDROMORPHONE HYDROCHLORIDE 0.5 MG: 2 INJECTION, SOLUTION INTRAMUSCULAR; INTRAVENOUS; SUBCUTANEOUS at 10:19

## 2022-11-14 RX ADMIN — PROPOFOL 120 MCG/KG/MIN: 10 INJECTION, EMULSION INTRAVENOUS at 07:39

## 2022-11-14 RX ADMIN — REMIFENTANIL HYDROCHLORIDE 0.07 MCG/KG/MIN: 1 INJECTION, POWDER, LYOPHILIZED, FOR SOLUTION INTRAVENOUS at 07:39

## 2022-11-14 RX ADMIN — CEFAZOLIN 2000 MG: 10 INJECTION, POWDER, FOR SOLUTION INTRAVENOUS at 07:28

## 2022-11-14 RX ADMIN — FENTANYL CITRATE 100 MCG: 50 INJECTION, SOLUTION INTRAMUSCULAR; INTRAVENOUS at 07:24

## 2022-11-14 RX ADMIN — DEXAMETHASONE SODIUM PHOSPHATE 8 MG: 4 INJECTION, SOLUTION INTRAMUSCULAR; INTRAVENOUS at 07:33

## 2022-11-14 RX ADMIN — ACETAMINOPHEN 650 MG: 325 TABLET ORAL at 19:14

## 2022-11-14 RX ADMIN — GLYCOPYRROLATE 0.2 MG: 0.2 INJECTION, SOLUTION INTRAMUSCULAR; INTRAVENOUS at 08:26

## 2022-11-14 RX ADMIN — Medication 120 MG: at 07:24

## 2022-11-14 RX ADMIN — SODIUM CHLORIDE, POTASSIUM CHLORIDE, SODIUM LACTATE AND CALCIUM CHLORIDE: 600; 310; 30; 20 INJECTION, SOLUTION INTRAVENOUS at 06:29

## 2022-11-14 RX ADMIN — HYDROMORPHONE HYDROCHLORIDE 0.5 MG: 2 INJECTION, SOLUTION INTRAMUSCULAR; INTRAVENOUS; SUBCUTANEOUS at 10:00

## 2022-11-14 RX ADMIN — Medication 10 MG: at 08:13

## 2022-11-14 RX ADMIN — ACETAMINOPHEN 1000 MG: 500 TABLET ORAL at 06:31

## 2022-11-14 RX ADMIN — PROPOFOL 150 MG: 10 INJECTION, EMULSION INTRAVENOUS at 07:24

## 2022-11-14 RX ADMIN — CEFAZOLIN 2000 MG: 10 INJECTION, POWDER, FOR SOLUTION INTRAVENOUS at 23:24

## 2022-11-14 RX ADMIN — PHENYLEPHRINE HYDROCHLORIDE 50 MCG: 10 INJECTION INTRAVENOUS at 08:04

## 2022-11-14 RX ADMIN — OXYCODONE HYDROCHLORIDE 5 MG: 5 TABLET ORAL at 15:18

## 2022-11-14 RX ADMIN — HYDROMORPHONE HYDROCHLORIDE 0.5 MG: 1 INJECTION, SOLUTION INTRAMUSCULAR; INTRAVENOUS; SUBCUTANEOUS at 12:00

## 2022-11-14 RX ADMIN — OXYCODONE HYDROCHLORIDE 5 MG: 5 TABLET ORAL at 19:15

## 2022-11-14 RX ADMIN — SODIUM CHLORIDE, PRESERVATIVE FREE 10 ML: 5 INJECTION INTRAVENOUS at 21:35

## 2022-11-14 RX ADMIN — PHENYLEPHRINE HYDROCHLORIDE 15 MCG/MIN: 10 INJECTION INTRAVENOUS at 08:43

## 2022-11-14 RX ADMIN — GABAPENTIN 600 MG: 600 TABLET, FILM COATED ORAL at 21:34

## 2022-11-14 RX ADMIN — VERAPAMIL HYDROCHLORIDE 40 MG: 80 TABLET ORAL at 21:37

## 2022-11-14 RX ADMIN — Medication 20 MG: at 09:27

## 2022-11-14 RX ADMIN — LIDOCAINE HYDROCHLORIDE 100 MG: 20 INJECTION, SOLUTION EPIDURAL; INFILTRATION; INTRACAUDAL; PERINEURAL at 07:24

## 2022-11-14 RX ADMIN — PHENYLEPHRINE HYDROCHLORIDE 100 MCG: 10 INJECTION INTRAVENOUS at 08:31

## 2022-11-14 RX ADMIN — ONDANSETRON 4 MG: 2 INJECTION INTRAMUSCULAR; INTRAVENOUS at 09:54

## 2022-11-14 RX ADMIN — PROPOFOL 50 MG: 10 INJECTION, EMULSION INTRAVENOUS at 07:32

## 2022-11-14 ASSESSMENT — PAIN DESCRIPTION - ORIENTATION
ORIENTATION: MID

## 2022-11-14 ASSESSMENT — PAIN DESCRIPTION - DESCRIPTORS
DESCRIPTORS: ACHING
DESCRIPTORS: ACHING;SORE
DESCRIPTORS: ACHING
DESCRIPTORS: ACHING;BURNING;SHOOTING
DESCRIPTORS: ACHING

## 2022-11-14 ASSESSMENT — PAIN SCALES - GENERAL
PAINLEVEL_OUTOF10: 3
PAINLEVEL_OUTOF10: 4
PAINLEVEL_OUTOF10: 3
PAINLEVEL_OUTOF10: 7
PAINLEVEL_OUTOF10: 9
PAINLEVEL_OUTOF10: 6
PAINLEVEL_OUTOF10: 10

## 2022-11-14 ASSESSMENT — PAIN DESCRIPTION - LOCATION
LOCATION: BACK

## 2022-11-14 ASSESSMENT — PAIN DESCRIPTION - PAIN TYPE
TYPE: SURGICAL PAIN

## 2022-11-14 ASSESSMENT — PAIN - FUNCTIONAL ASSESSMENT: PAIN_FUNCTIONAL_ASSESSMENT: 0-10

## 2022-11-14 ASSESSMENT — PAIN DESCRIPTION - FREQUENCY: FREQUENCY: CONTINUOUS

## 2022-11-14 NOTE — PROGRESS NOTES
[facility] [place]   Herbal and Nutritional Product Restrictions      The following herbal, alternative, and/or nutritional/dietary supplement product(s) has been discontinued per [facility] approved policy:      Magnesium 30 mg tablet TID    Please reorder upon discharge if appropriate.     Thank you,  Dee Dee Peck 115[address], [facility]  11/14/2022 4:54 PM

## 2022-11-14 NOTE — ANESTHESIA POSTPROCEDURE EVALUATION
Department of Anesthesiology  Postprocedure Note    Patient: Segundo Lim  MRN: [id number]  [place]: 1965  Date of evaluation: 11/14/2022      Procedure Summary     Date: 11/14/22 Room / Location: [address] [place] [address] Lia Black 01 / [facility]: ROCCO CARTER    Anesthesia Start: 1818 Anesthesia Stop: 7889    Procedure: REVISION L5-S1  POSTERIOR  INSTRUMENTED FUSION POSSIBLE PELVIC FIXATION (Spine Lumbar) Diagnosis:       Lumbar disc herniation      Painful orthopaedic hardware ([address]      ([address])    Surgeons: Sary Kapoor MD Responsible Provider: Ki Vásquez MD    Anesthesia Type: general ASA Status: 3          Anesthesia Type: No value filed.     Alyse Phase I: Alyse Score: 3    Alyse Phase II:        Anesthesia Post Evaluation    Comments: POST- ANESTHESIA EVALUATION       Pt Name: Segundo Lim  MRN: [id number]  [place]: 1965  Date of evaluation: 11/14/2022  Time:  1:06 PM      /80   Pulse 82   Temp 97.1 °F (36.2 °C) (Infrared)   Resp 15   Ht 5' 7\" (1.702 m)   Wt 178 lb (80.7 kg)   SpO2 91%   BMI 27.88 kg/m²      Consciousness Level  Awake  Cardiopulmonary Status  Stable  Pain Adequately Treated YES  Nausea / Vomiting  NO  Adequate Hydration  YES  Anesthesia Related Complications NONE      Electronically signed by Ki Vásquez MD on 11/14/2022 at 1:06 PM

## 2022-11-14 NOTE — INTERVAL H&P NOTE
Update History & Physical    The patient's History and Physical of November 2, 2022 was reviewed with the patient and I examined the patient. There was no change. The surgical site was confirmed by the patient and me. Patient will be having : REVISION L5-S1  POSTERIOR  INSTRUMENTED FUSION POSSIBLE PELVIC FIXATION. Patient reports present  pain at 10/10. Patient has been NPO since midnight. No blood thinners in the past 5-7 days. Patient took  losartan and verapamil  this am with sip of water. Patient denies any personal or family problems with anesthesia. Patient did obtain medical clearance. , she states that he BP was still high and medication increases were done. Patient has a racy heartrate this am greater than 100 bpm, she admits to some chest fluttering. Patient was physically assessed, including cardiovascular and respiratory.        Electronically signed by GONZÁLEZ Tinoco CNP on 11/14/2022 at 5:39 AM

## 2022-11-14 NOTE — BRIEF OP NOTE
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 Item Serial No.  Lot No. LRB No. Used Action   GRAFT BNE CHIP FRZ DRY CANC DENVER 1MM 8MM RANG 30CC READIGRFT - [id number]  GRAFT BNE CHIP FRZ DRY CANC DENVER 1MM 8MM RANG 30CC READIGRFT  [facility] [place] [facility] [de-identified] N/A 1 Implanted   KIT GRAFT BONE SM RHBMP-2 4.2MG INJ 5ML CONTAIN NDL 20GA - [id number]  KIT GRAFT BONE SM RHBMP-2 4.2MG INJ 5ML CONTAIN NDL 20GA  [employer] SPINALG[employer] TECH-WD [id number] N/A 1 Implanted   SCREW SPNL POLYAX 6.5X50 MM BELKIS FEN INVASIVE EVEREST - [id number]  SCREW SPNL POLYAX 6.5X50 MM BELKIS FEN INVASIVE EVEREST  JEN SPINE HOWM-WD  N/A 2 Implanted   SET SCR SPNL POLYAX ATR EVEREST - [id number]  SET SCR SPNL POLYAX ATR EVEREST  [employer]-WD  N/A 2 Implanted   CONNECTOR SPNL LAT OFFSET 35 MM EVEREST - [id number]  CONNECTOR SPNL LAT OFFSET 35 MM EVEREST  JEN SPINE HOWM-WD  N/A 2 Implanted   BROOKE SPNL L45MM DIA5. 5MM POST TI LUM CNTOUR BK SMOOTH JAKE - [id number]  BROOKE SPNL L45MM DIA5. 5MM POST TI LUM CNTOUR BK SMOOTH JAKE  [employer]-WD  N/A 1 Implanted         Drains: * No LDAs found *    Findings: see dictation    Electronically signed by Vaishali Rodriguez MD on 11/14/2022 at 10:18 AM

## 2022-11-14 NOTE — ANESTHESIA PRE PROCEDURE
Department of Anesthesiology  Preprocedure Note       Name:  Mary Moses   Age:  61 y.o.  :  1965                                          MRN:  [id number]         Date:  2022      Surgeon: Zabrina Anderson):  Jim Salgado MD    Procedure: Procedure(s):  REVISION L5-S1  POSTERIOR  INSTRUMENTED FUSION POSSIBLE PELVIC FIXATION    Medications prior to admission:   Prior to Admission medications    Medication Sig Start Date End Date Taking? Authorizing Provider   losartan (COZAAR) 100 MG tablet take 1 tablet by mouth once daily 11/10/22   Reginaldo Orozco APRN - CNP   gabapentin (NEURONTIN) 600 MG tablet Take 1 tablet by mouth 3 times daily for 30 days.  10/20/22 11/19/22  GONZÁLEZ Jurado - CNP   metaxalone (SKELAXIN) 800 MG tablet take 1 tablet by mouth three times a day 10/17/22   Jim Salgado MD   diclofenac (VOLTAREN) 75 MG EC tablet Take 1 tablet by mouth 2 times daily 10/3/22   Gladies Brace, DO   verapamil (CALAN) 40 MG tablet take 1 tablet by mouth three times a day 22   Gladies Brace, DO   magnesium 30 MG tablet Take 1 tablet by mouth in the morning, at noon, and at bedtime Supplement magnesium over the counter    Historical Provider, MD   acetaminophen (TYLENOL) 650 MG extended release tablet Take 650 mg by mouth every 8 hours as needed for Pain    Historical Provider, MD       Current medications:    Current Facility-Administered Medications   Medication Dose Route Frequency Provider Last Rate Last Admin    lidocaine PF 1 % injection 1 mL  1 mL IntraDERmal Once PRN Yanna Sunshine MD        lactated ringers infusion   IntraVENous Continuous Barrington MD Tristan 125 mL/hr at 22 0629 New Bag at 22 0629    sodium chloride flush 0.9 % injection 5-40 mL  5-40 mL IntraVENous 2 times per day Yanna Sunshine MD        sodium chloride flush 0.9 % injection 5-40 mL  5-40 mL IntraVENous PRN Abena Hudson MD        0.9 % sodium chloride infusion   IntraVENous PRN Barrington Tristan MD        ceFAZolin (ANCEF) 2000 mg in dextrose 5 % 50 mL IVPB  2,000 mg IntraVENous Once Levar Valerio MD           Allergies:  No Known Allergies    Problem List:    Patient Active Problem List   Diagnosis Code    Prediabetes R73.03    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    Dyslipidemia E78.5    Chronic bilateral low back pain with bilateral sciatica M54.42, M54.41, G89.29    DDD (degenerative disc disease), lumbar M51.36    Medication management Z79.899    Lumbosacral spondylosis without myelopathy M47.817    Mitral valve prolapse I34.1    Lumbar disc herniation M51.26    Primary hypertension I10    Acquired spondylolisthesis M43.10    Back pain M54.9    Lumbar radiculopathy M54.16    S/P lumbar fusion Z98.1       Past Medical History:        Diagnosis Date    Abnormal EKG     Arthritis     Chest pain     Chronic bilateral low back pain with bilateral sciatica 02/11/2020    DDD (degenerative disc disease), lumbar 02/11/2020    Dyslipidemia     Elevated BP without diagnosis of hypertension     Hay fever     Hypertension     Lumbosacral spondylosis without myelopathy 04/18/2022    MVP (mitral valve prolapse)     Pre-diabetes     Snoring        Past Surgical History:        Procedure Laterality Date    BREAST SURGERY Bilateral     biopsy    CHOLECYSTECTOMY      COLONOSCOPY      ENDOMETRIAL ABLATION      EYE SURGERY Bilateral     chalazion removed    LUMBAR FUSION N/A 5/16/2022    LUMBAR L5-S1 DECOMPRESSION FUSION POSTERIOR performed by Levar Valerio MD at [address] Right 7/16/2020    RIGHT L4 AND L5 EPIDURAL STEROID INJECTION performed by Rafal Acuna MD at [address] Right 10/26/2020    EPIDURAL STEROID INJECTION -RIGHT L4 L5 performed by Rafal Acuna MD at [address]         Social History:    Social History     Tobacco Use    Smoking status: Never    Smokeless tobacco: Never   Substance Use Topics    Alcohol use: Yes     Alcohol/week: 3.0 standard drinks     Types: 3 Glasses of wine per week     Comment: moderately 3 glasses of wine per week                                Counseling given: Not Answered      Vital Signs (Current):   Vitals:    11/14/22 0608   BP: (!) 155/100   Pulse: 85   Resp: 18   Temp: 97.1 °F (36.2 °C)   TempSrc: Infrared   SpO2: 99%   Weight: 178 lb (80.7 kg)   Height: 5' 7\" (1.702 m)                                              BP Readings from Last 3 Encounters:   11/14/22 (!) 155/100   11/10/22 (!) 158/98   11/02/22 (!) 136/98       NPO Status: Time of last liquid consumption: 1900                        Time of last solid consumption: 1900                        Date of last liquid consumption: 11/13/22                        Date of last solid food consumption: 11/13/22    BMI:   Wt Readings from Last 3 Encounters:   11/14/22 178 lb (80.7 kg)   11/10/22 178 lb (80.7 kg)   11/02/22 178 lb (80.7 kg)     Body mass index is 27.88 kg/m². CBC:   Lab Results   Component Value Date/Time    WBC 4.0 11/10/2022 03:34 PM    RBC 4.42 11/10/2022 03:34 PM    HGB 12.6 11/10/2022 03:34 PM    HCT 39.6 11/10/2022 03:34 PM    MCV 89.6 11/10/2022 03:34 PM    RDW 14.6 11/10/2022 03:34 PM     11/10/2022 03:34 PM       CMP:   Lab Results   Component Value Date/Time     11/02/2022 01:15 PM    K 4.1 11/02/2022 01:15 PM     11/02/2022 01:15 PM    CO2 26 11/02/2022 01:15 PM    BUN 13 11/02/2022 01:15 PM    CREATININE 0.82 11/02/2022 01:15 PM    GFRAA >60 05/02/2022 04:03 PM    LABGLOM >60 11/02/2022 01:15 PM    GLUCOSE 84 11/02/2022 01:15 PM    PROT 8.5 09/17/2018 09:08 AM    CALCIUM 9.2 11/02/2022 01:15 PM    BILITOT 0.4 12/31/2021 12:00 AM    ALKPHOS 81 12/31/2021 12:00 AM    AST 19 12/31/2021 12:00 AM    ALT 16 12/31/2021 12:00 AM       POC Tests: No results for input(s): POCGLU, POCNA, POCK, POCCL, POCBUN, POCHEMO, POCHCT in the last 72 hours.     Coags:   Lab Results   Component Value Date/Time    PROTIME 13.1 07/22/2022 09:13 AM    INR 1.0 07/22/2022 09:13 AM    APTT 27.8 07/22/2022 09:13 AM       HCG (If Applicable):   Lab Results   Component Value Date    HCG NEGATIVE 10/26/2020        ABGs: No results found for: PHART, PO2ART, NER4EWP, ZWP1AOG, BEART, U4PEAYPY     Type & Screen (If Applicable):  No results found for: LABABO, LABRH    Drug/Infectious Status (If Applicable):  Lab Results   Component Value Date/Time    HEPCAB NONREACTIVE 11/25/2017 11:03 AM       COVID-19 Screening (If Applicable):   Lab Results   Component Value Date/Time    COVID19 Not Detected 10/24/2020 04:46 PM    COVID19 Not Detected 07/12/2020 10:00 AM           Anesthesia Evaluation  Patient summary reviewed and Nursing notes reviewed no history of anesthetic complications:   Airway: Mallampati: II  TM distance: >3 FB   Neck ROM: full  Mouth opening: > = 3 FB   Dental: normal exam         Pulmonary:Negative Pulmonary ROS and normal exam  breath sounds clear to auscultation                             Cardiovascular:  Exercise tolerance: good (>4 METS),   (+) hypertension:, hyperlipidemia      ECG reviewed  Rhythm: regular  Rate: normal                    Neuro/Psych:   (+) neuromuscular disease:,              ROS comment: Chronic lower pelvic pain with bilateral radiculopathy    Painful orthopedic hardware   GI/Hepatic/Renal: Neg GI/Hepatic/Renal ROS            Endo/Other:    (+) : arthritis: OA., .                 Abdominal:             Vascular: Other Findings:           Anesthesia Plan      general     ASA 3       Induction: intravenous. MIPS: Postoperative opioids intended and Prophylactic antiemetics administered. Anesthetic plan and risks discussed with patient. Plan discussed with CRNA.                     April Abdi MD   11/14/2022

## 2022-11-14 NOTE — PROGRESS NOTES
Patient arrived to Room 2063 via bed. Alert and oriented. White board updated. Vital signs stable. Patient given call light and instructed to not get out of bed without supervision.

## 2022-11-15 LAB
ANION GAP SERPL CALCULATED.3IONS-SCNC: 8 MMOL/L (ref 9–17)
BUN BLDV-MCNC: 12 MG/DL (ref 6–20)
CALCIUM SERPL-MCNC: 8.3 MG/DL (ref 8.6–10.4)
CHLORIDE BLD-SCNC: 104 MMOL/L (ref 98–107)
CO2: 26 MMOL/L (ref 20–31)
CREAT SERPL-MCNC: 0.73 MG/DL (ref 0.5–0.9)
GFR SERPL CREATININE-BSD FRML MDRD: >60 ML/MIN/1.73M2
GLUCOSE BLD-MCNC: 113 MG/DL (ref 70–99)
MAGNESIUM: 2.1 MG/DL (ref 1.6–2.6)
POTASSIUM SERPL-SCNC: 3.6 MMOL/L (ref 3.7–5.3)
SODIUM BLD-SCNC: 138 MMOL/L (ref 135–144)
TSH SERPL DL<=0.05 MIU/L-ACNC: 2.05 UIU/ML (ref 0.3–5)

## 2022-11-15 PROCEDURE — 99254 IP/OBS CNSLTJ NEW/EST MOD 60: CPT | Performed by: INTERNAL MEDICINE

## 2022-11-15 PROCEDURE — 2580000003 HC RX 258: Performed by: ORTHOPAEDIC SURGERY

## 2022-11-15 PROCEDURE — 97162 PT EVAL MOD COMPLEX 30 MIN: CPT

## 2022-11-15 PROCEDURE — 84443 ASSAY THYROID STIM HORMONE: CPT

## 2022-11-15 PROCEDURE — 2580000003 HC RX 258: Performed by: INTERNAL MEDICINE

## 2022-11-15 PROCEDURE — 1200000000 HC SEMI PRIVATE

## 2022-11-15 PROCEDURE — 2580000003 HC RX 258: Performed by: NURSE PRACTITIONER

## 2022-11-15 PROCEDURE — 83735 ASSAY OF MAGNESIUM: CPT

## 2022-11-15 PROCEDURE — 80048 BASIC METABOLIC PNL TOTAL CA: CPT

## 2022-11-15 PROCEDURE — 99024 POSTOP FOLLOW-UP VISIT: CPT | Performed by: ORTHOPAEDIC SURGERY

## 2022-11-15 PROCEDURE — 36415 COLL VENOUS BLD VENIPUNCTURE: CPT

## 2022-11-15 PROCEDURE — 6370000000 HC RX 637 (ALT 250 FOR IP): Performed by: ORTHOPAEDIC SURGERY

## 2022-11-15 RX ORDER — 0.9 % SODIUM CHLORIDE 0.9 %
500 INTRAVENOUS SOLUTION INTRAVENOUS ONCE
Status: COMPLETED | OUTPATIENT
Start: 2022-11-15 | End: 2022-11-15

## 2022-11-15 RX ORDER — METAXALONE 800 MG/1
TABLET ORAL
Qty: 90 TABLET | Refills: 0 | Status: SHIPPED | OUTPATIENT
Start: 2022-11-15

## 2022-11-15 RX ADMIN — ACETAMINOPHEN 650 MG: 325 TABLET ORAL at 23:07

## 2022-11-15 RX ADMIN — OXYCODONE HYDROCHLORIDE 10 MG: 10 TABLET ORAL at 15:38

## 2022-11-15 RX ADMIN — OXYCODONE HYDROCHLORIDE 10 MG: 10 TABLET ORAL at 19:41

## 2022-11-15 RX ADMIN — GABAPENTIN 600 MG: 600 TABLET, FILM COATED ORAL at 08:35

## 2022-11-15 RX ADMIN — VERAPAMIL HYDROCHLORIDE 40 MG: 80 TABLET ORAL at 23:08

## 2022-11-15 RX ADMIN — OXYCODONE HYDROCHLORIDE 5 MG: 5 TABLET ORAL at 11:24

## 2022-11-15 RX ADMIN — SODIUM CHLORIDE 500 ML: 9 INJECTION, SOLUTION INTRAVENOUS at 09:20

## 2022-11-15 RX ADMIN — ACETAMINOPHEN 650 MG: 325 TABLET ORAL at 18:23

## 2022-11-15 RX ADMIN — ACETAMINOPHEN 650 MG: 325 TABLET ORAL at 05:06

## 2022-11-15 RX ADMIN — OXYCODONE HYDROCHLORIDE 5 MG: 5 TABLET ORAL at 06:34

## 2022-11-15 RX ADMIN — GABAPENTIN 600 MG: 600 TABLET, FILM COATED ORAL at 14:26

## 2022-11-15 RX ADMIN — VERAPAMIL HYDROCHLORIDE 40 MG: 80 TABLET ORAL at 06:35

## 2022-11-15 RX ADMIN — ACETAMINOPHEN 650 MG: 325 TABLET ORAL at 11:24

## 2022-11-15 RX ADMIN — SODIUM CHLORIDE, PRESERVATIVE FREE 10 ML: 5 INJECTION INTRAVENOUS at 08:37

## 2022-11-15 RX ADMIN — GABAPENTIN 600 MG: 600 TABLET, FILM COATED ORAL at 21:27

## 2022-11-15 RX ADMIN — VERAPAMIL HYDROCHLORIDE 40 MG: 80 TABLET ORAL at 14:27

## 2022-11-15 RX ADMIN — SODIUM CHLORIDE 500 ML: 9 INJECTION, SOLUTION INTRAVENOUS at 21:15

## 2022-11-15 ASSESSMENT — PAIN DESCRIPTION - DESCRIPTORS
DESCRIPTORS: ACHING;SORE
DESCRIPTORS: ACHING;SORE;THROBBING
DESCRIPTORS: SORE;ACHING
DESCRIPTORS: ACHING;SORE;BURNING
DESCRIPTORS: SORE
DESCRIPTORS: ACHING;SORE

## 2022-11-15 ASSESSMENT — PAIN SCALES - GENERAL
PAINLEVEL_OUTOF10: 5
PAINLEVEL_OUTOF10: 7
PAINLEVEL_OUTOF10: 6
PAINLEVEL_OUTOF10: 6
PAINLEVEL_OUTOF10: 4
PAINLEVEL_OUTOF10: 6
PAINLEVEL_OUTOF10: 2
PAINLEVEL_OUTOF10: 4
PAINLEVEL_OUTOF10: 4
PAINLEVEL_OUTOF10: 5

## 2022-11-15 ASSESSMENT — PAIN DESCRIPTION - LOCATION
LOCATION: BACK
LOCATION: BACK;HEAD
LOCATION: BACK

## 2022-11-15 ASSESSMENT — PAIN DESCRIPTION - ORIENTATION
ORIENTATION: MID;LOWER
ORIENTATION: MID;LOWER

## 2022-11-15 NOTE — TELEPHONE ENCOUNTER
Patient is requesting a refill on Skelaxin 800 mg. LRF 10/17/22 #90    Patient had surgery 10/14/22:    PROCEDURE PERFORMED:  1. Posterior fusion L5-S1.  2.  Posterior instrumentation L5 to the pelvis. 3.  Removal of hardware deep.

## 2022-11-15 NOTE — ANESTHESIA POSTPROCEDURE EVALUATION
Department of Anesthesiology  Postprocedure Note    Patient: Edwin Nicole  MRN: [id number]  [place]: 1965  Date of evaluation: 11/15/2022      Procedure Summary     Date: 11/14/22 Room / Location: [facility] [address] / [facility]: [facility]TER    Anesthesia Start: 2310 Anesthesia Stop: 6699    Procedure: REVISION L5-S1  POSTERIOR  INSTRUMENTED FUSION (Spine Lumbar) Diagnosis:       Lumbar disc herniation      Painful orthopaedic hardware ([address]      [facility]    Surgeons: Ladarius Conner MD Responsible Provider: Rose Nguyen MD    Anesthesia Type: general ASA Status: 3          Anesthesia Type: No value filed. Alyse Phase I: Alyse Score: 10    Alyse Phase II:        Anesthesia Post Evaluation    Comments: POD #1 Patient seen lying in bed. Denied any anesthesia related issues.

## 2022-11-15 NOTE — CARE COORDINATION
CASE MANAGEMENT NOTE:    Admission Date:  11/14/2022 Juan Casas is a 61 y.o.  female    Admitted for : Lumbar disc herniation [M51.26]  Painful orthopaedic hardware [facility]  Acquired spondylolisthesis [M43.10]    Met with:  Patient    PCP:  Dr. Funmi Saenz:  Jeniffer Carmichael      Is patient alert and oriented at time of discussion:  Yes    Current Residence/ Living Arrangements:  independently at home with spouse and drives. Current Services PTA:  No    Does patient go to outpatient dialysis: No    Is patient agreeable to VNS: No    Freedom of choice provided:  Yes    List of [address] provided: No    VNS chosen:  NA    DME:  shower chair and rollator    Home Oxygen: No    Nebulizer: No    CPAP/BIPAP: No    Supplier: N/A    Potential Assistance Needed: No    SNF needed: No    Freedom of choice and list provided: NA    Pharmacy:  [facility] on [address].       Is patient currently receiving oral anticoagulation therapy? No    Is the Patient an [employer] [facility] with Readmission Risk Score greater than 14%? No  If yes, pt needs a follow up appointment made within 7 days. Family Members/Caregivers that pt would like involved in their care:    Yes    If yes, list name here:  [address]    Transportation Provider:  Family             Discharge Plan:  home without needs.                  Electronically signed by: Christine Coronado RN on 11/15/2022 at 11:30 AM

## 2022-11-15 NOTE — CONSULTS
[facility] Internal Medicine    CONSULTATION / HISTORY AND PHYSICAL EXAMINATION            Date:   11/15/2022  Patient name:  Juan Casas  Date of admission:  11/14/2022  5:33 AM  MRN:   [id number]  Account:  [de-identified]  YOB: 1965  PCP:    Rajni Craig DO  Room:   2063/2063-01  Code Status:    Full Code    Physician Requesting Consult: Johanny Cortez MD    Reason for Consult:  medical management    Chief Complaint:     No chief complaint on file.       History Obtained From:     Patient medical record nursing staff    History of Present Illness:   Patient past medical history of hypertension, mitral valve prolapse, chronic osteoarthritis affecting lower back, patient has lower back pain, radiating to left leg, going on for long period of time  Patient underwent REVISION L5-S1  POSTERIOR  INSTRUMENTED FUSION POSSIBLE PELVIC FIXATION on 11/14  Patient had reading of low blood pressure in the morning, losartan was on hold  Patient complaining of pain 6 out of 10  Passed stools today morning    Past Medical History:     Past Medical History:   Diagnosis Date    Abnormal EKG     Arthritis     Chest pain     Chronic bilateral low back pain with bilateral sciatica 02/11/2020    DDD (degenerative disc disease), lumbar 02/11/2020    Dyslipidemia     Elevated BP without diagnosis of hypertension     Hay fever     Hypertension     Lumbosacral spondylosis without myelopathy 04/18/2022    MVP (mitral valve prolapse)     Pre-diabetes     Snoring         Past Surgical History:     Past Surgical History:   Procedure Laterality Date    BREAST SURGERY Bilateral     biopsy    CHOLECYSTECTOMY      COLONOSCOPY      ENDOMETRIAL ABLATION      EYE SURGERY Bilateral     chalazion removed    LUMBAR FUSION N/A 5/16/2022    LUMBAR L5-S1 DECOMPRESSION FUSION POSTERIOR performed by Johanny Cortez MD at [place] [address] N/A 11/14/2022    REVISION L5-S1 POSTERIOR  INSTRUMENTED FUSION performed by Gisela Mitchell MD at [address] Right 7/16/2020    RIGHT L4 AND L5 EPIDURAL STEROID INJECTION performed by Babar Jara MD at [address] Right 10/26/2020    EPIDURAL STEROID INJECTION -RIGHT L4 L5 performed by Babar Jara MD at [address]          Medications Prior to Admission:     Prior to Admission medications    Medication Sig Start Date End Date Taking? Authorizing Provider   losartan (COZAAR) 100 MG tablet take 1 tablet by mouth once daily 11/10/22   GONZÁLEZ Hurley - CNP   gabapentin (NEURONTIN) 600 MG tablet Take 1 tablet by mouth 3 times daily for 30 days. 10/20/22 11/19/22  GONZÁLEZ Edmond - CNP   metaxalone (SKELAXIN) 800 MG tablet take 1 tablet by mouth three times a day 10/17/22   Gisela Mitchell MD   verapamil (CALAN) 40 MG tablet take 1 tablet by mouth three times a day 8/29/22   Estrellita Galvez DO   magnesium 30 MG tablet Take 1 tablet by mouth in the morning, at noon, and at bedtime Supplement magnesium over the counter    Historical Provider, MD   acetaminophen (TYLENOL) 650 MG extended release tablet Take 650 mg by mouth every 8 hours as needed for Pain    Historical Provider, MD        Allergies:     Patient has no known allergies. Social History:     Tobacco:    reports that she has never smoked. She has never used smokeless tobacco.  Alcohol:      reports current alcohol use of about 3.0 standard drinks per week. Drug Use:  reports no history of drug use. Family History:     Family History   Problem Relation Age of Onset    High Blood Pressure Mother     High Cholesterol Mother     Other Mother         blood clots, denies any known clotting d/o    Other Father         alcoholism    Arthritis Sister     No Known Problems Other        Review of Systems:     Positive and Negative as described in HPI.     CONSTITUTIONAL:  negative for fevers, chills, sweats, fatigue, weight loss  HEENT:  negative for vision, hearing changes, runny nose, throat pain  RESPIRATORY:  negative for shortness of breath, cough, congestion, wheezing. CARDIOVASCULAR:  negative for chest pain, palpitations. GASTROINTESTINAL:  negative for nausea, vomiting, diarrhea, constipation, change in bowel habits, abdominal pain   GENITOURINARY:  negative for difficulty of urination, burning with urination, frequency   INTEGUMENT:  negative for rash, skin lesions, easy bruising   HEMATOLOGIC/LYMPHATIC:  negative for swelling/edema   ALLERGIC/IMMUNOLOGIC:  negative for urticaria , itching  ENDOCRINE:  negative increase in drinking, increase in urination, hot or cold intolerance  MUSCULOSKELETAL: Lower back pain, radiating to left leg  NEUROLOGICAL:  negative for headaches, dizziness, lightheadedness, numbness, pain, tingling extremities  BEHAVIOR/PSYCH:      Physical Exam:     BP (!) 160/69   Pulse (!) 110   Temp 99.4 °F (37.4 °C) (Oral)   Resp 18   Ht 5' 7\" (1.702 m)   Wt 178 lb (80.7 kg)   SpO2 96%   BMI 27.88 kg/m²   Temp (24hrs), Av.9 °F (37.2 °C), Min:97.7 °F (36.5 °C), Max:100 °F (37.8 °C)    No results for input(s): POCGLU in the last 72 hours. Intake/Output Summary (Last 24 hours) at 11/15/2022 1554  Last data filed at 2022 1625  Gross per 24 hour   Intake 1028 ml   Output 600 ml   Net 428 ml       General Appearance:  alert, well appearing, and in no acute distress  Mental status: oriented to person, place, and time with normal affect  Head:  normocephalic, atraumatic.   Eye: no icterus, redness, pupils equal and reactive, extraocular eye movements intact, conjunctiva clear  Ear: normal external ear, no discharge, hearing intact  Nose:  no drainage noted  Mouth: mucous membranes moist  Neck: supple, no carotid bruits, thyroid not palpable  Lungs: Bilateral equal air entry, clear to ausculation, no wheezing, rales or rhonchi, normal effort  Cardiovascular: Tachycardia present, regular rhythm, no murmur, gallop, rub. Abdomen: Soft, nontender, nondistended, normal bowel sounds, no hepatomegaly or splenomegaly  Neurologic: There are no new focal motor or sensory deficits, normal muscle tone and bulk, no abnormal sensation, normal speech, cranial nerves II through XII grossly intact  Skin: No gross lesions, rashes, bruising or bleeding on exposed skin area  Extremities: Postsurgical changes present in lower back  Psych: Investigations:      Laboratory Testing:  No results found for this or any previous visit (from the past 24 hour(s)). Consultations:   IP CONSULT TO INTERNAL MEDICINE  Assessment :      Primary Problem  Acquired spondylolisthesis    Active Hospital Problems    Diagnosis Date Noted    Acquired spondylolisthesis [M43.10] 05/16/2022     Priority: Medium       Plan:     Patient has chronic back pain radiating to left leg, s/p surgery  Hypertension, restarted home dose of calcium channel blocker, holding losartan with readings of low blood pressure in the morning, was given 500 mill fluid bolus in the morning, blood pressure is improved, still tachycardic, will observe likely due to pain  Will do BMP, magnesium, TSH  DVT prophylaxis once okay with orthopedic surgeon        Mariela Colón MD  11/15/2022  3:54 PM    Copy sent to Dr. Angel Guevara, DO    Please note that this chart was generated using voice recognition Dragon dictation software. Although every effort was made to ensure the accuracy of this automated transcription, some errors in transcription may have occurred.

## 2022-11-15 NOTE — PROGRESS NOTES
Physical Therapy Cancel Note      DATE: 11/15/2022    NAME: Mary Moses  MRN: [id number]   : 1965      Patient not seen this date for Physical Therapy due to:     Other: Pt has hypotension and tachycardia this am. Will check back this afternoon      Electronically signed by Darci Marie PT on 11/15/2022 at 10:28 AM

## 2022-11-15 NOTE — PROGRESS NOTES
Surgical Progress Note    POD:      Patient doing fairly well  Vitals:    11/15/22 0628   BP: 120/76   Pulse: (!) 120   Resp: 20   Temp: 100 °F (37.8 °C)   SpO2: 96%      Temp (24hrs), Av.2 °F (36.8 °C), Min:97.1 °F (36.2 °C), Max:100 °F (37.8 °C)       Pain Control fair  No unusual nausea    Exam: no change out of bed        Lungs:  No respiratory distress    Labs reviewed:  Labs:                I/O last 3 completed shifts:   In:  [I.V.:]  Out: 1000 [Urine:1000]    Assessment:    Patient Active Problem List   Diagnosis    Prediabetes    Eyelid cyst    Dyslipidemia    Chronic bilateral low back pain with bilateral sciatica    DDD (degenerative disc disease), lumbar    Medication management    Lumbosacral spondylosis without myelopathy    Mitral valve prolapse    Lumbar disc herniation    Primary hypertension    Acquired spondylolisthesis    Back pain    Lumbar radiculopathy    S/P lumbar fusion       Plan:  See my orders    Sidney Maldonado MD MD  11/15/2022 7:21 AM

## 2022-11-15 NOTE — PROGRESS NOTES
RN attempted to contact internal medicine physician regarding vitals, MEWS 4, but no answer and mail box is full, unable to leave voicemail. Patient is asymptomatic at this time, will continue attempt calls to physician and monitor patient. Update 0915am:  RN spoke with physician on rounds; new orders received for IV fluid bolus and to hold cozaar.

## 2022-11-15 NOTE — PLAN OF CARE
Problem: Discharge Planning  Goal: Discharge to home or other facility with appropriate resources  11/15/2022 1657 by Tiffany Clement RN  Outcome: Progressing  Flowsheets (Taken 11/15/2022 0677)  Discharge to home or other facility with appropriate resources:   Identify barriers to discharge with patient and caregiver   Arrange for needed discharge resources and transportation as appropriate   Identify discharge learning needs (meds, wound care, etc)   Refer to discharge planning if patient needs post-hospital services based on physician order or complex needs related to functional status, cognitive ability or social support system     Problem: Pain  Goal: Verbalizes/displays adequate comfort level or baseline comfort level  11/15/2022 1657 by Tiffany Clement RN  Outcome: Progressing     Problem: Safety - Adult  Goal: Free from fall injury  11/15/2022 1657 by Tiffany Clement RN  Outcome: Progressing     Problem: ABCDS Injury Assessment  Goal: Absence of physical injury  11/15/2022 1657 by Tiffany Clement, RN  Outcome: Progressing

## 2022-11-15 NOTE — OP NOTE
[address]                 [address] [facility], [address]                                OPERATIVE REPORT    PATIENT NAME: Herminio Bonilla                     :        1965  MED REC NO:   [id number]                              ROOM:       2063  ACCOUNT NO:   [de-identified]                           ADMIT DATE: 2022  PROVIDER:     Rishabh Aparicio    DATE OF PROCEDURE:  2022    PREOPERATIVE DIAGNOSES:  L5-S1 nonunion, history of PLIF. POSTOPERATIVE DIAGNOSES:  L5-S1 nonunion, history of PLIF. PROCEDURE PERFORMED:  1. Posterior fusion L5-S1.  2.  Posterior instrumentation L5 to the pelvis. 3.  Removal of hardware deep. PROCEDURE IN DETAIL:  The patient was taken to the operating room,  placed under general anesthesia, transferred to the [facility]  table, checked for padding and positioning. Back was prepped and draped  in the usual sterile fashion. Previous incision was opened. Dissection  was carried down through skin and subcutaneous fat and out to the  previously-placed hardware. The patient's sacral screws were frankly  loose. Attempt was made bilaterally to graft the screw hole with bone  and then replace the screw. We got a little bit of purchase on the  right, the left really did not get much purchase and as I was trying to  assemble the fixation to the pelvis, I was having hardware impingement  elected to remove the L5 screw on the left-hand side. After grafting the screw holes at S1 and replacing the screw, attention  was directed to the pelvis. Dissection was carried out and in a  suprafascial region out to the iliac crest.  The iliac crest was  identified and screws were placed. On the right, we were able to  connect with the isiah connector. I was able to link the L5-S1 screws and  the isiah connector. These were all placed, caps were placed and  eventually all screws were torqued.     On the left, I was unable to get the screws with the offset to the  pelvis to not impinge and allowed me to use all three screws. As the  sacral screw was largely loose anyway, this was room removed and we  simply went with L5 to the pelvis fixation on the left-hand side. After placement of hardware, all screws were torqued. Lateral gutter  had previously been decompressed with a high-speed bur. A small infuse  was utilized bilaterally. Final AP and lateral fluoroscopic images were  obtained showing satisfactory hardware placement. Deep fascia was  reapproximated with #2 Vicryl sutures, subcuticular layer with 2-0  Vicryl, followed by skin staples. Sterile dressing was applied. The  patient was awakened from anesthesia, taken to recovery room in stable  condition. Complications arising during the operation, none noted. Blood loss 200.         GALO See    D: 11/14/2022 10:33:49       T: 11/14/2022 10:38:32     [id number]  Job#: [id number]     Doc#: [id number]    CC:

## 2022-11-15 NOTE — PLAN OF CARE
Problem: Discharge Planning  Goal: Discharge to home or other facility with appropriate resources  Outcome: Progressing  Flowsheets (Taken 11/15/2022 0512)  Discharge to home or other facility with appropriate resources:   Identify barriers to discharge with patient and caregiver   Arrange for needed discharge resources and transportation as appropriate   Identify discharge learning needs (meds, wound care, etc)   Arrange for interpreters to assist at discharge as needed   Refer to discharge planning if patient needs post-hospital services based on physician order or complex needs related to functional status, cognitive ability or social support system     Problem: Pain  Goal: Verbalizes/displays adequate comfort level or baseline comfort level  Outcome: Progressing  Flowsheets (Taken 11/15/2022 0512)  Verbalizes/displays adequate comfort level or baseline comfort level:   Encourage patient to monitor pain and request assistance   Assess pain using appropriate pain scale   Administer analgesics based on type and severity of pain and evaluate response   Implement non-pharmacological measures as appropriate and evaluate response   Consider cultural and social influences on pain and pain management   Notify Licensed Independent Practitioner if interventions unsuccessful or patient reports new pain     Problem: Safety - Adult  Goal: Free from fall injury  Outcome: Progressing  Flowsheets (Taken 11/15/2022 0512)  Free From Fall Injury:   Instruct family/caregiver on patient safety   Based on caregiver fall risk screen, instruct family/caregiver to ask for assistance with transferring infant if caregiver noted to have fall risk factors     Problem: ABCDS Injury Assessment  Goal: Absence of physical injury  Outcome: Progressing  Flowsheets (Taken 11/15/2022 0512)  Absence of Physical Injury: Implement safety measures based on patient assessment

## 2022-11-15 NOTE — PROGRESS NOTES
Complexity  History: Sylwia Pineda is 61 y.o. female, here for preadmission testing related to lumbar disc herniation, painful hardware with scheduled REVISION L5-S1  POSTERIOR  INSTRUMENTED FUSION POSSIBLE PELVIC FIXATION per Dr. Angel Driver. Pt s/p lumbar L5-S1 decompression posterior fusion in May, 2022. Initially had temporary relief of symptoms for about one month post operatively. The current symptoms started for approximately 5 months. Pt c/o pain to bilateral hips and legs with right being worse. SURGERY REPORT:The patient was taken to the operating room,  placed under general anesthesia, transferred to the [facility]  table, checked for padding and positioning. Back was prepped and draped  in the usual sterile fashion. Previous incision was opened. Dissection  was carried down through skin and subcutaneous fat and out to the  previously-placed hardware. The patient's sacral screws were frankly  loose. Attempt was made bilaterally to graft the screw hole with bone  and then replace the screw. We got a little bit of purchase on the  right, the left really did not get much purchase and as I was trying to  assemble the fixation to the pelvis, I was having hardware impingement  elected to remove the L5 screw on the left-hand side. After grafting the screw holes at S1 and replacing the screw, attention  was directed to the pelvis. Dissection was carried out and in a  suprafascial region out to the iliac crest.  The iliac crest was  identified and screws were placed. On the right, we were able to  connect with the angel connector. I was able to link the L5-S1 screws and  the angel connector. These were all placed, caps were placed and  eventually all screws were torqued. On the left, I was unable to get the screws with the offset to the  pelvis to not impinge and allowed me to use all three screws.   As the  sacral screw was largely loose anyway, this was room removed and we  simply went with L5 to the pelvis fixation on the left-hand side. than left. Describes pain as intermittent burning. Rating pain 10/10. Takes gabapentin, skelaxin and tylenol with good relief. Sitting for prolonged periods aggravates pain. Heat helps alleviate symptoms. Pain does radiate to bilateral feet. She does have tingling to bilateral feet. Denies recent falls, injury or trauma. Exam: social hx, ROM, balance assessment, pt education, functional mobility training  Clinical Presentation: severe pain, motivated  Barriers to Learning: none  Requires PT Follow-Up: Yes  Activity Tolerance  Activity Tolerance: Patient limited by pain  Activity Tolerance Comments: Pt reports she spent much of time in bed prior to surgery d/t pain     Plan   Physcial Therapy Plan  General Plan: 1 time a day 7 days a week  Current Treatment Recommendations: Strengthening, ROM, Balance training, Functional mobility training, Transfer training, Stair training, Gait training, Endurance training, Safety education & training, Positioning, Therapeutic activities, Equipment evaluation, education, & procurement, Home exercise program, Pain management, Patient/Caregiver education & training  Safety Devices  Type of Devices: Nurse notified, Patient at risk for falls, Call light within reach, Left in bed (Pt is left in R sidelying with pillow between LEs, ice pack on back.  Pt is aware to call for assistance if ice is too uncomfortable)     Restrictions  Restrictions/Precautions  Restrictions/Precautions: Surgical Protocols, Other (comment), Fall Risk, General Precautions (activity: ambulate)  Required Braces or Orthoses?: No  Implants present? : Metal implants (instrumented fusion L5 to pelvis)  Position Activity Restriction  Spinal Precautions: No Bending, No Lifting, No Twisting (lifting less than 5-10#)  Spinal Precautions: log roll  Other position/activity restrictions: check vitals prior to session: BP before session while in bed 130/76 and HR 1-5 bpm, Sitting 133/97 and  bpm. Denies symptoms throughout session     Subjective   Pain: c/o burning and shooting pain L LE, currently 4/10 but 10/10 at worst  General  Chart Reviewed: Yes  Patient assessed for rehabilitation services?: Yes  Additional Pertinent Hx: Pt underwent revision L5-S1 with HWR removal and instrumented fusion L5 to iliac crest bone on 11/14/22. Response To Previous Treatment: Not applicable  Family / Caregiver Present: No  Diagnosis: lumbar DDD with HWR failure  Follows Commands: Within Functional Limits  Subjective  Subjective: Pt is resting in bed, agreeable to PT. Reports she feels better than this am although continues with severe L LE pain. Reports intermittent L LE pain prior to sx, but now constant         Social/Functional History  Social/Functional History  Lives With: Spouse (Spouse works full time, pt will be alone at those times.)  Type of Home: House  Home Layout: Two level, Able to Live on Main level with bedroom/bathroom  Home Access: Stairs to enter without rails  Entrance Stairs - Number of Steps: 2 +1+1. Pt reports she previously used RW on steps by putting front legs of RW on top step while back legs are on lower step. Denies LOB  Bathroom Shower/Tub: Tub/Shower unit (uses stool in tub area to sit on)  Bathroom Toilet: Standard  Bathroom Accessibility: Walker accessible  Home Equipment: Rollator, Walker, rolling (keeps RW on basement level in case she needs to use)  Has the patient had two or more falls in the past year or any fall with injury in the past year?: No (denies)  ADL Assistance: Independent  Ambulation Assistance: Independent (does not use walker in home.  Reports she can furniture ambulate if needed)  Transfer Assistance: Independent  Active : Yes (typically does not drive since prior back surgery)  Mode of Transportation: [facility]  Additional Comments: sleeps in flat bed  Vision/Hearing  Vision  Vision: Impaired  Vision Exceptions: Wears glasses for reading  Hearing  Hearing: Within functional limits    Cognition   Orientation  Overall Orientation Status: Within Functional Limits     Objective      Observation/Palpation  Posture: Fair  Observation: back incision with surgical drsg  Body Mechanics: rigid trunk, cautious movements  Gross Assessment  Tone: Normal  Sensation: Impaired (L LE burning pain)     AROM RLE (degrees)  RLE AROM: WFL  RLE General AROM: assessed during functional mobility d/t pain  AROM LLE (degrees)  LLE AROM : WFL  LLE General AROM: assessed during functional mobility d/t pain  AROM RUE (degrees)  RUE AROM : WFL  RUE General AROM: assessed during functional mobility d/t pain  AROM LUE (degrees)  LUE AROM : WFL  LUE General AROM: assessed during functional mobility d/t pain  Strength Other  Other: MMT deferred: UEs at least 3/5, hips 3-/5 d/t pain, knees and ankles at least 3/5           Bed mobility  Rolling to Right: Stand by assistance  Supine to Sit: Minimal assistance (min A to raise trunk)  Sit to Supine:  Moderate assistance (assist to get LEs back into bed)  Bed Mobility Comments: slow and cautious d/t pain  Transfers  Sit to Stand: Contact guard assistance  Stand to Sit: Contact guard assistance  Comment: with rollator  Ambulation  WB Status: no wt bearing precautions documented  Ambulation  Surface: Level tile  Device: Rollator  Assistance: Contact guard assistance  Gait Deviations: Slow Elva;Decreased step length;Decreased head and trunk rotation;Decreased step height  Distance: 50 ft  Comments: distance limited by pain  More Ambulation?: No  Stairs/Curb  Stairs?: No     Balance  Posture: Fair  Sitting - Static: Fair;+ (minimal movements and increased UE wt bearing d/t back and L LE pain)  Standing - Static: Fair  Standing - Dynamic: Fair  Comments: standing with rollator           OutComes Score                                                  AM-PAC Score  AM-PAC Inpatient Mobility Raw Score : 16 (11/15/22 7699)  AM-PAC Inpatient T-Scale Score : 40.78 (11/15/22 1305)  Mobility Inpatient CMS 0-100% Score: 54.16 (11/15/22 1305)  Mobility Inpatient CMS G-Code Modifier : CK (11/15/22 1305)          Tinneti Score       Goals  Short Term Goals  Time Frame for Short Term Goals: 5 days  Short Term Goal 1: Pt will demo WFL Bilat LE strength to maxmize mobility  Short Term Goal 2: Pt will demo good standing balance with rollator to increase safety  Short Term Goal 3: Pt will perform bed mobility IND  Short Term Goal 4: Pt will transfer with rollator MOD IND  Short Term Goal 5: Pt will ambulate with rollator MOD  ft  Additional Goals?: Yes  Short Term Goal 6: Pt will navigate 4 steps CGA per home set up  Patient Goals   Patient Goals : decrease pain, increase activity       Education  Patient Education  Education Given To: Patient  Education Provided: Role of Therapy; Fall Prevention Strategies; Plan of Care;Precautions  Education Method: Demonstration;Verbal  Barriers to Learning: None  Education Outcome: Verbalized understanding;Continued education needed;Demonstrated understanding      Therapy Time   Individual Concurrent Group Co-treatment   Time In 1302         Time Out 1340         Minutes [address] [facility],[address], PT

## 2022-11-16 VITALS
DIASTOLIC BLOOD PRESSURE: 66 MMHG | HEART RATE: 112 BPM | RESPIRATION RATE: 18 BRPM | WEIGHT: 178 LBS | TEMPERATURE: 99.3 F | SYSTOLIC BLOOD PRESSURE: 102 MMHG | HEIGHT: 67 IN | BODY MASS INDEX: 27.94 KG/M2 | OXYGEN SATURATION: 97 %

## 2022-11-16 LAB — SURGICAL PATHOLOGY REPORT: NORMAL

## 2022-11-16 PROCEDURE — 97116 GAIT TRAINING THERAPY: CPT

## 2022-11-16 PROCEDURE — 97110 THERAPEUTIC EXERCISES: CPT

## 2022-11-16 PROCEDURE — 99232 SBSQ HOSP IP/OBS MODERATE 35: CPT | Performed by: INTERNAL MEDICINE

## 2022-11-16 PROCEDURE — 2580000003 HC RX 258: Performed by: ORTHOPAEDIC SURGERY

## 2022-11-16 PROCEDURE — 99024 POSTOP FOLLOW-UP VISIT: CPT | Performed by: ORTHOPAEDIC SURGERY

## 2022-11-16 PROCEDURE — 6370000000 HC RX 637 (ALT 250 FOR IP): Performed by: ORTHOPAEDIC SURGERY

## 2022-11-16 RX ORDER — OXYCODONE HYDROCHLORIDE AND ACETAMINOPHEN 5; 325 MG/1; MG/1
1 TABLET ORAL EVERY 6 HOURS PRN
Qty: 28 TABLET | Refills: 0 | Status: SHIPPED | OUTPATIENT
Start: 2022-11-16 | End: 2022-11-23

## 2022-11-16 RX ADMIN — VERAPAMIL HYDROCHLORIDE 40 MG: 80 TABLET ORAL at 13:40

## 2022-11-16 RX ADMIN — OXYCODONE HYDROCHLORIDE 5 MG: 5 TABLET ORAL at 17:00

## 2022-11-16 RX ADMIN — VERAPAMIL HYDROCHLORIDE 40 MG: 80 TABLET ORAL at 06:25

## 2022-11-16 RX ADMIN — GABAPENTIN 600 MG: 600 TABLET, FILM COATED ORAL at 13:40

## 2022-11-16 RX ADMIN — OXYCODONE HYDROCHLORIDE 5 MG: 5 TABLET ORAL at 11:02

## 2022-11-16 RX ADMIN — ACETAMINOPHEN 650 MG: 325 TABLET ORAL at 17:01

## 2022-11-16 RX ADMIN — SODIUM CHLORIDE, PRESERVATIVE FREE 10 ML: 5 INJECTION INTRAVENOUS at 08:55

## 2022-11-16 RX ADMIN — ACETAMINOPHEN 650 MG: 325 TABLET ORAL at 11:05

## 2022-11-16 RX ADMIN — GABAPENTIN 600 MG: 600 TABLET, FILM COATED ORAL at 08:55

## 2022-11-16 RX ADMIN — OXYCODONE HYDROCHLORIDE 5 MG: 5 TABLET ORAL at 05:53

## 2022-11-16 RX ADMIN — ACETAMINOPHEN 650 MG: 325 TABLET ORAL at 05:51

## 2022-11-16 ASSESSMENT — PAIN DESCRIPTION - ORIENTATION
ORIENTATION: LOWER;MID
ORIENTATION: LOWER;MID

## 2022-11-16 ASSESSMENT — PAIN DESCRIPTION - LOCATION
LOCATION: BACK

## 2022-11-16 ASSESSMENT — PAIN DESCRIPTION - DESCRIPTORS
DESCRIPTORS: ACHING;THROBBING

## 2022-11-16 ASSESSMENT — PAIN - FUNCTIONAL ASSESSMENT
PAIN_FUNCTIONAL_ASSESSMENT: ACTIVITIES ARE NOT PREVENTED
PAIN_FUNCTIONAL_ASSESSMENT: ACTIVITIES ARE NOT PREVENTED

## 2022-11-16 ASSESSMENT — PAIN SCALES - GENERAL
PAINLEVEL_OUTOF10: 0
PAINLEVEL_OUTOF10: 4
PAINLEVEL_OUTOF10: 5
PAINLEVEL_OUTOF10: 0
PAINLEVEL_OUTOF10: 4
PAINLEVEL_OUTOF10: 5
PAINLEVEL_OUTOF10: 0
PAINLEVEL_OUTOF10: 3

## 2022-11-16 NOTE — PLAN OF CARE
Problem: Discharge Planning  Goal: Discharge to home or other facility with appropriate resources  11/16/2022 1037 by Ofelia Villanueva RN  Outcome: Progressing     Problem: Pain  Goal: Verbalizes/displays adequate comfort level or baseline comfort level  11/16/2022 1037 by Ofelia Villanueva RN  Outcome: Progressing     Problem: Safety - Adult  Goal: Free from fall injury  11/16/2022 1037 by Ofelia Villanueva RN  Outcome: Progressing

## 2022-11-16 NOTE — DISCHARGE SUMMARY
Discharge Summary    Attending Physician: Samuel Partida MD  Admit Date: 11/14/2022  Discharge Date:    Primary Care Physician: Fabio Gonzalez DO    Admitting Diagnosis:  Principal Problem:    Acquired spondylolisthesis  Resolved Problems:    * No resolved hospital problems. *        Discharge Diagnoses:  Principal Problem:    Acquired spondylolisthesis  Resolved Problems:    * No resolved hospital problems. *         Past Medical History:   Diagnosis Date    Abnormal EKG     Arthritis     Chest pain     Chronic bilateral low back pain with bilateral sciatica 02/11/2020    DDD (degenerative disc disease), lumbar 02/11/2020    Dyslipidemia     Elevated BP without diagnosis of hypertension     Hay fever     Hypertension     Lumbosacral spondylosis without myelopathy 04/18/2022    MVP (mitral valve prolapse)     Pre-diabetes     Snoring        Procedures Performed and Findings  Procedure(s) with comments:  REVISION L5-S1  POSTERIOR  INSTRUMENTED FUSION - INFUSE  K2M  EVOKES  HARDWARE SENT      Consultations Obtained  IP CONSULT TO [address]  Admitted for failure of instrumentation  Instrumentation revised  Uncomplicated hospital course    Discharge Medications       Medication List        STOP taking these medications      diclofenac 75 MG EC tablet  Commonly known as: VOLTAREN            ASK your doctor about these medications      acetaminophen 650 MG extended release tablet  Commonly known as: TYLENOL     gabapentin 600 MG tablet  Commonly known as: Neurontin  Take 1 tablet by mouth 3 times daily for 30 days.      losartan 100 MG tablet  Commonly known as: COZAAR  take 1 tablet by mouth once daily     magnesium 30 MG tablet     metaxalone 800 MG tablet  Commonly known as: SKELAXIN  take 1 tablet by mouth three times a day  Ask about: Which instructions should I use?     verapamil 40 MG tablet  Commonly known as: CALAN  take 1 tablet by mouth three times a day               Where to Get Your Medications        These medications were sent to [address] [place] [address], [address] [place] [address], [address] [place] [id number]      Phone: [phone]   metaxalone 800 MG tablet          Discharge Condition  Stable       Activity on Discharge  As tolerated       Discharge Disposition:  Home    Discharge Instructions  See Orders    Follow-Up Scheduled    Roxanna Ellsworth MD  [facility], [address] [facility] [address]  [phone]    Go on 11/29/2022  [facility] Follow Up @ 11:10am      Electronically signed by Roxanna Ellsworth MD on 11/16/2022 at 5:07 PM

## 2022-11-16 NOTE — TELEPHONE ENCOUNTER
Patient requesting a refill on medication. Please contact with any questions.    Last Ov:09.02.2020  Next Ov: 12.02.2020 negative

## 2022-11-16 NOTE — PROGRESS NOTES
Physical Therapy  Facility/Department: [facility] MED SURG  Daily Treatment Note  NAME: Mone Blanco  : 1965  MRN: [id number]    Date of Service: 2022    Discharge Recommendations:  24 hour supervision or assist   PT Equipment Recommendations  Other: possibly RTS and shower chair. Pt reports she uses \"stool\" to sit on in shower    Patient Diagnosis(es): Diagnoses of Lumbar disc herniation and Painful orthopaedic hardware [facility]) were pertinent to this visit. Assessment   Activity Tolerance: Patient limited by pain  Other: possibly RTS and shower chair. Pt reports she uses \"stool\" to sit on in [address] [place]: 1 time a day 7 days a week  Current Treatment Recommendations: Strengthening;ROM;Balance training;Functional mobility training;Transfer training;Stair training;Gait training; Endurance training; Safety education & training;Positioning; Therapeutic activities; Equipment evaluation, education, & procurement;Home exercise program;Pain management;Patient/Caregiver education & training     Restrictions  Restrictions/Precautions  Restrictions/Precautions: Surgical Protocols, Other (comment), Fall Risk, General Precautions (activity: ambulate)  Required Braces or Orthoses?: No  Implants present? : Metal implants (instrumented fusion L5 to pelvis)  Position Activity Restriction  Spinal Precautions: No Bending, No Lifting, No Twisting (lifting less than 5-10#)  Spinal Precautions: log roll  Other position/activity restrictions: check vitals prior to session: BP before session while in bed 130/76 and HR 1-5 bpm, Sitting 133/97 and  bpm. Denies symptoms throughout session     Subjective    Subjective  Subjective: Pt reports hoping to go home today.   Pain: 4/10  Orientation  Overall Orientation Status: Within Functional Limits     Objective   Vitals     Bed Mobility Training  Bed Mobility Training: Yes  Rolling: Supervision  Supine to Sit: Supervision  Sit to Supine:  (Not tested, pt left up in recliner)  Scooting: Supervision (to EOB)  Balance  Sitting: Impaired  Sitting - Static: Fair (occasional)  Sitting - Dynamic: Fair (occasional)  Standing: Impaired  Standing - Static: Good  Standing - Dynamic: Fair  Transfer Training  Transfer Training: Yes  Sit to Stand: Stand-by assistance  Stand to Sit: Stand-by assistance  Stand Pivot Transfers: Stand-by assistance  Bed to Chair: Stand-by assistance  Gait Training  Gait Training: Yes  Gait  Overall Level of Assistance: Contact-guard assistance;Stand-by assistance (SBA on straight path, CGA on turns very unsteady)  Interventions: Safety awareness training;Verbal cues  Base of Support: Narrowed  Speed/Elva: Slow;Shuffled  Step Length: Right shortened;Left shortened  Swing Pattern: Right symmetrical;Left symmetrical  Stance: Right increased; Left decreased  Gait Abnormalities: Shuffling gait; Step to gait;Trunk sway increased;Decreased step clearance  Distance (ft): 50 Feet (50ft seated rest break on rollator)  Assistive Device: Gait belt;Walker, rollator     PT Exercises  Exercise Treatment: Educational Handout for Low back pain precautions and safety, discussed car transfers and safe use of Rollator  A/AROM Exercises: Seated Ankle Pumps x20, LAQx5, Glute Setx10           [facility] Mobility Inpatient   How much difficulty turning over in bed?: A Little  How much difficulty sitting down on / standing up from a chair with arms?: A Little  How much difficulty moving from lying on back to sitting on side of bed?: A Little  How much help from another person moving to and from a bed to a chair?: A Little  How much help from another person needed to walk in hospital room?: A Little  How much help from another person for climbing 3-5 steps with a railing?: A Lot  AM-PAC Inpatient Mobility Raw Score : 17  AM-PAC Inpatient T-Scale Score : 42.13  Mobility Inpatient CMS 0-100% Score: 50.57  Mobility Inpatient CMS G-Code Modifier : CK      Goals  Short Term Goals  Time Frame for Short Term Goals: 5 days  Short Term Goal 1: Pt will demo WFL Bilat LE strength to maxmize mobility  Short Term Goal 2: Pt will demo good standing balance with rollator to increase safety  Short Term Goal 3: Pt will perform bed mobility IND  Short Term Goal 4: Pt will transfer with rollator MOD IND  Short Term Goal 5: Pt will ambulate with rollator MOD  ft  Additional Goals?: Yes  Short Term Goal 6: Pt will navigate 4 steps CGA per home set up  Patient Goals   Patient Goals : decrease pain, increase activity    Education  Patient Education  Education Given To: Patient  Education Provided: Role of Therapy; Fall Prevention Strategies; Plan of Care;Precautions  Education Method: Demonstration;Verbal  Barriers to Learning: None  Education Outcome: Verbalized understanding;Continued education needed;Demonstrated understanding    Therapy Time   Individual Concurrent Group Co-treatment   Time In 0957         Time Out 1037         Minutes [address] [place], [place]

## 2022-11-16 NOTE — DISCHARGE INSTR - COC
Continuity of Care Form    Patient Name: Tawny Chavez   :  1965  MRN:  [id number]    Admit date:  2022  Discharge date:  ***    Code Status Order: Full Code   Advance Directives:   [facility] Documentation       Date/Time Healthcare Directive Type of Healthcare Directive Copy in [address] Agent's Name Healthcare Agent's Phone Number    [phone] No, patient does not have an advance directive for healthcare treatment  declined info -- -- -- -- --            Admitting Physician:  Chary Talbert MD  PCP: Rachelle Navarro DO    Discharging Nurse: [facility] Unit/Room#: 2063/3-01  Discharging Unit Phone Number: ***    Emergency Contact:   Extended Emergency Contact Information  Primary Emergency Contact: Naseem Hardy  Address: [address] [place] [address] Marisa Daviesboa   Home Phone: [phone]  Work Phone: [phone]  Mobile Phone: [phone]  Relation: Spouse    Past Surgical History:  Past Surgical History:   Procedure Laterality Date    BREAST SURGERY Bilateral     biopsy    CHOLECYSTECTOMY      COLONOSCOPY      ENDOMETRIAL ABLATION      EYE SURGERY Bilateral     chalazion removed    LUMBAR FUSION N/A 2022    LUMBAR L5-S1 DECOMPRESSION FUSION POSTERIOR performed by Chary Talbert MD at [facility] 2022    REVISION L5-S1  POSTERIOR  INSTRUMENTED FUSION performed by Chary Talbert MD at [address] Right 2020    RIGHT L4 AND L5 EPIDURAL STEROID INJECTION performed by Adryan Baxter MD at [address] Right 10/26/2020    EPIDURAL STEROID INJECTION -RIGHT L4 L5 performed by Adryan Baxter MD at [facility],[address]         Immunization History:   Immunization History   Administered Date(s) Administered    COVID-19, PFIZER PURPLE top, DILUTE for use, (age 15 y+), 30mcg/0.3mL 2021, 2021, 2021    Influenza A (L7E2-06) Vaccine PF IM 12/30/2009    Influenza Virus Vaccine 12/12/2016, 10/02/2017, 10/30/2018, 09/26/2019    Influenza, AFLURIA (age 1 yrs+), FLUZONE, (age 10 mo+), MDV, 0.5mL 12/12/2016    Influenza, Quadv, 6 mo and older, IM (Fluzone, Flulaval) 10/02/2017    Tdap (Boostrix, Adacel) 05/17/2016    Zoster Recombinant (Shingrix) 05/09/2021       Active Problems:  Patient Active Problem List   Diagnosis Code    Prediabetes R73.03    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    Dyslipidemia E78.5    Chronic bilateral low back pain with bilateral sciatica M54.42, M54.41, G89.29    DDD (degenerative disc disease), lumbar M51.36    Medication management Z79.899    Lumbosacral spondylosis without myelopathy M47.817    Mitral valve prolapse I34.1    Lumbar disc herniation M51.26    Primary hypertension I10    Acquired spondylolisthesis M43.10    Back pain M54.9    Lumbar radiculopathy M54.16    S/P lumbar fusion Z98.1       Isolation/Infection:   Isolation            No Isolation          Patient Infection Status       Infection Onset Added Last Indicated Last Indicated By Review Planned Expiration Resolved Resolved By    None active    Resolved    COVID-19 (Rule Out) 10/24/20 10/24/20 10/24/20 COVID-19 (Ordered)   10/24/20 Rule-Out Test Resulted    COVID-19 (Rule Out) 07/12/20 07/12/20 07/12/20 COVID-19 (Ordered)   07/14/20 Rule-Out Test Resulted    COVID-19 (Rule Out) 06/28/20 06/28/20 06/28/20 COVID-19 (Ordered)   06/29/20 Rule-Out Test Resulted            Nurse Assessment:  Last Vital Signs: /66   Pulse (!) 112   Temp 99.3 °F (37.4 °C) (Oral)   Resp 18   Ht 5' 7\" (1.702 m)   Wt 178 lb (80.7 kg)   SpO2 97%   BMI 27.88 kg/m²     Last documented pain score (0-10 scale): Pain Level: 0  Last Weight:   Wt Readings from Last 1 Encounters:   11/14/22 178 lb (80.7 kg)     Mental Status:  {IP PT MENTAL STATUS:20030}    IV Access:  {[facility] [place] [facility]:347114407}    Nursing Mobility/ADLs:  Walking   {RICKEY COLBERT UEPN:717210787}  Transfer  [facility]}  Bathing  {CHP DME KLCK:250078920}  Dressing  {CHP DME WLHC:640464323}  Toileting  {CHP DME JJFT:053423134}  Feeding  {CHP DME ZBGR:028047935}  Med Admin  {CHP DME XCYT:051985395}  Med Delivery   { [facility] Delivery:784543448}    Wound Care Documentation and Therapy:  Incision 22 Back Lower;Medial (Active)   Dressing Status Old drainage noted 22 1007   Dressing Change Due 11/16/22 11/15/22 1836   Incision Cleansed Cleansed with saline 11/15/22 1836   Dressing/Treatment Other (comment) 22 1007   Closure Other (Comment) 22 1007   Margins Approximated 11/15/22 1836   Incision Assessment Dry 11/15/22 1836   Drainage Amount Scant 11/15/22 1836   Drainage Description Serosanguinous 22 1722   Odor None 11/15/22 183   Kaylie-incision Assessment Intact 11/15/22 1836   Number of days: 2        Elimination:  Continence: Bowel: {YES / ZN:15495}  Bladder: {YES / TP:57789}  Urinary Catheter: {Urinary Catheter:940918291}   Colostomy/Ileostomy/Ileal Conduit: {YES / RJ:37464}       Date of Last BM: ***  No intake or output data in the 24 hours ending 22 1737  No intake/output data recorded.     Safety Concerns:     [employer] [facility] Safety Concerns:403071365}    Impairments/Disabilities:      [employer] Impairments/Disabilities:906174876}    Nutrition Therapy:  Current Nutrition Therapy:   [employer] Diet List:321965991}    Routes of Feeding: {CHP DME Other Feedings:685068187}  Liquids: {Slp liquid thickness:99822}  Daily Fluid Restriction: {CHP DME Yes amt example:468876604}  Last Modified Barium Swallow with Video (Video Swallowing Test): {Done Not Done IVIY:108748244}    Treatments at the Time of Hospital Discharge:   Respiratory Treatments: ***  Oxygen Therapy:  {Therapy; copd oxygen:45198}  Ventilator:    { CC Vent JVIN:185788773}    Rehab Therapies: {THERAPEUTIC INTERVENTION:2048722483}  Weight Bearing Status/Restrictions: [address] [employer] ONE[facility] Weight Bearin}  Other Medical Equipment (for information only, NOT a DME order):  {EQUIPMENT:601310785}  Other Treatments: ***    Patient's personal belongings (please select all that are sent with patient):  {CHP DME Belongings:180181769}    RN SIGNATURE:  {Esignature:857982260}    CASE MANAGEMENT/SOCIAL WORK SECTION    Inpatient Status Date: ***    Readmission Risk Assessment Score:  Readmission Risk              Risk of Unplanned Readmission:  8           Discharging to Facility/ Agency   Name:   Address:  Phone:  Fax:    Dialysis Facility (if applicable)   Name:  Address:  Dialysis Schedule:  Phone:  Fax:    / signature: {Esignature:292361518}    PHYSICIAN SECTION    Prognosis: {Prognosis:7433379227}    Condition at Discharge: [address] [place] [address] Patient Condition:617525151}    Rehab Potential (if transferring to Rehab): {Prognosis:9175285601}    Recommended Labs or Other Treatments After Discharge: ***    Physician Certification: I certify the above information and transfer of Feliciano Calderon  is necessary for the continuing treatment of the diagnosis listed and that she requires {Admit to Appropriate Level of Care:86421} for {GREATER/LESS:626845910} 30 days.      Update Admission H&P: {CHP DME Changes in UPMKV:391052650}    PHYSICIAN SIGNATURE:  {Esignature:666705451}

## 2022-11-16 NOTE — CARE COORDINATION
ONGOING DISCHARGE PLAN:    Patient is alert and oriented x4. Spoke with patient regarding discharge plan and patient confirms that plan is still to go home with spouse. PT recs: vns for home, pt is agreeable . [facility] [address] [address] [facility] to see who can take this insurance. POD#2 revision of L5 S1 fusion     Will continue to follow for additional discharge needs.     Electronically signed by Bishop Kessler RN on 11/16/2022 at 11:40 AM

## 2022-11-16 NOTE — DISCHARGE INSTR - DIET

## 2022-11-16 NOTE — PLAN OF CARE
Problem: Discharge Planning  Goal: Discharge to home or other facility with appropriate resources  11/16/2022 0337 by Anna Chan RN  Outcome: Progressing  Flowsheets (Taken 11/16/2022 7672)  Discharge to home or other facility with appropriate resources:   Identify barriers to discharge with patient and caregiver   Arrange for needed discharge resources and transportation as appropriate   Identify discharge learning needs (meds, wound care, etc)   Arrange for interpreters to assist at discharge as needed   Refer to discharge planning if patient needs post-hospital services based on physician order or complex needs related to functional status, cognitive ability or social support system  11/15/2022 1657 by Socorro Em RN  Outcome: Progressing  Flowsheets (Taken 11/15/2022 8297)  Discharge to home or other facility with appropriate resources:   Identify barriers to discharge with patient and caregiver   Arrange for needed discharge resources and transportation as appropriate   Identify discharge learning needs (meds, wound care, etc)   Refer to discharge planning if patient needs post-hospital services based on physician order or complex needs related to functional status, cognitive ability or social support system     Problem: Pain  Goal: Verbalizes/displays adequate comfort level or baseline comfort level  11/16/2022 0337 by Anna Chan RN  Outcome: Progressing  Flowsheets (Taken 11/16/2022 6729)  Verbalizes/displays adequate comfort level or baseline comfort level:   Encourage patient to monitor pain and request assistance   Assess pain using appropriate pain scale   Administer analgesics based on type and severity of pain and evaluate response   Implement non-pharmacological measures as appropriate and evaluate response   Consider cultural and social influences on pain and pain management   Notify Licensed Independent Practitioner if interventions unsuccessful or patient reports new pain  11/15/2022 1657 by Maurice Malcolm RN  Outcome: Progressing     Problem: Safety - Adult  Goal: Free from fall injury  11/16/2022 0337 by Sanjuana Sterling RN  Outcome: Progressing  Flowsheets (Taken 11/16/2022 6332)  Free From Fall Injury:   Instruct family/caregiver on patient safety   Based on caregiver fall risk screen, instruct family/caregiver to ask for assistance with transferring infant if caregiver noted to have fall risk factors  11/15/2022 1657 by Maurice Malcolm RN  Outcome: Progressing     Problem: ABCDS Injury Assessment  Goal: Absence of physical injury  11/16/2022 0337 by Sanjuana Sterling RN  Outcome: Progressing  Flowsheets (Taken 11/16/2022 0337)  Absence of Physical Injury: Implement safety measures based on patient assessment  11/15/2022 1657 by Maurice Malcolm RN  Outcome: Progressing

## 2022-11-16 NOTE — PROGRESS NOTES
Surgical Progress Note    POD: 2    Patient doing fairly well  Vitals:    22 0615   BP: 127/85   Pulse: (!) 120   Resp: 18   Temp: 99 °F (37.2 °C)   SpO2:       Temp (24hrs), Av.4 °F (37.4 °C), Min:98.9 °F (37.2 °C), Max:100.4 °F (38 °C)       Pain Control fair  No unusual nausea    Exam: ambulatory        Lungs:  No respiratory distress    Labs reviewed:  Labs:     BUN/Cr/glu/ALT/AST/amyl/lip:  12/0.73/--/--/--/--/-- (11/15 1648)     Na/K/Cl/CO2:  138/3.6/104/26 (11/15 1648)    No intake/output data recorded.     Assessment:    Patient Active Problem List   Diagnosis    Prediabetes    Eyelid cyst    Dyslipidemia    Chronic bilateral low back pain with bilateral sciatica    DDD (degenerative disc disease), lumbar    Medication management    Lumbosacral spondylosis without myelopathy    Mitral valve prolapse    Lumbar disc herniation    Primary hypertension    Acquired spondylolisthesis    Back pain    Lumbar radiculopathy    S/P lumbar fusion       Plan:  See my orders  Consider discharge later today    Miguel Angel Parikh MD MD  2022 7:20 AM

## 2022-11-16 NOTE — PROGRESS NOTES
[facility] Internal Medicine    CONSULTATION / HISTORY AND PHYSICAL EXAMINATION            Date:   11/16/2022  Patient name:  Edmundo Juarez  Date of admission:  11/14/2022  5:33 AM  MRN:   [id number]  Account:  [de-identified]  YOB: 1965  PCP:    Radha Hirsch DO  Room:   2063/2063-01  Code Status:    Full Code    Physician Requesting Consult: Chip Carmichael MD    Reason for Consult:  medical management    Chief Complaint:     No chief complaint on file.       History Obtained From:     Patient medical record nursing staff    History of Present Illness:   Patient past medical history of hypertension, mitral valve prolapse, chronic osteoarthritis affecting lower back, patient has lower back pain, radiating to left leg, going on for long period of time  Patient underwent REVISION L5-S1  POSTERIOR  INSTRUMENTED FUSION POSSIBLE PELVIC FIXATION on 11/14  Patient had reading of low blood pressure in the morning, losartan was on hold  Patient complaining of pain 6 out of 10  Passed stools today morning  11/16   Patient still tachycardic  Back pain is controlled    Past Medical History:     Past Medical History:   Diagnosis Date    Abnormal EKG     Arthritis     Chest pain     Chronic bilateral low back pain with bilateral sciatica 02/11/2020    DDD (degenerative disc disease), lumbar 02/11/2020    Dyslipidemia     Elevated BP without diagnosis of hypertension     Hay fever     Hypertension     Lumbosacral spondylosis without myelopathy 04/18/2022    MVP (mitral valve prolapse)     Pre-diabetes     Snoring         Past Surgical History:     Past Surgical History:   Procedure Laterality Date    BREAST SURGERY Bilateral     biopsy    CHOLECYSTECTOMY      COLONOSCOPY      ENDOMETRIAL ABLATION      EYE SURGERY Bilateral     chalazion removed    LUMBAR FUSION N/A 5/16/2022    LUMBAR L5-S1 DECOMPRESSION FUSION POSTERIOR performed by Chip [facility]ichael MD at [facility]    LUMBAR FUSION N/A 11/14/2022    REVISION L5-S1  POSTERIOR  INSTRUMENTED FUSION performed by Lisa Gomes MD at [address] Right 7/16/2020    RIGHT L4 AND L5 EPIDURAL STEROID INJECTION performed by Dennis Rothman MD at [address] Right 10/26/2020    EPIDURAL STEROID INJECTION -RIGHT L4 L5 performed by Dennis Rothman MD at [address] [place] [address]          Medications Prior to Admission:     Prior to Admission medications    Medication Sig Start Date End Date Taking? Authorizing Provider   [facility]) 800 MG tablet take 1 tablet by mouth three times a day 11/15/22   Lisa Gomes MD   losartan (COZAAR) 100 MG tablet take 1 tablet by mouth once daily 11/10/22   GONZÁLEZ Jacobs CNP   gabapentin (NEURONTIN) 600 MG tablet Take 1 tablet by mouth 3 times daily for 30 days. 10/20/22 11/19/22  GONZÁLEZ Canchola - CNP   verapamil (CALAN) 40 MG tablet take 1 tablet by mouth three times a day 8/29/22   Orange Lobe, DO   magnesium 30 MG tablet Take 1 tablet by mouth in the morning, at noon, and at bedtime Supplement magnesium over the counter    Historical Provider, MD   acetaminophen (TYLENOL) 650 MG extended release tablet Take 650 mg by mouth every 8 hours as needed for Pain    Historical Provider, MD        Allergies:     Patient has no known allergies. Social History:     Tobacco:    reports that she has never smoked. She has never used smokeless tobacco.  Alcohol:      reports current alcohol use of about 3.0 standard drinks per week. Drug Use:  reports no history of drug use.     Family History:     Family History   Problem Relation Age of Onset    High Blood Pressure Mother     High Cholesterol Mother     Other Mother         blood clots, denies any known clotting d/o    Other Father         alcoholism    Arthritis Sister     No Known Problems Other        Review of Systems:     Positive and Negative as described in HPI. CONSTITUTIONAL:  negative for fevers, chills, sweats, fatigue, weight loss  HEENT:  negative for vision, hearing changes, runny nose, throat pain  RESPIRATORY:  negative for shortness of breath, cough, congestion, wheezing. CARDIOVASCULAR:  negative for chest pain, palpitations. GASTROINTESTINAL:  negative for nausea, vomiting, diarrhea, constipation, change in bowel habits, abdominal pain   GENITOURINARY:  negative for difficulty of urination, burning with urination, frequency   INTEGUMENT:  negative for rash, skin lesions, easy bruising   HEMATOLOGIC/LYMPHATIC:  negative for swelling/edema   ALLERGIC/IMMUNOLOGIC:  negative for urticaria , itching  ENDOCRINE:  negative increase in drinking, increase in urination, hot or cold intolerance  MUSCULOSKELETAL: Lower back pain, radiating to left leg  NEUROLOGICAL:  negative for headaches, dizziness, lightheadedness, numbness, pain, tingling extremities  BEHAVIOR/PSYCH:      Physical Exam:     /66   Pulse (!) 112   Temp 99.3 °F (37.4 °C) (Oral)   Resp 18   Ht 5' 7\" (1.702 m)   Wt 178 lb (80.7 kg)   SpO2 97%   BMI 27.88 kg/m²   Temp (24hrs), Av.3 °F (37.4 °C), Min:98.9 °F (37.2 °C), Max:100.4 °F (38 °C)    No results for input(s): POCGLU in the last 72 hours. No intake or output data in the 24 hours ending 22 1352      General Appearance:  alert, well appearing, and in no acute distress  Mental status: oriented to person, place, and time with normal affect  Head:  normocephalic, atraumatic.   Eye: no icterus, redness, pupils equal and reactive, extraocular eye movements intact, conjunctiva clear  Ear: normal external ear, no discharge, hearing intact  Nose:  no drainage noted  Mouth: mucous membranes moist  Neck: supple, no carotid bruits, thyroid not palpable  Lungs: Bilateral equal air entry, clear to ausculation, no wheezing, rales or rhonchi, normal effort  Cardiovascular: Tachycardia present, regular rhythm, no murmur, gallop, rub.  Abdomen: Soft, nontender, nondistended, normal bowel sounds, no hepatomegaly or splenomegaly  Neurologic: There are no new focal motor or sensory deficits, normal muscle tone and bulk, no abnormal sensation, normal speech, cranial nerves II through XII grossly intact  Skin: No gross lesions, rashes, bruising or bleeding on exposed skin area  Extremities: Postsurgical changes present in lower back  Psych:      Investigations:      Laboratory Testing:  Recent Results (from the past 24 hour(s))   Basic Metabolic Panel    Collection Time: 11/15/22  4:48 PM   Result Value Ref Range    Glucose 113 (H) 70 - 99 mg/dL    BUN 12 6 - 20 mg/dL    Creatinine 0.73 0.50 - 0.90 mg/dL    Est, Glom Filt Rate >60 >60 mL/min/1.73m2    Calcium 8.3 (L) 8.6 - 10.4 mg/dL    Sodium 138 135 - 144 mmol/L    Potassium 3.6 (L) 3.7 - 5.3 mmol/L    Chloride 104 98 - 107 mmol/L    CO2 26 20 - 31 mmol/L    Anion Gap 8 (L) 9 - 17 mmol/L   Magnesium    Collection Time: 11/15/22  4:48 PM   Result Value Ref Range    Magnesium 2.1 1.6 - 2.6 mg/dL   TSH w/reflex to FT4    Collection Time: 11/15/22  4:48 PM   Result Value Ref Range    TSH 2.05 0.30 - 5.00 uIU/mL           Consultations:   IP CONSULT TO INTERNAL MEDICINE  Assessment :      Primary Problem  Acquired spondylolisthesis    Active Hospital Problems    Diagnosis Date Noted    Acquired spondylolisthesis [M43.10] 05/16/2022     Priority: Medium       Plan:     Patient has chronic back pain radiating to left leg, s/p surgery  Hypertension, restarted home dose of calcium channel blocker, holding losartan with readings of low blood pressure in the morning, was given 500 mill fluid bolus in the morning, blood pressure is improved, still tachycardic, will observe likely due to pain  Will do BMP, magnesium, TSH  DVT prophylaxis once okay with orthopedic surgeon    11/16   Patient blood pressure in the lower side  I was told patient will be getting discharge later today  Need to continue with 50 mg of Cozaar, she can break her pill into half  Need to follow-up with her primary care physician  Patient voiced understanding    Lauren Savage MD  11/16/2022  1:52 PM    Copy sent to Dr. Chasity Long, DO    Please note that this chart was generated using voice recognition Dragon dictation software. Although every effort was made to ensure the accuracy of this automated transcription, some errors in transcription may have occurred.

## 2022-11-16 NOTE — PLAN OF CARE
Problem: Discharge Planning  Goal: Discharge to home or other facility with appropriate resources  11/16/2022 1817 by Taryn Nye RN  Outcome: Completed     Problem: Pain  Goal: Verbalizes/displays adequate comfort level or baseline comfort level  11/16/2022 1817 by Taryn Nye RN  Outcome: Completed     Problem: Safety - Adult  Goal: Free from fall injury  11/16/2022 1817 by Taryn Nye RN  Outcome: Completed     Problem: ABCDS Injury Assessment  Goal: Absence of physical injury  11/16/2022 1817 by Taryn Nye RN  Outcome: Completed

## 2022-11-17 ENCOUNTER — TELEPHONE (OUTPATIENT)
Dept: ORTHOPEDIC SURGERY | Age: 57
End: 2022-11-17

## 2022-11-17 NOTE — TELEPHONE ENCOUNTER
Pt called stating when she saw Dr. Riki Cortez 11/16/22 prior to her discharge he stated he wanted her to come in for post op appt in one week. Please verify. Current appt scheduled is 11/29.

## 2022-11-18 ENCOUNTER — TELEPHONE (OUTPATIENT)
Dept: FAMILY MEDICINE CLINIC | Age: 57
End: 2022-11-18

## 2022-11-18 NOTE — TELEPHONE ENCOUNTER
----- Message from Brigid Harirs sent at 11/17/2022  4:23 PM EST -----  Subject: Appointment Request    Reason for Call: Established Patient Appointment needed: Routine Existing   Condition Follow Up    QUESTIONS    Reason for appointment request? Available appointments did not meet   patient need     Additional Information for Provider?  Pt BP is 106/66 and needs to be seen   ---------------------------------------------------------------------------  --------------  Herminio PRADO  [phone]; OK to leave message on voicemail  ---------------------------------------------------------------------------  --------------  SCRIPT ANSWERS  COVID Screen: Shelby Casanova

## 2022-11-18 NOTE — TELEPHONE ENCOUNTER
Dr. Kendrick Almazan,  This patient had L5-S1 revision on 11/14/22 and her follow up is scheduled for 11/29/22. Does this patient need to come in for a 1 week follow up instead? Please advise.

## 2022-11-21 ENCOUNTER — TELEPHONE (OUTPATIENT)
Dept: ORTHOPEDIC SURGERY | Age: 57
End: 2022-11-21

## 2022-11-21 DIAGNOSIS — M43.10 ACQUIRED SPONDYLOLISTHESIS: ICD-10-CM

## 2022-11-21 DIAGNOSIS — M47.817 LUMBOSACRAL SPONDYLOSIS WITHOUT MYELOPATHY: ICD-10-CM

## 2022-11-21 DIAGNOSIS — M51.26 LUMBAR DISC HERNIATION: Primary | ICD-10-CM

## 2022-11-21 DIAGNOSIS — M51.36 DDD (DEGENERATIVE DISC DISEASE), LUMBAR: ICD-10-CM

## 2022-11-21 NOTE — TELEPHONE ENCOUNTER
Debra from [address] [place] [address] Beckey Gaucher is requesting to have in home care and is asking for a referral to be faxed at earliest convenience. DOS 11/14/22    Please contact [facility] if any questions or concerns. Thank you.     [address] - [phone]  FAX - [phone]

## 2022-11-22 ENCOUNTER — OFFICE VISIT (OUTPATIENT)
Dept: ORTHOPEDIC SURGERY | Age: 57
End: 2022-11-22

## 2022-11-22 VITALS — RESPIRATION RATE: 12 BRPM | BODY MASS INDEX: 27.94 KG/M2 | HEIGHT: 67 IN | WEIGHT: 178 LBS

## 2022-11-22 DIAGNOSIS — M43.10 ACQUIRED SPONDYLOLISTHESIS: ICD-10-CM

## 2022-11-22 DIAGNOSIS — M51.26 LUMBAR DISC HERNIATION: Primary | ICD-10-CM

## 2022-11-22 DIAGNOSIS — M47.817 LUMBOSACRAL SPONDYLOSIS WITHOUT MYELOPATHY: ICD-10-CM

## 2022-11-22 DIAGNOSIS — M51.36 DDD (DEGENERATIVE DISC DISEASE), LUMBAR: ICD-10-CM

## 2022-11-22 PROCEDURE — 99024 POSTOP FOLLOW-UP VISIT: CPT | Performed by: ORTHOPAEDIC SURGERY

## 2022-11-22 NOTE — PROGRESS NOTES
Patient ID: Melissa Puentes is a 61 y.o. female    Chief Compliant:  Chief Complaint   Patient presents with    Post-Op Check        Diagnostic imaging:    AP lateral lumbar spine status post revision posterior instrumentation L5 to the sacrum construct appears to be holding with no relative kyphosis across 5 1    Assessment and Plan:  1. Lumbar disc herniation    2. DDD (degenerative disc disease), lumbar    3. Lumbosacral spondylosis without myelopathy    4. Acquired spondylolisthesis        1 week post revision L5-S1 posterior instrumented fusion, recovering well    Patient reports her pain is the best its been in a time since her index operation    Follow up 1 week    HPI:  This is a 61 y.o. female who presents to the clinic today for 1 week post revision L5-S1 posterior instrumented fusion, 11/14/22. Patient is ambulating with a roll aid walker. Patient has some improvement to low back and left radicular leg pain compared to preoperatively. She notes having tingling in her left thigh that is not painful. Patient states she still has some left calf pain. Review of Systems   All other systems reviewed and are negative. Past History:    Current Outpatient Medications:     oxyCODONE-acetaminophen (PERCOCET) 5-325 MG per tablet, Take 1 tablet by mouth every 6 hours as needed for Pain for up to 7 days. Intended supply: 7 days. Take lowest dose possible to manage pain, Disp: 28 tablet, Rfl: 0    metaxalone (SKELAXIN) 800 MG tablet, take 1 tablet by mouth three times a day, Disp: 90 tablet, Rfl: 0    losartan (COZAAR) 100 MG tablet, take 1 tablet by mouth once daily, Disp: 30 tablet, Rfl: 1    gabapentin (NEURONTIN) 600 MG tablet, Take 1 tablet by mouth 3 times daily for 30 days. , Disp: 90 tablet, Rfl: 2    verapamil (CALAN) 40 MG tablet, take 1 tablet by mouth three times a day, Disp: 90 tablet, Rfl: 2    magnesium 30 MG tablet, Take 1 tablet by mouth in the morning, at noon, and at bedtime Supplement magnesium over the counter, Disp: , Rfl:     acetaminophen (TYLENOL) 650 MG extended release tablet, Take 650 mg by mouth every 8 hours as needed for Pain, Disp: , Rfl:   No Known Allergies  Social History     Socioeconomic History    Marital status:      Spouse name: Not on file    Number of children: Not on file    Years of education: Not on file    Highest education level: Not on file   Occupational History    Not on file   Tobacco Use    Smoking status: Never    Smokeless tobacco: Never   Vaping Use    Vaping Use: Never used   Substance and Sexual Activity    Alcohol use:  Yes     Alcohol/week: 3.0 standard drinks     Types: 3 Glasses of wine per week     Comment: moderately 3 glasses of wine per week    Drug use: No    Sexual activity: Yes     Partners: Male   Other Topics Concern    Not on file   Social History Narrative    Not on file     Social Determinants of Health     Financial Resource Strain: Low Risk     Difficulty of Paying Living Expenses: Not hard at all   Food Insecurity: No Food Insecurity    Worried About Running Out of Food in the Last Year: Never true    Ran Out of Food in the Last Year: Never true   Transportation Needs: Not on file   Physical Activity: Not on file   Stress: Not on file   Social Connections: Not on file   Intimate Partner Violence: Not on file   Housing Stability: Not on file     Past Medical History:   Diagnosis Date    Abnormal EKG     Arthritis     Chest pain     Chronic bilateral low back pain with bilateral sciatica 02/11/2020    DDD (degenerative disc disease), lumbar 02/11/2020    Dyslipidemia     Elevated BP without diagnosis of hypertension     Hay fever     Hypertension     Lumbosacral spondylosis without myelopathy 04/18/2022    MVP (mitral valve prolapse)     Pre-diabetes     Snoring      Past Surgical History:   Procedure Laterality Date    BREAST SURGERY Bilateral     biopsy    CHOLECYSTECTOMY      COLONOSCOPY      ENDOMETRIAL ABLATION      EYE SURGERY Bilateral     chalazion removed    LUMBAR FUSION N/A 5/16/2022    LUMBAR L5-S1 DECOMPRESSION FUSION POSTERIOR performed by Leslie Dougherty MD at [address] 11/14/2022    REVISION L5-S1  POSTERIOR  INSTRUMENTED FUSION performed by Leslie Dougherty MD at [address] Right 7/16/2020    RIGHT L4 AND L5 EPIDURAL STEROID INJECTION performed by Karen Patel MD at [address] Right 10/26/2020    EPIDURAL STEROID INJECTION -RIGHT L4 L5 performed by Karen Patel MD at [address] [facility]       Family History   Problem Relation Age of Onset    High Blood Pressure Mother     High Cholesterol Mother     Other Mother         blood clots, denies any known clotting d/o    Other Father         alcoholism    Arthritis Sister     No Known Problems Other         Physical Exam:  Vitals signs and nursing note reviewed. Constitutional:       Appearance: well-developed. HENT:      Head: Normocephalic and atraumatic. Nose: Nose normal.   Eyes:      Conjunctiva/sclera: Conjunctivae normal.   Neck:      Musculoskeletal: Normal range of motion and neck supple. Pulmonary:      Effort: Pulmonary effort is normal. No respiratory distress. Musculoskeletal:      Comments: Normal gait     Skin:     General: Skin is warm and dry. Neurological:      Mental Status: Alert and oriented to person, place, and time. Sensory: No sensory deficit. Psychiatric:         Behavior: Behavior normal.         Thought Content: Thought content normal.        Provider Attestation:  Casey Owens, personally performed the services described in this documentation. All medical record entries made by the scribe were at my direction and in my presence. I have reviewed the chart and discharge instructions and agree that the records reflect my personal performance and is accurate and complete. Francenia Howell Delphia Sandhoff, MD 11/22/22       Scribe Attestation:  By signing

## 2022-11-29 ENCOUNTER — OFFICE VISIT (OUTPATIENT)
Dept: ORTHOPEDIC SURGERY | Age: 57
End: 2022-11-29

## 2022-11-29 VITALS — RESPIRATION RATE: 14 BRPM | HEIGHT: 67 IN | WEIGHT: 178 LBS | BODY MASS INDEX: 27.94 KG/M2

## 2022-11-29 DIAGNOSIS — M51.26 LUMBAR DISC HERNIATION: Primary | ICD-10-CM

## 2022-11-29 DIAGNOSIS — M51.36 DDD (DEGENERATIVE DISC DISEASE), LUMBAR: ICD-10-CM

## 2022-11-29 DIAGNOSIS — M47.817 LUMBOSACRAL SPONDYLOSIS WITHOUT MYELOPATHY: ICD-10-CM

## 2022-11-29 PROCEDURE — 99024 POSTOP FOLLOW-UP VISIT: CPT | Performed by: ORTHOPAEDIC SURGERY

## 2022-11-29 NOTE — PROGRESS NOTES
Patient ID: Cecilia Benitez is a 61 y.o. female    Chief Compliant:  Chief Complaint   Patient presents with    Follow-up     L5-S1 revision        Diagnostic imaging:    AP lateral lumbar spine status post revision posterior instrumentation for a 5 1 fusion due to hardware failure patient now instrumented to the pelvis stable no change in position    Assessment and Plan:  1. Lumbar disc herniation    2. DDD (degenerative disc disease), lumbar    3. Lumbosacral spondylosis without myelopathy        2 weeks post revision L5-S1 posterior instrumented fusion, healing well, pain is still improving    DEXA    Follow up 2 weeks    HPI:  This is a 61 y.o. female who presents to the clinic today for 2 weeks post revision L5-S1 posterior instrumented fusion, 11/14/22. Patient is ambulating with assistance via roll aid walker. Patient has some improvement to low back and left radicular leg pain compared to preoperatively. Patient states still having some pain to the lateral aspect of her left knee, pain is still improving. Review of Systems   All other systems reviewed and are negative. Past History:    Current Outpatient Medications:     metaxalone (SKELAXIN) 800 MG tablet, take 1 tablet by mouth three times a day, Disp: 90 tablet, Rfl: 0    losartan (COZAAR) 100 MG tablet, take 1 tablet by mouth once daily, Disp: 30 tablet, Rfl: 1    gabapentin (NEURONTIN) 600 MG tablet, Take 1 tablet by mouth 3 times daily for 30 days. , Disp: 90 tablet, Rfl: 2    verapamil (CALAN) 40 MG tablet, take 1 tablet by mouth three times a day, Disp: 90 tablet, Rfl: 2    magnesium 30 MG tablet, Take 1 tablet by mouth in the morning, at noon, and at bedtime Supplement magnesium over the counter, Disp: , Rfl:     acetaminophen (TYLENOL) 650 MG extended release tablet, Take 650 mg by mouth every 8 hours as needed for Pain, Disp: , Rfl:   No Known Allergies  Social History     Socioeconomic History    Marital status:  Spouse name: Not on file    Number of children: Not on file    Years of education: Not on file    Highest education level: Not on file   Occupational History    Not on file   Tobacco Use    Smoking status: Never    Smokeless tobacco: Never   Vaping Use    Vaping Use: Never used   Substance and Sexual Activity    Alcohol use:  Yes     Alcohol/week: 3.0 standard drinks     Types: 3 Glasses of wine per week     Comment: moderately 3 glasses of wine per week    Drug use: No    Sexual activity: Yes     Partners: Male   Other Topics Concern    Not on file   Social History Narrative    Not on file     Social Determinants of Health     Financial Resource Strain: Low Risk     Difficulty of Paying Living Expenses: Not hard at all   Food Insecurity: No Food Insecurity    Worried About Running Out of Food in the Last Year: Never true    Ran Out of Food in the Last Year: Never true   Transportation Needs: Not on file   Physical Activity: Not on file   Stress: Not on file   Social Connections: Not on file   Intimate Partner Violence: Not on file   Housing Stability: Not on file     Past Medical History:   Diagnosis Date    Abnormal EKG     Arthritis     Chest pain     Chronic bilateral low back pain with bilateral sciatica 02/11/2020    DDD (degenerative disc disease), lumbar 02/11/2020    Dyslipidemia     Elevated BP without diagnosis of hypertension     Hay fever     Hypertension     Lumbosacral spondylosis without myelopathy 04/18/2022    MVP (mitral valve prolapse)     Pre-diabetes     Snoring      Past Surgical History:   Procedure Laterality Date    BREAST SURGERY Bilateral     biopsy    CHOLECYSTECTOMY      COLONOSCOPY      ENDOMETRIAL ABLATION      EYE SURGERY Bilateral     chalazion removed    LUMBAR FUSION N/A 5/16/2022    LUMBAR L5-S1 DECOMPRESSION FUSION POSTERIOR [facility] 11/14/2022    REVISION L5-S1  POSTERIOR  INSTRUMENTED FUSION performed by Ximena Alvarado MD at [facility]    PAIN MANAGEMENT PROCEDURE Right 7/16/2020    RIGHT L4 AND L5 EPIDURAL STEROID INJECTION performed by Yusuf Bone MD at [address] Right 10/26/2020    EPIDURAL STEROID INJECTION -RIGHT L4 L5 performed by Yusuf Bone MD at [address] [facility]       Family History   Problem Relation Age of Onset    High Blood Pressure Mother     High Cholesterol Mother     Other Mother         blood clots, denies any known clotting d/o    Other Father         alcoholism    Arthritis Sister     No Known Problems Other         Physical Exam:  Vitals signs and nursing note reviewed. Constitutional:       Appearance: well-developed. HENT:      Head: Normocephalic and atraumatic. Nose: Nose normal.   Eyes:      Conjunctiva/sclera: Conjunctivae normal.   Neck:      Musculoskeletal: Normal range of motion and neck supple. Pulmonary:      Effort: Pulmonary effort is normal. No respiratory distress. Musculoskeletal:      Comments: Normal gait     Skin:     General: Skin is warm and dry. Neurological:      Mental Status: Alert and oriented to person, place, and time. Sensory: No sensory deficit. Psychiatric:         Behavior: Behavior normal.         Thought Content: Thought content normal.    Staples removed    Incision very well-healed still a little puffy in the area    Provider Attestation:  Kyle Owens, personally performed the services described in this documentation. All medical record entries made by the scribe were at my direction and in my presence. I have reviewed the chart and discharge instructions and agree that the records reflect my personal performance and is accurate and complete. Vu Saleh MD 11/29/22       Scribe Attestation:  By signing my name below, Horace Bejarano, attest that this documentation has been prepared under the direction and in the presence of Dr. Brandon Sun.  Electronically signed: Lizz Hopkins, 11/29/22 Please note that this chart was generated using voice recognition Dragon dictation software. Although every effort was made to ensure the accuracy of this automated transcription, some errors in transcription may have occurred.

## 2022-12-02 ENCOUNTER — TELEPHONE (OUTPATIENT)
Dept: ORTHOPEDIC SURGERY | Age: 57
End: 2022-12-02

## 2022-12-02 NOTE — TELEPHONE ENCOUNTER
Pt called in to notify us that she has noticed a small amount of drainage at the incision site when she wakes up in the morning. She had her staples removed earlier this week but stated that she believes that the drainage is coming from a small area in the incision and believes that it is still intact. She was advised to apply a pressure dressing as best as she could to see if that helps but to call our office on Monday if she is still noticing drainage and we can get her in to see Sandra Bacon PA-C next week since Dr. Angela Lovett is out of the office.

## 2022-12-06 RX ORDER — VERAPAMIL HYDROCHLORIDE 40 MG/1
TABLET ORAL
Qty: 90 TABLET | Refills: 2 | Status: SHIPPED | OUTPATIENT
Start: 2022-12-06

## 2022-12-08 ENCOUNTER — HOSPITAL ENCOUNTER (EMERGENCY)
Age: 57
Discharge: HOME | End: 2022-12-08
Attending: EMERGENCY MEDICINE
Payer: COMMERCIAL

## 2022-12-08 ENCOUNTER — TELEPHONE (OUTPATIENT)
Dept: ADMINISTRATIVE | Age: 57
End: 2022-12-08

## 2022-12-08 ENCOUNTER — HOSPITAL ENCOUNTER (OUTPATIENT)
Dept: MAMMOGRAPHY | Facility: CLINIC | Age: 57
Discharge: HOME | End: 2022-12-10
Payer: COMMERCIAL

## 2022-12-08 VITALS
BODY MASS INDEX: 28.25 KG/M2 | DIASTOLIC BLOOD PRESSURE: 87 MMHG | WEIGHT: 180 LBS | SYSTOLIC BLOOD PRESSURE: 169 MMHG | TEMPERATURE: 98.2 F | HEART RATE: 87 BPM | OXYGEN SATURATION: 100 % | RESPIRATION RATE: 18 BRPM | HEIGHT: 67 IN

## 2022-12-08 DIAGNOSIS — Z48.89 ENCOUNTER FOR POST SURGICAL WOUND CHECK: Primary | ICD-10-CM

## 2022-12-08 DIAGNOSIS — M51.36 DDD (DEGENERATIVE DISC DISEASE), LUMBAR: ICD-10-CM

## 2022-12-08 DIAGNOSIS — M47.817 LUMBOSACRAL SPONDYLOSIS WITHOUT MYELOPATHY: ICD-10-CM

## 2022-12-08 DIAGNOSIS — M51.26 LUMBAR DISC HERNIATION: ICD-10-CM

## 2022-12-08 PROCEDURE — 99282 EMERGENCY DEPT VISIT SF MDM: CPT

## 2022-12-08 PROCEDURE — 77080 DXA BONE DENSITY AXIAL: CPT

## 2022-12-08 ASSESSMENT — PAIN SCALES - GENERAL: PAINLEVEL_OUTOF10: 4

## 2022-12-08 ASSESSMENT — PAIN - FUNCTIONAL ASSESSMENT: PAIN_FUNCTIONAL_ASSESSMENT: 0-10

## 2022-12-08 ASSESSMENT — PAIN DESCRIPTION - LOCATION: LOCATION: BACK

## 2022-12-08 NOTE — DISCHARGE INSTRUCTIONS
Return to this emergency room immediately if your symptoms persist, worsen or if new ones form. And feel free to return at anytime for a wound check. Make sure you follow-up with your surgeon next Tuesday at your previously scheduled appointment. Primary care doctor within the next 1-2 business days.

## 2022-12-08 NOTE — TELEPHONE ENCOUNTER
Patient called to report that she is still having drainage from incision site and was ask to call back if this continued. Pt also states that she noticed a small opening now as well on the site, I called Veronica at practice and was ask to inform pt that if she is concerned to be evaluated in the Emergency room.  She is set for follow up on 12/13/2022

## 2022-12-10 NOTE — ED PROVIDER NOTES
EMERGENCY DEPARTMENT ENCOUNTER    Pt Name: Mone Blanco  MRN: [id number]  [place] 1965  Date of evaluation: 12/9/22  CHIEF COMPLAINT       Chief Complaint   Patient presents with    Post-op Problem     Pt had revision of L1 S1 fusion, pts  noticed when her surgical dressing was being changed that there was some drainage to wound,      HISTORY OF PRESENT ILLNESS   Patient is a 48-year-old female who presents to the ED for wound check. Patient is status post L1 S1 fusion on 11/14/2022. Since that time surgical site has not completely and over the past week or she has had to cover site with dressing to absorb leaking fluid. She has been in touch with her surgeons and she has follow-up next week in their office. She is here today for wound check. No fevers, no pain over surgical site, no other issues reported. REVIEW OF SYSTEMS     Review of Systems   All other systems reviewed and are negative.   PASTMEDICAL HISTORY     Past Medical History:   Diagnosis Date    Abnormal EKG     Arthritis     Chest pain     Chronic bilateral low back pain with bilateral sciatica 02/11/2020    DDD (degenerative disc disease), lumbar 02/11/2020    Dyslipidemia     Elevated BP without diagnosis of hypertension     Hay fever     Hypertension     Lumbosacral spondylosis without myelopathy 04/18/2022    MVP (mitral valve prolapse)     Pre-diabetes     Snoring      SURGICAL HISTORY       Past Surgical History:   Procedure Laterality Date    BREAST SURGERY Bilateral     biopsy    CHOLECYSTECTOMY      COLONOSCOPY      ENDOMETRIAL ABLATION      EYE SURGERY Bilateral     chalazion removed    LUMBAR FUSION N/A 5/16/2022    LUMBAR L5-S1 DECOMPRESSION FUSION POSTERIOR performed by Kimberly George MD at [address] [facility] 11/14/2022    REVISION L5-S1  POSTERIOR  INSTRUMENTED FUSION performed by Kimberly George MD at [address] [place] [address] 7/16/2020    RIGHT L4 AND L5 EPIDURAL STEROID INJECTION performed by Yaima Maki MD at [facility] Right 10/26/2020    EPIDURAL STEROID INJECTION -RIGHT L4 L5 performed by Yaima Maki MD at [facility] [facility],[address]       Discharge Medication List as of 2022  6:55 PM        CONTINUE these medications which have NOT CHANGED    Details   verapamil (CALAN) 40 MG tablet take 1 tablet by mouth three times a day, Disp-90 tablet, R-2Normal      metaxalone (SKELAXIN) 800 MG tablet take 1 tablet by mouth three times a day, Disp-90 tablet, R-0Normal      losartan (COZAAR) 100 MG tablet take 1 tablet by mouth once daily, Disp-30 tablet, R-1Normal      gabapentin (NEURONTIN) 600 MG tablet Take 1 tablet by mouth 3 times daily for 30 days. , Disp-90 tablet, R-2Normal      magnesium 30 MG tablet Take 1 tablet by mouth in the morning, at noon, and at bedtime Supplement magnesium over the counterHistorical Med      acetaminophen (TYLENOL) 650 MG extended release tablet Take 650 mg by mouth every 8 hours as needed for PainHistorical Med           ALLERGIES     has No Known Allergies. FAMILY HISTORY     She indicated that her mother is . She indicated that her father is . She indicated that her sister is alive. She indicated that her other is alive. SOCIAL HISTORY       Social History     Tobacco Use    Smoking status: Never    Smokeless tobacco: Never   Vaping Use    Vaping Use: Never used   Substance Use Topics    Alcohol use: Yes     Alcohol/week: 3.0 standard drinks     Types: 3 Glasses of wine per week     Comment: moderately 3 glasses of wine per week    Drug use: No     PHYSICAL EXAM     INITIAL VITALS: BP (!) 169/87   Pulse 87   Temp 98.2 °F (36.8 °C) (Oral)   Resp 18   Ht 5' 7\" (1.702 m)   Wt 180 lb (81.6 kg)   SpO2 100%   BMI 28.19 kg/m²    Physical Exam  Constitutional:       Appearance: Normal appearance. HENT:      Head: Normocephalic.       Right Ear: External ear normal.      Left Ear: External ear normal.      Nose: Nose normal.   Eyes:      Conjunctiva/sclera: Conjunctivae normal.   Cardiovascular:      Rate and Rhythm: Regular rhythm. Abdominal:      General: There is no distension. Skin:     General: Skin is dry. Comments: Healing surgical site over lumbar spine, small amount of drainage and bandage, no purulent discharge, no erythema, no tenderness. Neurological:      Mental Status: She is alert. Mental status is at baseline. MEDICAL DECISION MAKING:   Temperature was 98.2, pulse was 87, and blood pressure was 169/87. Pulse oximetry on room air was 100%. Surgical site has small amount of fluid, slightly cloudy, yellow-tinged. However no purulent discharge, no tenderness, no erythema. Based on history exam low suspicion for deep space infection. No antibiotics indicated this time. Plan for reassurance, discharge with close follow-up. CRITICAL CARE:     The 30 ml/kg fluid bolus is not ordered due to concern for fluid overload and/or heart failure. PROCEDURES:    Procedures    DIAGNOSTIC RESULTS   EKG:All EKG's are interpreted by the Emergency Department Physician who either signs or Co-signs this chart in the absence of a cardiologist.        RADIOLOGY:All plain film, CT, MRI, and formal ultrasound images (except ED bedside ultrasound) are read by the radiologist, see reports below, unless otherwisenoted in MDM or here. No orders to display     LABS: All lab results were reviewed by myself, and all abnormals are listed below. Labs Reviewed - No data to display    EMERGENCY DEPARTMENTCOURSE:   Patient did well in the ED. Given strict return precautions. All questions answered. No further work-up indicated this time.       Vitals:    Vitals:    12/08/22 1541   BP: (!) 169/87   Pulse: 87   Resp: 18   Temp: 98.2 °F (36.8 °C)   TempSrc: Oral   SpO2: 100%   Weight: 180 lb (81.6 kg)   Height: 5' 7\" (1.702 m)       The patient was given the following medications while in the emergency department:  No orders of the defined types were placed in this encounter. CONSULTS:  None    FINAL IMPRESSION      1.  Encounter for post surgical wound check          DISPOSITION/PLAN   DISPOSITION Decision To Discharge 12/08/2022 06:33:01 PM      PATIENT REFERRED TO:  Yolanda Still DO  [address]  [address]  [phone]    In 2 days    DISCHARGE MEDICATIONS:  Discharge Medication List as of 12/8/2022  6:55 PM        James Lofton MD  Attending Emergency Physician                    Catina Hodges MD  12/09/22 2100

## 2022-12-13 ENCOUNTER — OFFICE VISIT (OUTPATIENT)
Dept: ORTHOPEDIC SURGERY | Age: 57
End: 2022-12-13

## 2022-12-13 VITALS — RESPIRATION RATE: 14 BRPM | BODY MASS INDEX: 28.25 KG/M2 | WEIGHT: 180 LBS | HEIGHT: 67 IN

## 2022-12-13 DIAGNOSIS — M51.26 LUMBAR DISC HERNIATION: Primary | ICD-10-CM

## 2022-12-13 DIAGNOSIS — M51.36 DDD (DEGENERATIVE DISC DISEASE), LUMBAR: ICD-10-CM

## 2022-12-13 DIAGNOSIS — M43.10 ACQUIRED SPONDYLOLISTHESIS: ICD-10-CM

## 2022-12-13 DIAGNOSIS — M47.817 LUMBOSACRAL SPONDYLOSIS WITHOUT MYELOPATHY: ICD-10-CM

## 2022-12-13 PROCEDURE — 99024 POSTOP FOLLOW-UP VISIT: CPT | Performed by: ORTHOPAEDIC SURGERY

## 2022-12-13 RX ORDER — METAXALONE 800 MG/1
TABLET ORAL
Qty: 90 TABLET | Refills: 0 | Status: SHIPPED | OUTPATIENT
Start: 2022-12-13

## 2022-12-13 NOTE — PROGRESS NOTES
Patient ID: Vicki Tompkins is a 61 y.o. female    Chief Compliant:  Chief Complaint   Patient presents with    Post-Op Check     L5-S1 Revision        Diagnostic imaging:        Assessment and Plan:  1. Lumbar disc herniation    2. DDD (degenerative disc disease), lumbar    3. Lumbosacral spondylosis without myelopathy    4. Acquired spondylolisthesis        4 weeks post revision L5-S1 posterior instrumented fusion, recovering well    Pyogenic granuloma observed along the incision site, silver nitrate applied    Follow up 1 week with Evan Virk PA-C    HPI:  This is a 61 y.o. female who presents to the clinic today for 4 weeks post revision L5-S1 posterior instrumented fusion, 11/14/22. Patient is ambulating with assistance via cane. Her leg pain is decreasing as well as her back pain but she has some drainage at her incision site. Review of Systems   All other systems reviewed and are negative. Past History:    Current Outpatient Medications:     metaxalone (SKELAXIN) 800 MG tablet, take 1 tablet by mouth three times a day, Disp: 90 tablet, Rfl: 0    verapamil (CALAN) 40 MG tablet, take 1 tablet by mouth three times a day, Disp: 90 tablet, Rfl: 2    losartan (COZAAR) 100 MG tablet, take 1 tablet by mouth once daily, Disp: 30 tablet, Rfl: 1    gabapentin (NEURONTIN) 600 MG tablet, Take 1 tablet by mouth 3 times daily for 30 days. , Disp: 90 tablet, Rfl: 2    magnesium 30 MG tablet, Take 1 tablet by mouth in the morning, at noon, and at bedtime Supplement magnesium over the counter, Disp: , Rfl:     acetaminophen (TYLENOL) 650 MG extended release tablet, Take 650 mg by mouth every 8 hours as needed for Pain, Disp: , Rfl:   No Known Allergies  Social History     Socioeconomic History    Marital status:      Spouse name: Not on file    Number of children: Not on file    Years of education: Not on file    Highest education level: Not on file   Occupational History    Not on file   Tobacco Use Smoking status: Never    Smokeless tobacco: Never   Vaping Use    Vaping Use: Never used   Substance and Sexual Activity    Alcohol use:  Yes     Alcohol/week: 3.0 standard drinks     Types: 3 Glasses of wine per week     Comment: moderately 3 glasses of wine per week    Drug use: No    Sexual activity: Yes     Partners: Male   Other Topics Concern    Not on file   Social History Narrative    Not on file     Social Determinants of Health     Financial Resource Strain: Low Risk     Difficulty of Paying Living Expenses: Not hard at all   Food Insecurity: No Food Insecurity    Worried About Running Out of Food in the Last Year: Never true    Ran Out of Food in the Last Year: Never true   Transportation Needs: Not on file   Physical Activity: Not on file   Stress: Not on file   Social Connections: Not on file   Intimate Partner Violence: Not on file   Housing Stability: Not on file     Past Medical History:   Diagnosis Date    Abnormal EKG     Arthritis     Chest pain     Chronic bilateral low back pain with bilateral sciatica 02/11/2020    DDD (degenerative disc disease), lumbar 02/11/2020    Dyslipidemia     Elevated BP without diagnosis of hypertension     Hay fever     Hypertension     Lumbosacral spondylosis without myelopathy 04/18/2022    MVP (mitral valve prolapse)     Pre-diabetes     Snoring      Past Surgical History:   Procedure Laterality Date    BREAST SURGERY Bilateral     biopsy    CHOLECYSTECTOMY      COLONOSCOPY      ENDOMETRIAL ABLATION      EYE SURGERY Bilateral     chalazion removed    LUMBAR FUSION N/A 5/16/2022    LUMBAR L5-S1 DECOMPRESSION FUSION POSTERIOR performed by Bob Wu MD at [facility] 11/14/2022    REVISION L5-S1  POSTERIOR  INSTRUMENTED FUSION performed by Bob Wu MD at [address] Right 7/16/2020    RIGHT L4 AND L5 EPIDURAL STEROID INJECTION performed by Joselyn Nelson MD at [address] Right 10/26/2020 EPIDURAL STEROID INJECTION -RIGHT L4 L5 performed by Salvatore Frost MD at [facility],[address]       Family History   Problem Relation Age of Onset    High Blood Pressure Mother     High Cholesterol Mother     Other Mother         blood clots, denies any known clotting d/o    Other Father         alcoholism    Arthritis Sister     No Known Problems Other         Physical Exam:  Vitals signs and nursing note reviewed. Constitutional:       Appearance: well-developed. HENT:      Head: Normocephalic and atraumatic. Nose: Nose normal.   Eyes:      Conjunctiva/sclera: Conjunctivae normal.   Neck:      Musculoskeletal: Normal range of motion and neck supple. Pulmonary:      Effort: Pulmonary effort is normal. No respiratory distress. Musculoskeletal:      Comments: Normal gait     Skin:     General: Skin is warm and dry. Neurological:      Mental Status: Alert and oriented to person, place, and time. Sensory: No sensory deficit. Psychiatric:         Behavior: Behavior normal.         Thought Content: Thought content normal.    Examination of the back reveals a well-healed a incision with the exception of approximately a 1 cm x 2 mm pyogenic granuloma that is forming along the suture line -this was silver nitrate was applied to this area    Provider Attestation:  Tommy Oewns, personally performed the services described in this documentation. All medical record entries made by the scribe were at my direction and in my presence. I have reviewed the chart and discharge instructions and agree that the records reflect my personal performance and is accurate and complete. Nancy Akbar MD 12/13/22       Scribe Attestation:  By signing my name below, Hemantangela Aguirre, attest that this documentation has been prepared under the direction and in the presence of Dr. Sharmaine Casillas.  Electronically signed: Lizz Barnard, 12/13/22     Please note that this chart was generated using voice recognition Dragon dictation software. Although every effort was made to ensure the accuracy of this automated transcription, some errors in transcription may have occurred.

## 2022-12-20 ENCOUNTER — OFFICE VISIT (OUTPATIENT)
Dept: ORTHOPEDIC SURGERY | Age: 57
End: 2022-12-20

## 2022-12-20 VITALS — RESPIRATION RATE: 14 BRPM | BODY MASS INDEX: 28.25 KG/M2 | HEIGHT: 67 IN | WEIGHT: 180 LBS

## 2022-12-20 DIAGNOSIS — M51.26 LUMBAR DISC HERNIATION: Primary | ICD-10-CM

## 2022-12-20 DIAGNOSIS — M43.10 ACQUIRED SPONDYLOLISTHESIS: ICD-10-CM

## 2022-12-20 PROCEDURE — 99024 POSTOP FOLLOW-UP VISIT: CPT | Performed by: PHYSICIAN ASSISTANT

## 2022-12-20 NOTE — PROGRESS NOTES
[address] [facility], [address] [place], [address] [place] [address], Stew Rapat[address].           Dept Phone: [phone]           Dept Fax:  [phone]           [place]          Dept Phone: [phone]           Dept Fax:  [phone]      Chief Compliant:  Chief Complaint   Patient presents with    Post-Op Check     L5-S1 Revision        History of Present Illness:  Rob Herrera returns today. This is a 61 y.o. female who presents to the clinic today for follow up of status post L5-S1 revision PLIF on 11/14/2022. Geno Walter Patient was last seen for postoperative appointment on 12/13/2022 found to have pyogenic granuloma along incision site which had silver nitrate applied by Dr. Syed Lovett, she returns today for routine wound check and follow-up. Patient reports her pain continues to be significantly better than it was prior to surgery. Continues no improving pain across the low back and leg. Does note intermittent drainage from the wound but feels like this is improving. No recent fever or chills. Review of Systems   Constitutional: Negative for fever, chills, sweats, recent illness, or recent injury. Neurological: Negative for headaches, numbness, or weakness. Integumentary: Negative for rash, itching, ecchymosis, abrasions, or laceration. Musculoskeletal: Positive for Post-Op Check (L5-S1 Revision)       Physical Exam:  Constitutional: Patient is oriented to person, place, and time. Patient appears well-developed and well nourished. Musculoskeletal:    Lumbar spine:  Healing incision with approximately 8 x 2 mm pyogenic granuloma along the suture line. Silver nitrate was applied to this area today. Remaining incision is healed without evidence of obvious drainage today. Dressing is removed today without evidence of significant drainage.   New border gauze dressing is applied today. Neurological: Patient is alert and oriented to person, place, and time. Normal strenght. No sensory deficit. Skin: Skin is warm and dry  Psychiatric: Behavior is normal. Thought content normal.  Nursing note and vitals reviewed. Labs and Imaging:     XR taken today:  No results found. Assessment and Plan:  1. Lumbar disc herniation    2. Acquired spondylolisthesis              PLAN:  This is a 61 y.o. female who presents to the clinic today for follow up status post revision posterior lumbar instrumented fusion on 11/14/2022. Patient is here for wound check as she developed a pyogenic granuloma was seen by Dr. Heena Durham on 12/8/2022 and underwent silver nitrate application at that time. Wound today appears to be improving. Compared to visualization of pyogenic granuloma the size has decreased slightly more silver nitrate is applied however. Follow-up as previously scheduled with Dr. Heena Durham on 1/3/2022. Patient continues to improve pain wise following the surgery. No other complaints at this time. Please note that this chart was generated using voice recognition Dragon dictation software. Although every effort was made to ensure the accuracy of this automated transcription, some errors in transcription may have occurred.

## 2022-12-28 ENCOUNTER — OFFICE VISIT (OUTPATIENT)
Dept: FAMILY MEDICINE CLINIC | Age: 57
End: 2022-12-28
Payer: COMMERCIAL

## 2022-12-28 VITALS
DIASTOLIC BLOOD PRESSURE: 70 MMHG | HEART RATE: 87 BPM | BODY MASS INDEX: 28.82 KG/M2 | OXYGEN SATURATION: 98 % | SYSTOLIC BLOOD PRESSURE: 130 MMHG | WEIGHT: 184 LBS

## 2022-12-28 DIAGNOSIS — R73.03 PREDIABETES: ICD-10-CM

## 2022-12-28 DIAGNOSIS — Z09 HOSPITAL DISCHARGE FOLLOW-UP: ICD-10-CM

## 2022-12-28 DIAGNOSIS — I34.1 MITRAL VALVE PROLAPSE: ICD-10-CM

## 2022-12-28 DIAGNOSIS — Z23 NEEDS FLU SHOT: Primary | ICD-10-CM

## 2022-12-28 DIAGNOSIS — M54.42 CHRONIC BILATERAL LOW BACK PAIN WITH BILATERAL SCIATICA: ICD-10-CM

## 2022-12-28 DIAGNOSIS — I10 PRIMARY HYPERTENSION: ICD-10-CM

## 2022-12-28 DIAGNOSIS — M54.41 CHRONIC BILATERAL LOW BACK PAIN WITH BILATERAL SCIATICA: ICD-10-CM

## 2022-12-28 DIAGNOSIS — E78.5 DYSLIPIDEMIA: ICD-10-CM

## 2022-12-28 DIAGNOSIS — G89.29 CHRONIC BILATERAL LOW BACK PAIN WITH BILATERAL SCIATICA: ICD-10-CM

## 2022-12-28 PROBLEM — N63.10 BREAST MASS, RIGHT: Status: ACTIVE | Noted: 2022-12-28

## 2022-12-28 PROBLEM — N64.52 DISCHARGE FROM RIGHT NIPPLE: Status: ACTIVE | Noted: 2022-12-28

## 2022-12-28 PROCEDURE — 90471 IMMUNIZATION ADMIN: CPT | Performed by: FAMILY MEDICINE

## 2022-12-28 PROCEDURE — 1111F DSCHRG MED/CURRENT MED MERGE: CPT | Performed by: FAMILY MEDICINE

## 2022-12-28 PROCEDURE — 3074F SYST BP LT 130 MM HG: CPT | Performed by: FAMILY MEDICINE

## 2022-12-28 PROCEDURE — 99214 OFFICE O/P EST MOD 30 MIN: CPT | Performed by: FAMILY MEDICINE

## 2022-12-28 PROCEDURE — 3078F DIAST BP <80 MM HG: CPT | Performed by: FAMILY MEDICINE

## 2022-12-28 PROCEDURE — 90674 CCIIV4 VAC NO PRSV 0.5 ML IM: CPT | Performed by: FAMILY MEDICINE

## 2022-12-28 RX ORDER — DICLOFENAC SODIUM 75 MG/1
TABLET, DELAYED RELEASE ORAL
COMMUNITY
Start: 2022-11-22

## 2022-12-28 RX ORDER — LOSARTAN POTASSIUM 50 MG/1
TABLET ORAL
COMMUNITY
Start: 2022-12-12

## 2022-12-28 ASSESSMENT — ENCOUNTER SYMPTOMS
EYES NEGATIVE: 1
SHORTNESS OF BREATH: 0
ALLERGIC/IMMUNOLOGIC NEGATIVE: 1
RESPIRATORY NEGATIVE: 1
BACK PAIN: 1
BLURRED VISION: 0
BOWEL INCONTINENCE: 0
GASTROINTESTINAL NEGATIVE: 1
ORTHOPNEA: 0

## 2022-12-28 NOTE — PATIENT INSTRUCTIONS
Patient Education        DASH Diet: Care Instructions  Your Care Instructions     The DASH diet is an eating plan that can help lower your blood pressure. DASH stands for Dietary Approaches to Stop Hypertension. Hypertension is high blood pressure. The DASH diet focuses on eating foods that are high in calcium, potassium, and magnesium. These nutrients can lower blood pressure. The foods that are highest in these nutrients are fruits, vegetables, low-fat dairy products, nuts, seeds, and legumes. But taking calcium, potassium, and magnesium supplements instead of eating foods that are high in those nutrients does not have the same effect. The DASH diet also includes whole grains, fish, and poultry. The DASH diet is one of several lifestyle changes your doctor may recommend to lower your high blood pressure. Your doctor may also want you to decrease the amount of sodium in your diet. Lowering sodium while following the DASH diet can lower blood pressure even further than just the DASH diet alone. Follow-up care is a key part of your treatment and safety. Be sure to make and go to all appointments, and call your doctor if you are having problems. It's also a good idea to know your test results and keep a list of the medicines you take. How can you care for yourself at home? Following the DASH diet  Eat 4 to 5 servings of fruit each day. A serving is 1 medium-sized piece of fruit, ½ cup chopped or canned fruit, 1/4 cup dried fruit, or 4 ounces (½ cup) of fruit juice. Choose fruit more often than fruit juice. Eat 4 to 5 servings of vegetables each day. A serving is 1 cup of lettuce or raw leafy vegetables, ½ cup of chopped or cooked vegetables, or 4 ounces (½ cup) of vegetable juice. Choose vegetables more often than vegetable juice. Get 2 to 3 servings of low-fat and fat-free dairy each day. A serving is 8 ounces of milk, 1 cup of yogurt, or 1 ½ ounces of cheese. Eat 6 to 8 servings of grains each day.  A serving is 1 slice of bread, 1 ounce of dry cereal, or ½ cup of cooked rice, pasta, or cooked cereal. Try to choose whole-grain products as much as possible. Limit lean meat, poultry, and fish to 2 servings each day. A serving is 3 ounces, about the size of a deck of cards. Eat 4 to 5 servings of nuts, seeds, and legumes (cooked dried beans, lentils, and split peas) each week. A serving is 1/3 cup of nuts, 2 tablespoons of seeds, or ½ cup of cooked beans or peas. Limit fats and oils to 2 to 3 servings each day. A serving is 1 teaspoon of vegetable oil or 2 tablespoons of salad dressing. Limit sweets and added sugars to 5 servings or less a week. A serving is 1 tablespoon jelly or jam, ½ cup sorbet, or 1 cup of lemonade. Eat less than 2,300 milligrams (mg) of sodium a day. If you limit your sodium to 1,500 mg a day, you can lower your blood pressure even more. Be aware that all of these are the suggested number of servings for people who eat 1,800 to 2,000 calories a day. Your recommended number of servings may be different if you need more or fewer calories. Tips for success  Start small. Do not try to make dramatic changes to your diet all at once. You might feel that you are missing out on your favorite foods and then be more likely to not follow the plan. Make small changes, and stick with them. Once those changes become habit, add a few more changes. Try some of the following:  Make it a goal to eat a fruit or vegetable at every meal and at snacks. This will make it easy to get the recommended amount of fruits and vegetables each day. Try yogurt topped with fruit and nuts for a snack or healthy dessert. Add lettuce, tomato, cucumber, and onion to sandwiches. Combine a ready-made pizza crust with low-fat mozzarella cheese and lots of vegetable toppings. Try using tomatoes, squash, spinach, broccoli, carrots, cauliflower, and onions.   Have a variety of cut-up vegetables with a low-fat dip as an appetizer instead of chips and dip. Sprinkle sunflower seeds or chopped almonds over salads. Or try adding chopped walnuts or almonds to cooked vegetables. Try some vegetarian meals using beans and peas. Add garbanzo or kidney beans to salads. Make burritos and tacos with mashed yañez beans or black beans. Where can you learn more? Go to http://www.woods.com/ and enter H967 to learn more about \"DASH Diet: Care Instructions. \"  Current as of: September 7, 2022               Content Version: 13.5  © 7886-3763 [employer], [employer]. Care instructions adapted under license by [facility] ([facility]). If you have questions about a medical condition or this instruction, always ask your healthcare professional. [address] any warranty or liability for your use of this information.

## 2022-12-28 NOTE — LETTER
[facility]  [address] [address]  Phone: [phone]  Fax: [phone]    Susan Hood DO         December 28, 2022     Patient: Selma Lynch   YOB: 1965   Date of Visit: 12/28/2022       To Whom It May Concern: It is my medical opinion that Milvia Lawrence requires a disability parking placard for the following reasons:  She has limited walking ability due to an orthopedic condition. Duration of need: 5 years    If you have any questions or concerns, please don't hesitate to call.     Sincerely,        Susan Hood DO

## 2022-12-28 NOTE — PROGRESS NOTES
Post-Discharge Transitional Care Follow Up      Fran Kanner   YOB: 1965    Date of Office Visit:  12/28/2022  Date of Hospital Admission: 12/8/22  Date of Hospital Discharge: 12/8/22  Readmission Risk Score (high >=14%. Medium >=10%):Readmission Risk Score: 4.4      Care management risk score Rising risk (score 2-5) and Complex Care (Scores >=6): No Risk Score On File     Non face to face  following discharge, date last encounter closed (first attempt may have been earlier): *No documented post hospital discharge outreach found in the last 14 days     Call initiated 2 business days of discharge: *No response recorded in the last 14 days     Needs flu shot  -     Influenza, FLUCELVAX, (age 10 mo+), IM, Preservative Free, 0.5 mL  Primary hypertension  Assessment & Plan:   Well-controlled, continue current medications, continue current treatment plan, medication adherence emphasized and lifestyle modifications recommended  Chronic bilateral low back pain with bilateral sciatica  Assessment & Plan:   Monitored by specialist- no acute findings meriting change in the plan  Dyslipidemia  Assessment & Plan:   Well-controlled, continue current treatment plan and lifestyle modifications recommended  Prediabetes  Assessment & Plan:   Well-controlled, continue current medications and continue current treatment plan  Hospital discharge follow-up  -     WI DISCHARGE MEDS RECONCILED W/ CURRENT OUTPATIENT MED LIST  Mitral valve prolapse  Assessment & Plan:   Unclear control, continue current plan pending work up below  Orders:  -     Echocardiogram transthoracic; Future      Medical Decision Making: moderate complexity  Return in about 4 months (around 4/28/2023). On this date 12/28/2022 I have spent 43 minutes reviewing previous notes, test results and face to face with the patient discussing the diagnosis and importance of compliance with the treatment plan as well as documenting on the day of the visit. Subjective:   Discharge Summary     Attending Physician: Brittny Guillen MD  Admit Date: 11/14/2022  Discharge Date: ?   Primary Care Physician: Lucrecia Ashford DO     Admitting Diagnosis:  Principal Problem:    Acquired spondylolisthesis  Resolved Problems:    No resolved hospital problems. Discharge Diagnoses:  Principal Problem:    Acquired spondylolisthesis  Resolved Problems:    No resolved hospital problems. Past Medical History  Past Medical History:  Diagnosis Date  · Abnormal EKG    · Arthritis    · Chest pain    · Chronic bilateral low back pain with bilateral sciatica 02/11/2020  · DDD (degenerative disc disease), lumbar 02/11/2020  · Dyslipidemia    · Elevated BP without diagnosis of hypertension    · Hay fever    · Hypertension    · Lumbosacral spondylosis without myelopathy 04/18/2022  · MVP (mitral valve prolapse)    · Pre-diabetes    · Snoring            Procedures Performed and Findings  Procedure(s) with comments:  REVISION L5-S1  POSTERIOR  INSTRUMENTED FUSION - INFUSE  K2M  EVOKES  HARDWARE SENT       Consultations Obtained  IP CONSULT TO [address] Course  Admitted for failure of instrumentation  Instrumentation revised  Uncomplicated hospital course     Discharge Medications       Medication List        STOP taking these medications      diclofenac 75 MG EC tablet  Commonly known as: VOLTAREN              ASK your doctor about these medications      acetaminophen 650 MG extended release tablet  Commonly known as: TYLENOL     gabapentin 600 MG tablet  Commonly known as: Neurontin  Take 1 tablet by mouth 3 times daily for 30 days.      losartan 100 MG tablet  Commonly known as: COZAAR  take 1 tablet by mouth once daily     magnesium 30 MG tablet     metaxalone 800 MG tablet  Commonly known as: SKELAXIN  take 1 tablet by mouth three times a day  Ask about: Which instructions should I use?     verapamil 40 MG tablet  Commonly known as: CALAN  take 1 tablet by mouth three times a day                 Where to Get Your Medications        These medications were sent to [facility], [address] [place] [id number]       Phone: [phone]  · metaxalone 800 MG tablet           Discharge Condition  Stable        Activity on Discharge  As tolerated        Discharge Disposition:  Home     Discharge Instructions  See Orders     Follow-Up Scheduled    MD Alessia [address], [address] [facility] [address]  [phone]     Go on 11/29/2022  [facility] Follow Up @ 11:10am    Syeda    Pt Name: Joshua Rollins  MRN: [id number]  [place] 1965  Date of evaluation: 12/9/22  CHIEF COMPLAINT       Chief Complaint  Patient presents with  · Post-op Problem      Pt had revision of L1 S1 fusion, pts  noticed when her surgical dressing was being changed that there was some drainage to wound,      HISTORY OF PRESENT ILLNESS  Patient is a 50-year-old female who presents to the ED for wound check. Patient is status post L1 S1 fusion on 11/14/2022. Since that time surgical site has not completely and over the past week or she has had to cover site with dressing to absorb leaking fluid. She has been in touch with her surgeons and she has follow-up next week in their office. She is here today for wound check. No fevers, no pain over surgical site, no other issues reported. MEDICAL DECISION MAKING:  Temperature was 98.2, pulse was 87, and blood pressure was 169/87. Pulse oximetry on room air was 100%. Surgical site has small amount of fluid, slightly cloudy, yellow-tinged. However no purulent discharge, no tenderness, no erythema. Based on history exam low suspicion for deep space infection. No antibiotics indicated this time. Plan for reassurance, discharge with close follow-up. Hypertension  This is a new problem.  The current episode started more than 1 month ago. The problem has been gradually worsening since onset. The problem is uncontrolled. Pertinent negatives include no anxiety, blurred vision, malaise/fatigue, orthopnea, palpitations, peripheral edema, PND, shortness of breath or sweats. Past treatments include nothing. The current treatment provides no improvement. There is no history of chronic renal disease. Back Pain  This is a chronic problem. The current episode started more than 1 month ago. The problem occurs constantly. The problem has been gradually worsening since onset. The pain is present in the lumbar spine. The quality of the pain is described as aching. The pain is severe. The symptoms are aggravated by bending and position. Associated symptoms include leg pain. Pertinent negatives include no bladder incontinence, bowel incontinence, dysuria, paresis, pelvic pain, perianal numbness, tingling or weight loss. She has tried NSAIDs and home exercises (PT) for the symptoms. The treatment provided no relief. Hyperlipidemia  This is a chronic problem. The current episode started more than 1 year ago. She has no history of chronic renal disease, diabetes, hypothyroidism, liver disease, obesity or nephrotic syndrome. Associated symptoms include leg pain. Pertinent negatives include no focal sensory loss, focal weakness or shortness of breath. Current antihyperlipidemic treatment includes diet change. Diabetes  She presents for her follow-up diabetic visit. Diabetes type: prediabetes. Her disease course has been stable. There are no hypoglycemic associated symptoms. Pertinent negatives for hypoglycemia include no sweats. There are no diabetic associated symptoms. Pertinent negatives for diabetes include no blurred vision and no weight loss. There are no hypoglycemic complications. Symptoms are stable. There are no diabetic complications. Current diabetic treatment includes diet.    Inpatient course: Discharge summary reviewed- see chart.    Interval history/Current status: stable    Patient Active Problem List   Diagnosis    Prediabetes    Eyelid cyst    Dyslipidemia    Chronic bilateral low back pain with bilateral sciatica    DDD (degenerative disc disease), lumbar    Medication management    Lumbosacral spondylosis without myelopathy    Mitral valve prolapse    Lumbar disc herniation    Primary hypertension    Acquired spondylolisthesis    Back pain    Lumbar radiculopathy    S/P lumbar fusion    Breast mass, right    Discharge from right nipple    Wound seroma       Medications listed as ordered at the time of discharge from hospital     Medication List            Accurate as of December 28, 2022  3:32 PM. If you have any questions, ask your nurse or doctor. CONTINUE taking these medications      acetaminophen 650 MG extended release tablet  Commonly known as: TYLENOL     diclofenac 75 MG EC tablet  Commonly known as: VOLTAREN     gabapentin 600 MG tablet  Commonly known as: Neurontin  Take 1 tablet by mouth 3 times daily for 30 days. * losartan 100 MG tablet  Commonly known as: COZAAR  take 1 tablet by mouth once daily     * losartan 50 MG tablet  Commonly known as: COZAAR     magnesium 30 MG tablet     metaxalone 800 MG tablet  Commonly known as: SKELAXIN  take 1 tablet by mouth three times a day     verapamil 40 MG tablet  Commonly known as: CALAN  take 1 tablet by mouth three times a day           * This list has 2 medication(s) that are the same as other medications prescribed for you. Read the directions carefully, and ask your doctor or other care provider to review them with you.                    Medications marked \"taking\" at this time  Outpatient Medications Marked as Taking for the 12/28/22 encounter (Office Visit) with Vipul Summers, DO   Medication Sig Dispense Refill    diclofenac (VOLTAREN) 75 MG EC tablet       metaxalone (SKELAXIN) 800 MG tablet take 1 tablet by mouth three times a day 90 tablet 0    verapamil (CALAN) 40 MG tablet take 1 tablet by mouth three times a day 90 tablet 2    losartan (COZAAR) 100 MG tablet take 1 tablet by mouth once daily 30 tablet 1    gabapentin (NEURONTIN) 600 MG tablet Take 1 tablet by mouth 3 times daily for 30 days. 90 tablet 2    magnesium 30 MG tablet Take 1 tablet by mouth in the morning, at noon, and at bedtime Supplement magnesium over the counter      acetaminophen (TYLENOL) 650 MG extended release tablet Take 650 mg by mouth every 8 hours as needed for Pain          Medications patient taking as of now reconciled against medications ordered at time of hospital discharge: Yes    Review of Systems   Constitutional: Negative. Negative for malaise/fatigue and weight loss. HENT: Negative. Eyes: Negative. Negative for blurred vision. Respiratory: Negative. Negative for shortness of breath. Cardiovascular: Negative. Negative for palpitations, orthopnea and PND. Gastrointestinal: Negative. Negative for bowel incontinence. Endocrine: Negative. Genitourinary: Negative. Negative for bladder incontinence, dysuria and pelvic pain. Musculoskeletal: Negative. Skin: Negative. Allergic/Immunologic: Negative. Neurological: Negative. Negative for tingling and focal weakness. Hematological: Negative. Psychiatric/Behavioral: Negative. All other systems reviewed and are negative. Objective:    /70   Pulse 87   Wt 184 lb (83.5 kg)   SpO2 98%   BMI 28.82 kg/m²   Physical Exam  Vitals and nursing note reviewed. Constitutional:       General: She is not in acute distress. Appearance: Normal appearance. She is overweight. She is not ill-appearing, toxic-appearing or diaphoretic. HENT:      Head: Normocephalic and atraumatic. Eyes:      General: No scleral icterus. Right eye: No discharge. Left eye: No discharge. Extraocular Movements: Extraocular movements intact.       Conjunctiva/sclera: Conjunctivae normal.   Cardiovascular:      Rate and Rhythm: Normal rate and regular rhythm. Pulses: Normal pulses. Heart sounds: Normal heart sounds. No murmur heard. No friction rub. No gallop. Pulmonary:      Effort: Pulmonary effort is normal. No respiratory distress. Breath sounds: Normal breath sounds. No stridor. No wheezing, rhonchi or rales. Chest:      Chest wall: No tenderness. Neurological:      Mental Status: She is alert and oriented to person, place, and time. Mental status is at baseline. Gait: Gait abnormal (ambulating with cane). Psychiatric:         Mood and Affect: Mood normal.         Behavior: Behavior normal.         Thought Content: Thought content normal.         Judgment: Judgment normal.       An electronic signature was used to authenticate this note.   --Tom Painting, DO

## 2023-01-03 ENCOUNTER — OFFICE VISIT (OUTPATIENT)
Dept: ORTHOPEDIC SURGERY | Age: 58
End: 2023-01-03

## 2023-01-03 VITALS — BODY MASS INDEX: 28.88 KG/M2 | WEIGHT: 184 LBS | RESPIRATION RATE: 12 BRPM | HEIGHT: 67 IN

## 2023-01-03 DIAGNOSIS — M47.817 LUMBOSACRAL SPONDYLOSIS WITHOUT MYELOPATHY: ICD-10-CM

## 2023-01-03 DIAGNOSIS — M43.10 ACQUIRED SPONDYLOLISTHESIS: ICD-10-CM

## 2023-01-03 DIAGNOSIS — M51.36 DDD (DEGENERATIVE DISC DISEASE), LUMBAR: ICD-10-CM

## 2023-01-03 DIAGNOSIS — M51.26 LUMBAR DISC HERNIATION: Primary | ICD-10-CM

## 2023-01-03 PROCEDURE — 99024 POSTOP FOLLOW-UP VISIT: CPT | Performed by: ORTHOPAEDIC SURGERY

## 2023-01-03 NOTE — PROGRESS NOTES
Patient ID: Bakari Camargo is a 61 y.o. female    Chief Compliant:  Chief Complaint   Patient presents with    Post-Op Check     L5-S1  fusion          Diagnostic imaging:    AP lateral lumbar spine status post L5-S1 PLIF modified to fixation to the pelvis for a sacral subsidence    Assessment and Plan:  1. Lumbar disc herniation    2. Acquired spondylolisthesis    3. DDD (degenerative disc disease), lumbar    4. Lumbosacral spondylosis without myelopathy        7 weeks post revision L5-S1 posterior instrumented fusion, recovering well    Follow up 4 weeks with new x-rays    Likely return to work in 4 to 8 weeks    HPI:  This is a 61 y.o. female who presents to the clinic today for 7 weeks post revision L5-S1 posterior instrumented fusion, 11/14/22. Patient is able to ambulate independently. She has some soreness and achy pain in her back as well as some achy pain in her leg but is mostly tolerable. Review of Systems   All other systems reviewed and are negative. Past History:    Current Outpatient Medications:     diclofenac (VOLTAREN) 75 MG EC tablet, , Disp: , Rfl:     losartan (COZAAR) 50 MG tablet, take 1 tablet by mouth once daily, Disp: , Rfl:     metaxalone (SKELAXIN) 800 MG tablet, take 1 tablet by mouth three times a day, Disp: 90 tablet, Rfl: 0    verapamil (CALAN) 40 MG tablet, take 1 tablet by mouth three times a day, Disp: 90 tablet, Rfl: 2    losartan (COZAAR) 100 MG tablet, take 1 tablet by mouth once daily, Disp: 30 tablet, Rfl: 1    gabapentin (NEURONTIN) 600 MG tablet, Take 1 tablet by mouth 3 times daily for 30 days. , Disp: 90 tablet, Rfl: 2    magnesium 30 MG tablet, Take 1 tablet by mouth in the morning, at noon, and at bedtime Supplement magnesium over the counter, Disp: , Rfl:     acetaminophen (TYLENOL) 650 MG extended release tablet, Take 650 mg by mouth every 8 hours as needed for Pain, Disp: , Rfl:   Allergies   Allergen Reactions    Cefuroxime      Other reaction(s): Unknown Social History     Socioeconomic History    Marital status:      Spouse name: Not on file    Number of children: Not on file    Years of education: Not on file    Highest education level: Not on file   Occupational History    Not on file   Tobacco Use    Smoking status: Never    Smokeless tobacco: Never   Vaping Use    Vaping Use: Never used   Substance and Sexual Activity    Alcohol use:  Yes     Alcohol/week: 3.0 standard drinks     Types: 3 Glasses of wine per week     Comment: moderately 3 glasses of wine per week    Drug use: No    Sexual activity: Yes     Partners: Male   Other Topics Concern    Not on file   Social History Narrative    Not on file     Social Determinants of Health     Financial Resource Strain: Low Risk     Difficulty of Paying Living Expenses: Not hard at all   Food Insecurity: No Food Insecurity    Worried About Running Out of Food in the Last Year: Never true    Ran Out of Food in the Last Year: Never true   Transportation Needs: Not on file   Physical Activity: Not on file   Stress: Not on file   Social Connections: Not on file   Intimate Partner Violence: Not on file   Housing Stability: Not on file     Past Medical History:   Diagnosis Date    Abnormal EKG     Arthritis     Chest pain     Chronic bilateral low back pain with bilateral sciatica 02/11/2020    DDD (degenerative disc disease), lumbar 02/11/2020    Dyslipidemia     Elevated BP without diagnosis of hypertension     Hay fever     Hypertension     Lumbosacral spondylosis without myelopathy 04/18/2022    MVP (mitral valve prolapse)     Pre-diabetes     Snoring      Past Surgical History:   Procedure Laterality Date    BREAST SURGERY Bilateral     biopsy    CHOLECYSTECTOMY      COLONOSCOPY      ENDOMETRIAL ABLATION      EYE SURGERY Bilateral     chalazion removed    LUMBAR FUSION N/A 5/16/2022    LUMBAR L5-S1 DECOMPRESSION FUSION POSTERIOR performed by Dione Ojeda MD at [facility] N/A 11/14/2022 REVISION L5-S1  POSTERIOR  INSTRUMENTED FUSION performed by Kalin Mackenzie MD at [address] Right 7/16/2020    RIGHT L4 AND L5 EPIDURAL STEROID INJECTION performed by Dajuan Zaidi MD at [address] Right 10/26/2020    EPIDURAL STEROID INJECTION -RIGHT L4 L5 performed by Dajuan Zaidi MD at [address] [facility]       Family History   Problem Relation Age of Onset    High Blood Pressure Mother     High Cholesterol Mother     Other Mother         blood clots, denies any known clotting d/o    Other Father         alcoholism    Arthritis Sister     No Known Problems Other         Physical Exam:  Vitals signs and nursing note reviewed. Constitutional:       Appearance: well-developed. HENT:      Head: Normocephalic and atraumatic. Nose: Nose normal.   Eyes:      Conjunctiva/sclera: Conjunctivae normal.   Neck:      Musculoskeletal: Normal range of motion and neck supple. Pulmonary:      Effort: Pulmonary effort is normal. No respiratory distress. Musculoskeletal:      Comments: Normal gait     Skin:     General: Skin is warm and dry. Neurological:      Mental Status: Alert and oriented to person, place, and time. Sensory: No sensory deficit. Psychiatric:         Behavior: Behavior normal.         Thought Content: Thought content normal.        Provider Attestation:  Royce Owens personally performed the services described in this documentation. All medical record entries made by the scribe were at my direction and in my presence. I have reviewed the chart and discharge instructions and agree that the records reflect my personal performance and is accurate and complete. Torin Gomez MD 1/3/23       Scribe Attestation:  By signing my name below, Zuri Sinha attest that this documentation has been prepared under the direction and in the presence of Dr. Michelle Bazzi.  Electronically signed: Lizz Faye, 1/3/23 Please note that this chart was generated using voice recognition Dragon dictation software. Although every effort was made to ensure the accuracy of this automated transcription, some errors in transcription may have occurred.

## 2023-01-04 ENCOUNTER — HOSPITAL ENCOUNTER (OUTPATIENT)
Dept: NON INVASIVE DIAGNOSTICS | Age: 58
Discharge: HOME | End: 2023-01-04
Payer: COMMERCIAL

## 2023-01-04 PROCEDURE — 93306 TTE W/DOPPLER COMPLETE: CPT

## 2023-01-16 ENCOUNTER — OFFICE VISIT (OUTPATIENT)
Dept: PAIN MANAGEMENT | Age: 58
End: 2023-01-16
Payer: COMMERCIAL

## 2023-01-16 ENCOUNTER — TELEMEDICINE (OUTPATIENT)
Dept: FAMILY MEDICINE CLINIC | Age: 58
End: 2023-01-16
Payer: COMMERCIAL

## 2023-01-16 VITALS
DIASTOLIC BLOOD PRESSURE: 95 MMHG | SYSTOLIC BLOOD PRESSURE: 137 MMHG | WEIGHT: 180 LBS | OXYGEN SATURATION: 100 % | HEART RATE: 88 BPM | HEIGHT: 67 IN | BODY MASS INDEX: 28.25 KG/M2

## 2023-01-16 DIAGNOSIS — M54.42 CHRONIC BILATERAL LOW BACK PAIN WITH BILATERAL SCIATICA: Primary | ICD-10-CM

## 2023-01-16 DIAGNOSIS — I10 PRIMARY HYPERTENSION: Primary | ICD-10-CM

## 2023-01-16 DIAGNOSIS — M54.16 LUMBAR RADICULOPATHY: ICD-10-CM

## 2023-01-16 DIAGNOSIS — M54.41 CHRONIC BILATERAL LOW BACK PAIN WITH BILATERAL SCIATICA: Primary | ICD-10-CM

## 2023-01-16 DIAGNOSIS — M81.0 AGE-RELATED OSTEOPOROSIS WITHOUT CURRENT PATHOLOGICAL FRACTURE: ICD-10-CM

## 2023-01-16 DIAGNOSIS — I34.1 MITRAL VALVE PROLAPSE: ICD-10-CM

## 2023-01-16 DIAGNOSIS — Z98.1 S/P LUMBAR FUSION: ICD-10-CM

## 2023-01-16 DIAGNOSIS — G89.29 CHRONIC BILATERAL LOW BACK PAIN WITH BILATERAL SCIATICA: Primary | ICD-10-CM

## 2023-01-16 PROCEDURE — 3075F SYST BP GE 130 - 139MM HG: CPT | Performed by: NURSE PRACTITIONER

## 2023-01-16 PROCEDURE — 99214 OFFICE O/P EST MOD 30 MIN: CPT | Performed by: FAMILY MEDICINE

## 2023-01-16 PROCEDURE — 3080F DIAST BP >= 90 MM HG: CPT | Performed by: NURSE PRACTITIONER

## 2023-01-16 PROCEDURE — 99214 OFFICE O/P EST MOD 30 MIN: CPT | Performed by: NURSE PRACTITIONER

## 2023-01-16 RX ORDER — GABAPENTIN 400 MG/1
400 CAPSULE ORAL 3 TIMES DAILY
Qty: 90 CAPSULE | Refills: 0 | Status: SHIPPED | OUTPATIENT
Start: 2023-01-16 | End: 2023-02-15

## 2023-01-16 RX ORDER — GABAPENTIN 600 MG/1
600 TABLET ORAL 3 TIMES DAILY
Qty: 90 TABLET | Refills: 2 | Status: CANCELLED | OUTPATIENT
Start: 2023-01-16 | End: 2023-02-15

## 2023-01-16 RX ORDER — METAXALONE 800 MG/1
TABLET ORAL
Qty: 90 TABLET | Refills: 0 | Status: SHIPPED | OUTPATIENT
Start: 2023-01-16

## 2023-01-16 RX ORDER — LOSARTAN POTASSIUM 100 MG/1
TABLET ORAL
Qty: 90 TABLET | Refills: 1 | Status: SHIPPED | OUTPATIENT
Start: 2023-01-16

## 2023-01-16 RX ORDER — ALENDRONATE SODIUM 70 MG/1
70 TABLET ORAL
Qty: 12 TABLET | Refills: 1 | Status: SHIPPED | OUTPATIENT
Start: 2023-01-16

## 2023-01-16 RX ORDER — TIZANIDINE 4 MG/1
4 TABLET ORAL 3 TIMES DAILY PRN
Qty: 90 TABLET | Refills: 0 | Status: SHIPPED | OUTPATIENT
Start: 2023-01-16 | End: 2023-02-15

## 2023-01-16 RX ORDER — GABAPENTIN 600 MG/1
TABLET ORAL
Qty: 90 TABLET | Refills: 2 | OUTPATIENT
Start: 2023-01-16

## 2023-01-16 ASSESSMENT — ENCOUNTER SYMPTOMS
CONSTIPATION: 0
NAUSEA: 0
BACK PAIN: 1
GASTROINTESTINAL NEGATIVE: 1
ORTHOPNEA: 0
VOMITING: 0
DIARRHEA: 0
BACK PAIN: 1
BOWEL INCONTINENCE: 0
EYES NEGATIVE: 1
RESPIRATORY NEGATIVE: 1
ALLERGIC/IMMUNOLOGIC NEGATIVE: 1

## 2023-01-16 NOTE — ASSESSMENT & PLAN NOTE
Gerri Pearl Patient Age: 20 year old  MESSAGE: Interpreting service used: No      Obstetrics & Gynecology- Reason for call: covid vaccine and birth control     Patient is calling stating she got the [place] vaccine on 04/09/21 and is concerned taking birth control with it.     She has no symptoms.    Please advise  Routed to providers clinical pool.           ALLERGIES:  Patient has no known allergies.  Current Outpatient Medications   Medication   • clindamycin (CLEOCIN T) 1 % lotion   • norethindrone-ethinyl estradiol and ferrous fumarate (JUNEL FE 24) 1-20 MG-MCG(24) tablet     No current facility-administered medications for this visit.     PHARMACY to use:           Pharmacy preference(s) on file:   [id number] - [place], [place] - [address] AT [facility] [address]  [address]  [facility] [address]  Phone: [phone] Fax: [phone]      CALL BACK INFO: Ok to leave response (including medical information) with family member or on answering machine      PCP: Pcp Not In System         INS: Payor: BLUE [facility] / Plan: PPO [id number] / Product Type: PPO MISC   PATIENT ADDRESS:  [address] [place] [id number]     Well-controlled, changes made today: stop verapamil b/c no MVP on echo - increase losartan to 100mg - watch BP at home - call with readings in 1-2 weeks, medication adherence emphasized and lifestyle modifications recommended

## 2023-01-16 NOTE — PROGRESS NOTES
Ann Hardy (:  1965) is a Established patient, here for evaluation of the following:    Assessment & Plan   Below is the assessment and plan developed based on review of pertinent history, physical exam, labs, studies, and medications. 1. Primary hypertension  Assessment & Plan:   Well-controlled, changes made today: stop verapamil b/c no MVP on echo - increase losartan to 100mg - watch BP at home - call with readings in 1-2 weeks, medication adherence emphasized and lifestyle modifications recommended  Orders:  -     losartan (COZAAR) 100 MG tablet; take 1 tablet by mouth once daily, Disp-90 tablet, R-1Normal  2. Mitral valve prolapse  Assessment & Plan:   Asymptomatic, reviewed echo not showing MVP  3. Lumbar radiculopathy  Assessment & Plan:   Borderline controlled, changes made today: trial different muscle relaxer  Orders:  -     tiZANidine (ZANAFLEX) 4 MG tablet; Take 1 tablet by mouth 3 times daily as needed (muscle spasms/leg pain), Disp-90 tablet, R-0Normal  4. Age-related osteoporosis without current pathological fracture  Assessment & Plan:   Check Vit D and Ca, start supplements if needed, start fosamax  Recheck dexa in 2 years  Orders:  -     Calcium; Future  -     Vitamin D 25 Hydroxy; Future  -     alendronate (FOSAMAX) 70 MG tablet; Take 1 tablet by mouth every 7 days, Disp-12 tablet, R-1Normal    Return in about 3 months (around 2023). Subjective   MVP - did echo and didn't show it. Wants to stop verapamil    Had dexa scan by ortho that showed osteoporosis in lumbar spine. Needs treatment    Hypertension  This is a new problem. The current episode started more than 1 month ago. The problem has been gradually worsening since onset. The problem is uncontrolled. Pertinent negatives include no anxiety, malaise/fatigue, orthopnea, palpitations, peripheral edema or PND. Past treatments include nothing. The current treatment provides no improvement.    Back Pain  This is a chronic problem. The current episode started more than 1 month ago. The problem occurs constantly. The problem has been gradually worsening since onset. The pain is present in the lumbar spine. The quality of the pain is described as aching. The pain is severe. The symptoms are aggravated by bending and position. Pertinent negatives include no bladder incontinence, bowel incontinence, dysuria, paresis, pelvic pain, perianal numbness or tingling. She has tried NSAIDs and home exercises (PT) for the symptoms. The treatment provided no relief. Review of Systems   Constitutional: Negative. Negative for malaise/fatigue. HENT: Negative. Eyes: Negative. Respiratory: Negative. Cardiovascular: Negative. Negative for palpitations, orthopnea and PND. Gastrointestinal: Negative. Negative for bowel incontinence. Endocrine: Negative. Genitourinary: Negative. Negative for bladder incontinence, dysuria and pelvic pain. Musculoskeletal: Negative. Skin: Negative. Allergic/Immunologic: Negative. Neurological: Negative. Negative for tingling. Hematological: Negative. Psychiatric/Behavioral: Negative. All other systems reviewed and are negative.        Objective   Patient-Reported Vitals  No data recorded     Physical Exam  [INSTRUCTIONS:  \"[x]\" Indicates a positive item  \"[]\" Indicates a negative item  -- DELETE ALL ITEMS NOT EXAMINED]    Constitutional: [x] Appears well-developed and well-nourished [x] No apparent distress      [] Abnormal -     Mental status: [x] Alert and awake  [x] Oriented to person/place/time [x] Able to follow commands    [] Abnormal -     Eyes:   EOM    [x]  Normal    [] Abnormal -   Sclera  [x]  Normal    [] Abnormal -          Discharge [x]  None visible   [] Abnormal -     HENT: [x] Normocephalic, atraumatic  [] Abnormal -   [x] Mouth/Throat: Mucous membranes are moist    External Ears [x] Normal  [] Abnormal -    Neck: [x] No visualized mass [] Abnormal - Pulmonary/Chest: [x] Respiratory effort normal   [x] No visualized signs of difficulty breathing or respiratory distress        [] Abnormal -      Musculoskeletal:   [x] Normal gait with no signs of ataxia         [x] Normal range of motion of neck        [] Abnormal -     Neurological:        [x] No Facial Asymmetry (Cranial nerve 7 motor function) (limited exam due to video visit)          [x] No gaze palsy        [] Abnormal -          Skin:        [x] No significant exanthematous lesions or discoloration noted on facial skin         [] Abnormal -            Psychiatric:       [x] Normal Affect [] Abnormal -        [x] No Hallucinations    Other pertinent observable physical exam findings:-         On this date 1/16/2023 I have spent 41 minutes reviewing previous notes, test results and face to face (virtual) with the patient discussing the diagnosis and importance of compliance with the treatment plan as well as documenting on the day of the visitAdrian Smart, was evaluated through a synchronous (real-time) audio-video encounter. The patient (or guardian if applicable) is aware that this is a billable service, which includes applicable co-pays. This Virtual Visit was conducted with patient's (and/or legal guardian's) consent. The visit was conducted pursuant to the emergency declaration under the [facility], [address] [place] [address] authority and the [employer] and [employer] Act. Patient identification was verified, and a caregiver was present when appropriate. The patient was located at Home: [address] [place] [address].    Provider was located at [place] [id number]): [facility]. [address]

## 2023-01-16 NOTE — PATIENT INSTRUCTIONS
Patient Education        DASH Diet: Care Instructions  Your Care Instructions     The DASH diet is an eating plan that can help lower your blood pressure. DASH stands for Dietary Approaches to Stop Hypertension. Hypertension is high blood pressure. The DASH diet focuses on eating foods that are high in calcium, potassium, and magnesium. These nutrients can lower blood pressure. The foods that are highest in these nutrients are fruits, vegetables, low-fat dairy products, nuts, seeds, and legumes. But taking calcium, potassium, and magnesium supplements instead of eating foods that are high in those nutrients does not have the same effect. The DASH diet also includes whole grains, fish, and poultry. The DASH diet is one of several lifestyle changes your doctor may recommend to lower your high blood pressure. Your doctor may also want you to decrease the amount of sodium in your diet. Lowering sodium while following the DASH diet can lower blood pressure even further than just the DASH diet alone. Follow-up care is a key part of your treatment and safety. Be sure to make and go to all appointments, and call your doctor if you are having problems. It's also a good idea to know your test results and keep a list of the medicines you take. How can you care for yourself at home? Following the DASH diet  Eat 4 to 5 servings of fruit each day. A serving is 1 medium-sized piece of fruit, ½ cup chopped or canned fruit, 1/4 cup dried fruit, or 4 ounces (½ cup) of fruit juice. Choose fruit more often than fruit juice. Eat 4 to 5 servings of vegetables each day. A serving is 1 cup of lettuce or raw leafy vegetables, ½ cup of chopped or cooked vegetables, or 4 ounces (½ cup) of vegetable juice. Choose vegetables more often than vegetable juice. Get 2 to 3 servings of low-fat and fat-free dairy each day. A serving is 8 ounces of milk, 1 cup of yogurt, or 1 ½ ounces of cheese. Eat 6 to 8 servings of grains each day.  A serving is 1 slice of bread, 1 ounce of dry cereal, or ½ cup of cooked rice, pasta, or cooked cereal. Try to choose whole-grain products as much as possible. Limit lean meat, poultry, and fish to 2 servings each day. A serving is 3 ounces, about the size of a deck of cards. Eat 4 to 5 servings of nuts, seeds, and legumes (cooked dried beans, lentils, and split peas) each week. A serving is 1/3 cup of nuts, 2 tablespoons of seeds, or ½ cup of cooked beans or peas. Limit fats and oils to 2 to 3 servings each day. A serving is 1 teaspoon of vegetable oil or 2 tablespoons of salad dressing. Limit sweets and added sugars to 5 servings or less a week. A serving is 1 tablespoon jelly or jam, ½ cup sorbet, or 1 cup of lemonade. Eat less than 2,300 milligrams (mg) of sodium a day. If you limit your sodium to 1,500 mg a day, you can lower your blood pressure even more. Be aware that all of these are the suggested number of servings for people who eat 1,800 to 2,000 calories a day. Your recommended number of servings may be different if you need more or fewer calories. Tips for success  Start small. Do not try to make dramatic changes to your diet all at once. You might feel that you are missing out on your favorite foods and then be more likely to not follow the plan. Make small changes, and stick with them. Once those changes become habit, add a few more changes. Try some of the following:  Make it a goal to eat a fruit or vegetable at every meal and at snacks. This will make it easy to get the recommended amount of fruits and vegetables each day. Try yogurt topped with fruit and nuts for a snack or healthy dessert. Add lettuce, tomato, cucumber, and onion to sandwiches. Combine a ready-made pizza crust with low-fat mozzarella cheese and lots of vegetable toppings. Try using tomatoes, squash, spinach, broccoli, carrots, cauliflower, and onions.   Have a variety of cut-up vegetables with a low-fat dip as an appetizer instead of chips and dip.  Sprinkle sunflower seeds or chopped almonds over salads. Or try adding chopped walnuts or almonds to cooked vegetables.  Try some vegetarian meals using beans and peas. Add garbanzo or kidney beans to salads. Make burritos and tacos with mashed yañez beans or black beans.  Where can you learn more?  Go to https://www.[employer].net/patientEd and enter H967 to learn more about \"DASH Diet: Care Instructions.\"  Current as of: September 7, 2022               Content Version: 13.5  © 9749-1880 [employer].   Care instructions adapted under license by [employer]. If you have questions about a medical condition or this instruction, always ask your healthcare professional. [employer] disclaims any warranty or liability for your use of this information.

## 2023-01-16 NOTE — PROGRESS NOTES
Chief Complaint   Patient presents with    Back Pain     Tizanidine               PMH    Pt c/o low back and right lumbar radicular pain hip buttock area occ numbness in right foot. She has tried LESI  therapy and chiropractic treatment with minimal relief  She is also prescribed NSAID and gabapentin  MRI showed bulging disc on the left side of L4/L5 and L5/S1 affecting both sides of the L5 nerve roots  Hx of  L5-S1 posterior  decompression fusion surgery with Dr. Radha Craft 5/2022. She Continued  with pain with Ct done with his office and now s/p revision L5-S1 posterior instrumented fusion done 11/22  Pt feels pain is improving and would like to try decreasing gabapentin       HPI:   Back Pain  This is a chronic problem. The current episode started more than 1 year ago. The problem has been gradually improving since onset. The pain is present in the lumbar spine. The quality of the pain is described as aching and shooting. Radiates to: left calf. The pain is at a severity of 2/10. The pain is moderate. The symptoms are aggravated by standing (walking). Pertinent negatives include no fever, headaches, numbness or paresthesias. Risk factors include menopause, obesity, poor posture, sedentary lifestyle and lack of exercise. She has tried analgesics for the symptoms. The treatment provided mild relief.       Medication Refill:     Pain score Today:  2  Adverse effects (Constipation / Nausea / Sedation / sexual Dysfunction / others) : no  Mood: excellent  Sleep pattern and quality: good  Activity level: Good    Pill count Today:Na  Last dose taken  NA  OARRS report reviewed today: yes  ER/Hospitalizations/PCP visit related to pain since last visit:no   Any legal problems e.g. DUI etc.:Yes  Satisfied with current management: Yes    Opioid Contract: NA  Last Urine Dug screen dated: NA    Hemoglobin A1C   Date Value Ref Range Status   12/31/2021 5.9 % Final       Past Medical History, Past Surgical History, Social History, Allergies and Medications reviewed and updated in EPIC as indicated    Family History reviewed and is noncontributory.                Past Medical History:   Diagnosis Date    Abnormal EKG     Arthritis     Chest pain     Chronic bilateral low back pain with bilateral sciatica 02/11/2020    DDD (degenerative disc disease), lumbar 02/11/2020    Dyslipidemia     Elevated BP without diagnosis of hypertension     Hay fever     Hypertension     Lumbosacral spondylosis without myelopathy 04/18/2022    MVP (mitral valve prolapse)     Pre-diabetes     Snoring        Past Surgical History:   Procedure Laterality Date    BREAST SURGERY Bilateral     biopsy    CHOLECYSTECTOMY      COLONOSCOPY      ENDOMETRIAL ABLATION      EYE SURGERY Bilateral     chalazion removed    LUMBAR FUSION N/A 5/16/2022    LUMBAR L5-S1 DECOMPRESSION FUSION POSTERIOR performed by Guera Christianson MD at [facility] 11/14/2022    REVISION L5-S1  POSTERIOR  INSTRUMENTED FUSION performed by Guera Christianson MD at [address] Right 7/16/2020    RIGHT L4 AND L5 EPIDURAL STEROID INJECTION performed by Talia Jefferson MD at [address] Right 10/26/2020    EPIDURAL STEROID INJECTION -RIGHT L4 L5 performed by Talia Jefferson MD at [address] [facility]   Allergen Reactions    Cefuroxime      Other reaction(s): Unknown         Current Outpatient Medications:     metaxalone (SKELAXIN) 800 MG tablet, take 1 tablet by mouth three times a day, Disp: 90 tablet, Rfl: 0    tiZANidine (ZANAFLEX) 4 MG tablet, Take 1 tablet by mouth 3 times daily as needed (muscle spasms/leg pain), Disp: 90 tablet, Rfl: 0    alendronate (FOSAMAX) 70 MG tablet, Take 1 tablet by mouth every 7 days, Disp: 12 tablet, Rfl: 1    losartan (COZAAR) 100 MG tablet, take 1 tablet by mouth once daily, Disp: 90 tablet, Rfl: 1    gabapentin (NEURONTIN) 400 MG capsule, Take 1 capsule by mouth 3 times daily for 30 days. , Disp: 90 capsule, Rfl: 0    diclofenac (VOLTAREN) 75 MG EC tablet, , Disp: , Rfl:     acetaminophen (TYLENOL) 650 MG extended release tablet, Take 650 mg by mouth every 8 hours as needed for Pain, Disp: , Rfl:     magnesium 30 MG tablet, Take 1 tablet by mouth in the morning, at noon, and at bedtime Supplement magnesium over the counter (Patient not taking: Reported on 1/16/2023), Disp: , Rfl:     Family History   Problem Relation Age of Onset    High Blood Pressure Mother     High Cholesterol Mother     Other Mother         blood clots, denies any known clotting d/o    Other Father         alcoholism    Arthritis Sister     No Known Problems Other        Social History     Socioeconomic History    Marital status:      Spouse name: Not on file    Number of children: Not on file    Years of education: Not on file    Highest education level: Not on file   Occupational History    Not on file   Tobacco Use    Smoking status: Never    Smokeless tobacco: Never   Vaping Use    Vaping Use: Never used   Substance and Sexual Activity    Alcohol use: Yes     Alcohol/week: 3.0 standard drinks     Types: 3 Glasses of wine per week     Comment: moderately 3 glasses of wine per week    Drug use: No    Sexual activity: Yes     Partners: Male   Other Topics Concern    Not on file   Social History Narrative    Not on file     Social Determinants of Health     Financial Resource Strain: Low Risk     Difficulty of Paying Living Expenses: Not hard at all   Food Insecurity: No Food Insecurity    Worried About Running Out of Food in the Last Year: Never true    Ran Out of Food in the Last Year: Never true   Transportation Needs: Not on file   Physical Activity: Not on file   Stress: Not on file   Social Connections: Not on file   Intimate Partner Violence: Not on file   Housing Stability: Not on file       Review of Systems:  Review of Systems   Constitutional: Negative for chills, fever and night sweats. Musculoskeletal:  Positive for back pain, muscle weakness and stiffness. Negative for falls and muscle cramps. Gastrointestinal:  Negative for constipation, diarrhea, nausea and vomiting. Neurological:  Negative for dizziness, headaches, numbness and paresthesias. Physical Exam:  BP (!) 137/95   Pulse 88   Ht 5' 7\" (1.702 m)   Wt 180 lb (81.6 kg)   SpO2 100%   BMI 28.19 kg/m²     Physical Exam  Cardiovascular:      Rate and Rhythm: Normal rate. Pulmonary:      Effort: Pulmonary effort is normal.   Musculoskeletal:         General: Normal range of motion. Skin:     General: Skin is warm and dry. Neurological:      Mental Status: She is alert and oriented to person, place, and time. Assessment:  Problem List Items Addressed This Visit       Lumbar radiculopathy    Relevant Medications    gabapentin (NEURONTIN) 400 MG capsule    S/P lumbar fusion    Chronic bilateral low back pain with bilateral sciatica - Primary    Relevant Medications    gabapentin (NEURONTIN) 400 MG capsule          Treatment Plan:  Patient relates current medications are helping the pain. Patient reports taking pain medications as prescribed, denies obtaining medications from different sources and denies use of illegal drugs. Medication risk and benefits have been discussed. Patient denies side effects from medications like nausea, vomiting, constipation or drowsiness. Patient reports current activities of daily living are possible due to medications and would like to continue them. As always, we encourage daily stretching and strengthening exercises, and recommend minimizing use of pain medications unless patient cannot get through daily activities due to pain. Continue current medication management, pt has been stable and compliant.   Script written for gabapentin and decreased to 400mg with plan to titrate off   Continue to f/u with Dr Glenn Parikh as planned  Follow up appointment made for 1 month    I have reviewed the chief complaint and history of present illness (including ROS and PFSH) and vital documentation by my staff and I agree with their documentation and have added where applicable.

## 2023-01-31 ENCOUNTER — OFFICE VISIT (OUTPATIENT)
Dept: ORTHOPEDIC SURGERY | Age: 58
End: 2023-01-31

## 2023-01-31 VITALS — BODY MASS INDEX: 28.25 KG/M2 | WEIGHT: 180 LBS | HEIGHT: 67 IN | RESPIRATION RATE: 10 BRPM

## 2023-01-31 DIAGNOSIS — M51.26 LUMBAR DISC HERNIATION: Primary | ICD-10-CM

## 2023-01-31 DIAGNOSIS — M51.36 DDD (DEGENERATIVE DISC DISEASE), LUMBAR: ICD-10-CM

## 2023-01-31 DIAGNOSIS — M43.10 ACQUIRED SPONDYLOLISTHESIS: ICD-10-CM

## 2023-01-31 PROCEDURE — 99024 POSTOP FOLLOW-UP VISIT: CPT | Performed by: ORTHOPAEDIC SURGERY

## 2023-01-31 NOTE — PROGRESS NOTES
Patient ID: Vicki Tompkins is a 61 y.o. female    Chief Compliant:  No chief complaint on file. Diagnostic imaging:    AP lateral lumbar spine status post L5-S1 fusion with extension to the pelvis for the instrumentation stable no change    Assessment and Plan:  1. Lumbar disc herniation    2. DDD (degenerative disc disease), lumbar    3. Acquired spondylolisthesis        11 weeks post revision L5-S1 posterior instrumented fusion, recovering well    PT    Follow up 4 weeks    HPI:  This is a 61 y.o. female who presents to the clinic today for 11 weeks post revision L5-S1 posterior instrumented fusion, 11/14/22. Patient is ambulating without assistance. She states her back pain is gradually improving and she is regaining most of her normal function. Review of Systems   All other systems reviewed and are negative. Past History:    Current Outpatient Medications:     metaxalone (SKELAXIN) 800 MG tablet, take 1 tablet by mouth three times a day, Disp: 90 tablet, Rfl: 0    tiZANidine (ZANAFLEX) 4 MG tablet, Take 1 tablet by mouth 3 times daily as needed (muscle spasms/leg pain), Disp: 90 tablet, Rfl: 0    alendronate (FOSAMAX) 70 MG tablet, Take 1 tablet by mouth every 7 days, Disp: 12 tablet, Rfl: 1    losartan (COZAAR) 100 MG tablet, take 1 tablet by mouth once daily, Disp: 90 tablet, Rfl: 1    gabapentin (NEURONTIN) 400 MG capsule, Take 1 capsule by mouth 3 times daily for 30 days. , Disp: 90 capsule, Rfl: 0    diclofenac (VOLTAREN) 75 MG EC tablet, , Disp: , Rfl:     magnesium 30 MG tablet, Take 1 tablet by mouth in the morning, at noon, and at bedtime Supplement magnesium over the counter (Patient not taking: Reported on 1/16/2023), Disp: , Rfl:     acetaminophen (TYLENOL) 650 MG extended release tablet, Take 650 mg by mouth every 8 hours as needed for Pain, Disp: , Rfl:   Allergies   Allergen Reactions    Cefuroxime      Other reaction(s): Unknown     Social History     Socioeconomic History Marital status:      Spouse name: Not on file    Number of children: Not on file    Years of education: Not on file    Highest education level: Not on file   Occupational History    Not on file   Tobacco Use    Smoking status: Never    Smokeless tobacco: Never   Vaping Use    Vaping Use: Never used   Substance and Sexual Activity    Alcohol use:  Yes     Alcohol/week: 3.0 standard drinks     Types: 3 Glasses of wine per week     Comment: moderately 3 glasses of wine per week    Drug use: No    Sexual activity: Yes     Partners: Male   Other Topics Concern    Not on file   Social History Narrative    Not on file     Social Determinants of Health     Financial Resource Strain: Low Risk     Difficulty of Paying Living Expenses: Not hard at all   Food Insecurity: No Food Insecurity    Worried About Running Out of Food in the Last Year: Never true    Ran Out of Food in the Last Year: Never true   Transportation Needs: Not on file   Physical Activity: Not on file   Stress: Not on file   Social Connections: Not on file   Intimate Partner Violence: Not on file   Housing Stability: Not on file     Past Medical History:   Diagnosis Date    Abnormal EKG     Arthritis     Chest pain     Chronic bilateral low back pain with bilateral sciatica 02/11/2020    DDD (degenerative disc disease), lumbar 02/11/2020    Dyslipidemia     Elevated BP without diagnosis of hypertension     Hay fever     Hypertension     Lumbosacral spondylosis without myelopathy 04/18/2022    MVP (mitral valve prolapse)     Pre-diabetes     Snoring      Past Surgical History:   Procedure Laterality Date    BREAST SURGERY Bilateral     biopsy    CHOLECYSTECTOMY      COLONOSCOPY      ENDOMETRIAL ABLATION      EYE SURGERY Bilateral     chalazion removed    LUMBAR FUSION N/A 5/16/2022    LUMBAR L5-S1 DECOMPRESSION FUSION POSTERIOR performed by David Cote MD at [facility] 11/14/2022    REVISION L5-S1  POSTERIOR  INSTRUMENTED FUSION performed by Simona Jones MD at [address] Right 7/16/2020    RIGHT L4 AND L5 EPIDURAL STEROID INJECTION performed by Milan Pham MD at [address] Right 10/26/2020    EPIDURAL STEROID INJECTION -RIGHT L4 L5 performed by Milan Pham MD at [address] [facility],[address]       Family History   Problem Relation Age of Onset    High Blood Pressure Mother     High Cholesterol Mother     Other Mother         blood clots, denies any known clotting d/o    Other Father         alcoholism    Arthritis Sister     No Known Problems Other         Physical Exam:  Vitals signs and nursing note reviewed. Constitutional:       Appearance: well-developed. HENT:      Head: Normocephalic and atraumatic. Nose: Nose normal.   Eyes:      Conjunctiva/sclera: Conjunctivae normal.   Neck:      Musculoskeletal: Normal range of motion and neck supple. Pulmonary:      Effort: Pulmonary effort is normal. No respiratory distress. Musculoskeletal:      Comments: Normal gait     Skin:     General: Skin is warm and dry. Neurological:      Mental Status: Alert and oriented to person, place, and time. Sensory: No sensory deficit. Psychiatric:         Behavior: Behavior normal.         Thought Content: Thought content normal.        Provider Attestation:  Omkar Owens personally performed the services described in this documentation. All medical record entries made by the scribe were at my direction and in my presence. I have reviewed the chart and discharge instructions and agree that the records reflect my personal performance and is accurate and complete. Kike Grider MD 1/31/23       Scribe Attestation:  By signing my name below, Caren Bossman, attest that this documentation has been prepared under the direction and in the presence of Dr. Heather Pinto.  Electronically signed: Lizz Borrero, 1/31/23     Please note that this chart was generated using voice recognition Dragon dictation software. Although every effort was made to ensure the accuracy of this automated transcription, some errors in transcription may have occurred.

## 2023-02-09 ENCOUNTER — HOSPITAL ENCOUNTER (OUTPATIENT)
Dept: PHYSICAL THERAPY | Age: 58
Setting detail: THERAPIES SERIES
Discharge: HOME | End: 2023-02-09
Payer: COMMERCIAL

## 2023-02-09 PROCEDURE — 97161 PT EVAL LOW COMPLEX 20 MIN: CPT

## 2023-02-09 PROCEDURE — 97110 THERAPEUTIC EXERCISES: CPT

## 2023-02-09 NOTE — THERAPY EVALUATION
[x] [facility]) - [facility] [place] [facility]  [address]  [place]: [phone]   F: [phone]     Physical Therapy Lumbar Evaluation    Date:  2023  Patient: Monica Bassett  : 1965  MRN: [id number]  Physician: Melisa Chamberlain MD   Insurance: [place]- 61 visits per year   Medical Diagnosis:   M51.36 (ICD-10-CM) - DDD (degenerative disc disease), lumbar   M51.26 (ICD-10-CM) - Lumbar disc herniation      Rehab Codes: R25 pain , M25.60 stiffness, R53.1 weakness   Onset Date: 22- DOS    Next Dr.'s appt: 23    Precautions: none noted     Subjective:    Pt comes to PT to address low back pain. She began having issues in . She had initial lumbar fusion 22 and then required a revision 22. Had REVISION L5-S1  POSTERIOR  INSTRUMENTED FUSION POSSIBLE PELVIC FIXATION. She feels she is healing better after last surgery. She is stilll having pain in the R glute and L lateral lower leg. Not super limiting but more a dull ache. She also feels the need to build functional capacity. She notes difficulty with lifting things and doing more at a community level.        PMHx: [] Unremarkable [] Diabetes [] HTN  [] Pacemaker   [] MI/Heart Problems [] Cancer [x] Arthritis [x] Other: osteoporosis                Past Medical History:   Diagnosis Date    Abnormal EKG     Arthritis     Chest pain     Chronic bilateral low back pain with bilateral sciatica 2020    DDD (degenerative disc disease), lumbar 2020    Dyslipidemia     Elevated BP without diagnosis of hypertension     Hay fever     Hypertension     Lumbosacral spondylosis without myelopathy 2022    MVP (mitral valve prolapse)     Pre-diabetes     Snoring          Past Surgical History:   Procedure Laterality Date    BREAST SURGERY Bilateral     biopsy    CHOLECYSTECTOMY      COLONOSCOPY      ENDOMETRIAL ABLATION      EYE SURGERY Bilateral     chalazion removed    LUMBAR FUSION N/A 2022    LUMBAR L5-S1 DECOMPRESSION FUSION POSTERIOR performed by Jimmy Lux MD at [address] 2022    REVISION L5-S1  POSTERIOR  INSTRUMENTED FUSION performed by Jimmy Lux MD at [address] Right 2020    RIGHT L4 AND L5 EPIDURAL STEROID INJECTION performed by Jayden Flannery MD at [address] Right 10/26/2020    EPIDURAL STEROID INJECTION -RIGHT L4 L5 performed by Jayden Flannery MD at [address]          Comorbidities:   [] Obesity [] Dialysis  [] Other:   [] Asthma/COPD [] Dementia [] Other:   [] Stroke [] Sleep apnea [] Other:   [] Vascular disease [] Rheumatic disease [] Other:     Preferred Language:   [x] English           [] Other:    Prior Imaging: Xray   Narrative   AP lateral lumbar spine status post L5-S1 fusion with extension to the    pelvis for the instrumentation stable no change         Previous Treatment: none     Home Environment: Has no difficulties     Medications: [x] Refer to full medical record [] None [] Other:  Allergies:      [x] Refer to full medical record [] None [] Other:    Work Status: not currently working -- will see surgeon end of Feb to determine     Pain present?  Yes    Location Lower back 1/10   L lateral calf 2/10    Description of pain Ache, dull    Altered Sensation None    What makes it worse Sitting with legs straight out in bed, sitting on the couch   What makes it better Sitting with leaning to R    Symptom progression Improving    Sleep Good with sleep         Functional Status Previous level of function Current level of function Comments   Sitting [x] Independent  [] Deficit [] Independent  [x] Deficit Pain with sitting midline, better with leaning to L    Standing/walking [x] Independent  [] Deficit [] Independent  [] Deficit Standin-30 minutes   Walkin -45 minutes    Driving [x] Independent  [] Deficit [x] Independent  [] Deficit    Housekeeping/Meal Preparation [x] Independent  [] Deficit [x] Independent  [] Deficit    Lifting/Carrying [] Independent  [x] Deficit [] Independent  [x] Deficit Does not feel strong enough to lift   Bending/Reaching [x] Independent  [] Deficit [x] Independent  [] Deficit    Stair climbing [x] Independent  [] Deficit [x] Independent  [] Deficit Taking time    Pivoting [x] Independent  [] Deficit [x] Independent  [] Deficit    Squatting [x] Independent  [] Deficit [x] Independent  [] Deficit    Other [] Independent  [] Deficit [] Independent  [] Deficit      Patient Goals/Rehab Expectations:  To get stronger       Objective:    OBSERVATION No Deficit Deficit Comments   Posture          Forward head [x]  []     Rounded shoulders [x]  []     Slumped sitting  [x]  []     kyphosis [x]  []     Lordosis []  [x]  Decreased    Lateral Shift [x]  []     Scoliosis [x]  []     Iliac Crest [x]  []     Palpation [x]  []     Joint Mobility  []  [x]  Limited in lumbar due to hardware    Sensation [x]  []      Edema [x]  []            Neurological [x]  []     Reflexes      Compression/distraction      Gait []  [x]   antalgic with initial steps; decreased trunk control noted    FALL RISK?  x  No falls noted     COMMENTS:          Range of Motion  Left Range of Motion  Right Strength  Left Strength  Right   Lumbar Flexion 50% -- moves slowly  4 4+   Lumbar Extension 100%       Lumbar Rotation 50% 50%     Lumbar Side Bend 100%  100%      Hip Flexion (L1-2)   4 4+   Hip Abduction   4 4   Hip Adduction    4+   Hip Extension   3 3   Knee Flexion    5 5   Knee Extension  (L3)   4+ 4   Dorsiflexion (L4-L5)   5 5   Plantar flexion (S1)   5 5   -- Effortful TrAB contraction   -- decreased pelvic control with glute bridge and SL stance     MUSCLE LENGTH TESTING Normal Left Tight Right Tight   Glutes []  []  []    Piriformis []  []  [x]    ITB/TFL []  []  []    Hip Flexors []  []  []    Quads []  []  []    Hamstrings []  []  []    Gastrocnemius []  []  [] Soleus []  []  []    Multifidus  []  []  [x]     []  []  []        Functional Testin second sit to stand: 6 reps     BALANCE Left Right   Tandem Stance (Eyes Open)     Tandem Stance (Eyes Closed)     Single Leg (Eyes Open) 15s 6s   Single Leg (Eyes Closed     Other:     Comments:     Assessment:    Pt presents about 3 months post lumbar revision. She is doing well having minimal pain but feel overall weak. Do observe mild weakness of lower extremities and trunk with MMT, analysis of movement, and with 30 second sit to stand. Much more weakness proximally which is likely why she fatigues easily and feels she can not lift things. Feel to develop based strength without flaring up pain, pt would best be suited with starting aquatic therapy for x6 sessions. Will then follow with land based loading after that foundational strength is gained. Feel that with PT pt has good prognosis to recover strength and increased functional capacity to allow to perform ADLs and return to work with less dysfunction. Problems:    [] ? Pain:  [] ? ROM:  [x] ? Strength:  [x] ? Function:  [] Other:    STG: (to be met in 6 treatments)   Pt will be able to maintain TrAB contraction with functional dynamic movements showing increased core stability to progress towards more lifting training. Pt will improve performance on  30s STS  to >10 reps showing improved power to arise to standing and better endurance with transfers. Pt will be able to tolerate sitting at a midine posture without pain increase to maintain a good alignment. LTG: (to be met in 13 treatments)   Pt will increase strength of all major muscle groups of the LEs to 5/5 or greater demonstrating improved strength needed to maximize safety and efficiency with mobility tasks such as gait, transfers and stairs. Pt will be able to maintain SLS with no trendelenberg showing improved proximal control for walking ans stairs.    Pt will decrease functional limitation per mod BRYN to <24%  in order to demonstrate improved functional tolerances at PLOF with minimal restriction/dysfunction  Pt will demonstrate independence with a long term HEP for continued progress/maintenance after completion of PT      Functional Assessment Used: Mod. BRYN  Current Status Score: 17/50 = 34%   Goal Status Score: <24%     Evaluation Complexity:  History (Personal factors, comorbidities) [] 0 [x] 1-2 [] 3+   Exam (limitations, restrictions) [] 1-2 [x] 3 [] 4+   Clinical presentation (progression) [x] Stable [] Evolving  [] Unstable   Decision Making [x] Low [] Moderate [] High    [x] Low Complexity [] Moderate Complexity [] High Complexity     Rehab Potential:  [x] Good  [] Fair  [] Poor   Suggested Professional Referral:  [x] No  [] Yes:  Barriers to Goal Achievement[de-identified]  [x] No  [] Yes:  Domestic Concerns:  [x] No  [] Yes:    Pt. Education:  [x] Plans/Goals, Risks/Benefits discussed  [x] Home exercise program  Method of Education: [x] Verbal  [x] Demo  [x] Written  Comprehension of Education:  [x] Verbalizes understanding. [x] Demonstrates understanding. [] Needs Review. [x] Demonstrates/verbalizes understanding of HEP/Ed previously given.     Treatment Plan:  [x] Therapeutic Exercise   87807  [] Iontophoresis: 4 mg/mL Dexamethasone Sodium Phosphate  mAmin  33467   [x] Therapeutic Activity  02634 [] Vasopneumatic cold with compression  99981    [] Gait Training   46595 [] Ultrasound   88016   [x] Neuromuscular Re-education  33972 [] Electrical Stimulation Unattended  23426   [x] Manual Therapy  23722 [] Electrical Stimulation Attended  92858   [x] Instruction in HEP  [] Lumbar/Cervical Traction  19759   [x] Aquatic Therapy   27271 [] Cold/hotpack    [] Massage   18470      [] Dry Needling, 1 or 2 muscles  73854   [] Biofeedback, first 15 minutes   60900  [] Biofeedback, additional 15 minutes   86057 [] Dry Needling, 3 or more muscles  26715       Frequency: 2 x/week for 13 total visits    Ruth Willis Treatment:  INTERVENTIONS  Reps/ Time Weight/ Level Completed  Today Comments          MODALITIES                      MANUAL                      EXERCISES        Posterior pelvic tilts  10x  x    Supine brace + SLR  10x ea  x    Single leg fall outs  10x ea  green x    - educated on the benefits of aquatic therapy. Provided with handout for pool procedures and reviewed with patient.       Specific Instructions for next treatment: begin aquatic PT with moderate challenge for core stability     Treatment Charges: Mins Units   [x] Evaluation       [x]  Low       []  Moderate       []  High 33 1   []  Modalities     [x]  Ther Exercise 14 1   []  Manual Therapy     []  Ther Activities     []  Aquatics     []  Neuromuscular     []  Gait Training     []  Dry Needling           1-2 muscles     []  Dry Needling           3 or more muscles     [] Vasocompression     []  Other       Time in: 11:11a   Time Out: 2494   TOTAL  TIME: 47     Total billable time: 14     Electronically signed by: Chauncey Frankel PT

## 2023-02-13 ENCOUNTER — OFFICE VISIT (OUTPATIENT)
Dept: PAIN MANAGEMENT | Age: 58
End: 2023-02-13
Payer: COMMERCIAL

## 2023-02-13 VITALS
HEIGHT: 67 IN | DIASTOLIC BLOOD PRESSURE: 76 MMHG | BODY MASS INDEX: 28.25 KG/M2 | WEIGHT: 180 LBS | SYSTOLIC BLOOD PRESSURE: 111 MMHG | OXYGEN SATURATION: 99 % | HEART RATE: 81 BPM

## 2023-02-13 DIAGNOSIS — M54.42 CHRONIC BILATERAL LOW BACK PAIN WITH BILATERAL SCIATICA: Primary | ICD-10-CM

## 2023-02-13 DIAGNOSIS — M54.41 CHRONIC BILATERAL LOW BACK PAIN WITH BILATERAL SCIATICA: Primary | ICD-10-CM

## 2023-02-13 DIAGNOSIS — M47.817 LUMBOSACRAL SPONDYLOSIS WITHOUT MYELOPATHY: ICD-10-CM

## 2023-02-13 DIAGNOSIS — G89.29 CHRONIC BILATERAL LOW BACK PAIN WITH BILATERAL SCIATICA: Primary | ICD-10-CM

## 2023-02-13 DIAGNOSIS — Z98.1 S/P LUMBAR FUSION: ICD-10-CM

## 2023-02-13 PROCEDURE — 3078F DIAST BP <80 MM HG: CPT | Performed by: NURSE PRACTITIONER

## 2023-02-13 PROCEDURE — 99213 OFFICE O/P EST LOW 20 MIN: CPT | Performed by: NURSE PRACTITIONER

## 2023-02-13 PROCEDURE — 3074F SYST BP LT 130 MM HG: CPT | Performed by: NURSE PRACTITIONER

## 2023-02-13 RX ORDER — LOSARTAN POTASSIUM 50 MG/1
TABLET ORAL
COMMUNITY
Start: 2023-02-07

## 2023-02-13 RX ORDER — GABAPENTIN 600 MG/1
600 TABLET ORAL 3 TIMES DAILY
Qty: 90 TABLET | Refills: 2 | Status: SHIPPED | OUTPATIENT
Start: 2023-02-13 | End: 2023-03-15

## 2023-02-13 RX ORDER — GABAPENTIN 400 MG/1
CAPSULE ORAL
Qty: 90 CAPSULE | Refills: 0 | OUTPATIENT
Start: 2023-02-13

## 2023-02-13 RX ORDER — GABAPENTIN 400 MG/1
400 CAPSULE ORAL 3 TIMES DAILY
Qty: 90 CAPSULE | Refills: 0 | Status: CANCELLED | OUTPATIENT
Start: 2023-02-13 | End: 2023-03-15

## 2023-02-13 ASSESSMENT — ENCOUNTER SYMPTOMS
SHORTNESS OF BREATH: 0
NAUSEA: 0
COUGH: 0
BACK PAIN: 1
DIARRHEA: 0
VOMITING: 0
CONSTIPATION: 0

## 2023-02-13 NOTE — PROGRESS NOTES
Chief Complaint   Patient presents with    Back Pain     Gabapentin          PMH     Pt c/o low back and right lumbar radicular pain hip buttock area occ numbness in right foot. She has tried LESI  therapy and chiropractic treatment with minimal relief  She is also prescribed NSAID and gabapentin  Hx of  L5-S1 posterior  decompression fusion surgery with Dr. Olivier Gomez 5/2022. She continued to have pain with CT done with his office 7/2022 and now s/p revision L5-S1 posterior instrumented fusion done 11/22 with post op Xray showing stable fusion  Had 11 week post op f/u with Dr Olivier Gomez 1/2023 with PT ordered  Pt feels pain is improving and would like to try decreasing gabapentin. Went from 600 to 400mg last month. 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 The current episode started more than 1 year ago. The problem has been gradually improving since onset. The pain is present in the lumbar spine. The quality of the pain is described as aching and shooting. Radiates to: left calf. The pain is at a severity of 2/10. The pain is moderate. The symptoms are aggravated by standing (walking). Pertinent negatives include no fever, headaches, numbness or paresthesias. Risk factors include menopause, obesity, poor posture, sedentary lifestyle and lack of exercise. She has tried analgesics for the symptoms. The treatment provided mild relief.         Chief Complaint:  Medication Refill:     Pain score Today:  2  Adverse effects (Constipation / Nausea / Sedation / sexual Dysfunction / others) : no  Mood: good  Sleep pattern and quality: poor not do to meds   Activity level: fair    Pill count Today:NA  Last dose taken  NA  OARRS report reviewed today: yes  ER/Hospitalizations/PCP visit related to pain since last visit:no   Any legal problems e.g. DUI etc.:No  Satisfied with current management: Yes  Opioid Contract : NA  Last Urine Dug screen dated:NA    Hemoglobin A1C   Date Value Ref Range Status   12/31/2021 5.9 % Final       Past Medical History, Past Surgical History, Social History, Allergies and Medications reviewed and updated in EPIC as indicated    Family History reviewed and is noncontributory.                Past Medical History:   Diagnosis Date    Abnormal EKG     Arthritis     Chest pain     Chronic bilateral low back pain with bilateral sciatica 02/11/2020    DDD (degenerative disc disease), lumbar 02/11/2020    Dyslipidemia     Elevated BP without diagnosis of hypertension     Hay fever     Hypertension     Lumbosacral spondylosis without myelopathy 04/18/2022    MVP (mitral valve prolapse)     Pre-diabetes     Snoring        Past Surgical History:   Procedure Laterality Date    BREAST SURGERY Bilateral     biopsy    CHOLECYSTECTOMY      COLONOSCOPY      ENDOMETRIAL ABLATION      EYE SURGERY Bilateral     chalazion removed    LUMBAR FUSION N/A 5/16/2022    LUMBAR L5-S1 DECOMPRESSION FUSION POSTERIOR performed by No Jung MD at [address] [facility] 11/14/2022    REVISION L5-S1  POSTERIOR  INSTRUMENTED FUSION performed by No Jung MD at [address] Right 7/16/2020    RIGHT L4 AND L5 EPIDURAL STEROID INJECTION performed by Ashish Umaña MD at [address] Right 10/26/2020    EPIDURAL STEROID INJECTION -RIGHT L4 L5 performed by Ashish Umaña MD at [address] [facility]   Allergen Reactions    Cefuroxime      Other reaction(s): Unknown         Current Outpatient Medications:     losartan (COZAAR) 50 MG tablet, take 1 tablet by mouth once daily, Disp: , Rfl:     metaxalone (SKELAXIN) 800 MG tablet, take 1 tablet by mouth three times a day, Disp: 90 tablet, Rfl: 0    tiZANidine (ZANAFLEX) 4 MG tablet, Take 1 tablet by mouth 3 times daily as needed (muscle spasms/leg pain), Disp: 90 tablet, Rfl: 0    alendronate (FOSAMAX) 70 MG tablet, Take 1 tablet by mouth every 7 days, Disp: 12 tablet, Rfl: 1    losartan (COZAAR) 100 MG tablet, take 1 tablet by mouth once daily, Disp: 90 tablet, Rfl: 1    gabapentin (NEURONTIN) 400 MG capsule, Take 1 capsule by mouth 3 times daily for 30 days. , Disp: 90 capsule, Rfl: 0    diclofenac (VOLTAREN) 75 MG EC tablet, , Disp: , Rfl:     magnesium 30 MG tablet, Take 1 tablet by mouth in the morning, at noon, and at bedtime Supplement magnesium over the counter, Disp: , Rfl:     acetaminophen (TYLENOL) 650 MG extended release tablet, Take 650 mg by mouth every 8 hours as needed for Pain, Disp: , Rfl:     Family History   Problem Relation Age of Onset    High Blood Pressure Mother     High Cholesterol Mother     Other Mother         blood clots, denies any known clotting d/o    Other Father         alcoholism    Arthritis Sister     No Known Problems Other        Social History     Socioeconomic History    Marital status:      Spouse name: Not on file    Number of children: Not on file    Years of education: Not on file    Highest education level: Not on file   Occupational History    Not on file   Tobacco Use    Smoking status: Never    Smokeless tobacco: Never   Vaping Use    Vaping Use: Never used   Substance and Sexual Activity    Alcohol use:  Yes     Alcohol/week: 3.0 standard drinks     Types: 3 Glasses of wine per week     Comment: moderately 3 glasses of wine per week    Drug use: No    Sexual activity: Yes     Partners: Male   Other Topics Concern    Not on file   Social History Narrative    Not on file     Social Determinants of Health     Financial Resource Strain: Low Risk     Difficulty of Paying Living Expenses: Not hard at all   Food Insecurity: No Food Insecurity    Worried About Running Out of Food in the Last Year: Never true    Ran Out of Food in the Last Year: Never true   Transportation Needs: Not on file   Physical Activity: Not on file   Stress: Not on file   Social Connections: Not on file   Intimate Partner Violence: Not on file   Housing Stability: Not on file       Review of Systems:  Review of Systems   Constitutional: Positive for malaise/fatigue. Negative for chills and fever. Cardiovascular:  Negative for chest pain. Respiratory:  Negative for cough and shortness of breath. Musculoskeletal:  Positive for back pain and stiffness. Negative for falls, muscle cramps and muscle weakness. Gastrointestinal:  Negative for constipation, diarrhea, nausea and vomiting. Neurological:  Negative for dizziness, headaches and numbness. Physical Exam:  /76   Pulse 81   Ht 5' 7\" (1.702 m)   Wt 180 lb (81.6 kg)   SpO2 99%   BMI 28.19 kg/m²     Physical Exam  Cardiovascular:      Rate and Rhythm: Normal rate. Pulmonary:      Effort: Pulmonary effort is normal.   Musculoskeletal:         General: Normal range of motion. Skin:     General: Skin is warm and dry. Neurological:      Mental Status: She is alert and oriented to person, place, and time. Assessment:  Problem List Items Addressed This Visit       S/P lumbar fusion    Chronic bilateral low back pain with bilateral sciatica - Primary    Lumbosacral spondylosis without myelopathy          Treatment Plan:  Patient relates current medications are helping the pain. Patient reports taking pain medications as prescribed, denies obtaining medications from different sources and denies use of illegal drugs. Medication risk and benefits have been discussed. Patient denies side effects from medications like nausea, vomiting, constipation or drowsiness. Patient reports current activities of daily living are possible due to medications and would like to continue them. As always, we encourage daily stretching and strengthening exercises, and recommend minimizing use of pain medications unless patient cannot get through daily activities due to pain.       Script written for gabapentin back to 600mg TID  Plans to start PT  Follow up appointment made for 3 months     I have reviewed the chief complaint and history of present illness (including ROS and PFSH) and vital documentation by my staff and I agree with their documentation and have added where applicable.

## 2023-02-14 ENCOUNTER — HOSPITAL ENCOUNTER (OUTPATIENT)
Dept: PHYSICAL THERAPY | Age: 58
Setting detail: THERAPIES SERIES
Discharge: HOME | End: 2023-02-14
Payer: COMMERCIAL

## 2023-02-14 PROCEDURE — 97113 AQUATIC THERAPY/EXERCISES: CPT

## 2023-02-14 NOTE — FLOWSHEET NOTE
[x] [facility]) - [facility] & Therapy  [address]  [place]: [phone]   F: [phone]    Physical Therapy Daily Treatment Note    Date:  2023  Patient Name:  Nick Plaza    :  1965  MRN: [id number]  Physician: Klaus Bruce MD                                    Insurance: [place]- 61 visits per year   Medical Diagnosis:   M51.36 (ICD-10-CM) - DDD (degenerative disc disease), lumbar   M51.26 (ICD-10-CM) - Lumbar disc herniation                 Rehab Codes: R25 pain , M25.60 stiffness, R53.1 weakness   Onset Date: 22- DOS              Next 's appt: 23  Visit# / total visits:   Cancels/No Shows: 0/0    Subjective:  Pt reports pain is in left lumbar back/buttocks at this time with no radicular symptoms. States sitting is most bothersome and the transferring between sitting and standing is painful. Notes 2/10 is an average day at this time of the day. Pain:  [x] Yes  [] No Location: Left Lumbar back/buttocks with intermittent lateral left calf pain    Pain Rating: (0-10 scale) 2/10  Pain altered Tx:  [x] No  [] Yes  Action:  Comments: Initiated aquatic therapy visit. Educated on postural awareness, core stability and working in pain free ranges. Objective:      [address] [facility] Exercise Log  Aquatic, Hip & DLS Program- Phase 1    Date of Eval:  23                              Primary PT: Jean-Paul Jiang, PT        Date 23       Visit # 2/       Walk F/L/R 2 Laps +R      Marching 10x       Squats 10x5\"       Step-Ups F/L        Step Down F/L        Heel-toe raises 10x       SLR F/L/R 10x       Hip/Knee Flex/Ext  Add      F/L Lunges                Kickboard Ex.  Med       Iso Abd. 10x5\"       Push-pull 10x       Paddling                UE Format:        Horiz Abd/Add        IR/ER (wipers)        Alt Flex/Ext        Alt Press Down        Abd/Add                [place]:        [address] [facility] [employer]        X-Country                Balance        SLS                Stretches        Achllies 2x20\"       Hamstring 2x20\"               Cool Down 2 Laps       Pain Rating 2          Specific Instructions for next treatment: Assess tolerance to initial aquatic therapy visit and progress as patient is able. Assessment: [x] Progressing toward goals. Initiated aquatic therapy for lumbar back pain and LLE weakness. Educated on postural awareness, core stability and working in pain free ranges with all exercises. Good overall tolerance and technique with no pain complaints throughout treatment. Notes less pressure when standing in 30-35% WB environment on pool floor. [] No change. [] Other:    [x] Patient would continue to benefit from skilled physical therapy services in order to:  recover strength and increased functional capacity to allow to perform ADLs and return to work with less dysfunction. STG: (to be met in 6 treatments)   Pt will be able to maintain TrAB contraction with functional dynamic movements showing increased core stability to progress towards more lifting training. Pt will improve performance on  30s STS  to >10 reps showing improved power to arise to standing and better endurance with transfers. Pt will be able to tolerate sitting at a midine posture without pain increase to maintain a good alignment. LTG: (to be met in 13 treatments)   Pt will increase strength of all major muscle groups of the LEs to 5/5 or greater demonstrating improved strength needed to maximize safety and efficiency with mobility tasks such as gait, transfers and stairs. Pt will be able to maintain SLS with no trendelenberg showing improved proximal control for walking ans stairs.    Pt will decrease functional limitation per mod BRYN to <24%  in order to demonstrate improved functional tolerances at PLOF with minimal restriction/dysfunction  Pt will demonstrate independence with a long term HEP for continued progress/maintenance after completion of PT       Pt. Education:  [x] Yes  [] No  [x] Reviewed Prior HEP/Ed- Educated on postural awareness, core stability and working in pain free ranges. Method of Education: [x] Verbal  [x] Demo  [] Written  Comprehension of Education:  [x] Verbalizes understanding. [x] Demonstrates understanding. [] Needs review. [] Demonstrates/verbalizes HEP/Ed previously given. Plan: [x] Continue per plan of care.    [] Other:      Treatment Charges: Mins Units   []  Modalities     []  Ther Exercise     []  Manual Therapy     []  Ther Activities     [x]  Aquatics 28 2   []  Neuromuscular     [] Vasocompression     [] Gait Training     [] Dry needling        [] 1 or 2 muscles        [] 3 or more muscles     []  Other     Total Treatment time 28 2     Time In:  3797            Time Out: 3370    Electronically signed by:  Liss Palacios PTA

## 2023-02-17 ENCOUNTER — HOSPITAL ENCOUNTER (OUTPATIENT)
Dept: PHYSICAL THERAPY | Age: 58
Setting detail: THERAPIES SERIES
Discharge: HOME | End: 2023-02-17
Payer: COMMERCIAL

## 2023-02-17 DIAGNOSIS — M54.16 LUMBAR RADICULOPATHY: ICD-10-CM

## 2023-02-17 PROCEDURE — 97113 AQUATIC THERAPY/EXERCISES: CPT

## 2023-02-17 RX ORDER — TIZANIDINE 4 MG/1
TABLET ORAL
Qty: 90 TABLET | Refills: 0 | Status: SHIPPED | OUTPATIENT
Start: 2023-02-17

## 2023-02-17 NOTE — FLOWSHEET NOTE
[x] [facility]   Outpatient Rehabilitation & Therapy  [address]  P: [phone]   F: [phone]    Physical Therapy Daily Treatment Note    Date:  2023  Patient Name:  Ann Hardy    :  1965  MRN: [id number]  Physician: Salomon Owens MD                                    Insurance: [place]- 60 visits per year   Medical Diagnosis:   M51.36 (ICD-10-CM) - DDD (degenerative disc disease), lumbar   M51.26 (ICD-10-CM) - Lumbar disc herniation                 Rehab Codes: R25 pain , M25.60 stiffness, R53.1 weakness   Onset Date: 22- DOS              Next 's appt: 23  Visit# / total visits: 3/13  Cancels/No Shows: 0/0    Subjective:  Pt reports mild soreness after initial aquatic therapy visit.  States pain still gets higher as the day progresses and activity level progresses.  Notes pain slightly higher today with no radicular symptoms at this time.  Pain:  [x] Yes  [] No Location: Left Lumbar back/buttocks with intermittent lateral left calf pain    Pain Rating: (0-10 scale) 4/10  Pain altered Tx:  [x] No  [] Yes  Action:  Comments: reviewed postural awareness, core stability and working in pain free ranges.    Objective:      [facility] Exercise Log  Aquatic, Hip & DLS Program- Phase 1    Date of Eval:  23                              Primary PT: hCris See, PT        Date 23      Visit # 2/13 3/12      Walk F/L/R 2 Laps 2 Laps +R      Marching 10x 10x      Squats 10x5\" 10x5\"      Step-Ups F/L        Step Down F/L        Heel-toe raises 10x 10x      SLR F/L/R 10x 10x      Hip/Knee Flex/Ext  10x      F/L Lunges                Kickboard Ex. Med Med      Iso Abd. 10x5\" 10x5\"      Push-pull 10x 10x      Paddling                UE Format:        Horiz Abd/Add        IR/ER (wipers)        Alt Flex/Ext        Alt Press Down        Abd/Add                Deep Water:        Hang        Cycling        Jacks       X-Country                Balance        SLS                Stretches        Achllies 2x20\" 2x20\"      Hamstring 2x20\" 2x20\"              Cool Down 2 Laps 2 Laps      Pain Rating 2 4         Specific Instructions for next treatment: Add deep water exercises, UE format and progress reps as patient is able. Assessment: [x] Progressing toward goals. Added retro ambulation for rear chain engagement, forward step ups for hip/LE strengthening and hip/knee flex/ext for LE ROM and core stabilization. Good tolerance to all exercises with slight discomfort with retro ambulation and SLR. Cued to decrease height to avoid pain and able to complete in pain free ranges. Notes LLE leading for step ups more difficult vs RLE leading. [] No change. [] Other:    [x] Patient would continue to benefit from skilled physical therapy services in order to:  recover strength and increased functional capacity to allow to perform ADLs and return to work with less dysfunction. STG: (to be met in 6 treatments)   Pt will be able to maintain TrAB contraction with functional dynamic movements showing increased core stability to progress towards more lifting training. Pt will improve performance on  30s STS  to >10 reps showing improved power to arise to standing and better endurance with transfers. Pt will be able to tolerate sitting at a midine posture without pain increase to maintain a good alignment. LTG: (to be met in 13 treatments)   Pt will increase strength of all major muscle groups of the LEs to 5/5 or greater demonstrating improved strength needed to maximize safety and efficiency with mobility tasks such as gait, transfers and stairs. Pt will be able to maintain SLS with no trendelenberg showing improved proximal control for walking ans stairs.    Pt will decrease functional limitation per mod BRYN to <24%  in order to demonstrate improved functional tolerances at PLOF with minimal restriction/dysfunction  Pt will demonstrate independence with a long term HEP for continued progress/maintenance after completion of PT       Pt. Education:  [] Yes  [] No  [x] Reviewed Prior HEP/Ed- Educated on postural awareness, core stability and working in pain free ranges. Method of Education: [] Verbal  [] Demo  [] Written  Comprehension of Education:  [] Verbalizes understanding. [] Demonstrates understanding. [] Needs review. [x] Demonstrates/verbalizes HEP/Ed previously given. Plan: [x] Continue per plan of care.    [] Other:      Treatment Charges: Mins Units   []  Modalities     []  Ther Exercise     []  Manual Therapy     []  Ther Activities     [x]  Aquatics 31 2   []  Neuromuscular     [] Vasocompression     [] Gait Training     [] Dry needling        [] 1 or 2 muscles        [] 3 or more muscles     []  Other     Total Treatment time 31 2     Time In:  8732            Time CHQ:0820     Electronically signed by:  Eneida Prado PTA

## 2023-02-17 NOTE — TELEPHONE ENCOUNTER
Esperanza Akins is calling to request a refill on the following medication(s):    Medication Request:  Requested Prescriptions     Pending Prescriptions Disp Refills    tiZANidine (ZANAFLEX) 4 MG tablet [Pharmacy Med Name: TIZANIDINE HCL 4 MG TABLET] 90 tablet 0     Sig: take 1 tablet by mouth three times a day if needed for muscle spasm       Last Visit Date (If Applicable):  1/32/0204    Next Visit Date:    5/1/2023

## 2023-02-21 ENCOUNTER — HOSPITAL ENCOUNTER (OUTPATIENT)
Dept: PHYSICAL THERAPY | Age: 58
Setting detail: THERAPIES SERIES
Discharge: HOME | End: 2023-02-21
Payer: COMMERCIAL

## 2023-02-21 PROCEDURE — 97113 AQUATIC THERAPY/EXERCISES: CPT

## 2023-02-21 NOTE — FLOWSHEET NOTE
[x] [facility]) - [facility] & Therapy  [address]  [place]: [phone]   F: [phone]    Physical Therapy Daily Treatment Note    Date:  2023  Patient Name:  Prasanth Reese    :  1965  MRN: [id number]  Physician: Alejandrina Tate MD                                    Insurance: [place]- 61 visits per year   Medical Diagnosis:   M51.36 (ICD-10-CM) - DDD (degenerative disc disease), lumbar   M51.26 (ICD-10-CM) - Lumbar disc herniation                 Rehab Codes: R25 pain , M25.60 stiffness, R53.1 weakness   Onset Date: 22- DOS              Next Dr.'s appt: 23  Visit# / total visits:   Cancels/No Shows: 0/0    Subjective:  Pt reports less pain this morning but still getting intermittent calf symptoms the more active she is throughout the day. States saw pain management and they increased peds. Pt reports LLE feels heavy and weak today. Pain:  [x] Yes  [] No Location: Left Lumbar back/buttocks with intermittent lateral left calf pain    Pain Rating: (0-10 scale) 2/10  Pain altered Tx:  [x] No  [] Yes  Action:  Comments: reviewed postural awareness, core stability and working in pain free ranges. Objective:      [facility] Exercise Log  Aquatic, Hip & DLS Program- Phase 1    Date of Eval:  23                              Primary PT: Viral Christianson, PT      Date 23     Visit # 2/13 3/13 4/13     Walk F/L/R 2 Laps 2 Laps +R 2 Laps     Marching 10x 10x 12x     Squats 10x5\" 10x5\" 12x5\"     Step-Ups F/L   Low 10x     Step Down F/L        Heel-toe raises 10x 10x 12x     SLR F/L/R 10x 10x 12x     Hip/Knee Flex/Ext  10x 12x     F/L Lunges                Kickboard Ex.  Med Med Med     Iso Abd. 10x5\" 10x5\" 12x5\"     Push-pull 10x 10x 12x     Paddling                UE Format:        Horiz Abd/Add        IR/ER (wipers)        Alt Flex/Ext        Alt Press Down        Abd/Add                Deep Water: 1 Noodle     Hang   5'     Cycling        Jacks        X-Country                Balance        SLS                Stretches        Achllies 2x20\" 2x20\" 2x20\"     Hamstring 2x20\" 2x20\" 2x20\"             Cool Down 2 Laps 2 Laps 2 Laps     Pain Rating 2 4 2        Specific Instructions for next treatment: Add deep water exercises, UE format and progress reps as patient is able. Assessment: [x] Progressing toward goals. Continued with emphasis on core stability and postural awareness. Added forward and lateral step ups for LE strengthening and progressed reps for all LE exercises for further LE strengthening and core stability. States stretches and hip/knee flex/ext exercise decreased tightness in lumbar back and posterior LEs. Also \"heavy\" feeling in LLE went away with stretches. Ended with deep water hang for unloading of spine with no pain felt while hanging and decreased pain when exiting pool. [] No change. [] Other:    [x] Patient would continue to benefit from skilled physical therapy services in order to:  recover strength and increased functional capacity to allow to perform ADLs and return to work with less dysfunction. STG: (to be met in 6 treatments)   Pt will be able to maintain TrAB contraction with functional dynamic movements showing increased core stability to progress towards more lifting training. Pt will improve performance on  30s STS  to >10 reps showing improved power to arise to standing and better endurance with transfers. Pt will be able to tolerate sitting at a midine posture without pain increase to maintain a good alignment. LTG: (to be met in 13 treatments)   Pt will increase strength of all major muscle groups of the LEs to 5/5 or greater demonstrating improved strength needed to maximize safety and efficiency with mobility tasks such as gait, transfers and stairs.     Pt will be able to maintain SLS with no trendelenberg showing improved proximal control for walking ans stairs. Pt will decrease functional limitation per mod BRYN to <24%  in order to demonstrate improved functional tolerances at PLOF with minimal restriction/dysfunction  Pt will demonstrate independence with a long term HEP for continued progress/maintenance after completion of PT       Pt. Education:  [] Yes  [] No  [x] Reviewed Prior HEP/Ed- Educated on postural awareness, core stability and working in pain free ranges. Method of Education: [] Verbal  [] Demo  [] Written  Comprehension of Education:  [] Verbalizes understanding. [] Demonstrates understanding. [] Needs review. [x] Demonstrates/verbalizes HEP/Ed previously given. Plan: [x] Continue per plan of care.    [] Other:      Treatment Charges: Mins Units   []  Modalities     []  Ther Exercise     []  Manual Therapy     []  Ther Activities     [x]  Aquatics 36 2   []  Neuromuscular     [] Vasocompression     [] Gait Training     [] Dry needling        [] 1 or 2 muscles        [] 3 or more muscles     []  Other     Total Treatment time 36 2     Time In:  6117            Time Out:  8798    Electronically signed by:  Anastasiia Brandt PTA

## 2023-02-24 ENCOUNTER — HOSPITAL ENCOUNTER (OUTPATIENT)
Dept: PHYSICAL THERAPY | Age: 58
Setting detail: THERAPIES SERIES
Discharge: HOME | End: 2023-02-24
Payer: COMMERCIAL

## 2023-02-24 PROCEDURE — 97113 AQUATIC THERAPY/EXERCISES: CPT

## 2023-02-24 NOTE — FLOWSHEET NOTE
[x] [facility]) - [facility] & Therapy  [address]  [place]: [phone]   F: [phone]    Physical Therapy Daily Treatment Note    Date:  2023  Patient Name:  Marium Law    :  1965  MRN: [id number]  Physician: Janice Elena MD                                    Insurance: [employer]- 61 visits per year   Medical Diagnosis:   M51.36 (ICD-10-CM) - DDD (degenerative disc disease), lumbar   M51.26 (ICD-10-CM) - Lumbar disc herniation                 Rehab Codes: R25 pain , M25.60 stiffness, R53.1 weakness   Onset Date: 22- DOS              Next 's appt: 23  Visit# / total visits:   Cancels/No Shows: 0/0    Subjective:  Pt reports no issues after last therapy session. Notes minimal pain after deep water hang last visit and lasting 2-3 hours afterwards. Pain:  [x] Yes  [] No Location: Left Lumbar back/buttocks with intermittent lateral left calf pain    Pain Rating: (0-10 scale) 1-2/10  Pain altered Tx:  [x] No  [] Yes  Action:  Comments: reviewed postural awareness, core stability and working in pain free ranges. Objective:      [address] [facility] Exercise Log  Aquatic, Hip & DLS Program- Phase 1    Date of Eval:  23                              Primary PT: Ky Acuna, PT      Date 23    Visit # 2/13 3/13 4/13 5/13    Walk F/L/R 2 Laps 2 Laps +R 2 Laps 2 Laps    Marching 10x 10x 12x 2 Laps    Squats 10x5\" 10x5\" 12x5\" 12x5\"    Step-Ups F/L   Low 10x Tall 10x    Step Down F/L        Heel-toe raises 10x 10x 12x 12x    SLR F/L/R 10x 10x 12x 12x    Hip/Knee Flex/Ext  10x 12x 12x    F/L Lunges                Kickboard Ex.  Med Med Med Med    Iso Abd. 10x5\" 10x5\" 12x5\" 12x5\"    Push-pull 10x 10x 12x 12x    Paddling                UE Format:        Horiz Abd/Add        IR/ER (wipers)        Alt Flex/Ext        Alt Press Down        Abd/Add                Deep Water:   1 Noodle 1 Noodle   Hang   5' 5'    Cycling    1'    Jacks    1'    X-Country                Balance        SLS                Stretches        Achllies 2x20\" 2x20\" 2x20\" 2x20\"    Hamstring 2x20\" 2x20\" 2x20\" 2x20\"            Cool Down 2 Laps 2 Laps 2 Laps 2 Laps    Pain Rating 2 4 2 1-2       Specific Instructions for next treatment: Add UE format and progress reps as patient is able.    Assessment: [x] Progressing toward goals.   Progressed marching to laps for core stability with dynamic movement, tall box for step ups for LE strengthening and stability and added deep water cycling and jacks for hip mobility and core stability.  Good technique and tolerance to progressions.  Ended with deep water hang for pain relief and unloading of spine.  Notes no pain in back.LE at end of treatment.    [] No change.     [] Other:    [x] Patient would continue to benefit from skilled physical therapy services in order to:  recover strength and increased functional capacity to allow to perform ADLs and return to work with less dysfunction.        STG: (to be met in 6 treatments)   Pt will be able to maintain TrAB contraction with functional dynamic movements showing increased core stability to progress towards more lifting training.   Pt will improve performance on  30s STS  to >10 reps showing improved power to arise to standing and better endurance with transfers.   Pt will be able to tolerate sitting at a midine posture without pain increase to maintain a good alignment.   LTG: (to be met in 13 treatments)   Pt will increase strength of all major muscle groups of the LEs to 5/5 or greater demonstrating improved strength needed to maximize safety and efficiency with mobility tasks such as gait, transfers and stairs.    Pt will be able to maintain SLS with no trendelenberg showing improved proximal control for walking ans stairs.   Pt will decrease functional limitation per mod BRYN to <24%  in order to demonstrate improved functional tolerances  at [facility] with minimal restriction/dysfunction  Pt will demonstrate independence with a long term HEP for continued progress/maintenance after completion of PT       Pt. Education:  [] Yes  [x] No  [] Reviewed Prior HEP/Ed-   Method of Education: [] Verbal  [] Demo  [] Written  Comprehension of Education:  [] Verbalizes understanding. [] Demonstrates understanding. [] Needs review. [] Demonstrates/verbalizes HEP/Ed previously given. Plan: [x] Continue per plan of care.    [] Other:      Treatment Charges: Mins Units   []  Modalities     []  Ther Exercise     []  Manual Therapy     []  Ther Activities     [x]  Aquatics 39 3   []  Neuromuscular     [] Vasocompression     [] Gait Training     [] Dry needling        [] 1 or 2 muscles        [] 3 or more muscles     []  Other     Total Treatment time 39 3     5 minute deep water hang unbilled time    Time In:  1579            Time Out: 8267     Electronically signed by:  Eileen Serrano, PTA

## 2023-02-27 ENCOUNTER — APPOINTMENT (OUTPATIENT)
Dept: PHYSICAL THERAPY | Age: 58
End: 2023-02-27
Payer: COMMERCIAL

## 2023-02-27 PROCEDURE — 97113 AQUATIC THERAPY/EXERCISES: CPT

## 2023-02-27 NOTE — FLOWSHEET NOTE
[x] [facility]) - [facility] & Therapy  [address]  [place]: [phone]   F: [phone]    Physical Therapy Daily Treatment Note    Date:  2023  Patient Name:  Kendal Gaytan    :  1965  MRN: [id number]  Physician: Samy Kaur MD                                    Insurance: [employer]- 61 visits per year   Medical Diagnosis:   M51.36 (ICD-10-CM) - DDD (degenerative disc disease), lumbar   M51.26 (ICD-10-CM) - Lumbar disc herniation                 Rehab Codes: R25 pain , M25.60 stiffness, R53.1 weakness   Onset Date: 22- DOS              Next Dr.'s appt: 23  Visit# / total visits:   Cancels/No Shows: 0/0    Subjective:  Pt reports denies and issues after last therapy session. States pain has been tolerable throughout the day with increased calf symptoms when relaxing at the end of the day  notes radicular/calf symptoms have been less intense and less often since starting therapy. Pain:  [x] Yes  [] No Location: Left Lumbar back/buttocks with intermittent lateral left calf pain    Pain Rating: (0-10 scale) 2/10  Pain altered Tx:  [x] No  [] Yes  Action:  Comments: reviewed postural awareness, core stability and working in pain free ranges. Objective:      [address] [facility] Exercise Log  Aquatic, Hip & DLS Program- Phase 1    Date of Eval:  23                              Primary PT: Praveen Hunter, PT      Date 23   Visit # 2/13 3/13 4/13 5/13 6/13   Walk F/L/R 2 Laps 2 Laps +R 2 Laps 2 Laps 2 Laps   Marching 10x 10x 12x 2 Laps 2 Laps   Squats 10x5\" 10x5\" 12x5\" 12x5\" 15x5\"   Step-Ups F/L   Low 10x Tall 10x Tall 10x  12x Lat   Step Down F/L        Heel-toe raises 10x 10x 12x 12x 15x   SLR F/L/R 10x 10x 12x 12x 15x   Hip/Knee Flex/Ext  10x 12x 12x 15x   F/L Lunges                Kickboard Ex.  Med Med Med Med Med   Iso Abd. 10x5\" 10x5\" 12x5\" 12x5\" 12x5\"   Push-pull 10x 10x 12x 12x 12x   Paddling     10x           UE Format:        Horiz Abd/Add     10x   IR/ER (wipers)        Alt Flex/Ext     10x   Alt Press Down        Abd/Add                Deep Water:   1 Noodle 1 Noodle 1 Noodle   Hang   5' 5' 5'   Cycling    1' 1'   Jacks    1' 1'   X-Country     1'           Balance        SLS                Stretches        Achllies 2x20\" 2x20\" 2x20\" 2x20\" 2x20\"   Hamstring 2x20\" 2x20\" 2x20\" 2x20\" 2x20\"           Cool Down 2 Laps 2 Laps 2 Laps 2 Laps 2 Laps Breast Stroke   Pain Rating 2 4 2 1-2 2      Specific Instructions for next treatment: Progress UE format and add lunges next visit. Assessment: [x] Progressing toward goals. Good tolerance to all exercises with cueing needed to decrease height of retro SLR due to increased discomfort. Progressed reps for LE all exercises, added kickboard paddling and UE format for trunk/core stability and and hip strengthening. Also added x-country in deep water for hip/core stability. Ended with deep water hang for unloading of spine. [] No change. [] Other:    [x] Patient would continue to benefit from skilled physical therapy services in order to:  recover strength and increased functional capacity to allow to perform ADLs and return to work with less dysfunction. STG: (to be met in 6 treatments)   Pt will be able to maintain TrAB contraction with functional dynamic movements showing increased core stability to progress towards more lifting training. Pt will improve performance on  30s STS  to >10 reps showing improved power to arise to standing and better endurance with transfers. Pt will be able to tolerate sitting at a midine posture without pain increase to maintain a good alignment. LTG: (to be met in 13 treatments)   Pt will increase strength of all major muscle groups of the LEs to 5/5 or greater demonstrating improved strength needed to maximize safety and efficiency with mobility tasks such as gait, transfers and stairs.     Pt will be able to maintain SLS with no trendelenberg showing improved proximal control for walking ans stairs. Pt will decrease functional limitation per mod BRYN to <24%  in order to demonstrate improved functional tolerances at PLOF with minimal restriction/dysfunction  Pt will demonstrate independence with a long term HEP for continued progress/maintenance after completion of PT       Pt. Education:  [] Yes  [x] No  [] Reviewed Prior HEP/Ed-   Method of Education: [] Verbal  [] Demo  [] Written  Comprehension of Education:  [] Verbalizes understanding. [] Demonstrates understanding. [] Needs review. [] Demonstrates/verbalizes HEP/Ed previously given. Plan: [x] Continue per plan of care.    [] Other:      Treatment Charges: Mins Units   []  Modalities     []  Ther Exercise     []  Manual Therapy     []  Ther Activities     [x]  Aquatics 38 3   []  Neuromuscular     [] Vasocompression     [] Gait Training     [] Dry needling        [] 1 or 2 muscles        [] 3 or more muscles     []  Other     Total Treatment time 38 3     5 minute deep water hang unbilled time    Time In:  0932            Time Out:  4725    Electronically signed by:  Veronica Jefferson PTA

## 2023-02-28 ENCOUNTER — OFFICE VISIT (OUTPATIENT)
Dept: ORTHOPEDIC SURGERY | Age: 58
End: 2023-02-28
Payer: COMMERCIAL

## 2023-02-28 VITALS — WEIGHT: 180 LBS | HEIGHT: 67 IN | RESPIRATION RATE: 10 BRPM | BODY MASS INDEX: 28.25 KG/M2

## 2023-02-28 DIAGNOSIS — M43.10 ACQUIRED SPONDYLOLISTHESIS: ICD-10-CM

## 2023-02-28 DIAGNOSIS — M47.817 LUMBOSACRAL SPONDYLOSIS WITHOUT MYELOPATHY: ICD-10-CM

## 2023-02-28 DIAGNOSIS — M51.36 DDD (DEGENERATIVE DISC DISEASE), LUMBAR: Primary | ICD-10-CM

## 2023-02-28 PROCEDURE — 99213 OFFICE O/P EST LOW 20 MIN: CPT | Performed by: ORTHOPAEDIC SURGERY

## 2023-02-28 NOTE — PROGRESS NOTES
Patient ID: Jeanette Davis is a 61 y.o. female    Chief Compliant:  Chief Complaint   Patient presents with    Back Pain     lumbar        Diagnostic imaging:    AP lateral lumbar spine status post L5 to pelvis posterior instrumentation 5 1 PLIF complicated by superior S1 subsidence of the cage appears to be solidly ankylosed in the posterior half of the vertebral bodies    Assessment and Plan:  1. DDD (degenerative disc disease), lumbar    2. Lumbar disc herniation    3. Lumbosacral spondylosis without myelopathy        4 months post revision L5-S1 posterior instrumented fusion, great outcome currently    RTW    Follow up 3 I, Salomon Owens, personally performed the services described in this documentation. All medical record entries made by the scribe were at my direction and in my presence. I have reviewed the chart and discharge instructions and agree that the records reflect my personal performance and is accurate and complete. Dustin Owens MD 2/28/23   months    HPI:  This is a 61 y.o. female who presents to the clinic today for 4 months post revision L5-S1 posterior instrumented fusion, 11/14/22. Patient states she continues to feel well with the exception of pain along the lateral aspect of her left lower extremity managed well with Gabapentin. She is following with aquatic therapy with significant improvement. Review of Systems   All other systems reviewed and are negative. Past History:    Current Outpatient Medications:     tiZANidine (ZANAFLEX) 4 MG tablet, take 1 tablet by mouth three times a day if needed for muscle spasm, Disp: 90 tablet, Rfl: 0    losartan (COZAAR) 50 MG tablet, take 1 tablet by mouth once daily, Disp: , Rfl:     gabapentin (NEURONTIN) 600 MG tablet, Take 1 tablet by mouth 3 times daily for 30 days. , Disp: 90 tablet, Rfl: 2    metaxalone (SKELAXIN) 800 MG tablet, take 1 tablet by mouth three times a day, Disp: 90 tablet, Rfl: 0    alendronate (FOSAMAX) 70 MG tablet, Take 1 tablet by mouth every 7 days, Disp: 12 tablet, Rfl: 1    losartan (COZAAR) 100 MG tablet, take 1 tablet by mouth once daily, Disp: 90 tablet, Rfl: 1    diclofenac (VOLTAREN) 75 MG EC tablet, , Disp: , Rfl:     magnesium 30 MG tablet, Take 1 tablet by mouth in the morning, at noon, and at bedtime Supplement magnesium over the counter, Disp: , Rfl:     acetaminophen (TYLENOL) 650 MG extended release tablet, Take 650 mg by mouth every 8 hours as needed for Pain, Disp: , Rfl:   Allergies   Allergen Reactions    Cefuroxime      Other reaction(s): Unknown     Social History     Socioeconomic History    Marital status:      Spouse name: Not on file    Number of children: Not on file    Years of education: Not on file    Highest education level: Not on file   Occupational History    Not on file   Tobacco Use    Smoking status: Never    Smokeless tobacco: Never   Vaping Use    Vaping Use: Never used   Substance and Sexual Activity    Alcohol use:  Yes     Alcohol/week: 3.0 standard drinks     Types: 3 Glasses of wine per week     Comment: moderately 3 glasses of wine per week    Drug use: No    Sexual activity: Yes     Partners: Male   Other Topics Concern    Not on file   Social History Narrative    Not on file     Social Determinants of Health     Financial Resource Strain: Not on file   Food Insecurity: Not on file   Transportation Needs: Not on file   Physical Activity: Not on file   Stress: Not on file   Social Connections: Not on file   Intimate Partner Violence: Not on file   Housing Stability: Not on file     Past Medical History:   Diagnosis Date    Abnormal EKG     Arthritis     Chest pain     Chronic bilateral low back pain with bilateral sciatica 02/11/2020    DDD (degenerative disc disease), lumbar 02/11/2020    Dyslipidemia     Elevated BP without diagnosis of hypertension     Hay fever     Hypertension     Lumbosacral spondylosis without myelopathy 04/18/2022    MVP (mitral valve prolapse) Pre-diabetes     Snoring      Past Surgical History:   Procedure Laterality Date    BREAST SURGERY Bilateral     biopsy    CHOLECYSTECTOMY      COLONOSCOPY      ENDOMETRIAL ABLATION      EYE SURGERY Bilateral     chalazion removed    LUMBAR FUSION N/A 5/16/2022    LUMBAR L5-S1 DECOMPRESSION FUSION POSTERIOR performed by Monique Plascencia MD at [address] 11/14/2022    REVISION L5-S1  POSTERIOR  INSTRUMENTED FUSION performed by Monique Plascencia MD at [address] Right 7/16/2020    RIGHT L4 AND L5 EPIDURAL STEROID INJECTION performed by Audrey Ramirez MD at [address] Right 10/26/2020    EPIDURAL STEROID INJECTION -RIGHT L4 L5 performed by Audrey Ramirez MD at [address] [facility]       Family History   Problem Relation Age of Onset    High Blood Pressure Mother     High Cholesterol Mother     Other Mother         blood clots, denies any known clotting d/o    Other Father         alcoholism    Arthritis Sister     No Known Problems Other         Physical Exam:  Vitals signs and nursing note reviewed. Constitutional:       Appearance: well-developed. HENT:      Head: Normocephalic and atraumatic. Nose: Nose normal.   Eyes:      Conjunctiva/sclera: Conjunctivae normal.   Neck:      Musculoskeletal: Normal range of motion and neck supple. Pulmonary:      Effort: Pulmonary effort is normal. No respiratory distress. Musculoskeletal:      Comments: Normal gait     Skin:     General: Skin is warm and dry. Neurological:      Mental Status: Alert and oriented to person, place, and time. Sensory: No sensory deficit. Psychiatric:         Behavior: Behavior normal.         Thought Content: Thought content normal.        Provider Attestation:  Fernando Owens, personally performed the services described in this documentation. All medical record entries made by the scribe were at my direction and in my presence.  I have reviewed the chart and discharge instructions and agree that the records reflect my personal performance and is accurate and complete. Abbie Barrow MD 2/28/23       Scribe Attestation:  By signing my name below, Maxwell Schmid, attest that this documentation has been prepared under the direction and in the presence of Dr. Saleem Pineda. Electronically signed: Lizz Easton, 2/28/23     Please note that this chart was generated using voice recognition Dragon dictation software. Although every effort was made to ensure the accuracy of this automated transcription, some errors in transcription may have occurred.

## 2023-02-28 NOTE — LETTER
February 28, 2023       Alison Bravo YOB: 1965   [id number] [facility] Date of Visit:  2/28/2023       To Whom It May Concern: It is my medical opinion that Kailey Muhammad may return to work on Monday March 6, 2023 with the following restrictions: No prolonged squatting, kneeling or bending and no lifting of greater than 25 pounds. If you have any questions or concerns, please don't hesitate to call.     Sincerely,        Rosaline Ashford MD

## 2023-03-01 ENCOUNTER — HOSPITAL ENCOUNTER (OUTPATIENT)
Dept: PHYSICAL THERAPY | Age: 58
Setting detail: THERAPIES SERIES
Discharge: HOME | End: 2023-03-01
Payer: COMMERCIAL

## 2023-03-01 ENCOUNTER — TELEPHONE (OUTPATIENT)
Dept: ORTHOPEDIC SURGERY | Age: 58
End: 2023-03-01

## 2023-03-01 PROCEDURE — 97113 AQUATIC THERAPY/EXERCISES: CPT

## 2023-03-01 NOTE — TELEPHONE ENCOUNTER
Pt called in stating her employer is requesting that she return to work on 03/06/23 with out restrictions.  Pt states her current letter states for her to return with restrictions

## 2023-03-01 NOTE — FLOWSHEET NOTE
[x] [facility]   Outpatient Rehabilitation & Therapy  [address]  P: [phone]   F: [phone]    Physical Therapy Daily Treatment Note    Date:  3/1/2023  Patient Name:  Ann Hardy    :  1965  MRN: [id number]  Physician: Salomon Owens MD                                    Insurance: [employer]- 60 visits per year   Medical Diagnosis:   M51.36 (ICD-10-CM) - DDD (degenerative disc disease), lumbar   M51.26 (ICD-10-CM) - Lumbar disc herniation                 Rehab Codes: R25 pain , M25.60 stiffness, R53.1 weakness   Onset Date: 22- DOS              Next 's appt: 23  Visit# / total visits:   Cancels/No Shows: 0/0    Subjective:  Pt reports mild soreness after last therapy session but denies any increased pain.  Notes bending forward is still irritating at this time so trying to limit this motion to avoid aggravation.  States trying to be more active at home to prepare to go back to work Monday.    Pain:  [x] Yes  [] No Location: Left Lumbar back/buttocks with intermittent lateral left calf pain    Pain Rating: (0-10 scale) 2/10  Pain altered Tx:  [x] No  [] Yes  Action:  Comments: reviewed postural awareness, core stability and working in pain free ranges.    Objective:      [facility] Exercise Log  Aquatic, Hip & DLS Program- Phase 1    Date of Eval:  23                              Primary PT: Chris See, PT      Date 2/24/23 2/27/23 3/1/23     Visit #      Walk F/L/R 2 Laps 2 Laps 2 Laps     Marching 2 Laps 2 Laps 2 Laps     Squats 12x5\" 15x5\" 15x5\"     Step-Ups F/L Tall 10x Tall 10x  12x Lat Tall 12x     Step Down F/L        Heel-toe raises 12x 15x 15x     SLR F/L/R 12x 15x 15x     Hip/Knee Flex/Ext 12x 15x 15x     F/L Lunges                Kickboard Ex. Med Med Med     Iso Abd. 12x5\" 12x5\" 12x5\"     Push-pull 12x 12x 12x     Paddling  10x 12x             UE Format:        Horiz Abd/Add  10x 10x    IR/ER (wipers)        Alt Flex/Ext  10x 12x     Alt Press Down        Abd/Add   12x             Deep Water: 1 Noodle 1 Noodle 1 Noodle     Hang 5' 5' 5'     Cycling 1' 1' 2'     Jacks 1' 1' 1'     X-Country  1' 1'             Balance        SLS                Stretches        Achllies 2x20\" 2x20\" 2x20\"     Hamstring 2x20\" 2x20\" 2x20\"             Breast stroke laps 2 Laps 2 Laps Breast Stroke 2 Laps     Pain Rating 1-2 2 2        Specific Instructions for next treatment: Progress UE format,  and add lunges next visit. Assessment: [x] Progressing toward goals. Progressed step ups reps, UE format for trunk/core stability and increased deep water cycling time for LE strengthening and endurance. Also encouraged decreased UE support to minimal to no support if possible throughout program.  Still needed 1-2 finger tip touch for most exercises. Good tolerance to all exercise with good technique. Notes increased fatigue at end of treatment today. [] No change. [] Other:    [x] Patient would continue to benefit from skilled physical therapy services in order to:  recover strength and increased functional capacity to allow to perform ADLs and return to work with less dysfunction. STG: (to be met in 6 treatments)   Pt will be able to maintain TrAB contraction with functional dynamic movements showing increased core stability to progress towards more lifting training. Pt will improve performance on  30s STS  to >10 reps showing improved power to arise to standing and better endurance with transfers. Pt will be able to tolerate sitting at a midine posture without pain increase to maintain a good alignment. LTG: (to be met in 13 treatments)   Pt will increase strength of all major muscle groups of the LEs to 5/5 or greater demonstrating improved strength needed to maximize safety and efficiency with mobility tasks such as gait, transfers and stairs.     Pt will be able to maintain SLS with no trendelenberg showing improved proximal control for walking ans stairs. Pt will decrease functional limitation per mod BRYN to <24%  in order to demonstrate improved functional tolerances at PLOF with minimal restriction/dysfunction  Pt will demonstrate independence with a long term HEP for continued progress/maintenance after completion of PT       Pt. Education:  [] Yes  [x] No  [] Reviewed Prior HEP/Ed-   Method of Education: [] Verbal  [] Demo  [] Written  Comprehension of Education:  [] Verbalizes understanding. [] Demonstrates understanding. [] Needs review. [] Demonstrates/verbalizes HEP/Ed previously given. Plan: [x] Continue per plan of care.    [] Other:      Treatment Charges: Mins Units   []  Modalities     []  Ther Exercise     []  Manual Therapy     []  Ther Activities     [x]  Aquatics 45 3   []  Neuromuscular     [] Vasocompression     [] Gait Training     [] Dry needling        [] 1 or 2 muscles        [] 3 or more muscles     []  Other     Total Treatment time 45 3     5 minute deep water hang unbilled time    Time In:  0930            Time Out:  1020    Electronically signed by:  Mikhail Shaw PTA

## 2023-03-01 NOTE — LETTER
March 3, 2023       Mundogabobg Holbrook YOB: 1965   [address] [facility] Date of Visit:  3/1/2023       To Whom It May Concern: It is my medical opinion that Ashlee Jones may return to work on Monday March 6, 2023 without restrictions. If you have any questions or concerns, please don't hesitate to call.     Sincerely,        Zenia Sanz MD

## 2023-03-03 NOTE — TELEPHONE ENCOUNTER
II contacted the patient to advise her that a new letter was written. She will picl it up in the [facility].

## 2023-03-07 ENCOUNTER — APPOINTMENT (OUTPATIENT)
Dept: PHYSICAL THERAPY | Age: 58
End: 2023-03-07
Payer: COMMERCIAL

## 2023-03-08 ENCOUNTER — HOSPITAL ENCOUNTER (OUTPATIENT)
Dept: PHYSICAL THERAPY | Age: 58
Setting detail: THERAPIES SERIES
Discharge: HOME | End: 2023-03-08
Payer: COMMERCIAL

## 2023-03-08 NOTE — CARE COORDINATION
[] [address]  [place]: [phone]   F: [phone]     Physical Therapy Cancel/No Show note    Date: 3/8/2023  Patient: Asif Brito  : 1965  MRN: [id number]    Visit Count:   Cancels/No Shows to date:     For today's appointment patient:    [x]  Cancelled    [] Rescheduled appointment    [] No-show     Reason given by patient:    []  Patient ill    []  Conflicting appointment    [] No transportation      [] Conflict with work    [x] No reason given    [] Weather related    [] COVID-19    [] Other:      Comments:  needs to be called to be rescheduled with me       [] Next appointment was confirmed    Electronically signed by: Kellie Sanchez PT

## 2023-03-16 ENCOUNTER — APPOINTMENT (OUTPATIENT)
Dept: PHYSICAL THERAPY | Age: 58
End: 2023-03-16
Payer: COMMERCIAL

## 2023-03-20 DIAGNOSIS — M54.16 LUMBAR RADICULOPATHY: ICD-10-CM

## 2023-03-21 RX ORDER — TIZANIDINE 4 MG/1
TABLET ORAL
Qty: 90 TABLET | Refills: 0 | Status: SHIPPED | OUTPATIENT
Start: 2023-03-21

## 2023-03-21 NOTE — TELEPHONE ENCOUNTER
Raphael Alcaraz is calling to request a refill on the following medication(s):    Medication Request:  Requested Prescriptions     Pending Prescriptions Disp Refills    tiZANidine (ZANAFLEX) 4 MG tablet [Pharmacy Med Name: TIZANIDINE HCL 4 MG TABLET] 90 tablet 0     Sig: take 1 tablet by mouth three times a day if needed for muscle spasm       Last Visit Date (If Applicable):  8/84/3839    Next Visit Date:    5/1/2023

## 2023-03-27 ENCOUNTER — HOSPITAL ENCOUNTER (OUTPATIENT)
Dept: PHYSICAL THERAPY | Age: 58
Setting detail: THERAPIES SERIES
Discharge: HOME | End: 2023-03-27
Payer: COMMERCIAL

## 2023-03-27 PROCEDURE — 97110 THERAPEUTIC EXERCISES: CPT

## 2023-03-27 PROCEDURE — 97140 MANUAL THERAPY 1/> REGIONS: CPT

## 2023-03-27 NOTE — PROGRESS NOTES
contraction   -- decreased pelvic control with glute bridge and SL stance      Function:   30s STS: 8 reps (previously 6 reps)   Mod BRYN: 11/50 = 22%  (previously 34%)       Treatment/Interventions:   Completed the above assessments of ROM, strength, and funciton. Educated patient on outcomes & progress toward goals. -- Billed as TherEx    Intervention  Reps/ Time Weight/level Completed:  Comments      Manual        Hypervolt  8 min  x Lumbar and glute region           Exercises        HS stretch  2x30s ea  x    SKTC  2x30s ea   x    LTR  15x ea  x    Piriformis stretch  2x30s  ea  x    Posterior pelvic tilts  15x  Hold 3s  x    Pelvic tilt + march  10x ea LE   x    Pelvic tilt + heel slide out  10x ea   x    Standing squats  10x  x Cues for core bracing & squat form    Marching  20x altn   x Cues for core bracing    Standing hip ABD  10x ea   x No increase in pain          Assessment:  Pt was initially evaluated on 2/9/22 due to long standing low back pain post lumbar revision. Initially started with aquatic therapy in which controlled pain and built baseline strength. This is supported as pt has improved strength measures of most LE musculature. Still having mild weakness in hip flexors and hip extensors that could be improved. Pt has better core contraction allowing for progressions to core stability training. Pt still having tightness in the lumbar and glute regions that can benefit from manual and stretching which was done today. Still pan with loading of th spine so will gradually want to progress. Pt has been having functional improvements as her mod BRYN lessens and her 30s STS test increases in reps. Pt has been feeling improvements but is limited in sessions she can attend so will decrease frequency to 1x/wk until reaching 13 visits to continue to progress and monitor how she feels.        Goals: Assessed/updated 3/27/23     STG: (to be met in 6 treatments)   Pt will be able to maintain TrAB contraction with

## 2023-04-03 RX ORDER — LOSARTAN POTASSIUM 50 MG/1
TABLET ORAL
Qty: 30 TABLET | Refills: 3 | Status: SHIPPED | OUTPATIENT
Start: 2023-04-03

## 2023-04-03 NOTE — TELEPHONE ENCOUNTER
Catrachito Hernandez is calling to request a refill on the following medication(s):    Medication Request:  Requested Prescriptions     Pending Prescriptions Disp Refills    losartan (COZAAR) 50 MG tablet [Pharmacy Med Name: LOSARTAN POTASSIUM 50 MG TAB] 30 tablet 3     Sig: take 1 tablet by mouth once daily       Last Visit Date (If Applicable):  4/56/2864    Next Visit Date:    5/1/2023

## 2023-04-06 ENCOUNTER — HOSPITAL ENCOUNTER (OUTPATIENT)
Dept: PHYSICAL THERAPY | Age: 58
Setting detail: THERAPIES SERIES
Discharge: HOME | End: 2023-04-06

## 2023-04-06 NOTE — CARE COORDINATION
[] [address]  [place]: [phone]   F: [phone]     Physical Therapy Cancel/No Show note    Date: 2023  Patient: Chey Ny  : 1965  MRN: [id number]    Visit Count:   Cancels/No Shows to date:     For today's appointment patient:    []  Cancelled    [] Rescheduled appointment    [x] No-show     Reason given by patient:    []  Patient ill    []  Conflicting appointment    [] No transportation      [] Conflict with work    [] No reason given    [] Weather related    [] FPYTE-    [x] Other:      Comments:  Pt was contacted regarding missed appt and notfiied up upcoming appt on Monday, 4/10 at 5:30PM      [] Next appointment was confirmed    Electronically signed by: Edgar Jarrell PTA

## 2023-04-06 NOTE — FLOWSHEET NOTE
Neuromuscular       [] Vasocompression       [] Gait Training       [] Dry needling        [] 1 or 2 muscles        [] 3 or more muscles       []  Other       Total Treatment time 42 3      Time In: 5:24p   Time Out: 6:06p        Electronically signed by:   Teodoro Velazquez PT, DPT   #[id number]      If you have any questions or concerns, please don't hesitate to call.   Thank you for your referral.

## 2023-04-17 ENCOUNTER — HOSPITAL ENCOUNTER (OUTPATIENT)
Dept: PHYSICAL THERAPY | Age: 58
Setting detail: THERAPIES SERIES
Discharge: HOME | End: 2023-04-17
Payer: COMMERCIAL

## 2023-04-17 PROCEDURE — 97110 THERAPEUTIC EXERCISES: CPT

## 2023-04-17 NOTE — FLOWSHEET NOTE
demonstrate independence with a long term HEP for continued progress/maintenance after completion of PT    -3/27: progressing -- will continue to update with HEP   5. New goal: Pt will be able to lift > 10# without increase in pain to better tolerate lifting things as work and progress to getting back in the gym. PLAN:   - plan for pt to trial HEP on her own as pain seems to be improving. If no contact in ~ 1-2 months will close this episode of care. Treatment Charges: Mins Units   []  Modalities       [x]  Ther Exercise 42 3   []  Manual Therapy     []  Ther Activities       []  Aquatics       []  Neuromuscular       [] Vasocompression       [] Gait Training       [] Dry needling        [] 1 or 2 muscles        [] 3 or more muscles       []  Other       Total Treatment time 42 3      Time In: 5:18 p   Time Out: 6:00 p        Electronically signed by:   Wily Pedraza, PT, DPT   #[id number]      If you have any questions or concerns, please don't hesitate to call.   Thank you for your referral.

## 2023-05-01 ENCOUNTER — OFFICE VISIT (OUTPATIENT)
Dept: FAMILY MEDICINE CLINIC | Age: 58
End: 2023-05-01
Payer: COMMERCIAL

## 2023-05-01 VITALS
DIASTOLIC BLOOD PRESSURE: 80 MMHG | BODY MASS INDEX: 28.91 KG/M2 | HEART RATE: 85 BPM | WEIGHT: 184.6 LBS | OXYGEN SATURATION: 98 % | SYSTOLIC BLOOD PRESSURE: 120 MMHG

## 2023-05-01 DIAGNOSIS — E78.5 DYSLIPIDEMIA: ICD-10-CM

## 2023-05-01 DIAGNOSIS — G89.29 CHRONIC BILATERAL LOW BACK PAIN WITH BILATERAL SCIATICA: ICD-10-CM

## 2023-05-01 DIAGNOSIS — I10 PRIMARY HYPERTENSION: Primary | ICD-10-CM

## 2023-05-01 DIAGNOSIS — M54.42 CHRONIC BILATERAL LOW BACK PAIN WITH BILATERAL SCIATICA: ICD-10-CM

## 2023-05-01 DIAGNOSIS — M51.36 DDD (DEGENERATIVE DISC DISEASE), LUMBAR: ICD-10-CM

## 2023-05-01 DIAGNOSIS — M81.0 AGE-RELATED OSTEOPOROSIS WITHOUT CURRENT PATHOLOGICAL FRACTURE: ICD-10-CM

## 2023-05-01 DIAGNOSIS — R73.03 PREDIABETES: ICD-10-CM

## 2023-05-01 DIAGNOSIS — M54.41 CHRONIC BILATERAL LOW BACK PAIN WITH BILATERAL SCIATICA: ICD-10-CM

## 2023-05-01 PROCEDURE — 3078F DIAST BP <80 MM HG: CPT | Performed by: FAMILY MEDICINE

## 2023-05-01 PROCEDURE — 99214 OFFICE O/P EST MOD 30 MIN: CPT | Performed by: FAMILY MEDICINE

## 2023-05-01 PROCEDURE — 3074F SYST BP LT 130 MM HG: CPT | Performed by: FAMILY MEDICINE

## 2023-05-01 RX ORDER — VERAPAMIL HYDROCHLORIDE 40 MG/1
TABLET ORAL
COMMUNITY
Start: 2023-01-28

## 2023-05-01 SDOH — ECONOMIC STABILITY: FOOD INSECURITY: WITHIN THE PAST 12 MONTHS, THE FOOD YOU BOUGHT JUST DIDN'T LAST AND YOU DIDN'T HAVE MONEY TO GET MORE.: NEVER TRUE

## 2023-05-01 SDOH — ECONOMIC STABILITY: HOUSING INSECURITY
IN THE LAST 12 MONTHS, WAS THERE A TIME WHEN YOU DID NOT HAVE A STEADY PLACE TO SLEEP OR SLEPT IN A SHELTER (INCLUDING NOW)?: NO

## 2023-05-01 SDOH — ECONOMIC STABILITY: FOOD INSECURITY: WITHIN THE PAST 12 MONTHS, YOU WORRIED THAT YOUR FOOD WOULD RUN OUT BEFORE YOU GOT MONEY TO BUY MORE.: NEVER TRUE

## 2023-05-01 SDOH — ECONOMIC STABILITY: INCOME INSECURITY: HOW HARD IS IT FOR YOU TO PAY FOR THE VERY BASICS LIKE FOOD, HOUSING, MEDICAL CARE, AND HEATING?: NOT HARD AT ALL

## 2023-05-01 ASSESSMENT — PATIENT HEALTH QUESTIONNAIRE - PHQ9
SUM OF ALL RESPONSES TO PHQ QUESTIONS 1-9: 0
SUM OF ALL RESPONSES TO PHQ QUESTIONS 1-9: 0
1. LITTLE INTEREST OR PLEASURE IN DOING THINGS: 0
2. FEELING DOWN, DEPRESSED OR HOPELESS: 0
SUM OF ALL RESPONSES TO PHQ QUESTIONS 1-9: 0
SUM OF ALL RESPONSES TO PHQ9 QUESTIONS 1 & 2: 0
SUM OF ALL RESPONSES TO PHQ QUESTIONS 1-9: 0

## 2023-05-02 ASSESSMENT — ENCOUNTER SYMPTOMS
EYES NEGATIVE: 1
RESPIRATORY NEGATIVE: 1
BOWEL INCONTINENCE: 0
ALLERGIC/IMMUNOLOGIC NEGATIVE: 1
BACK PAIN: 1
ORTHOPNEA: 0
GASTROINTESTINAL NEGATIVE: 1

## 2023-05-02 NOTE — PROGRESS NOTES
[facility] Progress Note    Date:5/2/2023         Patient Name:Ann Hardy     YOB: 1965     Age:57 y.o. Assessment/Plan        Problem List Items Addressed This Visit          Circulatory    Primary hypertension - Primary      Well-controlled, continue current medications, continue current treatment plan, medication adherence emphasized and lifestyle modifications recommended         Relevant Orders    CBC with Auto Differential       Nervous and Auditory    Chronic bilateral low back pain with bilateral sciatica      Monitored by specialist- no acute findings meriting change in the plan         Relevant Orders    CBC with Auto Differential       Musculoskeletal and Integument    Age-related osteoporosis without current pathological fracture      At goal, continue current medications and continue current treatment plan         Relevant Orders    CBC with Auto Differential    DDD (degenerative disc disease), lumbar      Monitored by specialist- no acute findings meriting change in the plan            Other    Prediabetes    Relevant Orders    CBC with Auto Differential    Hemoglobin A1C    Dyslipidemia    Relevant Orders    CBC with Auto Differential    Comprehensive Metabolic Panel    Lipid Panel        Return in about 6 months (around 11/1/2023). Electronically signed by Kenyatta Echeverria DO on 5/2/23       Total time spent was between Time personally spent assessing and managing the patient on the date of service: Est: 30-39 minutes ([id number]) mins. This included time spent reviewing the patient's medical record (e.g., recent visits, labs, and studies); seeing the patient in the office (face-to-face time); ordering medications, studies, procedures, or referrals; calling the patient or family later in the day with results and further recommendations; and documenting the visit in the medical record.      Seb Vasquez is a 61 y.o. female presenting today for   Chief Complaint

## 2023-05-16 RX ORDER — GABAPENTIN 600 MG/1
TABLET ORAL
Qty: 90 TABLET | Refills: 2 | OUTPATIENT
Start: 2023-05-16

## 2023-05-19 ENCOUNTER — TELEPHONE (OUTPATIENT)
Dept: PAIN MANAGEMENT | Age: 58
End: 2023-05-19

## 2023-05-19 RX ORDER — GABAPENTIN 600 MG/1
600 TABLET ORAL 3 TIMES DAILY
Qty: 90 TABLET | Refills: 2 | Status: SHIPPED | OUTPATIENT
Start: 2023-05-19 | End: 2023-06-18

## 2023-05-30 ENCOUNTER — OFFICE VISIT (OUTPATIENT)
Dept: ORTHOPEDIC SURGERY | Age: 58
End: 2023-05-30
Payer: COMMERCIAL

## 2023-05-30 VITALS — RESPIRATION RATE: 12 BRPM | BODY MASS INDEX: 28.88 KG/M2 | WEIGHT: 184 LBS | HEIGHT: 67 IN

## 2023-05-30 DIAGNOSIS — M43.10 ACQUIRED SPONDYLOLISTHESIS: ICD-10-CM

## 2023-05-30 DIAGNOSIS — M51.36 DDD (DEGENERATIVE DISC DISEASE), LUMBAR: Primary | ICD-10-CM

## 2023-05-30 DIAGNOSIS — M51.26 LUMBAR DISC HERNIATION: ICD-10-CM

## 2023-05-30 DIAGNOSIS — M47.817 LUMBOSACRAL SPONDYLOSIS WITHOUT MYELOPATHY: ICD-10-CM

## 2023-05-30 PROCEDURE — 99213 OFFICE O/P EST LOW 20 MIN: CPT | Performed by: ORTHOPAEDIC SURGERY

## 2023-05-30 NOTE — PROGRESS NOTES
Rebecca Collado MD at [address] 11/14/2022    REVISION L5-S1  POSTERIOR  INSTRUMENTED FUSION performed by Jessy Willis MD at [address] Right 7/16/2020    RIGHT L4 AND L5 EPIDURAL STEROID INJECTION performed by Van Lares MD at [address] Right 10/26/2020    EPIDURAL STEROID INJECTION -RIGHT L4 L5 performed by Van Lares MD at [address] [facility]       Family History   Problem Relation Age of Onset    High Blood Pressure Mother     High Cholesterol Mother     Other Mother         blood clots, denies any known clotting d/o    Other Father         alcoholism    Arthritis Sister     No Known Problems Other         Physical Exam:  Vitals signs and nursing note reviewed. Constitutional:       Appearance: well-developed. HENT:      Head: Normocephalic and atraumatic. Nose: Nose normal.   Eyes:      Conjunctiva/sclera: Conjunctivae normal.   Neck:      Musculoskeletal: Normal range of motion and neck supple. Pulmonary:      Effort: Pulmonary effort is normal. No respiratory distress. Musculoskeletal:      Comments: Normal gait     Skin:     General: Skin is warm and dry. Neurological:      Mental Status: Alert and oriented to person, place, and time. Sensory: No sensory deficit. Psychiatric:         Behavior: Behavior normal.         Thought Content: Thought content normal.        Provider Attestation:  Jarrod Owens, personally performed the services described in this documentation. All medical record entries made by the scribe were at my direction and in my presence. I have reviewed the chart and discharge instructions and agree that the records reflect my personal performance and is accurate and complete. Zoey Collado MD 5/30/23       Scribe Attestation:  By signing my name below, Bianca Stoll, attest that this documentation has been prepared under the direction and in the presence of Dr. Patrizia Fierro.

## 2023-05-31 ENCOUNTER — TELEPHONE (OUTPATIENT)
Dept: ORTHOPEDIC SURGERY | Age: 58
End: 2023-05-31

## 2023-05-31 NOTE — TELEPHONE ENCOUNTER
Received call from [facility] at [facility] requesting clinicals be faxed to them so that they can start the precert process for the pts upcoming testing that was ordered by Dr. Ginette Johnson.      Fax#: [phone]

## 2023-05-31 NOTE — TELEPHONE ENCOUNTER
Jose Carlos Conteh from [address] [place] [address] called in requesting an order CT lumbar spine w/o contrast for the patient to be sent via fax [phone] please advise thank you!

## 2023-06-02 ENCOUNTER — TELEPHONE (OUTPATIENT)
Dept: ORTHOPEDIC SURGERY | Age: 58
End: 2023-06-02

## 2023-06-02 NOTE — TELEPHONE ENCOUNTER
Pt called in stating she is schedule for her CT scan on 6/10/23 at [facility].  Pt needs CT f/u next available appt 7/13/23 pt can be reached at [phone]

## 2023-06-23 RX ORDER — DICLOFENAC SODIUM 75 MG/1
75 TABLET, DELAYED RELEASE ORAL 2 TIMES DAILY
Qty: 60 TABLET | Refills: 0 | Status: SHIPPED | OUTPATIENT
Start: 2023-06-23

## 2023-06-23 NOTE — TELEPHONE ENCOUNTER
Deandra Lezama is calling to request a refill on the following medication(s):    Medication Request:  Requested Prescriptions     Pending Prescriptions Disp Refills    diclofenac (VOLTAREN) 75 MG EC tablet 60 tablet 0     Si tablet 2 times daily       Last Visit Date (If Applicable):  9359    Next Visit Date:    2023

## 2023-06-26 ENCOUNTER — OFFICE VISIT (OUTPATIENT)
Dept: PAIN MANAGEMENT | Age: 58
End: 2023-06-26
Payer: COMMERCIAL

## 2023-06-26 VITALS — WEIGHT: 184 LBS | BODY MASS INDEX: 28.88 KG/M2 | HEART RATE: 79 BPM | HEIGHT: 67 IN | OXYGEN SATURATION: 96 %

## 2023-06-26 DIAGNOSIS — M54.41 CHRONIC BILATERAL LOW BACK PAIN WITH BILATERAL SCIATICA: Primary | ICD-10-CM

## 2023-06-26 DIAGNOSIS — M54.16 LUMBAR RADICULOPATHY: ICD-10-CM

## 2023-06-26 DIAGNOSIS — M54.42 CHRONIC BILATERAL LOW BACK PAIN WITH BILATERAL SCIATICA: Primary | ICD-10-CM

## 2023-06-26 DIAGNOSIS — G89.29 CHRONIC BILATERAL LOW BACK PAIN WITH BILATERAL SCIATICA: Primary | ICD-10-CM

## 2023-06-26 PROCEDURE — 99213 OFFICE O/P EST LOW 20 MIN: CPT | Performed by: NURSE PRACTITIONER

## 2023-06-26 RX ORDER — GABAPENTIN 600 MG/1
600 TABLET ORAL 3 TIMES DAILY
Qty: 270 TABLET | Refills: 0 | Status: SHIPPED | OUTPATIENT
Start: 2023-06-26 | End: 2023-09-24

## 2023-06-26 ASSESSMENT — ENCOUNTER SYMPTOMS
CONSTIPATION: 0
COUGH: 0
BACK PAIN: 1
SHORTNESS OF BREATH: 0
RESPIRATORY NEGATIVE: 1
GASTROINTESTINAL NEGATIVE: 1

## 2023-06-27 DIAGNOSIS — M81.0 AGE-RELATED OSTEOPOROSIS WITHOUT CURRENT PATHOLOGICAL FRACTURE: ICD-10-CM

## 2023-06-28 RX ORDER — ALENDRONATE SODIUM 70 MG/1
TABLET ORAL
Qty: 12 TABLET | Refills: 1 | Status: SHIPPED | OUTPATIENT
Start: 2023-06-28

## 2023-07-17 DIAGNOSIS — M54.42 CHRONIC BILATERAL LOW BACK PAIN WITH BILATERAL SCIATICA: Primary | ICD-10-CM

## 2023-07-17 DIAGNOSIS — M54.41 CHRONIC BILATERAL LOW BACK PAIN WITH BILATERAL SCIATICA: Primary | ICD-10-CM

## 2023-07-17 DIAGNOSIS — G89.29 CHRONIC BILATERAL LOW BACK PAIN WITH BILATERAL SCIATICA: Primary | ICD-10-CM

## 2023-07-18 RX ORDER — DICLOFENAC SODIUM 75 MG/1
TABLET, DELAYED RELEASE ORAL
Qty: 60 TABLET | Refills: 5 | Status: SHIPPED | OUTPATIENT
Start: 2023-07-18

## 2023-07-18 NOTE — TELEPHONE ENCOUNTER
Keith Bella is calling to request a refill on the following medication(s):    Last Visit Date (If Applicable):  7/4/8358    Next Visit Date:    11/2/2023    Medication Request:  Requested Prescriptions     Pending Prescriptions Disp Refills    diclofenac (VOLTAREN) 75 MG EC tablet [Pharmacy Med Name: DICLOFENAC SOD EC 75 MG TAB] 60 tablet 0     Sig: take 1 tablet by mouth twice a day

## 2023-08-01 DIAGNOSIS — I10 PRIMARY HYPERTENSION: Primary | ICD-10-CM

## 2023-08-01 RX ORDER — LOSARTAN POTASSIUM 50 MG/1
TABLET ORAL
Qty: 90 TABLET | Refills: 3 | Status: SHIPPED | OUTPATIENT
Start: 2023-08-01

## 2023-08-01 NOTE — TELEPHONE ENCOUNTER
Nolan Harrington is calling to request a refill on the following medication(s):    Last Visit Date (If Applicable):  9/2/0644    Next Visit Date:    11/2/2023    Medication Request:  Requested Prescriptions     Pending Prescriptions Disp Refills    losartan (COZAAR) 50 MG tablet [Pharmacy Med Name: LOSARTAN POTASSIUM 50 MG TAB] 30 tablet 3     Sig: take 1 tablet by mouth once daily

## 2023-09-12 ENCOUNTER — OFFICE VISIT (OUTPATIENT)
Dept: ORTHOPEDIC SURGERY | Age: 58
End: 2023-09-12
Payer: COMMERCIAL

## 2023-09-12 VITALS — BODY MASS INDEX: 28.88 KG/M2 | RESPIRATION RATE: 14 BRPM | WEIGHT: 184 LBS | HEIGHT: 67 IN

## 2023-09-12 DIAGNOSIS — M54.16 LUMBAR RADICULAR PAIN: Primary | ICD-10-CM

## 2023-09-12 DIAGNOSIS — M54.50 LOW BACK PAIN, UNSPECIFIED BACK PAIN LATERALITY, UNSPECIFIED CHRONICITY, UNSPECIFIED WHETHER SCIATICA PRESENT: ICD-10-CM

## 2023-09-12 PROCEDURE — 99213 OFFICE O/P EST LOW 20 MIN: CPT | Performed by: ORTHOPAEDIC SURGERY

## 2023-09-12 NOTE — PROGRESS NOTES
Patient ID: Robert Rhodes is a 61 y.o. female    Chief Compliant:  Chief Complaint   Patient presents with    Lower Back Pain     L5-S1 revision        Diagnostic imaging:    AP lateral lumbar spine status post L5-S1 PLIF revised due to S1 fracture and recurrent lordosis patient has fractured the iliac screw on the right but otherwise appears to have healed    Assessment and Plan:  1. Low back pain, unspecified back pain laterality, unspecified chronicity, unspecified whether sciatica present      S/p 10 months post L5-S1 posterior instrumented fusion, 11/14/22. Patient reports some bilateral leg burning pain in the morning in particular left greater than right    CT myelogram lumbar spine    Follow up after imaging    HPI:  This is a 61 y.o. female who presents to the clinic today for low back pain. S/p 10 months post L5-S1 posterior instrumented fusion, 11/14/22. She reports that her lower back is sore. She also reports bilateral leg dysesthesias that has progressively gotten worse. Patient notes that she is able to function but she is constantly sore. Review of Systems   All other systems reviewed and are negative. Past History:    Current Outpatient Medications:     losartan (COZAAR) 50 MG tablet, take 1 tablet by mouth once daily, Disp: 90 tablet, Rfl: 3    diclofenac (VOLTAREN) 75 MG EC tablet, take 1 tablet by mouth twice a day, Disp: 60 tablet, Rfl: 5    alendronate (FOSAMAX) 70 MG tablet, take 1 tablet by mouth every week, Disp: 12 tablet, Rfl: 1    gabapentin (NEURONTIN) 600 MG tablet, Take 1 tablet by mouth 3 times daily for 90 days. , Disp: 270 tablet, Rfl: 0    verapamil (CALAN) 40 MG tablet, , Disp: , Rfl:     magnesium 30 MG tablet, Take 1 tablet by mouth in the morning, at noon, and at bedtime Supplement magnesium over the counter, Disp: , Rfl:     acetaminophen (TYLENOL) 650 MG extended release tablet, Take 1 tablet by mouth every 8 hours as needed for Pain, Disp: , Rfl:

## 2023-10-17 ENCOUNTER — TELEPHONE (OUTPATIENT)
Dept: INTERVENTIONAL RADIOLOGY/VASCULAR | Age: 58
End: 2023-10-17

## 2023-10-17 NOTE — TELEPHONE ENCOUNTER
Attempted to contact pt to r/s appt that is on 10/23/23 due to no provider. LM advising pt to contact [phone] to schedule.

## 2023-10-19 ENCOUNTER — TELEPHONE (OUTPATIENT)
Dept: PAIN MANAGEMENT | Age: 58
End: 2023-10-19

## 2023-10-19 NOTE — TELEPHONE ENCOUNTER
Per Dr. Maco Szymanski called pt today @ 3:10pm by our staff to CX her 4:40pm appointment today 10/19/2023 he will see her on Video Call tomorrow 10/20/2023 around the patients lunch no later Also called her husbands number and LM about this

## 2023-10-20 ENCOUNTER — TELEMEDICINE (OUTPATIENT)
Dept: PAIN MANAGEMENT | Age: 58
End: 2023-10-20
Payer: COMMERCIAL

## 2023-10-20 DIAGNOSIS — Z79.899 HIGH RISK MEDICATION USE: ICD-10-CM

## 2023-10-20 DIAGNOSIS — Z98.1 S/P LUMBAR FUSION: ICD-10-CM

## 2023-10-20 DIAGNOSIS — M51.36 DDD (DEGENERATIVE DISC DISEASE), LUMBAR: Primary | ICD-10-CM

## 2023-10-20 PROCEDURE — 99213 OFFICE O/P EST LOW 20 MIN: CPT | Performed by: ANESTHESIOLOGY

## 2023-10-20 RX ORDER — GABAPENTIN 600 MG/1
600 TABLET ORAL 3 TIMES DAILY
Qty: 270 TABLET | Refills: 0 | Status: SHIPPED | OUTPATIENT
Start: 2023-10-20 | End: 2024-01-18

## 2023-10-20 ASSESSMENT — ENCOUNTER SYMPTOMS
DIARRHEA: 0
NAUSEA: 0
CONSTIPATION: 0
VOMITING: 0
BACK PAIN: 1

## 2023-10-20 NOTE — PROGRESS NOTES
The patient is a 61 y. o. Non- / non  female. Cc: BACK PAIN    Back Pain  Pertinent negatives include no fever, headaches, numbness or weakness. Medication Refill  Pertinent negatives include no chills, fatigue, fever, headaches, nausea, neck pain, numbness, vomiting or weakness.     -Pleasant 54-year-old female with history of chronic low back and right lower extremity pain  Was diagnosed with lumbar degenerative disc disease  Tried therapy NSAIDs and epidural injection  Had lumbar spine fusion surgery at [facility] by Dr. Bhavesh [place]  Have noticed significant improvement in back pain  Still having right lower extremity pain symptoms for which she is getting EMG nerve testing and imaging  Currently taking gabapentin  Finds the medication helpful  No side effects  No sign or symptom of any aberrancy  No intervention indicated at this time  Stable on current dose  Refill ordered  Follow-up in 3 months  Average pain score 3-4/10  Mostly in right lower extremity  Associated symptoms numbness and paresthesia    Hemoglobin A1C   Date Value Ref Range Status   12/31/2021 5.9 % Final       Past Medical History, Past Surgical History, Social History, Allergies and Medications reviewed and updated in EPIC as indicated    Family History reviewed and is noncontributory.           Past Medical History:   Diagnosis Date    Abnormal EKG     Arthritis     Chest pain     Chronic bilateral low back pain with bilateral sciatica 02/11/2020    DDD (degenerative disc disease), lumbar 02/11/2020    Dyslipidemia     Elevated BP without diagnosis of hypertension     Hay fever     Hypertension     Lumbosacral spondylosis without myelopathy 04/18/2022    MVP (mitral valve prolapse)     Pre-diabetes     Snoring         Past Surgical History:   Procedure Laterality Date    BREAST SURGERY Bilateral     biopsy    CHOLECYSTECTOMY      COLONOSCOPY      ENDOMETRIAL ABLATION      EYE SURGERY Bilateral     chalazion removed

## 2023-10-25 ENCOUNTER — TELEPHONE (OUTPATIENT)
Dept: FAMILY MEDICINE CLINIC | Age: 58
End: 2023-10-25

## 2023-10-25 DIAGNOSIS — Z12.31 ENCOUNTER FOR SCREENING MAMMOGRAM FOR MALIGNANT NEOPLASM OF BREAST: Primary | ICD-10-CM

## 2023-10-25 NOTE — TELEPHONE ENCOUNTER
Ena Escobar is asking for a new order for Bilateral Screening Mammogram she hasn't had one since January of last year. Please and thak you.     Fax to [facility]      [phone]

## 2023-11-02 ENCOUNTER — OFFICE VISIT (OUTPATIENT)
Dept: FAMILY MEDICINE CLINIC | Age: 58
End: 2023-11-02
Payer: COMMERCIAL

## 2023-11-02 VITALS — SYSTOLIC BLOOD PRESSURE: 122 MMHG | BODY MASS INDEX: 28.35 KG/M2 | DIASTOLIC BLOOD PRESSURE: 70 MMHG | WEIGHT: 181 LBS

## 2023-11-02 DIAGNOSIS — M54.42 CHRONIC BILATERAL LOW BACK PAIN WITH BILATERAL SCIATICA: Primary | ICD-10-CM

## 2023-11-02 DIAGNOSIS — E78.5 DYSLIPIDEMIA: ICD-10-CM

## 2023-11-02 DIAGNOSIS — G89.29 CHRONIC BILATERAL LOW BACK PAIN WITH BILATERAL SCIATICA: Primary | ICD-10-CM

## 2023-11-02 DIAGNOSIS — R73.03 PREDIABETES: ICD-10-CM

## 2023-11-02 DIAGNOSIS — I10 PRIMARY HYPERTENSION: ICD-10-CM

## 2023-11-02 DIAGNOSIS — E83.42 HYPOMAGNESEMIA: ICD-10-CM

## 2023-11-02 DIAGNOSIS — M54.41 CHRONIC BILATERAL LOW BACK PAIN WITH BILATERAL SCIATICA: Primary | ICD-10-CM

## 2023-11-02 DIAGNOSIS — M81.0 AGE-RELATED OSTEOPOROSIS WITHOUT CURRENT PATHOLOGICAL FRACTURE: ICD-10-CM

## 2023-11-02 PROBLEM — M47.26 OTHER SPONDYLOSIS WITH RADICULOPATHY, LUMBAR REGION: Status: ACTIVE | Noted: 2023-11-02

## 2023-11-02 PROCEDURE — 3074F SYST BP LT 130 MM HG: CPT | Performed by: FAMILY MEDICINE

## 2023-11-02 PROCEDURE — 99214 OFFICE O/P EST MOD 30 MIN: CPT | Performed by: FAMILY MEDICINE

## 2023-11-02 PROCEDURE — 3078F DIAST BP <80 MM HG: CPT | Performed by: FAMILY MEDICINE

## 2023-11-02 RX ORDER — LOSARTAN POTASSIUM 50 MG/1
50 TABLET ORAL DAILY
Qty: 90 TABLET | Refills: 3 | Status: SHIPPED | OUTPATIENT
Start: 2023-11-02

## 2023-11-02 RX ORDER — DICLOFENAC SODIUM 75 MG/1
75 TABLET, DELAYED RELEASE ORAL 2 TIMES DAILY
Qty: 60 TABLET | Refills: 5 | Status: SHIPPED | OUTPATIENT
Start: 2023-11-02

## 2023-11-02 ASSESSMENT — ENCOUNTER SYMPTOMS
BACK PAIN: 1
RESPIRATORY NEGATIVE: 1
BOWEL INCONTINENCE: 0
ORTHOPNEA: 0
GASTROINTESTINAL NEGATIVE: 1
ALLERGIC/IMMUNOLOGIC NEGATIVE: 1
EYES NEGATIVE: 1

## 2023-11-02 NOTE — PROGRESS NOTES
results and further recommendations; and documenting the visit in the medical record. Dolan Severin is a 61 y.o. female presenting today for   Chief Complaint   Patient presents with    Hypertension   . Had dexa scan by ortho that showed osteoporosis in lumbar spine. On Fosamax and doing well    Saw spine specialist and they are having her do a CT myelogram of lumbosacral area b/c of pain in her legs    Hypertension  This is a new problem. The current episode started more than 1 month ago. The problem has been gradually worsening since onset. The problem is uncontrolled. Pertinent negatives include no anxiety, malaise/fatigue, orthopnea, palpitations, peripheral edema or PND. Past treatments include nothing. The current treatment provides no improvement. Back Pain  This is a chronic problem. The current episode started more than 1 month ago. The problem occurs constantly. The problem has been gradually worsening since onset. The pain is present in the lumbar spine. The quality of the pain is described as aching. The pain is severe. The symptoms are aggravated by bending and position. Pertinent negatives include no bladder incontinence, bowel incontinence, dysuria, paresis, pelvic pain, perianal numbness or tingling. She has tried NSAIDs and home exercises (PT) for the symptoms. The treatment provided no relief. Review of Systems   Review of Systems   Constitutional: Negative. Negative for malaise/fatigue. HENT: Negative. Eyes: Negative. Respiratory: Negative. Cardiovascular: Negative. Negative for palpitations, orthopnea and PND. Gastrointestinal: Negative. Negative for bowel incontinence. Endocrine: Negative. Genitourinary: Negative. Negative for bladder incontinence, dysuria and pelvic pain. Musculoskeletal:  Positive for back pain. Skin: Negative. Allergic/Immunologic: Negative. Neurological: Negative. Negative for tingling.    Hematological:

## 2023-11-10 ENCOUNTER — HOSPITAL ENCOUNTER (OUTPATIENT)
Dept: INTERVENTIONAL RADIOLOGY/VASCULAR | Age: 58
End: 2023-11-10
Attending: ORTHOPAEDIC SURGERY
Payer: COMMERCIAL

## 2023-11-10 ENCOUNTER — HOSPITAL ENCOUNTER (OUTPATIENT)
Dept: CT IMAGING | Age: 58
End: 2023-11-10
Attending: ORTHOPAEDIC SURGERY
Payer: COMMERCIAL

## 2023-11-10 VITALS
OXYGEN SATURATION: 99 % | HEIGHT: 67 IN | WEIGHT: 179.8 LBS | DIASTOLIC BLOOD PRESSURE: 93 MMHG | HEART RATE: 80 BPM | TEMPERATURE: 96.8 F | SYSTOLIC BLOOD PRESSURE: 145 MMHG | BODY MASS INDEX: 28.22 KG/M2 | RESPIRATION RATE: 18 BRPM

## 2023-11-10 DIAGNOSIS — M54.50 LOW BACK PAIN, UNSPECIFIED BACK PAIN LATERALITY, UNSPECIFIED CHRONICITY, UNSPECIFIED WHETHER SCIATICA PRESENT: ICD-10-CM

## 2023-11-10 DIAGNOSIS — M54.16 LUMBAR RADICULAR PAIN: ICD-10-CM

## 2023-11-10 LAB
INR PPP: 1
PROTHROMBIN TIME: 12.8 SEC (ref 11.5–14.2)

## 2023-11-10 PROCEDURE — 2709999900 IR MYELOGRAM LUMBOSACRAL

## 2023-11-10 PROCEDURE — 62284 INJECTION FOR MYELOGRAM: CPT

## 2023-11-10 PROCEDURE — 85610 PROTHROMBIN TIME: CPT

## 2023-11-10 PROCEDURE — 6360000004 HC RX CONTRAST MEDICATION: Performed by: RADIOLOGY

## 2023-11-10 PROCEDURE — 72132 CT LUMBAR SPINE W/DYE: CPT

## 2023-11-10 PROCEDURE — 2580000003 HC RX 258: Performed by: RADIOLOGY

## 2023-11-10 PROCEDURE — 7100000011 HC PHASE II RECOVERY - ADDTL 15 MIN

## 2023-11-10 PROCEDURE — 7100000010 HC PHASE II RECOVERY - FIRST 15 MIN

## 2023-11-10 RX ORDER — ACETAMINOPHEN 325 MG/1
650 TABLET ORAL EVERY 4 HOURS PRN
Status: ACTIVE | OUTPATIENT
Start: 2023-11-10

## 2023-11-10 RX ORDER — SODIUM CHLORIDE 0.9 % (FLUSH) 0.9 %
5-40 SYRINGE (ML) INJECTION PRN
Status: ACTIVE | OUTPATIENT
Start: 2023-11-10

## 2023-11-10 RX ORDER — IOPAMIDOL 408 MG/ML
INJECTION, SOLUTION INTRATHECAL PRN
Status: SHIPPED | OUTPATIENT
Start: 2023-11-10

## 2023-11-10 RX ORDER — SODIUM CHLORIDE 9 MG/ML
INJECTION, SOLUTION INTRAVENOUS PRN
Status: ACTIVE | OUTPATIENT
Start: 2023-11-10

## 2023-11-10 RX ORDER — SODIUM CHLORIDE 0.9 % (FLUSH) 0.9 %
5-40 SYRINGE (ML) INJECTION EVERY 12 HOURS SCHEDULED
Status: ACTIVE | OUTPATIENT
Start: 2023-11-10

## 2023-11-10 RX ADMIN — SODIUM CHLORIDE 100 ML/HR: 9 INJECTION, SOLUTION INTRAVENOUS at 08:34

## 2023-11-10 RX ADMIN — IOPAMIDOL 14 ML: 408 INJECTION, SOLUTION INTRATHECAL at 11:05

## 2023-11-10 ASSESSMENT — PAIN DESCRIPTION - ORIENTATION: ORIENTATION: RIGHT

## 2023-11-10 ASSESSMENT — PAIN DESCRIPTION - LOCATION: LOCATION: NECK

## 2023-11-10 ASSESSMENT — PAIN SCALES - GENERAL: PAINLEVEL_OUTOF10: 2

## 2023-11-10 ASSESSMENT — PAIN DESCRIPTION - FREQUENCY: FREQUENCY: INTERMITTENT

## 2023-11-10 ASSESSMENT — PAIN DESCRIPTION - DESCRIPTORS: DESCRIPTORS: ACHING

## 2023-11-10 NOTE — DISCHARGE INSTRUCTIONS
-- Watch for signs and symptoms of infection including fever over 100, chills, vomiting, headache.   -- Watch for any increased weakness or numbness in legs. -- Watch for any redness, swelling, drainage or bleeding at the site. -- Do not lay flat for 24 hours. Keep head of bed elevated. -- Drink plenty of fluids if diet allows. -- May remove band-aid tomorrow and shower tomorrow. -- Avoid strenuous activities today, avoid a lot of bending and twisting.   -- Call doctor for any complications from procedure.

## 2023-12-12 ENCOUNTER — OFFICE VISIT (OUTPATIENT)
Dept: ORTHOPEDIC SURGERY | Age: 58
End: 2023-12-12
Payer: COMMERCIAL

## 2023-12-12 VITALS — BODY MASS INDEX: 28.24 KG/M2 | WEIGHT: 179.9 LBS | HEIGHT: 67 IN | RESPIRATION RATE: 14 BRPM

## 2023-12-12 DIAGNOSIS — M81.0 AGE-RELATED OSTEOPOROSIS WITHOUT CURRENT PATHOLOGICAL FRACTURE: ICD-10-CM

## 2023-12-12 DIAGNOSIS — M43.10 ACQUIRED SPONDYLOLISTHESIS: ICD-10-CM

## 2023-12-12 DIAGNOSIS — M54.16 LUMBAR RADICULAR PAIN: Primary | ICD-10-CM

## 2023-12-12 PROCEDURE — 99213 OFFICE O/P EST LOW 20 MIN: CPT | Performed by: ORTHOPAEDIC SURGERY

## 2023-12-12 NOTE — PROGRESS NOTES
attest that this documentation has been prepared under the direction and in the presence of Dr. Vinh Lora. Electronically signed: Lizz Knight, 12/12/23     Please note that this chart was generated using voice recognition Dragon dictation software. Although every effort was made to ensure the accuracy of this automated transcription, some errors in transcription may have occurred.

## 2023-12-12 NOTE — TELEPHONE ENCOUNTER
Ashley Glass is calling to request a refill on the following medication(s):    Medication Request:  Requested Prescriptions     Pending Prescriptions Disp Refills    alendronate (FOSAMAX) 70 MG tablet [Pharmacy Med Name: ALENDRONATE SODIUM 70 MG TAB] 12 tablet 1     Sig: take 1 tablet by mouth every week       Last Visit Date (If Applicable):  75/6/8582    Next Visit Date:    5/2/2024

## 2023-12-13 RX ORDER — ALENDRONATE SODIUM 70 MG/1
TABLET ORAL
Qty: 12 TABLET | Refills: 1 | Status: SHIPPED | OUTPATIENT
Start: 2023-12-13

## 2024-02-06 DIAGNOSIS — Z12.31 ENCOUNTER FOR SCREENING MAMMOGRAM FOR MALIGNANT NEOPLASM OF BREAST: ICD-10-CM

## 2024-02-06 DIAGNOSIS — N63.10 MASS OF RIGHT BREAST, UNSPECIFIED QUADRANT: Primary | ICD-10-CM

## 2024-02-19 DIAGNOSIS — R92.8 ABNORMAL MAMMOGRAM OF RIGHT BREAST: Primary | ICD-10-CM

## 2024-02-20 ENCOUNTER — HOSPITAL ENCOUNTER (OUTPATIENT)
Dept: MAMMOGRAPHY | Age: 59
Discharge: HOME | End: 2024-02-22
Payer: COMMERCIAL

## 2024-02-20 ENCOUNTER — TELEPHONE (OUTPATIENT)
Dept: FAMILY MEDICINE CLINIC | Age: 59
End: 2024-02-20

## 2024-02-20 DIAGNOSIS — N63.10 MASS OF RIGHT BREAST, UNSPECIFIED QUADRANT: ICD-10-CM

## 2024-02-20 DIAGNOSIS — R92.8 ABNORMAL SCREENING MAMMOGRAM: ICD-10-CM

## 2024-02-20 PROCEDURE — 88341 IMHCHEM/IMCYTCHM EA ADD ANTB: CPT

## 2024-02-20 PROCEDURE — 88342 IMHCHEM/IMCYTCHM 1ST ANTB: CPT

## 2024-02-20 PROCEDURE — 19081 BX BREAST 1ST LESION STRTCTC: CPT

## 2024-02-20 PROCEDURE — 88377 M/PHMTRC ALYS ISHQUANT/SEMIQ: CPT

## 2024-02-20 PROCEDURE — 77065 DX MAMMO INCL CAD UNI: CPT

## 2024-02-20 PROCEDURE — 88360 TUMOR IMMUNOHISTOCHEM/MANUAL: CPT

## 2024-02-20 PROCEDURE — 88305 TISSUE EXAM BY PATHOLOGIST: CPT

## 2024-02-20 PROCEDURE — 2500000003 HC RX 250 WO HCPCS

## 2024-02-20 NOTE — TELEPHONE ENCOUNTER
Patient had call back mammograms and Radiologist is recommending Stereotactic BX right breast. Please place order and I will fax to     [facility]   [phone]

## 2024-02-23 LAB — SURGICAL PATHOLOGY REPORT: NORMAL

## 2024-02-26 ENCOUNTER — TELEPHONE (OUTPATIENT)
Dept: FAMILY MEDICINE CLINIC | Age: 59
End: 2024-02-26

## 2024-02-27 ENCOUNTER — TELEPHONE (OUTPATIENT)
Dept: ONCOLOGY | Age: 59
End: 2024-02-27

## 2024-02-27 DIAGNOSIS — C50.911 MALIGNANT NEOPLASM OF RIGHT FEMALE BREAST, UNSPECIFIED ESTROGEN RECEPTOR STATUS, UNSPECIFIED SITE OF BREAST (HCC): Primary | ICD-10-CM

## 2024-02-27 NOTE — TELEPHONE ENCOUNTER
Name: Ann Hardy  : 1965  MRN: [id number]    Oncology Navigation- Initial Note: (first attempt)     Intake-  Contact Type: Telephone    Diagnosis: Breast-malignant    Home Disposition: Lives with other who is able to assist    Patient needs and barriers to care: Financial concerns due to time off work      Referral Source: Outpatient      Interventions-       Smoking hx: Nonsmoker.        Continuum of Care: Diagnosis/Active Treatment    Notes: Writer spoke with pt, introduced self and navigator role. Pt was referred to navigation by Dr Centeno, PCP. Writer reviewed [facility] details with pt. She is interested in this. Appts will be coordinated with schedulers. Pt given appt details.        Barriers of care were reviewed with pt. Currently pt reports that she had two back surgeries previously so her sick time at work is only a few days. We discussed FMLA and she does receive that through her job. Writer explained depending on income she may be eligible for grants to help offset time off that will be unpaid. Discussed that if future barriers arise, navigator can help with resources to overcome these.     Pt was given navigator's contact information and encouraged to contact writer as needed.       Pt was added to [facility] to requested tumor board discussion.     OB/GYN history:  First menstruation: age 11  Menopause:  Postmenopausal, age 49's Ablation at age 43   Number of Pregnancies: 2  Miscarriages: 0  Abortions: 0  Age at first live birth: 21  Years on HRT: no   Years on BC: ~7 years   Personal history of cancer: NA  Family history of breast cancer: no   Family history of other cancers: no           Electronically signed by Delmy Calderon RN on 2024 at 9:42 AM

## 2024-03-01 ENCOUNTER — TELEPHONE (OUTPATIENT)
Dept: ONCOLOGY | Age: 59
End: 2024-03-01

## 2024-03-01 ENCOUNTER — INITIAL CONSULT (OUTPATIENT)
Dept: ONCOLOGY | Age: 59
End: 2024-03-01

## 2024-03-01 ENCOUNTER — HOSPITAL ENCOUNTER (OUTPATIENT)
Dept: RADIATION ONCOLOGY | Age: 59
Discharge: HOME | End: 2024-03-01

## 2024-03-01 ENCOUNTER — HOSPITAL ENCOUNTER (OUTPATIENT)
Age: 59
Discharge: HOME | End: 2024-03-01
Payer: COMMERCIAL

## 2024-03-01 ENCOUNTER — INITIAL CONSULT (OUTPATIENT)
Dept: ONCOLOGY | Age: 59
End: 2024-03-01
Payer: COMMERCIAL

## 2024-03-01 DIAGNOSIS — M25.511 CHRONIC RIGHT SHOULDER PAIN: ICD-10-CM

## 2024-03-01 DIAGNOSIS — Z17.1 MALIGNANT NEOPLASM OF RIGHT BREAST IN FEMALE, ESTROGEN RECEPTOR NEGATIVE, UNSPECIFIED SITE OF BREAST (HCC): Primary | ICD-10-CM

## 2024-03-01 DIAGNOSIS — Z17.1 MALIGNANT NEOPLASM OF UPPER-OUTER QUADRANT OF RIGHT BREAST IN FEMALE, ESTROGEN RECEPTOR NEGATIVE (HCC): Primary | ICD-10-CM

## 2024-03-01 DIAGNOSIS — N63.10 MASS OF RIGHT BREAST, UNSPECIFIED QUADRANT: ICD-10-CM

## 2024-03-01 DIAGNOSIS — E78.5 DYSLIPIDEMIA: ICD-10-CM

## 2024-03-01 DIAGNOSIS — C50.411 MALIGNANT NEOPLASM OF UPPER-OUTER QUADRANT OF RIGHT BREAST IN FEMALE, ESTROGEN RECEPTOR NEGATIVE (HCC): Primary | ICD-10-CM

## 2024-03-01 DIAGNOSIS — C50.911 MALIGNANT NEOPLASM OF RIGHT BREAST IN FEMALE, ESTROGEN RECEPTOR NEGATIVE, UNSPECIFIED SITE OF BREAST (HCC): Primary | ICD-10-CM

## 2024-03-01 DIAGNOSIS — R73.03 PREDIABETES: ICD-10-CM

## 2024-03-01 DIAGNOSIS — G89.29 CHRONIC RIGHT SHOULDER PAIN: ICD-10-CM

## 2024-03-01 DIAGNOSIS — N63.10 MASS OF RIGHT BREAST, UNSPECIFIED QUADRANT: Primary | ICD-10-CM

## 2024-03-01 DIAGNOSIS — M81.0 AGE-RELATED OSTEOPOROSIS WITHOUT CURRENT PATHOLOGICAL FRACTURE: ICD-10-CM

## 2024-03-01 DIAGNOSIS — E83.42 HYPOMAGNESEMIA: ICD-10-CM

## 2024-03-01 LAB
25(OH)D3 SERPL-MCNC: 7.3 NG/ML (ref 30–100)
ALBUMIN SERPL-MCNC: 4.6 G/DL (ref 3.5–5.2)
ALBUMIN SERPL-MCNC: 4.6 G/DL (ref 3.5–5.2)
ALBUMIN/GLOB SERPL: 1.2 {RATIO} (ref 1–2.5)
ALBUMIN/GLOB SERPL: 1.2 {RATIO} (ref 1–2.5)
ALP SERPL-CCNC: 80 U/L (ref 35–104)
ALP SERPL-CCNC: 80 U/L (ref 35–104)
ALT SERPL-CCNC: 20 U/L (ref 5–33)
ALT SERPL-CCNC: 20 U/L (ref 5–33)
ANION GAP SERPL CALCULATED.3IONS-SCNC: 10 MMOL/L (ref 9–17)
ANION GAP SERPL CALCULATED.3IONS-SCNC: 10 MMOL/L (ref 9–17)
AST SERPL-CCNC: 19 U/L
AST SERPL-CCNC: 19 U/L
BASOPHILS # BLD: 0 K/UL (ref 0–0.2)
BASOPHILS # BLD: 0 K/UL (ref 0–0.2)
BASOPHILS NFR BLD: 1 % (ref 0–2)
BASOPHILS NFR BLD: 1 % (ref 0–2)
BILIRUB SERPL-MCNC: 0.4 MG/DL (ref 0.3–1.2)
BILIRUB SERPL-MCNC: 0.4 MG/DL (ref 0.3–1.2)
BUN SERPL-MCNC: 12 MG/DL (ref 6–20)
BUN SERPL-MCNC: 12 MG/DL (ref 6–20)
CALCIUM SERPL-MCNC: 9.9 MG/DL (ref 8.6–10.4)
CALCIUM SERPL-MCNC: 9.9 MG/DL (ref 8.6–10.4)
CHLORIDE SERPL-SCNC: 103 MMOL/L (ref 98–107)
CHLORIDE SERPL-SCNC: 103 MMOL/L (ref 98–107)
CHOLEST SERPL-MCNC: 227 MG/DL (ref 0–199)
CHOLESTEROL/HDL RATIO: 4
CO2 SERPL-SCNC: 27 MMOL/L (ref 20–31)
CO2 SERPL-SCNC: 27 MMOL/L (ref 20–31)
CREAT SERPL-MCNC: 0.7 MG/DL (ref 0.5–0.9)
CREAT SERPL-MCNC: 0.7 MG/DL (ref 0.5–0.9)
EOSINOPHIL # BLD: 0.1 K/UL (ref 0–0.4)
EOSINOPHIL # BLD: 0.1 K/UL (ref 0–0.4)
EOSINOPHILS RELATIVE PERCENT: 2 % (ref 1–4)
EOSINOPHILS RELATIVE PERCENT: 2 % (ref 1–4)
ERYTHROCYTE [DISTWIDTH] IN BLOOD BY AUTOMATED COUNT: 14.5 % (ref 12.5–15.4)
ERYTHROCYTE [DISTWIDTH] IN BLOOD BY AUTOMATED COUNT: 14.5 % (ref 12.5–15.4)
EST. AVERAGE GLUCOSE BLD GHB EST-MCNC: 117 MG/DL
GFR SERPL CREATININE-BSD FRML MDRD: >60 ML/MIN/1.73M2
GFR SERPL CREATININE-BSD FRML MDRD: >60 ML/MIN/1.73M2
GLUCOSE SERPL-MCNC: 98 MG/DL (ref 70–99)
GLUCOSE SERPL-MCNC: 98 MG/DL (ref 70–99)
HBA1C MFR BLD: 5.7 % (ref 4–6)
HCT VFR BLD AUTO: 39.7 % (ref 36–46)
HCT VFR BLD AUTO: 39.7 % (ref 36–46)
HDLC SERPL-MCNC: 54 MG/DL
HGB BLD-MCNC: 13.1 G/DL (ref 12–16)
HGB BLD-MCNC: 13.1 G/DL (ref 12–16)
LDLC SERPL CALC-MCNC: 156 MG/DL (ref 0–100)
LYMPHOCYTES NFR BLD: 1.1 K/UL (ref 1–4.8)
LYMPHOCYTES NFR BLD: 1.1 K/UL (ref 1–4.8)
LYMPHOCYTES RELATIVE PERCENT: 41 % (ref 24–44)
LYMPHOCYTES RELATIVE PERCENT: 41 % (ref 24–44)
MAGNESIUM SERPL-MCNC: 2.4 MG/DL (ref 1.6–2.6)
MCH RBC QN AUTO: 28.5 PG (ref 26–34)
MCH RBC QN AUTO: 28.5 PG (ref 26–34)
MCHC RBC AUTO-ENTMCNC: 33.1 G/DL (ref 31–37)
MCHC RBC AUTO-ENTMCNC: 33.1 G/DL (ref 31–37)
MCV RBC AUTO: 86.2 FL (ref 80–100)
MCV RBC AUTO: 86.2 FL (ref 80–100)
MONOCYTES NFR BLD: 0.2 K/UL (ref 0.1–1.2)
MONOCYTES NFR BLD: 0.2 K/UL (ref 0.1–1.2)
MONOCYTES NFR BLD: 8 % (ref 2–11)
MONOCYTES NFR BLD: 8 % (ref 2–11)
NEUTROPHILS NFR BLD: 48 % (ref 36–66)
NEUTROPHILS NFR BLD: 48 % (ref 36–66)
NEUTS SEG NFR BLD: 1.3 K/UL (ref 1.8–7.7)
NEUTS SEG NFR BLD: 1.3 K/UL (ref 1.8–7.7)
PLATELET # BLD AUTO: 308 K/UL (ref 140–450)
PLATELET # BLD AUTO: 308 K/UL (ref 140–450)
PMV BLD AUTO: 7.6 FL (ref 6–12)
PMV BLD AUTO: 7.6 FL (ref 6–12)
POTASSIUM SERPL-SCNC: 3.7 MMOL/L (ref 3.7–5.3)
POTASSIUM SERPL-SCNC: 3.7 MMOL/L (ref 3.7–5.3)
PROT SERPL-MCNC: 8.6 G/DL (ref 6.4–8.3)
PROT SERPL-MCNC: 8.6 G/DL (ref 6.4–8.3)
RBC # BLD AUTO: 4.61 M/UL (ref 4–5.2)
RBC # BLD AUTO: 4.61 M/UL (ref 4–5.2)
SODIUM SERPL-SCNC: 140 MMOL/L (ref 135–144)
SODIUM SERPL-SCNC: 140 MMOL/L (ref 135–144)
TRIGL SERPL-MCNC: 84 MG/DL
VLDLC SERPL CALC-MCNC: 17 MG/DL
WBC OTHER # BLD: 2.8 K/UL (ref 3.5–11)
WBC OTHER # BLD: 2.8 K/UL (ref 3.5–11)

## 2024-03-01 PROCEDURE — 93702 BIS XTRACELL FLUID ANALYSIS: CPT | Performed by: INTERNAL MEDICINE

## 2024-03-01 PROCEDURE — 80053 COMPREHEN METABOLIC PANEL: CPT

## 2024-03-01 PROCEDURE — 36415 COLL VENOUS BLD VENIPUNCTURE: CPT

## 2024-03-01 PROCEDURE — 99203 OFFICE O/P NEW LOW 30 MIN: CPT | Performed by: SURGERY

## 2024-03-01 PROCEDURE — 83735 ASSAY OF MAGNESIUM: CPT

## 2024-03-01 PROCEDURE — 82306 VITAMIN D 25 HYDROXY: CPT

## 2024-03-01 PROCEDURE — 80061 LIPID PANEL: CPT

## 2024-03-01 PROCEDURE — 85025 COMPLETE CBC W/AUTO DIFF WBC: CPT

## 2024-03-01 PROCEDURE — 83036 HEMOGLOBIN GLYCOSYLATED A1C: CPT

## 2024-03-01 RX ORDER — ALPRAZOLAM 0.5 MG/1
0.5 TABLET ORAL EVERY 12 HOURS PRN
Qty: 5 TABLET | Refills: 0 | Status: SHIPPED | OUTPATIENT
Start: 2024-03-01 | End: 2024-03-06

## 2024-03-01 NOTE — PROGRESS NOTES
SOZO Lymphedema Assessment  Measurement Type: Unilateral  Body Element: Arm  Limb Dominance: Right  Purpose for Testing: Baseline  SOZO Measurement - Right  Baseline L-Dex Score - Right: -3.9

## 2024-03-01 NOTE — PROGRESS NOTES
unexpected weight change.   Eyes:  Negative for visual disturbance.   Respiratory:  Negative for cough, chest tightness, shortness of breath and wheezing.    Cardiovascular:  Negative for chest pain, palpitations and leg swelling.   Gastrointestinal:  Negative for abdominal distention, abdominal pain, blood in stool, constipation, diarrhea, nausea and vomiting.   Genitourinary:  Negative for dysuria, hematuria and urgency.   Musculoskeletal:  Negative for back pain, neck pain and neck stiffness.   Skin:  Negative for rash and wound.   Neurological:  Negative for syncope, weakness, light-headedness and headaches.   Hematological:  Negative for adenopathy. Does not bruise/bleed easily.   Psychiatric/Behavioral:  Negative for suicidal ideas. The patient is not nervous/anxious.      Physical Exam:  There were no vitals filed for this visit.  General:A & O x3  HEENT:  NCAT, no mass palpated on neck exam  BREAST: The bilateral breasts are symmetric with no skin changes, nipple deformity, discharge, or masses.  There is no tenderness to palpation.  Lymph Nodes:  There is no lymphadenopathy in the supraclavicular, infraclavicular, or axillary areas bilaterally.  Extremity: Normal, without deformities, edema, or skin discoloration.  She has equal strength of her shoulders for all movements but she has some mild pain with supination against resistance.   SKIN: Skin color, texture, turgor normal. No rashes or lesions.     IMAGING:  Mammogram Result (most recent):  MAMMOGRAM POST BX CLIP PLACEMENT RIGHT 02/20/2024    Addendum 2/28/2024  7:25 PM  ADDENDUM:  Histology, stereotactically guided core needle biopsy of right breast mass at  11 o'clock with calcifications: Invasive carcinoma of no special type  (ductal), preliminary grade 2/3.  ER negative TX negative no lymphovascular  invasion.  Extensive ductal carcinoma in situ, high grade with comedonecrosis  and calcifications.  Radial scar.    Findings are malignant and image

## 2024-03-01 NOTE — PATIENT INSTRUCTIONS
You have a right breast cancer in the upper outer part.  We need to check an MRI in order to come up with the best plan for you. I have ordered xanax to help with anxiety for MRI.  You complained of shoulder pain. We will check a bone scan to make sure that it is not cancer spread (not worried about it - just covering bases).  I have put in a consult for you to talk to the plastic surgeon about reconstruction options in case you want/need to pursue mastectomy (removal of the breast).  We will talk after your MRI to better plan.

## 2024-03-01 NOTE — CONSULTS
[facility]            Radiation Oncology          [address] [place] [address]        O: [phone]        F: [phone]       [facility]             3/1/2024    Patient: Ann Hardy   YOB: 1965       Dear Dr Almeida:    Thank you for referring nAn Hardy to me for evaluation. Below are the relevant portions of my assessment and plan of care. If you have questions, please do not hesitate to call me. I look forward to following this patient along with you.     Sincerely,    Electronically signed by Milan Almeida MD on 3/1/2024 at 11:56 AM    CC: Patient Care Team:  Varun Centeno DO as PCP - General (Family Medicine)  Varun Centeno DO as PCP - Empaneled Provider  Delmy Calderon RN as Nurse Navigator (Oncology)  ------------------------------------------------------------------------------------------------------------------------------------------------------------------------------------------      RADIATION ONCOLOGY NEW PATIENT VISIT:    Date of Service: 3/1/2024    Location:  [facility] Radiation Oncology,   [address] [facility] [facility] [address], [place] [id number] [phone]     Patient ID:   Ann Hardy  : 1965   MRN: [id number]    CHIEF COMPLAINT: \"I have breast cancer\"    DIAGNOSIS:  Right upper outer quadrant 11 o'clock position invasive ductal carcinoma of the right breast, cT1a N0 M0, ER/NE negative, HER2/nahum negative    HISTORY OF PRESENT ILLNESS:   Ann Hardy is a 58 y.o. female who had a screening mammogram which demonstrated an area of suspicious calcifications involving the right breast at the 11 o'clock position.  Stereotactic guided biopsy of the suspicious calcification was performed and demonstrated the presence of DCIS, small focus of invasive disease was noted measuring more than 1 mm but less than 2 mm.  She is staged as cT1a N0 M0, ER/NE negative, HER2/nahum negative.  Patient is

## 2024-03-01 NOTE — TELEPHONE ENCOUNTER
Labs now ( mine and PCPs)  Rv will cordinate with navigator       Labs today      Writer spoke with Nurse Navigator BC. Pt will have scans done and Navigator will reach out to writer to get pt put back on the schedule.    PT was given AVS and appt schedule    Electronically signed by Nicole Montoya on 3/1/2024 at 10:14 AM

## 2024-03-01 NOTE — TELEPHONE ENCOUNTER
met with patient during Breast Clinic. Patient accompanied by . Patient has concerns about missing work due to treatment.  provided information about [employer] Financial Assistance and [employer] Financial Assistance. Patient asking about support groups and given [facility] newsletter.

## 2024-03-01 NOTE — TELEPHONE ENCOUNTER
Patient seen in consultation with Dr Rivero, Dr Guerra, Dr Almeida, and Rayna Guzmán, [facility]     Treatment plan recommendations are:  genetics  MRI/bonescan   Lump vs jocelin-ad chemo  XRT         Writer met with patient and her .  Introduced myself as the breast cancer patient navigator. Explained to patient that I would be available to help navigate the disease process, treatment, and follow up.       Patient given [employer] breast cancer folder with community resources, breast cancer information, and contact information along with [employer] bag.      Baseline Sozo scan has been completed.     Patient meets criteria for Genetic testing.  Labs drawn.  Rayna to call in 2-3 weeks with results.     Reviewed breast clinic recommendations with patient.  Pt given copy of recommendations.     Patient to follow up with Dr Rivero after MRI.     Oncology rehab discussed.  Referral placed for Oncology Rehab and Lymphedema Clinic.      Psychosocial and Functional assessment completed and forwarded to Spiritual Care and .      Encouraged patient to call with questions, concerns, needs.       Will continue to follow.

## 2024-03-01 NOTE — PROGRESS NOTES
[facility] Genetic Counseling Program   Hereditary Cancer Risk Assessment     Name: Ann Hardy   YOB: 1965   Date of Consultation: 3/1/24     Ms. Hardy was seen in the [facility] Comprehensive Breast Cancer Program and as part of her appointment was offered a genetic evaluation.      PERSONAL HISTORY   Ms. Hardy is a 58 y.o. female who was recently diagnosed with breast cancer. Specifically, an invasive ductal carcinoma of the right breast. The tumor is estrogen receptor negative, progesterone receptor negative, and Pme7Nma negative.     She reports:     Menarche at age: 11y  First child at age: 21y  Menopause at age: 50s, ablation at age 43  OCP: 7y  HRT: Never     FAMILY HISTORY  Ms. Hardy has one son and one daughter. She has one full sister.     Her mother had just one sister and no brothers. Her sister's daughter (Ms. Hardy's maternal first cousin) had breast cancer in her 70s.     Her father was an only child.     Ms. Hardy reports unknown ancestry and denies any known Ashkenazi Roman Catholic heritage.     RISK ASSESSMENT   We discussed that approximately 5-10% of cancers are due to a hereditary gene mutation which causes an increased risk for certain cancers. Hereditary cancers are typically diagnosed at younger ages (under age 50y) and occur in multiple generations of a family. Multiple individuals with the same type of cancer (example: breast or colorectal) or uncommon cancers (example: ovarian, pancreatic, male breast cancer) are also features of hereditary cancers.     In summary, Ms. Hardy meets the National Comprehensive Cancer Network (NCCN) guidelines for genetic testing of the BRCA1/2 genes due to her diagnosis of triple negative breast cancer. It is also important to note that her paternal family structure is small with few female relatives (her father was an only child).     The NCCN guidelines also recognize that an individual's personal and/or family history may be explained by

## 2024-03-05 ENCOUNTER — HOSPITAL ENCOUNTER (OUTPATIENT)
Dept: MRI IMAGING | Age: 59
Discharge: HOME | End: 2024-03-07
Attending: SURGERY
Payer: COMMERCIAL

## 2024-03-05 DIAGNOSIS — Z17.1 MALIGNANT NEOPLASM OF RIGHT BREAST IN FEMALE, ESTROGEN RECEPTOR NEGATIVE, UNSPECIFIED SITE OF BREAST (HCC): ICD-10-CM

## 2024-03-05 DIAGNOSIS — C50.911 MALIGNANT NEOPLASM OF RIGHT BREAST IN FEMALE, ESTROGEN RECEPTOR NEGATIVE, UNSPECIFIED SITE OF BREAST (HCC): ICD-10-CM

## 2024-03-05 PROCEDURE — 2580000003 HC RX 258: Performed by: SURGERY

## 2024-03-05 PROCEDURE — C8908 MRI W/O FOL W/CONT, BREAST,: HCPCS

## 2024-03-05 PROCEDURE — 6360000004 HC RX CONTRAST MEDICATION: Performed by: SURGERY

## 2024-03-05 PROCEDURE — A9579 GAD-BASE MR CONTRAST NOS,1ML: HCPCS | Performed by: SURGERY

## 2024-03-05 RX ORDER — SODIUM CHLORIDE 0.9 % (FLUSH) 0.9 %
10 SYRINGE (ML) INJECTION PRN
Status: DISCONTINUED | OUTPATIENT
Start: 2024-03-05 | End: 2024-03-08 | Stop reason: HOSPADM

## 2024-03-05 RX ORDER — 0.9 % SODIUM CHLORIDE 0.9 %
35 INTRAVENOUS SOLUTION INTRAVENOUS ONCE
Status: COMPLETED | OUTPATIENT
Start: 2024-03-05 | End: 2024-03-05

## 2024-03-05 RX ADMIN — GADOTERIDOL 16 ML: 279.3 INJECTION, SOLUTION INTRAVENOUS at 10:35

## 2024-03-05 RX ADMIN — SODIUM CHLORIDE, PRESERVATIVE FREE 10 ML: 5 INJECTION INTRAVENOUS at 10:36

## 2024-03-05 RX ADMIN — SODIUM CHLORIDE 35 ML: 9 INJECTION, SOLUTION INTRAVENOUS at 10:36

## 2024-03-06 ENCOUNTER — TELEMEDICINE (OUTPATIENT)
Dept: SURGERY | Age: 59
End: 2024-03-06

## 2024-03-06 DIAGNOSIS — C50.411 MALIGNANT NEOPLASM OF UPPER-OUTER QUADRANT OF RIGHT BREAST IN FEMALE, ESTROGEN RECEPTOR NEGATIVE (HCC): Primary | ICD-10-CM

## 2024-03-06 DIAGNOSIS — Z17.1 MALIGNANT NEOPLASM OF UPPER-OUTER QUADRANT OF RIGHT BREAST IN FEMALE, ESTROGEN RECEPTOR NEGATIVE (HCC): Primary | ICD-10-CM

## 2024-03-07 ENCOUNTER — TELEPHONE (OUTPATIENT)
Dept: ONCOLOGY | Age: 59
End: 2024-03-07

## 2024-03-07 NOTE — TELEPHONE ENCOUNTER
Late entry: 3/6     Name: Ann Hardy  : 1965  MRN: [id number]    Oncology Navigation Follow-Up Note    Contact Type:  Telephone    Notes: Spoke with Grace at [facility]'s [place]. Updated her on second look US order. Grace contacted pt and offered her dates. Pt scheduled.     Electronically signed by Delmy Calderon RN on 3/7/2024 at 11:04 AM

## 2024-03-11 ENCOUNTER — HOSPITAL ENCOUNTER (OUTPATIENT)
Dept: ULTRASOUND IMAGING | Age: 59
Discharge: HOME | End: 2024-03-13
Payer: COMMERCIAL

## 2024-03-11 ENCOUNTER — TELEPHONE (OUTPATIENT)
Dept: ONCOLOGY | Age: 59
End: 2024-03-11

## 2024-03-11 DIAGNOSIS — C50.411 MALIGNANT NEOPLASM OF UPPER-OUTER QUADRANT OF RIGHT BREAST IN FEMALE, ESTROGEN RECEPTOR NEGATIVE (HCC): ICD-10-CM

## 2024-03-11 DIAGNOSIS — E78.5 DYSLIPIDEMIA: Primary | ICD-10-CM

## 2024-03-11 DIAGNOSIS — Z17.1 MALIGNANT NEOPLASM OF UPPER-OUTER QUADRANT OF RIGHT BREAST IN FEMALE, ESTROGEN RECEPTOR NEGATIVE (HCC): ICD-10-CM

## 2024-03-11 DIAGNOSIS — E55.9 VITAMIN D DEFICIENCY: ICD-10-CM

## 2024-03-11 PROCEDURE — 76642 ULTRASOUND BREAST LIMITED: CPT

## 2024-03-11 RX ORDER — ERGOCALCIFEROL 1.25 MG/1
50000 CAPSULE ORAL WEEKLY
Qty: 12 CAPSULE | Refills: 1 | Status: SHIPPED | OUTPATIENT
Start: 2024-03-11

## 2024-03-11 RX ORDER — ATORVASTATIN CALCIUM 20 MG/1
20 TABLET, FILM COATED ORAL DAILY
Qty: 90 TABLET | Refills: 1 | Status: SHIPPED | OUTPATIENT
Start: 2024-03-11

## 2024-03-11 NOTE — TELEPHONE ENCOUNTER
[facility] Genetic Counseling Program   Hereditary Cancer Risk Assessment     Name: Ann Hardy  YOB: 1965  Date of Results Disclosure: 03/11/24      HISTORY   Ms. Hardy was seen for genetic counseling due to her personal and family history of cancer.  At that time, Ms. Hardy chose to pursue genetic testing via the CancerNext Expanded + RNA hereditary cancer gene panel. These results were discussed with Ms. Hardy via telephone. A summary of Ms. Hardy's results and recommendations are below.     RESULTS  [employer] CancerNext-Expanded Panel + RNAinsight: NEGATIVE - NO CLINICALLY SIGNIFICANT MUTATIONS DETECTED   This panel included the analysis of 71 genes associated with hereditary cancer including: AIP, ALK, APC, ALEX, BAP1, BARD1, BMPR1A, BRCA1, BRCA2, BRIP1, CDC73, CDH1, CDK4, CDKN1B, CDKN2A, CHEK2, CTNNA1, DICER1, EGFR, EGLN1, EPCAM, FH, FLCN, GREM1, HOXB13, KIF1B, KIT1, LZTR1, MAX, MEN1, MET, MITF, MLH1, MSH2, MSH3, MSH6, MUTYH, NF1, NF2, NTHL1, PALB2, PDGFRA, PHOX2B, PMS2, POLD1, POLE, POT1, UVTRF2E, PTCH1, PTEN, RAD51C, RAD51D, RB1, RET, SDHA, SDHAF2, SDHB, SDHC, ,SDHD, SMAD4, SMARCA4, SMARCB1, SMARCE1, STK11, SUFU, VENY284, TP53, TSC1, TSC2, VHL. In addition, no clinically relevant aberrant RNA transcripts were detected in select genes. Please refer to genetic test report for technical details.    The stat BRCAplus panel was completed on the same day as the full panel and included in the results above.     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 Because the significance of the BRIP1 gene VUS is unknown, medical management must be based on Ms. Hardy's personal history and family history of cancer.     We discussed that Ms. Ojedas

## 2024-03-14 ENCOUNTER — TELEPHONE (OUTPATIENT)
Dept: ONCOLOGY | Age: 59
End: 2024-03-14

## 2024-03-20 ENCOUNTER — TELEPHONE (OUTPATIENT)
Dept: SURGERY | Age: 59
End: 2024-03-20

## 2024-03-20 ENCOUNTER — TELEPHONE (OUTPATIENT)
Dept: ONCOLOGY | Age: 59
End: 2024-03-20

## 2024-03-20 NOTE — TELEPHONE ENCOUNTER
Pt called to ask what the plan is after US from 3/11/24. She does not have an appt set up with Dr. Rivero at this time. If she is needing to be seen she would like to set up a VV if possible. Please advise. Images are in PACS. Thank you.

## 2024-03-20 NOTE — TELEPHONE ENCOUNTER
Name: Ann Hardy  : 1965  MRN: [id number]    Oncology Navigation Follow-Up Note    Contact Type:  Telephone    Notes: Pt called navigator with questions r/t FMLA paperwork. Instructed pt that paperwork should be given to Dr Rivero's office. Writer provided pt with Dr Rivero's office contact information.           Electronically signed by Delmy Calderon RN on 3/20/2024 at 11:44 AM

## 2024-03-25 ENCOUNTER — HOSPITAL ENCOUNTER (OUTPATIENT)
Dept: NUCLEAR MEDICINE | Age: 59
Discharge: HOME | End: 2024-03-27
Payer: COMMERCIAL

## 2024-03-25 DIAGNOSIS — M25.511 CHRONIC RIGHT SHOULDER PAIN: ICD-10-CM

## 2024-03-25 DIAGNOSIS — Z17.1 MALIGNANT NEOPLASM OF UPPER-OUTER QUADRANT OF RIGHT BREAST IN FEMALE, ESTROGEN RECEPTOR NEGATIVE (HCC): ICD-10-CM

## 2024-03-25 DIAGNOSIS — C50.411 MALIGNANT NEOPLASM OF UPPER-OUTER QUADRANT OF RIGHT BREAST IN FEMALE, ESTROGEN RECEPTOR NEGATIVE (HCC): ICD-10-CM

## 2024-03-25 DIAGNOSIS — G89.29 CHRONIC RIGHT SHOULDER PAIN: ICD-10-CM

## 2024-03-25 PROCEDURE — 78306 BONE IMAGING WHOLE BODY: CPT | Performed by: SURGERY

## 2024-03-25 PROCEDURE — 3430000000 HC RX DIAGNOSTIC RADIOPHARMACEUTICAL: Performed by: SURGERY

## 2024-03-25 PROCEDURE — A9503 TC99M MEDRONATE: HCPCS | Performed by: SURGERY

## 2024-03-25 RX ORDER — TC 99M MEDRONATE 20 MG/10ML
24.5 INJECTION, POWDER, LYOPHILIZED, FOR SOLUTION INTRAVENOUS
Status: COMPLETED | OUTPATIENT
Start: 2024-03-25 | End: 2024-03-25

## 2024-03-25 RX ORDER — GABAPENTIN 600 MG/1
600 TABLET ORAL 3 TIMES DAILY
Qty: 90 TABLET | OUTPATIENT
Start: 2024-03-25

## 2024-03-25 RX ORDER — GABAPENTIN 600 MG/1
600 TABLET ORAL 3 TIMES DAILY
Qty: 270 TABLET | Refills: 0 | OUTPATIENT
Start: 2024-03-25

## 2024-03-25 RX ADMIN — TC 99M MEDRONATE 24.5 MILLICURIE: 20 INJECTION, POWDER, LYOPHILIZED, FOR SOLUTION INTRAVENOUS at 07:48

## 2024-03-27 ENCOUNTER — OFFICE VISIT (OUTPATIENT)
Dept: SURGERY | Age: 59
End: 2024-03-27
Payer: COMMERCIAL

## 2024-03-27 ENCOUNTER — TELEPHONE (OUTPATIENT)
Dept: SURGERY | Age: 59
End: 2024-03-27

## 2024-03-27 VITALS
OXYGEN SATURATION: 100 % | RESPIRATION RATE: 16 BRPM | SYSTOLIC BLOOD PRESSURE: 135 MMHG | WEIGHT: 178 LBS | HEART RATE: 85 BPM | BODY MASS INDEX: 27.94 KG/M2 | HEIGHT: 67 IN | DIASTOLIC BLOOD PRESSURE: 94 MMHG

## 2024-03-27 DIAGNOSIS — C50.411 MALIGNANT NEOPLASM OF UPPER-OUTER QUADRANT OF RIGHT BREAST IN FEMALE, ESTROGEN RECEPTOR NEGATIVE (HCC): Primary | ICD-10-CM

## 2024-03-27 DIAGNOSIS — Z17.1 MALIGNANT NEOPLASM OF UPPER-OUTER QUADRANT OF RIGHT BREAST IN FEMALE, ESTROGEN RECEPTOR NEGATIVE (HCC): Primary | ICD-10-CM

## 2024-03-27 PROCEDURE — 3080F DIAST BP >= 90 MM HG: CPT | Performed by: PLASTIC SURGERY

## 2024-03-27 PROCEDURE — 99203 OFFICE O/P NEW LOW 30 MIN: CPT | Performed by: PLASTIC SURGERY

## 2024-03-27 PROCEDURE — 3075F SYST BP GE 130 - 139MM HG: CPT | Performed by: PLASTIC SURGERY

## 2024-03-27 NOTE — PROGRESS NOTES
Sternal Notch to Nipple RIGHT (cm)  25.0 Sternal Notch to Nipple LEFT (cm)  27.5   Nipple to IMF RIGHT (cm)  10.5 Nipple to IMF LEFT (cm)  8.0   Breast Width RIGHT (cm)                      17.2                         Breast Width LEFT (cm)   `19.5                                                Current bra size:  40 C Desired bra size  40  C        Resection Grams for each Size  32-34 = 100g   36-38 = 200g     42/44 = 300g     44-46 = 400g   BSA GRAMS OF TISSUE TO BE REMOVED PER BREAST    1.40-1.50 218-260   1.51-1.60 261-310   1.61-1.70 311-370   1.71-1.80 371-441   1.81-1.90 442-527   1.91-2.00 528-628   2.01-2.10 629-750   2.11-2.20 751-895   2.21-2.30 896-1068   2.31-2.40 9872-3815   2.41-2.50 5881-0008   2.51-2.60 9629-4273   2.61-2.70 9031-1121   2.7-2.80 7003-1084   2.81-2.90 8846-9130   2.91-3.00 6622-4988       Reconstruction options discussed:   [ ] Tram         [ ] Latissimus   dorsi flap        [ ] Tissue Expanders   [ ]Delayed reconstruction          [ ] Free Flap                 [ ] No Reconstruction    I discussed with the patient that the patient is responsible for obtaining a mammogram prior to any surgical intervention if they have not had a mammogram within 1 year of the scheduled procedure.  Refer to:     Breast reduction options discussed:     [ ] Inferior Pedical                [ ] Superior Pedicle  [ ] Medial Pedicle        [ ] Mastopexy   
                      [ ] Yes       [ ] No        Type:        Allergies:         Allergies as of 03/27/2024 - Fully Reviewed 03/27/2024   Allergen Reaction Noted    Cefuroxime   12/28/2022      Other:      PHYSICAL EXAM  Chest Wall                                                                                [ ] Pectus Deform      [ ] Scoliosis      [ ] Scars      [ ] Venous Stasis  Asymmetries:                                                                           [ ] Right      [ ] Left      [ ] Bilateral  Re-Construction                                                                       [ ] Right      [ ] Left      [ ] Bilateral  Tubular:Discussed Options:                                                    [ ] Right      [ ] Left      [ ] Bilateral  Striae:                                                                                       [ ] Right      [ ] Left      [ ] Bilateral  Other:      NEUROLOGICAL EXAM  Normal:                                                                                   [ ] Yes       [ ] No           Problem/Explain:   MEASUREMENTS      Chest Circumference in Inches   Above the breast: 38.5 Chest Circumference in Inches   Bellow the breast: 40.0   Breast Girth (inches)                    42.0                                      Sternal Notch to Nipple RIGHT (cm)  25.0 Sternal Notch to Nipple LEFT (cm)  27.5   Nipple to IMF RIGHT (cm)  10.5 Nipple to IMF LEFT (cm)  8.0   Breast Width RIGHT (cm)                      17.2                         Breast Width LEFT (cm)   `19.5                                                Current bra size:  40 C Desired bra size  40  C          Resection Grams for each Size  32-34 = 100g   36-38 = 200g     42/44 = 300g     44-46 = 400g   BSA GRAMS OF TISSUE TO BE REMOVED PER BREAST    1.40-1.50 218-260   1.51-1.60 261-310   1.61-1.70 311-370   1.71-1.80 371-441   1.81-1.90 442-527   1.91-2.00 528-628   2.01-2.10 739-870   2.11-2.20 [phone]

## 2024-03-27 NOTE — TELEPHONE ENCOUNTER
Patient dropped off [facility] Paperwork for /clinical staff to complete.     Paperwork placed in Dr. Barraza folder at the  in [place].

## 2024-03-29 ENCOUNTER — OFFICE VISIT (OUTPATIENT)
Dept: PAIN MANAGEMENT | Age: 59
End: 2024-03-29
Payer: COMMERCIAL

## 2024-03-29 VITALS — OXYGEN SATURATION: 99 % | HEIGHT: 67 IN | WEIGHT: 175 LBS | BODY MASS INDEX: 27.47 KG/M2

## 2024-03-29 DIAGNOSIS — M51.36 DDD (DEGENERATIVE DISC DISEASE), LUMBAR: ICD-10-CM

## 2024-03-29 DIAGNOSIS — M54.41 CHRONIC BILATERAL LOW BACK PAIN WITH BILATERAL SCIATICA: Primary | ICD-10-CM

## 2024-03-29 DIAGNOSIS — Z79.899 MEDICATION MANAGEMENT: Chronic | ICD-10-CM

## 2024-03-29 DIAGNOSIS — M25.511 ACUTE PAIN OF RIGHT SHOULDER: ICD-10-CM

## 2024-03-29 DIAGNOSIS — M54.42 CHRONIC BILATERAL LOW BACK PAIN WITH BILATERAL SCIATICA: Primary | ICD-10-CM

## 2024-03-29 DIAGNOSIS — G89.29 CHRONIC BILATERAL LOW BACK PAIN WITH BILATERAL SCIATICA: Primary | ICD-10-CM

## 2024-03-29 DIAGNOSIS — M47.26 OTHER SPONDYLOSIS WITH RADICULOPATHY, LUMBAR REGION: ICD-10-CM

## 2024-03-29 PROBLEM — M54.16 LUMBAR RADICULAR PAIN: Status: RESOLVED | Noted: 2022-10-06 | Resolved: 2024-03-29

## 2024-03-29 PROCEDURE — 99214 OFFICE O/P EST MOD 30 MIN: CPT | Performed by: NURSE PRACTITIONER

## 2024-03-29 RX ORDER — GABAPENTIN 400 MG/1
400 CAPSULE ORAL 3 TIMES DAILY
Qty: 270 CAPSULE | Refills: 0 | Status: SHIPPED | OUTPATIENT
Start: 2024-03-29 | End: 2024-06-27

## 2024-03-29 ASSESSMENT — ENCOUNTER SYMPTOMS
COUGH: 0
CONSTIPATION: 0
SHORTNESS OF BREATH: 0
BACK PAIN: 1
BOWEL INCONTINENCE: 0

## 2024-03-29 NOTE — PROGRESS NOTES
Chief Complaint   Patient presents with    Back Pain    Medication Refill         PMH     Pt c/o low back and right lumbar radicular pain hip buttock area occ numbness in right foot. She has tried LESI  therapy and chiropractic treatment with minimal relief prior to surgery  She is now s/p revision L5-S1 posterior instrumented fusion, 11/14/22 with Dr Owens.She had post op f/u 12/23 with CT myelogram completed 11/2023 showing Previous PLIF L5-S1 including bilateral iliac screws.  The right iliac screw is fractured and there is subtle lucency adjacent to nearly all of the remaining screws. No significant spinal stenosis in the lumbar spine.  Moderate bilateral neural foraminal stenosis L5-S1.  Pt completed #10/13 PT visits 4/2023 that was helpful and continues with HEP  Prescribed gabapentin that is helping with her pain and s/sx control would like to decrease dose  Recent dx of breast CA this month needing lumpectomy and radiation  Also c/o right shoulder pain bone scan was unremarkable. And given HEP from her PCP  Did discuss possible formal PT and XR if pain continues     HPI:   Back Pain  This is a chronic problem. The current episode started more than 1 year ago. The problem occurs intermittently. The problem has been gradually improving since onset. The pain is present in the lumbar spine. The quality of the pain is described as aching. The pain radiates to the right foot. The pain is at a severity of 1/10. The pain is mild. The pain is Worse during the night. The symptoms are aggravated by position, twisting and sitting. Stiffness is present In the morning. Pertinent negatives include no bladder incontinence, bowel incontinence, chest pain, fever, numbness or tingling. Risk factors include history of cancer. She has tried home exercises, bed rest, walking, chiropractic manipulation, heat and analgesics for the symptoms. The treatment provided significant relief.   Shoulder Pain   The pain is present

## 2024-04-01 ENCOUNTER — OFFICE VISIT (OUTPATIENT)
Dept: SURGERY | Age: 59
End: 2024-04-01
Payer: COMMERCIAL

## 2024-04-01 VITALS
SYSTOLIC BLOOD PRESSURE: 162 MMHG | OXYGEN SATURATION: 100 % | HEIGHT: 67 IN | WEIGHT: 178 LBS | DIASTOLIC BLOOD PRESSURE: 99 MMHG | BODY MASS INDEX: 27.94 KG/M2

## 2024-04-01 DIAGNOSIS — Z17.1 MALIGNANT NEOPLASM OF UPPER-OUTER QUADRANT OF RIGHT BREAST IN FEMALE, ESTROGEN RECEPTOR NEGATIVE (HCC): Primary | ICD-10-CM

## 2024-04-01 DIAGNOSIS — C50.411 MALIGNANT NEOPLASM OF UPPER-OUTER QUADRANT OF RIGHT BREAST IN FEMALE, ESTROGEN RECEPTOR NEGATIVE (HCC): Primary | ICD-10-CM

## 2024-04-01 PROCEDURE — 3080F DIAST BP >= 90 MM HG: CPT | Performed by: SURGERY

## 2024-04-01 PROCEDURE — 99213 OFFICE O/P EST LOW 20 MIN: CPT | Performed by: SURGERY

## 2024-04-01 PROCEDURE — 3077F SYST BP >= 140 MM HG: CPT | Performed by: SURGERY

## 2024-04-01 NOTE — PROGRESS NOTES
Review of Systems   Constitutional:  Positive for appetite change.   Gastrointestinal:  Positive for nausea.   Psychiatric/Behavioral:  The patient is nervous/anxious.       
or  internal mammary chain lymph nodes are present.    Left breast: No areas of suspicious mass-like or non-mass-like parenchymal  contrast enhancement above background are noted.  The patient has a sizable  septated cyst of almost 3 cm left breast at 12 o'clock 2 cm posterior to the  nipple.  No abnormal axillary or internal mammary chain lymph nodes are  present.    Extramammary tissues: No areas of suspicious signal intensity or contrast  enhancement are noted within the included extramammary tissues.    Impression  Right breast: Area of clumped lobular contrast enhancement above background  2.2 x 1.7 cm at 11 o'clock 4.8 cm from nipple representing previously  biopsied area.  Additional small area of non-mass-like contrast enhancement  of 13 mm is noted in the right breast at 4 o'clock 2.9 cm deep to the nipple  with ultrasound advised.    Left breast: No MRI evidence of malignancy.    BI-RADS 0    BIRADS:  BIRADS - CATEGORY 0    Incomplete: Needs Additional Imaging Evaluation    OVERALL ASSESSMENT - INCOMPLETE:  NEED ADDITIONAL IMAGING EVALUATION.    Assessment/Plan:  1. Malignant neoplasm of upper-outer quadrant of right breast in female, estrogen receptor negative (HCC)        Stage - T1, N0, right invasive breast cancer; triple negative >> Stage 1     Ann Hardy is a 58 y.o. female that is seen today to discuss results of studies and schedule surgery for her right breast cancer.  he had a breast MRI and subsequent second look ultrasound with the following final impressions, respectively:    MRI IMPRESSION:  Right breast: Area of clumped lobular contrast enhancement above background  2.2 x 1.7 cm at 11 o'clock 4.8 cm from nipple representing previously  biopsied area.  Additional small area of non-mass-like contrast enhancement  of 13 mm is noted in the right breast at 4 o'clock 2.9 cm deep to the nipple  with ultrasound advised.   Left breast: No MRI evidence of malignancy.   BI-RADS 0    US

## 2024-04-01 NOTE — PATIENT INSTRUCTIONS
You have a right breast cancer.  You have chosen a right needle localized lumpectomy (technically called a partial mastectomy) with sentinel lymph node biopsy at [facility]

## 2024-04-12 ENCOUNTER — TELEPHONE (OUTPATIENT)
Dept: SURGERY | Age: 59
End: 2024-04-12

## 2024-04-12 NOTE — TELEPHONE ENCOUNTER
I spoke with patient to confirm her surgery at [facility]. Info sent thru mychart.    Surg appt  5/8 @ 2:30  Arrival time 10:30  Needle loce 5/8 @ 11:30  PAT 4/24 @ 12:30  Post op 5/15 @ 11:45

## 2024-04-16 ENCOUNTER — TELEPHONE (OUTPATIENT)
Dept: ONCOLOGY | Age: 59
End: 2024-04-16

## 2024-04-16 NOTE — TELEPHONE ENCOUNTER
Name: Ann Hardy  : 1965  MRN: [id number]    Oncology Navigation Follow-Up Note    Notes: Noted pt is scheduled for surgery on . Writer will updated MO for post op f/u appts to be coordinated with pt.       Electronically signed by Delmy Calderon RN on 2024 at 1:06 PM

## 2024-04-17 ENCOUNTER — TELEPHONE (OUTPATIENT)
Dept: ONCOLOGY | Age: 59
End: 2024-04-17

## 2024-04-17 NOTE — TELEPHONE ENCOUNTER
M for pt to call and schedule post op follow up with Dr Grimaldo.  Surgery is 05/08/2024    Electronically signed by Ronda Singh on 4/17/2024 at 2:45 PM

## 2024-04-23 ENCOUNTER — HOSPITAL ENCOUNTER (OUTPATIENT)
Dept: PREADMISSION TESTING | Age: 59
Discharge: HOME | End: 2024-04-27
Payer: COMMERCIAL

## 2024-04-23 VITALS
OXYGEN SATURATION: 97 % | DIASTOLIC BLOOD PRESSURE: 100 MMHG | BODY MASS INDEX: 27.15 KG/M2 | HEIGHT: 67 IN | HEART RATE: 75 BPM | SYSTOLIC BLOOD PRESSURE: 146 MMHG | WEIGHT: 173 LBS | RESPIRATION RATE: 18 BRPM | TEMPERATURE: 97.2 F

## 2024-04-23 LAB
ANION GAP SERPL CALCULATED.3IONS-SCNC: 9 MMOL/L (ref 9–17)
BUN SERPL-MCNC: 16 MG/DL (ref 6–20)
BUN/CREAT SERPL: 23 (ref 9–20)
CALCIUM SERPL-MCNC: 9.3 MG/DL (ref 8.6–10.4)
CHLORIDE SERPL-SCNC: 105 MMOL/L (ref 98–107)
CO2 SERPL-SCNC: 27 MMOL/L (ref 20–31)
CREAT SERPL-MCNC: 0.7 MG/DL (ref 0.5–0.9)
EKG ATRIAL RATE: 77 BPM
EKG P AXIS: 52 DEGREES
EKG P-R INTERVAL: 170 MS
EKG Q-T INTERVAL: 386 MS
EKG QRS DURATION: 88 MS
EKG QTC CALCULATION (BAZETT): 436 MS
EKG R AXIS: -4 DEGREES
EKG T AXIS: 29 DEGREES
EKG VENTRICULAR RATE: 77 BPM
ERYTHROCYTE [DISTWIDTH] IN BLOOD BY AUTOMATED COUNT: 15.1 % (ref 11.8–14.4)
EST. AVERAGE GLUCOSE BLD GHB EST-MCNC: 126 MG/DL
GFR SERPL CREATININE-BSD FRML MDRD: >90 ML/MIN/1.73M2
GLUCOSE SERPL-MCNC: 89 MG/DL (ref 70–99)
HBA1C MFR BLD: 6 % (ref 4–6)
HCT VFR BLD AUTO: 38.2 % (ref 36.3–47.1)
HGB BLD-MCNC: 12 G/DL (ref 11.9–15.1)
MCH RBC QN AUTO: 28 PG (ref 25.2–33.5)
MCHC RBC AUTO-ENTMCNC: 31.4 G/DL (ref 28.4–34.8)
MCV RBC AUTO: 89 FL (ref 82.6–102.9)
NRBC BLD-RTO: 0 PER 100 WBC
PLATELET # BLD AUTO: 282 K/UL (ref 138–453)
PMV BLD AUTO: 9.4 FL (ref 8.1–13.5)
POTASSIUM SERPL-SCNC: 3.7 MMOL/L (ref 3.7–5.3)
RBC # BLD AUTO: 4.29 M/UL (ref 3.95–5.11)
SODIUM SERPL-SCNC: 141 MMOL/L (ref 135–144)
WBC OTHER # BLD: 3.5 K/UL (ref 3.5–11.3)

## 2024-04-23 PROCEDURE — 80048 BASIC METABOLIC PNL TOTAL CA: CPT

## 2024-04-23 PROCEDURE — 93010 ELECTROCARDIOGRAM REPORT: CPT | Performed by: INTERNAL MEDICINE

## 2024-04-23 PROCEDURE — 93005 ELECTROCARDIOGRAM TRACING: CPT | Performed by: ANESTHESIOLOGY

## 2024-04-23 PROCEDURE — 83036 HEMOGLOBIN GLYCOSYLATED A1C: CPT

## 2024-04-23 PROCEDURE — 36415 COLL VENOUS BLD VENIPUNCTURE: CPT

## 2024-04-23 PROCEDURE — 85027 COMPLETE CBC AUTOMATED: CPT

## 2024-04-23 ASSESSMENT — PAIN DESCRIPTION - LOCATION: LOCATION: BACK

## 2024-04-23 ASSESSMENT — PAIN DESCRIPTION - DESCRIPTORS: DESCRIPTORS: ACHING

## 2024-04-23 ASSESSMENT — PAIN DESCRIPTION - PAIN TYPE: TYPE: CHRONIC PAIN

## 2024-04-23 ASSESSMENT — PAIN SCALES - GENERAL: PAINLEVEL_OUTOF10: 2

## 2024-04-23 ASSESSMENT — PAIN DESCRIPTION - FREQUENCY: FREQUENCY: CONTINUOUS

## 2024-04-23 NOTE — PROGRESS NOTES
PAT Progress Note    Pt Name: Ann Hardy  MRN: [id number]  YOB: 1965  Date of evaluation: 4/23/2024      [x] Called to PAT. I spoke to the patient, Ann Hardy, a 58 y.o. female, who is scheduled for an upcoming RIGHT BREAST  NEEDLE LOCALIZED @ 1130AM        PARTIAL MASTECTOMY WITH SENTINEL NODE BX  @ 1PM by Davis Rivero MD for Estrogen receptor negative status (ER-); Malignant neoplasm of upper-outer* on 05/08/2024 at 1425.     [x] The patient has an upcoming primary care appointment with Dr. Centeno on 05/02/2024, to be used for an Interval History and Physical Note the day of surgery.     Patient hypertensive in pre-admission testing with repeat /100. Patient asymptomatic denying headache, visual changes, dizziness, lightheadedness. Patient states she has occasional chest pain \"twice a year\" and states it lasts for seconds and is located in the epigastric area. She has never been evaluated by cardiology or seen in ER for this. Denies any treatments and states that the episodes only last seconds and are very infrequent. Last episode over a month ago. Instructed patient to check blood pressure daily and keep log prior to primary care appointment, calling PCP if blood pressure >140/90 or if patient is symptomatic.     Functional Capacity per pt:  1) Pt is able to walk 2 city blocks on level ground without SOB.  2) Pt is able to climb 2 flights of stairs without SOB.  3) Pt is able to walk up a hill for 1-2 city blocks without SOB.    Vital signs: BP (!) 146/100   Pulse 75   Temp 97.2 °F (36.2 °C) (Temporal)   Resp 18   Ht 1.702 m (5' 7\")   Wt 78.5 kg (173 lb)   SpO2 97%   BMI 27.10 kg/m²     Physical Exam:     General Appearance:  Alert, well appearing, and in no acute distress.  Mental status: Oriented to person, place, and time.  Lungs: Bilateral equal air entry, clear to auscultation, no wheezing, rales or rhonchi, and normal effort.  Cardiovascular: Normal rate, regular rhythm, no

## 2024-04-23 NOTE — PRE-PROCEDURE INSTRUCTIONS
mints or water.  The only exception to this is small sips of water to take the medications listed above.  No smoking or chewing tobacco after midnight.  No alcoholic beverages for 24 hours prior to surgery.  2. You may brush your teeth but do not swallow the water.  3. If you wear glasses bring a case for them if you have one.  No contacts should be worn the day of surgery.  You may also bring your hearing aids. If you have dentures, most surgical procedures involving anesthesia will require that you remove them prior to surgery.  4. If you sleep with a CPAP or BiPAP machine at home and plan on staying in the hospital overnight after surgery, please bring your machine with you.   5. Do not wear any jewelry or body piercings the day of surgery.  No nail polish on the operative extremity (arm/hand or leg/foot surgeries)   6. If you are staying overnight with us, you may bring a small bag of necessary personal items.   7. Please wear loose, comfortable clothing.  If you are potentially going to have a cast, sling, brace or bulky dressing, make sure to wear clothing that will fit over it.   8. In case of illness - If you have cold or flu like symptoms (high fever, runny nose, sore throat, cough, etc.) rash, nausea, vomiting, loose stools, and/or recent contact with someone who has a contagious disease (chicken pox, measles, COVID-19, etc.).  Please call your surgeon before coming to the hospital.    Transportation After Your Surgery/Procedure:     If you are going home the same day of surgery you need someone to drive you home.  Your  must be at least 18 years of age.  A taxi cab or other nonmedical public transportation is not acceptable unless you have someone to ride home in the vehicle with you.   For your safety, someone must remain with you for the first 24 hours after your surgery if you receive anesthesia or medication.  If you do not have someone to stay with you, your procedure may be cancelled.  As a

## 2024-04-24 ENCOUNTER — ANESTHESIA EVENT (OUTPATIENT)
Dept: OPERATING ROOM | Age: 59
End: 2024-04-24
Payer: COMMERCIAL

## 2024-05-02 ENCOUNTER — TELEPHONE (OUTPATIENT)
Dept: FAMILY MEDICINE CLINIC | Age: 59
End: 2024-05-02

## 2024-05-02 NOTE — TELEPHONE ENCOUNTER
Dr. Centeno, Patient has a pre-op with you on May 7,2024 Surgery is May 8,2024    Please scan clearance in chart  for surgery ASAP when appointment is done!    Greatly appreciated!

## 2024-05-07 ENCOUNTER — OFFICE VISIT (OUTPATIENT)
Dept: FAMILY MEDICINE CLINIC | Age: 59
End: 2024-05-07
Payer: COMMERCIAL

## 2024-05-07 VITALS
WEIGHT: 176 LBS | DIASTOLIC BLOOD PRESSURE: 78 MMHG | SYSTOLIC BLOOD PRESSURE: 118 MMHG | BODY MASS INDEX: 27.57 KG/M2 | HEART RATE: 73 BPM

## 2024-05-07 DIAGNOSIS — Z01.818 PREOPERATIVE EXAMINATION: Primary | ICD-10-CM

## 2024-05-07 DIAGNOSIS — C50.919 MALIGNANT NEOPLASM OF FEMALE BREAST, UNSPECIFIED ESTROGEN RECEPTOR STATUS, UNSPECIFIED LATERALITY, UNSPECIFIED SITE OF BREAST (HCC): ICD-10-CM

## 2024-05-07 DIAGNOSIS — I10 PRIMARY HYPERTENSION: ICD-10-CM

## 2024-05-07 PROCEDURE — 99214 OFFICE O/P EST MOD 30 MIN: CPT | Performed by: FAMILY MEDICINE

## 2024-05-07 PROCEDURE — 3074F SYST BP LT 130 MM HG: CPT | Performed by: FAMILY MEDICINE

## 2024-05-07 PROCEDURE — 3078F DIAST BP <80 MM HG: CPT | Performed by: FAMILY MEDICINE

## 2024-05-07 SDOH — ECONOMIC STABILITY: FOOD INSECURITY: WITHIN THE PAST 12 MONTHS, YOU WORRIED THAT YOUR FOOD WOULD RUN OUT BEFORE YOU GOT MONEY TO BUY MORE.: NEVER TRUE

## 2024-05-07 SDOH — ECONOMIC STABILITY: FOOD INSECURITY: WITHIN THE PAST 12 MONTHS, THE FOOD YOU BOUGHT JUST DIDN'T LAST AND YOU DIDN'T HAVE MONEY TO GET MORE.: NEVER TRUE

## 2024-05-07 SDOH — ECONOMIC STABILITY: INCOME INSECURITY: HOW HARD IS IT FOR YOU TO PAY FOR THE VERY BASICS LIKE FOOD, HOUSING, MEDICAL CARE, AND HEATING?: NOT HARD AT ALL

## 2024-05-07 ASSESSMENT — PATIENT HEALTH QUESTIONNAIRE - PHQ9
SUM OF ALL RESPONSES TO PHQ QUESTIONS 1-9: 0
1. LITTLE INTEREST OR PLEASURE IN DOING THINGS: NOT AT ALL
2. FEELING DOWN, DEPRESSED OR HOPELESS: NOT AT ALL
SUM OF ALL RESPONSES TO PHQ QUESTIONS 1-9: 0
SUM OF ALL RESPONSES TO PHQ9 QUESTIONS 1 & 2: 0

## 2024-05-07 NOTE — PROGRESS NOTES
current chest pain considered due to myocardial ischemia; use of nitrate therapy or ECG with pathological Q waves)? No  History of congestive heart failure (Pulmonary edema, bilateral rales or S3 gallop; paroxysmal nocturnal dyspnea; chest x-ray (CXR) showing pulmonary vascular redistribution)? No  History of cerebrovascular disease (Prior transient ischemic attack (TIA) or stroke)? No  Pre-operative treatment with insulin? No  Pre-operative creatinine >2 mg/dL / 176.8 µmol/L? No    Total Score (number of Yes responses) = 0    RCRI Score - Risk of major cardiac event (95% CI)*  0 = 3.9% (2.8-5.4%)  1 = 6.0% (4.9-7.4%)  2 = 10.1% (8.1-12.6%)  ?3 = 15% (11.1-20.0%)      Current medications which may produce withdrawal symptoms if withheld perioperatively: none      Plan:      1. Preoperative workup as follows ECG, hemoglobin, hematocrit, electrolytes, creatinine, glucose, liver function studies  2. Change in medication regimen before surgery: none, continue med regimen including morning of surgery, w/sip of water  3. Prophylaxis for cardiac events with perioperative beta-blockers: not indicated  4. Invasive hemodynamic monitoring perioperatively: not indicated  5. Deep vein thrombosis prophylaxis postoperatively:regimen to be chosen by surgical team  6. Surveillance for postoperative MI with ECG immediately postoperatively and on postoperative days 1 and 2 AND troponin levels 24 hours postoperatively and on day 4 or hospital discharge (whichever comes first): not indicated  7. Other measures: cleared for surgery     Problem List Items Addressed This Visit          Circulatory    Primary hypertension      Well-controlled, continue current medications, continue current treatment plan, medication adherence emphasized, and lifestyle modifications recommended            Other    Breast cancer (HCC)      Monitored by specialist- no acute findings meriting change in the plan  Surgery tomorrow          Other Visit Diagnoses

## 2024-05-08 ENCOUNTER — HOSPITAL ENCOUNTER (OUTPATIENT)
Age: 59
Setting detail: OUTPATIENT SURGERY
Discharge: HOME | End: 2024-05-08
Attending: SURGERY | Admitting: SURGERY
Payer: COMMERCIAL

## 2024-05-08 ENCOUNTER — HOSPITAL ENCOUNTER (OUTPATIENT)
Dept: NUCLEAR MEDICINE | Age: 59
Discharge: HOME | End: 2024-05-10
Payer: COMMERCIAL

## 2024-05-08 ENCOUNTER — HOSPITAL ENCOUNTER (OUTPATIENT)
Dept: ULTRASOUND IMAGING | Age: 59
Discharge: HOME | End: 2024-05-10
Payer: COMMERCIAL

## 2024-05-08 ENCOUNTER — APPOINTMENT (OUTPATIENT)
Dept: MAMMOGRAPHY | Age: 59
End: 2024-05-08
Attending: SURGERY
Payer: COMMERCIAL

## 2024-05-08 ENCOUNTER — ANESTHESIA (OUTPATIENT)
Dept: OPERATING ROOM | Age: 59
End: 2024-05-08
Payer: COMMERCIAL

## 2024-05-08 VITALS
DIASTOLIC BLOOD PRESSURE: 98 MMHG | HEIGHT: 67 IN | OXYGEN SATURATION: 99 % | BODY MASS INDEX: 27.15 KG/M2 | TEMPERATURE: 97.5 F | HEART RATE: 66 BPM | SYSTOLIC BLOOD PRESSURE: 178 MMHG | RESPIRATION RATE: 12 BRPM | WEIGHT: 173 LBS

## 2024-05-08 DIAGNOSIS — C50.411 MALIGNANT NEOPLASM OF UPPER-OUTER QUADRANT OF RIGHT BREAST IN FEMALE, ESTROGEN RECEPTOR NEGATIVE (HCC): Primary | ICD-10-CM

## 2024-05-08 DIAGNOSIS — Z17.1 MALIGNANT NEOPLASM OF UPPER-OUTER QUADRANT OF RIGHT BREAST IN FEMALE, ESTROGEN RECEPTOR NEGATIVE (HCC): Primary | ICD-10-CM

## 2024-05-08 PROCEDURE — 7100000010 HC PHASE II RECOVERY - FIRST 15 MIN: Performed by: SURGERY

## 2024-05-08 PROCEDURE — 78195 LYMPH SYSTEM IMAGING: CPT

## 2024-05-08 PROCEDURE — 6370000000 HC RX 637 (ALT 250 FOR IP): Performed by: SURGERY

## 2024-05-08 PROCEDURE — 38525 BIOPSY/REMOVAL LYMPH NODES: CPT | Performed by: SURGERY

## 2024-05-08 PROCEDURE — 3600000012 HC SURGERY LEVEL 2 ADDTL 15MIN: Performed by: SURGERY

## 2024-05-08 PROCEDURE — 6360000002 HC RX W HCPCS: Performed by: SURGERY

## 2024-05-08 PROCEDURE — 2580000003 HC RX 258: Performed by: ANESTHESIOLOGY

## 2024-05-08 PROCEDURE — 7100000001 HC PACU RECOVERY - ADDTL 15 MIN: Performed by: SURGERY

## 2024-05-08 PROCEDURE — 6360000002 HC RX W HCPCS: Performed by: ANESTHESIOLOGY

## 2024-05-08 PROCEDURE — A9520 TC99 TILMANOCEPT DIAG 0.5MCI: HCPCS | Performed by: SURGERY

## 2024-05-08 PROCEDURE — 88377 M/PHMTRC ALYS ISHQUANT/SEMIQ: CPT

## 2024-05-08 PROCEDURE — 88360 TUMOR IMMUNOHISTOCHEM/MANUAL: CPT

## 2024-05-08 PROCEDURE — 6360000002 HC RX W HCPCS: Performed by: NURSE ANESTHETIST, CERTIFIED REGISTERED

## 2024-05-08 PROCEDURE — 2500000003 HC RX 250 WO HCPCS: Performed by: NURSE ANESTHETIST, CERTIFIED REGISTERED

## 2024-05-08 PROCEDURE — 6370000000 HC RX 637 (ALT 250 FOR IP): Performed by: ANESTHESIOLOGY

## 2024-05-08 PROCEDURE — 19285 PERQ DEV BREAST 1ST US IMAG: CPT

## 2024-05-08 PROCEDURE — 19301 PARTIAL MASTECTOMY: CPT | Performed by: SURGERY

## 2024-05-08 PROCEDURE — 3700000000 HC ANESTHESIA ATTENDED CARE: Performed by: SURGERY

## 2024-05-08 PROCEDURE — 76098 X-RAY EXAM SURGICAL SPECIMEN: CPT

## 2024-05-08 PROCEDURE — 38900 IO MAP OF SENT LYMPH NODE: CPT | Performed by: SURGERY

## 2024-05-08 PROCEDURE — 88342 IMHCHEM/IMCYTCHM 1ST ANTB: CPT

## 2024-05-08 PROCEDURE — 3600000002 HC SURGERY LEVEL 2 BASE: Performed by: SURGERY

## 2024-05-08 PROCEDURE — 7100000011 HC PHASE II RECOVERY - ADDTL 15 MIN: Performed by: SURGERY

## 2024-05-08 PROCEDURE — 2500000003 HC RX 250 WO HCPCS

## 2024-05-08 PROCEDURE — C1819 TISSUE LOCALIZATION-EXCISION: HCPCS

## 2024-05-08 PROCEDURE — 76942 ECHO GUIDE FOR BIOPSY: CPT | Performed by: ANESTHESIOLOGY

## 2024-05-08 PROCEDURE — 7100000000 HC PACU RECOVERY - FIRST 15 MIN: Performed by: SURGERY

## 2024-05-08 PROCEDURE — 3700000001 HC ADD 15 MINUTES (ANESTHESIA): Performed by: SURGERY

## 2024-05-08 PROCEDURE — 3430000000 HC RX DIAGNOSTIC RADIOPHARMACEUTICAL: Performed by: SURGERY

## 2024-05-08 PROCEDURE — 2720000010 HC SURG SUPPLY STERILE: Performed by: SURGERY

## 2024-05-08 PROCEDURE — 2709999900 HC NON-CHARGEABLE SUPPLY: Performed by: SURGERY

## 2024-05-08 PROCEDURE — 88307 TISSUE EXAM BY PATHOLOGIST: CPT

## 2024-05-08 RX ORDER — KETAMINE HCL IN NACL, ISO-OSM 100MG/10ML
SYRINGE (ML) INJECTION PRN
Status: DISCONTINUED | OUTPATIENT
Start: 2024-05-08 | End: 2024-05-08 | Stop reason: SDUPTHER

## 2024-05-08 RX ORDER — LIDOCAINE HYDROCHLORIDE 10 MG/ML
INJECTION, SOLUTION INFILTRATION; PERINEURAL PRN
Status: DISCONTINUED | OUTPATIENT
Start: 2024-05-08 | End: 2024-05-08 | Stop reason: SDUPTHER

## 2024-05-08 RX ORDER — FENTANYL CITRATE 50 UG/ML
INJECTION, SOLUTION INTRAMUSCULAR; INTRAVENOUS PRN
Status: DISCONTINUED | OUTPATIENT
Start: 2024-05-08 | End: 2024-05-08 | Stop reason: SDUPTHER

## 2024-05-08 RX ORDER — ONDANSETRON 2 MG/ML
INJECTION INTRAMUSCULAR; INTRAVENOUS PRN
Status: DISCONTINUED | OUTPATIENT
Start: 2024-05-08 | End: 2024-05-08 | Stop reason: SDUPTHER

## 2024-05-08 RX ORDER — NALOXONE HYDROCHLORIDE 0.4 MG/ML
INJECTION, SOLUTION INTRAMUSCULAR; INTRAVENOUS; SUBCUTANEOUS PRN
Status: DISCONTINUED | OUTPATIENT
Start: 2024-05-08 | End: 2024-05-08 | Stop reason: HOSPADM

## 2024-05-08 RX ORDER — SODIUM CHLORIDE, SODIUM LACTATE, POTASSIUM CHLORIDE, CALCIUM CHLORIDE 600; 310; 30; 20 MG/100ML; MG/100ML; MG/100ML; MG/100ML
INJECTION, SOLUTION INTRAVENOUS CONTINUOUS
Status: DISCONTINUED | OUTPATIENT
Start: 2024-05-08 | End: 2024-05-08 | Stop reason: HOSPADM

## 2024-05-08 RX ORDER — SODIUM CHLORIDE 9 MG/ML
INJECTION, SOLUTION INTRAVENOUS CONTINUOUS
Status: DISCONTINUED | OUTPATIENT
Start: 2024-05-08 | End: 2024-05-08 | Stop reason: HOSPADM

## 2024-05-08 RX ORDER — LIDOCAINE 40 MG/G
CREAM TOPICAL PRN
Status: DISCONTINUED | OUTPATIENT
Start: 2024-05-08 | End: 2024-05-08 | Stop reason: HOSPADM

## 2024-05-08 RX ORDER — SODIUM CHLORIDE 0.9 % (FLUSH) 0.9 %
5-40 SYRINGE (ML) INJECTION PRN
Status: DISCONTINUED | OUTPATIENT
Start: 2024-05-08 | End: 2024-05-08 | Stop reason: HOSPADM

## 2024-05-08 RX ORDER — DEXAMETHASONE SODIUM PHOSPHATE 4 MG/ML
INJECTION, SOLUTION INTRA-ARTICULAR; INTRALESIONAL; INTRAMUSCULAR; INTRAVENOUS; SOFT TISSUE PRN
Status: DISCONTINUED | OUTPATIENT
Start: 2024-05-08 | End: 2024-05-08 | Stop reason: SDUPTHER

## 2024-05-08 RX ORDER — DEXAMETHASONE SODIUM PHOSPHATE 10 MG/ML
INJECTION, SOLUTION INTRAMUSCULAR; INTRAVENOUS PRN
Status: DISCONTINUED | OUTPATIENT
Start: 2024-05-08 | End: 2024-05-08 | Stop reason: SDUPTHER

## 2024-05-08 RX ORDER — SCOLOPAMINE TRANSDERMAL SYSTEM 1 MG/1
1 PATCH, EXTENDED RELEASE TRANSDERMAL ONCE
Status: DISCONTINUED | OUTPATIENT
Start: 2024-05-08 | End: 2024-05-08 | Stop reason: HOSPADM

## 2024-05-08 RX ORDER — HYDROMORPHONE HYDROCHLORIDE 1 MG/ML
0.5 INJECTION, SOLUTION INTRAMUSCULAR; INTRAVENOUS; SUBCUTANEOUS EVERY 5 MIN PRN
Status: DISCONTINUED | OUTPATIENT
Start: 2024-05-08 | End: 2024-05-08 | Stop reason: HOSPADM

## 2024-05-08 RX ORDER — SODIUM CHLORIDE 0.9 % (FLUSH) 0.9 %
5-40 SYRINGE (ML) INJECTION EVERY 12 HOURS SCHEDULED
Status: DISCONTINUED | OUTPATIENT
Start: 2024-05-08 | End: 2024-05-08 | Stop reason: HOSPADM

## 2024-05-08 RX ORDER — FENTANYL CITRATE 50 UG/ML
25 INJECTION, SOLUTION INTRAMUSCULAR; INTRAVENOUS EVERY 5 MIN PRN
Status: COMPLETED | OUTPATIENT
Start: 2024-05-08 | End: 2024-05-08

## 2024-05-08 RX ORDER — ISOSULFAN BLUE 50 MG/5ML
INJECTION, SOLUTION SUBCUTANEOUS PRN
Status: DISCONTINUED | OUTPATIENT
Start: 2024-05-08 | End: 2024-05-08 | Stop reason: ALTCHOICE

## 2024-05-08 RX ORDER — SODIUM CHLORIDE, SODIUM LACTATE, POTASSIUM CHLORIDE, CALCIUM CHLORIDE 600; 310; 30; 20 MG/100ML; MG/100ML; MG/100ML; MG/100ML
INJECTION, SOLUTION INTRAVENOUS CONTINUOUS PRN
Status: DISCONTINUED | OUTPATIENT
Start: 2024-05-08 | End: 2024-05-08

## 2024-05-08 RX ORDER — ROCURONIUM BROMIDE 50 MG/5 ML
SYRINGE (ML) INTRAVENOUS PRN
Status: DISCONTINUED | OUTPATIENT
Start: 2024-05-08 | End: 2024-05-08 | Stop reason: SDUPTHER

## 2024-05-08 RX ORDER — IBUPROFEN 800 MG/1
800 TABLET ORAL 2 TIMES DAILY PRN
Qty: 60 TABLET | Refills: 0 | Status: SHIPPED | OUTPATIENT
Start: 2024-05-08

## 2024-05-08 RX ORDER — HYDROCODONE BITARTRATE AND ACETAMINOPHEN 5; 325 MG/1; MG/1
1 TABLET ORAL EVERY 6 HOURS PRN
Qty: 10 TABLET | Refills: 0 | Status: SHIPPED | OUTPATIENT
Start: 2024-05-08 | End: 2024-05-11

## 2024-05-08 RX ORDER — SODIUM CHLORIDE 9 MG/ML
INJECTION, SOLUTION INTRAVENOUS PRN
Status: DISCONTINUED | OUTPATIENT
Start: 2024-05-08 | End: 2024-05-08 | Stop reason: HOSPADM

## 2024-05-08 RX ORDER — BUPIVACAINE HYDROCHLORIDE 2.5 MG/ML
INJECTION, SOLUTION EPIDURAL; INFILTRATION; INTRACAUDAL PRN
Status: DISCONTINUED | OUTPATIENT
Start: 2024-05-08 | End: 2024-05-08 | Stop reason: SDUPTHER

## 2024-05-08 RX ORDER — HYDROCODONE BITARTRATE AND ACETAMINOPHEN 5; 325 MG/1; MG/1
1 TABLET ORAL ONCE
Status: COMPLETED | OUTPATIENT
Start: 2024-05-08 | End: 2024-05-08

## 2024-05-08 RX ORDER — ONDANSETRON 2 MG/ML
4 INJECTION INTRAMUSCULAR; INTRAVENOUS
Status: DISCONTINUED | OUTPATIENT
Start: 2024-05-08 | End: 2024-05-08 | Stop reason: HOSPADM

## 2024-05-08 RX ORDER — KETOROLAC TROMETHAMINE 30 MG/ML
30 INJECTION, SOLUTION INTRAMUSCULAR; INTRAVENOUS ONCE
Status: COMPLETED | OUTPATIENT
Start: 2024-05-08 | End: 2024-05-08

## 2024-05-08 RX ORDER — PROPOFOL 10 MG/ML
INJECTION, EMULSION INTRAVENOUS PRN
Status: DISCONTINUED | OUTPATIENT
Start: 2024-05-08 | End: 2024-05-08 | Stop reason: SDUPTHER

## 2024-05-08 RX ORDER — MIDAZOLAM HYDROCHLORIDE 1 MG/ML
INJECTION INTRAMUSCULAR; INTRAVENOUS PRN
Status: DISCONTINUED | OUTPATIENT
Start: 2024-05-08 | End: 2024-05-08 | Stop reason: SDUPTHER

## 2024-05-08 RX ORDER — LIDOCAINE HYDROCHLORIDE 10 MG/ML
1 INJECTION, SOLUTION EPIDURAL; INFILTRATION; INTRACAUDAL; PERINEURAL
Status: DISCONTINUED | OUTPATIENT
Start: 2024-05-09 | End: 2024-05-08 | Stop reason: HOSPADM

## 2024-05-08 RX ADMIN — FENTANYL CITRATE 25 MCG: 50 INJECTION INTRAMUSCULAR; INTRAVENOUS at 17:54

## 2024-05-08 RX ADMIN — HYDROCODONE BITARTRATE AND ACETAMINOPHEN 1 TABLET: 5; 325 TABLET ORAL at 18:39

## 2024-05-08 RX ADMIN — LIDOCAINE HYDROCHLORIDE 40 MG: 10 INJECTION, SOLUTION INFILTRATION; PERINEURAL at 15:55

## 2024-05-08 RX ADMIN — FENTANYL CITRATE 25 MCG: 50 INJECTION INTRAMUSCULAR; INTRAVENOUS at 18:08

## 2024-05-08 RX ADMIN — PROPOFOL 20 MCG/KG/MIN: 10 INJECTION, EMULSION INTRAVENOUS at 16:03

## 2024-05-08 RX ADMIN — DEXAMETHASONE SODIUM PHOSPHATE 10 MG: 10 INJECTION INTRAMUSCULAR; INTRAVENOUS at 16:09

## 2024-05-08 RX ADMIN — ONDANSETRON 4 MG: 2 INJECTION INTRAMUSCULAR; INTRAVENOUS at 16:50

## 2024-05-08 RX ADMIN — BUPIVACAINE HYDROCHLORIDE 30 ML: 2.5 INJECTION, SOLUTION EPIDURAL; INFILTRATION; INTRACAUDAL; PERINEURAL at 15:58

## 2024-05-08 RX ADMIN — PROPOFOL 150 MG: 10 INJECTION, EMULSION INTRAVENOUS at 15:55

## 2024-05-08 RX ADMIN — SODIUM CHLORIDE, POTASSIUM CHLORIDE, SODIUM LACTATE AND CALCIUM CHLORIDE: 600; 310; 30; 20 INJECTION, SOLUTION INTRAVENOUS at 17:10

## 2024-05-08 RX ADMIN — Medication 20 MG: at 16:38

## 2024-05-08 RX ADMIN — FENTANYL CITRATE 100 MCG: 50 INJECTION INTRAMUSCULAR; INTRAVENOUS at 15:55

## 2024-05-08 RX ADMIN — Medication 10 MG: at 16:47

## 2024-05-08 RX ADMIN — Medication 2000 MG: at 16:05

## 2024-05-08 RX ADMIN — Medication 40 MG: at 15:55

## 2024-05-08 RX ADMIN — MIDAZOLAM 2 MG: 1 INJECTION INTRAMUSCULAR; INTRAVENOUS at 15:49

## 2024-05-08 RX ADMIN — SODIUM CHLORIDE, POTASSIUM CHLORIDE, SODIUM LACTATE AND CALCIUM CHLORIDE: 600; 310; 30; 20 INJECTION, SOLUTION INTRAVENOUS at 18:00

## 2024-05-08 RX ADMIN — KETOROLAC TROMETHAMINE 30 MG: 30 INJECTION, SOLUTION INTRAMUSCULAR at 17:57

## 2024-05-08 RX ADMIN — FENTANYL CITRATE 25 MCG: 50 INJECTION INTRAMUSCULAR; INTRAVENOUS at 17:46

## 2024-05-08 RX ADMIN — FENTANYL CITRATE 25 MCG: 50 INJECTION INTRAMUSCULAR; INTRAVENOUS at 17:40

## 2024-05-08 RX ADMIN — LIDOCAINE 4%: 4 CREAM TOPICAL at 11:07

## 2024-05-08 RX ADMIN — DEXAMETHASONE SODIUM PHOSPHATE 4 MG: 4 INJECTION, SOLUTION INTRAMUSCULAR; INTRAVENOUS at 15:58

## 2024-05-08 RX ADMIN — SUGAMMADEX 200 MG: 100 INJECTION, SOLUTION INTRAVENOUS at 16:58

## 2024-05-08 RX ADMIN — SODIUM CHLORIDE, POTASSIUM CHLORIDE, SODIUM LACTATE AND CALCIUM CHLORIDE: 600; 310; 30; 20 INJECTION, SOLUTION INTRAVENOUS at 11:14

## 2024-05-08 RX ADMIN — HYDROMORPHONE HYDROCHLORIDE 0.5 MG: 1 INJECTION, SOLUTION INTRAMUSCULAR; INTRAVENOUS; SUBCUTANEOUS at 18:13

## 2024-05-08 RX ADMIN — TILMANOCEPT 600 MICRO CURIE: KIT at 12:30

## 2024-05-08 RX ADMIN — PROPOFOL 50 MG: 10 INJECTION, EMULSION INTRAVENOUS at 16:41

## 2024-05-08 ASSESSMENT — ENCOUNTER SYMPTOMS
ALLERGIC/IMMUNOLOGIC NEGATIVE: 1
GASTROINTESTINAL NEGATIVE: 1
EYES NEGATIVE: 1
RESPIRATORY NEGATIVE: 1

## 2024-05-08 ASSESSMENT — PAIN SCALES - GENERAL
PAINLEVEL_OUTOF10: 5
PAINLEVEL_OUTOF10: 4
PAINLEVEL_OUTOF10: 4
PAINLEVEL_OUTOF10: 7
PAINLEVEL_OUTOF10: 5

## 2024-05-08 ASSESSMENT — PAIN DESCRIPTION - LOCATION
LOCATION: BREAST
LOCATION: BREAST

## 2024-05-08 ASSESSMENT — PAIN - FUNCTIONAL ASSESSMENT
PAIN_FUNCTIONAL_ASSESSMENT: 0-10
PAIN_FUNCTIONAL_ASSESSMENT: FACE, LEGS, ACTIVITY, CRY, AND CONSOLABILITY (FLACC)

## 2024-05-08 ASSESSMENT — PAIN DESCRIPTION - DESCRIPTORS: DESCRIPTORS: ACHING

## 2024-05-08 ASSESSMENT — LIFESTYLE VARIABLES: SMOKING_STATUS: 0

## 2024-05-08 ASSESSMENT — PAIN DESCRIPTION - PAIN TYPE: TYPE: SURGICAL PAIN

## 2024-05-08 ASSESSMENT — PAIN DESCRIPTION - ORIENTATION
ORIENTATION: RIGHT
ORIENTATION: RIGHT

## 2024-05-08 NOTE — ANESTHESIA POSTPROCEDURE EVALUATION
Department of Anesthesiology  Postprocedure Note    Patient: Ann Hardy  MRN: [id number]  YOB: 1965  Date of evaluation: 5/8/2024    Procedure Summary       Date: 05/08/24 Room / Location: [address]    Anesthesia Start: 1549 Anesthesia Stop: 1728    Procedure: RIGHT BREAST  NEEDLE LOCALIZED @ 1130AM        PARTIAL MASTECTOMY WITH SENTINEL NODE BX  @ 1PM (Right: Breast) Diagnosis:       Malignant neoplasm of upper-outer quadrant of right breast in female, estrogen receptor negative (HCC)      (Malignant neoplasm of upper-outer quadrant of right breast in female, estrogen receptor negative (HCC) [C50.411, Z17.1])    Surgeons: Davis Rivero MD Responsible Provider: Jordan Chaudhry DO    Anesthesia Type: general, regional ASA Status: 3            Anesthesia Type: No value filed.    Alyse Phase I: Alyse Score: 8    Alyse Phase II:      Anesthesia Post Evaluation    Patient location during evaluation: PACU  Patient participation: complete - patient participated  Level of consciousness: awake and alert  Airway patency: patent  Nausea & Vomiting: no nausea and no vomiting  Cardiovascular status: hemodynamically stable  Respiratory status: acceptable  Hydration status: stable  Pain management: adequate    No notable events documented.

## 2024-05-08 NOTE — BRIEF OP NOTE
Brief Postoperative Note      Patient: Ann Hardy  YOB: 1965  MRN: [id number]    Date of Procedure: 5/8/2024    Pre-Op Diagnosis Codes:     * Malignant neoplasm of upper-outer quadrant of right breast in female, estrogen receptor negative (HCC) [C50.411, Z17.1]    Post-Op Diagnosis: Same       Procedure(s):  RIGHT BREAST  NEEDLE LOCALIZED @ 1130AM        PARTIAL MASTECTOMY WITH SENTINEL NODE BX  @ 1PM    Surgeon(s):  Davis Rivero MD    Assistant:  RONALDO Milan    Anesthesia: General    Estimated Blood Loss (mL): less than 50     Complications: None    Specimens:   ID Type Source Tests Collected by Time Destination   A : RIGHT BREAST LUMPECTOMY. SHORT STITCH SUPERIOR, LONG STITCH LATERA Tissue Breast SURGICAL PATHOLOGY Davis Rivero MD 5/8/2024 1628    B : NEW SUPERIOR MARGIN INK MARKS NEW MARGIN Tissue Breast SURGICAL PATHOLOGY Davis Rivero MD 5/8/2024 1636    C : NEW MEDIAL MARGIN INK MARKS NEW MARGIN Tissue Breast SURGICAL PATHOLOGY Davis Rivero MD 5/8/2024 1637    D : NEW INFERIOR MARGIN INK MARKS NEW MARGIN Tissue Breast SURGICAL PATHOLOGY Davis Rivero MD 5/8/2024 1637    E : NEW LATERAL MARGIN INK MARKS NEW MARGIN Tissue Breast SURGICAL PATHOLOGY Davis Rivero MD 5/8/2024 1638    F : NEW POSTERIOR MARGIN INK MARKS NEW MARGIN Tissue Breast SURGICAL PATHOLOGY Davis Rivero MD 5/8/2024 1639    G : RIGHT SENTINEL NODES HOT AND BLUE Tissue Lymph Node SURGICAL PATHOLOGY Davis Rivero MD 5/8/2024 1648        Implants:  * No implants in log *      Drains: * No LDAs found *    Findings:  Infection Present At Time Of Surgery (PATOS) (choose all levels that have infection present):  No infection present  Other Findings: at least 2 lymph nodes that were blue, hot, and palpable.  [place] Node Biopsy for Breast Cancer - Right  Operation performed with curative intent. Yes   Tracer(s) used to identify sentinel nodes in the upfront surgery (non-neoadjuvant) setting (select all that apply). Dye and

## 2024-05-08 NOTE — ANESTHESIA PRE PROCEDURE
Department of Anesthesiology  Preprocedure Note       Name:  Ann Hardy   Age:  58 y.o.  :  1965                                          MRN:  [id number]         Date:  2024      Surgeon: Surgeon(s):  Davis Rivero MD    Procedure: Procedure(s):  RIGHT BREAST  NEEDLE LOCALIZED @ 1130AM        PARTIAL MASTECTOMY WITH SENTINEL NODE BX  @ 1PM    Medications prior to admission:   Prior to Admission medications    Medication Sig Start Date End Date Taking? Authorizing Provider   gabapentin (NEURONTIN) 400 MG capsule Take 1 capsule by mouth 3 times daily for 90 days. 3/29/24 6/27/24  Kaitlynn Stubbs, GONZÁLEZ - CNP   vitamin D (ERGOCALCIFEROL) 1.25 MG (30581 UT) CAPS capsule Take 1 capsule by mouth once a week 3/11/24   Varun Centeno DO   atorvastatin (LIPITOR) 20 MG tablet Take 1 tablet by mouth daily 3/11/24   Varun Centeno DO   alendronate (FOSAMAX) 70 MG tablet take 1 tablet by mouth every week  Patient taking differently: Take 1 tablet by mouth every 7 days Takes on Sundays 12/13/23   Varun Centeno DO   diclofenac (VOLTAREN) 75 MG EC tablet Take 1 tablet by mouth 2 times daily 23   Varun Centeno DO   losartan (COZAAR) 50 MG tablet Take 1 tablet by mouth daily 23   Varun Centeno DO   magnesium 30 MG tablet Take 1 tablet by mouth 2 times daily Supplement magnesium over the counter    Provider, MD Krish   acetaminophen (TYLENOL) 650 MG extended release tablet Take 1 tablet by mouth every 8 hours as needed for Pain    ProviderKrish MD       Current medications:    Current Facility-Administered Medications   Medication Dose Route Frequency Provider Last Rate Last Admin   • [START ON 2024] lidocaine PF 1 % injection 1 mL  1 mL IntraDERmal Once PRN Sandra Jacques MD       • 0.9 % sodium chloride infusion   IntraVENous Continuous Sandra Jacques MD       • lactated ringers IV soln infusion   IntraVENous Continuous Sandra Jacques MD       • ceFAZolin

## 2024-05-08 NOTE — OP NOTE
Operative Note      Patient: Ann Hardy  YOB: 1965  MRN: [id number]    Date of Procedure: 5/8/2024    Pre-Op Diagnosis Codes:     * Malignant neoplasm of upper-outer quadrant of right breast in female, estrogen receptor negative (HCC) [C50.411, Z17.1]    Post-Op Diagnosis: Same       Procedure(s):  RIGHT BREAST  NEEDLE LOCALIZED @ 1130AM        PARTIAL MASTECTOMY WITH SENTINEL NODE BX  @ 1PM    Surgeon(s):  Davis Rivero MD    Assistant:   RONALDO Milan    Anesthesia: General    Estimated Blood Loss (mL): less than 50     Complications: None    Specimens:   ID Type Source Tests Collected by Time Destination   A : RIGHT BREAST LUMPECTOMY. SHORT STITCH SUPERIOR, LONG STITCH LATERA Tissue Breast SURGICAL PATHOLOGY Davis Rivero MD 5/8/2024 1628    B : NEW SUPERIOR MARGIN INK MARKS NEW MARGIN Tissue Breast SURGICAL PATHOLOGY Davis Rivero MD 5/8/2024 1636    C : NEW MEDIAL MARGIN INK MARKS NEW MARGIN Tissue Breast SURGICAL PATHOLOGY Davis Rivero MD 5/8/2024 1637    D : NEW INFERIOR MARGIN INK MARKS NEW MARGIN Tissue Breast SURGICAL PATHOLOGY Davis Rivero MD 5/8/2024 1637    E : NEW LATERAL MARGIN INK MARKS NEW MARGIN Tissue Breast SURGICAL PATHOLOGY Davis Rivero MD 5/8/2024 1638    F : NEW POSTERIOR MARGIN INK MARKS NEW MARGIN Tissue Breast SURGICAL PATHOLOGY Davis Rivero MD 5/8/2024 1639    G : RIGHT SENTINEL NODES HOT AND BLUE Tissue Lymph Node SURGICAL PATHOLOGY Davis Rivero MD 5/8/2024 1648        Implants:  * No implants in log *      Drains: * No LDAs found *    Findings:  Infection Present At Time Of Surgery (PATOS) (choose all levels that have infection present):  No infection present  Other Findings: at least 2 lymph nodes that were blue, hot, and palpable.   [place] Node Biopsy for Breast Cancer - Right  Operation performed with curative intent. Yes   Tracer(s) used to identify sentinel nodes in the upfront surgery (non-neoadjuvant) setting (select all that apply). Dye and  electrocautery.  The axillary fascia was closed with a running 2-0.  The remainder of the wound was closed in several layers. The deeper tissues were re-approximated with 2-0 vicryl followed by 3-0 vicryl buried interrupted suture for the deep dermal layer and 4-0 monocryl in a running subcuticular for the superficial dermal layer.    Patient was awakened and returned to recovery in stable condition.  Needle, sponge, and instrument counts were correct at the conclusion of the case x2.     Electronically signed by Davis Rivero MD on 5/8/2024 at 5:19 PM

## 2024-05-08 NOTE — H&P
HPI:The patient has a right sided breast cancer that she has opted to manage with breast conserving surgery and a sentinel node.     Past Medical History:   Diagnosis Date    Abnormal EKG     Arthritis     Breast cancer (HCC)     Rt breast. Pt said she will need radiation post surgery    Chest pain 2000    only in the chest. Never went to ER, never went to see a cardiologist. Reported them to her PCP only    Chronic bilateral low back pain with bilateral sciatica 02/11/2020    DDD (degenerative disc disease), lumbar 02/11/2020    Dyslipidemia     Elevated BP without diagnosis of hypertension     Hay fever     Hyperlipidemia     Hypertension     Lumbosacral spondylosis without myelopathy 04/18/2022    MVP (mitral valve prolapse)     diagnosed by first PCP 20 years ago, just got a new PCP Dr Centeno and patient said she was told she never had MVP    Pre-diabetes     managed by PCP    Snoring     White coat syndrome with diagnosis of hypertension       Past Surgical History:   Procedure Laterality Date    BREAST SURGERY Bilateral     biopsy    CHOLECYSTECTOMY      COLONOSCOPY      x 1.  Normal    ENDOMETRIAL ABLATION      EYE SURGERY Bilateral     chalazion removed    LUMBAR FUSION N/A 05/16/2022    LUMBAR L5-S1 DECOMPRESSION FUSION POSTERIOR performed by Salomon Owens MD at [facility] OR    LUMBAR FUSION N/A 11/14/2022    REVISION L5-S1  POSTERIOR  INSTRUMENTED FUSION performed by Salomon Owens MD at [facility] OR    [facility] STEROTACTIC LOC BREAST BIOPSY RIGHT Right 02/20/2024    DEREK STEROTACTIC LOC BREAST BIOPSY RIGHT 2/20/2024 [facility] MAMMOGRAPHY    PAIN MANAGEMENT PROCEDURE Right 07/16/2020    RIGHT L4 AND L5 EPIDURAL STEROID INJECTION performed by Garrison Berg MD at [facility] OR    PAIN MANAGEMENT PROCEDURE Right 10/26/2020    EPIDURAL STEROID INJECTION -RIGHT L4 L5 performed by Garrison Berg MD at [facility] OR    TUBAL LIGATION      WISDOM TOOTH EXTRACTION          Review of Systems   Constitutional: Negative.    HENT:

## 2024-05-08 NOTE — ANESTHESIA PROCEDURE NOTES
Peripheral Block    Patient location during procedure: OR  Reason for block: post-op pain management and at surgeon's request  Start time: 5/8/2024 3:55 PM  End time: 5/8/2024 4:00 PM  Staffing  Performed: anesthesiologist   Anesthesiologist: Jordan Chaudhry DO  Performed by: Jordan Chaudhry DO  Authorized by: Jordan Chaudhry DO    Preanesthetic Checklist  Completed: patient identified, IV checked, site marked, risks and benefits discussed, surgical/procedural consents, equipment checked, pre-op evaluation, timeout performed, anesthesia consent given, oxygen available and monitors applied/VS acknowledged  Peripheral Block   Patient position: supine  Prep: ChloraPrep  Provider prep: mask and sterile gloves  Patient monitoring: cardiac monitor, continuous pulse ox, frequent blood pressure checks, IV access, oxygen and continuous capnometry  Block type: PECS I and PECS II  Laterality: right  Injection technique: single-shot  Guidance: ultrasound guided    Needle   Needle type: insulated echogenic nerve stimulator needle   Needle gauge: 21 G  Needle localization: ultrasound guidance  Needle length: 10 cm  Assessment   Injection assessment: negative aspiration for heme, no paresthesia on injection and local visualized surrounding nerve on ultrasound  Paresthesia pain: none  Slow fractionated injection: yes  Hemodynamics: stable  Outcomes: uncomplicated    Additional Notes  Right PECS I and II blocks  A total of 30ml 0.25% bupivacaine + 4mg decadron was divided equally between the 2 sites

## 2024-05-10 DIAGNOSIS — M54.42 CHRONIC BILATERAL LOW BACK PAIN WITH BILATERAL SCIATICA: ICD-10-CM

## 2024-05-10 DIAGNOSIS — G89.29 CHRONIC BILATERAL LOW BACK PAIN WITH BILATERAL SCIATICA: ICD-10-CM

## 2024-05-10 DIAGNOSIS — M54.41 CHRONIC BILATERAL LOW BACK PAIN WITH BILATERAL SCIATICA: ICD-10-CM

## 2024-05-10 NOTE — TELEPHONE ENCOUNTER
Ann Hardy is calling to request a refill on the following medication(s):    Medication Request:  Requested Prescriptions     Pending Prescriptions Disp Refills    diclofenac (VOLTAREN) 75 MG EC tablet [Pharmacy Med Name: DICLOFENAC SOD EC 75 MG TAB] 60 tablet 5     Sig: take 1 tablet by mouth twice a day       Last Visit Date (If Applicable):  5/7/2024    Next Visit Date:    8/8/2024

## 2024-05-12 RX ORDER — DICLOFENAC SODIUM 75 MG/1
75 TABLET, DELAYED RELEASE ORAL 2 TIMES DAILY
Qty: 60 TABLET | Refills: 5 | Status: SHIPPED | OUTPATIENT
Start: 2024-05-12

## 2024-05-15 ENCOUNTER — TELEPHONE (OUTPATIENT)
Dept: SURGERY | Age: 59
End: 2024-05-15

## 2024-05-15 ENCOUNTER — OFFICE VISIT (OUTPATIENT)
Dept: SURGERY | Age: 59
End: 2024-05-15

## 2024-05-15 VITALS
SYSTOLIC BLOOD PRESSURE: 128 MMHG | HEIGHT: 67 IN | WEIGHT: 177 LBS | BODY MASS INDEX: 27.78 KG/M2 | DIASTOLIC BLOOD PRESSURE: 89 MMHG

## 2024-05-15 DIAGNOSIS — C50.411 MALIGNANT NEOPLASM OF UPPER-OUTER QUADRANT OF RIGHT BREAST IN FEMALE, ESTROGEN RECEPTOR NEGATIVE (HCC): Primary | ICD-10-CM

## 2024-05-15 DIAGNOSIS — N64.89 BREAST ASYMMETRY: ICD-10-CM

## 2024-05-15 DIAGNOSIS — Z17.1 MALIGNANT NEOPLASM OF UPPER-OUTER QUADRANT OF RIGHT BREAST IN FEMALE, ESTROGEN RECEPTOR NEGATIVE (HCC): Primary | ICD-10-CM

## 2024-05-15 LAB — SURGICAL PATHOLOGY REPORT: NORMAL

## 2024-05-15 PROCEDURE — 99024 POSTOP FOLLOW-UP VISIT: CPT | Performed by: SURGERY

## 2024-05-15 NOTE — TELEPHONE ENCOUNTER
Stacy from [employer] is requesting a call regarding prior authorization and can be reached at [phone].

## 2024-05-15 NOTE — PATIENT INSTRUCTIONS
You have a stage 1 right breast cancer that has been fully excised.  I will see you in 6 months with imaging.  We will get prosthesis for breast asymmetry.

## 2024-05-15 NOTE — PROGRESS NOTES
Review of Systems   Constitutional:  Negative for activity change, appetite change, chills, fatigue, fever and unexpected weight change.   Respiratory:  Negative for apnea, cough, chest tightness, shortness of breath and wheezing.    Cardiovascular:  Positive for chest pain (Chest tightness; Sharp shooting pains occasionally) and palpitations. Negative for leg swelling.   Gastrointestinal:  Negative for abdominal distention, anal bleeding, blood in stool, nausea, rectal pain and vomiting.   Genitourinary:  Negative for difficulty urinating and urgency.   Musculoskeletal:  Negative for back pain.   Skin:  Negative for color change.   Allergic/Immunologic: Negative for food allergies and immunocompromised state.   Neurological:  Negative for seizures, speech difficulty, weakness, light-headedness, numbness and headaches.   Hematological:  Does not bruise/bleed easily.   Psychiatric/Behavioral:  The patient is not nervous/anxious.       
oncology.  I will see her back in 6 months with a diagnostic mammogram and for an exam.     Return in about 6 months (around 11/25/2024).     Davis Rivero MD  6/1/2024 10:22 PM

## 2024-05-16 NOTE — TELEPHONE ENCOUNTER
Writer contacted [employer] at [phone]. Writer spoke with Liane. Liane stated the call was regarding the need for Dr. Rivero's NPI number for the insurance company who is requesting a prior authorization. Liane stated she has all the information that is needed at this time and if any further information is needed she will contact office. No concerns.

## 2024-05-16 NOTE — TELEPHONE ENCOUNTER
Liane called back to say dr taylor's  NPI is showing out of network. She is requesting our office fax over a rx and office note stating patient has cancer and needed the mastectomy. Specifically on the rx, it should be for quantity 1 right prosthesis and 4 mastectomy bras. Use CPT code C50.911. The fax number is [phone]. I told Liane that I would reach out to dr taylor to get rx put in the system and for note to be completed.

## 2024-05-17 ENCOUNTER — TELEPHONE (OUTPATIENT)
Dept: ONCOLOGY | Age: 59
End: 2024-05-17

## 2024-05-17 NOTE — TELEPHONE ENCOUNTER
Name: Ann Hardy  : 1965  MRN: [id number]    Oncology Navigation Follow-Up Note    Contact Type:  Telephone    Notes: Attempted to contact pt to update her on new appt details for Dr Grimaldo. No answer, details left in VM.       Electronically signed by Delmy Calderon RN on 2024 at 1:59 PM

## 2024-05-25 DIAGNOSIS — M81.0 AGE-RELATED OSTEOPOROSIS WITHOUT CURRENT PATHOLOGICAL FRACTURE: ICD-10-CM

## 2024-05-28 RX ORDER — ALENDRONATE SODIUM 70 MG/1
TABLET ORAL
Qty: 12 TABLET | Refills: 1 | Status: SHIPPED | OUTPATIENT
Start: 2024-05-28

## 2024-05-28 NOTE — TELEPHONE ENCOUNTER
Ann Hardy is calling to request a refill on the following medication(s):    Medication Request:  Requested Prescriptions     Pending Prescriptions Disp Refills    alendronate (FOSAMAX) 70 MG tablet [Pharmacy Med Name: ALENDRONATE SODIUM 70 MG TAB] 12 tablet 1     Sig: take 1 tablet by mouth every week       Last Visit Date (If Applicable):  5/7/2024    Next Visit Date:    8/8/2024

## 2024-05-31 ENCOUNTER — TELEPHONE (OUTPATIENT)
Dept: ONCOLOGY | Age: 59
End: 2024-05-31

## 2024-05-31 ENCOUNTER — OFFICE VISIT (OUTPATIENT)
Dept: ONCOLOGY | Age: 59
End: 2024-05-31
Payer: COMMERCIAL

## 2024-05-31 VITALS
BODY MASS INDEX: 28.04 KG/M2 | DIASTOLIC BLOOD PRESSURE: 90 MMHG | SYSTOLIC BLOOD PRESSURE: 132 MMHG | RESPIRATION RATE: 14 BRPM | HEART RATE: 79 BPM | OXYGEN SATURATION: 98 % | WEIGHT: 179 LBS

## 2024-05-31 DIAGNOSIS — Z17.1 MALIGNANT NEOPLASM OF RIGHT BREAST IN FEMALE, ESTROGEN RECEPTOR NEGATIVE, UNSPECIFIED SITE OF BREAST (HCC): Primary | ICD-10-CM

## 2024-05-31 DIAGNOSIS — Z17.1 MALIGNANT NEOPLASM OF OVERLAPPING SITES OF RIGHT BREAST IN FEMALE, ESTROGEN RECEPTOR NEGATIVE (HCC): ICD-10-CM

## 2024-05-31 DIAGNOSIS — C50.911 MALIGNANT NEOPLASM OF RIGHT BREAST IN FEMALE, ESTROGEN RECEPTOR NEGATIVE, UNSPECIFIED SITE OF BREAST (HCC): Primary | ICD-10-CM

## 2024-05-31 DIAGNOSIS — N63.10 MASS OF RIGHT BREAST, UNSPECIFIED QUADRANT: ICD-10-CM

## 2024-05-31 DIAGNOSIS — C50.811 MALIGNANT NEOPLASM OF OVERLAPPING SITES OF RIGHT BREAST IN FEMALE, ESTROGEN RECEPTOR NEGATIVE (HCC): ICD-10-CM

## 2024-05-31 PROCEDURE — 3080F DIAST BP >= 90 MM HG: CPT | Performed by: INTERNAL MEDICINE

## 2024-05-31 PROCEDURE — 3075F SYST BP GE 130 - 139MM HG: CPT | Performed by: INTERNAL MEDICINE

## 2024-05-31 PROCEDURE — 99211 OFF/OP EST MAY X REQ PHY/QHP: CPT | Performed by: INTERNAL MEDICINE

## 2024-05-31 PROCEDURE — 99215 OFFICE O/P EST HI 40 MIN: CPT | Performed by: INTERNAL MEDICINE

## 2024-05-31 RX ORDER — SODIUM CHLORIDE 0.9 % (FLUSH) 0.9 %
5-40 SYRINGE (ML) INJECTION PRN
Status: CANCELLED | OUTPATIENT
Start: 2024-06-24

## 2024-05-31 RX ORDER — ONDANSETRON 2 MG/ML
8 INJECTION INTRAMUSCULAR; INTRAVENOUS
Status: CANCELLED | OUTPATIENT
Start: 2024-06-24

## 2024-05-31 RX ORDER — FAMOTIDINE 10 MG/ML
20 INJECTION, SOLUTION INTRAVENOUS ONCE
Status: CANCELLED | OUTPATIENT
Start: 2024-06-24 | End: 2024-05-31

## 2024-05-31 RX ORDER — EPINEPHRINE 1 MG/ML
0.3 INJECTION, SOLUTION, CONCENTRATE INTRAVENOUS PRN
Status: CANCELLED | OUTPATIENT
Start: 2024-06-24

## 2024-05-31 RX ORDER — DIPHENHYDRAMINE HYDROCHLORIDE 50 MG/ML
50 INJECTION INTRAMUSCULAR; INTRAVENOUS ONCE
Status: CANCELLED | OUTPATIENT
Start: 2024-06-24 | End: 2024-05-31

## 2024-05-31 RX ORDER — SODIUM CHLORIDE 9 MG/ML
INJECTION, SOLUTION INTRAVENOUS CONTINUOUS
Status: CANCELLED | OUTPATIENT
Start: 2024-06-24

## 2024-05-31 RX ORDER — HEPARIN SODIUM (PORCINE) LOCK FLUSH IV SOLN 100 UNIT/ML 100 UNIT/ML
500 SOLUTION INTRAVENOUS PRN
Status: CANCELLED | OUTPATIENT
Start: 2024-06-24

## 2024-05-31 RX ORDER — ACETAMINOPHEN 325 MG/1
650 TABLET ORAL
Status: CANCELLED | OUTPATIENT
Start: 2024-06-24

## 2024-05-31 RX ORDER — SODIUM CHLORIDE 9 MG/ML
5-250 INJECTION, SOLUTION INTRAVENOUS PRN
Status: CANCELLED | OUTPATIENT
Start: 2024-06-24

## 2024-05-31 RX ORDER — MEPERIDINE HYDROCHLORIDE 50 MG/ML
12.5 INJECTION INTRAMUSCULAR; INTRAVENOUS; SUBCUTANEOUS PRN
Status: CANCELLED | OUTPATIENT
Start: 2024-06-24

## 2024-05-31 RX ORDER — FAMOTIDINE 10 MG/ML
20 INJECTION, SOLUTION INTRAVENOUS
Status: CANCELLED | OUTPATIENT
Start: 2024-06-24

## 2024-05-31 RX ORDER — DIPHENHYDRAMINE HYDROCHLORIDE 50 MG/ML
50 INJECTION INTRAMUSCULAR; INTRAVENOUS
Status: CANCELLED | OUTPATIENT
Start: 2024-06-24

## 2024-05-31 RX ORDER — ALBUTEROL SULFATE 90 UG/1
4 AEROSOL, METERED RESPIRATORY (INHALATION) PRN
Status: CANCELLED | OUTPATIENT
Start: 2024-06-24

## 2024-05-31 NOTE — TELEPHONE ENCOUNTER
Name: Ann Hardy  : 1965  MRN: [id number]    Oncology Navigation Follow-Up Note    Contact Type:  Medical Oncology    Notes: Met with pt during MO post op f/u. Plan for chemo. Referral for port and ECHO placed. Chemo orders pending insurance approval.     Encouraged pt to call navigator as needed with any questions, concerns or barriers. Confirmed pt has navigator's contact information.             Electronically signed by Delmy Calderon RN on 2024 at 2:22 PM

## 2024-05-31 NOTE — PROGRESS NOTES
Ann Hardy                                                                                                                  5/31/2024  MRN:   [id number]  YOB: 1965  PCP:                           Varun Centeno DO  Referring Physician: No ref. provider found  Treating Physician Name: TOBI PARK MD      Reason for visit:  Discussed treatment plan    Current problems:  Right breast invasive cancer, HER2 positive ER negative, pathological stage T1b N0 M0-2/2024  Right breast DCIS, ER negative  Chronic arthritis  Medical comorbidities: Mitral valve prolapse, dyslipidemia, hypertension    Active and recent treatments:  S/p right breast lumpectomy with sentinel lymph node biopsy-5/2024  Anticipating adjuvant chemotherapy  Anticipating adjuvant radiation therapy    Interval history:  Patient presents to the clinic for follow-up visit and to discuss results of her pathological stage and surgical path report.  Patient underwent lumpectomy tolerated well.  Patient lumpectomy specimen was tested positive for HER2 amplification.  Patient denies fever or chills.    During this visit patient's allergy, social, medical, surgical history and medications were reviewed and updated.    Summary of Case/History:  Ann Hardy a 58 y.o.female is a patient with biopsy-proven invasive cancer of right breast, triple negative and DCIS, ER negative presents to the clinic to establish care and for further workup and evaluation.She underwent screening mammogram in January 2024 which showed group of calcification in the right breast which was indeterminate.  Patient subsequently underwent diagnostic mammogram of the right breast which showed clustered pleomorphic calcification in the right breast at the 11 o'clock position.  Patient subsequently underwent biopsy of the right breast which came back as invasive carcinoma, triple negative, also showed ER negative DCIS.  Patient now presents to the clinic to

## 2024-05-31 NOTE — TELEPHONE ENCOUNTER
Instructions   from Dr. Jourdan Grimaldo MD    Tx at [facility]   Port   Echo   Rv on tx day week 2       AVS faxed to [facility], they will call pt to schedule tx  IR to call pt to schedule port  ECHO scheduled for [facility] 6/17/24 at 9 am

## 2024-06-06 ENCOUNTER — TELEPHONE (OUTPATIENT)
Dept: INFUSION THERAPY | Facility: MEDICAL CENTER | Age: 59
End: 2024-06-06

## 2024-06-06 NOTE — TELEPHONE ENCOUNTER
RN check new chemotherapy orders per Dr. Grimaldo.      Surgical pathology 5/8/24 Invasive breast carcinoma.      21 day cycle Trazimera and Taxol      Ht - 170.2 cm  Wt - 81.2 kg  BSA - 1.96      Cycle 1  Trazimera 8 mg/kg = 649.6 mg rounded to 651 mg IV day 1.    Cycle 2 - 4  Trazimera 6 mg/kg = 487.2 mg rounded to 483 mg IV day 1.    Taxol 80 mg/m2 = 156.8 mg rounded to 156 mg IV day 1,8,15.      Order noted. Labs to epic. Chart to front office for processing. Note to pool for 2nd RN check.

## 2024-06-10 DIAGNOSIS — E55.9 VITAMIN D DEFICIENCY: ICD-10-CM

## 2024-06-10 DIAGNOSIS — E78.5 DYSLIPIDEMIA: ICD-10-CM

## 2024-06-10 NOTE — TELEPHONE ENCOUNTER
Patient called requesting vitamin d2 I only see the vitamin D     Ann Hardy is calling to request a refill on the following medication(s):    Medication Request:  Requested Prescriptions     Pending Prescriptions Disp Refills    atorvastatin (LIPITOR) 20 MG tablet 90 tablet 1     Sig: Take 1 tablet by mouth daily    vitamin D (ERGOCALCIFEROL) 1.25 MG (88875 UT) CAPS capsule 12 capsule 1     Sig: Take 1 capsule by mouth once a week       Last Visit Date (If Applicable):  5/7/2024    Next Visit Date:    8/8/2024

## 2024-06-11 ENCOUNTER — TELEPHONE (OUTPATIENT)
Dept: ORTHOPEDIC SURGERY | Age: 59
End: 2024-06-11

## 2024-06-11 RX ORDER — ERGOCALCIFEROL 1.25 MG/1
50000 CAPSULE ORAL WEEKLY
Qty: 12 CAPSULE | Refills: 1 | Status: SHIPPED | OUTPATIENT
Start: 2024-06-11

## 2024-06-11 RX ORDER — ATORVASTATIN CALCIUM 20 MG/1
20 TABLET, FILM COATED ORAL DAILY
Qty: 90 TABLET | Refills: 1 | Status: SHIPPED | OUTPATIENT
Start: 2024-06-11

## 2024-06-11 NOTE — TELEPHONE ENCOUNTER
Patient calls [place] [facility] and is questioning if she can discontinue taking the Fosamax that patient states was originally ordered by Dr. Owens     It looks like patients PCP ordered it previously, patient states because she has not seen Dr. Owens her PCP reordered it and told patient to ask Dr. Owens when she can discontinue taking the medication.     Informed patient a message would be sent to Dr. Owens and someone from the office would return her call.

## 2024-06-12 NOTE — TELEPHONE ENCOUNTER
I spoke with the patient and advised to continue with the Fosamax and follow up with her PCP.  She voiced understanding.

## 2024-06-17 ENCOUNTER — HOSPITAL ENCOUNTER (OUTPATIENT)
Age: 59
Discharge: HOME | End: 2024-06-19
Attending: INTERNAL MEDICINE
Payer: COMMERCIAL

## 2024-06-17 ENCOUNTER — TELEPHONE (OUTPATIENT)
Dept: ONCOLOGY | Age: 59
End: 2024-06-17

## 2024-06-17 VITALS — HEIGHT: 67 IN | BODY MASS INDEX: 28.09 KG/M2 | WEIGHT: 179 LBS

## 2024-06-17 DIAGNOSIS — Z17.1 MALIGNANT NEOPLASM OF RIGHT BREAST IN FEMALE, ESTROGEN RECEPTOR NEGATIVE, UNSPECIFIED SITE OF BREAST (HCC): ICD-10-CM

## 2024-06-17 DIAGNOSIS — C50.911 MALIGNANT NEOPLASM OF RIGHT BREAST IN FEMALE, ESTROGEN RECEPTOR NEGATIVE, UNSPECIFIED SITE OF BREAST (HCC): ICD-10-CM

## 2024-06-17 DIAGNOSIS — N63.10 MASS OF RIGHT BREAST, UNSPECIFIED QUADRANT: ICD-10-CM

## 2024-06-17 LAB
ECHO AO ROOT DIAM: 2.7 CM
ECHO AO ROOT INDEX: 1.4 CM/M2
ECHO AV MEAN GRADIENT: 5 MMHG
ECHO AV MEAN VELOCITY: 1 M/S
ECHO AV PEAK GRADIENT: 8 MMHG
ECHO AV PEAK VELOCITY: 1.4 M/S
ECHO AV VELOCITY RATIO: 0.93
ECHO AV VTI: 26.9 CM
ECHO BSA: 1.96 M2
ECHO EST RA PRESSURE: 3 MMHG
ECHO LA AREA 2C: 16.4 CM2
ECHO LA AREA 4C: 15.7 CM2
ECHO LA DIAMETER INDEX: 1.61 CM/M2
ECHO LA DIAMETER: 3.1 CM
ECHO LA MAJOR AXIS: 5.3 CM
ECHO LA MINOR AXIS: 5 CM
ECHO LA TO AORTIC ROOT RATIO: 1.15
ECHO LA VOL BP: 41 ML (ref 22–52)
ECHO LA VOL MOD A2C: 44 ML (ref 22–52)
ECHO LA VOL MOD A4C: 36 ML (ref 22–52)
ECHO LA VOL/BSA BIPLANE: 21 ML/M2 (ref 16–34)
ECHO LA VOLUME INDEX MOD A2C: 23 ML/M2 (ref 16–34)
ECHO LA VOLUME INDEX MOD A4C: 19 ML/M2 (ref 16–34)
ECHO LV E' LATERAL VELOCITY: 13 CM/S
ECHO LV E' SEPTAL VELOCITY: 5 CM/S
ECHO LV FRACTIONAL SHORTENING: 36 % (ref 28–44)
ECHO LV INTERNAL DIMENSION DIASTOLE INDEX: 1.87 CM/M2
ECHO LV INTERNAL DIMENSION DIASTOLIC: 3.6 CM (ref 3.9–5.3)
ECHO LV INTERNAL DIMENSION SYSTOLIC INDEX: 1.19 CM/M2
ECHO LV INTERNAL DIMENSION SYSTOLIC: 2.3 CM
ECHO LV IVSD: 0.9 CM (ref 0.6–0.9)
ECHO LV MASS 2D: 100.2 G (ref 67–162)
ECHO LV MASS INDEX 2D: 51.9 G/M2 (ref 43–95)
ECHO LV POSTERIOR WALL DIASTOLIC: 1 CM (ref 0.6–0.9)
ECHO LV RELATIVE WALL THICKNESS RATIO: 0.56
ECHO LVOT PEAK GRADIENT: 7 MMHG
ECHO LVOT PEAK VELOCITY: 1.3 M/S
ECHO MV A VELOCITY: 0.98 M/S
ECHO MV E DECELERATION TIME (DT): 250 MS
ECHO MV E VELOCITY: 0.82 M/S
ECHO MV E/A RATIO: 0.84
ECHO MV E/E' LATERAL: 6.31
ECHO MV E/E' RATIO (AVERAGED): 11.35
ECHO MV E/E' SEPTAL: 16.4
ECHO RIGHT VENTRICULAR SYSTOLIC PRESSURE (RVSP): 22 MMHG
ECHO TV REGURGITANT MAX VELOCITY: 2.2 M/S
ECHO TV REGURGITANT PEAK GRADIENT: 19 MMHG

## 2024-06-17 PROCEDURE — 93306 TTE W/DOPPLER COMPLETE: CPT

## 2024-06-17 NOTE — TELEPHONE ENCOUNTER
Name: Ann Hardy  : 1965  MRN: [id number]    Oncology Navigation Follow-Up Note    Contact Type:  Telephone    Notes: Pt called navigator with general questions about chemo next week. Writer addressed questions to the best of writer's knowledge. We reviewed appt details and no barriers were voiced.     We discussed financial situation. Writer will contact [employer], to see if pt may qualify for any grants ot assistance.       Electronically signed by Delmy Calderon RN on 2024 at 9:52 AM

## 2024-06-18 ENCOUNTER — HOSPITAL ENCOUNTER (OUTPATIENT)
Facility: MEDICAL CENTER | Age: 59
End: 2024-06-18
Payer: COMMERCIAL

## 2024-06-18 ENCOUNTER — HOSPITAL ENCOUNTER (OUTPATIENT)
Dept: INTERVENTIONAL RADIOLOGY/VASCULAR | Age: 59
Discharge: HOME | End: 2024-06-20
Payer: COMMERCIAL

## 2024-06-18 VITALS
RESPIRATION RATE: 19 BRPM | TEMPERATURE: 97.3 F | OXYGEN SATURATION: 97 % | HEIGHT: 67 IN | BODY MASS INDEX: 27.94 KG/M2 | WEIGHT: 178 LBS | HEART RATE: 76 BPM | DIASTOLIC BLOOD PRESSURE: 107 MMHG | SYSTOLIC BLOOD PRESSURE: 157 MMHG

## 2024-06-18 DIAGNOSIS — C50.911 MALIGNANT NEOPLASM OF RIGHT FEMALE BREAST, UNSPECIFIED ESTROGEN RECEPTOR STATUS, UNSPECIFIED SITE OF BREAST (HCC): ICD-10-CM

## 2024-06-18 LAB
INR PPP: 1
PARTIAL THROMBOPLASTIN TIME: 26.5 SEC (ref 23.9–33.8)
PLATELET # BLD AUTO: 292 K/UL (ref 138–453)
PROTHROMBIN TIME: 13.2 SEC (ref 11.5–14.2)

## 2024-06-18 PROCEDURE — 36561 INSERT TUNNELED CV CATH: CPT

## 2024-06-18 PROCEDURE — 7100000010 HC PHASE II RECOVERY - FIRST 15 MIN: Performed by: RADIOLOGY

## 2024-06-18 PROCEDURE — 6360000002 HC RX W HCPCS: Performed by: RADIOLOGY

## 2024-06-18 PROCEDURE — 76937 US GUIDE VASCULAR ACCESS: CPT

## 2024-06-18 PROCEDURE — C1887 CATHETER, GUIDING: HCPCS

## 2024-06-18 PROCEDURE — 7100000011 HC PHASE II RECOVERY - ADDTL 15 MIN: Performed by: RADIOLOGY

## 2024-06-18 PROCEDURE — 77001 FLUOROGUIDE FOR VEIN DEVICE: CPT

## 2024-06-18 PROCEDURE — 85730 THROMBOPLASTIN TIME PARTIAL: CPT

## 2024-06-18 PROCEDURE — 2580000003 HC RX 258: Performed by: RADIOLOGY

## 2024-06-18 PROCEDURE — 85610 PROTHROMBIN TIME: CPT

## 2024-06-18 PROCEDURE — 85049 AUTOMATED PLATELET COUNT: CPT

## 2024-06-18 RX ORDER — SODIUM CHLORIDE 9 MG/ML
INJECTION, SOLUTION INTRAVENOUS CONTINUOUS
Status: DISCONTINUED | OUTPATIENT
Start: 2024-06-18 | End: 2024-06-21 | Stop reason: HOSPADM

## 2024-06-18 RX ORDER — MIDAZOLAM HYDROCHLORIDE 1 MG/ML
INJECTION INTRAMUSCULAR; INTRAVENOUS PRN
Status: COMPLETED | OUTPATIENT
Start: 2024-06-18 | End: 2024-06-18

## 2024-06-18 RX ORDER — HEPARIN SODIUM 1000 [USP'U]/ML
INJECTION, SOLUTION INTRAVENOUS; SUBCUTANEOUS PRN
Status: COMPLETED | OUTPATIENT
Start: 2024-06-18 | End: 2024-06-18

## 2024-06-18 RX ORDER — SODIUM CHLORIDE 9 MG/ML
INJECTION, SOLUTION INTRAVENOUS PRN
Status: DISCONTINUED | OUTPATIENT
Start: 2024-06-18 | End: 2024-06-21 | Stop reason: HOSPADM

## 2024-06-18 RX ORDER — FENTANYL CITRATE 50 UG/ML
INJECTION, SOLUTION INTRAMUSCULAR; INTRAVENOUS PRN
Status: COMPLETED | OUTPATIENT
Start: 2024-06-18 | End: 2024-06-18

## 2024-06-18 RX ORDER — SODIUM CHLORIDE 0.9 % (FLUSH) 0.9 %
5-40 SYRINGE (ML) INJECTION PRN
Status: DISCONTINUED | OUTPATIENT
Start: 2024-06-18 | End: 2024-06-21 | Stop reason: HOSPADM

## 2024-06-18 RX ORDER — SODIUM CHLORIDE 0.9 % (FLUSH) 0.9 %
5-40 SYRINGE (ML) INJECTION EVERY 12 HOURS SCHEDULED
Status: DISCONTINUED | OUTPATIENT
Start: 2024-06-18 | End: 2024-06-21 | Stop reason: HOSPADM

## 2024-06-18 RX ORDER — SODIUM CHLORIDE 0.9 % (FLUSH) 0.9 %
SYRINGE (ML) INJECTION PRN
Status: COMPLETED | OUTPATIENT
Start: 2024-06-18 | End: 2024-06-18

## 2024-06-18 RX ADMIN — FENTANYL CITRATE 50 MCG: 50 INJECTION INTRAMUSCULAR; INTRAVENOUS at 12:56

## 2024-06-18 RX ADMIN — CEFAZOLIN 1000 MG: 1 INJECTION, POWDER, FOR SOLUTION INTRAMUSCULAR; INTRAVENOUS at 12:15

## 2024-06-18 RX ADMIN — SODIUM CHLORIDE, PRESERVATIVE FREE 10 ML: 5 INJECTION INTRAVENOUS at 12:46

## 2024-06-18 RX ADMIN — SODIUM CHLORIDE: 9 INJECTION, SOLUTION INTRAVENOUS at 11:46

## 2024-06-18 RX ADMIN — HEPARIN SODIUM 500 UNITS: 1000 INJECTION INTRAVENOUS; SUBCUTANEOUS at 13:06

## 2024-06-18 RX ADMIN — MIDAZOLAM 1 MG: 1 INJECTION INTRAMUSCULAR; INTRAVENOUS at 12:46

## 2024-06-18 RX ADMIN — FENTANYL CITRATE 50 MCG: 50 INJECTION INTRAMUSCULAR; INTRAVENOUS at 12:46

## 2024-06-18 ASSESSMENT — PAIN DESCRIPTION - DESCRIPTORS
DESCRIPTORS: DISCOMFORT
DESCRIPTORS: OTHER (COMMENT)
DESCRIPTORS: DISCOMFORT;NAGGING

## 2024-06-18 ASSESSMENT — PAIN DESCRIPTION - ORIENTATION
ORIENTATION: LEFT;UPPER

## 2024-06-18 ASSESSMENT — PAIN SCALES - GENERAL
PAINLEVEL_OUTOF10: 3
PAINLEVEL_OUTOF10: 0
PAINLEVEL_OUTOF10: 3
PAINLEVEL_OUTOF10: 0

## 2024-06-18 ASSESSMENT — PAIN DESCRIPTION - LOCATION
LOCATION: CHEST

## 2024-06-18 ASSESSMENT — PAIN - FUNCTIONAL ASSESSMENT
PAIN_FUNCTIONAL_ASSESSMENT: NONE - DENIES PAIN
PAIN_FUNCTIONAL_ASSESSMENT: 0-10

## 2024-06-18 ASSESSMENT — PAIN DESCRIPTION - PAIN TYPE
TYPE: SURGICAL PAIN

## 2024-06-18 NOTE — DISCHARGE INSTRUCTIONS
Discharge Instructions following Anesthesia or Sedation  Do not drive, use any equipment or machinery, make any important decisions or sign any legal papers for 24 hours following anesthesia/sedation.  Do not drink any alcoholic beverages for 24 hours following anesthesia/sedation and while taking prescription pain medication.  If you develop uncontrolled vomiting or nausea or a severe headache notify your physician.

## 2024-06-18 NOTE — POST SEDATION
Sedation Post Procedure Note    Patient Name: Ann Hardy   YOB: 1965  Room/Bed: Room/bed info not found  Medical Record Number: [id number]  Date: 6/18/2024   Time: 1:24 PM         Physicians/Assistants: Jacobo Whatley MD, MD    Procedure Performed:  Port placement    Post-Sedation Vital Signs:  Vitals:    06/18/24 1310   BP: (!) 145/95   Pulse: 78   Resp: 16   Temp:    SpO2: 100%      Vital signs were reviewed and were stable after the procedure (see flow sheet for vitals)            Complications: none    Electronically signed by Jacobo Whatley MD on 6/18/2024 at 1:24 PM

## 2024-06-18 NOTE — BRIEF OP NOTE
Brief Postoperative Note    Ann Hardy  YOB: 1965  [id number]    Pre-operative Diagnosis: Right breast cancer    Post-operative Diagnosis: Same    Procedure: Port placement    Anesthesia: Moderate Sedation    Surgeons/Assistants: Chacorta    Estimated Blood Loss: less than 50     Complications: None    Specimens: Was Not Obtained    Electronically signed by Jacobo Whatley MD on 6/18/2024 at 1:25 PM

## 2024-06-18 NOTE — H&P
Interval H&P Note    Pt Name: Ann Hardy  MRN: [id number]  YOB: 1965  Date of evaluation: 6/18/2024      [x] I have reviewed in epic the Oncology/Hematology Note by Dr Grimaldo dated 5/31/24 attached below for an Interval History and Physical note.     [x] I have examined  Ann Hardy, a 58 y.o. female who presents for a scheduled  PORT PLACEMENT in IR by DR MCCABE for Unspecified lump in the right breast, unspecified quadrant; Malignant neop*. The patient is having acid reflux this past week Advised to talk with physician to see if they advise medication. She  arrived for her procedure and denies new health changes, fever, chills, wheezing, cough, increased SOB, chest pain, open sores or wounds.  NO DM     Vital signs: BP (!) 156/99   Pulse 78   Temp 97.7 °F (36.5 °C)   Resp 16   Ht 1.702 m (5' 7\")   Wt 80.7 kg (178 lb)   SpO2 100%   BMI 27.88 kg/m²     Allergies:  Patient has no known allergies.    Medications:    Prior to Admission medications    Medication Sig Start Date End Date Taking? Authorizing Provider   atorvastatin (LIPITOR) 20 MG tablet Take 1 tablet by mouth daily 6/11/24   Varun Centeno, DO   vitamin D (ERGOCALCIFEROL) 1.25 MG (90229 UT) CAPS capsule Take 1 capsule by mouth once a week 6/11/24   Varun Centeno, DO   alendronate (FOSAMAX) 70 MG tablet take 1 tablet by mouth every week 5/28/24   Varun Centeno DO   diclofenac (VOLTAREN) 75 MG EC tablet take 1 tablet by mouth twice a day 5/12/24   Varun Centeno DO   ibuprofen (ADVIL;MOTRIN) 800 MG tablet Take 1 tablet by mouth 2 times daily as needed for Pain 5/8/24   Davis Rivero MD   gabapentin (NEURONTIN) 400 MG capsule Take 1 capsule by mouth 3 times daily for 90 days. 3/29/24 6/27/24  Kaitlynn Stubbs, APRN - CNP   losartan (COZAAR) 50 MG tablet Take 1 tablet by mouth daily 11/2/23   Varun Centeno DO   magnesium 30 MG tablet Take 1 tablet by mouth 2 times daily Supplement magnesium over the

## 2024-06-19 ENCOUNTER — TELEPHONE (OUTPATIENT)
Dept: ONCOLOGY | Age: 59
End: 2024-06-19

## 2024-06-19 DIAGNOSIS — I07.1 TRICUSPID VALVE INSUFFICIENCY, UNSPECIFIED ETIOLOGY: ICD-10-CM

## 2024-06-19 DIAGNOSIS — I34.0 MITRAL VALVE INSUFFICIENCY, UNSPECIFIED ETIOLOGY: Primary | ICD-10-CM

## 2024-06-19 NOTE — TELEPHONE ENCOUNTER
Thank you for the update Delmy! I've put in a referral to Dr. Ricci from [facility] cardiology - his office is in [place] so I figured that could work out well. Let me know if there is anything else you or she needs!

## 2024-06-19 NOTE — TELEPHONE ENCOUNTER
Name: Ann Hardy  : 1965  MRN: [id number]    Oncology Navigation Follow-Up Note    Contact Type:  Telephone    Notes: Call received from pt who voiced concern that her MyChart showed ECHO was abnormal. Writer reviewed results and noted Mitral and tricuspid valves showed mild regurgitation. Writer explained that this would not hinder her from starting chemo treatments. Pt stated she would want to be seen by a cardiologist but does not know how to approach that. Writer informed her that here PCP could place a referral. Writer will update Dr Centeno so he is aware of pt's request.     Pt also c/o of heartburn on and off for a few weeks. She has not used anything to try and remedy this. Pt encouraged to try any OTC medication to see if she notices improvement. If not, encouraged her to call back.     Electronically signed by Delmy Calderon RN on 2024 at 12:53 PM

## 2024-06-21 ENCOUNTER — HOSPITAL ENCOUNTER (OUTPATIENT)
Dept: INFUSION THERAPY | Facility: MEDICAL CENTER | Age: 59
Discharge: HOME | End: 2024-06-21
Payer: COMMERCIAL

## 2024-06-21 ENCOUNTER — CLINICAL DOCUMENTATION (OUTPATIENT)
Facility: HOSPITAL | Age: 59
End: 2024-06-21

## 2024-06-21 DIAGNOSIS — N63.10 MASS OF RIGHT BREAST, UNSPECIFIED QUADRANT: Primary | ICD-10-CM

## 2024-06-21 PROCEDURE — 99211 OFF/OP EST MAY X REQ PHY/QHP: CPT

## 2024-06-21 NOTE — PROGRESS NOTES
Ann and her  Naseem were here for chemotherapy teaching, time spent 60 minutes .    Teaching sheets from [facility] and verbal information given on chemotherapy agents, action, administration and side effects.     Chemotherapy medications discussed: taxol , trazimera       Chemotherapy consent form signed by patient.     Provided new patient binder, Chemotherapy and You booklet, Eating Hints booklet     Port placement: 6/18/24    Pt has prescription for EMLA cream and was given instructions on use.     Reviewed take home medication:   Zofran      Questions answered and support given.     [facility] rehab referral assessment form, distress tool form and PG-SGA form completed by patient.     Needs that were identified during teaching visit:  [place],    Discussed community resources such as [facility],  [employer], [place], [employer], etc.     Jessie Pickett RN

## 2024-06-21 NOTE — PROGRESS NOTES
Patient Assistance    Referral to [employer] sent today.

## 2024-06-24 ENCOUNTER — OFFICE VISIT (OUTPATIENT)
Dept: ONCOLOGY | Age: 59
End: 2024-06-24
Payer: COMMERCIAL

## 2024-06-24 ENCOUNTER — TELEPHONE (OUTPATIENT)
Dept: ONCOLOGY | Age: 59
End: 2024-06-24

## 2024-06-24 ENCOUNTER — HOSPITAL ENCOUNTER (OUTPATIENT)
Dept: INFUSION THERAPY | Facility: MEDICAL CENTER | Age: 59
Discharge: HOME | End: 2024-06-24
Payer: COMMERCIAL

## 2024-06-24 VITALS
BODY MASS INDEX: 27.86 KG/M2 | WEIGHT: 177.9 LBS | DIASTOLIC BLOOD PRESSURE: 92 MMHG | SYSTOLIC BLOOD PRESSURE: 149 MMHG | HEART RATE: 81 BPM | TEMPERATURE: 98.1 F

## 2024-06-24 DIAGNOSIS — Z51.11 CHEMOTHERAPY MANAGEMENT, ENCOUNTER FOR: ICD-10-CM

## 2024-06-24 DIAGNOSIS — C50.811 MALIGNANT NEOPLASM OF OVERLAPPING SITES OF RIGHT BREAST IN FEMALE, ESTROGEN RECEPTOR NEGATIVE (HCC): Primary | ICD-10-CM

## 2024-06-24 DIAGNOSIS — Z17.1 MALIGNANT NEOPLASM OF OVERLAPPING SITES OF RIGHT BREAST IN FEMALE, ESTROGEN RECEPTOR NEGATIVE (HCC): Primary | ICD-10-CM

## 2024-06-24 DIAGNOSIS — Z17.1 MALIGNANT NEOPLASM OF OVERLAPPING SITES OF RIGHT BREAST IN FEMALE, ESTROGEN RECEPTOR NEGATIVE (HCC): ICD-10-CM

## 2024-06-24 DIAGNOSIS — C50.811 MALIGNANT NEOPLASM OF OVERLAPPING SITES OF RIGHT BREAST IN FEMALE, ESTROGEN RECEPTOR NEGATIVE (HCC): ICD-10-CM

## 2024-06-24 LAB
ALBUMIN SERPL-MCNC: 4.2 G/DL (ref 3.5–5.2)
ALP SERPL-CCNC: 84 U/L (ref 35–104)
ALT SERPL-CCNC: 16 U/L (ref 5–33)
ANION GAP SERPL CALCULATED.3IONS-SCNC: 13 MMOL/L (ref 9–17)
AST SERPL-CCNC: 21 U/L
BASOPHILS # BLD: 0.05 K/UL (ref 0–0.2)
BASOPHILS NFR BLD: 2 % (ref 0–2)
BILIRUB SERPL-MCNC: 0.5 MG/DL (ref 0.3–1.2)
BUN SERPL-MCNC: 14 MG/DL (ref 6–20)
BUN/CREAT SERPL: 20 (ref 9–20)
CALCIUM SERPL-MCNC: 9 MG/DL (ref 8.6–10.4)
CHLORIDE SERPL-SCNC: 104 MMOL/L (ref 98–107)
CO2 SERPL-SCNC: 25 MMOL/L (ref 20–31)
CREAT SERPL-MCNC: 0.7 MG/DL (ref 0.5–0.9)
EOSINOPHIL # BLD: 0.08 K/UL (ref 0–0.44)
EOSINOPHILS RELATIVE PERCENT: 3 % (ref 1–4)
ERYTHROCYTE [DISTWIDTH] IN BLOOD BY AUTOMATED COUNT: 15.1 % (ref 11.8–14.4)
GFR, ESTIMATED: >90 ML/MIN/1.73M2
GLUCOSE SERPL-MCNC: 97 MG/DL (ref 70–99)
HCT VFR BLD AUTO: 36.2 % (ref 36.3–47.1)
HGB BLD-MCNC: 11.5 G/DL (ref 11.9–15.1)
IMM GRANULOCYTES # BLD AUTO: 0.01 K/UL (ref 0–0.3)
IMM GRANULOCYTES NFR BLD: 0 %
LYMPHOCYTES NFR BLD: 1.34 K/UL (ref 1.1–3.7)
LYMPHOCYTES RELATIVE PERCENT: 43 % (ref 24–43)
MCH RBC QN AUTO: 28.3 PG (ref 25.2–33.5)
MCHC RBC AUTO-ENTMCNC: 31.8 G/DL (ref 28.4–34.8)
MCV RBC AUTO: 88.9 FL (ref 82.6–102.9)
MONOCYTES NFR BLD: 0.26 K/UL (ref 0.1–1.2)
MONOCYTES NFR BLD: 8 % (ref 3–12)
NEUTROPHILS NFR BLD: 44 % (ref 36–65)
NEUTS SEG NFR BLD: 1.37 K/UL (ref 1.5–8.1)
NRBC BLD-RTO: 0 PER 100 WBC
PLATELET # BLD AUTO: 275 K/UL (ref 138–453)
PMV BLD AUTO: 9.4 FL (ref 8.1–13.5)
POTASSIUM SERPL-SCNC: 3.7 MMOL/L (ref 3.7–5.3)
PROT SERPL-MCNC: 7.7 G/DL (ref 6.4–8.3)
RBC # BLD AUTO: 4.07 M/UL (ref 3.95–5.11)
RBC # BLD: ABNORMAL 10*6/UL
SODIUM SERPL-SCNC: 142 MMOL/L (ref 135–144)
WBC OTHER # BLD: 3.1 K/UL (ref 3.5–11.3)

## 2024-06-24 PROCEDURE — 99211 OFF/OP EST MAY X REQ PHY/QHP: CPT | Performed by: INTERNAL MEDICINE

## 2024-06-24 PROCEDURE — 3077F SYST BP >= 140 MM HG: CPT | Performed by: INTERNAL MEDICINE

## 2024-06-24 PROCEDURE — 96413 CHEMO IV INFUSION 1 HR: CPT

## 2024-06-24 PROCEDURE — 99214 OFFICE O/P EST MOD 30 MIN: CPT | Performed by: INTERNAL MEDICINE

## 2024-06-24 PROCEDURE — 2580000003 HC RX 258: Performed by: INTERNAL MEDICINE

## 2024-06-24 PROCEDURE — 96415 CHEMO IV INFUSION ADDL HR: CPT

## 2024-06-24 PROCEDURE — 96375 TX/PRO/DX INJ NEW DRUG ADDON: CPT

## 2024-06-24 PROCEDURE — 36591 DRAW BLOOD OFF VENOUS DEVICE: CPT

## 2024-06-24 PROCEDURE — 3080F DIAST BP >= 90 MM HG: CPT | Performed by: INTERNAL MEDICINE

## 2024-06-24 PROCEDURE — 80053 COMPREHEN METABOLIC PANEL: CPT

## 2024-06-24 PROCEDURE — 6360000002 HC RX W HCPCS: Performed by: INTERNAL MEDICINE

## 2024-06-24 PROCEDURE — 85025 COMPLETE CBC W/AUTO DIFF WBC: CPT

## 2024-06-24 PROCEDURE — 96417 CHEMO IV INFUS EACH ADDL SEQ: CPT

## 2024-06-24 PROCEDURE — 2500000003 HC RX 250 WO HCPCS: Performed by: INTERNAL MEDICINE

## 2024-06-24 RX ORDER — ONDANSETRON 2 MG/ML
8 INJECTION INTRAMUSCULAR; INTRAVENOUS
OUTPATIENT
Start: 2024-07-01

## 2024-06-24 RX ORDER — FAMOTIDINE 10 MG/ML
20 INJECTION, SOLUTION INTRAVENOUS
OUTPATIENT
Start: 2024-07-01

## 2024-06-24 RX ORDER — DIPHENHYDRAMINE HYDROCHLORIDE 50 MG/ML
50 INJECTION INTRAMUSCULAR; INTRAVENOUS ONCE
OUTPATIENT
Start: 2024-07-08 | End: 2024-07-08

## 2024-06-24 RX ORDER — ONDANSETRON 4 MG/1
4 TABLET, ORALLY DISINTEGRATING ORAL 3 TIMES DAILY PRN
Qty: 21 TABLET | Refills: 0 | Status: SHIPPED | OUTPATIENT
Start: 2024-06-24 | End: 2024-06-28

## 2024-06-24 RX ORDER — ALBUTEROL SULFATE 90 UG/1
4 AEROSOL, METERED RESPIRATORY (INHALATION) PRN
OUTPATIENT
Start: 2024-07-08

## 2024-06-24 RX ORDER — ONDANSETRON 4 MG/1
4 TABLET, ORALLY DISINTEGRATING ORAL EVERY 8 HOURS PRN
Qty: 90 TABLET | Refills: 0 | Status: SHIPPED | OUTPATIENT
Start: 2024-06-24 | End: 2024-07-24

## 2024-06-24 RX ORDER — LIDOCAINE AND PRILOCAINE 25; 25 MG/G; MG/G
CREAM TOPICAL
Qty: 30 G | Refills: 2 | Status: SHIPPED | OUTPATIENT
Start: 2024-06-24

## 2024-06-24 RX ORDER — DIPHENHYDRAMINE HYDROCHLORIDE 50 MG/ML
50 INJECTION INTRAMUSCULAR; INTRAVENOUS
OUTPATIENT
Start: 2024-07-01

## 2024-06-24 RX ORDER — HEPARIN 100 UNIT/ML
500 SYRINGE INTRAVENOUS PRN
Status: DISCONTINUED | OUTPATIENT
Start: 2024-06-24 | End: 2024-06-25 | Stop reason: HOSPADM

## 2024-06-24 RX ORDER — SODIUM CHLORIDE 9 MG/ML
5-250 INJECTION, SOLUTION INTRAVENOUS PRN
OUTPATIENT
Start: 2024-07-08

## 2024-06-24 RX ORDER — MEPERIDINE HYDROCHLORIDE 50 MG/ML
12.5 INJECTION INTRAMUSCULAR; INTRAVENOUS; SUBCUTANEOUS PRN
OUTPATIENT
Start: 2024-07-08

## 2024-06-24 RX ORDER — SODIUM CHLORIDE 0.9 % (FLUSH) 0.9 %
5-40 SYRINGE (ML) INJECTION PRN
OUTPATIENT
Start: 2024-07-01

## 2024-06-24 RX ORDER — SODIUM CHLORIDE 9 MG/ML
INJECTION, SOLUTION INTRAVENOUS CONTINUOUS
OUTPATIENT
Start: 2024-07-08

## 2024-06-24 RX ORDER — EPINEPHRINE 1 MG/ML
0.3 INJECTION, SOLUTION, CONCENTRATE INTRAVENOUS PRN
OUTPATIENT
Start: 2024-07-08

## 2024-06-24 RX ORDER — FAMOTIDINE 10 MG/ML
20 INJECTION, SOLUTION INTRAVENOUS ONCE
OUTPATIENT
Start: 2024-07-01 | End: 2024-07-01

## 2024-06-24 RX ORDER — FAMOTIDINE 10 MG/ML
20 INJECTION, SOLUTION INTRAVENOUS ONCE
Status: COMPLETED | OUTPATIENT
Start: 2024-06-24 | End: 2024-06-24

## 2024-06-24 RX ORDER — ONDANSETRON 2 MG/ML
8 INJECTION INTRAMUSCULAR; INTRAVENOUS
OUTPATIENT
Start: 2024-07-08

## 2024-06-24 RX ORDER — DIPHENHYDRAMINE HYDROCHLORIDE 50 MG/ML
50 INJECTION INTRAMUSCULAR; INTRAVENOUS
OUTPATIENT
Start: 2024-07-08

## 2024-06-24 RX ORDER — LIDOCAINE AND PRILOCAINE 25; 25 MG/G; MG/G
CREAM TOPICAL
Qty: 30 G | Refills: 1 | Status: SHIPPED | OUTPATIENT
Start: 2024-06-24 | End: 2024-06-28

## 2024-06-24 RX ORDER — HEPARIN SODIUM (PORCINE) LOCK FLUSH IV SOLN 100 UNIT/ML 100 UNIT/ML
500 SOLUTION INTRAVENOUS PRN
OUTPATIENT
Start: 2024-07-08

## 2024-06-24 RX ORDER — SODIUM CHLORIDE 0.9 % (FLUSH) 0.9 %
5-40 SYRINGE (ML) INJECTION PRN
Status: DISCONTINUED | OUTPATIENT
Start: 2024-06-24 | End: 2024-06-25 | Stop reason: HOSPADM

## 2024-06-24 RX ORDER — SODIUM CHLORIDE 9 MG/ML
5-250 INJECTION, SOLUTION INTRAVENOUS PRN
OUTPATIENT
Start: 2024-07-01

## 2024-06-24 RX ORDER — ERGOCALCIFEROL 1.25 MG/1
50000 CAPSULE ORAL WEEKLY
Qty: 12 CAPSULE | Refills: 1 | Status: SHIPPED | OUTPATIENT
Start: 2024-06-24 | End: 2024-06-28

## 2024-06-24 RX ORDER — SODIUM CHLORIDE 0.9 % (FLUSH) 0.9 %
5-40 SYRINGE (ML) INJECTION PRN
OUTPATIENT
Start: 2024-07-08

## 2024-06-24 RX ORDER — ALBUTEROL SULFATE 90 UG/1
4 AEROSOL, METERED RESPIRATORY (INHALATION) PRN
OUTPATIENT
Start: 2024-07-01

## 2024-06-24 RX ORDER — FAMOTIDINE 10 MG/ML
20 INJECTION, SOLUTION INTRAVENOUS
OUTPATIENT
Start: 2024-07-08

## 2024-06-24 RX ORDER — SODIUM CHLORIDE 9 MG/ML
INJECTION, SOLUTION INTRAVENOUS CONTINUOUS
OUTPATIENT
Start: 2024-07-01

## 2024-06-24 RX ORDER — EPINEPHRINE 1 MG/ML
0.3 INJECTION, SOLUTION, CONCENTRATE INTRAVENOUS PRN
OUTPATIENT
Start: 2024-07-01

## 2024-06-24 RX ORDER — FAMOTIDINE 10 MG/ML
20 INJECTION, SOLUTION INTRAVENOUS ONCE
OUTPATIENT
Start: 2024-07-08 | End: 2024-07-08

## 2024-06-24 RX ORDER — MEPERIDINE HYDROCHLORIDE 50 MG/ML
12.5 INJECTION INTRAMUSCULAR; INTRAVENOUS; SUBCUTANEOUS PRN
OUTPATIENT
Start: 2024-07-01

## 2024-06-24 RX ORDER — ACETAMINOPHEN 325 MG/1
650 TABLET ORAL
OUTPATIENT
Start: 2024-07-01

## 2024-06-24 RX ORDER — SODIUM CHLORIDE 9 MG/ML
5-250 INJECTION, SOLUTION INTRAVENOUS PRN
Status: DISCONTINUED | OUTPATIENT
Start: 2024-06-24 | End: 2024-06-25 | Stop reason: HOSPADM

## 2024-06-24 RX ORDER — ACETAMINOPHEN 325 MG/1
650 TABLET ORAL
OUTPATIENT
Start: 2024-07-08

## 2024-06-24 RX ORDER — DIPHENHYDRAMINE HYDROCHLORIDE 50 MG/ML
50 INJECTION INTRAMUSCULAR; INTRAVENOUS ONCE
OUTPATIENT
Start: 2024-07-01 | End: 2024-07-01

## 2024-06-24 RX ORDER — HEPARIN SODIUM (PORCINE) LOCK FLUSH IV SOLN 100 UNIT/ML 100 UNIT/ML
500 SOLUTION INTRAVENOUS PRN
OUTPATIENT
Start: 2024-07-01

## 2024-06-24 RX ORDER — DEXAMETHASONE SODIUM PHOSPHATE 10 MG/ML
10 INJECTION INTRAMUSCULAR; INTRAVENOUS ONCE
Status: COMPLETED | OUTPATIENT
Start: 2024-06-24 | End: 2024-06-24

## 2024-06-24 RX ORDER — DIPHENHYDRAMINE HYDROCHLORIDE 50 MG/ML
50 INJECTION INTRAMUSCULAR; INTRAVENOUS ONCE
Status: COMPLETED | OUTPATIENT
Start: 2024-06-24 | End: 2024-06-24

## 2024-06-24 RX ADMIN — HEPARIN 500 UNITS: 100 SYRINGE at 15:19

## 2024-06-24 RX ADMIN — DEXAMETHASONE SODIUM PHOSPHATE 10 MG: 10 INJECTION INTRAMUSCULAR; INTRAVENOUS at 11:45

## 2024-06-24 RX ADMIN — SODIUM CHLORIDE, PRESERVATIVE FREE 10 ML: 5 INJECTION INTRAVENOUS at 15:19

## 2024-06-24 RX ADMIN — SODIUM CHLORIDE 100 ML/HR: 9 INJECTION, SOLUTION INTRAVENOUS at 10:20

## 2024-06-24 RX ADMIN — SODIUM CHLORIDE 651 MG: 9 INJECTION, SOLUTION INTRAVENOUS at 12:19

## 2024-06-24 RX ADMIN — FAMOTIDINE 20 MG: 10 INJECTION INTRAVENOUS at 11:45

## 2024-06-24 RX ADMIN — SODIUM CHLORIDE, PRESERVATIVE FREE 10 ML: 5 INJECTION INTRAVENOUS at 10:20

## 2024-06-24 RX ADMIN — PACLITAXEL 156 MG: 6 INJECTION, SOLUTION INTRAVENOUS at 14:00

## 2024-06-24 RX ADMIN — DIPHENHYDRAMINE HYDROCHLORIDE 50 MG: 50 INJECTION INTRAMUSCULAR; INTRAVENOUS at 11:45

## 2024-06-24 NOTE — PROGRESS NOTES
Ann Hardy                                                                                                                  6/24/2024  MRN:   [id number]  YOB: 1965  PCP:                           Varun Centeno DO  Referring Physician: Dillan  Treating Physician Name: TOBI PARK MD      Reason for visit:  Discussed treatment plan  Reviewed labs.  Chief Complaint   Patient presents with    Follow-up    Chemotherapy     Occasional pain at lumpectomy site.     Current problems:  Right breast invasive cancer, HER2 positive ER negative, pathological stage T1b N0 M0-2/2024  Right breast DCIS, ER negative  Chronic arthritis  Medical comorbidities: Mitral valve prolapse, dyslipidemia, hypertension    Active and recent treatments:  S/p right lumpectomy with sentinel lymph node biopsy-5/2024  Weekly Taxol with Herceptin-6/2024    Interval history:  Patient presents to the clinic accompanied by her  to discuss further treatment plan.  Complaints of fullness and discomfort in the right breast denies any discharge from the nipple.  Denies noticing swollen glands.  Patient underwent echo which showed preserved ejection fraction.  Patient with port placement without any problems.  Scheduled to start treatment today with weekly Taxol and Herceptin    During this visit patient's allergy, social, medical, surgical history and medications were reviewed and updated.    Summary of Case/History:  Ann Hardy a 58 y.o.female is a patient with biopsy-proven invasive cancer of right breast, triple negative and DCIS, ER negative presents to the clinic to establish care and for further workup and evaluation.She underwent screening mammogram in January 2024 which showed group of calcification in the right breast which was indeterminate.  Patient subsequently underwent diagnostic mammogram of the right breast which showed clustered pleomorphic calcification in the right breast at the 11 o'clock

## 2024-06-24 NOTE — PROGRESS NOTES
Patient arrived ambulatory with  for cycle 1 day 1 treatment and physician visit.  Patient complains of intermittent right breast tenderness. No other complaints or concerns.  Port accessed; specimen sent.  Patient received chemotherapy education on 6/21.  Labs reviewed. Physician at bedside. Okay to treat.  Patient premedicated.  Trazimera infused with no sign adverse reaction;line flushed.  Taxol infused with no sign adverse reaction;line flushed.  Port flushed and heparinized with intact hollingsworth needle removed per protocol.  Patient ambulated off unit with  at discharge. AVS in hand.

## 2024-06-24 NOTE — PROGRESS NOTES
Spiritual Health Outpatient Oncology/Hematology Progress Note    Situation: Writer encountered Patient and Spouse in the treatment cubicle of the infusion clinic.    Assessment: Pt appeared to be resting. Pt opened her eyes when writer approached the treatment cubicle. Pt greeted writer. Pt appeared somewhat fatigued during the conversation. Spouse, who was sitting in the chair next to Pt, greeted writer. Pt affirmed that this is her first treatment. Pt reported how she was doing. Pt named her family and [employer] members as her support system. Pt has an appointment with [facility]. Pt shared about her treatment plan and how she will work around her work schedule in the fall. Pt and Spouse named their professions and how long they have been in them.     Intervention: Writer introduced herself and her services. Writer inquired about Pt's coping and needs. Writer explored Pt's sources of support and strength. Writer mentioned [facility] as a resource. Writer offered supportive presence and active listening. Writer offered words of support and encouragement.     Outcome: Patient named her sources of support and her intention to access resources. Pt and Spouse thanked writer for the visit.     Plan: Spiritual Health Services are available for Patient and Family by phone and/or in person.     06/24/24 1456   Encounter Summary   Encounter Overview/Reason Initial Encounter   Service Provided For Patient and family together   Referral/Consult From [facility]   Support System Family members;Children;Spouse;Religious/edgard community   Last Encounter  06/24/24   Complexity of Encounter Low   Begin Time 1435   End Time  1445   Total Time Calculated 10 min   Spiritual/Emotional needs   Type Spiritual Support   Assessment/Intervention/Outcome   Assessment Coping;Calm   Intervention Active listening;Explored/Affirmed feelings, thoughts, concerns;Explored Coping Skills/Resources;Sustaining Presence/Ministry of

## 2024-06-24 NOTE — TELEPHONE ENCOUNTER
AMINA HERE FOR FOLLOW UP & TX   Tx today   Rv in 2 weeks  MD VISIT: 7/8/24 @ 11:45AM TX @ 11AM   AVS PRINTED AND GIVEN ON EXIT

## 2024-06-27 ENCOUNTER — TELEPHONE (OUTPATIENT)
Dept: ONCOLOGY | Age: 59
End: 2024-06-27

## 2024-06-27 NOTE — TELEPHONE ENCOUNTER
Called pt to introduce self and see if she needs any assistance. Pt denies needing any assistance at this time.

## 2024-06-28 ENCOUNTER — OFFICE VISIT (OUTPATIENT)
Dept: PAIN MANAGEMENT | Age: 59
End: 2024-06-28
Payer: COMMERCIAL

## 2024-06-28 VITALS — BODY MASS INDEX: 27.78 KG/M2 | HEART RATE: 77 BPM | WEIGHT: 177 LBS | HEIGHT: 67 IN | OXYGEN SATURATION: 99 %

## 2024-06-28 DIAGNOSIS — Z79.899 MEDICATION MANAGEMENT: Chronic | ICD-10-CM

## 2024-06-28 DIAGNOSIS — M54.42 CHRONIC BILATERAL LOW BACK PAIN WITH BILATERAL SCIATICA: Primary | ICD-10-CM

## 2024-06-28 DIAGNOSIS — M54.41 CHRONIC BILATERAL LOW BACK PAIN WITH BILATERAL SCIATICA: Primary | ICD-10-CM

## 2024-06-28 DIAGNOSIS — M47.26 OTHER SPONDYLOSIS WITH RADICULOPATHY, LUMBAR REGION: ICD-10-CM

## 2024-06-28 DIAGNOSIS — G89.29 CHRONIC BILATERAL LOW BACK PAIN WITH BILATERAL SCIATICA: Primary | ICD-10-CM

## 2024-06-28 DIAGNOSIS — M51.36 DDD (DEGENERATIVE DISC DISEASE), LUMBAR: ICD-10-CM

## 2024-06-28 PROBLEM — M25.511 ACUTE PAIN OF RIGHT SHOULDER: Status: RESOLVED | Noted: 2024-03-29 | Resolved: 2024-06-28

## 2024-06-28 PROBLEM — N64.52 DISCHARGE FROM RIGHT NIPPLE: Status: RESOLVED | Noted: 2022-12-28 | Resolved: 2024-06-28

## 2024-06-28 PROBLEM — N63.10 BREAST MASS, RIGHT: Status: RESOLVED | Noted: 2022-12-28 | Resolved: 2024-06-28

## 2024-06-28 PROCEDURE — 99213 OFFICE O/P EST LOW 20 MIN: CPT | Performed by: NURSE PRACTITIONER

## 2024-06-28 RX ORDER — GABAPENTIN 400 MG/1
400 CAPSULE ORAL 3 TIMES DAILY
Qty: 270 CAPSULE | Refills: 0 | Status: SHIPPED | OUTPATIENT
Start: 2024-06-28 | End: 2024-09-26

## 2024-06-28 ASSESSMENT — ENCOUNTER SYMPTOMS
RESPIRATORY NEGATIVE: 1
COUGH: 0
CONSTIPATION: 0
GASTROINTESTINAL NEGATIVE: 1
SHORTNESS OF BREATH: 0
BACK PAIN: 0

## 2024-06-28 NOTE — PROGRESS NOTES
Chief Complaint   Patient presents with    Back Pain     Gabapentin       Medication Refill: Gabapentin    PMH     Pt c/o low back and right lumbar radicular pain hip buttock area occ numbness in right foot. She has tried LESI  therapy and chiropractic treatment with minimal relief prior to surgery  She is now s/p revision L5-S1 posterior instrumented fusion, 11/14/22 with Dr Owens.She had post op f/u 12/23 with CT myelogram completed 11/2023 showing Previous PLIF L5-S1 including bilateral iliac screws.  The right iliac screw is fractured and there is subtle lucency adjacent to nearly all of the remaining screws. No significant spinal stenosis in the lumbar spine.  Moderate bilateral neural foraminal stenosis L5-S1.  Pt completed #10/13 PT visits 4/2023 that was helpful and continues with HEP  Prescribed gabapentin that is helping with her pain and s/sx control    Recent dx of breast CA this month had lumpectomy and now in chemo     HPI  Back Pain  This is a chronic problem. The current episode started more than 1 year ago. The problem occurs intermittently. The problem is unchanged. The pain is present in the lumbar spine. The quality of the pain is described as aching. The pain is at a severity of 0/10. The patient is experiencing no pain. The pain is Worse during the day. The symptoms are aggravated by position (activity). Associated symptoms include numbness (right foot) and tingling. Pertinent negatives include no chest pain or fever. She has tried home exercises, heat and bed rest for the symptoms. The treatment provided significant relief.      Pain score Today:  0  Adverse effects (Constipation / Nausea / Sedation / sexual Dysfunction / others) : no  Mood: fair  Sleep pattern and quality: fair  Activity level: fair      Last dose taken  06/28/24 AM  OARRS report reviewed today: yes  ER/Hospitalizations/PCP visit related to pain since last visit:no   Any legal problems e.g. DUI etc.:No  Satisfied with

## 2024-07-01 ENCOUNTER — CLINICAL DOCUMENTATION (OUTPATIENT)
Facility: HOSPITAL | Age: 59
End: 2024-07-01

## 2024-07-01 ENCOUNTER — HOSPITAL ENCOUNTER (OUTPATIENT)
Facility: MEDICAL CENTER | Age: 59
End: 2024-07-01
Payer: COMMERCIAL

## 2024-07-01 ENCOUNTER — HOSPITAL ENCOUNTER (OUTPATIENT)
Dept: INFUSION THERAPY | Facility: MEDICAL CENTER | Age: 59
Discharge: HOME | End: 2024-07-01
Payer: COMMERCIAL

## 2024-07-01 VITALS
TEMPERATURE: 98.2 F | SYSTOLIC BLOOD PRESSURE: 152 MMHG | RESPIRATION RATE: 16 BRPM | HEART RATE: 85 BPM | DIASTOLIC BLOOD PRESSURE: 95 MMHG

## 2024-07-01 DIAGNOSIS — Z17.1 MALIGNANT NEOPLASM OF OVERLAPPING SITES OF RIGHT BREAST IN FEMALE, ESTROGEN RECEPTOR NEGATIVE (HCC): ICD-10-CM

## 2024-07-01 DIAGNOSIS — C50.811 MALIGNANT NEOPLASM OF OVERLAPPING SITES OF RIGHT BREAST IN FEMALE, ESTROGEN RECEPTOR NEGATIVE (HCC): ICD-10-CM

## 2024-07-01 DIAGNOSIS — Z17.1 MALIGNANT NEOPLASM OF OVERLAPPING SITES OF RIGHT BREAST IN FEMALE, ESTROGEN RECEPTOR NEGATIVE (HCC): Primary | ICD-10-CM

## 2024-07-01 DIAGNOSIS — C50.811 MALIGNANT NEOPLASM OF OVERLAPPING SITES OF RIGHT BREAST IN FEMALE, ESTROGEN RECEPTOR NEGATIVE (HCC): Primary | ICD-10-CM

## 2024-07-01 LAB
ALBUMIN SERPL-MCNC: 3.9 G/DL (ref 3.5–5.2)
ALP SERPL-CCNC: 75 U/L (ref 35–104)
ALT SERPL-CCNC: 24 U/L (ref 5–33)
ANION GAP SERPL CALCULATED.3IONS-SCNC: 9 MMOL/L (ref 9–17)
AST SERPL-CCNC: 23 U/L
BASOPHILS # BLD: <0.03 K/UL (ref 0–0.2)
BASOPHILS NFR BLD: 1 % (ref 0–2)
BILIRUB SERPL-MCNC: 0.4 MG/DL (ref 0.3–1.2)
BUN SERPL-MCNC: 14 MG/DL (ref 6–20)
BUN/CREAT SERPL: 20 (ref 9–20)
CALCIUM SERPL-MCNC: 9 MG/DL (ref 8.6–10.4)
CHLORIDE SERPL-SCNC: 104 MMOL/L (ref 98–107)
CO2 SERPL-SCNC: 25 MMOL/L (ref 20–31)
CREAT SERPL-MCNC: 0.7 MG/DL (ref 0.5–0.9)
EOSINOPHIL # BLD: 0.06 K/UL (ref 0–0.44)
EOSINOPHILS RELATIVE PERCENT: 2 % (ref 1–4)
ERYTHROCYTE [DISTWIDTH] IN BLOOD BY AUTOMATED COUNT: 14.7 % (ref 11.8–14.4)
GFR, ESTIMATED: >90 ML/MIN/1.73M2
GLUCOSE SERPL-MCNC: 120 MG/DL (ref 70–99)
HCT VFR BLD AUTO: 34.2 % (ref 36.3–47.1)
HGB BLD-MCNC: 11 G/DL (ref 11.9–15.1)
IMM GRANULOCYTES # BLD AUTO: 0.01 K/UL (ref 0–0.3)
IMM GRANULOCYTES NFR BLD: 0 %
LYMPHOCYTES NFR BLD: 0.9 K/UL (ref 1.1–3.7)
LYMPHOCYTES RELATIVE PERCENT: 35 % (ref 24–43)
MCH RBC QN AUTO: 28.9 PG (ref 25.2–33.5)
MCHC RBC AUTO-ENTMCNC: 32.2 G/DL (ref 28.4–34.8)
MCV RBC AUTO: 89.8 FL (ref 82.6–102.9)
MONOCYTES NFR BLD: 0.16 K/UL (ref 0.1–1.2)
MONOCYTES NFR BLD: 6 % (ref 3–12)
NEUTROPHILS NFR BLD: 55 % (ref 36–65)
NEUTS SEG NFR BLD: 1.43 K/UL (ref 1.5–8.1)
PLATELET # BLD AUTO: 286 K/UL (ref 138–453)
PMV BLD AUTO: 9.4 FL (ref 8.1–13.5)
POTASSIUM SERPL-SCNC: 3.6 MMOL/L (ref 3.7–5.3)
PROT SERPL-MCNC: 7.1 G/DL (ref 6.4–8.3)
RBC # BLD AUTO: 3.81 M/UL (ref 3.95–5.11)
RBC # BLD: ABNORMAL 10*6/UL
SODIUM SERPL-SCNC: 138 MMOL/L (ref 135–144)
WBC OTHER # BLD: 2.6 K/UL (ref 3.5–11.3)

## 2024-07-01 PROCEDURE — 6360000002 HC RX W HCPCS: Performed by: INTERNAL MEDICINE

## 2024-07-01 PROCEDURE — 96375 TX/PRO/DX INJ NEW DRUG ADDON: CPT

## 2024-07-01 PROCEDURE — 2500000003 HC RX 250 WO HCPCS: Performed by: INTERNAL MEDICINE

## 2024-07-01 PROCEDURE — 80053 COMPREHEN METABOLIC PANEL: CPT

## 2024-07-01 PROCEDURE — 36591 DRAW BLOOD OFF VENOUS DEVICE: CPT

## 2024-07-01 PROCEDURE — 2580000003 HC RX 258: Performed by: INTERNAL MEDICINE

## 2024-07-01 PROCEDURE — 85025 COMPLETE CBC W/AUTO DIFF WBC: CPT

## 2024-07-01 PROCEDURE — 96413 CHEMO IV INFUSION 1 HR: CPT

## 2024-07-01 RX ORDER — SODIUM CHLORIDE 9 MG/ML
5-250 INJECTION, SOLUTION INTRAVENOUS PRN
Status: DISCONTINUED | OUTPATIENT
Start: 2024-07-01 | End: 2024-07-02 | Stop reason: HOSPADM

## 2024-07-01 RX ORDER — DEXAMETHASONE SODIUM PHOSPHATE 10 MG/ML
10 INJECTION INTRAMUSCULAR; INTRAVENOUS ONCE
Status: COMPLETED | OUTPATIENT
Start: 2024-07-01 | End: 2024-07-01

## 2024-07-01 RX ORDER — FAMOTIDINE 10 MG/ML
20 INJECTION, SOLUTION INTRAVENOUS ONCE
Status: COMPLETED | OUTPATIENT
Start: 2024-07-01 | End: 2024-07-01

## 2024-07-01 RX ORDER — DIPHENHYDRAMINE HYDROCHLORIDE 50 MG/ML
50 INJECTION INTRAMUSCULAR; INTRAVENOUS ONCE
Status: COMPLETED | OUTPATIENT
Start: 2024-07-01 | End: 2024-07-01

## 2024-07-01 RX ORDER — HEPARIN 100 UNIT/ML
500 SYRINGE INTRAVENOUS PRN
Status: DISCONTINUED | OUTPATIENT
Start: 2024-07-01 | End: 2024-07-02 | Stop reason: HOSPADM

## 2024-07-01 RX ORDER — SODIUM CHLORIDE 0.9 % (FLUSH) 0.9 %
5-40 SYRINGE (ML) INJECTION PRN
Status: DISCONTINUED | OUTPATIENT
Start: 2024-07-01 | End: 2024-07-02 | Stop reason: HOSPADM

## 2024-07-01 RX ADMIN — FAMOTIDINE 20 MG: 10 INJECTION, SOLUTION INTRAVENOUS at 12:15

## 2024-07-01 RX ADMIN — PACLITAXEL 156 MG: 6 INJECTION, SOLUTION INTRAVENOUS at 13:01

## 2024-07-01 RX ADMIN — HEPARIN 500 UNITS: 100 SYRINGE at 14:29

## 2024-07-01 RX ADMIN — DIPHENHYDRAMINE HYDROCHLORIDE 50 MG: 50 INJECTION INTRAMUSCULAR; INTRAVENOUS at 12:14

## 2024-07-01 RX ADMIN — SODIUM CHLORIDE 20 ML/HR: 9 INJECTION, SOLUTION INTRAVENOUS at 11:29

## 2024-07-01 RX ADMIN — SODIUM CHLORIDE, PRESERVATIVE FREE 10 ML: 5 INJECTION INTRAVENOUS at 14:29

## 2024-07-01 RX ADMIN — SODIUM CHLORIDE, PRESERVATIVE FREE 10 ML: 5 INJECTION INTRAVENOUS at 11:19

## 2024-07-01 RX ADMIN — DEXAMETHASONE SODIUM PHOSPHATE 10 MG: 10 INJECTION INTRAMUSCULAR; INTRAVENOUS at 12:14

## 2024-07-01 NOTE — PROGRESS NOTES
Patient Assistance    Met with patient and spouse. Explained Trazimera Savings Program, obtained household information and signature. Enrollment form faxed to [employer] [facility].              Writer confirmed patient received a phone call from [employer]; referral sent recently.

## 2024-07-01 NOTE — PROGRESS NOTES
Patient arrive  for cycle 8 day 1 treatment .  Denies complaint or concern.  Vitals obtained    Port accessed; specimen sent.  Labs reviewed.  Patient premedicated.  Taxol infusion begin slowly with no adverse reaction; then increased to infuse over 1 hour; completed with no adverse reaction; line flushed.  Port flushed and heparinized with intact hollingsworth needle removed per protocol.  Patient  discharge.

## 2024-07-05 RX ORDER — ONDANSETRON 2 MG/ML
8 INJECTION INTRAMUSCULAR; INTRAVENOUS
Status: CANCELLED | OUTPATIENT
Start: 2024-07-08

## 2024-07-05 RX ORDER — FAMOTIDINE 10 MG/ML
20 INJECTION, SOLUTION INTRAVENOUS
Status: CANCELLED | OUTPATIENT
Start: 2024-07-08

## 2024-07-05 RX ORDER — SODIUM CHLORIDE 9 MG/ML
5-250 INJECTION, SOLUTION INTRAVENOUS PRN
Status: CANCELLED | OUTPATIENT
Start: 2024-07-08

## 2024-07-05 RX ORDER — ACETAMINOPHEN 325 MG/1
650 TABLET ORAL
Status: CANCELLED | OUTPATIENT
Start: 2024-07-08

## 2024-07-05 RX ORDER — MEPERIDINE HYDROCHLORIDE 50 MG/ML
12.5 INJECTION INTRAMUSCULAR; INTRAVENOUS; SUBCUTANEOUS PRN
Status: CANCELLED | OUTPATIENT
Start: 2024-07-08

## 2024-07-05 RX ORDER — DIPHENHYDRAMINE HYDROCHLORIDE 50 MG/ML
50 INJECTION INTRAMUSCULAR; INTRAVENOUS
Status: CANCELLED | OUTPATIENT
Start: 2024-07-08

## 2024-07-05 RX ORDER — EPINEPHRINE 1 MG/ML
0.3 INJECTION, SOLUTION INTRAMUSCULAR; SUBCUTANEOUS PRN
Status: CANCELLED | OUTPATIENT
Start: 2024-07-08

## 2024-07-05 RX ORDER — SODIUM CHLORIDE 9 MG/ML
INJECTION, SOLUTION INTRAVENOUS CONTINUOUS
Status: CANCELLED | OUTPATIENT
Start: 2024-07-08

## 2024-07-05 RX ORDER — ALBUTEROL SULFATE 90 UG/1
4 AEROSOL, METERED RESPIRATORY (INHALATION) PRN
Status: CANCELLED | OUTPATIENT
Start: 2024-07-08

## 2024-07-08 ENCOUNTER — OFFICE VISIT (OUTPATIENT)
Dept: ONCOLOGY | Age: 59
End: 2024-07-08
Payer: COMMERCIAL

## 2024-07-08 ENCOUNTER — HOSPITAL ENCOUNTER (OUTPATIENT)
Dept: INFUSION THERAPY | Facility: MEDICAL CENTER | Age: 59
Discharge: HOME | End: 2024-07-08
Payer: COMMERCIAL

## 2024-07-08 ENCOUNTER — TELEPHONE (OUTPATIENT)
Dept: ONCOLOGY | Age: 59
End: 2024-07-08

## 2024-07-08 VITALS
OXYGEN SATURATION: 100 % | RESPIRATION RATE: 18 BRPM | SYSTOLIC BLOOD PRESSURE: 127 MMHG | TEMPERATURE: 98.2 F | DIASTOLIC BLOOD PRESSURE: 82 MMHG | WEIGHT: 180.1 LBS | BODY MASS INDEX: 28.21 KG/M2 | HEART RATE: 75 BPM

## 2024-07-08 DIAGNOSIS — C50.811 MALIGNANT NEOPLASM OF OVERLAPPING SITES OF RIGHT BREAST IN FEMALE, ESTROGEN RECEPTOR NEGATIVE (HCC): Primary | ICD-10-CM

## 2024-07-08 DIAGNOSIS — T45.1X5A NEUROPATHY DUE TO CHEMOTHERAPEUTIC DRUG (HCC): ICD-10-CM

## 2024-07-08 DIAGNOSIS — Z17.1 MALIGNANT NEOPLASM OF OVERLAPPING SITES OF RIGHT BREAST IN FEMALE, ESTROGEN RECEPTOR NEGATIVE (HCC): Primary | ICD-10-CM

## 2024-07-08 DIAGNOSIS — Z51.11 CHEMOTHERAPY MANAGEMENT, ENCOUNTER FOR: ICD-10-CM

## 2024-07-08 DIAGNOSIS — K59.00 CONSTIPATION, UNSPECIFIED CONSTIPATION TYPE: ICD-10-CM

## 2024-07-08 DIAGNOSIS — G62.0 NEUROPATHY DUE TO CHEMOTHERAPEUTIC DRUG (HCC): ICD-10-CM

## 2024-07-08 LAB
ALBUMIN SERPL-MCNC: 4.1 G/DL (ref 3.5–5.2)
ALP SERPL-CCNC: 84 U/L (ref 35–104)
ALT SERPL-CCNC: 38 U/L (ref 5–33)
ANION GAP SERPL CALCULATED.3IONS-SCNC: 11 MMOL/L (ref 9–17)
AST SERPL-CCNC: 20 U/L
BASOPHILS # BLD: 0.03 K/UL (ref 0–0.2)
BASOPHILS NFR BLD: 1 % (ref 0–2)
BILIRUB SERPL-MCNC: 0.3 MG/DL (ref 0.3–1.2)
BUN SERPL-MCNC: 15 MG/DL (ref 6–20)
BUN/CREAT SERPL: 21 (ref 9–20)
CALCIUM SERPL-MCNC: 9 MG/DL (ref 8.6–10.4)
CHLORIDE SERPL-SCNC: 103 MMOL/L (ref 98–107)
CO2 SERPL-SCNC: 25 MMOL/L (ref 20–31)
CREAT SERPL-MCNC: 0.7 MG/DL (ref 0.5–0.9)
EOSINOPHIL # BLD: 0.09 K/UL (ref 0–0.44)
EOSINOPHILS RELATIVE PERCENT: 2 % (ref 1–4)
ERYTHROCYTE [DISTWIDTH] IN BLOOD BY AUTOMATED COUNT: 14.8 % (ref 11.8–14.4)
GFR, ESTIMATED: >90 ML/MIN/1.73M2
GLUCOSE SERPL-MCNC: 84 MG/DL (ref 70–99)
HCT VFR BLD AUTO: 33.4 % (ref 36.3–47.1)
HGB BLD-MCNC: 10.7 G/DL (ref 11.9–15.1)
IMM GRANULOCYTES # BLD AUTO: 0.01 K/UL (ref 0–0.3)
IMM GRANULOCYTES NFR BLD: 0 %
LYMPHOCYTES NFR BLD: 1.46 K/UL (ref 1.1–3.7)
LYMPHOCYTES RELATIVE PERCENT: 37 % (ref 24–43)
MCH RBC QN AUTO: 28.2 PG (ref 25.2–33.5)
MCHC RBC AUTO-ENTMCNC: 32 G/DL (ref 28.4–34.8)
MCV RBC AUTO: 88.1 FL (ref 82.6–102.9)
MONOCYTES NFR BLD: 0.3 K/UL (ref 0.1–1.2)
MONOCYTES NFR BLD: 8 % (ref 3–12)
NEUTROPHILS NFR BLD: 52 % (ref 36–65)
NEUTS SEG NFR BLD: 2.03 K/UL (ref 1.5–8.1)
NRBC BLD-RTO: 0 PER 100 WBC
PLATELET # BLD AUTO: 304 K/UL (ref 138–453)
PMV BLD AUTO: 9.3 FL (ref 8.1–13.5)
POTASSIUM SERPL-SCNC: 3.9 MMOL/L (ref 3.7–5.3)
PROT SERPL-MCNC: 7.5 G/DL (ref 6.4–8.3)
RBC # BLD AUTO: 3.79 M/UL (ref 3.95–5.11)
RBC # BLD: ABNORMAL 10*6/UL
SODIUM SERPL-SCNC: 139 MMOL/L (ref 135–144)
WBC OTHER # BLD: 3.9 K/UL (ref 3.5–11.3)

## 2024-07-08 PROCEDURE — 96409 CHEMO IV PUSH SNGL DRUG: CPT

## 2024-07-08 PROCEDURE — 3074F SYST BP LT 130 MM HG: CPT | Performed by: INTERNAL MEDICINE

## 2024-07-08 PROCEDURE — 96413 CHEMO IV INFUSION 1 HR: CPT

## 2024-07-08 PROCEDURE — 85025 COMPLETE CBC W/AUTO DIFF WBC: CPT

## 2024-07-08 PROCEDURE — 6360000002 HC RX W HCPCS: Performed by: INTERNAL MEDICINE

## 2024-07-08 PROCEDURE — 80053 COMPREHEN METABOLIC PANEL: CPT

## 2024-07-08 PROCEDURE — 99214 OFFICE O/P EST MOD 30 MIN: CPT | Performed by: INTERNAL MEDICINE

## 2024-07-08 PROCEDURE — 36591 DRAW BLOOD OFF VENOUS DEVICE: CPT

## 2024-07-08 PROCEDURE — 2500000003 HC RX 250 WO HCPCS: Performed by: INTERNAL MEDICINE

## 2024-07-08 PROCEDURE — 2580000003 HC RX 258: Performed by: INTERNAL MEDICINE

## 2024-07-08 PROCEDURE — 99211 OFF/OP EST MAY X REQ PHY/QHP: CPT

## 2024-07-08 PROCEDURE — 96375 TX/PRO/DX INJ NEW DRUG ADDON: CPT

## 2024-07-08 PROCEDURE — 3079F DIAST BP 80-89 MM HG: CPT | Performed by: INTERNAL MEDICINE

## 2024-07-08 RX ORDER — ACETAMINOPHEN 325 MG/1
650 TABLET ORAL
OUTPATIENT
Start: 2024-07-22

## 2024-07-08 RX ORDER — HEPARIN SODIUM (PORCINE) LOCK FLUSH IV SOLN 100 UNIT/ML 100 UNIT/ML
500 SOLUTION INTRAVENOUS PRN
OUTPATIENT
Start: 2024-07-15

## 2024-07-08 RX ORDER — SODIUM CHLORIDE 9 MG/ML
5-250 INJECTION, SOLUTION INTRAVENOUS PRN
OUTPATIENT
Start: 2024-07-29

## 2024-07-08 RX ORDER — ERGOCALCIFEROL 1.25 MG/1
50000 CAPSULE ORAL WEEKLY
Qty: 12 CAPSULE | Refills: 1 | Status: SHIPPED | OUTPATIENT
Start: 2024-07-08 | End: 2024-07-12 | Stop reason: SDUPTHER

## 2024-07-08 RX ORDER — ACETAMINOPHEN 325 MG/1
650 TABLET ORAL
OUTPATIENT
Start: 2024-07-29

## 2024-07-08 RX ORDER — MEPERIDINE HYDROCHLORIDE 50 MG/ML
12.5 INJECTION INTRAMUSCULAR; INTRAVENOUS; SUBCUTANEOUS PRN
OUTPATIENT
Start: 2024-07-15

## 2024-07-08 RX ORDER — FAMOTIDINE 10 MG/ML
20 INJECTION, SOLUTION INTRAVENOUS ONCE
OUTPATIENT
Start: 2024-07-15 | End: 2024-07-15

## 2024-07-08 RX ORDER — SODIUM CHLORIDE 0.9 % (FLUSH) 0.9 %
5-40 SYRINGE (ML) INJECTION PRN
OUTPATIENT
Start: 2024-07-29

## 2024-07-08 RX ORDER — SODIUM CHLORIDE 9 MG/ML
INJECTION, SOLUTION INTRAVENOUS CONTINUOUS
OUTPATIENT
Start: 2024-07-15

## 2024-07-08 RX ORDER — ONDANSETRON 2 MG/ML
8 INJECTION INTRAMUSCULAR; INTRAVENOUS
OUTPATIENT
Start: 2024-07-22

## 2024-07-08 RX ORDER — ONDANSETRON 2 MG/ML
8 INJECTION INTRAMUSCULAR; INTRAVENOUS
OUTPATIENT
Start: 2024-07-29

## 2024-07-08 RX ORDER — FAMOTIDINE 10 MG/ML
20 INJECTION, SOLUTION INTRAVENOUS
OUTPATIENT
Start: 2024-07-29

## 2024-07-08 RX ORDER — EPINEPHRINE 1 MG/ML
0.3 INJECTION, SOLUTION, CONCENTRATE INTRAVENOUS PRN
OUTPATIENT
Start: 2024-07-22

## 2024-07-08 RX ORDER — DIPHENHYDRAMINE HYDROCHLORIDE 50 MG/ML
50 INJECTION INTRAMUSCULAR; INTRAVENOUS
OUTPATIENT
Start: 2024-07-29

## 2024-07-08 RX ORDER — ALBUTEROL SULFATE 90 UG/1
4 AEROSOL, METERED RESPIRATORY (INHALATION) PRN
OUTPATIENT
Start: 2024-07-29

## 2024-07-08 RX ORDER — SODIUM CHLORIDE 9 MG/ML
5-250 INJECTION, SOLUTION INTRAVENOUS PRN
OUTPATIENT
Start: 2024-07-22

## 2024-07-08 RX ORDER — HEPARIN 100 UNIT/ML
500 SYRINGE INTRAVENOUS PRN
Status: DISCONTINUED | OUTPATIENT
Start: 2024-07-08 | End: 2024-07-09 | Stop reason: HOSPADM

## 2024-07-08 RX ORDER — SODIUM CHLORIDE 9 MG/ML
5-250 INJECTION, SOLUTION INTRAVENOUS PRN
Status: DISCONTINUED | OUTPATIENT
Start: 2024-07-08 | End: 2024-07-09 | Stop reason: HOSPADM

## 2024-07-08 RX ORDER — SENNA AND DOCUSATE SODIUM 50; 8.6 MG/1; MG/1
2 TABLET, FILM COATED ORAL DAILY
Qty: 60 TABLET | Refills: 0 | Status: SHIPPED | OUTPATIENT
Start: 2024-07-08

## 2024-07-08 RX ORDER — FAMOTIDINE 10 MG/ML
20 INJECTION, SOLUTION INTRAVENOUS ONCE
OUTPATIENT
Start: 2024-07-22 | End: 2024-07-22

## 2024-07-08 RX ORDER — DIPHENHYDRAMINE HYDROCHLORIDE 50 MG/ML
50 INJECTION INTRAMUSCULAR; INTRAVENOUS ONCE
OUTPATIENT
Start: 2024-07-22 | End: 2024-07-22

## 2024-07-08 RX ORDER — DIPHENHYDRAMINE HYDROCHLORIDE 50 MG/ML
50 INJECTION INTRAMUSCULAR; INTRAVENOUS ONCE
OUTPATIENT
Start: 2024-07-29 | End: 2024-07-29

## 2024-07-08 RX ORDER — MEPERIDINE HYDROCHLORIDE 50 MG/ML
12.5 INJECTION INTRAMUSCULAR; INTRAVENOUS; SUBCUTANEOUS PRN
OUTPATIENT
Start: 2024-07-29

## 2024-07-08 RX ORDER — FAMOTIDINE 10 MG/ML
20 INJECTION, SOLUTION INTRAVENOUS ONCE
Status: COMPLETED | OUTPATIENT
Start: 2024-07-08 | End: 2024-07-08

## 2024-07-08 RX ORDER — FAMOTIDINE 10 MG/ML
20 INJECTION, SOLUTION INTRAVENOUS
OUTPATIENT
Start: 2024-07-22

## 2024-07-08 RX ORDER — DEXAMETHASONE 4 MG/1
TABLET ORAL
Qty: 60 TABLET | Refills: 1 | Status: SHIPPED | OUTPATIENT
Start: 2024-07-08

## 2024-07-08 RX ORDER — ALBUTEROL SULFATE 90 UG/1
4 AEROSOL, METERED RESPIRATORY (INHALATION) PRN
OUTPATIENT
Start: 2024-07-22

## 2024-07-08 RX ORDER — ALBUTEROL SULFATE 90 UG/1
4 AEROSOL, METERED RESPIRATORY (INHALATION) PRN
OUTPATIENT
Start: 2024-07-15

## 2024-07-08 RX ORDER — SODIUM CHLORIDE 9 MG/ML
5-250 INJECTION, SOLUTION INTRAVENOUS PRN
OUTPATIENT
Start: 2024-07-15

## 2024-07-08 RX ORDER — SODIUM CHLORIDE 0.9 % (FLUSH) 0.9 %
5-40 SYRINGE (ML) INJECTION PRN
OUTPATIENT
Start: 2024-07-22

## 2024-07-08 RX ORDER — EPINEPHRINE 1 MG/ML
0.3 INJECTION, SOLUTION, CONCENTRATE INTRAVENOUS PRN
OUTPATIENT
Start: 2024-07-29

## 2024-07-08 RX ORDER — SODIUM CHLORIDE 0.9 % (FLUSH) 0.9 %
5-40 SYRINGE (ML) INJECTION PRN
OUTPATIENT
Start: 2024-07-15

## 2024-07-08 RX ORDER — FAMOTIDINE 10 MG/ML
20 INJECTION, SOLUTION INTRAVENOUS
OUTPATIENT
Start: 2024-07-15

## 2024-07-08 RX ORDER — EPINEPHRINE 1 MG/ML
0.3 INJECTION, SOLUTION, CONCENTRATE INTRAVENOUS PRN
OUTPATIENT
Start: 2024-07-15

## 2024-07-08 RX ORDER — SODIUM CHLORIDE 9 MG/ML
INJECTION, SOLUTION INTRAVENOUS CONTINUOUS
OUTPATIENT
Start: 2024-07-29

## 2024-07-08 RX ORDER — HEPARIN SODIUM (PORCINE) LOCK FLUSH IV SOLN 100 UNIT/ML 100 UNIT/ML
500 SOLUTION INTRAVENOUS PRN
OUTPATIENT
Start: 2024-07-22

## 2024-07-08 RX ORDER — SODIUM CHLORIDE 0.9 % (FLUSH) 0.9 %
5-40 SYRINGE (ML) INJECTION PRN
Status: DISCONTINUED | OUTPATIENT
Start: 2024-07-08 | End: 2024-07-09 | Stop reason: HOSPADM

## 2024-07-08 RX ORDER — ONDANSETRON 2 MG/ML
8 INJECTION INTRAMUSCULAR; INTRAVENOUS
OUTPATIENT
Start: 2024-07-15

## 2024-07-08 RX ORDER — DIPHENHYDRAMINE HYDROCHLORIDE 50 MG/ML
50 INJECTION INTRAMUSCULAR; INTRAVENOUS ONCE
Status: COMPLETED | OUTPATIENT
Start: 2024-07-08 | End: 2024-07-08

## 2024-07-08 RX ORDER — DIPHENHYDRAMINE HYDROCHLORIDE 50 MG/ML
50 INJECTION INTRAMUSCULAR; INTRAVENOUS ONCE
OUTPATIENT
Start: 2024-07-15 | End: 2024-07-15

## 2024-07-08 RX ORDER — FAMOTIDINE 10 MG/ML
20 INJECTION, SOLUTION INTRAVENOUS ONCE
OUTPATIENT
Start: 2024-07-29 | End: 2024-07-29

## 2024-07-08 RX ORDER — HEPARIN SODIUM (PORCINE) LOCK FLUSH IV SOLN 100 UNIT/ML 100 UNIT/ML
500 SOLUTION INTRAVENOUS PRN
OUTPATIENT
Start: 2024-07-29

## 2024-07-08 RX ORDER — DIPHENHYDRAMINE HYDROCHLORIDE 50 MG/ML
50 INJECTION INTRAMUSCULAR; INTRAVENOUS
OUTPATIENT
Start: 2024-07-22

## 2024-07-08 RX ORDER — MEPERIDINE HYDROCHLORIDE 50 MG/ML
12.5 INJECTION INTRAMUSCULAR; INTRAVENOUS; SUBCUTANEOUS PRN
OUTPATIENT
Start: 2024-07-22

## 2024-07-08 RX ORDER — VITAMIN B COMPLEX
1 CAPSULE ORAL DAILY
Qty: 30 CAPSULE | Refills: 3 | Status: SHIPPED | OUTPATIENT
Start: 2024-07-08

## 2024-07-08 RX ORDER — ACETAMINOPHEN 325 MG/1
650 TABLET ORAL
OUTPATIENT
Start: 2024-07-15

## 2024-07-08 RX ORDER — DIPHENHYDRAMINE HYDROCHLORIDE 50 MG/ML
50 INJECTION INTRAMUSCULAR; INTRAVENOUS
OUTPATIENT
Start: 2024-07-15

## 2024-07-08 RX ORDER — SODIUM CHLORIDE 9 MG/ML
INJECTION, SOLUTION INTRAVENOUS CONTINUOUS
OUTPATIENT
Start: 2024-07-22

## 2024-07-08 RX ORDER — DEXAMETHASONE SODIUM PHOSPHATE 10 MG/ML
10 INJECTION INTRAMUSCULAR; INTRAVENOUS ONCE
Status: COMPLETED | OUTPATIENT
Start: 2024-07-08 | End: 2024-07-08

## 2024-07-08 RX ADMIN — DEXAMETHASONE SODIUM PHOSPHATE 10 MG: 10 INJECTION INTRAMUSCULAR; INTRAVENOUS at 12:33

## 2024-07-08 RX ADMIN — SODIUM CHLORIDE, PRESERVATIVE FREE 10 ML: 5 INJECTION INTRAVENOUS at 11:30

## 2024-07-08 RX ADMIN — SODIUM CHLORIDE 25 ML/HR: 9 INJECTION, SOLUTION INTRAVENOUS at 11:34

## 2024-07-08 RX ADMIN — PACLITAXEL 156 MG: 6 INJECTION, SOLUTION INTRAVENOUS at 13:09

## 2024-07-08 RX ADMIN — SODIUM CHLORIDE, PRESERVATIVE FREE 10 ML: 5 INJECTION INTRAVENOUS at 14:39

## 2024-07-08 RX ADMIN — DIPHENHYDRAMINE HYDROCHLORIDE 50 MG: 50 INJECTION INTRAMUSCULAR; INTRAVENOUS at 12:32

## 2024-07-08 RX ADMIN — FAMOTIDINE 20 MG: 10 INJECTION, SOLUTION INTRAVENOUS at 12:33

## 2024-07-08 RX ADMIN — HEPARIN 500 UNITS: 100 SYRINGE at 14:39

## 2024-07-08 NOTE — TELEPHONE ENCOUNTER
Name: Ann Hadry  : 1965  MRN: [id number]    Oncology Navigation Follow-Up Note    Contact Type:  Telephone    Notes: Pt called and stated she has not heard from Social work about assistance in covering the cost of her prosthetic bra. Writer reviewed chart and noted Faviola spoke with pt on  and pt denied needs. Pt was unaware that Faviola would be the person to discuss possible grants to help cover costs. Writer will update Faviola and request she contact pt again.     Pt otherwise denies other needs at this time.       Electronically signed by Delmy Calderon RN on 2024 at 9:08 AM

## 2024-07-08 NOTE — PROGRESS NOTES
Patient here for C1 D15 Taxol & MD visit  Arrives ambulatory with spouse  Complaints of new itching & had tingling to both feet after last tx - also c/o constipation  Suggested stool softeners   Labs drawn per port & reviewed  Pt seen by Dr Grimaldo & orders to tx  Pt premedicated   Taxol infused without incident & line flushed  Port flushed with brisk blood return & heparinized with intact Wilder needle removed per protocol   Pt discharged ambulatory with spouse  AVS per

## 2024-07-08 NOTE — TELEPHONE ENCOUNTER
AMINA HERE FOR FOLLOW UP & TX   Rv in 2 weeks with tx   Tx today   MD VISIT: 7/22/24 @ 11:45AM TX @ 11AM   AVS PRINTED AND GIVEN ON EXIT

## 2024-07-08 NOTE — PROGRESS NOTES
(Treatment Plan Recorded) IntraVENous Once Jourdan Grimaldo MD         Allergies:   Patient has no known allergies.    Review of Systems:    Constitutional: No fever or chills. No night sweats, no weight loss   Eyes: No eye discharge, double vision, or eye pain   HEENT: negative for sore mouth, sore throat, hoarseness and voice change   Respiratory: negative for cough , sputum, dyspnea, wheezing, hemoptysis, chest pain   Cardiovascular: negative for chest pain, dyspnea, palpitations, orthopnea, PND   Gastrointestinal: negative for nausea, vomiting, diarrhea, constipation, abdominal pain, Dysphagia, hematemesis and hematochezia   Genitourinary: negative for frequency, dysuria, nocturia, urinary incontinence, and hematuria   Integument: negative for rash, skin lesions, bruises.  Positive for itching  Hematologic/Lymphatic: negative for easy bruising, bleeding, lymphadenopathy, or petechiae   Endocrine: negative for heat or cold intolerance,weight changes, change in bowel habits and hair loss   Musculoskeletal: negative for myalgias, arthralgias, pain, joint swelling,and bone pain   Neurological: negative for headaches, dizziness, seizures, weakness, numbness    Physical Exam:  Vitals: /82 (Site: Left Upper Arm, Position: Sitting)   Pulse 75   Temp 98.2 °F (36.8 °C) (Oral)   Resp 18   Wt 81.7 kg (180 lb 1.6 oz)   SpO2 100%   BMI 28.21 kg/m²   General appearance - well appearing, no in pain or distress  Mental status - AAO X3  Eyes - pupils equal and reactive, extraocular eye movements intact  Mouth - mucous membranes moist, pharynx normal without lesions  Neck - supple, no significant adenopathy  Lymphatics - no palpable lymphadenopathy, no hepatosplenomegaly  Chest - clear to auscultation, no wheezes, rales or rhonchi, symmetric air entry  Heart - normal rate, regular rhythm, normal S1, S2, no murmurs  Abdomen - soft, nontender, nondistended, no masses or organomegaly  Neurological - alert, oriented, normal

## 2024-07-09 ENCOUNTER — TELEPHONE (OUTPATIENT)
Dept: ONCOLOGY | Age: 59
End: 2024-07-09

## 2024-07-09 NOTE — TELEPHONE ENCOUNTER
Pt wondering about possible grants that assist with prosthesis bras. SW currently looking into possible assistance options.

## 2024-07-12 ENCOUNTER — CLINICAL DOCUMENTATION (OUTPATIENT)
Dept: ONCOLOGY | Age: 59
End: 2024-07-12

## 2024-07-12 DIAGNOSIS — Z51.11 CHEMOTHERAPY MANAGEMENT, ENCOUNTER FOR: ICD-10-CM

## 2024-07-12 DIAGNOSIS — C50.811 MALIGNANT NEOPLASM OF OVERLAPPING SITES OF RIGHT BREAST IN FEMALE, ESTROGEN RECEPTOR NEGATIVE (HCC): ICD-10-CM

## 2024-07-12 DIAGNOSIS — Z17.1 MALIGNANT NEOPLASM OF OVERLAPPING SITES OF RIGHT BREAST IN FEMALE, ESTROGEN RECEPTOR NEGATIVE (HCC): ICD-10-CM

## 2024-07-12 DIAGNOSIS — E78.5 DYSLIPIDEMIA: ICD-10-CM

## 2024-07-12 DIAGNOSIS — I10 PRIMARY HYPERTENSION: ICD-10-CM

## 2024-07-12 RX ORDER — ERGOCALCIFEROL 1.25 MG/1
50000 CAPSULE ORAL WEEKLY
Qty: 12 CAPSULE | Refills: 1 | Status: SHIPPED | OUTPATIENT
Start: 2024-07-12

## 2024-07-12 RX ORDER — LOSARTAN POTASSIUM 50 MG/1
50 TABLET ORAL DAILY
Qty: 90 TABLET | Refills: 3 | Status: SHIPPED | OUTPATIENT
Start: 2024-07-12

## 2024-07-12 RX ORDER — ATORVASTATIN CALCIUM 20 MG/1
20 TABLET, FILM COATED ORAL DAILY
Qty: 90 TABLET | Refills: 1 | Status: SHIPPED | OUTPATIENT
Start: 2024-07-12

## 2024-07-12 NOTE — PROGRESS NOTES
[facility] Nutrition Screen:    PG-SGA screening form reviewed. Point score = 1.  RD referral/nutrition evaluation indicated for scores > 4. Full nutrition assessment not indicated at this time.   Please consult oncology dietitian with any future nutrition concerns/needs.

## 2024-07-12 NOTE — TELEPHONE ENCOUNTER
Ann Hardy is calling to request a refill on the following medication(s):    Medication Request:  Requested Prescriptions     Pending Prescriptions Disp Refills    atorvastatin (LIPITOR) 20 MG tablet 90 tablet 1     Sig: Take 1 tablet by mouth daily    vitamin D (ERGOCALCIFEROL) 1.25 MG (63707 UT) CAPS capsule 12 capsule 1     Sig: Take 1 capsule by mouth once a week    losartan (COZAAR) 50 MG tablet 90 tablet 3     Sig: Take 1 tablet by mouth daily       Last Visit Date (If Applicable):  5/7/2024    Next Visit Date:    8/8/2024

## 2024-07-16 ENCOUNTER — HOSPITAL ENCOUNTER (OUTPATIENT)
Dept: INFUSION THERAPY | Facility: MEDICAL CENTER | Age: 59
Discharge: HOME | End: 2024-07-16
Payer: COMMERCIAL

## 2024-07-16 ENCOUNTER — HOSPITAL ENCOUNTER (OUTPATIENT)
Facility: MEDICAL CENTER | Age: 59
End: 2024-07-16
Payer: COMMERCIAL

## 2024-07-16 VITALS
DIASTOLIC BLOOD PRESSURE: 86 MMHG | HEART RATE: 76 BPM | SYSTOLIC BLOOD PRESSURE: 133 MMHG | TEMPERATURE: 98 F | RESPIRATION RATE: 16 BRPM

## 2024-07-16 DIAGNOSIS — Z17.1 MALIGNANT NEOPLASM OF OVERLAPPING SITES OF RIGHT BREAST IN FEMALE, ESTROGEN RECEPTOR NEGATIVE (HCC): Primary | ICD-10-CM

## 2024-07-16 DIAGNOSIS — C50.811 MALIGNANT NEOPLASM OF OVERLAPPING SITES OF RIGHT BREAST IN FEMALE, ESTROGEN RECEPTOR NEGATIVE (HCC): Primary | ICD-10-CM

## 2024-07-16 LAB
ALBUMIN SERPL-MCNC: 3.9 G/DL (ref 3.5–5.2)
ALP SERPL-CCNC: 90 U/L (ref 35–104)
ALT SERPL-CCNC: 42 U/L (ref 5–33)
ANION GAP SERPL CALCULATED.3IONS-SCNC: 12 MMOL/L (ref 9–17)
AST SERPL-CCNC: 27 U/L
BASOPHILS # BLD: <0.03 K/UL (ref 0–0.2)
BASOPHILS NFR BLD: 1 % (ref 0–2)
BILIRUB SERPL-MCNC: 0.2 MG/DL (ref 0.3–1.2)
BUN SERPL-MCNC: 13 MG/DL (ref 6–20)
BUN/CREAT SERPL: 19 (ref 9–20)
CALCIUM SERPL-MCNC: 8.4 MG/DL (ref 8.6–10.4)
CHLORIDE SERPL-SCNC: 104 MMOL/L (ref 98–107)
CO2 SERPL-SCNC: 23 MMOL/L (ref 20–31)
CREAT SERPL-MCNC: 0.7 MG/DL (ref 0.5–0.9)
EOSINOPHIL # BLD: 0.07 K/UL (ref 0–0.44)
EOSINOPHILS RELATIVE PERCENT: 2 % (ref 1–4)
ERYTHROCYTE [DISTWIDTH] IN BLOOD BY AUTOMATED COUNT: 15 % (ref 11.8–14.4)
GFR, ESTIMATED: >90 ML/MIN/1.73M2
GLUCOSE SERPL-MCNC: 131 MG/DL (ref 70–99)
HCT VFR BLD AUTO: 32.1 % (ref 36.3–47.1)
HGB BLD-MCNC: 10 G/DL (ref 11.9–15.1)
IMM GRANULOCYTES # BLD AUTO: 0.02 K/UL (ref 0–0.3)
IMM GRANULOCYTES NFR BLD: 1 %
LYMPHOCYTES NFR BLD: 1.08 K/UL (ref 1.1–3.7)
LYMPHOCYTES RELATIVE PERCENT: 36 % (ref 24–43)
MCH RBC QN AUTO: 28 PG (ref 25.2–33.5)
MCHC RBC AUTO-ENTMCNC: 31.2 G/DL (ref 28.4–34.8)
MCV RBC AUTO: 89.9 FL (ref 82.6–102.9)
MONOCYTES NFR BLD: 0.19 K/UL (ref 0.1–1.2)
MONOCYTES NFR BLD: 6 % (ref 3–12)
NEUTROPHILS NFR BLD: 54 % (ref 36–65)
NEUTS SEG NFR BLD: 1.66 K/UL (ref 1.5–8.1)
NRBC BLD-RTO: 0 PER 100 WBC
PLATELET # BLD AUTO: 329 K/UL (ref 138–453)
PMV BLD AUTO: 9.2 FL (ref 8.1–13.5)
POTASSIUM SERPL-SCNC: 3.4 MMOL/L (ref 3.7–5.3)
PROT SERPL-MCNC: 7.2 G/DL (ref 6.4–8.3)
RBC # BLD AUTO: 3.57 M/UL (ref 3.95–5.11)
RBC # BLD: ABNORMAL 10*6/UL
SODIUM SERPL-SCNC: 139 MMOL/L (ref 135–144)
WBC OTHER # BLD: 3 K/UL (ref 3.5–11.3)

## 2024-07-16 PROCEDURE — 36591 DRAW BLOOD OFF VENOUS DEVICE: CPT

## 2024-07-16 PROCEDURE — 2580000003 HC RX 258: Performed by: INTERNAL MEDICINE

## 2024-07-16 PROCEDURE — 96413 CHEMO IV INFUSION 1 HR: CPT

## 2024-07-16 PROCEDURE — 6360000002 HC RX W HCPCS: Performed by: INTERNAL MEDICINE

## 2024-07-16 PROCEDURE — 80053 COMPREHEN METABOLIC PANEL: CPT

## 2024-07-16 PROCEDURE — 2500000003 HC RX 250 WO HCPCS: Performed by: INTERNAL MEDICINE

## 2024-07-16 PROCEDURE — 96375 TX/PRO/DX INJ NEW DRUG ADDON: CPT

## 2024-07-16 PROCEDURE — 85025 COMPLETE CBC W/AUTO DIFF WBC: CPT

## 2024-07-16 PROCEDURE — 96417 CHEMO IV INFUS EACH ADDL SEQ: CPT

## 2024-07-16 RX ORDER — SODIUM CHLORIDE 9 MG/ML
5-250 INJECTION, SOLUTION INTRAVENOUS PRN
Status: DISCONTINUED | OUTPATIENT
Start: 2024-07-16 | End: 2024-07-17 | Stop reason: HOSPADM

## 2024-07-16 RX ORDER — DEXAMETHASONE SODIUM PHOSPHATE 10 MG/ML
10 INJECTION INTRAMUSCULAR; INTRAVENOUS ONCE
Status: COMPLETED | OUTPATIENT
Start: 2024-07-16 | End: 2024-07-16

## 2024-07-16 RX ORDER — HEPARIN 100 UNIT/ML
500 SYRINGE INTRAVENOUS PRN
Status: DISCONTINUED | OUTPATIENT
Start: 2024-07-16 | End: 2024-07-17 | Stop reason: HOSPADM

## 2024-07-16 RX ORDER — DIPHENHYDRAMINE HYDROCHLORIDE 50 MG/ML
25 INJECTION INTRAMUSCULAR; INTRAVENOUS ONCE
Status: CANCELLED | OUTPATIENT
Start: 2024-07-22 | End: 2024-07-22

## 2024-07-16 RX ORDER — SODIUM CHLORIDE 0.9 % (FLUSH) 0.9 %
5-40 SYRINGE (ML) INJECTION PRN
Status: DISCONTINUED | OUTPATIENT
Start: 2024-07-16 | End: 2024-07-17 | Stop reason: HOSPADM

## 2024-07-16 RX ORDER — DIPHENHYDRAMINE HYDROCHLORIDE 50 MG/ML
25 INJECTION INTRAMUSCULAR; INTRAVENOUS ONCE
OUTPATIENT
Start: 2024-07-29 | End: 2024-07-29

## 2024-07-16 RX ORDER — DIPHENHYDRAMINE HYDROCHLORIDE 50 MG/ML
25 INJECTION INTRAMUSCULAR; INTRAVENOUS ONCE
Status: COMPLETED | OUTPATIENT
Start: 2024-07-16 | End: 2024-07-16

## 2024-07-16 RX ORDER — FAMOTIDINE 10 MG/ML
20 INJECTION, SOLUTION INTRAVENOUS ONCE
Status: COMPLETED | OUTPATIENT
Start: 2024-07-16 | End: 2024-07-16

## 2024-07-16 RX ADMIN — DEXAMETHASONE SODIUM PHOSPHATE 10 MG: 10 INJECTION INTRAMUSCULAR; INTRAVENOUS at 11:08

## 2024-07-16 RX ADMIN — FAMOTIDINE 20 MG: 10 INJECTION, SOLUTION INTRAVENOUS at 11:09

## 2024-07-16 RX ADMIN — HEPARIN 500 UNITS: 100 SYRINGE at 14:05

## 2024-07-16 RX ADMIN — SODIUM CHLORIDE 20 ML/HR: 9 INJECTION, SOLUTION INTRAVENOUS at 10:31

## 2024-07-16 RX ADMIN — SODIUM CHLORIDE, PRESERVATIVE FREE 10 ML: 5 INJECTION INTRAVENOUS at 10:26

## 2024-07-16 RX ADMIN — DIPHENHYDRAMINE HYDROCHLORIDE 25 MG: 50 INJECTION INTRAMUSCULAR; INTRAVENOUS at 11:08

## 2024-07-16 RX ADMIN — PACLITAXEL 156 MG: 6 INJECTION, SOLUTION INTRAVENOUS at 12:39

## 2024-07-16 RX ADMIN — SODIUM CHLORIDE, PRESERVATIVE FREE 10 ML: 5 INJECTION INTRAVENOUS at 14:05

## 2024-07-16 RX ADMIN — SODIUM CHLORIDE 483 MG: 9 INJECTION, SOLUTION INTRAVENOUS at 11:53

## 2024-07-16 NOTE — PROGRESS NOTES
Patient arrived ambulatory per self for cycle 2 day 1 treatment.  Patient denies new complaints or concerns.  Port accessed; specimen sent.  Labs reviewed. Potassium low, patient would like to try increasing it through foods.  Patient premedicated. Okay to do 25 mg IV benadryl per Dr. Grimaldo as patient took 25 mg PO benadryl at home this morning for generalized itching.  Trazimera infused with no sign adverse reaction;line flushed.  Taxol infused with no sign adverse reaction;line flushed.  Port flushed and heparinized with intact hollingsworth needle removed per protocol.  Patient ambulated off unit per self with knowledge of next appointment.

## 2024-07-22 ENCOUNTER — HOSPITAL ENCOUNTER (OUTPATIENT)
Dept: INFUSION THERAPY | Facility: MEDICAL CENTER | Age: 59
Discharge: HOME | End: 2024-07-22
Payer: COMMERCIAL

## 2024-07-22 ENCOUNTER — TELEPHONE (OUTPATIENT)
Dept: ONCOLOGY | Age: 59
End: 2024-07-22

## 2024-07-22 ENCOUNTER — OFFICE VISIT (OUTPATIENT)
Dept: ONCOLOGY | Age: 59
End: 2024-07-22
Payer: COMMERCIAL

## 2024-07-22 VITALS
WEIGHT: 180.9 LBS | SYSTOLIC BLOOD PRESSURE: 148 MMHG | BODY MASS INDEX: 28.33 KG/M2 | HEART RATE: 101 BPM | RESPIRATION RATE: 16 BRPM | DIASTOLIC BLOOD PRESSURE: 82 MMHG | TEMPERATURE: 98.2 F

## 2024-07-22 DIAGNOSIS — T45.1X5A NEUROPATHY DUE TO CHEMOTHERAPEUTIC DRUG (HCC): ICD-10-CM

## 2024-07-22 DIAGNOSIS — Z17.1 MALIGNANT NEOPLASM OF OVERLAPPING SITES OF RIGHT BREAST IN FEMALE, ESTROGEN RECEPTOR NEGATIVE (HCC): Primary | ICD-10-CM

## 2024-07-22 DIAGNOSIS — C50.811 MALIGNANT NEOPLASM OF OVERLAPPING SITES OF RIGHT BREAST IN FEMALE, ESTROGEN RECEPTOR NEGATIVE (HCC): Primary | ICD-10-CM

## 2024-07-22 DIAGNOSIS — Z51.11 CHEMOTHERAPY MANAGEMENT, ENCOUNTER FOR: ICD-10-CM

## 2024-07-22 DIAGNOSIS — G62.0 NEUROPATHY DUE TO CHEMOTHERAPEUTIC DRUG (HCC): ICD-10-CM

## 2024-07-22 LAB
ALBUMIN SERPL-MCNC: 4 G/DL (ref 3.5–5.2)
ALP SERPL-CCNC: 91 U/L (ref 35–104)
ALT SERPL-CCNC: 37 U/L (ref 5–33)
ANION GAP SERPL CALCULATED.3IONS-SCNC: 7 MMOL/L (ref 9–17)
AST SERPL-CCNC: 22 U/L
BASOPHILS # BLD: 0.05 K/UL (ref 0–0.2)
BASOPHILS NFR BLD: 2 % (ref 0–2)
BILIRUB SERPL-MCNC: 0.2 MG/DL (ref 0.3–1.2)
BUN SERPL-MCNC: 12 MG/DL (ref 6–20)
BUN/CREAT SERPL: 17 (ref 9–20)
CALCIUM SERPL-MCNC: 8.7 MG/DL (ref 8.6–10.4)
CHLORIDE SERPL-SCNC: 105 MMOL/L (ref 98–107)
CO2 SERPL-SCNC: 26 MMOL/L (ref 20–31)
CREAT SERPL-MCNC: 0.7 MG/DL (ref 0.5–0.9)
EOSINOPHIL # BLD: 0.06 K/UL (ref 0–0.44)
EOSINOPHILS RELATIVE PERCENT: 2 % (ref 1–4)
ERYTHROCYTE [DISTWIDTH] IN BLOOD BY AUTOMATED COUNT: 15.2 % (ref 11.8–14.4)
GFR, ESTIMATED: >90 ML/MIN/1.73M2
GLUCOSE SERPL-MCNC: 95 MG/DL (ref 70–99)
HCT VFR BLD AUTO: 32.4 % (ref 36.3–47.1)
HGB BLD-MCNC: 10.6 G/DL (ref 11.9–15.1)
IMM GRANULOCYTES # BLD AUTO: 0.01 K/UL (ref 0–0.3)
IMM GRANULOCYTES NFR BLD: 0 %
LYMPHOCYTES NFR BLD: 0.9 K/UL (ref 1.1–3.7)
LYMPHOCYTES RELATIVE PERCENT: 36 % (ref 24–43)
MCH RBC QN AUTO: 29.7 PG (ref 25.2–33.5)
MCHC RBC AUTO-ENTMCNC: 32.7 G/DL (ref 28.4–34.8)
MCV RBC AUTO: 90.8 FL (ref 82.6–102.9)
MONOCYTES NFR BLD: 0.19 K/UL (ref 0.1–1.2)
MONOCYTES NFR BLD: 8 % (ref 3–12)
NEUTROPHILS NFR BLD: 52 % (ref 36–65)
NEUTS SEG NFR BLD: 1.32 K/UL (ref 1.5–8.1)
NRBC BLD-RTO: 0 PER 100 WBC
PLATELET # BLD AUTO: 328 K/UL (ref 138–453)
PMV BLD AUTO: 9.4 FL (ref 8.1–13.5)
POTASSIUM SERPL-SCNC: 3.8 MMOL/L (ref 3.7–5.3)
PROT SERPL-MCNC: 7.4 G/DL (ref 6.4–8.3)
RBC # BLD AUTO: 3.57 M/UL (ref 3.95–5.11)
RBC # BLD: ABNORMAL 10*6/UL
SODIUM SERPL-SCNC: 138 MMOL/L (ref 135–144)
WBC OTHER # BLD: 2.5 K/UL (ref 3.5–11.3)

## 2024-07-22 PROCEDURE — 3077F SYST BP >= 140 MM HG: CPT | Performed by: INTERNAL MEDICINE

## 2024-07-22 PROCEDURE — 2500000003 HC RX 250 WO HCPCS: Performed by: INTERNAL MEDICINE

## 2024-07-22 PROCEDURE — 96375 TX/PRO/DX INJ NEW DRUG ADDON: CPT

## 2024-07-22 PROCEDURE — 96413 CHEMO IV INFUSION 1 HR: CPT

## 2024-07-22 PROCEDURE — 6360000002 HC RX W HCPCS: Performed by: INTERNAL MEDICINE

## 2024-07-22 PROCEDURE — 3079F DIAST BP 80-89 MM HG: CPT | Performed by: INTERNAL MEDICINE

## 2024-07-22 PROCEDURE — 80053 COMPREHEN METABOLIC PANEL: CPT

## 2024-07-22 PROCEDURE — 99215 OFFICE O/P EST HI 40 MIN: CPT | Performed by: INTERNAL MEDICINE

## 2024-07-22 PROCEDURE — 2580000003 HC RX 258: Performed by: INTERNAL MEDICINE

## 2024-07-22 PROCEDURE — 85025 COMPLETE CBC W/AUTO DIFF WBC: CPT

## 2024-07-22 PROCEDURE — 99211 OFF/OP EST MAY X REQ PHY/QHP: CPT | Performed by: INTERNAL MEDICINE

## 2024-07-22 PROCEDURE — 36591 DRAW BLOOD OFF VENOUS DEVICE: CPT

## 2024-07-22 RX ORDER — SODIUM CHLORIDE 9 MG/ML
5-250 INJECTION, SOLUTION INTRAVENOUS PRN
Status: DISCONTINUED | OUTPATIENT
Start: 2024-07-22 | End: 2024-07-23 | Stop reason: HOSPADM

## 2024-07-22 RX ORDER — DIPHENHYDRAMINE HYDROCHLORIDE 50 MG/ML
25 INJECTION INTRAMUSCULAR; INTRAVENOUS ONCE
Status: COMPLETED | OUTPATIENT
Start: 2024-07-22 | End: 2024-07-22

## 2024-07-22 RX ORDER — HEPARIN 100 UNIT/ML
500 SYRINGE INTRAVENOUS PRN
Status: DISCONTINUED | OUTPATIENT
Start: 2024-07-22 | End: 2024-07-23 | Stop reason: HOSPADM

## 2024-07-22 RX ORDER — DEXAMETHASONE SODIUM PHOSPHATE 10 MG/ML
10 INJECTION INTRAMUSCULAR; INTRAVENOUS ONCE
Status: COMPLETED | OUTPATIENT
Start: 2024-07-22 | End: 2024-07-22

## 2024-07-22 RX ORDER — SODIUM CHLORIDE 0.9 % (FLUSH) 0.9 %
5-40 SYRINGE (ML) INJECTION PRN
Status: DISCONTINUED | OUTPATIENT
Start: 2024-07-22 | End: 2024-07-23 | Stop reason: HOSPADM

## 2024-07-22 RX ORDER — FAMOTIDINE 10 MG/ML
20 INJECTION, SOLUTION INTRAVENOUS ONCE
Status: COMPLETED | OUTPATIENT
Start: 2024-07-22 | End: 2024-07-22

## 2024-07-22 RX ADMIN — SODIUM CHLORIDE 20 ML/HR: 9 INJECTION, SOLUTION INTRAVENOUS at 11:41

## 2024-07-22 RX ADMIN — HEPARIN 500 UNITS: 100 SYRINGE at 14:26

## 2024-07-22 RX ADMIN — FAMOTIDINE 20 MG: 10 INJECTION, SOLUTION INTRAVENOUS at 12:26

## 2024-07-22 RX ADMIN — SODIUM CHLORIDE, PRESERVATIVE FREE 10 ML: 5 INJECTION INTRAVENOUS at 14:26

## 2024-07-22 RX ADMIN — SODIUM CHLORIDE, PRESERVATIVE FREE 10 ML: 5 INJECTION INTRAVENOUS at 11:22

## 2024-07-22 RX ADMIN — DIPHENHYDRAMINE HYDROCHLORIDE 25 MG: 50 INJECTION INTRAMUSCULAR; INTRAVENOUS at 12:26

## 2024-07-22 RX ADMIN — PACLITAXEL 156 MG: 6 INJECTION, SOLUTION INTRAVENOUS at 13:06

## 2024-07-22 RX ADMIN — DEXAMETHASONE SODIUM PHOSPHATE 10 MG: 10 INJECTION INTRAMUSCULAR; INTRAVENOUS at 12:26

## 2024-07-22 NOTE — TELEPHONE ENCOUNTER
AMINA HERE FOR FOLLOW UP & TX   NO INSTRUCTIONS GIVEN   DR. PARK VERBALLY STATED HE WANTS TO SEE PT IN 3 WKS   MD VISIT: 8/12/24 @ 11;30AM TX @ 11AM   AVS PRINTED AND GIVEN ON EXIT

## 2024-07-22 NOTE — PROGRESS NOTES
Patient arrived ambulatory per self for C2D8 treatment.   Patient denies complaint or concern other than itching which is controlled with benadryl and decadron.   Port accessed, specimen sent.   MD in to see patient, OK to treat.   Patient premedicated.   Taxol infused starting at 100ml/hr, infusion and increased without issue, line flushed.   Port flushed and heparinized with intact hollingsworth needle removed, band aid applied.   Patient discharged, AVS per .

## 2024-07-22 NOTE — PROGRESS NOTES
1426     Allergies:   Patient has no known allergies.    Review of Systems:    Constitutional: No fever or chills. No night sweats, no weight loss   Eyes: No eye discharge, double vision, or eye pain   HEENT: negative for sore mouth, sore throat, hoarseness and voice change   Respiratory: negative for cough , sputum, dyspnea, wheezing, hemoptysis, chest pain   Cardiovascular: negative for chest pain, dyspnea, palpitations, orthopnea, PND   Gastrointestinal: negative for nausea, vomiting, diarrhea, constipation, abdominal pain, Dysphagia, hematemesis and hematochezia   Genitourinary: negative for frequency, dysuria, nocturia, urinary incontinence, and hematuria   Integument: negative for rash, skin lesions, bruises.  Positive for itching  Hematologic/Lymphatic: negative for easy bruising, bleeding, lymphadenopathy, or petechiae   Endocrine: negative for heat or cold intolerance,weight changes, change in bowel habits and hair loss   Musculoskeletal: negative for myalgias, arthralgias, pain, joint swelling,and bone pain   Neurological: negative for headaches, dizziness, seizures, weakness, numbness    Physical Exam:  Vitals: BP (!) 148/82   Pulse (!) 101   Temp 98.2 °F (36.8 °C) (Oral)   Resp 16   Wt 82.1 kg (180 lb 14.4 oz)   BMI 28.33 kg/m²   General appearance - well appearing, no in pain or distress  Mental status - AAO X3  Eyes - pupils equal and reactive, extraocular eye movements intact  Mouth - mucous membranes moist, pharynx normal without lesions  Neck - supple, no significant adenopathy  Lymphatics - no palpable lymphadenopathy, no hepatosplenomegaly  Chest - clear to auscultation, no wheezes, rales or rhonchi, symmetric air entry  Heart - normal rate, regular rhythm, normal S1, S2, no murmurs  Abdomen - soft, nontender, nondistended, no masses or organomegaly  Neurological - alert, oriented, normal speech, no focal findings or movement disorder noted  Extremities - peripheral pulses normal, no pedal

## 2024-07-23 ENCOUNTER — TELEPHONE (OUTPATIENT)
Dept: ONCOLOGY | Age: 59
End: 2024-07-23

## 2024-07-23 NOTE — TELEPHONE ENCOUNTER
Pt wondering about possible assistance for a prosthesis bra. Called pt and encouraged her to apply for the [employer] page online. Pt verbalized that she will apply. SW looking for other available options.

## 2024-07-29 ENCOUNTER — HOSPITAL ENCOUNTER (OUTPATIENT)
Dept: INFUSION THERAPY | Facility: MEDICAL CENTER | Age: 59
Discharge: HOME | End: 2024-07-29
Payer: COMMERCIAL

## 2024-07-29 VITALS
HEART RATE: 90 BPM | DIASTOLIC BLOOD PRESSURE: 81 MMHG | OXYGEN SATURATION: 98 % | TEMPERATURE: 98.5 F | RESPIRATION RATE: 16 BRPM | SYSTOLIC BLOOD PRESSURE: 121 MMHG

## 2024-07-29 DIAGNOSIS — Z17.1 MALIGNANT NEOPLASM OF OVERLAPPING SITES OF RIGHT BREAST IN FEMALE, ESTROGEN RECEPTOR NEGATIVE (HCC): Primary | ICD-10-CM

## 2024-07-29 DIAGNOSIS — C50.811 MALIGNANT NEOPLASM OF OVERLAPPING SITES OF RIGHT BREAST IN FEMALE, ESTROGEN RECEPTOR NEGATIVE (HCC): Primary | ICD-10-CM

## 2024-07-29 LAB
ALBUMIN SERPL-MCNC: 4.1 G/DL (ref 3.5–5.2)
ALP SERPL-CCNC: 93 U/L (ref 35–104)
ALT SERPL-CCNC: 28 U/L (ref 5–33)
ANION GAP SERPL CALCULATED.3IONS-SCNC: 10 MMOL/L (ref 9–17)
AST SERPL-CCNC: 17 U/L
BASOPHILS # BLD: 0.04 K/UL (ref 0–0.2)
BASOPHILS NFR BLD: 1 % (ref 0–2)
BILIRUB SERPL-MCNC: 0.3 MG/DL (ref 0.3–1.2)
BUN SERPL-MCNC: 14 MG/DL (ref 6–20)
BUN/CREAT SERPL: 18 (ref 9–20)
CALCIUM SERPL-MCNC: 9.2 MG/DL (ref 8.6–10.4)
CHLORIDE SERPL-SCNC: 105 MMOL/L (ref 98–107)
CO2 SERPL-SCNC: 25 MMOL/L (ref 20–31)
CREAT SERPL-MCNC: 0.8 MG/DL (ref 0.5–0.9)
EOSINOPHIL # BLD: 0.05 K/UL (ref 0–0.44)
EOSINOPHILS RELATIVE PERCENT: 1 % (ref 1–4)
ERYTHROCYTE [DISTWIDTH] IN BLOOD BY AUTOMATED COUNT: 15.4 % (ref 11.8–14.4)
GFR, ESTIMATED: 85 ML/MIN/1.73M2
GLUCOSE SERPL-MCNC: 101 MG/DL (ref 70–99)
HCT VFR BLD AUTO: 32.2 % (ref 36.3–47.1)
HGB BLD-MCNC: 10.5 G/DL (ref 11.9–15.1)
IMM GRANULOCYTES # BLD AUTO: 0.02 K/UL (ref 0–0.3)
IMM GRANULOCYTES NFR BLD: 1 %
LYMPHOCYTES NFR BLD: 1.04 K/UL (ref 1.1–3.7)
LYMPHOCYTES RELATIVE PERCENT: 28 % (ref 24–43)
MCH RBC QN AUTO: 29.2 PG (ref 25.2–33.5)
MCHC RBC AUTO-ENTMCNC: 32.6 G/DL (ref 28.4–34.8)
MCV RBC AUTO: 89.7 FL (ref 82.6–102.9)
MONOCYTES NFR BLD: 0.35 K/UL (ref 0.1–1.2)
MONOCYTES NFR BLD: 9 % (ref 3–12)
NEUTROPHILS NFR BLD: 60 % (ref 36–65)
NEUTS SEG NFR BLD: 2.21 K/UL (ref 1.5–8.1)
NRBC BLD-RTO: 0 PER 100 WBC
PLATELET # BLD AUTO: 331 K/UL (ref 138–453)
PMV BLD AUTO: 9 FL (ref 8.1–13.5)
POTASSIUM SERPL-SCNC: 3.7 MMOL/L (ref 3.7–5.3)
PROT SERPL-MCNC: 7.6 G/DL (ref 6.4–8.3)
RBC # BLD AUTO: 3.59 M/UL (ref 3.95–5.11)
RBC # BLD: ABNORMAL 10*6/UL
SODIUM SERPL-SCNC: 140 MMOL/L (ref 135–144)
WBC OTHER # BLD: 3.7 K/UL (ref 3.5–11.3)

## 2024-07-29 PROCEDURE — 36591 DRAW BLOOD OFF VENOUS DEVICE: CPT

## 2024-07-29 PROCEDURE — 96375 TX/PRO/DX INJ NEW DRUG ADDON: CPT

## 2024-07-29 PROCEDURE — 2500000003 HC RX 250 WO HCPCS: Performed by: INTERNAL MEDICINE

## 2024-07-29 PROCEDURE — 96413 CHEMO IV INFUSION 1 HR: CPT

## 2024-07-29 PROCEDURE — 2580000003 HC RX 258: Performed by: INTERNAL MEDICINE

## 2024-07-29 PROCEDURE — 80053 COMPREHEN METABOLIC PANEL: CPT

## 2024-07-29 PROCEDURE — 85025 COMPLETE CBC W/AUTO DIFF WBC: CPT

## 2024-07-29 PROCEDURE — 6360000002 HC RX W HCPCS: Performed by: INTERNAL MEDICINE

## 2024-07-29 RX ORDER — SODIUM CHLORIDE 0.9 % (FLUSH) 0.9 %
5-40 SYRINGE (ML) INJECTION PRN
Status: DISCONTINUED | OUTPATIENT
Start: 2024-07-29 | End: 2024-07-30 | Stop reason: HOSPADM

## 2024-07-29 RX ORDER — HEPARIN 100 UNIT/ML
500 SYRINGE INTRAVENOUS PRN
Status: DISCONTINUED | OUTPATIENT
Start: 2024-07-29 | End: 2024-07-30 | Stop reason: HOSPADM

## 2024-07-29 RX ORDER — FAMOTIDINE 10 MG/ML
20 INJECTION, SOLUTION INTRAVENOUS ONCE
Status: COMPLETED | OUTPATIENT
Start: 2024-07-29 | End: 2024-07-29

## 2024-07-29 RX ORDER — DIPHENHYDRAMINE HYDROCHLORIDE 50 MG/ML
25 INJECTION INTRAMUSCULAR; INTRAVENOUS ONCE
Status: COMPLETED | OUTPATIENT
Start: 2024-07-29 | End: 2024-07-29

## 2024-07-29 RX ORDER — SODIUM CHLORIDE 9 MG/ML
5-250 INJECTION, SOLUTION INTRAVENOUS PRN
Status: DISCONTINUED | OUTPATIENT
Start: 2024-07-29 | End: 2024-07-30 | Stop reason: HOSPADM

## 2024-07-29 RX ORDER — DEXAMETHASONE SODIUM PHOSPHATE 10 MG/ML
10 INJECTION INTRAMUSCULAR; INTRAVENOUS ONCE
Status: COMPLETED | OUTPATIENT
Start: 2024-07-29 | End: 2024-07-29

## 2024-07-29 RX ADMIN — SODIUM CHLORIDE, PRESERVATIVE FREE 10 ML: 5 INJECTION INTRAVENOUS at 10:30

## 2024-07-29 RX ADMIN — DIPHENHYDRAMINE HYDROCHLORIDE 25 MG: 50 INJECTION INTRAMUSCULAR; INTRAVENOUS at 11:46

## 2024-07-29 RX ADMIN — DEXAMETHASONE SODIUM PHOSPHATE 10 MG: 10 INJECTION INTRAMUSCULAR; INTRAVENOUS at 11:46

## 2024-07-29 RX ADMIN — PACLITAXEL 156 MG: 6 INJECTION, SOLUTION INTRAVENOUS at 12:29

## 2024-07-29 RX ADMIN — FAMOTIDINE 20 MG: 10 INJECTION, SOLUTION INTRAVENOUS at 11:46

## 2024-07-29 RX ADMIN — SODIUM CHLORIDE, PRESERVATIVE FREE 10 ML: 5 INJECTION INTRAVENOUS at 13:54

## 2024-07-29 RX ADMIN — HEPARIN 500 UNITS: 100 SYRINGE at 13:54

## 2024-07-29 RX ADMIN — SODIUM CHLORIDE 25 ML/HR: 9 INJECTION, SOLUTION INTRAVENOUS at 11:45

## 2024-07-29 NOTE — PROGRESS NOTES
Patient here for C2 D15 Taxol   Arrives ambulatory per self  Denies complaint/concern  Labs drawn per port & reviewed;within parameters to tx  Pt premedicated   Taxol infused at 100 ml x 15 minutes then increased to full rate  Infused without incident & line flushed  Port flushed with brisk blood return & heparinized with intact Wilder needle removed per protocol   Pt discharged ambulatory per self

## 2024-08-02 ENCOUNTER — HOSPITAL ENCOUNTER (OUTPATIENT)
Facility: MEDICAL CENTER | Age: 59
End: 2024-08-02
Payer: COMMERCIAL

## 2024-08-05 ENCOUNTER — APPOINTMENT (OUTPATIENT)
Dept: INFUSION THERAPY | Facility: MEDICAL CENTER | Age: 59
End: 2024-08-05
Payer: COMMERCIAL

## 2024-08-06 ENCOUNTER — HOSPITAL ENCOUNTER (OUTPATIENT)
Dept: INFUSION THERAPY | Facility: MEDICAL CENTER | Age: 59
Discharge: HOME | End: 2024-08-06
Payer: COMMERCIAL

## 2024-08-06 VITALS
WEIGHT: 180.1 LBS | RESPIRATION RATE: 16 BRPM | SYSTOLIC BLOOD PRESSURE: 121 MMHG | BODY MASS INDEX: 28.21 KG/M2 | HEART RATE: 91 BPM | DIASTOLIC BLOOD PRESSURE: 87 MMHG | OXYGEN SATURATION: 99 % | TEMPERATURE: 98 F

## 2024-08-06 DIAGNOSIS — Z17.1 MALIGNANT NEOPLASM OF OVERLAPPING SITES OF RIGHT BREAST IN FEMALE, ESTROGEN RECEPTOR NEGATIVE (HCC): Primary | ICD-10-CM

## 2024-08-06 DIAGNOSIS — C50.811 MALIGNANT NEOPLASM OF OVERLAPPING SITES OF RIGHT BREAST IN FEMALE, ESTROGEN RECEPTOR NEGATIVE (HCC): Primary | ICD-10-CM

## 2024-08-06 LAB
ALBUMIN SERPL-MCNC: 4.1 G/DL (ref 3.5–5.2)
ALP SERPL-CCNC: 90 U/L (ref 35–104)
ALT SERPL-CCNC: 21 U/L (ref 5–33)
ANION GAP SERPL CALCULATED.3IONS-SCNC: 10 MMOL/L (ref 9–17)
AST SERPL-CCNC: 17 U/L
BASOPHILS # BLD: 0.05 K/UL (ref 0–0.2)
BASOPHILS NFR BLD: 1 % (ref 0–2)
BILIRUB SERPL-MCNC: 0.2 MG/DL (ref 0.3–1.2)
BUN SERPL-MCNC: 16 MG/DL (ref 6–20)
BUN/CREAT SERPL: 20 (ref 9–20)
CALCIUM SERPL-MCNC: 8.7 MG/DL (ref 8.6–10.4)
CHLORIDE SERPL-SCNC: 105 MMOL/L (ref 98–107)
CO2 SERPL-SCNC: 25 MMOL/L (ref 20–31)
CREAT SERPL-MCNC: 0.8 MG/DL (ref 0.5–0.9)
EOSINOPHIL # BLD: 0.08 K/UL (ref 0–0.44)
EOSINOPHILS RELATIVE PERCENT: 2 % (ref 1–4)
ERYTHROCYTE [DISTWIDTH] IN BLOOD BY AUTOMATED COUNT: 15.4 % (ref 11.8–14.4)
GFR, ESTIMATED: 85 ML/MIN/1.73M2
GLUCOSE SERPL-MCNC: 101 MG/DL (ref 70–99)
HCT VFR BLD AUTO: 32.1 % (ref 36.3–47.1)
HGB BLD-MCNC: 10.3 G/DL (ref 11.9–15.1)
IMM GRANULOCYTES # BLD AUTO: 0.03 K/UL (ref 0–0.3)
IMM GRANULOCYTES NFR BLD: 1 %
LYMPHOCYTES NFR BLD: 0.98 K/UL (ref 1.1–3.7)
LYMPHOCYTES RELATIVE PERCENT: 23 % (ref 24–43)
MCH RBC QN AUTO: 29.1 PG (ref 25.2–33.5)
MCHC RBC AUTO-ENTMCNC: 32.1 G/DL (ref 28.4–34.8)
MCV RBC AUTO: 90.7 FL (ref 82.6–102.9)
MONOCYTES NFR BLD: 0.43 K/UL (ref 0.1–1.2)
MONOCYTES NFR BLD: 10 % (ref 3–12)
NEUTROPHILS NFR BLD: 63 % (ref 36–65)
NEUTS SEG NFR BLD: 2.75 K/UL (ref 1.5–8.1)
NRBC BLD-RTO: 0 PER 100 WBC
PLATELET # BLD AUTO: 324 K/UL (ref 138–453)
PMV BLD AUTO: 9.1 FL (ref 8.1–13.5)
POTASSIUM SERPL-SCNC: 3.5 MMOL/L (ref 3.7–5.3)
PROT SERPL-MCNC: 7.7 G/DL (ref 6.4–8.3)
RBC # BLD AUTO: 3.54 M/UL (ref 3.95–5.11)
RBC # BLD: ABNORMAL 10*6/UL
SODIUM SERPL-SCNC: 140 MMOL/L (ref 135–144)
WBC OTHER # BLD: 4.3 K/UL (ref 3.5–11.3)

## 2024-08-06 PROCEDURE — 96413 CHEMO IV INFUSION 1 HR: CPT

## 2024-08-06 PROCEDURE — 2500000003 HC RX 250 WO HCPCS: Performed by: INTERNAL MEDICINE

## 2024-08-06 PROCEDURE — 96375 TX/PRO/DX INJ NEW DRUG ADDON: CPT

## 2024-08-06 PROCEDURE — 96417 CHEMO IV INFUS EACH ADDL SEQ: CPT

## 2024-08-06 PROCEDURE — 85025 COMPLETE CBC W/AUTO DIFF WBC: CPT

## 2024-08-06 PROCEDURE — 6360000002 HC RX W HCPCS: Performed by: INTERNAL MEDICINE

## 2024-08-06 PROCEDURE — 2580000003 HC RX 258: Performed by: INTERNAL MEDICINE

## 2024-08-06 PROCEDURE — 80053 COMPREHEN METABOLIC PANEL: CPT

## 2024-08-06 PROCEDURE — 36591 DRAW BLOOD OFF VENOUS DEVICE: CPT

## 2024-08-06 RX ORDER — SODIUM CHLORIDE 9 MG/ML
5-250 INJECTION, SOLUTION INTRAVENOUS PRN
Status: DISCONTINUED | OUTPATIENT
Start: 2024-08-06 | End: 2024-08-07 | Stop reason: HOSPADM

## 2024-08-06 RX ORDER — SODIUM CHLORIDE 9 MG/ML
5-250 INJECTION, SOLUTION INTRAVENOUS PRN
OUTPATIENT
Start: 2024-08-19

## 2024-08-06 RX ORDER — EPINEPHRINE 1 MG/ML
0.3 INJECTION, SOLUTION, CONCENTRATE INTRAVENOUS PRN
OUTPATIENT
Start: 2024-08-19

## 2024-08-06 RX ORDER — SODIUM CHLORIDE 9 MG/ML
5-250 INJECTION, SOLUTION INTRAVENOUS PRN
OUTPATIENT
Start: 2024-08-12

## 2024-08-06 RX ORDER — ONDANSETRON 2 MG/ML
8 INJECTION INTRAMUSCULAR; INTRAVENOUS
OUTPATIENT
Start: 2024-08-12

## 2024-08-06 RX ORDER — MEPERIDINE HYDROCHLORIDE 50 MG/ML
12.5 INJECTION INTRAMUSCULAR; INTRAVENOUS; SUBCUTANEOUS PRN
OUTPATIENT
Start: 2024-08-12

## 2024-08-06 RX ORDER — ONDANSETRON 2 MG/ML
8 INJECTION INTRAMUSCULAR; INTRAVENOUS
Status: CANCELLED | OUTPATIENT
Start: 2024-08-06

## 2024-08-06 RX ORDER — HEPARIN SODIUM (PORCINE) LOCK FLUSH IV SOLN 100 UNIT/ML 100 UNIT/ML
500 SOLUTION INTRAVENOUS PRN
Status: CANCELLED | OUTPATIENT
Start: 2024-08-06

## 2024-08-06 RX ORDER — SODIUM CHLORIDE 0.9 % (FLUSH) 0.9 %
5-40 SYRINGE (ML) INJECTION PRN
OUTPATIENT
Start: 2024-08-19

## 2024-08-06 RX ORDER — ACETAMINOPHEN 325 MG/1
650 TABLET ORAL
OUTPATIENT
Start: 2024-08-19

## 2024-08-06 RX ORDER — MEPERIDINE HYDROCHLORIDE 50 MG/ML
12.5 INJECTION INTRAMUSCULAR; INTRAVENOUS; SUBCUTANEOUS PRN
OUTPATIENT
Start: 2024-08-19

## 2024-08-06 RX ORDER — DIPHENHYDRAMINE HYDROCHLORIDE 50 MG/ML
50 INJECTION INTRAMUSCULAR; INTRAVENOUS
OUTPATIENT
Start: 2024-08-12

## 2024-08-06 RX ORDER — ALBUTEROL SULFATE 90 UG/1
4 AEROSOL, METERED RESPIRATORY (INHALATION) PRN
OUTPATIENT
Start: 2024-08-12

## 2024-08-06 RX ORDER — ALBUTEROL SULFATE 90 UG/1
4 AEROSOL, METERED RESPIRATORY (INHALATION) PRN
Status: CANCELLED | OUTPATIENT
Start: 2024-08-06

## 2024-08-06 RX ORDER — SODIUM CHLORIDE 9 MG/ML
INJECTION, SOLUTION INTRAVENOUS CONTINUOUS
OUTPATIENT
Start: 2024-08-19

## 2024-08-06 RX ORDER — ACETAMINOPHEN 325 MG/1
650 TABLET ORAL
OUTPATIENT
Start: 2024-08-12

## 2024-08-06 RX ORDER — SODIUM CHLORIDE 9 MG/ML
5-250 INJECTION, SOLUTION INTRAVENOUS PRN
Status: CANCELLED | OUTPATIENT
Start: 2024-08-06

## 2024-08-06 RX ORDER — SODIUM CHLORIDE 9 MG/ML
INJECTION, SOLUTION INTRAVENOUS CONTINUOUS
Status: CANCELLED | OUTPATIENT
Start: 2024-08-06

## 2024-08-06 RX ORDER — FAMOTIDINE 10 MG/ML
20 INJECTION, SOLUTION INTRAVENOUS
Status: CANCELLED | OUTPATIENT
Start: 2024-08-06

## 2024-08-06 RX ORDER — ONDANSETRON 2 MG/ML
8 INJECTION INTRAMUSCULAR; INTRAVENOUS
OUTPATIENT
Start: 2024-08-19

## 2024-08-06 RX ORDER — DIPHENHYDRAMINE HYDROCHLORIDE 50 MG/ML
50 INJECTION INTRAMUSCULAR; INTRAVENOUS
Status: CANCELLED | OUTPATIENT
Start: 2024-08-06

## 2024-08-06 RX ORDER — ALBUTEROL SULFATE 90 UG/1
4 AEROSOL, METERED RESPIRATORY (INHALATION) PRN
OUTPATIENT
Start: 2024-08-19

## 2024-08-06 RX ORDER — HEPARIN SODIUM (PORCINE) LOCK FLUSH IV SOLN 100 UNIT/ML 100 UNIT/ML
500 SOLUTION INTRAVENOUS PRN
OUTPATIENT
Start: 2024-08-12

## 2024-08-06 RX ORDER — DIPHENHYDRAMINE HYDROCHLORIDE 50 MG/ML
25 INJECTION INTRAMUSCULAR; INTRAVENOUS ONCE
OUTPATIENT
Start: 2024-08-19 | End: 2024-08-20

## 2024-08-06 RX ORDER — HEPARIN 100 UNIT/ML
500 SYRINGE INTRAVENOUS PRN
Status: DISCONTINUED | OUTPATIENT
Start: 2024-08-06 | End: 2024-08-07 | Stop reason: HOSPADM

## 2024-08-06 RX ORDER — SODIUM CHLORIDE 0.9 % (FLUSH) 0.9 %
5-40 SYRINGE (ML) INJECTION PRN
Status: CANCELLED | OUTPATIENT
Start: 2024-08-06

## 2024-08-06 RX ORDER — SODIUM CHLORIDE 0.9 % (FLUSH) 0.9 %
5-40 SYRINGE (ML) INJECTION PRN
Status: DISCONTINUED | OUTPATIENT
Start: 2024-08-06 | End: 2024-08-07 | Stop reason: HOSPADM

## 2024-08-06 RX ORDER — SODIUM CHLORIDE 0.9 % (FLUSH) 0.9 %
5-40 SYRINGE (ML) INJECTION PRN
OUTPATIENT
Start: 2024-08-12

## 2024-08-06 RX ORDER — EPINEPHRINE 1 MG/ML
0.3 INJECTION, SOLUTION, CONCENTRATE INTRAVENOUS PRN
OUTPATIENT
Start: 2024-08-12

## 2024-08-06 RX ORDER — DIPHENHYDRAMINE HYDROCHLORIDE 50 MG/ML
50 INJECTION INTRAMUSCULAR; INTRAVENOUS
OUTPATIENT
Start: 2024-08-19

## 2024-08-06 RX ORDER — DIPHENHYDRAMINE HYDROCHLORIDE 50 MG/ML
25 INJECTION INTRAMUSCULAR; INTRAVENOUS ONCE
OUTPATIENT
Start: 2024-08-12 | End: 2024-08-13

## 2024-08-06 RX ORDER — FAMOTIDINE 10 MG/ML
20 INJECTION, SOLUTION INTRAVENOUS
OUTPATIENT
Start: 2024-08-19

## 2024-08-06 RX ORDER — SODIUM CHLORIDE 9 MG/ML
INJECTION, SOLUTION INTRAVENOUS CONTINUOUS
OUTPATIENT
Start: 2024-08-12

## 2024-08-06 RX ORDER — DEXAMETHASONE SODIUM PHOSPHATE 10 MG/ML
10 INJECTION INTRAMUSCULAR; INTRAVENOUS ONCE
Status: COMPLETED | OUTPATIENT
Start: 2024-08-06 | End: 2024-08-06

## 2024-08-06 RX ORDER — FAMOTIDINE 10 MG/ML
20 INJECTION, SOLUTION INTRAVENOUS ONCE
Status: COMPLETED | OUTPATIENT
Start: 2024-08-06 | End: 2024-08-06

## 2024-08-06 RX ORDER — FAMOTIDINE 10 MG/ML
20 INJECTION, SOLUTION INTRAVENOUS ONCE
OUTPATIENT
Start: 2024-08-19 | End: 2024-08-20

## 2024-08-06 RX ORDER — DIPHENHYDRAMINE HYDROCHLORIDE 50 MG/ML
25 INJECTION INTRAMUSCULAR; INTRAVENOUS ONCE
Status: COMPLETED | OUTPATIENT
Start: 2024-08-06 | End: 2024-08-06

## 2024-08-06 RX ORDER — FAMOTIDINE 10 MG/ML
20 INJECTION, SOLUTION INTRAVENOUS
OUTPATIENT
Start: 2024-08-12

## 2024-08-06 RX ORDER — ACETAMINOPHEN 325 MG/1
650 TABLET ORAL
Status: CANCELLED | OUTPATIENT
Start: 2024-08-06

## 2024-08-06 RX ORDER — FAMOTIDINE 10 MG/ML
20 INJECTION, SOLUTION INTRAVENOUS ONCE
Status: CANCELLED | OUTPATIENT
Start: 2024-08-06 | End: 2024-08-06

## 2024-08-06 RX ORDER — FAMOTIDINE 10 MG/ML
20 INJECTION, SOLUTION INTRAVENOUS ONCE
OUTPATIENT
Start: 2024-08-12 | End: 2024-08-13

## 2024-08-06 RX ORDER — DIPHENHYDRAMINE HYDROCHLORIDE 50 MG/ML
25 INJECTION INTRAMUSCULAR; INTRAVENOUS ONCE
Status: CANCELLED | OUTPATIENT
Start: 2024-08-06 | End: 2024-08-06

## 2024-08-06 RX ORDER — MEPERIDINE HYDROCHLORIDE 50 MG/ML
12.5 INJECTION INTRAMUSCULAR; INTRAVENOUS; SUBCUTANEOUS PRN
Status: CANCELLED | OUTPATIENT
Start: 2024-08-06

## 2024-08-06 RX ORDER — HEPARIN SODIUM (PORCINE) LOCK FLUSH IV SOLN 100 UNIT/ML 100 UNIT/ML
500 SOLUTION INTRAVENOUS PRN
OUTPATIENT
Start: 2024-08-19

## 2024-08-06 RX ORDER — EPINEPHRINE 1 MG/ML
0.3 INJECTION, SOLUTION, CONCENTRATE INTRAVENOUS PRN
Status: CANCELLED | OUTPATIENT
Start: 2024-08-06

## 2024-08-06 RX ADMIN — Medication 500 UNITS: at 13:12

## 2024-08-06 RX ADMIN — FAMOTIDINE 20 MG: 10 INJECTION, SOLUTION INTRAVENOUS at 10:21

## 2024-08-06 RX ADMIN — DEXAMETHASONE SODIUM PHOSPHATE 10 MG: 10 INJECTION INTRAMUSCULAR; INTRAVENOUS at 10:21

## 2024-08-06 RX ADMIN — DIPHENHYDRAMINE HYDROCHLORIDE 25 MG: 50 INJECTION INTRAMUSCULAR; INTRAVENOUS at 10:21

## 2024-08-06 RX ADMIN — SODIUM CHLORIDE 483 MG: 9 INJECTION, SOLUTION INTRAVENOUS at 10:59

## 2024-08-06 RX ADMIN — SODIUM CHLORIDE, PRESERVATIVE FREE 10 ML: 5 INJECTION INTRAVENOUS at 09:15

## 2024-08-06 RX ADMIN — PACLITAXEL 156 MG: 6 INJECTION, SOLUTION INTRAVENOUS at 11:45

## 2024-08-06 RX ADMIN — SODIUM CHLORIDE 25 ML/HR: 9 INJECTION, SOLUTION INTRAVENOUS at 10:21

## 2024-08-06 RX ADMIN — SODIUM CHLORIDE, PRESERVATIVE FREE 10 ML: 5 INJECTION INTRAVENOUS at 13:12

## 2024-08-06 NOTE — PROGRESS NOTES
Patient here for C3 D1 Taxol & Trazimera  Arrives ambulatory per self  Complaints of - occasional HA, diarrhea, chills at night but no fevers - manageable side effects  VS stble as charted  Labs drawn per port & reviewed;within parameters to tx  K 3.5   Perfect serve sent to Dr Grimaldo with no response for replacement if needed -  patient educated on foods high in K+  Pt premedicated   Taxol infused at 100 ml x 15 minutes with no s/s adverse reaction then increased to ordered rate  Infused without incident & line flushed  Trazimera infused without incident;line flushed  Pt stable for discharge  Port flushed with brisk blood return & heparinized with intact Wilder needle removed per protocol   Pt discharged ambulatory per self  Returns 8/12 for C3 D8 Taxol & MD visit

## 2024-08-08 ENCOUNTER — TELEPHONE (OUTPATIENT)
Dept: ONCOLOGY | Age: 59
End: 2024-08-08

## 2024-08-08 NOTE — TELEPHONE ENCOUNTER
Name: Ann Hardy  : 1965  MRN: [id number]    Oncology Navigation Follow-Up Note    Notes: PT is currently scheduled to complete her chemotherapy on 9/10. She will need a Rad/Onc appt to discuss XRT s/p lumpectomy. Writer will update RO staff.       Electronically signed by Delmy Calderon RN on 2024 at 3:00 PM

## 2024-08-12 ENCOUNTER — TELEPHONE (OUTPATIENT)
Dept: ONCOLOGY | Age: 59
End: 2024-08-12

## 2024-08-12 ENCOUNTER — HOSPITAL ENCOUNTER (OUTPATIENT)
Facility: MEDICAL CENTER | Age: 59
End: 2024-08-12
Payer: COMMERCIAL

## 2024-08-12 ENCOUNTER — HOSPITAL ENCOUNTER (OUTPATIENT)
Dept: INFUSION THERAPY | Facility: MEDICAL CENTER | Age: 59
Discharge: HOME | End: 2024-08-12
Payer: COMMERCIAL

## 2024-08-12 ENCOUNTER — OFFICE VISIT (OUTPATIENT)
Dept: ONCOLOGY | Age: 59
End: 2024-08-12
Payer: COMMERCIAL

## 2024-08-12 VITALS
DIASTOLIC BLOOD PRESSURE: 83 MMHG | SYSTOLIC BLOOD PRESSURE: 116 MMHG | BODY MASS INDEX: 28.25 KG/M2 | TEMPERATURE: 97.6 F | HEART RATE: 94 BPM | RESPIRATION RATE: 16 BRPM | WEIGHT: 180.4 LBS

## 2024-08-12 DIAGNOSIS — Z17.1 MALIGNANT NEOPLASM OF OVERLAPPING SITES OF RIGHT BREAST IN FEMALE, ESTROGEN RECEPTOR NEGATIVE (HCC): Primary | ICD-10-CM

## 2024-08-12 DIAGNOSIS — C50.811 MALIGNANT NEOPLASM OF OVERLAPPING SITES OF RIGHT BREAST IN FEMALE, ESTROGEN RECEPTOR NEGATIVE (HCC): Primary | ICD-10-CM

## 2024-08-12 DIAGNOSIS — Z51.11 CHEMOTHERAPY MANAGEMENT, ENCOUNTER FOR: ICD-10-CM

## 2024-08-12 LAB
ALBUMIN SERPL-MCNC: 3.7 G/DL (ref 3.5–5.2)
ALP SERPL-CCNC: 86 U/L (ref 35–104)
ALT SERPL-CCNC: 21 U/L (ref 5–33)
ANION GAP SERPL CALCULATED.3IONS-SCNC: 10 MMOL/L (ref 9–17)
AST SERPL-CCNC: 16 U/L
BASOPHILS # BLD: 0.03 K/UL (ref 0–0.2)
BASOPHILS NFR BLD: 1 % (ref 0–2)
BILIRUB SERPL-MCNC: 0.2 MG/DL (ref 0.3–1.2)
BUN SERPL-MCNC: 19 MG/DL (ref 6–20)
BUN/CREAT SERPL: 27 (ref 9–20)
CALCIUM SERPL-MCNC: 9.1 MG/DL (ref 8.6–10.4)
CHLORIDE SERPL-SCNC: 106 MMOL/L (ref 98–107)
CO2 SERPL-SCNC: 24 MMOL/L (ref 20–31)
CREAT SERPL-MCNC: 0.7 MG/DL (ref 0.5–0.9)
EOSINOPHIL # BLD: 0.08 K/UL (ref 0–0.44)
EOSINOPHILS RELATIVE PERCENT: 2 % (ref 1–4)
ERYTHROCYTE [DISTWIDTH] IN BLOOD BY AUTOMATED COUNT: 15.4 % (ref 11.8–14.4)
GFR, ESTIMATED: >90 ML/MIN/1.73M2
GLUCOSE SERPL-MCNC: 126 MG/DL (ref 70–99)
HCT VFR BLD AUTO: 30.2 % (ref 36.3–47.1)
HGB BLD-MCNC: 9.6 G/DL (ref 11.9–15.1)
IMM GRANULOCYTES # BLD AUTO: 0.04 K/UL (ref 0–0.3)
IMM GRANULOCYTES NFR BLD: 1 %
LYMPHOCYTES NFR BLD: 0.76 K/UL (ref 1.1–3.7)
LYMPHOCYTES RELATIVE PERCENT: 19 % (ref 24–43)
MCH RBC QN AUTO: 29.5 PG (ref 25.2–33.5)
MCHC RBC AUTO-ENTMCNC: 31.8 G/DL (ref 28.4–34.8)
MCV RBC AUTO: 92.9 FL (ref 82.6–102.9)
MONOCYTES NFR BLD: 0.3 K/UL (ref 0.1–1.2)
MONOCYTES NFR BLD: 8 % (ref 3–12)
NEUTROPHILS NFR BLD: 69 % (ref 36–65)
NEUTS SEG NFR BLD: 2.73 K/UL (ref 1.5–8.1)
NRBC BLD-RTO: 0 PER 100 WBC
PLATELET # BLD AUTO: 263 K/UL (ref 138–453)
PMV BLD AUTO: 9.3 FL (ref 8.1–13.5)
POTASSIUM SERPL-SCNC: 3.3 MMOL/L (ref 3.7–5.3)
PROT SERPL-MCNC: 7.3 G/DL (ref 6.4–8.3)
RBC # BLD AUTO: 3.25 M/UL (ref 3.95–5.11)
RBC # BLD: ABNORMAL 10*6/UL
SODIUM SERPL-SCNC: 140 MMOL/L (ref 135–144)
WBC OTHER # BLD: 3.9 K/UL (ref 3.5–11.3)

## 2024-08-12 PROCEDURE — 85025 COMPLETE CBC W/AUTO DIFF WBC: CPT

## 2024-08-12 PROCEDURE — 96375 TX/PRO/DX INJ NEW DRUG ADDON: CPT

## 2024-08-12 PROCEDURE — 36591 DRAW BLOOD OFF VENOUS DEVICE: CPT

## 2024-08-12 PROCEDURE — 99214 OFFICE O/P EST MOD 30 MIN: CPT | Performed by: INTERNAL MEDICINE

## 2024-08-12 PROCEDURE — 3079F DIAST BP 80-89 MM HG: CPT | Performed by: INTERNAL MEDICINE

## 2024-08-12 PROCEDURE — 2580000003 HC RX 258: Performed by: INTERNAL MEDICINE

## 2024-08-12 PROCEDURE — 6360000002 HC RX W HCPCS: Performed by: INTERNAL MEDICINE

## 2024-08-12 PROCEDURE — 2500000003 HC RX 250 WO HCPCS: Performed by: INTERNAL MEDICINE

## 2024-08-12 PROCEDURE — 99211 OFF/OP EST MAY X REQ PHY/QHP: CPT | Performed by: INTERNAL MEDICINE

## 2024-08-12 PROCEDURE — 3074F SYST BP LT 130 MM HG: CPT | Performed by: INTERNAL MEDICINE

## 2024-08-12 PROCEDURE — 6370000000 HC RX 637 (ALT 250 FOR IP): Performed by: INTERNAL MEDICINE

## 2024-08-12 PROCEDURE — 96413 CHEMO IV INFUSION 1 HR: CPT

## 2024-08-12 PROCEDURE — 80053 COMPREHEN METABOLIC PANEL: CPT

## 2024-08-12 RX ORDER — DIPHENHYDRAMINE HYDROCHLORIDE 50 MG/ML
25 INJECTION INTRAMUSCULAR; INTRAVENOUS ONCE
OUTPATIENT
Start: 2024-09-09 | End: 2024-09-09

## 2024-08-12 RX ORDER — ALBUTEROL SULFATE 90 UG/1
4 AEROSOL, METERED RESPIRATORY (INHALATION) PRN
OUTPATIENT
Start: 2024-08-26

## 2024-08-12 RX ORDER — FAMOTIDINE 10 MG/ML
20 INJECTION, SOLUTION INTRAVENOUS ONCE
OUTPATIENT
Start: 2024-08-26 | End: 2024-08-26

## 2024-08-12 RX ORDER — SODIUM CHLORIDE 9 MG/ML
INJECTION, SOLUTION INTRAVENOUS CONTINUOUS
OUTPATIENT
Start: 2024-09-02

## 2024-08-12 RX ORDER — ONDANSETRON 2 MG/ML
8 INJECTION INTRAMUSCULAR; INTRAVENOUS
OUTPATIENT
Start: 2024-09-02

## 2024-08-12 RX ORDER — ONDANSETRON 2 MG/ML
8 INJECTION INTRAMUSCULAR; INTRAVENOUS
OUTPATIENT
Start: 2024-09-09

## 2024-08-12 RX ORDER — ONDANSETRON 2 MG/ML
8 INJECTION INTRAMUSCULAR; INTRAVENOUS
OUTPATIENT
Start: 2024-08-26

## 2024-08-12 RX ORDER — FAMOTIDINE 10 MG/ML
20 INJECTION, SOLUTION INTRAVENOUS
OUTPATIENT
Start: 2024-09-02

## 2024-08-12 RX ORDER — SODIUM CHLORIDE 9 MG/ML
5-250 INJECTION, SOLUTION INTRAVENOUS PRN
OUTPATIENT
Start: 2024-09-09

## 2024-08-12 RX ORDER — SODIUM CHLORIDE 9 MG/ML
5-250 INJECTION, SOLUTION INTRAVENOUS PRN
OUTPATIENT
Start: 2024-09-02

## 2024-08-12 RX ORDER — FAMOTIDINE 10 MG/ML
20 INJECTION, SOLUTION INTRAVENOUS ONCE
Status: COMPLETED | OUTPATIENT
Start: 2024-08-12 | End: 2024-08-12

## 2024-08-12 RX ORDER — EPINEPHRINE 1 MG/ML
0.3 INJECTION, SOLUTION, CONCENTRATE INTRAVENOUS PRN
OUTPATIENT
Start: 2024-09-09

## 2024-08-12 RX ORDER — ACETAMINOPHEN 325 MG/1
650 TABLET ORAL
OUTPATIENT
Start: 2024-08-26

## 2024-08-12 RX ORDER — DIPHENHYDRAMINE HYDROCHLORIDE 50 MG/ML
50 INJECTION INTRAMUSCULAR; INTRAVENOUS
OUTPATIENT
Start: 2024-09-09

## 2024-08-12 RX ORDER — DIPHENHYDRAMINE HYDROCHLORIDE 50 MG/ML
25 INJECTION INTRAMUSCULAR; INTRAVENOUS ONCE
OUTPATIENT
Start: 2024-09-02 | End: 2024-09-02

## 2024-08-12 RX ORDER — SODIUM CHLORIDE 9 MG/ML
5-250 INJECTION, SOLUTION INTRAVENOUS PRN
OUTPATIENT
Start: 2024-08-26

## 2024-08-12 RX ORDER — SODIUM CHLORIDE 9 MG/ML
INJECTION, SOLUTION INTRAVENOUS CONTINUOUS
OUTPATIENT
Start: 2024-08-26

## 2024-08-12 RX ORDER — DEXAMETHASONE SODIUM PHOSPHATE 10 MG/ML
10 INJECTION INTRAMUSCULAR; INTRAVENOUS ONCE
Status: COMPLETED | OUTPATIENT
Start: 2024-08-12 | End: 2024-08-12

## 2024-08-12 RX ORDER — SODIUM CHLORIDE 9 MG/ML
5-250 INJECTION, SOLUTION INTRAVENOUS PRN
Status: DISCONTINUED | OUTPATIENT
Start: 2024-08-12 | End: 2024-08-13 | Stop reason: HOSPADM

## 2024-08-12 RX ORDER — HEPARIN SODIUM (PORCINE) LOCK FLUSH IV SOLN 100 UNIT/ML 100 UNIT/ML
500 SOLUTION INTRAVENOUS PRN
OUTPATIENT
Start: 2024-09-02

## 2024-08-12 RX ORDER — HEPARIN SODIUM (PORCINE) LOCK FLUSH IV SOLN 100 UNIT/ML 100 UNIT/ML
500 SOLUTION INTRAVENOUS PRN
OUTPATIENT
Start: 2024-08-26

## 2024-08-12 RX ORDER — ALBUTEROL SULFATE 90 UG/1
4 AEROSOL, METERED RESPIRATORY (INHALATION) PRN
OUTPATIENT
Start: 2024-09-02

## 2024-08-12 RX ORDER — ERGOCALCIFEROL 1.25 MG/1
50000 CAPSULE ORAL WEEKLY
Qty: 12 CAPSULE | Refills: 1 | Status: SHIPPED | OUTPATIENT
Start: 2024-08-12

## 2024-08-12 RX ORDER — SODIUM CHLORIDE 0.9 % (FLUSH) 0.9 %
5-40 SYRINGE (ML) INJECTION PRN
OUTPATIENT
Start: 2024-08-26

## 2024-08-12 RX ORDER — DIPHENHYDRAMINE HYDROCHLORIDE 50 MG/ML
25 INJECTION INTRAMUSCULAR; INTRAVENOUS ONCE
OUTPATIENT
Start: 2024-08-26 | End: 2024-08-26

## 2024-08-12 RX ORDER — SODIUM CHLORIDE 0.9 % (FLUSH) 0.9 %
5-40 SYRINGE (ML) INJECTION PRN
OUTPATIENT
Start: 2024-09-09

## 2024-08-12 RX ORDER — VITAMIN B COMPLEX
1 CAPSULE ORAL DAILY
Qty: 30 CAPSULE | Refills: 3 | Status: SHIPPED | OUTPATIENT
Start: 2024-08-12

## 2024-08-12 RX ORDER — SODIUM CHLORIDE 0.9 % (FLUSH) 0.9 %
5-40 SYRINGE (ML) INJECTION PRN
OUTPATIENT
Start: 2024-09-02

## 2024-08-12 RX ORDER — MEPERIDINE HYDROCHLORIDE 50 MG/ML
12.5 INJECTION INTRAMUSCULAR; INTRAVENOUS; SUBCUTANEOUS PRN
OUTPATIENT
Start: 2024-09-02

## 2024-08-12 RX ORDER — HEPARIN 100 UNIT/ML
500 SYRINGE INTRAVENOUS PRN
Status: DISCONTINUED | OUTPATIENT
Start: 2024-08-12 | End: 2024-08-13 | Stop reason: HOSPADM

## 2024-08-12 RX ORDER — DIPHENHYDRAMINE HYDROCHLORIDE 50 MG/ML
25 INJECTION INTRAMUSCULAR; INTRAVENOUS ONCE
Status: COMPLETED | OUTPATIENT
Start: 2024-08-12 | End: 2024-08-12

## 2024-08-12 RX ORDER — FAMOTIDINE 10 MG/ML
20 INJECTION, SOLUTION INTRAVENOUS ONCE
OUTPATIENT
Start: 2024-09-02 | End: 2024-09-02

## 2024-08-12 RX ORDER — DIPHENHYDRAMINE HYDROCHLORIDE 50 MG/ML
50 INJECTION INTRAMUSCULAR; INTRAVENOUS
OUTPATIENT
Start: 2024-08-26

## 2024-08-12 RX ORDER — FAMOTIDINE 10 MG/ML
20 INJECTION, SOLUTION INTRAVENOUS ONCE
OUTPATIENT
Start: 2024-09-09 | End: 2024-09-09

## 2024-08-12 RX ORDER — EPINEPHRINE 1 MG/ML
0.3 INJECTION, SOLUTION, CONCENTRATE INTRAVENOUS PRN
OUTPATIENT
Start: 2024-08-26

## 2024-08-12 RX ORDER — FAMOTIDINE 10 MG/ML
20 INJECTION, SOLUTION INTRAVENOUS
OUTPATIENT
Start: 2024-08-26

## 2024-08-12 RX ORDER — EPINEPHRINE 1 MG/ML
0.3 INJECTION, SOLUTION, CONCENTRATE INTRAVENOUS PRN
OUTPATIENT
Start: 2024-09-02

## 2024-08-12 RX ORDER — MEPERIDINE HYDROCHLORIDE 50 MG/ML
12.5 INJECTION INTRAMUSCULAR; INTRAVENOUS; SUBCUTANEOUS PRN
OUTPATIENT
Start: 2024-09-09

## 2024-08-12 RX ORDER — ALBUTEROL SULFATE 90 UG/1
4 AEROSOL, METERED RESPIRATORY (INHALATION) PRN
OUTPATIENT
Start: 2024-09-09

## 2024-08-12 RX ORDER — SODIUM CHLORIDE 9 MG/ML
INJECTION, SOLUTION INTRAVENOUS CONTINUOUS
OUTPATIENT
Start: 2024-09-09

## 2024-08-12 RX ORDER — MEPERIDINE HYDROCHLORIDE 50 MG/ML
12.5 INJECTION INTRAMUSCULAR; INTRAVENOUS; SUBCUTANEOUS PRN
OUTPATIENT
Start: 2024-08-26

## 2024-08-12 RX ORDER — FAMOTIDINE 10 MG/ML
20 INJECTION, SOLUTION INTRAVENOUS
OUTPATIENT
Start: 2024-09-09

## 2024-08-12 RX ORDER — ACETAMINOPHEN 325 MG/1
650 TABLET ORAL
OUTPATIENT
Start: 2024-09-09

## 2024-08-12 RX ORDER — DIPHENHYDRAMINE HYDROCHLORIDE 50 MG/ML
50 INJECTION INTRAMUSCULAR; INTRAVENOUS
OUTPATIENT
Start: 2024-09-02

## 2024-08-12 RX ORDER — POTASSIUM CHLORIDE 20 MEQ/1
20 TABLET, EXTENDED RELEASE ORAL
Status: COMPLETED | OUTPATIENT
Start: 2024-08-12 | End: 2024-08-12

## 2024-08-12 RX ORDER — HEPARIN SODIUM (PORCINE) LOCK FLUSH IV SOLN 100 UNIT/ML 100 UNIT/ML
500 SOLUTION INTRAVENOUS PRN
OUTPATIENT
Start: 2024-09-09

## 2024-08-12 RX ORDER — ACETAMINOPHEN 325 MG/1
650 TABLET ORAL
OUTPATIENT
Start: 2024-09-02

## 2024-08-12 RX ORDER — POTASSIUM CHLORIDE 20 MEQ/1
20 TABLET, EXTENDED RELEASE ORAL DAILY
Qty: 30 TABLET | Refills: 1 | Status: SHIPPED | OUTPATIENT
Start: 2024-08-12

## 2024-08-12 RX ORDER — SODIUM CHLORIDE 0.9 % (FLUSH) 0.9 %
5-40 SYRINGE (ML) INJECTION PRN
Status: DISCONTINUED | OUTPATIENT
Start: 2024-08-12 | End: 2024-08-13 | Stop reason: HOSPADM

## 2024-08-12 RX ADMIN — FAMOTIDINE 20 MG: 10 INJECTION, SOLUTION INTRAVENOUS at 12:20

## 2024-08-12 RX ADMIN — SODIUM CHLORIDE, PRESERVATIVE FREE 20 ML: 5 INJECTION INTRAVENOUS at 14:50

## 2024-08-12 RX ADMIN — POTASSIUM CHLORIDE 20 MEQ: 1500 TABLET, EXTENDED RELEASE ORAL at 14:12

## 2024-08-12 RX ADMIN — DEXAMETHASONE SODIUM PHOSPHATE 10 MG: 10 INJECTION INTRAMUSCULAR; INTRAVENOUS at 12:22

## 2024-08-12 RX ADMIN — POTASSIUM CHLORIDE 20 MEQ: 1500 TABLET, EXTENDED RELEASE ORAL at 13:12

## 2024-08-12 RX ADMIN — SODIUM CHLORIDE 100 ML/HR: 9 INJECTION, SOLUTION INTRAVENOUS at 11:33

## 2024-08-12 RX ADMIN — Medication 500 UNITS: at 14:49

## 2024-08-12 RX ADMIN — DIPHENHYDRAMINE HYDROCHLORIDE 25 MG: 50 INJECTION INTRAMUSCULAR; INTRAVENOUS at 12:18

## 2024-08-12 RX ADMIN — PACLITAXEL 156 MG: 6 INJECTION, SOLUTION INTRAVENOUS at 13:14

## 2024-08-12 NOTE — PROGRESS NOTES
Spiritual Health Outpatient Oncology/Hematology Progress Note    Situation: Writer encountered Patient in the treatment cubicle of the infusion clinic.    Assessment: Patient smiled and greeted writer. Pt shared how she was doing. Pt expressed gratitude for how she has done with her treatments. Pt credited God. Pt spoke of her experiences at [facility] and how it has helped her cope.     Intervention: Writer inquired about Pt's coping and needs. Writer explored Pt's sources of support and strength. Writer offered supportive presence and active listening. Writer affirmed Pt's strengths. Writer offered words of support and encouragement.     Outcome:  Pt thanked writer for the visit.    Plan: Spiritual Health Services are available for Patient and Family by phone and/or in person.      08/12/24 1445   Encounter Summary   Encounter Overview/Reason Spiritual/Emotional Needs   Service Provided For Patient   Referral/Consult From [facility]   Support System Family members;Children;Spouse;[place]/edgard community   Last Encounter  06/24/24   Complexity of Encounter Low   Begin Time 1230   End Time  1240   Total Time Calculated 10 min   Spiritual/Emotional needs   Type Spiritual Support   Assessment/Intervention/Outcome   Assessment Coping;Calm   Intervention Sustaining Presence/Ministry of presence;Active listening;Explored/Affirmed feelings, thoughts, concerns;Explored Coping Skills/Resources;Discussed illness injury and it’s impact;Discussed belief system/Restoration practices/edgard   Outcome Expressed feelings, needs, and concerns;Engaged in conversation;Expressed Gratitude;Coping   Plan and Referrals   Plan/Referrals Continue Support (comment)     JOSSY Edmonds  [facility] Spiritual Health   [phone]

## 2024-08-12 NOTE — TELEPHONE ENCOUNTER
AMINA HERE FOR FOLLOW UP & TX   PUSH OUT 9/2 TO NEXT WEEK  KCL 40 PO X 1 NOW   RV IN 2 WEEKS   RN ROBBIN NOTIFIED ON SAMI RAMIREZ VISIT: 8/26/24 @ 1:30PM TX @ 1PM   AVS PRINTED AND GIVEN ON EXIT

## 2024-08-12 NOTE — PROGRESS NOTES
Ann Hardy                                                                                                                  8/12/2024  MRN:   [id number]  YOB: 1965  PCP:                           Varun Centeno DO  Referring Physician: No ref. provider found  Treating Physician Name: TOBI PARK MD      Reason for visit:  Chief Complaint   Patient presents with    Follow-up     RADIATION QUESTIONS    Toxicity check.  Discussed treatment plan.    Current problems:  Right breast invasive cancer, HER2 positive ER negative, pathological stage T1b N0 M0-2/2024  Right breast DCIS, ER negative  Chronic arthritis  Medical comorbidities: Mitral valve prolapse, dyslipidemia, hypertension    Active and recent treatments:  S/p right lumpectomy with sentinel lymph node biopsy-5/2024  Weekly Taxol with Herceptin-6/2024    Interval history:  Patient presents to the clinic for a follow-up visit and for toxicity check and to review results of her blood work-up.  Patient has been tolerating treatment without any unexpected or severe side effects.  Denies hospitalization or ER visit.  Appetite is good.  Weight is stable.  Nausea is controlled.  During this visit patient's allergy, social, medical, surgical history and medications were reviewed and updated.    Summary of Case/History:  Ann Hardy a 58 y.o.female is a patient with biopsy-proven invasive cancer of right breast, triple negative and DCIS, ER negative presents to the clinic to establish care and for further workup and evaluation.She underwent screening mammogram in January 2024 which showed group of calcification in the right breast which was indeterminate.  Patient subsequently underwent diagnostic mammogram of the right breast which showed clustered pleomorphic calcification in the right breast at the 11 o'clock position.  Patient subsequently underwent biopsy of the right breast which came back as invasive carcinoma, triple negative,

## 2024-08-12 NOTE — PROGRESS NOTES
Pt here for C3D8 taxol & MD visit   Arrives ambulatory   Denies complaints/concerns.   Labs drawn from chest port  Pt seen by MD, orders given to proceed with treatment and give oral K.   Premeds given   Oral K given   TX completed without incident   Patient discharged in stable condition  Patient returns 8/19 for C3 D15 Taxol

## 2024-08-16 ENCOUNTER — TELEPHONE (OUTPATIENT)
Dept: INFUSION THERAPY | Age: 59
End: 2024-08-16

## 2024-08-16 NOTE — TELEPHONE ENCOUNTER
Patient called stating that she is having cold chills, cold sweats, fever (but she don't have an accurate thermometer) some diarrhea.  RN thought this sounded like the flu and advised her to take tylenol/ibuprofen drink fluids with electrolytes take imodium for the diarrhea and if she gets nausea too take zofran.  She was advised to let the nurses know if she still feels this way Monday.  She said that she has an appointment Monday.

## 2024-08-19 ENCOUNTER — HOSPITAL ENCOUNTER (OUTPATIENT)
Dept: INFUSION THERAPY | Facility: MEDICAL CENTER | Age: 59
Discharge: HOME | End: 2024-08-19
Payer: COMMERCIAL

## 2024-08-19 ENCOUNTER — HOSPITAL ENCOUNTER (OUTPATIENT)
Facility: MEDICAL CENTER | Age: 59
End: 2024-08-19
Payer: COMMERCIAL

## 2024-08-19 VITALS
HEART RATE: 105 BPM | DIASTOLIC BLOOD PRESSURE: 82 MMHG | RESPIRATION RATE: 16 BRPM | SYSTOLIC BLOOD PRESSURE: 135 MMHG | TEMPERATURE: 97.4 F

## 2024-08-19 DIAGNOSIS — Z17.1 MALIGNANT NEOPLASM OF OVERLAPPING SITES OF RIGHT BREAST IN FEMALE, ESTROGEN RECEPTOR NEGATIVE (HCC): Primary | ICD-10-CM

## 2024-08-19 DIAGNOSIS — C50.811 MALIGNANT NEOPLASM OF OVERLAPPING SITES OF RIGHT BREAST IN FEMALE, ESTROGEN RECEPTOR NEGATIVE (HCC): Primary | ICD-10-CM

## 2024-08-19 LAB
ALBUMIN SERPL-MCNC: 3.9 G/DL (ref 3.5–5.2)
ALP SERPL-CCNC: 88 U/L (ref 35–104)
ALT SERPL-CCNC: 26 U/L (ref 5–33)
ANION GAP SERPL CALCULATED.3IONS-SCNC: 10 MMOL/L (ref 9–17)
AST SERPL-CCNC: 23 U/L
BASOPHILS # BLD: 0.03 K/UL (ref 0–0.2)
BASOPHILS NFR BLD: 1 % (ref 0–2)
BILIRUB SERPL-MCNC: 0.3 MG/DL (ref 0.3–1.2)
BUN SERPL-MCNC: 20 MG/DL (ref 6–20)
BUN/CREAT SERPL: 25 (ref 9–20)
CALCIUM SERPL-MCNC: 9.2 MG/DL (ref 8.6–10.4)
CHLORIDE SERPL-SCNC: 105 MMOL/L (ref 98–107)
CO2 SERPL-SCNC: 24 MMOL/L (ref 20–31)
CREAT SERPL-MCNC: 0.8 MG/DL (ref 0.5–0.9)
EOSINOPHIL # BLD: 0.08 K/UL (ref 0–0.44)
EOSINOPHILS RELATIVE PERCENT: 3 % (ref 1–4)
ERYTHROCYTE [DISTWIDTH] IN BLOOD BY AUTOMATED COUNT: 15.8 % (ref 11.8–14.4)
GFR, ESTIMATED: 85 ML/MIN/1.73M2
GLUCOSE SERPL-MCNC: 85 MG/DL (ref 70–99)
HCT VFR BLD AUTO: 29.9 % (ref 36.3–47.1)
HGB BLD-MCNC: 9.7 G/DL (ref 11.9–15.1)
IMM GRANULOCYTES # BLD AUTO: 0.01 K/UL (ref 0–0.3)
IMM GRANULOCYTES NFR BLD: 0 %
LYMPHOCYTES NFR BLD: 1.06 K/UL (ref 1.1–3.7)
LYMPHOCYTES RELATIVE PERCENT: 37 % (ref 24–43)
MCH RBC QN AUTO: 29 PG (ref 25.2–33.5)
MCHC RBC AUTO-ENTMCNC: 32.4 G/DL (ref 28.4–34.8)
MCV RBC AUTO: 89.3 FL (ref 82.6–102.9)
MONOCYTES NFR BLD: 0.3 K/UL (ref 0.1–1.2)
MONOCYTES NFR BLD: 11 % (ref 3–12)
NEUTROPHILS NFR BLD: 48 % (ref 36–65)
NEUTS SEG NFR BLD: 1.38 K/UL (ref 1.5–8.1)
NRBC BLD-RTO: 0 PER 100 WBC
PLATELET # BLD AUTO: 342 K/UL (ref 138–453)
PMV BLD AUTO: 9.3 FL (ref 8.1–13.5)
POTASSIUM SERPL-SCNC: 4 MMOL/L (ref 3.7–5.3)
PROT SERPL-MCNC: 7.6 G/DL (ref 6.4–8.3)
RBC # BLD AUTO: 3.35 M/UL (ref 3.95–5.11)
RBC # BLD: ABNORMAL 10*6/UL
SODIUM SERPL-SCNC: 139 MMOL/L (ref 135–144)
WBC OTHER # BLD: 2.9 K/UL (ref 3.5–11.3)

## 2024-08-19 PROCEDURE — 96413 CHEMO IV INFUSION 1 HR: CPT

## 2024-08-19 PROCEDURE — 96375 TX/PRO/DX INJ NEW DRUG ADDON: CPT

## 2024-08-19 PROCEDURE — 2580000003 HC RX 258: Performed by: INTERNAL MEDICINE

## 2024-08-19 PROCEDURE — 80053 COMPREHEN METABOLIC PANEL: CPT

## 2024-08-19 PROCEDURE — 36591 DRAW BLOOD OFF VENOUS DEVICE: CPT

## 2024-08-19 PROCEDURE — 85025 COMPLETE CBC W/AUTO DIFF WBC: CPT

## 2024-08-19 PROCEDURE — 2500000003 HC RX 250 WO HCPCS: Performed by: INTERNAL MEDICINE

## 2024-08-19 PROCEDURE — 6360000002 HC RX W HCPCS: Performed by: INTERNAL MEDICINE

## 2024-08-19 RX ORDER — DIPHENHYDRAMINE HYDROCHLORIDE 50 MG/ML
25 INJECTION INTRAMUSCULAR; INTRAVENOUS ONCE
Status: COMPLETED | OUTPATIENT
Start: 2024-08-19 | End: 2024-08-19

## 2024-08-19 RX ORDER — SODIUM CHLORIDE 0.9 % (FLUSH) 0.9 %
5-40 SYRINGE (ML) INJECTION PRN
Status: DISCONTINUED | OUTPATIENT
Start: 2024-08-19 | End: 2024-08-20 | Stop reason: HOSPADM

## 2024-08-19 RX ORDER — FAMOTIDINE 10 MG/ML
20 INJECTION, SOLUTION INTRAVENOUS ONCE
Status: COMPLETED | OUTPATIENT
Start: 2024-08-19 | End: 2024-08-19

## 2024-08-19 RX ORDER — SODIUM CHLORIDE 9 MG/ML
5-250 INJECTION, SOLUTION INTRAVENOUS PRN
Status: DISCONTINUED | OUTPATIENT
Start: 2024-08-19 | End: 2024-08-20 | Stop reason: HOSPADM

## 2024-08-19 RX ORDER — HEPARIN 100 UNIT/ML
500 SYRINGE INTRAVENOUS PRN
Status: DISCONTINUED | OUTPATIENT
Start: 2024-08-19 | End: 2024-08-20 | Stop reason: HOSPADM

## 2024-08-19 RX ORDER — DEXAMETHASONE SODIUM PHOSPHATE 10 MG/ML
10 INJECTION INTRAMUSCULAR; INTRAVENOUS ONCE
Status: COMPLETED | OUTPATIENT
Start: 2024-08-19 | End: 2024-08-19

## 2024-08-19 RX ADMIN — SODIUM CHLORIDE, PRESERVATIVE FREE 10 ML: 5 INJECTION INTRAVENOUS at 14:09

## 2024-08-19 RX ADMIN — SODIUM CHLORIDE 20 ML/HR: 9 INJECTION, SOLUTION INTRAVENOUS at 11:25

## 2024-08-19 RX ADMIN — FAMOTIDINE 20 MG: 10 INJECTION, SOLUTION INTRAVENOUS at 11:59

## 2024-08-19 RX ADMIN — DEXAMETHASONE SODIUM PHOSPHATE 10 MG: 10 INJECTION INTRAMUSCULAR; INTRAVENOUS at 11:59

## 2024-08-19 RX ADMIN — HEPARIN 500 UNITS: 100 SYRINGE at 14:09

## 2024-08-19 RX ADMIN — PACLITAXEL 156 MG: 6 INJECTION, SOLUTION INTRAVENOUS at 12:40

## 2024-08-19 RX ADMIN — DIPHENHYDRAMINE HYDROCHLORIDE 25 MG: 50 INJECTION INTRAMUSCULAR; INTRAVENOUS at 11:59

## 2024-08-19 RX ADMIN — SODIUM CHLORIDE, PRESERVATIVE FREE 10 ML: 5 INJECTION INTRAVENOUS at 11:21

## 2024-08-19 NOTE — PROGRESS NOTES
Patient arrived ambulatory per self for C3D15 treatment.   Patient denies complaint or concern. States the flu-like symptoms she experienced last week have resolved.   Port accessed, specimen sent.   Labs and orders reviewed, Ok to treat.  Patient premedicated.   Taxol infused without issue, line flushed.   Port flushed and heparinized with intact hollingsworth needle removed, band aid applied.   Patient discharged.

## 2024-08-20 NOTE — PROGRESS NOTES
Spiritual Health Outpatient Oncology/Hematology Progress Note    Situation: Writer encountered Patient in the treatment cubicle of the infusion clinic.    Assessment: Patient shared how she was doing. Pt acknowledged that she got sick this past week and that she is recovering. Pt shared that her spouse is now sick. Pt expressed gratitude for her spouse's support and care, noting how much he has helped her.     Intervention:  Writer inquired about Pt's coping and needs. Writer explored Pt's sources of support and strength. Writer offered supportive presence and active listening. Writer affirmed Pt's strengths. Writer offered words of support and encouragement.     Outcome: Pt thanked writer for the visit.    Plan: Spiritual Health Services are available for Patient by phone and/or in person.        08/20/24 1009   Encounter Summary   Encounter Overview/Reason Spiritual/Emotional Needs   Service Provided For Patient   Referral/Consult From [facility]   Support System Family members   Last Encounter  08/12/24   Complexity of Encounter Low   Begin Time 1030   End Time  1035   Total Time Calculated 5 min   Spiritual/Emotional needs   Type Spiritual Support   Assessment/Intervention/Outcome   Assessment Calm;Coping   Intervention Sustaining Presence/Ministry of presence;Active listening;Explored/Affirmed feelings, thoughts, concerns;Explored Coping Skills/Resources;Discussed illness injury and it’s impact   Outcome Expressed Gratitude;Engaged in conversation;Coping   Plan and Referrals   Plan/Referrals Continue Support (comment)     JOSSY Edmonds  [facility] Spiritual [facility]   [phone]

## 2024-08-22 ENCOUNTER — HOSPITAL ENCOUNTER (OUTPATIENT)
Facility: MEDICAL CENTER | Age: 59
End: 2024-08-22
Payer: COMMERCIAL

## 2024-08-23 ENCOUNTER — OFFICE VISIT (OUTPATIENT)
Age: 59
End: 2024-08-23
Payer: COMMERCIAL

## 2024-08-23 VITALS
HEART RATE: 122 BPM | BODY MASS INDEX: 27.97 KG/M2 | WEIGHT: 178.6 LBS | OXYGEN SATURATION: 99 % | DIASTOLIC BLOOD PRESSURE: 85 MMHG | SYSTOLIC BLOOD PRESSURE: 139 MMHG

## 2024-08-23 DIAGNOSIS — I34.0 MITRAL VALVE INSUFFICIENCY, UNSPECIFIED ETIOLOGY: Primary | ICD-10-CM

## 2024-08-23 DIAGNOSIS — I10 PRIMARY HYPERTENSION: ICD-10-CM

## 2024-08-23 PROCEDURE — 93000 ELECTROCARDIOGRAM COMPLETE: CPT | Performed by: INTERNAL MEDICINE

## 2024-08-23 PROCEDURE — 3079F DIAST BP 80-89 MM HG: CPT | Performed by: INTERNAL MEDICINE

## 2024-08-23 PROCEDURE — 3075F SYST BP GE 130 - 139MM HG: CPT | Performed by: INTERNAL MEDICINE

## 2024-08-23 PROCEDURE — 99204 OFFICE O/P NEW MOD 45 MIN: CPT | Performed by: INTERNAL MEDICINE

## 2024-08-23 RX ORDER — METOPROLOL SUCCINATE 25 MG/1
25 TABLET, EXTENDED RELEASE ORAL DAILY
Qty: 30 TABLET | Refills: 3 | Status: SHIPPED | OUTPATIENT
Start: 2024-08-23

## 2024-08-23 RX ORDER — METOPROLOL SUCCINATE 25 MG/1
25 TABLET, EXTENDED RELEASE ORAL DAILY
Qty: 30 TABLET | Refills: 3 | Status: SHIPPED | OUTPATIENT
Start: 2024-08-23 | End: 2024-08-23

## 2024-08-23 RX ORDER — LOSARTAN POTASSIUM 50 MG/1
25 TABLET ORAL DAILY
Qty: 90 TABLET | Refills: 3 | Status: SHIPPED | OUTPATIENT
Start: 2024-08-23

## 2024-08-23 NOTE — PROGRESS NOTES
No current facility-administered medications for this visit.       Physical Exam:   Vitals: /85 (Site: Left Upper Arm, Position: Sitting, Cuff Size: Medium Adult)   Pulse (!) 122   Wt 81 kg (178 lb 9.6 oz)   SpO2 99%   BMI 27.97 kg/m²     General appearance: alert, oriented and cooperative with exam  HEENT: Head: Normocephalic, no lesions, without obvious abnormality.  Neck: no carotid bruit, no JVD  Lungs: clear to auscultation bilaterally without any wheezing or rales   Heart: regular rate and rhythm, S1, S2 normal, no murmur, click, rub or gallop  Abdomen: soft, non-tender; bowel sounds normal; no masses,  no organomegaly  Extremities: extremities normal, atraumatic, no cyanosis or edema  Neurologic: Mental status: Alert, oriented, thought content appropriate    Labs:    CBC: No results for input(s): \"WBC\", \"HGB\", \"HCT\", \"PLT\" in the last 72 hours.  BMP: No results for input(s): \"NA\", \"K\", \"CO2\", \"BUN\", \"CREATININE\", \"LABGLOM\", \"GLUCOSE\" in the last 72 hours.  PT/INR: No results for input(s): \"PROTIME\", \"INR\" in the last 72 hours.  FASTING LIPID PANEL:  Lab Results   Component Value Date/Time    HDL 54 03/01/2024 10:24 AM    TRIG 84 03/01/2024 10:24 AM     LIVER PROFILE:No results for input(s): \"AST\", \"ALT\", \"LABALBU\" in the last 72 hours.    EKG 8/23/2024 : Sinus tachycardia, diffuse nonspecific ST wave abnormality      ECHO 6/17.24:     Left Ventricle: Normal left ventricular systolic function with a visually estimated EF of 60 - 65%. Left ventricle size is normal. Normal wall thickness. Normal wall motion. Normal diastolic function.    Mitral Valve: Mild regurgitation.    Tricuspid Valve: Mild regurgitation.    Image quality is adequate.      Past Medical and Surgical History, Problem List, Allergies, Medications, Labs, Imaging, all reviewed extensively in EMR and with the patient.    Assessment:   Hypertension  Sinus tachycardia  Mild MR and AI  Hyperlipidemia        Plan:   Recommend decreasing

## 2024-08-26 ENCOUNTER — OFFICE VISIT (OUTPATIENT)
Dept: ONCOLOGY | Age: 59
End: 2024-08-26
Payer: COMMERCIAL

## 2024-08-26 ENCOUNTER — TELEPHONE (OUTPATIENT)
Dept: ONCOLOGY | Age: 59
End: 2024-08-26

## 2024-08-26 ENCOUNTER — HOSPITAL ENCOUNTER (OUTPATIENT)
Dept: INFUSION THERAPY | Facility: MEDICAL CENTER | Age: 59
Discharge: HOME | End: 2024-08-26
Payer: COMMERCIAL

## 2024-08-26 VITALS
SYSTOLIC BLOOD PRESSURE: 121 MMHG | BODY MASS INDEX: 27.78 KG/M2 | WEIGHT: 177.4 LBS | RESPIRATION RATE: 18 BRPM | HEART RATE: 96 BPM | DIASTOLIC BLOOD PRESSURE: 84 MMHG | TEMPERATURE: 98.3 F

## 2024-08-26 DIAGNOSIS — C50.811 MALIGNANT NEOPLASM OF OVERLAPPING SITES OF RIGHT BREAST IN FEMALE, ESTROGEN RECEPTOR NEGATIVE (HCC): Primary | ICD-10-CM

## 2024-08-26 DIAGNOSIS — J01.90 ACUTE SINUSITIS, RECURRENCE NOT SPECIFIED, UNSPECIFIED LOCATION: ICD-10-CM

## 2024-08-26 DIAGNOSIS — Z17.1 MALIGNANT NEOPLASM OF OVERLAPPING SITES OF RIGHT BREAST IN FEMALE, ESTROGEN RECEPTOR NEGATIVE (HCC): Primary | ICD-10-CM

## 2024-08-26 LAB
ALBUMIN SERPL-MCNC: 3.6 G/DL (ref 3.5–5.2)
ALP SERPL-CCNC: 83 U/L (ref 35–104)
ALT SERPL-CCNC: 24 U/L (ref 5–33)
ANION GAP SERPL CALCULATED.3IONS-SCNC: 10 MMOL/L (ref 9–17)
AST SERPL-CCNC: 18 U/L
BASOPHILS # BLD: 0.06 K/UL (ref 0–0.2)
BASOPHILS NFR BLD: 1 % (ref 0–2)
BILIRUB SERPL-MCNC: 0.2 MG/DL (ref 0.3–1.2)
BUN SERPL-MCNC: 15 MG/DL (ref 6–20)
BUN/CREAT SERPL: 19 (ref 9–20)
CALCIUM SERPL-MCNC: 9.4 MG/DL (ref 8.6–10.4)
CHLORIDE SERPL-SCNC: 105 MMOL/L (ref 98–107)
CO2 SERPL-SCNC: 25 MMOL/L (ref 20–31)
CREAT SERPL-MCNC: 0.8 MG/DL (ref 0.5–0.9)
EOSINOPHIL # BLD: 0.33 K/UL (ref 0–0.44)
EOSINOPHILS RELATIVE PERCENT: 6 % (ref 1–4)
ERYTHROCYTE [DISTWIDTH] IN BLOOD BY AUTOMATED COUNT: 16.1 % (ref 11.8–14.4)
GFR, ESTIMATED: 85 ML/MIN/1.73M2
GLUCOSE SERPL-MCNC: 102 MG/DL (ref 70–99)
HCT VFR BLD AUTO: 27.6 % (ref 36.3–47.1)
HGB BLD-MCNC: 9 G/DL (ref 11.9–15.1)
IMM GRANULOCYTES # BLD AUTO: 0.07 K/UL (ref 0–0.3)
IMM GRANULOCYTES NFR BLD: 1 %
LYMPHOCYTES NFR BLD: 1.12 K/UL (ref 1.1–3.7)
LYMPHOCYTES RELATIVE PERCENT: 19 % (ref 24–43)
MCH RBC QN AUTO: 29.3 PG (ref 25.2–33.5)
MCHC RBC AUTO-ENTMCNC: 32.6 G/DL (ref 28.4–34.8)
MCV RBC AUTO: 89.9 FL (ref 82.6–102.9)
MONOCYTES NFR BLD: 0.89 K/UL (ref 0.1–1.2)
MONOCYTES NFR BLD: 15 % (ref 3–12)
NEUTROPHILS NFR BLD: 58 % (ref 36–65)
NEUTS SEG NFR BLD: 3.43 K/UL (ref 1.5–8.1)
NRBC BLD-RTO: 0 PER 100 WBC
PLATELET # BLD AUTO: 375 K/UL (ref 138–453)
PMV BLD AUTO: 9.4 FL (ref 8.1–13.5)
POTASSIUM SERPL-SCNC: 3.9 MMOL/L (ref 3.7–5.3)
PROT SERPL-MCNC: 7.3 G/DL (ref 6.4–8.3)
RBC # BLD AUTO: 3.07 M/UL (ref 3.95–5.11)
RBC # BLD: ABNORMAL 10*6/UL
SODIUM SERPL-SCNC: 140 MMOL/L (ref 135–144)
WBC OTHER # BLD: 5.9 K/UL (ref 3.5–11.3)

## 2024-08-26 PROCEDURE — 85025 COMPLETE CBC W/AUTO DIFF WBC: CPT

## 2024-08-26 PROCEDURE — 96375 TX/PRO/DX INJ NEW DRUG ADDON: CPT

## 2024-08-26 PROCEDURE — 99211 OFF/OP EST MAY X REQ PHY/QHP: CPT | Performed by: INTERNAL MEDICINE

## 2024-08-26 PROCEDURE — 96413 CHEMO IV INFUSION 1 HR: CPT

## 2024-08-26 PROCEDURE — 3079F DIAST BP 80-89 MM HG: CPT | Performed by: INTERNAL MEDICINE

## 2024-08-26 PROCEDURE — 6360000002 HC RX W HCPCS: Performed by: INTERNAL MEDICINE

## 2024-08-26 PROCEDURE — 3074F SYST BP LT 130 MM HG: CPT | Performed by: INTERNAL MEDICINE

## 2024-08-26 PROCEDURE — 96417 CHEMO IV INFUS EACH ADDL SEQ: CPT

## 2024-08-26 PROCEDURE — 2500000003 HC RX 250 WO HCPCS: Performed by: INTERNAL MEDICINE

## 2024-08-26 PROCEDURE — 80053 COMPREHEN METABOLIC PANEL: CPT

## 2024-08-26 PROCEDURE — 36591 DRAW BLOOD OFF VENOUS DEVICE: CPT

## 2024-08-26 PROCEDURE — 2580000003 HC RX 258: Performed by: INTERNAL MEDICINE

## 2024-08-26 PROCEDURE — 99214 OFFICE O/P EST MOD 30 MIN: CPT | Performed by: INTERNAL MEDICINE

## 2024-08-26 RX ORDER — DIPHENHYDRAMINE HYDROCHLORIDE 50 MG/ML
25 INJECTION INTRAMUSCULAR; INTRAVENOUS ONCE
Status: COMPLETED | OUTPATIENT
Start: 2024-08-26 | End: 2024-08-26

## 2024-08-26 RX ORDER — DEXAMETHASONE SODIUM PHOSPHATE 10 MG/ML
10 INJECTION INTRAMUSCULAR; INTRAVENOUS ONCE
Status: COMPLETED | OUTPATIENT
Start: 2024-08-26 | End: 2024-08-26

## 2024-08-26 RX ORDER — AMOXICILLIN 500 MG/1
500 CAPSULE ORAL 3 TIMES DAILY
Qty: 15 CAPSULE | Refills: 0 | Status: SHIPPED | OUTPATIENT
Start: 2024-08-26 | End: 2024-08-31

## 2024-08-26 RX ORDER — HEPARIN 100 UNIT/ML
500 SYRINGE INTRAVENOUS PRN
Status: DISCONTINUED | OUTPATIENT
Start: 2024-08-26 | End: 2024-08-27 | Stop reason: HOSPADM

## 2024-08-26 RX ORDER — SODIUM CHLORIDE 0.9 % (FLUSH) 0.9 %
5-40 SYRINGE (ML) INJECTION PRN
Status: DISCONTINUED | OUTPATIENT
Start: 2024-08-26 | End: 2024-08-27 | Stop reason: HOSPADM

## 2024-08-26 RX ORDER — SODIUM CHLORIDE 9 MG/ML
5-250 INJECTION, SOLUTION INTRAVENOUS PRN
Status: DISCONTINUED | OUTPATIENT
Start: 2024-08-26 | End: 2024-08-27 | Stop reason: HOSPADM

## 2024-08-26 RX ORDER — FAMOTIDINE 10 MG/ML
20 INJECTION, SOLUTION INTRAVENOUS ONCE
Status: COMPLETED | OUTPATIENT
Start: 2024-08-26 | End: 2024-08-26

## 2024-08-26 RX ADMIN — PACLITAXEL 156 MG: 6 INJECTION, SOLUTION INTRAVENOUS at 16:09

## 2024-08-26 RX ADMIN — HEPARIN 500 UNITS: 100 SYRINGE at 17:32

## 2024-08-26 RX ADMIN — FAMOTIDINE 20 MG: 10 INJECTION, SOLUTION INTRAVENOUS at 15:13

## 2024-08-26 RX ADMIN — DEXAMETHASONE SODIUM PHOSPHATE 10 MG: 10 INJECTION INTRAMUSCULAR; INTRAVENOUS at 15:16

## 2024-08-26 RX ADMIN — SODIUM CHLORIDE 100 ML/HR: 9 INJECTION, SOLUTION INTRAVENOUS at 13:49

## 2024-08-26 RX ADMIN — SODIUM CHLORIDE 483 MG: 9 INJECTION, SOLUTION INTRAVENOUS at 15:20

## 2024-08-26 RX ADMIN — SODIUM CHLORIDE, PRESERVATIVE FREE 20 ML: 5 INJECTION INTRAVENOUS at 17:32

## 2024-08-26 RX ADMIN — DIPHENHYDRAMINE HYDROCHLORIDE 25 MG: 50 INJECTION INTRAMUSCULAR; INTRAVENOUS at 15:12

## 2024-08-26 NOTE — PROGRESS NOTES
Ann Hardy                                                                                                                  8/26/2024  MRN:   [id number]  YOB: 1965  PCP:                           Varun Centeno DO  Referring Physician: No ref. provider found  Treating Physician Name: TOBI PARK MD      Reason for visit:  Chief Complaint   Patient presents with    Follow-up     FMLA, treatment schedule, sick- 2 weeks, congestion/chills/sore throat/ chest congestion    Toxicity check.  Discussed treatment plan.  Sinusitis    Current problems:  Right breast invasive cancer, HER2 positive ER negative, pathological stage T1b N0 M0-2/2024  Right breast DCIS, ER negative  Chronic arthritis  Medical comorbidities: Mitral valve prolapse, dyslipidemia, hypertension    Active and recent treatments:  S/p right lumpectomy with sentinel lymph node biopsy-5/2024  Weekly Taxol with Herceptin-6/2024    Interval history:  Patient presents to the clinic for a follow-up visit and for toxicity check and to review results of her blood work-up.  Patient has been tolerating treatment without any unexpected or severe side effects.  Denies hospitalization or ER visit.  Appetite is good.  Weight is stable.  Nausea is controlled.  Complains of nasal congestion.    During this visit patient's allergy, social, medical, surgical history and medications were reviewed and updated.    Summary of Case/History:  Ann Hardy a 58 y.o.female is a patient with biopsy-proven invasive cancer of right breast, triple negative and DCIS, ER negative presents to the clinic to establish care and for further workup and evaluation.She underwent screening mammogram in January 2024 which showed group of calcification in the right breast which was indeterminate.  Patient subsequently underwent diagnostic mammogram of the right breast which showed clustered pleomorphic calcification in the right breast at the 11 o'clock position.   specimen radiograph was performed showing calcifications in 3 of the core samples. POST-PROCEDURE MAMMOGRAM FOR MARKER PLACEMENT: ML and CC views were performed and compared to the pre biopsy images. The biopsy clip is in the correct location with respect to the targeted site. There is no evidence of biopsy clip migration from the biopsy site.     Technically successful stereotactic biopsy of the right breast mass with calcifications and HydroMARK T4 Butterfly clip marker placement as described above. BIRADS: ZW - Pathology pending.    MAMMOGRAM POST BX CLIP PLACEMENT RIGHT    Addendum Date: 2/28/2024    ADDENDUM: Histology, stereotactically guided core needle biopsy of right breast mass at 11 o'clock with calcifications: Invasive carcinoma of no special type (ductal), preliminary grade 2/3.  ER negative WI negative no lymphovascular invasion.  Extensive ductal carcinoma in situ, high grade with comedonecrosis and calcifications.  Radial scar. Findings are malignant and image concordant.  Surgical and oncologic consultation is advised.  However, recommend MRI imaging of the breasts to determine extent of disease given elements of DCIS.     Result Date: 2/28/2024  EXAMINATION: STEREOTACTIC RIGHT BREAST BIOPSY WITH VACUUM-ASSIST STEREOTACTIC MARKER PLACEMENT SPECIMEN RADIOGRAPH POSTPROCEDURE UNILATERAL DIGITAL MAMMOGRAM FOR MARKER PLACEMENT 2/20/2024 HISTORY: ORDERING SYSTEM PROVIDED HISTORY: Mass of right breast, unspecified quadrant Mass with calcifications upper outer right breast. COMPARISON: 16 January 2024 PROCEDURE: Following informed written consent with risks, benefits and alternatives to the procedure discussed, the patient was brought to the stereotactic mammographic suite and placed in prone position in the biopsy unit. Using stereotactic guidance, the calcifications in the upper outer right breast were targeted with coordinates transmitted to the biopsy unit. A timeout was performed to confirm patient  Patient's belongings returned

## 2024-08-26 NOTE — PROGRESS NOTES
Pt here for C4D1 Trazimera/ Taxol & MD visit   Arrives ambulatory   Pt states she hasn't felt well, possible fever/chills, cough, congestion.   Labs drawn from chest port   Pt seen by MD, orders to proceed with treatment   TX completed without incident   Patient discharged in stable condition  Patient returns 9/9 for C4 D8

## 2024-08-26 NOTE — TELEPHONE ENCOUNTER
AMINA HERE FOR FOLLOW UP & TX   Tx today   Rv in 3 weeks   MD VISIT: 9/16/24 @ 11:30AM TX @ 11AM   AVS PRINTED AND GIVEN ON EXIT

## 2024-08-29 ENCOUNTER — TELEPHONE (OUTPATIENT)
Dept: INFUSION THERAPY | Age: 59
End: 2024-08-29

## 2024-08-30 ENCOUNTER — TELEPHONE (OUTPATIENT)
Dept: INFUSION THERAPY | Facility: MEDICAL CENTER | Age: 59
End: 2024-08-30

## 2024-08-30 NOTE — TELEPHONE ENCOUNTER
New chemo order 8/27/24 pt of Dr Grimaldo.    Trazimera first dose 8 mg/kg IV or per epic 651 mg  (649.6 mg)    Thereafter trazimera dose is 6 mg /kg IV or per epic 483 mg (487.2 mg)    Ht 170.2 and wt 81.2 kg and BSA 1.96.    Agree with above per epic, review per treatment plan, note to pool for second RN to check    Cycle is Q 21 days  Right breast cancer

## 2024-09-09 ENCOUNTER — HOSPITAL ENCOUNTER (OUTPATIENT)
Facility: MEDICAL CENTER | Age: 59
End: 2024-09-09
Payer: COMMERCIAL

## 2024-09-09 ENCOUNTER — HOSPITAL ENCOUNTER (OUTPATIENT)
Dept: INFUSION THERAPY | Facility: MEDICAL CENTER | Age: 59
Discharge: HOME | End: 2024-09-09
Payer: COMMERCIAL

## 2024-09-09 ENCOUNTER — OFFICE VISIT (OUTPATIENT)
Dept: PAIN MANAGEMENT | Age: 59
End: 2024-09-09
Payer: COMMERCIAL

## 2024-09-09 VITALS — WEIGHT: 177 LBS | BODY MASS INDEX: 27.78 KG/M2 | HEIGHT: 67 IN

## 2024-09-09 VITALS
TEMPERATURE: 97.7 F | HEART RATE: 79 BPM | SYSTOLIC BLOOD PRESSURE: 122 MMHG | RESPIRATION RATE: 16 BRPM | DIASTOLIC BLOOD PRESSURE: 85 MMHG

## 2024-09-09 DIAGNOSIS — Z17.1 MALIGNANT NEOPLASM OF OVERLAPPING SITES OF RIGHT BREAST IN FEMALE, ESTROGEN RECEPTOR NEGATIVE (HCC): ICD-10-CM

## 2024-09-09 DIAGNOSIS — Z79.899 MEDICATION MANAGEMENT: Chronic | ICD-10-CM

## 2024-09-09 DIAGNOSIS — C50.811 MALIGNANT NEOPLASM OF OVERLAPPING SITES OF RIGHT BREAST IN FEMALE, ESTROGEN RECEPTOR NEGATIVE (HCC): ICD-10-CM

## 2024-09-09 DIAGNOSIS — Z51.11 CHEMOTHERAPY MANAGEMENT, ENCOUNTER FOR: ICD-10-CM

## 2024-09-09 DIAGNOSIS — Z17.1 MALIGNANT NEOPLASM OF OVERLAPPING SITES OF RIGHT BREAST IN FEMALE, ESTROGEN RECEPTOR NEGATIVE (HCC): Primary | ICD-10-CM

## 2024-09-09 DIAGNOSIS — K59.00 CONSTIPATION, UNSPECIFIED CONSTIPATION TYPE: ICD-10-CM

## 2024-09-09 DIAGNOSIS — M47.26 OTHER SPONDYLOSIS WITH RADICULOPATHY, LUMBAR REGION: ICD-10-CM

## 2024-09-09 DIAGNOSIS — G89.29 CHRONIC BILATERAL LOW BACK PAIN WITH BILATERAL SCIATICA: Primary | ICD-10-CM

## 2024-09-09 DIAGNOSIS — M54.42 CHRONIC BILATERAL LOW BACK PAIN WITH BILATERAL SCIATICA: Primary | ICD-10-CM

## 2024-09-09 DIAGNOSIS — T45.1X5A NEUROPATHY DUE TO CHEMOTHERAPEUTIC DRUG (HCC): ICD-10-CM

## 2024-09-09 DIAGNOSIS — G62.0 NEUROPATHY DUE TO CHEMOTHERAPEUTIC DRUG (HCC): ICD-10-CM

## 2024-09-09 DIAGNOSIS — M54.41 CHRONIC BILATERAL LOW BACK PAIN WITH BILATERAL SCIATICA: Primary | ICD-10-CM

## 2024-09-09 DIAGNOSIS — C50.811 MALIGNANT NEOPLASM OF OVERLAPPING SITES OF RIGHT BREAST IN FEMALE, ESTROGEN RECEPTOR NEGATIVE (HCC): Primary | ICD-10-CM

## 2024-09-09 DIAGNOSIS — M51.36 DDD (DEGENERATIVE DISC DISEASE), LUMBAR: ICD-10-CM

## 2024-09-09 LAB
ALBUMIN SERPL-MCNC: 3.9 G/DL (ref 3.5–5.2)
ALP SERPL-CCNC: 84 U/L (ref 35–104)
ALT SERPL-CCNC: 24 U/L (ref 5–33)
ANION GAP SERPL CALCULATED.3IONS-SCNC: 10 MMOL/L (ref 9–17)
AST SERPL-CCNC: 19 U/L
BASOPHILS # BLD: 0.07 K/UL (ref 0–0.2)
BASOPHILS NFR BLD: 2 % (ref 0–2)
BILIRUB SERPL-MCNC: 0.2 MG/DL (ref 0.3–1.2)
BUN SERPL-MCNC: 23 MG/DL (ref 6–20)
BUN/CREAT SERPL: 29 (ref 9–20)
CALCIUM SERPL-MCNC: 9.1 MG/DL (ref 8.6–10.4)
CHLORIDE SERPL-SCNC: 104 MMOL/L (ref 98–107)
CO2 SERPL-SCNC: 25 MMOL/L (ref 20–31)
CREAT SERPL-MCNC: 0.8 MG/DL (ref 0.5–0.9)
EOSINOPHIL # BLD: 0.14 K/UL (ref 0–0.44)
EOSINOPHILS RELATIVE PERCENT: 3 % (ref 1–4)
ERYTHROCYTE [DISTWIDTH] IN BLOOD BY AUTOMATED COUNT: 16.6 % (ref 11.8–14.4)
GFR, ESTIMATED: 85 ML/MIN/1.73M2
GLUCOSE SERPL-MCNC: 109 MG/DL (ref 70–99)
HCT VFR BLD AUTO: 30.3 % (ref 36.3–47.1)
HGB BLD-MCNC: 9.6 G/DL (ref 11.9–15.1)
IMM GRANULOCYTES # BLD AUTO: 0.03 K/UL (ref 0–0.3)
IMM GRANULOCYTES NFR BLD: 1 %
LYMPHOCYTES NFR BLD: 1.17 K/UL (ref 1.1–3.7)
LYMPHOCYTES RELATIVE PERCENT: 28 % (ref 24–43)
MCH RBC QN AUTO: 28.9 PG (ref 25.2–33.5)
MCHC RBC AUTO-ENTMCNC: 31.7 G/DL (ref 28.4–34.8)
MCV RBC AUTO: 91.3 FL (ref 82.6–102.9)
MONOCYTES NFR BLD: 0.56 K/UL (ref 0.1–1.2)
MONOCYTES NFR BLD: 13 % (ref 3–12)
NEUTROPHILS NFR BLD: 53 % (ref 36–65)
NEUTS SEG NFR BLD: 2.24 K/UL (ref 1.5–8.1)
NRBC BLD-RTO: 0 PER 100 WBC
PLATELET # BLD AUTO: 367 K/UL (ref 138–453)
PMV BLD AUTO: 9 FL (ref 8.1–13.5)
POTASSIUM SERPL-SCNC: 3.7 MMOL/L (ref 3.7–5.3)
PROT SERPL-MCNC: 7.4 G/DL (ref 6.4–8.3)
RBC # BLD AUTO: 3.32 M/UL (ref 3.95–5.11)
RBC # BLD: ABNORMAL 10*6/UL
SODIUM SERPL-SCNC: 139 MMOL/L (ref 135–144)
WBC OTHER # BLD: 4.2 K/UL (ref 3.5–11.3)

## 2024-09-09 PROCEDURE — 85025 COMPLETE CBC W/AUTO DIFF WBC: CPT

## 2024-09-09 PROCEDURE — 99213 OFFICE O/P EST LOW 20 MIN: CPT | Performed by: NURSE PRACTITIONER

## 2024-09-09 PROCEDURE — 80053 COMPREHEN METABOLIC PANEL: CPT

## 2024-09-09 PROCEDURE — 36591 DRAW BLOOD OFF VENOUS DEVICE: CPT

## 2024-09-09 PROCEDURE — 6360000002 HC RX W HCPCS: Performed by: INTERNAL MEDICINE

## 2024-09-09 PROCEDURE — 96375 TX/PRO/DX INJ NEW DRUG ADDON: CPT

## 2024-09-09 PROCEDURE — 2500000003 HC RX 250 WO HCPCS: Performed by: INTERNAL MEDICINE

## 2024-09-09 PROCEDURE — 2580000003 HC RX 258: Performed by: INTERNAL MEDICINE

## 2024-09-09 PROCEDURE — 96413 CHEMO IV INFUSION 1 HR: CPT

## 2024-09-09 RX ORDER — HEPARIN 100 UNIT/ML
500 SYRINGE INTRAVENOUS PRN
Status: DISCONTINUED | OUTPATIENT
Start: 2024-09-09 | End: 2024-09-10 | Stop reason: HOSPADM

## 2024-09-09 RX ORDER — SODIUM CHLORIDE 9 MG/ML
5-250 INJECTION, SOLUTION INTRAVENOUS PRN
Status: DISCONTINUED | OUTPATIENT
Start: 2024-09-09 | End: 2024-09-10 | Stop reason: HOSPADM

## 2024-09-09 RX ORDER — SENNA AND DOCUSATE SODIUM 50; 8.6 MG/1; MG/1
2 TABLET, FILM COATED ORAL DAILY
Qty: 60 TABLET | Refills: 0 | Status: SHIPPED | OUTPATIENT
Start: 2024-09-09

## 2024-09-09 RX ORDER — DIPHENHYDRAMINE HYDROCHLORIDE 50 MG/ML
25 INJECTION INTRAMUSCULAR; INTRAVENOUS ONCE
Status: COMPLETED | OUTPATIENT
Start: 2024-09-09 | End: 2024-09-09

## 2024-09-09 RX ORDER — GABAPENTIN 400 MG/1
400 CAPSULE ORAL 3 TIMES DAILY
Qty: 270 CAPSULE | Refills: 0 | Status: SHIPPED | OUTPATIENT
Start: 2024-09-09 | End: 2024-12-08

## 2024-09-09 RX ORDER — FAMOTIDINE 10 MG/ML
20 INJECTION, SOLUTION INTRAVENOUS ONCE
Status: COMPLETED | OUTPATIENT
Start: 2024-09-09 | End: 2024-09-09

## 2024-09-09 RX ORDER — SODIUM CHLORIDE 0.9 % (FLUSH) 0.9 %
5-40 SYRINGE (ML) INJECTION PRN
Status: DISCONTINUED | OUTPATIENT
Start: 2024-09-09 | End: 2024-09-10 | Stop reason: HOSPADM

## 2024-09-09 RX ORDER — DEXAMETHASONE SODIUM PHOSPHATE 10 MG/ML
10 INJECTION INTRAMUSCULAR; INTRAVENOUS ONCE
Status: COMPLETED | OUTPATIENT
Start: 2024-09-09 | End: 2024-09-09

## 2024-09-09 RX ADMIN — FAMOTIDINE 20 MG: 10 INJECTION, SOLUTION INTRAVENOUS at 14:30

## 2024-09-09 RX ADMIN — HEPARIN 500 UNITS: 100 SYRINGE at 16:50

## 2024-09-09 RX ADMIN — PACLITAXEL 156 MG: 6 INJECTION, SOLUTION INTRAVENOUS at 15:25

## 2024-09-09 RX ADMIN — DIPHENHYDRAMINE HYDROCHLORIDE 25 MG: 50 INJECTION INTRAMUSCULAR; INTRAVENOUS at 14:30

## 2024-09-09 RX ADMIN — SODIUM CHLORIDE, PRESERVATIVE FREE 10 ML: 5 INJECTION INTRAVENOUS at 16:50

## 2024-09-09 RX ADMIN — SODIUM CHLORIDE, PRESERVATIVE FREE 10 ML: 5 INJECTION INTRAVENOUS at 13:34

## 2024-09-09 RX ADMIN — DEXAMETHASONE SODIUM PHOSPHATE 10 MG: 10 INJECTION INTRAMUSCULAR; INTRAVENOUS at 14:30

## 2024-09-09 RX ADMIN — SODIUM CHLORIDE 50 ML/HR: 9 INJECTION, SOLUTION INTRAVENOUS at 13:39

## 2024-09-09 ASSESSMENT — ENCOUNTER SYMPTOMS
CONSTIPATION: 0
SHORTNESS OF BREATH: 0
COUGH: 0
BOWEL INCONTINENCE: 0
BACK PAIN: 0

## 2024-09-11 ENCOUNTER — TELEPHONE (OUTPATIENT)
Dept: ONCOLOGY | Age: 59
End: 2024-09-11

## 2024-09-16 ENCOUNTER — TELEPHONE (OUTPATIENT)
Dept: RADIATION ONCOLOGY | Age: 59
End: 2024-09-16

## 2024-09-16 ENCOUNTER — TELEPHONE (OUTPATIENT)
Dept: ONCOLOGY | Age: 59
End: 2024-09-16

## 2024-09-16 ENCOUNTER — HOSPITAL ENCOUNTER (OUTPATIENT)
Facility: MEDICAL CENTER | Age: 59
End: 2024-09-16
Payer: COMMERCIAL

## 2024-09-16 ENCOUNTER — OFFICE VISIT (OUTPATIENT)
Dept: ONCOLOGY | Age: 59
End: 2024-09-16
Payer: COMMERCIAL

## 2024-09-16 ENCOUNTER — HOSPITAL ENCOUNTER (OUTPATIENT)
Dept: INFUSION THERAPY | Facility: MEDICAL CENTER | Age: 59
Discharge: HOME | End: 2024-09-16
Payer: COMMERCIAL

## 2024-09-16 VITALS
SYSTOLIC BLOOD PRESSURE: 127 MMHG | BODY MASS INDEX: 27.94 KG/M2 | HEART RATE: 82 BPM | WEIGHT: 178.4 LBS | DIASTOLIC BLOOD PRESSURE: 88 MMHG | TEMPERATURE: 98.5 F

## 2024-09-16 DIAGNOSIS — G62.0 NEUROPATHY DUE TO CHEMOTHERAPEUTIC DRUG (HCC): ICD-10-CM

## 2024-09-16 DIAGNOSIS — Z17.1 MALIGNANT NEOPLASM OF OVERLAPPING SITES OF RIGHT BREAST IN FEMALE, ESTROGEN RECEPTOR NEGATIVE (HCC): Primary | ICD-10-CM

## 2024-09-16 DIAGNOSIS — C50.811 MALIGNANT NEOPLASM OF OVERLAPPING SITES OF RIGHT BREAST IN FEMALE, ESTROGEN RECEPTOR NEGATIVE (HCC): Primary | ICD-10-CM

## 2024-09-16 DIAGNOSIS — Z51.11 CHEMOTHERAPY MANAGEMENT, ENCOUNTER FOR: ICD-10-CM

## 2024-09-16 DIAGNOSIS — T45.1X5A NEUROPATHY DUE TO CHEMOTHERAPEUTIC DRUG (HCC): ICD-10-CM

## 2024-09-16 LAB
ALBUMIN SERPL-MCNC: 3.9 G/DL (ref 3.5–5.2)
ALP SERPL-CCNC: 77 U/L (ref 35–104)
ALT SERPL-CCNC: 23 U/L (ref 5–33)
ANION GAP SERPL CALCULATED.3IONS-SCNC: 8 MMOL/L (ref 9–17)
AST SERPL-CCNC: 19 U/L
BASOPHILS # BLD: 0.06 K/UL (ref 0–0.2)
BASOPHILS NFR BLD: 2 % (ref 0–2)
BILIRUB SERPL-MCNC: 0.2 MG/DL (ref 0.3–1.2)
BUN SERPL-MCNC: 14 MG/DL (ref 6–20)
BUN/CREAT SERPL: 20 (ref 9–20)
CALCIUM SERPL-MCNC: 8.6 MG/DL (ref 8.6–10.4)
CHLORIDE SERPL-SCNC: 108 MMOL/L (ref 98–107)
CO2 SERPL-SCNC: 25 MMOL/L (ref 20–31)
CREAT SERPL-MCNC: 0.7 MG/DL (ref 0.5–0.9)
EOSINOPHIL # BLD: 0.15 K/UL (ref 0–0.44)
EOSINOPHILS RELATIVE PERCENT: 4 % (ref 1–4)
ERYTHROCYTE [DISTWIDTH] IN BLOOD BY AUTOMATED COUNT: 16.5 % (ref 11.8–14.4)
GFR, ESTIMATED: >90 ML/MIN/1.73M2
GLUCOSE SERPL-MCNC: 111 MG/DL (ref 70–99)
HCT VFR BLD AUTO: 29.4 % (ref 36.3–47.1)
HGB BLD-MCNC: 9.4 G/DL (ref 11.9–15.1)
IMM GRANULOCYTES # BLD AUTO: 0.01 K/UL (ref 0–0.3)
IMM GRANULOCYTES NFR BLD: 0 %
LYMPHOCYTES NFR BLD: 1.18 K/UL (ref 1.1–3.7)
LYMPHOCYTES RELATIVE PERCENT: 32 % (ref 24–43)
MCH RBC QN AUTO: 29.2 PG (ref 25.2–33.5)
MCHC RBC AUTO-ENTMCNC: 32 G/DL (ref 28.4–34.8)
MCV RBC AUTO: 91.3 FL (ref 82.6–102.9)
MONOCYTES NFR BLD: 0.26 K/UL (ref 0.1–1.2)
MONOCYTES NFR BLD: 7 % (ref 3–12)
NEUTROPHILS NFR BLD: 55 % (ref 36–65)
NEUTS SEG NFR BLD: 2.04 K/UL (ref 1.5–8.1)
NRBC BLD-RTO: 0 PER 100 WBC
PLATELET # BLD AUTO: 316 K/UL (ref 138–453)
PMV BLD AUTO: 9.3 FL (ref 8.1–13.5)
POTASSIUM SERPL-SCNC: 3.8 MMOL/L (ref 3.7–5.3)
PROT SERPL-MCNC: 7.1 G/DL (ref 6.4–8.3)
RBC # BLD AUTO: 3.22 M/UL (ref 3.95–5.11)
RBC # BLD: ABNORMAL 10*6/UL
SODIUM SERPL-SCNC: 141 MMOL/L (ref 135–144)
WBC OTHER # BLD: 3.7 K/UL (ref 3.5–11.3)

## 2024-09-16 PROCEDURE — 6360000002 HC RX W HCPCS: Performed by: INTERNAL MEDICINE

## 2024-09-16 PROCEDURE — 3074F SYST BP LT 130 MM HG: CPT | Performed by: INTERNAL MEDICINE

## 2024-09-16 PROCEDURE — 36591 DRAW BLOOD OFF VENOUS DEVICE: CPT

## 2024-09-16 PROCEDURE — 2580000003 HC RX 258: Performed by: INTERNAL MEDICINE

## 2024-09-16 PROCEDURE — 96413 CHEMO IV INFUSION 1 HR: CPT

## 2024-09-16 PROCEDURE — 99211 OFF/OP EST MAY X REQ PHY/QHP: CPT

## 2024-09-16 PROCEDURE — 80053 COMPREHEN METABOLIC PANEL: CPT

## 2024-09-16 PROCEDURE — 99214 OFFICE O/P EST MOD 30 MIN: CPT | Performed by: INTERNAL MEDICINE

## 2024-09-16 PROCEDURE — 85025 COMPLETE CBC W/AUTO DIFF WBC: CPT

## 2024-09-16 PROCEDURE — 3079F DIAST BP 80-89 MM HG: CPT | Performed by: INTERNAL MEDICINE

## 2024-09-16 PROCEDURE — 2500000003 HC RX 250 WO HCPCS: Performed by: INTERNAL MEDICINE

## 2024-09-16 PROCEDURE — 96375 TX/PRO/DX INJ NEW DRUG ADDON: CPT

## 2024-09-16 RX ORDER — SODIUM CHLORIDE 0.9 % (FLUSH) 0.9 %
5-40 SYRINGE (ML) INJECTION PRN
Status: DISCONTINUED | OUTPATIENT
Start: 2024-09-16 | End: 2024-09-17 | Stop reason: HOSPADM

## 2024-09-16 RX ORDER — DEXAMETHASONE SODIUM PHOSPHATE 10 MG/ML
10 INJECTION INTRAMUSCULAR; INTRAVENOUS ONCE
Status: COMPLETED | OUTPATIENT
Start: 2024-09-16 | End: 2024-09-16

## 2024-09-16 RX ORDER — FAMOTIDINE 10 MG/ML
20 INJECTION, SOLUTION INTRAVENOUS ONCE
Status: COMPLETED | OUTPATIENT
Start: 2024-09-16 | End: 2024-09-16

## 2024-09-16 RX ORDER — DIPHENHYDRAMINE HYDROCHLORIDE 50 MG/ML
25 INJECTION INTRAMUSCULAR; INTRAVENOUS ONCE
Status: COMPLETED | OUTPATIENT
Start: 2024-09-16 | End: 2024-09-16

## 2024-09-16 RX ORDER — HEPARIN 100 UNIT/ML
500 SYRINGE INTRAVENOUS PRN
Status: DISCONTINUED | OUTPATIENT
Start: 2024-09-16 | End: 2024-09-17 | Stop reason: HOSPADM

## 2024-09-16 RX ORDER — SODIUM CHLORIDE 9 MG/ML
5-250 INJECTION, SOLUTION INTRAVENOUS PRN
Status: DISCONTINUED | OUTPATIENT
Start: 2024-09-16 | End: 2024-09-17 | Stop reason: HOSPADM

## 2024-09-16 RX ADMIN — DEXAMETHASONE SODIUM PHOSPHATE 10 MG: 10 INJECTION INTRAMUSCULAR; INTRAVENOUS at 12:07

## 2024-09-16 RX ADMIN — SODIUM CHLORIDE 100 ML/HR: 9 INJECTION, SOLUTION INTRAVENOUS at 11:14

## 2024-09-16 RX ADMIN — SODIUM CHLORIDE, PRESERVATIVE FREE 10 ML: 5 INJECTION INTRAVENOUS at 11:14

## 2024-09-16 RX ADMIN — SODIUM CHLORIDE, PRESERVATIVE FREE 10 ML: 5 INJECTION INTRAVENOUS at 13:50

## 2024-09-16 RX ADMIN — FAMOTIDINE 20 MG: 10 INJECTION, SOLUTION INTRAVENOUS at 12:06

## 2024-09-16 RX ADMIN — HEPARIN 500 UNITS: 100 SYRINGE at 13:50

## 2024-09-16 RX ADMIN — PACLITAXEL 156 MG: 6 INJECTION, SOLUTION INTRAVENOUS at 12:36

## 2024-09-16 RX ADMIN — DIPHENHYDRAMINE HYDROCHLORIDE 25 MG: 50 INJECTION INTRAMUSCULAR; INTRAVENOUS at 12:07

## 2024-09-19 ENCOUNTER — HOSPITAL ENCOUNTER (OUTPATIENT)
Dept: RADIATION ONCOLOGY | Age: 59
Discharge: HOME | End: 2024-09-19
Payer: COMMERCIAL

## 2024-09-19 VITALS
RESPIRATION RATE: 16 BRPM | OXYGEN SATURATION: 100 % | HEART RATE: 81 BPM | DIASTOLIC BLOOD PRESSURE: 91 MMHG | BODY MASS INDEX: 28.1 KG/M2 | TEMPERATURE: 97.2 F | SYSTOLIC BLOOD PRESSURE: 139 MMHG | WEIGHT: 179.4 LBS

## 2024-09-19 DIAGNOSIS — C50.911 MALIGNANT NEOPLASM OF RIGHT FEMALE BREAST, UNSPECIFIED ESTROGEN RECEPTOR STATUS, UNSPECIFIED SITE OF BREAST (HCC): Primary | ICD-10-CM

## 2024-09-19 PROCEDURE — 99212 OFFICE O/P EST SF 10 MIN: CPT | Performed by: RADIOLOGY

## 2024-10-01 ENCOUNTER — HOSPITAL ENCOUNTER (OUTPATIENT)
Age: 59
Discharge: HOME | End: 2024-10-03
Attending: INTERNAL MEDICINE
Payer: COMMERCIAL

## 2024-10-01 VITALS — WEIGHT: 179 LBS | BODY MASS INDEX: 28.09 KG/M2 | HEIGHT: 67 IN

## 2024-10-01 DIAGNOSIS — G62.0 NEUROPATHY DUE TO CHEMOTHERAPEUTIC DRUG (HCC): ICD-10-CM

## 2024-10-01 DIAGNOSIS — Z17.1 MALIGNANT NEOPLASM OF OVERLAPPING SITES OF RIGHT BREAST IN FEMALE, ESTROGEN RECEPTOR NEGATIVE (HCC): ICD-10-CM

## 2024-10-01 DIAGNOSIS — C50.811 MALIGNANT NEOPLASM OF OVERLAPPING SITES OF RIGHT BREAST IN FEMALE, ESTROGEN RECEPTOR NEGATIVE (HCC): ICD-10-CM

## 2024-10-01 DIAGNOSIS — Z51.11 CHEMOTHERAPY MANAGEMENT, ENCOUNTER FOR: ICD-10-CM

## 2024-10-01 DIAGNOSIS — T45.1X5A NEUROPATHY DUE TO CHEMOTHERAPEUTIC DRUG (HCC): ICD-10-CM

## 2024-10-01 PROCEDURE — 93306 TTE W/DOPPLER COMPLETE: CPT

## 2024-10-02 ENCOUNTER — HOSPITAL ENCOUNTER (OUTPATIENT)
Facility: MEDICAL CENTER | Age: 59
End: 2024-10-02
Payer: COMMERCIAL

## 2024-10-02 LAB
ECHO AO ROOT DIAM: 2.6 CM
ECHO AO ROOT INDEX: 1.35 CM/M2
ECHO AV MEAN GRADIENT: 3 MMHG
ECHO AV MEAN VELOCITY: 0.8 M/S
ECHO AV PEAK GRADIENT: 8 MMHG
ECHO AV PEAK VELOCITY: 1.4 M/S
ECHO AV VELOCITY RATIO: 0.86
ECHO AV VTI: 28.3 CM
ECHO BSA: 1.96 M2
ECHO EST RA PRESSURE: 3 MMHG
ECHO LA AREA 2C: 21.4 CM2
ECHO LA AREA 4C: 17.4 CM2
ECHO LA DIAMETER INDEX: 1.61 CM/M2
ECHO LA DIAMETER: 3.1 CM
ECHO LA MAJOR AXIS: 5.6 CM
ECHO LA MINOR AXIS: 5.6 CM
ECHO LA TO AORTIC ROOT RATIO: 1.19
ECHO LA VOL BP: 54 ML (ref 22–52)
ECHO LA VOL MOD A2C: 67 ML (ref 22–52)
ECHO LA VOL MOD A4C: 44 ML (ref 22–52)
ECHO LA VOL/BSA BIPLANE: 28 ML/M2 (ref 16–34)
ECHO LA VOLUME INDEX MOD A2C: 35 ML/M2 (ref 16–34)
ECHO LA VOLUME INDEX MOD A4C: 23 ML/M2 (ref 16–34)
ECHO LV E' LATERAL VELOCITY: 11.3 CM/S
ECHO LV E' SEPTAL VELOCITY: 6.5 CM/S
ECHO LV EDV A2C: 51 ML
ECHO LV EDV A4C: 97 ML
ECHO LV EDV INDEX A4C: 50 ML/M2
ECHO LV EDV NDEX A2C: 26 ML/M2
ECHO LV EJECTION FRACTION A2C: 56 %
ECHO LV EJECTION FRACTION A4C: 57 %
ECHO LV EJECTION FRACTION BIPLANE: 57 % (ref 55–100)
ECHO LV ESV A2C: 22 ML
ECHO LV ESV A4C: 42 ML
ECHO LV ESV INDEX A2C: 11 ML/M2
ECHO LV ESV INDEX A4C: 22 ML/M2
ECHO LV FRACTIONAL SHORTENING: 40 % (ref 28–44)
ECHO LV GLOBAL LONGITUDINAL STRAIN (GLS): -12.6 %
ECHO LV INTERNAL DIMENSION DIASTOLE INDEX: 2.59 CM/M2
ECHO LV INTERNAL DIMENSION DIASTOLIC: 5 CM (ref 3.9–5.3)
ECHO LV INTERNAL DIMENSION SYSTOLIC INDEX: 1.55 CM/M2
ECHO LV INTERNAL DIMENSION SYSTOLIC: 3 CM
ECHO LV IVSD: 0.9 CM (ref 0.6–0.9)
ECHO LV MASS 2D: 146.8 G (ref 67–162)
ECHO LV MASS INDEX 2D: 76.1 G/M2 (ref 43–95)
ECHO LV POSTERIOR WALL DIASTOLIC: 0.8 CM (ref 0.6–0.9)
ECHO LV RELATIVE WALL THICKNESS RATIO: 0.32
ECHO LVOT PEAK GRADIENT: 6 MMHG
ECHO LVOT PEAK VELOCITY: 1.2 M/S
ECHO MV A VELOCITY: 1.15 M/S
ECHO MV E DECELERATION TIME (DT): 201 MS
ECHO MV E VELOCITY: 0.9 M/S
ECHO MV E/A RATIO: 0.78
ECHO MV E/E' LATERAL: 7.96
ECHO MV E/E' RATIO (AVERAGED): 10.91
ECHO MV E/E' SEPTAL: 13.85
ECHO RIGHT VENTRICULAR SYSTOLIC PRESSURE (RVSP): 25 MMHG
ECHO TV REGURGITANT MAX VELOCITY: 2.32 M/S
ECHO TV REGURGITANT PEAK GRADIENT: 22 MMHG

## 2024-10-04 ENCOUNTER — HOSPITAL ENCOUNTER (OUTPATIENT)
Dept: RADIATION ONCOLOGY | Age: 59
Discharge: HOME | End: 2024-10-04
Payer: COMMERCIAL

## 2024-10-04 PROCEDURE — 77290 THER RAD SIMULAJ FIELD CPLX: CPT | Performed by: RADIOLOGY

## 2024-10-04 PROCEDURE — 77334 RADIATION TREATMENT AID(S): CPT | Performed by: RADIOLOGY

## 2024-10-04 RX ORDER — BETAMETHASONE VALERATE 1.2 MG/G
CREAM TOPICAL
Qty: 45 G | Refills: 2 | Status: SHIPPED | OUTPATIENT
Start: 2024-10-04

## 2024-10-04 NOTE — DISCHARGE INSTRUCTIONS
Breast Side Effects  Fatigue  Hair loss  Skin changes  Tenderness  Swelling     Fatigue  How long it lasts  When you will first feel fatigue depends on a few factors, such as your age, health, how active you are, and how you felt before radiation therapy started.  Fatigue can last from 6 weeks to a year after your last radiation therapy session. Some people may always feel fatigue and not have as much energy as they did before radiation therapy.  Ways to Manage Fatigue    Try to sleep at least 8 hours each night. This may be more sleep than you needed before radiation therapy. One way to sleep better at night is to be active during the day. Another way is to relax right before going to bed. Do calming activities before bedtime, such as reading, working on a [employer] puzzle, or listening to music.  Plan time to rest. Take short naps or rest breaks between activities.  Try not to do too much. With fatigue, you ay not have enough energy to do all the things you want to do. Stay active, but choose the activities that are most important to you. Try to let go of things that don't matter as much now. For example, you might go to work but not do housework. You might watch your children's sprots events but not cook dinner.  Exercise. Research shows that most people feel better when they get some exercise each day. Go for a short walk, ride a bike, or do yoga. Talk with your doctor or nurse about types of exercise you can do while having radiation therapy.  Relax. Mediatation, prayer, gentle yoga, guided imagery, and visualization are ways you can learn to relax and decrease stress.    For relaxation exercises:  Visit Learning to Relax on the National Cancer [place]'s website at : www.cancer.gov/about-cancer/copingfeelings/relaxation  See Facing Forward: Life After Cancer Treatment at: www.cancer.gov/publications/patient-education/facing-forward  Eat and drink well. It can be easier to eat if you have 5 or 6 small meals each

## 2024-10-04 NOTE — PROGRESS NOTES
Radiation Treatment Site/Plan/Fractions:  Right Breast/20 Fractions      Concurrent Chemotherapy/Immunotherapy:  None       Cardiac Device:  None      Transportation Concerns:  None      Nursing Referrals and Reasons:   None needed at this time      Contrast Given:  None          Miscellaneous Information:  Site specific skin care and side effects reviewed with patient while  present as well.  Patient verbalized understanding and questions answered.

## 2024-10-07 ENCOUNTER — HOSPITAL ENCOUNTER (OUTPATIENT)
Facility: MEDICAL CENTER | Age: 59
End: 2024-10-07
Payer: COMMERCIAL

## 2024-10-07 ENCOUNTER — TELEPHONE (OUTPATIENT)
Dept: ONCOLOGY | Age: 59
End: 2024-10-07

## 2024-10-07 ENCOUNTER — HOSPITAL ENCOUNTER (OUTPATIENT)
Dept: INFUSION THERAPY | Facility: MEDICAL CENTER | Age: 59
Discharge: HOME | End: 2024-10-07
Payer: COMMERCIAL

## 2024-10-07 ENCOUNTER — OFFICE VISIT (OUTPATIENT)
Dept: ONCOLOGY | Age: 59
End: 2024-10-07
Payer: COMMERCIAL

## 2024-10-07 VITALS
RESPIRATION RATE: 16 BRPM | HEART RATE: 92 BPM | WEIGHT: 179.2 LBS | BODY MASS INDEX: 28.07 KG/M2 | SYSTOLIC BLOOD PRESSURE: 129 MMHG | TEMPERATURE: 97.7 F | DIASTOLIC BLOOD PRESSURE: 85 MMHG

## 2024-10-07 DIAGNOSIS — Z51.11 CHEMOTHERAPY MANAGEMENT, ENCOUNTER FOR: ICD-10-CM

## 2024-10-07 DIAGNOSIS — C50.811 MALIGNANT NEOPLASM OF OVERLAPPING SITES OF RIGHT BREAST IN FEMALE, ESTROGEN RECEPTOR NEGATIVE (HCC): Primary | ICD-10-CM

## 2024-10-07 DIAGNOSIS — Z17.1 MALIGNANT NEOPLASM OF OVERLAPPING SITES OF RIGHT BREAST IN FEMALE, ESTROGEN RECEPTOR NEGATIVE (HCC): Primary | ICD-10-CM

## 2024-10-07 DIAGNOSIS — G62.0 NEUROPATHY DUE TO CHEMOTHERAPEUTIC DRUG (HCC): ICD-10-CM

## 2024-10-07 DIAGNOSIS — T45.1X5A NEUROPATHY DUE TO CHEMOTHERAPEUTIC DRUG (HCC): ICD-10-CM

## 2024-10-07 LAB
ALBUMIN SERPL-MCNC: 4 G/DL (ref 3.5–5.2)
ALP SERPL-CCNC: 70 U/L (ref 35–104)
ALT SERPL-CCNC: 21 U/L (ref 5–33)
ANION GAP SERPL CALCULATED.3IONS-SCNC: 12 MMOL/L (ref 9–17)
AST SERPL-CCNC: 23 U/L
BASOPHILS # BLD: 0.05 K/UL (ref 0–0.2)
BASOPHILS NFR BLD: 1 % (ref 0–2)
BILIRUB SERPL-MCNC: 0.3 MG/DL (ref 0.3–1.2)
BUN SERPL-MCNC: 15 MG/DL (ref 6–20)
BUN/CREAT SERPL: 19 (ref 9–20)
CALCIUM SERPL-MCNC: 9.2 MG/DL (ref 8.6–10.4)
CHLORIDE SERPL-SCNC: 107 MMOL/L (ref 98–107)
CO2 SERPL-SCNC: 23 MMOL/L (ref 20–31)
CREAT SERPL-MCNC: 0.8 MG/DL (ref 0.5–0.9)
EOSINOPHIL # BLD: 0.17 K/UL (ref 0–0.44)
EOSINOPHILS RELATIVE PERCENT: 4 % (ref 1–4)
ERYTHROCYTE [DISTWIDTH] IN BLOOD BY AUTOMATED COUNT: 17.2 % (ref 11.8–14.4)
GFR, ESTIMATED: 85 ML/MIN/1.73M2
GLUCOSE SERPL-MCNC: 130 MG/DL (ref 70–99)
HCT VFR BLD AUTO: 31.1 % (ref 36.3–47.1)
HGB BLD-MCNC: 10.1 G/DL (ref 11.9–15.1)
IMM GRANULOCYTES # BLD AUTO: 0.01 K/UL (ref 0–0.3)
IMM GRANULOCYTES NFR BLD: 0 %
LYMPHOCYTES NFR BLD: 1.45 K/UL (ref 1.1–3.7)
LYMPHOCYTES RELATIVE PERCENT: 38 % (ref 24–43)
MCH RBC QN AUTO: 29.7 PG (ref 25.2–33.5)
MCHC RBC AUTO-ENTMCNC: 32.5 G/DL (ref 28.4–34.8)
MCV RBC AUTO: 91.5 FL (ref 82.6–102.9)
MONOCYTES NFR BLD: 0.26 K/UL (ref 0.1–1.2)
MONOCYTES NFR BLD: 7 % (ref 3–12)
NEUTROPHILS NFR BLD: 50 % (ref 36–65)
NEUTS SEG NFR BLD: 1.89 K/UL (ref 1.5–8.1)
NRBC BLD-RTO: 0 PER 100 WBC
PLATELET # BLD AUTO: 359 K/UL (ref 138–453)
PMV BLD AUTO: 9.2 FL (ref 8.1–13.5)
POTASSIUM SERPL-SCNC: 3.7 MMOL/L (ref 3.7–5.3)
PROT SERPL-MCNC: 7.1 G/DL (ref 6.4–8.3)
RBC # BLD AUTO: 3.4 M/UL (ref 3.95–5.11)
RBC # BLD: ABNORMAL 10*6/UL
SODIUM SERPL-SCNC: 142 MMOL/L (ref 135–144)
WBC OTHER # BLD: 3.8 K/UL (ref 3.5–11.3)

## 2024-10-07 PROCEDURE — 3079F DIAST BP 80-89 MM HG: CPT | Performed by: INTERNAL MEDICINE

## 2024-10-07 PROCEDURE — 99214 OFFICE O/P EST MOD 30 MIN: CPT | Performed by: INTERNAL MEDICINE

## 2024-10-07 PROCEDURE — 96413 CHEMO IV INFUSION 1 HR: CPT

## 2024-10-07 PROCEDURE — 85025 COMPLETE CBC W/AUTO DIFF WBC: CPT

## 2024-10-07 PROCEDURE — 80053 COMPREHEN METABOLIC PANEL: CPT

## 2024-10-07 PROCEDURE — 3074F SYST BP LT 130 MM HG: CPT | Performed by: INTERNAL MEDICINE

## 2024-10-07 PROCEDURE — 6360000002 HC RX W HCPCS: Performed by: INTERNAL MEDICINE

## 2024-10-07 PROCEDURE — 2580000003 HC RX 258: Performed by: INTERNAL MEDICINE

## 2024-10-07 PROCEDURE — 36591 DRAW BLOOD OFF VENOUS DEVICE: CPT

## 2024-10-07 PROCEDURE — 99211 OFF/OP EST MAY X REQ PHY/QHP: CPT | Performed by: INTERNAL MEDICINE

## 2024-10-07 RX ORDER — DIPHENHYDRAMINE HYDROCHLORIDE 50 MG/ML
50 INJECTION INTRAMUSCULAR; INTRAVENOUS
Status: CANCELLED | OUTPATIENT
Start: 2024-10-07

## 2024-10-07 RX ORDER — ONDANSETRON 2 MG/ML
8 INJECTION INTRAMUSCULAR; INTRAVENOUS
Status: CANCELLED | OUTPATIENT
Start: 2024-10-07

## 2024-10-07 RX ORDER — ACETAMINOPHEN 325 MG/1
650 TABLET ORAL
OUTPATIENT
Start: 2024-10-28

## 2024-10-07 RX ORDER — SODIUM CHLORIDE 9 MG/ML
INJECTION, SOLUTION INTRAVENOUS CONTINUOUS
Status: CANCELLED | OUTPATIENT
Start: 2024-10-07

## 2024-10-07 RX ORDER — SODIUM CHLORIDE 0.9 % (FLUSH) 0.9 %
5-40 SYRINGE (ML) INJECTION PRN
OUTPATIENT
Start: 2024-10-28

## 2024-10-07 RX ORDER — ONDANSETRON 2 MG/ML
8 INJECTION INTRAMUSCULAR; INTRAVENOUS
OUTPATIENT
Start: 2024-10-28

## 2024-10-07 RX ORDER — SODIUM CHLORIDE 0.9 % (FLUSH) 0.9 %
5-40 SYRINGE (ML) INJECTION PRN
Status: DISCONTINUED | OUTPATIENT
Start: 2024-10-07 | End: 2024-10-08 | Stop reason: HOSPADM

## 2024-10-07 RX ORDER — HEPARIN 100 UNIT/ML
500 SYRINGE INTRAVENOUS PRN
Status: DISCONTINUED | OUTPATIENT
Start: 2024-10-07 | End: 2024-10-08 | Stop reason: HOSPADM

## 2024-10-07 RX ORDER — SODIUM CHLORIDE 9 MG/ML
5-250 INJECTION, SOLUTION INTRAVENOUS PRN
OUTPATIENT
Start: 2024-10-28

## 2024-10-07 RX ORDER — SODIUM CHLORIDE 9 MG/ML
5-250 INJECTION, SOLUTION INTRAVENOUS PRN
Status: CANCELLED | OUTPATIENT
Start: 2024-10-07

## 2024-10-07 RX ORDER — FAMOTIDINE 10 MG/ML
20 INJECTION, SOLUTION INTRAVENOUS
OUTPATIENT
Start: 2024-10-28

## 2024-10-07 RX ORDER — ACETAMINOPHEN 325 MG/1
650 TABLET ORAL
Status: CANCELLED | OUTPATIENT
Start: 2024-10-07

## 2024-10-07 RX ORDER — FAMOTIDINE 10 MG/ML
20 INJECTION, SOLUTION INTRAVENOUS
Status: CANCELLED | OUTPATIENT
Start: 2024-10-07

## 2024-10-07 RX ORDER — HEPARIN SODIUM (PORCINE) LOCK FLUSH IV SOLN 100 UNIT/ML 100 UNIT/ML
500 SOLUTION INTRAVENOUS PRN
Status: CANCELLED | OUTPATIENT
Start: 2024-10-07

## 2024-10-07 RX ORDER — ALBUTEROL SULFATE 90 UG/1
4 INHALANT RESPIRATORY (INHALATION) PRN
OUTPATIENT
Start: 2024-10-28

## 2024-10-07 RX ORDER — SODIUM CHLORIDE 9 MG/ML
5-250 INJECTION, SOLUTION INTRAVENOUS PRN
Status: DISCONTINUED | OUTPATIENT
Start: 2024-10-07 | End: 2024-10-08 | Stop reason: HOSPADM

## 2024-10-07 RX ORDER — EPINEPHRINE 1 MG/ML
0.3 INJECTION, SOLUTION, CONCENTRATE INTRAVENOUS PRN
OUTPATIENT
Start: 2024-10-28

## 2024-10-07 RX ORDER — MEPERIDINE HYDROCHLORIDE 50 MG/ML
12.5 INJECTION INTRAMUSCULAR; INTRAVENOUS; SUBCUTANEOUS PRN
OUTPATIENT
Start: 2024-10-28

## 2024-10-07 RX ORDER — DIPHENHYDRAMINE HYDROCHLORIDE 50 MG/ML
50 INJECTION INTRAMUSCULAR; INTRAVENOUS
OUTPATIENT
Start: 2024-10-28

## 2024-10-07 RX ORDER — ALBUTEROL SULFATE 90 UG/1
4 INHALANT RESPIRATORY (INHALATION) PRN
Status: CANCELLED | OUTPATIENT
Start: 2024-10-07

## 2024-10-07 RX ORDER — HEPARIN SODIUM (PORCINE) LOCK FLUSH IV SOLN 100 UNIT/ML 100 UNIT/ML
500 SOLUTION INTRAVENOUS PRN
OUTPATIENT
Start: 2024-10-28

## 2024-10-07 RX ORDER — SODIUM CHLORIDE 9 MG/ML
INJECTION, SOLUTION INTRAVENOUS CONTINUOUS
OUTPATIENT
Start: 2024-10-28

## 2024-10-07 RX ORDER — EPINEPHRINE 1 MG/ML
0.3 INJECTION, SOLUTION, CONCENTRATE INTRAVENOUS PRN
Status: CANCELLED | OUTPATIENT
Start: 2024-10-07

## 2024-10-07 RX ORDER — SODIUM CHLORIDE 0.9 % (FLUSH) 0.9 %
5-40 SYRINGE (ML) INJECTION PRN
Status: CANCELLED | OUTPATIENT
Start: 2024-10-07

## 2024-10-07 RX ORDER — MEPERIDINE HYDROCHLORIDE 50 MG/ML
12.5 INJECTION INTRAMUSCULAR; INTRAVENOUS; SUBCUTANEOUS PRN
Status: CANCELLED | OUTPATIENT
Start: 2024-10-07

## 2024-10-07 RX ADMIN — HEPARIN 500 UNITS: 100 SYRINGE at 17:25

## 2024-10-07 RX ADMIN — SODIUM CHLORIDE, PRESERVATIVE FREE 10 ML: 5 INJECTION INTRAVENOUS at 13:57

## 2024-10-07 RX ADMIN — SODIUM CHLORIDE, PRESERVATIVE FREE 10 ML: 5 INJECTION INTRAVENOUS at 15:40

## 2024-10-07 RX ADMIN — SODIUM CHLORIDE 20 ML/HR: 9 INJECTION, SOLUTION INTRAVENOUS at 16:12

## 2024-10-07 RX ADMIN — SODIUM CHLORIDE 483 MG: 9 INJECTION, SOLUTION INTRAVENOUS at 16:40

## 2024-10-07 RX ADMIN — SODIUM CHLORIDE, PRESERVATIVE FREE 10 ML: 5 INJECTION INTRAVENOUS at 17:25

## 2024-10-07 NOTE — PATIENT INSTRUCTIONS
Pt needs a note , okay to go the spa at [facility]  Rv in 3 weeks  Scans just before rv

## 2024-10-07 NOTE — PROGRESS NOTES
Genitourinary: negative for frequency, dysuria, nocturia, urinary incontinence, and hematuria   Integument: negative for rash, skin lesions, bruises.  Positive for itching  Hematologic/Lymphatic: negative for easy bruising, bleeding, lymphadenopathy, or petechiae   Endocrine: negative for heat or cold intolerance,weight changes, change in bowel habits and hair loss   Musculoskeletal: negative for myalgias, arthralgias, pain, joint swelling,and bone pain   Neurological: negative for headaches, dizziness, seizures, weakness, numbness    Physical Exam:  Vitals: /85   Pulse 92   Temp 97.7 °F (36.5 °C) (Oral)   Resp 16   Wt 81.3 kg (179 lb 3.2 oz)   BMI 28.07 kg/m²   General appearance - well appearing, no in pain or distress  Mental status - AAO X3  Eyes - pupils equal and reactive, extraocular eye movements intact  Mouth - mucous membranes moist, pharynx normal without lesions  Neck - supple, no significant adenopathy  Lymphatics - no palpable lymphadenopathy, no hepatosplenomegaly  Chest - clear to auscultation, no wheezes, rales or rhonchi, symmetric air entry  Heart - normal rate, regular rhythm, normal S1, S2, no murmurs  Abdomen - soft, nontender, nondistended, no masses or organomegaly  Neurological - alert, oriented, normal speech, no focal findings or movement disorder noted  Extremities - peripheral pulses normal, no pedal edema, no clubbing or cyanosis  Skin - normal coloration and turgor, no rashes, no suspicious skin lesions noted   Breast-deferred    DATA:    CBC:   Recent Labs     10/07/24  1359   WBC 3.8   HGB 10.1*          BMP:    Recent Labs     10/07/24  1359      K 3.7      CO2 23   BUN 15   CREATININE 0.8   GLUCOSE 130*       Hepatic:   Recent Labs     10/07/24  1359   AST 23   ALT 21   BILITOT 0.3   ALKPHOS 70     Echo (TTE) complete (PRN contrast/bubble/strain/3D)    Result Date: 10/2/2024    Left Ventricle: Normal left ventricular systolic function. EF by 2D

## 2024-10-07 NOTE — PROGRESS NOTES
Patient arrive ambulatory for port draw and MD visit.    Port accessed; specimen sent.  Labs reviewed. MD in room. Patient scheduled incorrectly, is due for trazimera today. Charge RN notified.  Pharmacy able to make medication.  Trazimera infused with no issues; line flushed.  Port flushed and heparinized with intact hollingsworth needle removed per protocol.  Patient discharged with AVS in hand.

## 2024-10-07 NOTE — TELEPHONE ENCOUNTER
AMINA HERE FOR MD VISIT & TX  Pt needs a note , okay to go the spa at [facility]  Rv in 3 weeks  Scans just before rv   CT SCAN IS ON 10/23/24 @ 9AM AT [facility] ARRIVAL @ 8AM W/ PORT ACCESS @ :30AM  MD VISIT 10/28/24 @ 11:30AM TX @ 11AM  AVS PRINTED W/ INSTRUCTIONS AND GIVEN TO PT ON EXIT

## 2024-10-11 DIAGNOSIS — Z17.1 MALIGNANT NEOPLASM OF OVERLAPPING SITES OF RIGHT BREAST IN FEMALE, ESTROGEN RECEPTOR NEGATIVE (HCC): ICD-10-CM

## 2024-10-11 DIAGNOSIS — C50.811 MALIGNANT NEOPLASM OF OVERLAPPING SITES OF RIGHT BREAST IN FEMALE, ESTROGEN RECEPTOR NEGATIVE (HCC): ICD-10-CM

## 2024-10-11 DIAGNOSIS — Z51.11 CHEMOTHERAPY MANAGEMENT, ENCOUNTER FOR: ICD-10-CM

## 2024-10-11 RX ORDER — POTASSIUM CHLORIDE 1500 MG/1
20 TABLET, EXTENDED RELEASE ORAL DAILY
Qty: 90 TABLET | Refills: 1 | Status: SHIPPED | OUTPATIENT
Start: 2024-10-11

## 2024-10-14 ENCOUNTER — OFFICE VISIT (OUTPATIENT)
Dept: FAMILY MEDICINE CLINIC | Age: 59
End: 2024-10-14
Payer: COMMERCIAL

## 2024-10-14 VITALS
SYSTOLIC BLOOD PRESSURE: 132 MMHG | BODY MASS INDEX: 28.04 KG/M2 | OXYGEN SATURATION: 100 % | HEART RATE: 88 BPM | DIASTOLIC BLOOD PRESSURE: 78 MMHG | WEIGHT: 179 LBS

## 2024-10-14 DIAGNOSIS — C50.919 MALIGNANT NEOPLASM OF FEMALE BREAST, UNSPECIFIED ESTROGEN RECEPTOR STATUS, UNSPECIFIED LATERALITY, UNSPECIFIED SITE OF BREAST (HCC): ICD-10-CM

## 2024-10-14 DIAGNOSIS — M54.42 CHRONIC BILATERAL LOW BACK PAIN WITH BILATERAL SCIATICA: ICD-10-CM

## 2024-10-14 DIAGNOSIS — I10 PRIMARY HYPERTENSION: Primary | ICD-10-CM

## 2024-10-14 DIAGNOSIS — M54.41 CHRONIC BILATERAL LOW BACK PAIN WITH BILATERAL SCIATICA: ICD-10-CM

## 2024-10-14 DIAGNOSIS — Z23 IMMUNIZATION DUE: ICD-10-CM

## 2024-10-14 DIAGNOSIS — G89.29 CHRONIC BILATERAL LOW BACK PAIN WITH BILATERAL SCIATICA: ICD-10-CM

## 2024-10-14 DIAGNOSIS — M81.0 AGE-RELATED OSTEOPOROSIS WITHOUT CURRENT PATHOLOGICAL FRACTURE: ICD-10-CM

## 2024-10-14 PROCEDURE — 90471 IMMUNIZATION ADMIN: CPT | Performed by: FAMILY MEDICINE

## 2024-10-14 PROCEDURE — 3078F DIAST BP <80 MM HG: CPT | Performed by: FAMILY MEDICINE

## 2024-10-14 PROCEDURE — 99214 OFFICE O/P EST MOD 30 MIN: CPT | Performed by: FAMILY MEDICINE

## 2024-10-14 PROCEDURE — 90661 CCIIV3 VAC ABX FR 0.5 ML IM: CPT | Performed by: FAMILY MEDICINE

## 2024-10-14 PROCEDURE — 3075F SYST BP GE 130 - 139MM HG: CPT | Performed by: FAMILY MEDICINE

## 2024-10-14 RX ORDER — LOSARTAN POTASSIUM 25 MG/1
25 TABLET ORAL DAILY
Qty: 90 TABLET | Refills: 1 | Status: SHIPPED | OUTPATIENT
Start: 2024-10-14

## 2024-10-14 ASSESSMENT — ENCOUNTER SYMPTOMS
BOWEL INCONTINENCE: 0
BACK PAIN: 1
ORTHOPNEA: 0
ALLERGIC/IMMUNOLOGIC NEGATIVE: 1
GASTROINTESTINAL NEGATIVE: 1
EYES NEGATIVE: 1
RESPIRATORY NEGATIVE: 1

## 2024-10-14 NOTE — PROGRESS NOTES
started more than 1 month ago. The problem has been gradually worsening since onset. The problem is uncontrolled. Pertinent negatives include no anxiety, malaise/fatigue, orthopnea, palpitations, peripheral edema or PND. Past treatments include nothing. The current treatment provides no improvement.   Back Pain  This is a chronic problem. The current episode started more than 1 month ago. The problem occurs constantly. The problem has been gradually worsening since onset. The pain is present in the lumbar spine. The quality of the pain is described as aching. The pain is severe. The symptoms are aggravated by bending and position. Pertinent negatives include no bladder incontinence, bowel incontinence, dysuria, paresis, pelvic pain, perianal numbness or tingling. She has tried NSAIDs and home exercises (PT) for the symptoms. The treatment provided no relief.       Review of Systems   Review of Systems   Constitutional: Negative.  Negative for malaise/fatigue.   HENT: Negative.     Eyes: Negative.    Respiratory: Negative.     Cardiovascular: Negative.  Negative for palpitations, orthopnea and PND.   Gastrointestinal: Negative.  Negative for bowel incontinence.   Endocrine: Negative.    Genitourinary: Negative.  Negative for bladder incontinence, dysuria and pelvic pain.   Musculoskeletal:  Positive for back pain.   Skin: Negative.    Allergic/Immunologic: Negative.    Neurological: Negative.  Negative for tingling.   Hematological: Negative.    Psychiatric/Behavioral: Negative.     All other systems reviewed and are negative.      Medications     Current Outpatient Medications   Medication Sig Dispense Refill    losartan (COZAAR) 25 MG tablet Take 1 tablet by mouth daily 90 tablet 1    potassium chloride (KLOR-CON M) 20 MEQ extended release tablet Take 1 tablet by mouth daily 90 tablet 1    betamethasone valerate (VALISONE) 0.1 % cream Apply topically 2 times daily. 45 g 2    gabapentin (NEURONTIN) 400 MG capsule

## 2024-10-16 ENCOUNTER — HOSPITAL ENCOUNTER (OUTPATIENT)
Dept: RADIATION ONCOLOGY | Age: 59
Discharge: HOME | End: 2024-10-16
Payer: COMMERCIAL

## 2024-10-16 PROCEDURE — 77300 RADIATION THERAPY DOSE PLAN: CPT | Performed by: RADIOLOGY

## 2024-10-16 PROCEDURE — 77295 3-D RADIOTHERAPY PLAN: CPT | Performed by: RADIOLOGY

## 2024-10-16 PROCEDURE — 77334 RADIATION TREATMENT AID(S): CPT | Performed by: RADIOLOGY

## 2024-10-17 ENCOUNTER — TELEPHONE (OUTPATIENT)
Dept: ONCOLOGY | Age: 59
End: 2024-10-17

## 2024-10-17 NOTE — TELEPHONE ENCOUNTER
Name: Ann Hardy  : 1965  MRN: [id number]    Oncology Navigation Follow-Up Note    Contact Type:  Telephone    Notes: Spoke with pt who has not heard from Lymphedema clinic yet. Referral was placed on  by Dr Almeida. Writer provided pt with scheduling number for lymphedema clinic and [employer] message to [facility].     Pt completed teach/SIM for RO but not scheduled. Spoke with RO staff who stated plan is done and insurance is back and pt should be contacted in the next few days. Pt was updated.     Pt denied other needs/concerns at this time. Encouraged her to call navigator as needed.       Electronically signed by Delmy Calderon RN on 10/17/2024 at 12:39 PM

## 2024-10-23 ENCOUNTER — HOSPITAL ENCOUNTER (OUTPATIENT)
Dept: INFUSION THERAPY | Facility: MEDICAL CENTER | Age: 59
Discharge: HOME | End: 2024-10-23
Payer: COMMERCIAL

## 2024-10-23 ENCOUNTER — HOSPITAL ENCOUNTER (OUTPATIENT)
Dept: CT IMAGING | Age: 59
Discharge: HOME | End: 2024-10-25
Attending: INTERNAL MEDICINE
Payer: COMMERCIAL

## 2024-10-23 VITALS
HEART RATE: 68 BPM | RESPIRATION RATE: 18 BRPM | DIASTOLIC BLOOD PRESSURE: 86 MMHG | TEMPERATURE: 97.6 F | SYSTOLIC BLOOD PRESSURE: 132 MMHG

## 2024-10-23 DIAGNOSIS — Z51.11 CHEMOTHERAPY MANAGEMENT, ENCOUNTER FOR: ICD-10-CM

## 2024-10-23 DIAGNOSIS — Z17.1 MALIGNANT NEOPLASM OF OVERLAPPING SITES OF RIGHT BREAST IN FEMALE, ESTROGEN RECEPTOR NEGATIVE (HCC): ICD-10-CM

## 2024-10-23 DIAGNOSIS — C50.811 MALIGNANT NEOPLASM OF OVERLAPPING SITES OF RIGHT BREAST IN FEMALE, ESTROGEN RECEPTOR NEGATIVE (HCC): ICD-10-CM

## 2024-10-23 PROCEDURE — 2580000003 HC RX 258: Performed by: INTERNAL MEDICINE

## 2024-10-23 PROCEDURE — 74177 CT ABD & PELVIS W/CONTRAST: CPT

## 2024-10-23 PROCEDURE — 6360000002 HC RX W HCPCS: Performed by: INTERNAL MEDICINE

## 2024-10-23 PROCEDURE — 2500000003 HC RX 250 WO HCPCS: Performed by: INTERNAL MEDICINE

## 2024-10-23 PROCEDURE — 6360000004 HC RX CONTRAST MEDICATION: Performed by: INTERNAL MEDICINE

## 2024-10-23 RX ORDER — HEPARIN 100 UNIT/ML
500 SYRINGE INTRAVENOUS PRN
Status: DISPENSED | OUTPATIENT
Start: 2024-10-23 | End: 2024-10-23

## 2024-10-23 RX ORDER — SODIUM CHLORIDE 0.9 % (FLUSH) 0.9 %
10 SYRINGE (ML) INJECTION PRN
Status: DISCONTINUED | OUTPATIENT
Start: 2024-10-23 | End: 2024-10-26 | Stop reason: HOSPADM

## 2024-10-23 RX ORDER — 0.9 % SODIUM CHLORIDE 0.9 %
100 INTRAVENOUS SOLUTION INTRAVENOUS ONCE
Status: COMPLETED | OUTPATIENT
Start: 2024-10-23 | End: 2024-10-23

## 2024-10-23 RX ORDER — IOPAMIDOL 755 MG/ML
100 INJECTION, SOLUTION INTRAVASCULAR
Status: COMPLETED | OUTPATIENT
Start: 2024-10-23 | End: 2024-10-23

## 2024-10-23 RX ORDER — SODIUM CHLORIDE 0.9 % (FLUSH) 0.9 %
5-40 SYRINGE (ML) INJECTION PRN
Status: ACTIVE | OUTPATIENT
Start: 2024-10-23 | End: 2024-10-23

## 2024-10-23 RX ADMIN — BARIUM SULFATE 450 ML: 20 SUSPENSION ORAL at 09:46

## 2024-10-23 RX ADMIN — SODIUM CHLORIDE, PRESERVATIVE FREE 10 ML: 5 INJECTION INTRAVENOUS at 08:50

## 2024-10-23 RX ADMIN — SODIUM CHLORIDE 100 ML: 9 INJECTION, SOLUTION INTRAVENOUS at 09:46

## 2024-10-23 RX ADMIN — IOPAMIDOL 100 ML: 755 INJECTION, SOLUTION INTRAVENOUS at 09:46

## 2024-10-23 RX ADMIN — HEPARIN 500 UNITS: 100 SYRINGE at 09:56

## 2024-10-23 RX ADMIN — SODIUM CHLORIDE, PRESERVATIVE FREE 10 ML: 5 INJECTION INTRAVENOUS at 09:46

## 2024-10-23 RX ADMIN — SODIUM CHLORIDE, PRESERVATIVE FREE 10 ML: 5 INJECTION INTRAVENOUS at 09:56

## 2024-10-23 NOTE — PROGRESS NOTES
Patient arrive ambulatory for port access for CT.   Denies complaint or concern.  Port accessed with brisk blood return. Transparent antimicrobial dressing applied, line clamped, and alcohol cap applied. Patient educated to return to unit for port needle removal after CT. Patient verbalizes understanding.  Patient returns from CT, no issues with port. Port flushed and heparinized with intact hollingsworth needle removed per protocol.  Patient discharged with knowledge of next appointment.

## 2024-10-29 ENCOUNTER — HOSPITAL ENCOUNTER (OUTPATIENT)
Dept: RADIATION ONCOLOGY | Age: 59
Discharge: HOME | End: 2024-10-29
Payer: COMMERCIAL

## 2024-10-29 PROCEDURE — 77412 RADIATION TX DELIVERY LVL 3: CPT | Performed by: RADIOLOGY

## 2024-10-29 PROCEDURE — 77280 THER RAD SIMULAJ FIELD SMPL: CPT | Performed by: RADIOLOGY

## 2024-10-30 ENCOUNTER — HOSPITAL ENCOUNTER (OUTPATIENT)
Dept: RADIATION ONCOLOGY | Age: 59
Discharge: HOME | End: 2024-10-30
Payer: COMMERCIAL

## 2024-10-30 VITALS
DIASTOLIC BLOOD PRESSURE: 85 MMHG | RESPIRATION RATE: 16 BRPM | SYSTOLIC BLOOD PRESSURE: 137 MMHG | BODY MASS INDEX: 27.31 KG/M2 | TEMPERATURE: 97.8 F | HEART RATE: 71 BPM | WEIGHT: 174.4 LBS

## 2024-10-30 PROCEDURE — 77336 RADIATION PHYSICS CONSULT: CPT | Performed by: RADIOLOGY

## 2024-10-30 PROCEDURE — 77387 GUIDANCE FOR RADJ TX DLVR: CPT | Performed by: RADIOLOGY

## 2024-10-30 PROCEDURE — 77412 RADIATION TX DELIVERY LVL 3: CPT | Performed by: RADIOLOGY

## 2024-10-30 ASSESSMENT — PAIN SCALES - GENERAL: PAINLEVEL_OUTOF10: 0

## 2024-10-30 NOTE — PROGRESS NOTES
Ann Sterlingn  10/30/2024  Wt Readings from Last 3 Encounters:   10/30/24 79.1 kg (174 lb 6.4 oz)   10/14/24 81.2 kg (179 lb)   10/07/24 81.3 kg (179 lb 3.2 oz)     Body mass index is 27.31 kg/m².        Treatment Area: Right Breast    Patient was seen today for weekly visit.     Comfort Alteration    Fatigue: None    Nutritional Alteration  Anorexia: No   Nausea: No   Vomiting: No     Mucous Membrane Alteration  Drainage: No  Lymphedema: No    Skin Alteration   Sensation: WNL    Radiation Dermatitis:  Intact [x]     Erythema  []     Discoloration  []     Rash []     Dry desquamation  []     Moist desquamation []       Emotional  Coping: effective      Injury, potential bleeding or infection:     Lab Results   Component Value Date    WBC 3.8 10/07/2024    HGB 10.1 (L) 10/07/2024    HCT 31.1 (L) 10/07/2024     10/07/2024         /85   Pulse 71   Temp 97.8 °F (36.6 °C) (Temporal)   Resp 16   Wt 79.1 kg (174 lb 6.4 oz)   BMI 27.31 kg/m²      Pain Assessment: 0-10  Pain Level: 0           Assessment/Plan: Patient was seen today for weekly visit.  She is early into treatment and so reviewed recommended skin care with moisturizer and steroid cream.  Plan of care ongoing.      Sylwia Jhaveri RN    
hours prior to paclitaxel, Disp: 60 tablet, Rfl: 1    lidocaine-prilocaine (EMLA) 2.5-2.5 % cream, Apply to port site prior to treatments, Disp: 30 g, Rfl: 2    alendronate (FOSAMAX) 70 MG tablet, take 1 tablet by mouth every week, Disp: 12 tablet, Rfl: 1    diclofenac (VOLTAREN) 75 MG EC tablet, take 1 tablet by mouth twice a day, Disp: 60 tablet, Rfl: 5    acetaminophen (TYLENOL) 650 MG extended release tablet, Take 1 tablet by mouth every 8 hours as needed for Pain, Disp: , Rfl:       ASSESSMENT PLAN:   Treatment setup and plan reviewed. Port images/CBCT images reviewed. Appropriate laboratory work was reviewed. Treatment side effects and toxicities reviewed with the patient, and appropriate management was advised. Will continue radiation treatment as planned, and recommend patient contact us if they have any questions or concerns.     Continue radiation therapy as planned.  Skin care with Aquaphor and betamethasone.    Electronically signed by Milan Almeida MD on 10/31/2024 at 8:43 AM      Drugs Prescribed:  New Prescriptions    No medications on file       Other Orders Placed:  No orders of the defined types were placed in this encounter.

## 2024-10-31 ENCOUNTER — HOSPITAL ENCOUNTER (OUTPATIENT)
Dept: RADIATION ONCOLOGY | Age: 59
Discharge: HOME | End: 2024-10-31
Payer: COMMERCIAL

## 2024-11-01 ENCOUNTER — HOSPITAL ENCOUNTER (OUTPATIENT)
Dept: RADIATION ONCOLOGY | Age: 59
Discharge: HOME | End: 2024-11-01

## 2024-11-04 ENCOUNTER — OFFICE VISIT (OUTPATIENT)
Dept: ONCOLOGY | Age: 59
End: 2024-11-04
Payer: COMMERCIAL

## 2024-11-04 ENCOUNTER — HOSPITAL ENCOUNTER (OUTPATIENT)
Dept: INFUSION THERAPY | Facility: MEDICAL CENTER | Age: 59
Discharge: HOME | End: 2024-11-04
Payer: COMMERCIAL

## 2024-11-04 ENCOUNTER — HOSPITAL ENCOUNTER (OUTPATIENT)
Facility: MEDICAL CENTER | Age: 59
End: 2024-11-04
Payer: COMMERCIAL

## 2024-11-04 ENCOUNTER — TELEPHONE (OUTPATIENT)
Dept: ONCOLOGY | Age: 59
End: 2024-11-04

## 2024-11-04 ENCOUNTER — HOSPITAL ENCOUNTER (OUTPATIENT)
Dept: RADIATION ONCOLOGY | Age: 59
Discharge: HOME | End: 2024-11-04
Payer: COMMERCIAL

## 2024-11-04 VITALS
DIASTOLIC BLOOD PRESSURE: 90 MMHG | SYSTOLIC BLOOD PRESSURE: 129 MMHG | BODY MASS INDEX: 27.44 KG/M2 | HEART RATE: 80 BPM | WEIGHT: 175.2 LBS | TEMPERATURE: 97.9 F | RESPIRATION RATE: 16 BRPM

## 2024-11-04 DIAGNOSIS — Z51.11 CHEMOTHERAPY MANAGEMENT, ENCOUNTER FOR: ICD-10-CM

## 2024-11-04 DIAGNOSIS — T45.1X5A NEUROPATHY DUE TO CHEMOTHERAPEUTIC DRUG (HCC): ICD-10-CM

## 2024-11-04 DIAGNOSIS — Z17.1 MALIGNANT NEOPLASM OF OVERLAPPING SITES OF RIGHT BREAST IN FEMALE, ESTROGEN RECEPTOR NEGATIVE (HCC): Primary | ICD-10-CM

## 2024-11-04 DIAGNOSIS — G62.0 NEUROPATHY DUE TO CHEMOTHERAPEUTIC DRUG (HCC): ICD-10-CM

## 2024-11-04 DIAGNOSIS — C50.811 MALIGNANT NEOPLASM OF OVERLAPPING SITES OF RIGHT BREAST IN FEMALE, ESTROGEN RECEPTOR NEGATIVE (HCC): Primary | ICD-10-CM

## 2024-11-04 LAB
ALBUMIN SERPL-MCNC: 4.1 G/DL (ref 3.5–5.2)
ALP SERPL-CCNC: 72 U/L (ref 35–104)
ALT SERPL-CCNC: 16 U/L (ref 5–33)
ANION GAP SERPL CALCULATED.3IONS-SCNC: 9 MMOL/L (ref 9–17)
AST SERPL-CCNC: 17 U/L
BASOPHILS # BLD: 0.03 K/UL (ref 0–0.2)
BASOPHILS NFR BLD: 1 % (ref 0–2)
BILIRUB SERPL-MCNC: 0.5 MG/DL (ref 0.3–1.2)
BUN SERPL-MCNC: 15 MG/DL (ref 6–20)
BUN/CREAT SERPL: 21 (ref 9–20)
CALCIUM SERPL-MCNC: 8.8 MG/DL (ref 8.6–10.4)
CHLORIDE SERPL-SCNC: 105 MMOL/L (ref 98–107)
CO2 SERPL-SCNC: 23 MMOL/L (ref 20–31)
CREAT SERPL-MCNC: 0.7 MG/DL (ref 0.5–0.9)
EOSINOPHIL # BLD: 0.09 K/UL (ref 0–0.44)
EOSINOPHILS RELATIVE PERCENT: 3 % (ref 1–4)
ERYTHROCYTE [DISTWIDTH] IN BLOOD BY AUTOMATED COUNT: 15.8 % (ref 11.8–14.4)
GFR, ESTIMATED: >90 ML/MIN/1.73M2
GLUCOSE SERPL-MCNC: 86 MG/DL (ref 70–99)
HCT VFR BLD AUTO: 32 % (ref 36.3–47.1)
HGB BLD-MCNC: 10.2 G/DL (ref 11.9–15.1)
IMM GRANULOCYTES # BLD AUTO: 0.01 K/UL (ref 0–0.3)
IMM GRANULOCYTES NFR BLD: 0 %
LYMPHOCYTES NFR BLD: 1.22 K/UL (ref 1.1–3.7)
LYMPHOCYTES RELATIVE PERCENT: 42 % (ref 24–43)
MCH RBC QN AUTO: 29.5 PG (ref 25.2–33.5)
MCHC RBC AUTO-ENTMCNC: 31.9 G/DL (ref 28.4–34.8)
MCV RBC AUTO: 92.5 FL (ref 82.6–102.9)
MONOCYTES NFR BLD: 0.29 K/UL (ref 0.1–1.2)
MONOCYTES NFR BLD: 10 % (ref 3–12)
NEUTROPHILS NFR BLD: 44 % (ref 36–65)
NEUTS SEG NFR BLD: 1.28 K/UL (ref 1.5–8.1)
NRBC BLD-RTO: 0 PER 100 WBC
PLATELET # BLD AUTO: 276 K/UL (ref 138–453)
PMV BLD AUTO: 9 FL (ref 8.1–13.5)
POTASSIUM SERPL-SCNC: 3.5 MMOL/L (ref 3.7–5.3)
PROT SERPL-MCNC: 7.2 G/DL (ref 6.4–8.3)
RBC # BLD AUTO: 3.46 M/UL (ref 3.95–5.11)
RBC # BLD: ABNORMAL 10*6/UL
SODIUM SERPL-SCNC: 137 MMOL/L (ref 135–144)
WBC OTHER # BLD: 2.9 K/UL (ref 3.5–11.3)

## 2024-11-04 PROCEDURE — 99214 OFFICE O/P EST MOD 30 MIN: CPT | Performed by: INTERNAL MEDICINE

## 2024-11-04 PROCEDURE — 6360000002 HC RX W HCPCS: Performed by: INTERNAL MEDICINE

## 2024-11-04 PROCEDURE — 3074F SYST BP LT 130 MM HG: CPT | Performed by: INTERNAL MEDICINE

## 2024-11-04 PROCEDURE — 77412 RADIATION TX DELIVERY LVL 3: CPT | Performed by: RADIOLOGY

## 2024-11-04 PROCEDURE — 80053 COMPREHEN METABOLIC PANEL: CPT

## 2024-11-04 PROCEDURE — 3080F DIAST BP >= 90 MM HG: CPT | Performed by: INTERNAL MEDICINE

## 2024-11-04 PROCEDURE — 99211 OFF/OP EST MAY X REQ PHY/QHP: CPT | Performed by: INTERNAL MEDICINE

## 2024-11-04 PROCEDURE — 2580000003 HC RX 258: Performed by: INTERNAL MEDICINE

## 2024-11-04 PROCEDURE — 96413 CHEMO IV INFUSION 1 HR: CPT

## 2024-11-04 PROCEDURE — 85025 COMPLETE CBC W/AUTO DIFF WBC: CPT

## 2024-11-04 PROCEDURE — 36591 DRAW BLOOD OFF VENOUS DEVICE: CPT

## 2024-11-04 PROCEDURE — 77387 GUIDANCE FOR RADJ TX DLVR: CPT | Performed by: RADIOLOGY

## 2024-11-04 RX ORDER — SODIUM CHLORIDE 0.9 % (FLUSH) 0.9 %
5-40 SYRINGE (ML) INJECTION PRN
Status: DISCONTINUED | OUTPATIENT
Start: 2024-11-04 | End: 2024-11-05 | Stop reason: HOSPADM

## 2024-11-04 RX ORDER — FAMOTIDINE 10 MG/ML
20 INJECTION, SOLUTION INTRAVENOUS
OUTPATIENT
Start: 2024-11-25

## 2024-11-04 RX ORDER — DIPHENHYDRAMINE HYDROCHLORIDE 50 MG/ML
50 INJECTION INTRAMUSCULAR; INTRAVENOUS
OUTPATIENT
Start: 2024-11-25

## 2024-11-04 RX ORDER — HEPARIN SODIUM (PORCINE) LOCK FLUSH IV SOLN 100 UNIT/ML 100 UNIT/ML
500 SOLUTION INTRAVENOUS PRN
OUTPATIENT
Start: 2024-11-25

## 2024-11-04 RX ORDER — ONDANSETRON 2 MG/ML
8 INJECTION INTRAMUSCULAR; INTRAVENOUS
OUTPATIENT
Start: 2024-11-25

## 2024-11-04 RX ORDER — MEPERIDINE HYDROCHLORIDE 50 MG/ML
12.5 INJECTION INTRAMUSCULAR; INTRAVENOUS; SUBCUTANEOUS PRN
OUTPATIENT
Start: 2024-11-25

## 2024-11-04 RX ORDER — SODIUM CHLORIDE 0.9 % (FLUSH) 0.9 %
5-40 SYRINGE (ML) INJECTION PRN
OUTPATIENT
Start: 2024-11-25

## 2024-11-04 RX ORDER — SODIUM CHLORIDE 9 MG/ML
5-250 INJECTION, SOLUTION INTRAVENOUS PRN
OUTPATIENT
Start: 2024-11-25

## 2024-11-04 RX ORDER — EPINEPHRINE 1 MG/ML
0.3 INJECTION, SOLUTION, CONCENTRATE INTRAVENOUS PRN
OUTPATIENT
Start: 2024-11-25

## 2024-11-04 RX ORDER — SODIUM CHLORIDE 9 MG/ML
5-250 INJECTION, SOLUTION INTRAVENOUS PRN
Status: DISCONTINUED | OUTPATIENT
Start: 2024-11-04 | End: 2024-11-05 | Stop reason: HOSPADM

## 2024-11-04 RX ORDER — ALBUTEROL SULFATE 90 UG/1
4 INHALANT RESPIRATORY (INHALATION) PRN
OUTPATIENT
Start: 2024-11-25

## 2024-11-04 RX ORDER — SODIUM CHLORIDE 9 MG/ML
INJECTION, SOLUTION INTRAVENOUS CONTINUOUS
OUTPATIENT
Start: 2024-11-25

## 2024-11-04 RX ORDER — HEPARIN 100 UNIT/ML
500 SYRINGE INTRAVENOUS PRN
Status: DISCONTINUED | OUTPATIENT
Start: 2024-11-04 | End: 2024-11-05 | Stop reason: HOSPADM

## 2024-11-04 RX ORDER — ACETAMINOPHEN 325 MG/1
650 TABLET ORAL
OUTPATIENT
Start: 2024-11-25

## 2024-11-04 RX ADMIN — HEPARIN 500 UNITS: 100 SYRINGE at 15:25

## 2024-11-04 RX ADMIN — SODIUM CHLORIDE 100 ML/HR: 9 INJECTION, SOLUTION INTRAVENOUS at 14:42

## 2024-11-04 RX ADMIN — SODIUM CHLORIDE 483 MG: 9 INJECTION, SOLUTION INTRAVENOUS at 14:37

## 2024-11-04 RX ADMIN — SODIUM CHLORIDE, PRESERVATIVE FREE 10 ML: 5 INJECTION INTRAVENOUS at 15:25

## 2024-11-04 RX ADMIN — SODIUM CHLORIDE, PRESERVATIVE FREE 30 ML: 5 INJECTION INTRAVENOUS at 13:35

## 2024-11-04 NOTE — PROGRESS NOTES
Pt arrives per amb per self and labs and orders reviewed and pt seen per md and NS flushing line before and after trazimera and no reactions or complaints and blood return present throughout infusions and pt discharged per amb per self and pt given AVS with next appts from .

## 2024-11-04 NOTE — PROGRESS NOTES
Correlate with urinalysis to exclude cystitis. 5. Additional incidental findings as above.        Impression:  Right breast invasive cancer, HER2 positive ER negative, pathological stage T1b N0 M0-2/2024  Right breast DCIS, ER negative  Chronic arthritis  Medical comorbidities: Mitral valve prolapse, dyslipidemia, hypertension    Plan:  I had a detailed discussion with the patient and we went over results of lab work-up imaging studies and other relevant clinical data  Toxicity check performed.  Patient is tolerating treatment without unexpected side effects  Labs are adequate for treatment.  We will proceed with treatment   Reiterated treatment plan.  Reviewed risk benefit profile and reiterated potential side-effects of ongoing treatment  CT scans reviewed with the patient.  Reviewed images.  Subcentimeter lung nodules likely inflammatory in nature.  Continue to monitor neuropathy.  Patient on Neurontin  Patient has completed 12 weekly treatments of Taxol  Continue Herceptin to complete 1 year of adjuvant anti-HER2 therapy  Started on radiation therapy.  Echocardiogram reviewed.  Preserved ejection fraction noted.  Reviewed logistics prognostic data and potential side effects of ongoing therapy  Labs adequate for treatment.  NCCN guidelines were reviewed and discussed with the patient.  The diagnosis and care plan were discussed with the patient in detail. I discussed the natural history of the disease, prognosis, risks and goals of therapy and answered all the patients questions to the best of my ability.  Patient expressed understanding and was in agreement.    TOBI PARK MD      This note is created with the assistance of a speech recognition program.  While intending to generate a document that actually reflects the content of the visit, the document can still have some errors including those of syntax and sound a like substitutions which may escape proof reading.  It such instances, actual meaning can be

## 2024-11-04 NOTE — TELEPHONE ENCOUNTER
Name: Ann Hardy  : 1965  MRN: [id number]    Oncology Navigation Follow-Up Note    Contact Type:  Telephone    Notes: Reviewed chart, noted pt has not been contacted by Lymphedema clinic. Writer albertina with pt who confirmed she has not received any calls. She does report her symptoms have improved but is still interested in getting established with O[facility]T. Writer will send another message to [facility] for f/u.     Pt denied other needs at this time. Encouraged pt to call navigator as needed with any questions, concerns or barriers. Confirmed pt has navigator's contact information.         Electronically signed by Delmy Calderon RN on 2024 at 1:34 PM

## 2024-11-04 NOTE — TELEPHONE ENCOUNTER
AMINA HERE FOR MD VISIT & TX  Tx today   Rv in 3 weeks  MD VISIT 11/25/24 @ 1:45PM TX @ 1PM  AVS PRINTED W/ INSTRUCTIONS AND GIVEN TO PT ON EXIT

## 2024-11-05 ENCOUNTER — HOSPITAL ENCOUNTER (OUTPATIENT)
Dept: RADIATION ONCOLOGY | Age: 59
Discharge: HOME | End: 2024-11-05
Payer: COMMERCIAL

## 2024-11-05 PROCEDURE — 77412 RADIATION TX DELIVERY LVL 3: CPT | Performed by: RADIOLOGY

## 2024-11-05 PROCEDURE — 77417 THER RADIOLOGY PORT IMAGE(S): CPT | Performed by: RADIOLOGY

## 2024-11-06 ENCOUNTER — HOSPITAL ENCOUNTER (OUTPATIENT)
Dept: RADIATION ONCOLOGY | Age: 59
Discharge: HOME | End: 2024-11-06
Payer: COMMERCIAL

## 2024-11-06 VITALS
RESPIRATION RATE: 16 BRPM | BODY MASS INDEX: 27.22 KG/M2 | HEART RATE: 72 BPM | WEIGHT: 173.8 LBS | TEMPERATURE: 97.1 F | DIASTOLIC BLOOD PRESSURE: 103 MMHG | SYSTOLIC BLOOD PRESSURE: 160 MMHG

## 2024-11-06 PROCEDURE — 77336 RADIATION PHYSICS CONSULT: CPT | Performed by: RADIOLOGY

## 2024-11-06 PROCEDURE — 77387 GUIDANCE FOR RADJ TX DLVR: CPT | Performed by: RADIOLOGY

## 2024-11-06 PROCEDURE — 77412 RADIATION TX DELIVERY LVL 3: CPT | Performed by: RADIOLOGY

## 2024-11-06 ASSESSMENT — PAIN SCALES - GENERAL: PAINLEVEL_OUTOF10: 0

## 2024-11-06 NOTE — PROGRESS NOTES
[facility]       Radiation Oncology          [address]          [place] [address]        O: [phone]        F: [phone]       [facility]             RADIATION ONCOLOGY WEEKLY PROGRESS NOTE  Patient ID:   Ann Hardy  : 1965   MRN: [id number]    Location:  [facility] Radiation Oncology,   [address] [place] [address]   [phone]    DIAGNOSIS:  Invasive ductal carcinoma of the right upper outer quadrant 11 o'clock position of the right breast, pT1b pN0 M0, ER negative, CT weakly positive, HER2/nahum positive        TREATMENT DETAILS:  Treatment Site: Right breast  Actual Dose: 1862cGy  Total Planned Dose: 5256cGy  Treatment Technique: 3D-CRT  Fraction Technique: Daily  Therapy imaging monitoring: KV match daily with MV ports  Concurrent Chemotherapy: None    SUBJECTIVE:   Patient seen for their weekly on treatment evaluation today.  Doing well, reports some fatigue, no skin changes in the treated breast    OBJECTIVE:     ECO Asymptomatic    VITAL SIGNS: BP (!) 160/103   Pulse 72   Temp 97.1 °F (36.2 °C) (Temporal)   Resp 16   Wt 78.8 kg (173 lb 12.8 oz)   BMI 27.22 kg/m²   Wt Readings from Last 5 Encounters:   24 78.8 kg (173 lb 12.8 oz)   24 79.5 kg (175 lb 3.2 oz)   10/30/24 79.1 kg (174 lb 6.4 oz)   10/14/24 81.2 kg (179 lb)   10/07/24 81.3 kg (179 lb 3.2 oz)     GENERAL:  General appearance is that of a well-nourished, well-developed in no apparent distress.  HEART:  Normal rate and regular rhythm  LUNGS:  Pulmonary effort normal.  ABDOMEN:  Soft, nontender, non distended  EXTREMITIES:  No edema.  No calf tenderness.  MSK:  No spinal tenderness. Normal ROM.  NEUROLOGICAL: Alert and oriented. Strength and sensation intact bilaterally. No focal deficits.   PSYCH: Mood normal, behavior normal.      LABS:  WBC   Date Value Ref Range Status   2024 2.9 (L) 3.5 - 11.3 k/uL Final   10/07/2024 3.8 3.5 -

## 2024-11-06 NOTE — PROGRESS NOTES
Ann Sterlingn  11/6/2024  Wt Readings from Last 3 Encounters:   11/06/24 78.8 kg (173 lb 12.8 oz)   11/04/24 79.5 kg (175 lb 3.2 oz)   10/30/24 79.1 kg (174 lb 6.4 oz)     Body mass index is 27.22 kg/m².        Treatment Area: Right Breast    Patient was seen today for weekly visit.     Comfort Alteration    Fatigue: Mild    Nutritional Alteration  Anorexia: No   Nausea: No   Vomiting: No     Mucous Membrane Alteration  Drainage: No  Lymphedema: No    Skin Alteration   Sensation: WNL    Radiation Dermatitis:  Intact [x]     Erythema  []     Discoloration  [x]   Slightly darker  Rash []     Dry desquamation  []     Moist desquamation []       Emotional  Coping: effective      Injury, potential bleeding or infection:     Lab Results   Component Value Date    WBC 2.9 (L) 11/04/2024    HGB 10.2 (L) 11/04/2024    HCT 32.0 (L) 11/04/2024     11/04/2024         BP (!) 160/103   Pulse 72   Temp 97.1 °F (36.2 °C) (Temporal)   Resp 16   Wt 78.8 kg (173 lb 12.8 oz)   BMI 27.22 kg/m²         Pain Level: 0           Assessment/Plan: Patient was seen today for weekly visit.  She is noticing more fatigue recently and skin to her right breast is slightly darker per patient.  Plan of care otherwise ongoing.  Sylwia Jhaveri RN

## 2024-11-07 ENCOUNTER — HOSPITAL ENCOUNTER (OUTPATIENT)
Dept: RADIATION ONCOLOGY | Age: 59
Discharge: HOME | End: 2024-11-07
Payer: COMMERCIAL

## 2024-11-07 PROCEDURE — 77412 RADIATION TX DELIVERY LVL 3: CPT | Performed by: RADIOLOGY

## 2024-11-07 PROCEDURE — 77387 GUIDANCE FOR RADJ TX DLVR: CPT | Performed by: RADIOLOGY

## 2024-11-08 ENCOUNTER — HOSPITAL ENCOUNTER (OUTPATIENT)
Dept: RADIATION ONCOLOGY | Age: 59
Discharge: HOME | End: 2024-11-08
Payer: COMMERCIAL

## 2024-11-11 ENCOUNTER — OFFICE VISIT (OUTPATIENT)
Dept: SURGERY | Age: 59
End: 2024-11-11
Payer: COMMERCIAL

## 2024-11-11 ENCOUNTER — HOSPITAL ENCOUNTER (OUTPATIENT)
Dept: RADIATION ONCOLOGY | Age: 59
Discharge: HOME | End: 2024-11-11
Payer: COMMERCIAL

## 2024-11-11 VITALS
OXYGEN SATURATION: 100 % | HEART RATE: 73 BPM | SYSTOLIC BLOOD PRESSURE: 141 MMHG | DIASTOLIC BLOOD PRESSURE: 93 MMHG | WEIGHT: 175 LBS | HEIGHT: 67 IN | BODY MASS INDEX: 27.47 KG/M2

## 2024-11-11 DIAGNOSIS — C50.411 MALIGNANT NEOPLASM OF UPPER-OUTER QUADRANT OF RIGHT BREAST IN FEMALE, ESTROGEN RECEPTOR NEGATIVE (HCC): Primary | ICD-10-CM

## 2024-11-11 DIAGNOSIS — Z17.1 MALIGNANT NEOPLASM OF UPPER-OUTER QUADRANT OF RIGHT BREAST IN FEMALE, ESTROGEN RECEPTOR NEGATIVE (HCC): Primary | ICD-10-CM

## 2024-11-11 PROCEDURE — 77387 GUIDANCE FOR RADJ TX DLVR: CPT | Performed by: RADIOLOGY

## 2024-11-11 PROCEDURE — 99213 OFFICE O/P EST LOW 20 MIN: CPT | Performed by: SURGERY

## 2024-11-11 PROCEDURE — 77412 RADIATION TX DELIVERY LVL 3: CPT | Performed by: RADIOLOGY

## 2024-11-11 NOTE — PROGRESS NOTES
Patient's Name/Date of Birth: Ann Hardy / 1965 (59 y.o.)    Date: November 11, 2024    HPI: Pt is a 59 y.o. female who presents to [facility] for a 6 month follow-up visit after a right partial mastectomy with sentinel lymph node biopsy performed on May 8, 2024 for right breast cancer. She is currently on radiation treatment.    In the interim since her last visit on 5/15/2024, she has had the following imaging: CT chest/abd/pelvis on 10/24/2024 that the following impression:  IMPRESSION:  1. No convincing evidence for metastatic disease to the chest, abdomen or  pelvis.  2. Multiple subpleural noncalcified pulmonary nodules to both lungs measuring  up to 3 mm in size are felt unlikely to be neoplastic in etiology and more  likely to be post infectious/postinflammatory.  However, recommend attention  on routine oncologic follow-up.  3. Mild centrilobular emphysema.  4. Diffuse urinary bladder wall thickening. This may be related to  underdistention. Correlate with urinalysis to exclude cystitis.  5. Additional incidental findings as above.      Physical Exam:  Vitals:    11/11/24 1603   BP: (!) 141/93   Pulse: 73   SpO2: 100%     General:A & O x3  HEENT:  NCAT  BREAST: The bilateral breasts are symmetric with no skin changes, nipple deformity, discharge, or masses.  There is no tenderness to palpation. Well healed scar right breast.  Lymph Nodes:  There is no lymphadenopathy in the supraclavicular, infraclavicular, or axillary areas bilaterally.  Extremity: Normal, without deformities, edema, or skin discoloration  SKIN: Skin color, texture, turgor normal. No rashes or lesions.             Assessment/Plan:  1. Malignant neoplasm of upper-outer quadrant of right breast in female, estrogen receptor negative (HCC)          Ann Hardy is a 59 y.o. female that is seen today for a 6 month follow-up visit after a right partial mastectomy with sentinel lymph node biopsy performed on May 8,

## 2024-11-12 ENCOUNTER — HOSPITAL ENCOUNTER (OUTPATIENT)
Dept: RADIATION ONCOLOGY | Age: 59
Discharge: HOME | End: 2024-11-12
Payer: COMMERCIAL

## 2024-11-12 DIAGNOSIS — T45.1X5A NEUROPATHY DUE TO CHEMOTHERAPEUTIC DRUG (HCC): ICD-10-CM

## 2024-11-12 DIAGNOSIS — G62.0 NEUROPATHY DUE TO CHEMOTHERAPEUTIC DRUG (HCC): ICD-10-CM

## 2024-11-12 DIAGNOSIS — K59.00 CONSTIPATION, UNSPECIFIED CONSTIPATION TYPE: ICD-10-CM

## 2024-11-12 DIAGNOSIS — Z17.1 MALIGNANT NEOPLASM OF OVERLAPPING SITES OF RIGHT BREAST IN FEMALE, ESTROGEN RECEPTOR NEGATIVE (HCC): ICD-10-CM

## 2024-11-12 DIAGNOSIS — Z51.11 CHEMOTHERAPY MANAGEMENT, ENCOUNTER FOR: ICD-10-CM

## 2024-11-12 DIAGNOSIS — C50.811 MALIGNANT NEOPLASM OF OVERLAPPING SITES OF RIGHT BREAST IN FEMALE, ESTROGEN RECEPTOR NEGATIVE (HCC): ICD-10-CM

## 2024-11-12 PROCEDURE — 77412 RADIATION TX DELIVERY LVL 3: CPT | Performed by: RADIOLOGY

## 2024-11-12 PROCEDURE — 77417 THER RADIOLOGY PORT IMAGE(S): CPT | Performed by: RADIOLOGY

## 2024-11-12 RX ORDER — SENNA AND DOCUSATE SODIUM 50; 8.6 MG/1; MG/1
2 TABLET, FILM COATED ORAL DAILY
Qty: 180 TABLET | Refills: 2 | Status: SHIPPED | OUTPATIENT
Start: 2024-11-12

## 2024-11-13 ENCOUNTER — HOSPITAL ENCOUNTER (OUTPATIENT)
Age: 59
Setting detail: THERAPIES SERIES
Discharge: HOME | End: 2024-11-13
Payer: COMMERCIAL

## 2024-11-13 ENCOUNTER — HOSPITAL ENCOUNTER (OUTPATIENT)
Dept: RADIATION ONCOLOGY | Age: 59
Discharge: HOME | End: 2024-11-13
Payer: COMMERCIAL

## 2024-11-13 VITALS
RESPIRATION RATE: 16 BRPM | SYSTOLIC BLOOD PRESSURE: 166 MMHG | BODY MASS INDEX: 27.28 KG/M2 | HEART RATE: 71 BPM | TEMPERATURE: 97.7 F | DIASTOLIC BLOOD PRESSURE: 102 MMHG | WEIGHT: 174.2 LBS

## 2024-11-13 PROCEDURE — 97535 SELF CARE MNGMENT TRAINING: CPT

## 2024-11-13 PROCEDURE — 77387 GUIDANCE FOR RADJ TX DLVR: CPT | Performed by: RADIOLOGY

## 2024-11-13 PROCEDURE — 77412 RADIATION TX DELIVERY LVL 3: CPT | Performed by: RADIOLOGY

## 2024-11-13 PROCEDURE — 77336 RADIATION PHYSICS CONSULT: CPT | Performed by: RADIOLOGY

## 2024-11-13 PROCEDURE — 97166 OT EVAL MOD COMPLEX 45 MIN: CPT

## 2024-11-13 NOTE — CONSULTS
TREATMENT LOCATION:   [][facility]  Outpatient Rehabilitation &  Therapy  [address]  P: [phone]  F: [phone] [][facility]  Outpatient Rehabilitation &  Therapy  [address]  P: [phone]  F: [phone] [x][facility]  Outpatient Rehabilitation &  Therapy  [address]  P: [phone]  F: [phone]      Lymphedema Services - Initial Evaluation for Upper Extremity/Breast - Lymphedema Prevention***    Date:  2024  Patient: Ann Hardy  : 1965             MRN: [id number]  Referring Provider: Milan Almeida MD       Phone: [phone]  Fax: [phone]  Insurance:   [employer] [facility]    (ID:[id number] ) - Group no:[id number] Group Name [facility]  insurance restrictions  Medical Diagnosis: Invasive ductal carcinoma of the right upper outer quadrant 11 o'clock position of the right breast, pT1b pN0 M0, ER negative, MD weakly positive, HER2/nahum positive     Rehab Codes: 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  (Certification Dates: 2024 - 2025)    Info for [facility] VOB:   [address] [address]  [phone]      Allergies:  allergies: Other: Allergen Amoxicillin Reactions: Cough  Medications: See charted information in Epic  Past Medical History: See charted information in Epic     Restrictions:     Fall Risk:   [x] No    [] Yes         Overview: Patient is a 58 year old female referred to the Lymphedema Clinic with a RUE Lymphedema s/p right breast CA,  RIGHT BREAST  NEEDLE LOCALIZED @ 1130AM ,PARTIAL MASTECTOMY WITH SENTINEL NODE BX , damaging lymphatics in the RUQ. Pt would benefit from education regarding lymphedema prevention and is fitted today for

## 2024-11-13 NOTE — PROGRESS NOTES
[facility]       Radiation Oncology          [address] [place] [id number]        O: [phone]        F: [phone]       [facility]             RADIATION ONCOLOGY WEEKLY PROGRESS NOTE  Patient ID:   Ann Hardy  : 1965   MRN: [id number]    Location:  [facility] Radiation Oncology,   [address] [place] [address]   [phone]    DIAGNOSIS:  Invasive ductal carcinoma of the right upper outer quadrant 11 o'clock position of the right breast, pT1b pN0 M0, ER negative, MT weakly positive, HER2/nahum positive      TREATMENT DETAILS:  Treatment Site: Right breast  Actual Dose: 3192 cGy  Total Planned Dose: 5256cGy  Treatment Technique: 3D-CRT  Fraction Technique: Daily  Therapy imaging monitoring: KV match daily with MV ports  Concurrent Chemotherapy: None    SUBJECTIVE:   Patient seen for their weekly on treatment evaluation today.  Reports mild fatigue, darkening of the skin in the treated area, no skin breakdown in the right breast    OBJECTIVE:     ECO Asymptomatic    VITAL SIGNS: BP (!) 166/102   Pulse 71   Temp 97.7 °F (36.5 °C) (Temporal)   Resp 16   Wt 79 kg (174 lb 3.2 oz)   BMI 27.28 kg/m²   Wt Readings from Last 5 Encounters:   24 79 kg (174 lb 3.2 oz)   24 79.4 kg (175 lb)   24 78.8 kg (173 lb 12.8 oz)   24 79.5 kg (175 lb 3.2 oz)   10/30/24 79.1 kg (174 lb 6.4 oz)     GENERAL:  General appearance is that of a well-nourished, well-developed in no apparent distress.  HEART:  Normal rate and regular rhythm  LUNGS:  Pulmonary effort normal.  ABDOMEN:  Soft, nontender, non distended  EXTREMITIES:  No edema.  No calf tenderness.  MSK:  No spinal tenderness. Normal ROM.  NEUROLOGICAL: Alert and oriented. Strength and sensation intact bilaterally. No focal deficits.   PSYCH: Mood normal, behavior normal.      LABS:  WBC   Date Value Ref Range Status   2024 2.9 (L) 3.5 - 11.3 k/uL Final

## 2024-11-13 NOTE — CASE COMMUNICATION
Lymphedema can occur months or even years after cancer treatment. It is important to be aware of signs/symptoms to look out for and what to do if swelling occurs. The risk of developing swelling is for life.     Early Signs/Symptoms of Lymphedema    Be on the lookout for the following symptoms in the arm, breast/chest, or armpit of the affected side.    Arm or breast heaviness   Achy on affected side  Change in skin appearance/texture such as firmness or indentation  Change in fit of jewelry or clothing on affected side  New tenderness, tingling, burning sensation down the arm  Visible swelling of area    Should you experience symptoms, please do the following:   Wear sleeve for 30 days (daytime only) and elevate arm as able. If symptoms are still present after 30 days, follow up with your lymphedema therapist. If symptoms go away, you may return to preventative wear (see recommendations below). If swelling is so severe that you cannot wear sleeve, contact your lymphedema therapist.    Preventative Sleeve Wearing Schedule  The activities listed below could overwhelm an already impaired lymphatic system, either from lymph node removal during surgery or damage caused by radiation, and cause onset of swelling. If a compression sleeve is worn during these activities, the risk is greatly reduced. These activities should be avoided if you are not wearing compression to the at risk area.    Airplane travel  Exercise  Heavy chores/work  Any task that requires repetitive arm movement    It is also suggested that you wear your sleeve daily for 30 days after surgery, daily during radiation treatment, and for 30 days after your last day of radiation treatment. If not, please monitor your symptoms and refer to the info above should symptoms occur.    Sleeve Do's and Don'ts    Don't:   Do NOT wear to bed.  Do not apply lotion right before putting on your sleeve. It could break down the material.    Do wash your sleeve  "Handoff report received from Saira. Pt arrived to room via stretcher w/eyes closed, snoring. Koroma in place with blood tinged urine. Dried bloody discharge noted around koroma insertion site. Pt w/dirty diaper w/brown BM. Pressure ulcer noted to his buttock. Multiple open ulcers noted to bilateral lower extremities, feet and toes. Fluid filled blister to right lateral leg. This RN and PCT cleaning up pt in bed, and pt fighting both, attempting to hit and stating, "leave me alone you little bitch". Koroma care performed. Koroma to gravity. Son at bedside, states, "He is not normally like this, in the last couple of weeks, he has not been mobile and the dementia is getting worse". Pt's son apologetic. Pt son will stay to answer admission questions. Pt now quietly resting w/eyes closed. Bed in lowest position, locked and bed alarms on. Call light w/in pt's reach.  "

## 2024-11-13 NOTE — PROGRESS NOTES
Ann PAL Hayley  11/13/2024  Wt Readings from Last 3 Encounters:   11/13/24 79 kg (174 lb 3.2 oz)   11/11/24 79.4 kg (175 lb)   11/06/24 78.8 kg (173 lb 12.8 oz)     Body mass index is 27.28 kg/m².        Treatment Area: Right Breast    Patient was seen today for weekly visit.     Comfort Alteration    Fatigue: Mild    Nutritional Alteration  Anorexia: No   Nausea: No   Vomiting: No     Mucous Membrane Alteration  Drainage: No  Lymphedema: No    Skin Alteration   Sensation: WNL    Radiation Dermatitis:  Intact [x]     Erythema  []     Discoloration  [x]   Slightly darker  Rash []     Dry desquamation  []     Moist desquamation []       Emotional  Coping: effective      Injury, potential bleeding or infection:     Lab Results   Component Value Date    WBC 2.9 (L) 11/04/2024    HGB 10.2 (L) 11/04/2024    HCT 32.0 (L) 11/04/2024     11/04/2024         BP (!) 166/102   Pulse 71   Temp 97.7 °F (36.5 °C) (Temporal)   Resp 16   Wt 79 kg (174 lb 3.2 oz)   BMI 27.28 kg/m²                     Assessment/Plan: Patient was seen today for weekly visit.  Writer assessed her skin and it is slightly darker to her right breast area without desquamation.  She reports fatigue on and off.  Reports lower back pain due to history of back surgeries and she is taking Tylenol Arthritis throughout the day and Gabapentin.  Dr. Almeida agreeable to something soft under her lower back when she is on the treatment table.  Plan of care ongoing.  Sylwia Jhaveri RN

## 2024-11-14 ENCOUNTER — HOSPITAL ENCOUNTER (OUTPATIENT)
Dept: RADIATION ONCOLOGY | Age: 59
Discharge: HOME | End: 2024-11-14
Payer: COMMERCIAL

## 2024-11-14 PROCEDURE — 77387 GUIDANCE FOR RADJ TX DLVR: CPT | Performed by: RADIOLOGY

## 2024-11-14 PROCEDURE — 77334 RADIATION TREATMENT AID(S): CPT | Performed by: RADIOLOGY

## 2024-11-14 PROCEDURE — 77412 RADIATION TX DELIVERY LVL 3: CPT | Performed by: RADIOLOGY

## 2024-11-14 PROCEDURE — 77300 RADIATION THERAPY DOSE PLAN: CPT | Performed by: RADIOLOGY

## 2024-11-15 ENCOUNTER — APPOINTMENT (OUTPATIENT)
Dept: RADIATION ONCOLOGY | Age: 59
End: 2024-11-15
Payer: COMMERCIAL

## 2024-11-15 ENCOUNTER — HOSPITAL ENCOUNTER (OUTPATIENT)
Dept: RADIATION ONCOLOGY | Age: 59
Discharge: HOME | End: 2024-11-15
Payer: COMMERCIAL

## 2024-11-18 ENCOUNTER — HOSPITAL ENCOUNTER (OUTPATIENT)
Dept: RADIATION ONCOLOGY | Age: 59
Discharge: HOME | End: 2024-11-18
Payer: COMMERCIAL

## 2024-11-18 PROCEDURE — 77412 RADIATION TX DELIVERY LVL 3: CPT | Performed by: RADIOLOGY

## 2024-11-18 PROCEDURE — 77387 GUIDANCE FOR RADJ TX DLVR: CPT | Performed by: RADIOLOGY

## 2024-11-19 ENCOUNTER — HOSPITAL ENCOUNTER (OUTPATIENT)
Dept: RADIATION ONCOLOGY | Age: 59
Discharge: HOME | End: 2024-11-19
Payer: COMMERCIAL

## 2024-11-19 PROCEDURE — 77412 RADIATION TX DELIVERY LVL 3: CPT | Performed by: RADIOLOGY

## 2024-11-19 PROCEDURE — 77387 GUIDANCE FOR RADJ TX DLVR: CPT | Performed by: RADIOLOGY

## 2024-11-20 ENCOUNTER — HOSPITAL ENCOUNTER (OUTPATIENT)
Dept: RADIATION ONCOLOGY | Age: 59
Discharge: HOME | End: 2024-11-20
Payer: COMMERCIAL

## 2024-11-20 VITALS
SYSTOLIC BLOOD PRESSURE: 150 MMHG | WEIGHT: 172.6 LBS | BODY MASS INDEX: 27.03 KG/M2 | DIASTOLIC BLOOD PRESSURE: 92 MMHG | TEMPERATURE: 98 F | RESPIRATION RATE: 16 BRPM | HEART RATE: 64 BPM | OXYGEN SATURATION: 98 %

## 2024-11-20 PROCEDURE — 77280 THER RAD SIMULAJ FIELD SMPL: CPT | Performed by: RADIOLOGY

## 2024-11-20 PROCEDURE — 77412 RADIATION TX DELIVERY LVL 3: CPT | Performed by: RADIOLOGY

## 2024-11-20 NOTE — PROGRESS NOTES
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  Reports some fatigue, hyperpigmentation and some erythema of the right breast with no skin breakdown    OBJECTIVE:     ECO Asymptomatic    VITAL SIGNS: BP (!) 150/92   Pulse 64   Temp 98 °F (36.7 °C) (Temporal)   Resp 16   Wt 78.3 kg (172 lb 9.6 oz)   SpO2 98%   BMI 27.03 kg/m²   Wt Readings from Last 5 Encounters:   24 78.3 kg (172 lb 9.6 oz)   24 79 kg (174 lb 3.2 oz)   24 79.4 kg (175 lb)   24 78.8 kg (173 lb 12.8 oz)   24 79.5 kg (175 lb 3.2 oz)     GENERAL:  General appearance is that of a well-nourished, well-developed in no apparent distress.  HEART:  Normal rate and regular rhythm  LUNGS:  Pulmonary effort normal.  ABDOMEN:  Soft, nontender, non distended  EXTREMITIES:  No edema.  No calf tenderness.  MSK:  No spinal tenderness. Normal ROM.  NEUROLOGICAL: Alert and oriented. Strength and sensation intact bilaterally. No focal deficits.   PSYCH: Mood normal, behavior normal.      LABS:  WBC   Date Value Ref Range Status   2024 2.9 (L) 3.5 - 11.3

## 2024-11-20 NOTE — PROGRESS NOTES
Ann Sterlingn  11/20/2024  Wt Readings from Last 3 Encounters:   11/20/24 78.3 kg (172 lb 9.6 oz)   11/13/24 79 kg (174 lb 3.2 oz)   11/11/24 79.4 kg (175 lb)     Body mass index is 27.03 kg/m².        Treatment Area: Right Breast    Patient was seen today for weekly visit.     Comfort Alteration    Fatigue: Mild    Nutritional Alteration  Anorexia: No   Nausea: No   Vomiting: No     Mucous Membrane Alteration  Drainage: No  Lymphedema: No    Skin Alteration   Sensation: WNL    Radiation Dermatitis:  Intact [x]     Erythema  []     Discoloration  [x]   Slightly darker  Rash []     Dry desquamation  []     Moist desquamation []       Emotional  Coping: effective      Injury, potential bleeding or infection: Completing skin care as directed.    Lab Results   Component Value Date    WBC 2.9 (L) 11/04/2024    HGB 10.2 (L) 11/04/2024    HCT 32.0 (L) 11/04/2024     11/04/2024         BP (!) 150/92   Pulse 64   Temp 98 °F (36.7 °C) (Temporal)   Resp 16   Wt 78.3 kg (172 lb 9.6 oz)   SpO2 98%   BMI 27.03 kg/m²      Pain Assessment: None - Denies Pain              Assessment/Plan: Patient was seen today for weekly visit.  Writer assessed her skin and it is slightly darker to her right breast area without desquamation.  She reports mild fatigue.  She denies other treatment related concerns.  Dr. Almeida evaluated patient.  Once treatment complete, she will follow up in three months.     Liane Sullivan RN

## 2024-11-21 ENCOUNTER — TELEPHONE (OUTPATIENT)
Dept: RADIATION ONCOLOGY | Age: 59
End: 2024-11-21

## 2024-11-21 ENCOUNTER — HOSPITAL ENCOUNTER (OUTPATIENT)
Dept: RADIATION ONCOLOGY | Age: 59
Discharge: HOME | End: 2024-11-21
Payer: COMMERCIAL

## 2024-11-21 PROCEDURE — 77412 RADIATION TX DELIVERY LVL 3: CPT | Performed by: RADIOLOGY

## 2024-11-21 PROCEDURE — 77387 GUIDANCE FOR RADJ TX DLVR: CPT | Performed by: RADIOLOGY

## 2024-11-21 NOTE — TELEPHONE ENCOUNTER
Per Dr. Almeida, he wants to see pt back for 3 mo follow up after her radiation tx is completed.  I called pt to schedule, no answer, I left detailed messg for pt to call back to schedule.

## 2024-11-22 ENCOUNTER — HOSPITAL ENCOUNTER (OUTPATIENT)
Dept: RADIATION ONCOLOGY | Age: 59
Discharge: HOME | End: 2024-11-22
Payer: COMMERCIAL

## 2024-11-22 PROCEDURE — 77412 RADIATION TX DELIVERY LVL 3: CPT | Performed by: RADIOLOGY

## 2024-11-22 PROCEDURE — 77387 GUIDANCE FOR RADJ TX DLVR: CPT | Performed by: RADIOLOGY

## 2024-11-25 ENCOUNTER — HOSPITAL ENCOUNTER (OUTPATIENT)
Dept: RADIATION ONCOLOGY | Age: 59
Discharge: HOME | End: 2024-11-25
Payer: COMMERCIAL

## 2024-12-02 RX ORDER — METOPROLOL SUCCINATE 25 MG/1
25 TABLET, EXTENDED RELEASE ORAL DAILY
Qty: 30 TABLET | Refills: 2 | OUTPATIENT
Start: 2024-12-02

## 2024-12-02 NOTE — TELEPHONE ENCOUNTER
Called and left voice mail for patient to make sure of what pharmacy patient prefers, 90 day refill ok, and if there were any other medications needed at this time.    Waiting for patient callback.

## 2024-12-03 ENCOUNTER — OFFICE VISIT (OUTPATIENT)
Dept: PAIN MANAGEMENT | Age: 59
End: 2024-12-03
Payer: COMMERCIAL

## 2024-12-03 VITALS — WEIGHT: 172 LBS | HEIGHT: 67 IN | BODY MASS INDEX: 27 KG/M2

## 2024-12-03 DIAGNOSIS — Z79.899 MEDICATION MANAGEMENT: ICD-10-CM

## 2024-12-03 DIAGNOSIS — M47.26 OTHER SPONDYLOSIS WITH RADICULOPATHY, LUMBAR REGION: ICD-10-CM

## 2024-12-03 DIAGNOSIS — Z98.1 S/P LUMBAR FUSION: Primary | ICD-10-CM

## 2024-12-03 PROCEDURE — 99213 OFFICE O/P EST LOW 20 MIN: CPT | Performed by: ANESTHESIOLOGY

## 2024-12-03 RX ORDER — GABAPENTIN 400 MG/1
400 CAPSULE ORAL 3 TIMES DAILY
Qty: 270 CAPSULE | Refills: 0 | Status: CANCELLED | OUTPATIENT
Start: 2024-12-03 | End: 2025-03-03

## 2024-12-03 RX ORDER — GABAPENTIN 300 MG/1
300 CAPSULE ORAL 3 TIMES DAILY
Qty: 270 CAPSULE | Refills: 0 | Status: SHIPPED | OUTPATIENT
Start: 2025-01-01 | End: 2025-04-01

## 2024-12-03 ASSESSMENT — ENCOUNTER SYMPTOMS
SINUS PRESSURE: 0
NAUSEA: 0
CHEST TIGHTNESS: 0
DIARRHEA: 0
CONSTIPATION: 0
RESPIRATORY NEGATIVE: 1
GASTROINTESTINAL NEGATIVE: 1
SHORTNESS OF BREATH: 0
BACK PAIN: 1
SINUS PAIN: 0
VOMITING: 0

## 2024-12-03 NOTE — PROGRESS NOTES
(5/7/2024)    Overall Financial Resource Strain (CARDIA)     Difficulty of Paying Living Expenses: Not hard at all   Food Insecurity: No Food Insecurity (5/7/2024)    Hunger Vital Sign     Worried About Running Out of Food in the Last Year: Never true     Ran Out of Food in the Last Year: Never true   Transportation Needs: Unknown (5/7/2024)    PRAPARE - Transportation     Lack of Transportation (Non-Medical): No   Physical Activity: Sufficiently Active (8/27/2019)    [facility] from [facility]    Exercise Vital Sign     Days of Exercise per Week: 3 days     Minutes of Exercise per Session: 60 min   Stress: Stress Concern Present (8/27/2019)    Received from [facility]    [employer] [place] [employer] - Occupational Stress Questionnaire     Feeling of Stress : To some extent   Social Connections: Socially Integrated (8/27/2019)    Received from [facility]    Social Connection and Isolation Panel [NHANES]     Frequency of Communication with Friends and Family: Three times a week     Frequency of Social Gatherings with Friends and Family: Once a week     Attends Judaism Services: More than 4 times per year     Active Member of Clubs or Organizations: Yes     Attends Club or Organization Meetings: More than 4 times per year     Marital Status:    Housing Stability: Unknown (5/7/2024)    Housing Stability Vital Sign     Unstable Housing in the Last Year: No       Family History   Problem Relation Age of Onset    High Blood Pressure Mother     High Cholesterol Mother     Other Mother         blood clots, denies any known clotting d/o    Other Father         alcoholism    Arthritis Sister     No Known Problems Other        Allergies   Allergen Reactions    Amoxicillin Cough       There were no vitals filed for this visit.    Current Outpatient Medications   Medication Sig Dispense Refill

## 2024-12-04 ENCOUNTER — HOSPITAL ENCOUNTER (OUTPATIENT)
Facility: MEDICAL CENTER | Age: 59
End: 2024-12-04
Payer: COMMERCIAL

## 2024-12-05 ENCOUNTER — TELEPHONE (OUTPATIENT)
Dept: SURGERY | Age: 59
End: 2024-12-05

## 2024-12-05 ENCOUNTER — CLINICAL DOCUMENTATION (OUTPATIENT)
Dept: RADIATION ONCOLOGY | Age: 59
End: 2024-12-05

## 2024-12-05 RX ORDER — METOPROLOL SUCCINATE 25 MG/1
25 TABLET, EXTENDED RELEASE ORAL DAILY
Qty: 90 TABLET | Refills: 3 | Status: SHIPPED | OUTPATIENT
Start: 2024-12-05

## 2024-12-05 NOTE — PROGRESS NOTES
[facility]            Radiation Oncology          [address] [place] [address]        O: [phone]        F: [phone]       [facility]           Dear Dr Quintana ref. provider found:    Thank you for referring Ann Hardy to me for evaluation and treatment. Below is a summary of the patient's recently completed radiation course.  If you have questions, please do not hesitate to call me. I look forward to following this patient along with you.    Sincerely,  Electronically signed by Milan Almeida MD on 2024 at 9:17 AM EST      CC: Patient Care Team:  Varun Centeno DO as PCP - General (Family Medicine)  Delmy Calderon RN as Nurse Navigator (Oncology)  ------------------------------------------------------------------------------------------------------------------------------------------------------------------------------------------        Date of Service: 2024     Location:  [facility] Radiation Oncology,   [address] [place] [address]   [phone]        RADIATION ONCOLOGY END OF TREATMENT SUMMARY:    Patient ID:   Ann Hardy  : 1965   MRN: [id number]    DIAGNOSIS:  Invasive ductal carcinoma of the right upper outer quadrant 11 o'clock position of the right breast, pT1b pN0 M0, ER negative, VT weakly positive, HER2/nahum positive       TREATMENT DETAILS:          Concurrent Systemic Therapy: None    CLINICAL COURSE:    ECOG 0    Patient completed a course of radiation therapy as prescribed.  She experienced moderate fatigue, grade 1 radiation dermatitis but no other toxicities were reported.  She is to continue on adjuvant anti-HER2 therapy as recommended per medical oncology.  She will return to the radiation clinic in 3 months for posttreatment follow-up.    Electronically signed by Milan Almeida MD on 2024 at 9:17 AM

## 2024-12-05 NOTE — TELEPHONE ENCOUNTER
Patient called and confirmed that [facility] pharmacy is where she only needs metoprolol mg sent to at this time

## 2024-12-09 ENCOUNTER — TELEPHONE (OUTPATIENT)
Dept: ONCOLOGY | Age: 59
End: 2024-12-09

## 2024-12-09 ENCOUNTER — HOSPITAL ENCOUNTER (OUTPATIENT)
Facility: MEDICAL CENTER | Age: 59
End: 2024-12-09
Payer: COMMERCIAL

## 2024-12-09 ENCOUNTER — HOSPITAL ENCOUNTER (OUTPATIENT)
Dept: INFUSION THERAPY | Facility: MEDICAL CENTER | Age: 59
Discharge: HOME | End: 2024-12-09
Payer: COMMERCIAL

## 2024-12-09 ENCOUNTER — OFFICE VISIT (OUTPATIENT)
Dept: ONCOLOGY | Age: 59
End: 2024-12-09
Payer: COMMERCIAL

## 2024-12-09 VITALS
OXYGEN SATURATION: 99 % | SYSTOLIC BLOOD PRESSURE: 115 MMHG | RESPIRATION RATE: 18 BRPM | TEMPERATURE: 97.4 F | WEIGHT: 178.4 LBS | BODY MASS INDEX: 27.94 KG/M2 | HEART RATE: 85 BPM | DIASTOLIC BLOOD PRESSURE: 75 MMHG

## 2024-12-09 DIAGNOSIS — Z17.1 MALIGNANT NEOPLASM OF OVERLAPPING SITES OF RIGHT BREAST IN FEMALE, ESTROGEN RECEPTOR NEGATIVE (HCC): Primary | ICD-10-CM

## 2024-12-09 DIAGNOSIS — C50.811 MALIGNANT NEOPLASM OF OVERLAPPING SITES OF RIGHT BREAST IN FEMALE, ESTROGEN RECEPTOR NEGATIVE (HCC): Primary | ICD-10-CM

## 2024-12-09 DIAGNOSIS — T45.1X5A NEUROPATHY DUE TO CHEMOTHERAPEUTIC DRUG (HCC): ICD-10-CM

## 2024-12-09 DIAGNOSIS — G62.0 NEUROPATHY DUE TO CHEMOTHERAPEUTIC DRUG (HCC): ICD-10-CM

## 2024-12-09 LAB
ALBUMIN SERPL-MCNC: 3.8 G/DL (ref 3.5–5.2)
ALBUMIN/GLOB SERPL: 1.3 {RATIO} (ref 1–2.5)
ALP SERPL-CCNC: 66 U/L (ref 35–104)
ALT SERPL-CCNC: 27 U/L (ref 10–35)
ANION GAP SERPL CALCULATED.3IONS-SCNC: 9 MMOL/L (ref 9–16)
AST SERPL-CCNC: 27 U/L (ref 10–35)
BASOPHILS # BLD: 0.03 K/UL (ref 0–0.2)
BASOPHILS NFR BLD: 1 % (ref 0–2)
BILIRUB SERPL-MCNC: 0.2 MG/DL (ref 0–1.2)
BUN SERPL-MCNC: 16 MG/DL (ref 8–23)
CALCIUM SERPL-MCNC: 9.1 MG/DL (ref 8.8–10.2)
CHLORIDE SERPL-SCNC: 107 MMOL/L (ref 98–107)
CO2 SERPL-SCNC: 25 MMOL/L (ref 20–31)
CREAT SERPL-MCNC: 0.8 MG/DL (ref 0.5–0.9)
EOSINOPHIL # BLD: 0.08 K/UL (ref 0–0.44)
EOSINOPHILS RELATIVE PERCENT: 3 % (ref 1–4)
ERYTHROCYTE [DISTWIDTH] IN BLOOD BY AUTOMATED COUNT: 14.1 % (ref 11.8–14.4)
GFR, ESTIMATED: 86 ML/MIN/1.73M2
GLUCOSE SERPL-MCNC: 90 MG/DL (ref 82–115)
HCT VFR BLD AUTO: 33 % (ref 36.3–47.1)
HGB BLD-MCNC: 10.7 G/DL (ref 11.9–15.1)
IMM GRANULOCYTES # BLD AUTO: 0 K/UL (ref 0–0.3)
IMM GRANULOCYTES NFR BLD: 0 %
LYMPHOCYTES NFR BLD: 0.95 K/UL (ref 1.1–3.7)
LYMPHOCYTES RELATIVE PERCENT: 38 % (ref 24–43)
MCH RBC QN AUTO: 29.7 PG (ref 25.2–33.5)
MCHC RBC AUTO-ENTMCNC: 32.4 G/DL (ref 28.4–34.8)
MCV RBC AUTO: 91.7 FL (ref 82.6–102.9)
MONOCYTES NFR BLD: 0.3 K/UL (ref 0.1–1.2)
MONOCYTES NFR BLD: 12 % (ref 3–12)
NEUTROPHILS NFR BLD: 46 % (ref 36–65)
NEUTS SEG NFR BLD: 1.17 K/UL (ref 1.5–8.1)
NRBC BLD-RTO: 0 PER 100 WBC
PLATELET # BLD AUTO: 232 K/UL (ref 138–453)
PMV BLD AUTO: 9.3 FL (ref 8.1–13.5)
POTASSIUM SERPL-SCNC: 3.7 MMOL/L (ref 3.7–5.3)
PROT SERPL-MCNC: 6.8 G/DL (ref 6.6–8.7)
RBC # BLD AUTO: 3.6 M/UL (ref 3.95–5.11)
SODIUM SERPL-SCNC: 141 MMOL/L (ref 136–145)
WBC OTHER # BLD: 2.5 K/UL (ref 3.5–11.3)

## 2024-12-09 PROCEDURE — 80053 COMPREHEN METABOLIC PANEL: CPT

## 2024-12-09 PROCEDURE — 99214 OFFICE O/P EST MOD 30 MIN: CPT | Performed by: INTERNAL MEDICINE

## 2024-12-09 PROCEDURE — 85025 COMPLETE CBC W/AUTO DIFF WBC: CPT

## 2024-12-09 PROCEDURE — 3074F SYST BP LT 130 MM HG: CPT | Performed by: INTERNAL MEDICINE

## 2024-12-09 PROCEDURE — 2580000003 HC RX 258: Performed by: INTERNAL MEDICINE

## 2024-12-09 PROCEDURE — 3078F DIAST BP <80 MM HG: CPT | Performed by: INTERNAL MEDICINE

## 2024-12-09 PROCEDURE — 99211 OFF/OP EST MAY X REQ PHY/QHP: CPT | Performed by: INTERNAL MEDICINE

## 2024-12-09 PROCEDURE — 96413 CHEMO IV INFUSION 1 HR: CPT

## 2024-12-09 PROCEDURE — 36591 DRAW BLOOD OFF VENOUS DEVICE: CPT

## 2024-12-09 PROCEDURE — 6360000002 HC RX W HCPCS: Performed by: INTERNAL MEDICINE

## 2024-12-09 RX ORDER — SODIUM CHLORIDE 9 MG/ML
5-250 INJECTION, SOLUTION INTRAVENOUS PRN
OUTPATIENT
Start: 2024-12-30

## 2024-12-09 RX ORDER — SODIUM CHLORIDE 0.9 % (FLUSH) 0.9 %
5-40 SYRINGE (ML) INJECTION PRN
OUTPATIENT
Start: 2024-12-30

## 2024-12-09 RX ORDER — HEPARIN SODIUM (PORCINE) LOCK FLUSH IV SOLN 100 UNIT/ML 100 UNIT/ML
500 SOLUTION INTRAVENOUS PRN
OUTPATIENT
Start: 2024-12-30

## 2024-12-09 RX ORDER — SODIUM CHLORIDE 9 MG/ML
5-250 INJECTION, SOLUTION INTRAVENOUS PRN
Status: DISCONTINUED | OUTPATIENT
Start: 2024-12-09 | End: 2024-12-10 | Stop reason: HOSPADM

## 2024-12-09 RX ORDER — DIPHENHYDRAMINE HYDROCHLORIDE 50 MG/ML
50 INJECTION INTRAMUSCULAR; INTRAVENOUS
OUTPATIENT
Start: 2024-12-30

## 2024-12-09 RX ORDER — ACETAMINOPHEN 325 MG/1
650 TABLET ORAL
OUTPATIENT
Start: 2024-12-30

## 2024-12-09 RX ORDER — ONDANSETRON 2 MG/ML
8 INJECTION INTRAMUSCULAR; INTRAVENOUS
OUTPATIENT
Start: 2024-12-30

## 2024-12-09 RX ORDER — SODIUM CHLORIDE 0.9 % (FLUSH) 0.9 %
5-40 SYRINGE (ML) INJECTION PRN
Status: DISCONTINUED | OUTPATIENT
Start: 2024-12-09 | End: 2024-12-10 | Stop reason: HOSPADM

## 2024-12-09 RX ORDER — SODIUM CHLORIDE 9 MG/ML
INJECTION, SOLUTION INTRAVENOUS CONTINUOUS
OUTPATIENT
Start: 2024-12-30

## 2024-12-09 RX ORDER — FAMOTIDINE 10 MG/ML
20 INJECTION, SOLUTION INTRAVENOUS
OUTPATIENT
Start: 2024-12-30

## 2024-12-09 RX ORDER — HYDROCORTISONE SODIUM SUCCINATE 100 MG/2ML
100 INJECTION INTRAMUSCULAR; INTRAVENOUS
OUTPATIENT
Start: 2024-12-30

## 2024-12-09 RX ORDER — EPINEPHRINE 1 MG/ML
0.3 INJECTION, SOLUTION, CONCENTRATE INTRAVENOUS PRN
OUTPATIENT
Start: 2024-12-30

## 2024-12-09 RX ORDER — ALBUTEROL SULFATE 90 UG/1
4 INHALANT RESPIRATORY (INHALATION) PRN
OUTPATIENT
Start: 2024-12-30

## 2024-12-09 RX ORDER — HEPARIN 100 UNIT/ML
500 SYRINGE INTRAVENOUS PRN
Status: DISCONTINUED | OUTPATIENT
Start: 2024-12-09 | End: 2024-12-10 | Stop reason: HOSPADM

## 2024-12-09 RX ORDER — MEPERIDINE HYDROCHLORIDE 50 MG/ML
12.5 INJECTION INTRAMUSCULAR; INTRAVENOUS; SUBCUTANEOUS PRN
OUTPATIENT
Start: 2024-12-30

## 2024-12-09 RX ADMIN — SODIUM CHLORIDE 25 ML/HR: 9 INJECTION, SOLUTION INTRAVENOUS at 14:17

## 2024-12-09 RX ADMIN — SODIUM CHLORIDE, PRESERVATIVE FREE 10 ML: 5 INJECTION INTRAVENOUS at 15:43

## 2024-12-09 RX ADMIN — SODIUM CHLORIDE, PRESERVATIVE FREE 10 ML: 5 INJECTION INTRAVENOUS at 13:40

## 2024-12-09 RX ADMIN — HEPARIN 500 UNITS: 100 SYRINGE at 15:43

## 2024-12-09 RX ADMIN — SODIUM CHLORIDE 483 MG: 9 INJECTION, SOLUTION INTRAVENOUS at 14:55

## 2024-12-09 NOTE — PROGRESS NOTES
Patient arrives ambulatory per self for C3 D1 Trazimera & MD visit  Denies complaint/concern  Labs drawn per port & reviewed  Pt seen by Dr. Grimaldo & orders to tx  Trazimera infused without adverse reaction  Line flushed  Port flushed with brisk blood return & heparinized with intact Wilder needle removed per protocol   Pt discharged ambulatory per self  AVS per

## 2024-12-09 NOTE — TELEPHONE ENCOUNTER
AMINA HERE FOR FOLLOW UP & TX   Tx today   Rv in 3 week  MD VISIT: 12/30/24 @ 1:15PM TX @ 1PM   AVS PRINTED AND GIVEN ON EXIT

## 2024-12-09 NOTE — PROGRESS NOTES
Correlate with urinalysis to exclude cystitis. 5. Additional incidental findings as above.        Impression:  Right breast invasive cancer, HER2 positive ER negative, pathological stage T1b N0 M0-2/2024  Right breast DCIS, ER negative  Chronic arthritis  Medical comorbidities: Mitral valve prolapse, dyslipidemia, hypertension    Plan:  I had a detailed discussion with the patient and we went over results of lab work-up imaging studies and other relevant clinical data  Toxicity check performed.  Patient is tolerating treatment without unexpected side effects  Labs adequate  Reiterated treatment plan.  Reviewed risk benefit profile and reiterated potential side-effects of ongoing treatment  Neuropathy improving   Patient on Neurontin  Patient has completed 12 weekly treatments of Taxol  Continue Herceptin to complete 1 year of adjuvant anti-HER2 therapy  Echocardiogram every 3 months  Reviewed logistics prognostic data and potential side effects of ongoing therapy  NCCN guidelines were reviewed and discussed with the patient.  The diagnosis and care plan were discussed with the patient in detail. I discussed the natural history of the disease, prognosis, risks and goals of therapy and answered all the patients questions to the best of my ability.  Patient expressed understanding and was in agreement.    TOBI PARK MD      This note is created with the assistance of a speech recognition program.  While intending to generate a document that actually reflects the content of the visit, the document can still have some errors including those of syntax and sound a like substitutions which may escape proof reading.  It such instances, actual meaning can be extrapolated by contextual diversion.

## 2024-12-12 DIAGNOSIS — M54.42 CHRONIC BILATERAL LOW BACK PAIN WITH BILATERAL SCIATICA: ICD-10-CM

## 2024-12-12 DIAGNOSIS — G89.29 CHRONIC BILATERAL LOW BACK PAIN WITH BILATERAL SCIATICA: ICD-10-CM

## 2024-12-12 DIAGNOSIS — M54.41 CHRONIC BILATERAL LOW BACK PAIN WITH BILATERAL SCIATICA: ICD-10-CM

## 2024-12-12 RX ORDER — DICLOFENAC SODIUM 75 MG/1
75 TABLET, DELAYED RELEASE ORAL 2 TIMES DAILY
Qty: 60 TABLET | Refills: 5 | Status: SHIPPED | OUTPATIENT
Start: 2024-12-12

## 2024-12-21 ENCOUNTER — HOSPITAL ENCOUNTER (OUTPATIENT)
Facility: MEDICAL CENTER | Age: 59
End: 2024-12-21
Payer: COMMERCIAL

## 2024-12-27 ENCOUNTER — OFFICE VISIT (OUTPATIENT)
Dept: PRIMARY CARE CLINIC | Age: 59
End: 2024-12-27

## 2024-12-27 VITALS
SYSTOLIC BLOOD PRESSURE: 110 MMHG | HEIGHT: 67 IN | WEIGHT: 177 LBS | BODY MASS INDEX: 27.78 KG/M2 | HEART RATE: 75 BPM | DIASTOLIC BLOOD PRESSURE: 72 MMHG | OXYGEN SATURATION: 95 %

## 2024-12-27 DIAGNOSIS — M54.41 CHRONIC BILATERAL LOW BACK PAIN WITH BILATERAL SCIATICA: ICD-10-CM

## 2024-12-27 DIAGNOSIS — I50.30 HYPERTENSIVE HEART DISEASE WITH DIASTOLIC HEART FAILURE (HCC): ICD-10-CM

## 2024-12-27 DIAGNOSIS — C50.811 MALIGNANT NEOPLASM OF OVERLAPPING SITES OF RIGHT BREAST IN FEMALE, ESTROGEN RECEPTOR NEGATIVE (HCC): Primary | ICD-10-CM

## 2024-12-27 DIAGNOSIS — E78.5 DYSLIPIDEMIA: ICD-10-CM

## 2024-12-27 DIAGNOSIS — M54.42 CHRONIC BILATERAL LOW BACK PAIN WITH BILATERAL SCIATICA: ICD-10-CM

## 2024-12-27 DIAGNOSIS — I11.0 HYPERTENSIVE HEART DISEASE WITH DIASTOLIC HEART FAILURE (HCC): ICD-10-CM

## 2024-12-27 DIAGNOSIS — M81.0 AGE-RELATED OSTEOPOROSIS WITHOUT CURRENT PATHOLOGICAL FRACTURE: ICD-10-CM

## 2024-12-27 DIAGNOSIS — Z98.1 S/P LUMBAR FUSION: ICD-10-CM

## 2024-12-27 DIAGNOSIS — R73.03 PREDIABETES: ICD-10-CM

## 2024-12-27 DIAGNOSIS — G89.29 CHRONIC BILATERAL LOW BACK PAIN WITH BILATERAL SCIATICA: ICD-10-CM

## 2024-12-27 DIAGNOSIS — Z17.1 MALIGNANT NEOPLASM OF OVERLAPPING SITES OF RIGHT BREAST IN FEMALE, ESTROGEN RECEPTOR NEGATIVE (HCC): Primary | ICD-10-CM

## 2024-12-27 SDOH — HEALTH STABILITY: PHYSICAL HEALTH: ON AVERAGE, HOW MANY MINUTES DO YOU ENGAGE IN EXERCISE AT THIS LEVEL?: 0 MIN

## 2024-12-27 SDOH — HEALTH STABILITY: PHYSICAL HEALTH: ON AVERAGE, HOW MANY DAYS PER WEEK DO YOU ENGAGE IN MODERATE TO STRENUOUS EXERCISE (LIKE A BRISK WALK)?: 0 DAYS

## 2024-12-27 NOTE — PATIENT INSTRUCTIONS
You can get your labs done when you go to the infusion center- just let them know that your PCP has ordered labs. These labs do not have to be done while fasting.

## 2024-12-27 NOTE — PROGRESS NOTES
(5/7/2024)    Overall Financial Resource Strain (CARDIA)     Difficulty of Paying Living Expenses: Not hard at all   Food Insecurity: No Food Insecurity (5/7/2024)    Hunger Vital Sign     Worried About Running Out of Food in the Last Year: Never true     Ran Out of Food in the Last Year: Never true   Transportation Needs: Unknown (5/7/2024)    PRAPARE - Transportation     Lack of Transportation (Non-Medical): No   Physical Activity: Inactive (12/27/2024)    Exercise Vital Sign     Days of Exercise per Week: 0 days     Minutes of Exercise per Session: 0 min   Stress: Stress Concern Present (8/27/2019)    Received from [facility], [facility]    [employer] - Occupational Stress Questionnaire     Feeling of Stress : To some extent   Social Connections: Socially Integrated (8/27/2019)    Received from [facility]    Social Connection and Isolation Panel [NHANES]     Frequency of Communication with Friends and Family: Three times a week     Frequency of Social Gatherings with Friends and Family: Once a week     Attends Islam Services: More than 4 times per year     Active Member of Clubs or Organizations: Yes     Attends Club or Organization Meetings: More than 4 times per year     Marital Status:    Housing Stability: Unknown (5/7/2024)    Housing Stability Vital Sign     Unstable Housing in the Last Year: No

## 2024-12-29 PROBLEM — M47.26 OTHER SPONDYLOSIS WITH RADICULOPATHY, LUMBAR REGION: Status: RESOLVED | Noted: 2022-04-18 | Resolved: 2024-12-29

## 2024-12-29 PROBLEM — M51.369 DDD (DEGENERATIVE DISC DISEASE), LUMBAR: Status: RESOLVED | Noted: 2020-02-11 | Resolved: 2024-12-29

## 2024-12-29 PROBLEM — M47.26 OTHER SPONDYLOSIS WITH RADICULOPATHY, LUMBAR REGION: Status: ACTIVE | Noted: 2022-04-18

## 2024-12-30 ENCOUNTER — TELEPHONE (OUTPATIENT)
Dept: ONCOLOGY | Age: 59
End: 2024-12-30

## 2024-12-30 ENCOUNTER — OFFICE VISIT (OUTPATIENT)
Dept: ONCOLOGY | Age: 59
End: 2024-12-30
Payer: COMMERCIAL

## 2024-12-30 ENCOUNTER — HOSPITAL ENCOUNTER (OUTPATIENT)
Dept: INFUSION THERAPY | Facility: MEDICAL CENTER | Age: 59
Discharge: HOME | End: 2024-12-30
Payer: COMMERCIAL

## 2024-12-30 VITALS
HEART RATE: 68 BPM | DIASTOLIC BLOOD PRESSURE: 83 MMHG | TEMPERATURE: 97.9 F | SYSTOLIC BLOOD PRESSURE: 133 MMHG | RESPIRATION RATE: 18 BRPM

## 2024-12-30 DIAGNOSIS — G62.0 NEUROPATHY DUE TO CHEMOTHERAPEUTIC DRUG (HCC): ICD-10-CM

## 2024-12-30 DIAGNOSIS — C50.811 MALIGNANT NEOPLASM OF OVERLAPPING SITES OF RIGHT BREAST IN FEMALE, ESTROGEN RECEPTOR NEGATIVE (HCC): Primary | ICD-10-CM

## 2024-12-30 DIAGNOSIS — Z17.1 MALIGNANT NEOPLASM OF OVERLAPPING SITES OF RIGHT BREAST IN FEMALE, ESTROGEN RECEPTOR NEGATIVE (HCC): Primary | ICD-10-CM

## 2024-12-30 DIAGNOSIS — G89.29 CHRONIC RIGHT SHOULDER PAIN: ICD-10-CM

## 2024-12-30 DIAGNOSIS — T45.1X5A NEUROPATHY DUE TO CHEMOTHERAPEUTIC DRUG (HCC): ICD-10-CM

## 2024-12-30 DIAGNOSIS — M25.511 CHRONIC RIGHT SHOULDER PAIN: ICD-10-CM

## 2024-12-30 LAB
ALBUMIN SERPL-MCNC: 4.2 G/DL (ref 3.5–5.2)
ALBUMIN/GLOB SERPL: 1.2 {RATIO} (ref 1–2.5)
ALP SERPL-CCNC: 77 U/L (ref 35–104)
ALT SERPL-CCNC: 36 U/L (ref 10–35)
ANION GAP SERPL CALCULATED.3IONS-SCNC: 10 MMOL/L (ref 9–16)
AST SERPL-CCNC: 28 U/L (ref 10–35)
BASOPHILS # BLD: 0.04 K/UL (ref 0–0.2)
BASOPHILS NFR BLD: 2 % (ref 0–2)
BILIRUB SERPL-MCNC: 0.2 MG/DL (ref 0–1.2)
BUN SERPL-MCNC: 16 MG/DL (ref 8–23)
CALCIUM SERPL-MCNC: 9 MG/DL (ref 8.8–10.2)
CHLORIDE SERPL-SCNC: 105 MMOL/L (ref 98–107)
CO2 SERPL-SCNC: 24 MMOL/L (ref 20–31)
CREAT SERPL-MCNC: 0.8 MG/DL (ref 0.5–0.9)
EOSINOPHIL # BLD: 0.1 K/UL (ref 0–0.44)
EOSINOPHILS RELATIVE PERCENT: 4 % (ref 1–4)
ERYTHROCYTE [DISTWIDTH] IN BLOOD BY AUTOMATED COUNT: 13.7 % (ref 11.8–14.4)
GFR, ESTIMATED: 90 ML/MIN/1.73M2
GLUCOSE SERPL-MCNC: 78 MG/DL (ref 82–115)
HCT VFR BLD AUTO: 35.6 % (ref 36.3–47.1)
HGB BLD-MCNC: 11.7 G/DL (ref 11.9–15.1)
IMM GRANULOCYTES # BLD AUTO: 0.01 K/UL (ref 0–0.3)
IMM GRANULOCYTES NFR BLD: 0 %
LYMPHOCYTES NFR BLD: 1.03 K/UL (ref 1.1–3.7)
LYMPHOCYTES RELATIVE PERCENT: 39 % (ref 24–43)
MCH RBC QN AUTO: 29.1 PG (ref 25.2–33.5)
MCHC RBC AUTO-ENTMCNC: 32.9 G/DL (ref 28.4–34.8)
MCV RBC AUTO: 88.6 FL (ref 82.6–102.9)
MONOCYTES NFR BLD: 0.31 K/UL (ref 0.1–1.2)
MONOCYTES NFR BLD: 12 % (ref 3–12)
NEUTROPHILS NFR BLD: 43 % (ref 36–65)
NEUTS SEG NFR BLD: 1.14 K/UL (ref 1.5–8.1)
NRBC BLD-RTO: 0 PER 100 WBC
PLATELET # BLD AUTO: 282 K/UL (ref 138–453)
PMV BLD AUTO: 9.2 FL (ref 8.1–13.5)
POTASSIUM SERPL-SCNC: 4.1 MMOL/L (ref 3.7–5.3)
PROT SERPL-MCNC: 7.6 G/DL (ref 6.6–8.7)
RBC # BLD AUTO: 4.02 M/UL (ref 3.95–5.11)
SODIUM SERPL-SCNC: 139 MMOL/L (ref 136–145)
WBC OTHER # BLD: 2.6 K/UL (ref 3.5–11.3)

## 2024-12-30 PROCEDURE — 2580000003 HC RX 258: Performed by: INTERNAL MEDICINE

## 2024-12-30 PROCEDURE — 80053 COMPREHEN METABOLIC PANEL: CPT

## 2024-12-30 PROCEDURE — 99214 OFFICE O/P EST MOD 30 MIN: CPT | Performed by: INTERNAL MEDICINE

## 2024-12-30 PROCEDURE — 96413 CHEMO IV INFUSION 1 HR: CPT

## 2024-12-30 PROCEDURE — 2500000003 HC RX 250 WO HCPCS: Performed by: INTERNAL MEDICINE

## 2024-12-30 PROCEDURE — 85025 COMPLETE CBC W/AUTO DIFF WBC: CPT

## 2024-12-30 PROCEDURE — 6360000002 HC RX W HCPCS: Performed by: INTERNAL MEDICINE

## 2024-12-30 PROCEDURE — 99211 OFF/OP EST MAY X REQ PHY/QHP: CPT | Performed by: INTERNAL MEDICINE

## 2024-12-30 RX ORDER — SODIUM CHLORIDE 9 MG/ML
5-250 INJECTION, SOLUTION INTRAVENOUS PRN
OUTPATIENT
Start: 2025-01-20

## 2024-12-30 RX ORDER — SODIUM CHLORIDE 9 MG/ML
5-250 INJECTION, SOLUTION INTRAVENOUS PRN
Status: DISCONTINUED | OUTPATIENT
Start: 2024-12-30 | End: 2024-12-31 | Stop reason: HOSPADM

## 2024-12-30 RX ORDER — ACETAMINOPHEN 325 MG/1
650 TABLET ORAL
OUTPATIENT
Start: 2025-01-20

## 2024-12-30 RX ORDER — HYDROCORTISONE SODIUM SUCCINATE 100 MG/2ML
100 INJECTION INTRAMUSCULAR; INTRAVENOUS
OUTPATIENT
Start: 2025-01-20

## 2024-12-30 RX ORDER — SODIUM CHLORIDE 9 MG/ML
INJECTION, SOLUTION INTRAVENOUS CONTINUOUS
OUTPATIENT
Start: 2025-01-20

## 2024-12-30 RX ORDER — SODIUM CHLORIDE 0.9 % (FLUSH) 0.9 %
5-40 SYRINGE (ML) INJECTION PRN
OUTPATIENT
Start: 2025-01-20

## 2024-12-30 RX ORDER — ALBUTEROL SULFATE 90 UG/1
4 INHALANT RESPIRATORY (INHALATION) PRN
OUTPATIENT
Start: 2025-01-20

## 2024-12-30 RX ORDER — HEPARIN 100 UNIT/ML
500 SYRINGE INTRAVENOUS PRN
Status: DISCONTINUED | OUTPATIENT
Start: 2024-12-30 | End: 2024-12-31 | Stop reason: HOSPADM

## 2024-12-30 RX ORDER — FAMOTIDINE 10 MG/ML
20 INJECTION, SOLUTION INTRAVENOUS
OUTPATIENT
Start: 2025-01-20

## 2024-12-30 RX ORDER — MEPERIDINE HYDROCHLORIDE 50 MG/ML
12.5 INJECTION INTRAMUSCULAR; INTRAVENOUS; SUBCUTANEOUS PRN
OUTPATIENT
Start: 2025-01-20

## 2024-12-30 RX ORDER — SODIUM CHLORIDE 0.9 % (FLUSH) 0.9 %
5-40 SYRINGE (ML) INJECTION PRN
Status: DISCONTINUED | OUTPATIENT
Start: 2024-12-30 | End: 2024-12-31 | Stop reason: HOSPADM

## 2024-12-30 RX ORDER — ONDANSETRON 2 MG/ML
8 INJECTION INTRAMUSCULAR; INTRAVENOUS
OUTPATIENT
Start: 2025-01-20

## 2024-12-30 RX ORDER — DIPHENHYDRAMINE HYDROCHLORIDE 50 MG/ML
50 INJECTION INTRAMUSCULAR; INTRAVENOUS
OUTPATIENT
Start: 2025-01-20

## 2024-12-30 RX ORDER — EPINEPHRINE 1 MG/ML
0.3 INJECTION, SOLUTION, CONCENTRATE INTRAVENOUS PRN
OUTPATIENT
Start: 2025-01-20

## 2024-12-30 RX ORDER — HEPARIN SODIUM (PORCINE) LOCK FLUSH IV SOLN 100 UNIT/ML 100 UNIT/ML
500 SOLUTION INTRAVENOUS PRN
OUTPATIENT
Start: 2025-01-20

## 2024-12-30 RX ADMIN — SODIUM CHLORIDE 483 MG: 9 INJECTION, SOLUTION INTRAVENOUS at 14:36

## 2024-12-30 RX ADMIN — SODIUM CHLORIDE 20 ML/HR: 9 INJECTION, SOLUTION INTRAVENOUS at 13:31

## 2024-12-30 RX ADMIN — HEPARIN 500 UNITS: 100 SYRINGE at 15:21

## 2024-12-30 RX ADMIN — SODIUM CHLORIDE, PRESERVATIVE FREE 10 ML: 5 INJECTION INTRAVENOUS at 13:31

## 2024-12-30 RX ADMIN — SODIUM CHLORIDE, PRESERVATIVE FREE 10 ML: 5 INJECTION INTRAVENOUS at 15:21

## 2024-12-30 NOTE — TELEPHONE ENCOUNTER
AMINA HERE FOR MD VISIT & TX  Tx today   Rv in 3 weeks with tx   MD VISIT 1/20/25 @ 2:45PM TX @ 2PM  AVS PRINTED W/ INSTRUCTIONS AND MAILED TO PT

## 2024-12-30 NOTE — PROGRESS NOTES
which came back as invasive carcinoma, triple negative, also showed ER negative DCIS.  Patient now presents to the clinic to establish care and for further workup and evaluation.  He has recovered from the biopsy site without any complications.    Past Medical History:   Past Medical History:   Diagnosis Date    Abnormal EKG     Acute pain of right shoulder 03/29/2024    Arthritis     Breast cancer (HCC)     Rt breast. Pt said she will need radiation post surgery    Breast mass, right 12/28/2022    Chest pain 2000    only in the chest. Never went to ER, never went to see a cardiologist. Reported them to her PCP only    Chronic bilateral low back pain with bilateral sciatica 02/11/2020    DDD (degenerative disc disease), lumbar 02/11/2020    Discharge from right nipple 12/28/2022    Dyslipidemia     Elevated BP without diagnosis of hypertension     Hay fever     Hyperlipidemia     Hypertension     Lumbosacral spondylosis without myelopathy 04/18/2022    MVP (mitral valve prolapse)     diagnosed by first PCP 20 years ago, just got a new PCP Dr Centeno and patient said she was told she never had MVP    Other spondylosis with radiculopathy, lumbar region 04/18/2022    Pre-diabetes     managed by PCP    Snoring     White coat syndrome with diagnosis of hypertension      Past Surgical History:  Past Surgical History:   Procedure Laterality Date    BREAST LUMPECTOMY Right 5/8/2024    RIGHT BREAST  NEEDLE LOCALIZED @ 1130AM        PARTIAL MASTECTOMY WITH SENTINEL NODE BX  @ 1PM performed by Davis Rivero MD at [facility] OR    BREAST SURGERY Bilateral     biopsy    CHOLECYSTECTOMY      COLONOSCOPY      x 1.  Normal    ENDOMETRIAL ABLATION      EYE SURGERY Bilateral     chalazion removed    IR PORT PLACEMENT < 5 YEARS  6/18/2024    IR PORT PLACEMENT < 5 YEARS 6/18/2024 Jacobo Whatley MD [facility] SPECIAL PROCEDURES    LUMBAR FUSION N/A 05/16/2022    LUMBAR L5-S1 DECOMPRESSION FUSION POSTERIOR performed by Salomon Owens MD at [facility]

## 2024-12-30 NOTE — PROGRESS NOTES
Patient arrive ambulatory for cycle 4 day 1 treatment   Denies complaints or concerns.  Vitals as charted.  Port accessed; specimen sent.  Echo 10/1/24 , EF 57%  Trazimera infused with no sign of adverse reaction; line flushed.   Port flushed and heparinized with intact hollingsworth needle removed per protocol.  Patient  discharge.

## 2025-01-09 ENCOUNTER — TELEPHONE (OUTPATIENT)
Dept: ONCOLOGY | Age: 60
End: 2025-01-09

## 2025-01-09 NOTE — TELEPHONE ENCOUNTER
Name: Ann Hardy  : 1965  MRN: [id number]    Oncology Navigation Follow-Up Note    Contact Type:  Telephone    Notes: Writer spoke with pt who reports that she is feeling good. She does report some shoulder discomfort but otherwise denies concerns. We discussed upcoming appts. No barriers were voiced for these. Encouraged pt to call navigator as needed with any questions, concerns or barriers. Confirmed pt has navigator's contact information.         Electronically signed by Delmy Calderon RN on 2025 at 4:21 PM

## 2025-01-10 DIAGNOSIS — I10 PRIMARY HYPERTENSION: ICD-10-CM

## 2025-01-10 RX ORDER — LOSARTAN POTASSIUM 25 MG/1
25 TABLET ORAL DAILY
Qty: 90 TABLET | Refills: 3 | Status: SHIPPED | OUTPATIENT
Start: 2025-01-10

## 2025-01-10 RX ORDER — LOSARTAN POTASSIUM 25 MG/1
25 TABLET ORAL DAILY
Qty: 90 TABLET | Refills: 1 | Status: CANCELLED | OUTPATIENT
Start: 2025-01-10

## 2025-01-10 NOTE — PROGRESS NOTES
Patient calling in for a refill on her Losartan 25mg one tablet once daily. Her PCP has deferred the refill to you.     Last seen by Dr. Ricci 08/23/2024  Pharmacy: [facility]3] pharmacy if not able to send there then Luis Enrique at [facility]

## 2025-01-11 ENCOUNTER — HOSPITAL ENCOUNTER (OUTPATIENT)
Facility: MEDICAL CENTER | Age: 60
End: 2025-01-11
Payer: COMMERCIAL

## 2025-01-20 ENCOUNTER — OFFICE VISIT (OUTPATIENT)
Dept: ONCOLOGY | Age: 60
End: 2025-01-20
Payer: COMMERCIAL

## 2025-01-20 ENCOUNTER — TELEPHONE (OUTPATIENT)
Dept: ONCOLOGY | Age: 60
End: 2025-01-20

## 2025-01-20 ENCOUNTER — HOSPITAL ENCOUNTER (OUTPATIENT)
Dept: INFUSION THERAPY | Facility: MEDICAL CENTER | Age: 60
Discharge: HOME | End: 2025-01-20
Payer: COMMERCIAL

## 2025-01-20 VITALS
BODY MASS INDEX: 28.04 KG/M2 | OXYGEN SATURATION: 100 % | WEIGHT: 179 LBS | HEART RATE: 77 BPM | DIASTOLIC BLOOD PRESSURE: 80 MMHG | TEMPERATURE: 98.3 F | SYSTOLIC BLOOD PRESSURE: 125 MMHG

## 2025-01-20 VITALS — RESPIRATION RATE: 18 BRPM

## 2025-01-20 DIAGNOSIS — Z17.1 MALIGNANT NEOPLASM OF OVERLAPPING SITES OF RIGHT BREAST IN FEMALE, ESTROGEN RECEPTOR NEGATIVE (HCC): Primary | ICD-10-CM

## 2025-01-20 DIAGNOSIS — G89.29 CHRONIC RIGHT SHOULDER PAIN: ICD-10-CM

## 2025-01-20 DIAGNOSIS — C50.811 MALIGNANT NEOPLASM OF OVERLAPPING SITES OF RIGHT BREAST IN FEMALE, ESTROGEN RECEPTOR NEGATIVE (HCC): Primary | ICD-10-CM

## 2025-01-20 DIAGNOSIS — T45.1X5A NEUROPATHY DUE TO CHEMOTHERAPEUTIC DRUG (HCC): ICD-10-CM

## 2025-01-20 DIAGNOSIS — G62.0 NEUROPATHY DUE TO CHEMOTHERAPEUTIC DRUG (HCC): ICD-10-CM

## 2025-01-20 DIAGNOSIS — M25.511 CHRONIC RIGHT SHOULDER PAIN: ICD-10-CM

## 2025-01-20 LAB
ALBUMIN SERPL-MCNC: 3.9 G/DL (ref 3.5–5.2)
ALBUMIN/GLOB SERPL: 1.2 {RATIO} (ref 1–2.5)
ALP SERPL-CCNC: 78 U/L (ref 35–104)
ALT SERPL-CCNC: 26 U/L (ref 10–35)
ANION GAP SERPL CALCULATED.3IONS-SCNC: 10 MMOL/L (ref 9–16)
AST SERPL-CCNC: 22 U/L (ref 10–35)
BASOPHILS # BLD: 0.04 K/UL (ref 0–0.2)
BASOPHILS NFR BLD: 2 % (ref 0–2)
BILIRUB SERPL-MCNC: <0.2 MG/DL (ref 0–1.2)
BUN SERPL-MCNC: 19 MG/DL (ref 8–23)
CALCIUM SERPL-MCNC: 9 MG/DL (ref 8.8–10.2)
CHLORIDE SERPL-SCNC: 106 MMOL/L (ref 98–107)
CO2 SERPL-SCNC: 25 MMOL/L (ref 20–31)
CREAT SERPL-MCNC: 0.8 MG/DL (ref 0.5–0.9)
EOSINOPHIL # BLD: 0.11 K/UL (ref 0–0.44)
EOSINOPHILS RELATIVE PERCENT: 4 % (ref 1–4)
ERYTHROCYTE [DISTWIDTH] IN BLOOD BY AUTOMATED COUNT: 14.3 % (ref 11.8–14.4)
GFR, ESTIMATED: 90 ML/MIN/1.73M2
GLUCOSE SERPL-MCNC: 100 MG/DL (ref 82–115)
HCT VFR BLD AUTO: 34.2 % (ref 36.3–47.1)
HGB BLD-MCNC: 11.5 G/DL (ref 11.9–15.1)
IMM GRANULOCYTES # BLD AUTO: 0 K/UL (ref 0–0.3)
IMM GRANULOCYTES NFR BLD: 0 %
LYMPHOCYTES NFR BLD: 0.94 K/UL (ref 1.1–3.7)
LYMPHOCYTES RELATIVE PERCENT: 38 % (ref 24–43)
MCH RBC QN AUTO: 29.3 PG (ref 25.2–33.5)
MCHC RBC AUTO-ENTMCNC: 33.6 G/DL (ref 28.4–34.8)
MCV RBC AUTO: 87 FL (ref 82.6–102.9)
MONOCYTES NFR BLD: 0.25 K/UL (ref 0.1–1.2)
MONOCYTES NFR BLD: 10 % (ref 3–12)
NEUTROPHILS NFR BLD: 46 % (ref 36–65)
NEUTS SEG NFR BLD: 1.15 K/UL (ref 1.5–8.1)
NRBC BLD-RTO: 0 PER 100 WBC
PLATELET # BLD AUTO: 262 K/UL (ref 138–453)
PMV BLD AUTO: 9.6 FL (ref 8.1–13.5)
POTASSIUM SERPL-SCNC: 3.7 MMOL/L (ref 3.7–5.3)
PROT SERPL-MCNC: 7.1 G/DL (ref 6.6–8.7)
RBC # BLD AUTO: 3.93 M/UL (ref 3.95–5.11)
SODIUM SERPL-SCNC: 141 MMOL/L (ref 136–145)
WBC OTHER # BLD: 2.5 K/UL (ref 3.5–11.3)

## 2025-01-20 PROCEDURE — 80053 COMPREHEN METABOLIC PANEL: CPT

## 2025-01-20 PROCEDURE — 99214 OFFICE O/P EST MOD 30 MIN: CPT | Performed by: INTERNAL MEDICINE

## 2025-01-20 PROCEDURE — 2580000003 HC RX 258: Performed by: INTERNAL MEDICINE

## 2025-01-20 PROCEDURE — 96413 CHEMO IV INFUSION 1 HR: CPT

## 2025-01-20 PROCEDURE — 6360000002 HC RX W HCPCS: Performed by: INTERNAL MEDICINE

## 2025-01-20 PROCEDURE — 2500000003 HC RX 250 WO HCPCS: Performed by: INTERNAL MEDICINE

## 2025-01-20 PROCEDURE — 85025 COMPLETE CBC W/AUTO DIFF WBC: CPT

## 2025-01-20 RX ORDER — ALBUTEROL SULFATE 90 UG/1
4 INHALANT RESPIRATORY (INHALATION) PRN
OUTPATIENT
Start: 2025-02-10

## 2025-01-20 RX ORDER — HYDROCORTISONE SODIUM SUCCINATE 100 MG/2ML
100 INJECTION INTRAMUSCULAR; INTRAVENOUS
OUTPATIENT
Start: 2025-02-10

## 2025-01-20 RX ORDER — SODIUM CHLORIDE 0.9 % (FLUSH) 0.9 %
5-40 SYRINGE (ML) INJECTION PRN
Status: DISCONTINUED | OUTPATIENT
Start: 2025-01-20 | End: 2025-01-21 | Stop reason: HOSPADM

## 2025-01-20 RX ORDER — HEPARIN 100 UNIT/ML
500 SYRINGE INTRAVENOUS PRN
Status: DISCONTINUED | OUTPATIENT
Start: 2025-01-20 | End: 2025-01-21 | Stop reason: HOSPADM

## 2025-01-20 RX ORDER — HEPARIN SODIUM (PORCINE) LOCK FLUSH IV SOLN 100 UNIT/ML 100 UNIT/ML
500 SOLUTION INTRAVENOUS PRN
OUTPATIENT
Start: 2025-02-10

## 2025-01-20 RX ORDER — ACETAMINOPHEN 325 MG/1
650 TABLET ORAL
OUTPATIENT
Start: 2025-02-10

## 2025-01-20 RX ORDER — SODIUM CHLORIDE 9 MG/ML
5-250 INJECTION, SOLUTION INTRAVENOUS PRN
Status: DISCONTINUED | OUTPATIENT
Start: 2025-01-20 | End: 2025-01-21 | Stop reason: HOSPADM

## 2025-01-20 RX ORDER — ONDANSETRON 2 MG/ML
8 INJECTION INTRAMUSCULAR; INTRAVENOUS
OUTPATIENT
Start: 2025-02-10

## 2025-01-20 RX ORDER — DIPHENHYDRAMINE HYDROCHLORIDE 50 MG/ML
50 INJECTION INTRAMUSCULAR; INTRAVENOUS
OUTPATIENT
Start: 2025-02-10

## 2025-01-20 RX ORDER — FAMOTIDINE 10 MG/ML
20 INJECTION, SOLUTION INTRAVENOUS
OUTPATIENT
Start: 2025-02-10

## 2025-01-20 RX ORDER — MEPERIDINE HYDROCHLORIDE 50 MG/ML
12.5 INJECTION INTRAMUSCULAR; INTRAVENOUS; SUBCUTANEOUS PRN
OUTPATIENT
Start: 2025-02-10

## 2025-01-20 RX ORDER — SODIUM CHLORIDE 9 MG/ML
INJECTION, SOLUTION INTRAVENOUS CONTINUOUS
OUTPATIENT
Start: 2025-02-10

## 2025-01-20 RX ORDER — EPINEPHRINE 1 MG/ML
0.3 INJECTION, SOLUTION, CONCENTRATE INTRAVENOUS PRN
OUTPATIENT
Start: 2025-02-10

## 2025-01-20 RX ORDER — SODIUM CHLORIDE 9 MG/ML
5-250 INJECTION, SOLUTION INTRAVENOUS PRN
OUTPATIENT
Start: 2025-02-10

## 2025-01-20 RX ORDER — SODIUM CHLORIDE 0.9 % (FLUSH) 0.9 %
5-40 SYRINGE (ML) INJECTION PRN
OUTPATIENT
Start: 2025-02-10

## 2025-01-20 RX ADMIN — SODIUM CHLORIDE 25 ML/HR: 9 INJECTION, SOLUTION INTRAVENOUS at 14:43

## 2025-01-20 RX ADMIN — HEPARIN 500 UNITS: 100 SYRINGE at 16:44

## 2025-01-20 RX ADMIN — SODIUM CHLORIDE, PRESERVATIVE FREE 10 ML: 5 INJECTION INTRAVENOUS at 14:35

## 2025-01-20 RX ADMIN — SODIUM CHLORIDE, PRESERVATIVE FREE 10 ML: 5 INJECTION INTRAVENOUS at 16:44

## 2025-01-20 RX ADMIN — SODIUM CHLORIDE 483 MG: 0.9 INJECTION, SOLUTION INTRAVENOUS at 15:55

## 2025-01-20 NOTE — PROGRESS NOTES
nodule laterally to left lower lobe (image 86, series 6, and 2 mm nodule posterolaterally to left lower lobe (image 84, series 6). Soft Tissues/Bones: No acute bone or soft tissue abnormality.  No suspicious focal bony lesions.  Left chest infusion port with catheter tip in the SVC. Multilevel degenerative changes to the spine.  Prior right axillary lymph node dissection noted.  No lymphadenopathy is seen. Abdomen/Pelvis: Organs: Unremarkable liver, spleen, pancreas and adrenal glands.  Prior cholecystectomy.  Benign cyst to the upper pole of the left kidney measuring 2.3 cm in size.  Additional subcentimeter low-attenuation foci to both kidneys which are too small to further characterize, but are compatible with additional benign renal cysts.  No follow-up imaging is recommended of these cysts.  The kidneys are otherwise unremarkable. GI/Bowel: No evidence for bowel obstruction or definite bowel wall thickening to large or small bowel.  Normal appendix.  Grossly unremarkable stomach. Pelvis: Under distended urinary bladder.  Diffuse urinary bladder wall thickening.  Unremarkable uterus and adnexa. Peritoneum/Retroperitoneum: No ascites or focal fluid collections.  No intraperitoneal free air.  No evidence for abdominal aortic aneurysm.  No lymphadenopathy. Bones/Soft Tissues: No acute bone or soft tissue abnormality.  No suspicious focal bony lesions.  Chronic postsurgical change with orthopedic hardware at lumbosacral junction.  Multilevel degenerative changes to the spine.  Grade 1 anterolisthesis of L5 on S1.  Perihardware lucencies involving pedicle screws at L5 and S1 as well as screws extending into both the right and left iliac wings.  Screw into the right iliac wing is fractured.  Findings appear stable from prior CT lumbar myelogram 11/10/2023.     1. No convincing evidence for metastatic disease to the chest, abdomen or pelvis. 2. Multiple subpleural noncalcified pulmonary nodules to both lungs measuring

## 2025-01-20 NOTE — PROGRESS NOTES
Patient arrives ambulatory with spouse for C5 D1 Trazimera & MD visit  Pt states she has soreness/ tenderness to right shoulder, axilla & breast area. Denies other complaint/concern  Labs drawn per port & reviewed  Last echo reviewed from Oct 24 57%  Pt seen by Dr. Grimaldo & orders to tx  Follow up echo ordered  Trazimera infused without adverse reaction  Line flushed  Port flushed & heparinized with intact Wilder needle removed per protocol  Pt discharged ambulatory with spouse  AVS per

## 2025-01-20 NOTE — TELEPHONE ENCOUNTER
AMINA HERE FOR FOLLOW UP & TX   Schedule echo  , orders in from last visit  TX today   Rv in 3 weeks   ECHO: 1/24/25 @ 8:30AM ARRIVAL @ 8:15AM   MD VISIT: 2/10/25 @ 8:45AM TX @ 8AM   AVS PRINTED AND GIVEN ON EXIT

## 2025-01-24 ENCOUNTER — HOSPITAL ENCOUNTER (OUTPATIENT)
Age: 60
Discharge: HOME | End: 2025-01-26
Attending: INTERNAL MEDICINE
Payer: COMMERCIAL

## 2025-01-24 VITALS — HEIGHT: 67 IN | WEIGHT: 179 LBS | BODY MASS INDEX: 28.09 KG/M2

## 2025-01-24 DIAGNOSIS — Z17.1 MALIGNANT NEOPLASM OF OVERLAPPING SITES OF RIGHT BREAST IN FEMALE, ESTROGEN RECEPTOR NEGATIVE (HCC): ICD-10-CM

## 2025-01-24 DIAGNOSIS — C50.811 MALIGNANT NEOPLASM OF OVERLAPPING SITES OF RIGHT BREAST IN FEMALE, ESTROGEN RECEPTOR NEGATIVE (HCC): ICD-10-CM

## 2025-01-24 LAB
ECHO AO ROOT DIAM: 2.6 CM
ECHO AO ROOT INDEX: 1.35 CM/M2
ECHO AV MEAN GRADIENT: 5 MMHG
ECHO AV MEAN VELOCITY: 1 M/S
ECHO AV PEAK GRADIENT: 10 MMHG
ECHO AV PEAK VELOCITY: 1.6 M/S
ECHO AV VELOCITY RATIO: 0.69
ECHO AV VTI: 36.9 CM
ECHO BSA: 1.96 M2
ECHO EST RA PRESSURE: 3 MMHG
ECHO LA AREA 2C: 24.5 CM2
ECHO LA AREA 4C: 20.3 CM2
ECHO LA DIAMETER INDEX: 1.76 CM/M2
ECHO LA DIAMETER: 3.4 CM
ECHO LA MAJOR AXIS: 5.4 CM
ECHO LA MINOR AXIS: 5.7 CM
ECHO LA TO AORTIC ROOT RATIO: 1.31
ECHO LA VOL BP: 74 ML (ref 22–52)
ECHO LA VOL MOD A2C: 87 ML (ref 22–52)
ECHO LA VOL MOD A4C: 61 ML (ref 22–52)
ECHO LA VOL/BSA BIPLANE: 38 ML/M2 (ref 16–34)
ECHO LA VOLUME INDEX MOD A2C: 45 ML/M2 (ref 16–34)
ECHO LA VOLUME INDEX MOD A4C: 32 ML/M2 (ref 16–34)
ECHO LV E' LATERAL VELOCITY: 11.1 CM/S
ECHO LV E' SEPTAL VELOCITY: 5.55 CM/S
ECHO LV EDV 3D: 119 ML
ECHO LV EDV INDEX 3D: 62 ML/M2
ECHO LV EJECTION FRACTION 3D: 60 %
ECHO LV ESV 3D: 48 ML
ECHO LV ESV INDEX 3D: 25 ML/M2
ECHO LV FRACTIONAL SHORTENING: 27 % (ref 28–44)
ECHO LV GLOBAL LONGITUDINAL STRAIN (GLS): -16.3 %
ECHO LV GLOBAL LONGITUDINAL STRAIN (GLS): -16.8 %
ECHO LV GLOBAL LONGITUDINAL STRAIN (GLS): -17 %
ECHO LV GLOBAL LONGITUDINAL STRAIN (GLS): -17.9 %
ECHO LV INTERNAL DIMENSION DIASTOLE INDEX: 2.28 CM/M2
ECHO LV INTERNAL DIMENSION DIASTOLIC: 4.4 CM (ref 3.9–5.3)
ECHO LV INTERNAL DIMENSION SYSTOLIC INDEX: 1.66 CM/M2
ECHO LV INTERNAL DIMENSION SYSTOLIC: 3.2 CM
ECHO LV IVSD: 1 CM (ref 0.6–0.9)
ECHO LV MASS 2D: 147.8 G (ref 67–162)
ECHO LV MASS 3D INDEX: 66.8 G/M2
ECHO LV MASS 3D: 129 G
ECHO LV MASS INDEX 2D: 76.6 G/M2 (ref 43–95)
ECHO LV POSTERIOR WALL DIASTOLIC: 1 CM (ref 0.6–0.9)
ECHO LV RELATIVE WALL THICKNESS RATIO: 0.45
ECHO LVOT PEAK GRADIENT: 5 MMHG
ECHO LVOT PEAK VELOCITY: 1.1 M/S
ECHO MV A VELOCITY: 0.89 M/S
ECHO MV E DECELERATION TIME (DT): 211 MS
ECHO MV E VELOCITY: 1 M/S
ECHO MV E/A RATIO: 1.12
ECHO MV E/E' LATERAL: 9.01
ECHO MV E/E' RATIO (AVERAGED): 13.51
ECHO MV E/E' SEPTAL: 18.02
ECHO RIGHT VENTRICULAR SYSTOLIC PRESSURE (RVSP): 26 MMHG
ECHO TV REGURGITANT MAX VELOCITY: 2.4 M/S
ECHO TV REGURGITANT PEAK GRADIENT: 23 MMHG

## 2025-01-24 PROCEDURE — 93356 MYOCRD STRAIN IMG SPCKL TRCK: CPT

## 2025-02-04 ENCOUNTER — HOSPITAL ENCOUNTER (OUTPATIENT)
Facility: MEDICAL CENTER | Age: 60
End: 2025-02-04
Payer: COMMERCIAL

## 2025-02-10 ENCOUNTER — OFFICE VISIT (OUTPATIENT)
Dept: ONCOLOGY | Age: 60
End: 2025-02-10
Payer: COMMERCIAL

## 2025-02-10 ENCOUNTER — TELEPHONE (OUTPATIENT)
Dept: ONCOLOGY | Age: 60
End: 2025-02-10

## 2025-02-10 ENCOUNTER — HOSPITAL ENCOUNTER (OUTPATIENT)
Dept: INFUSION THERAPY | Facility: MEDICAL CENTER | Age: 60
Discharge: HOME | End: 2025-02-10
Payer: COMMERCIAL

## 2025-02-10 VITALS
BODY MASS INDEX: 28.18 KG/M2 | DIASTOLIC BLOOD PRESSURE: 91 MMHG | TEMPERATURE: 99.3 F | SYSTOLIC BLOOD PRESSURE: 140 MMHG | RESPIRATION RATE: 16 BRPM | HEART RATE: 71 BPM | WEIGHT: 179.9 LBS

## 2025-02-10 DIAGNOSIS — T45.1X5A NEUROPATHY DUE TO CHEMOTHERAPEUTIC DRUG (HCC): ICD-10-CM

## 2025-02-10 DIAGNOSIS — C50.811 MALIGNANT NEOPLASM OF OVERLAPPING SITES OF RIGHT BREAST IN FEMALE, ESTROGEN RECEPTOR NEGATIVE (HCC): Primary | ICD-10-CM

## 2025-02-10 DIAGNOSIS — Z51.11 CHEMOTHERAPY MANAGEMENT, ENCOUNTER FOR: ICD-10-CM

## 2025-02-10 DIAGNOSIS — G62.0 NEUROPATHY DUE TO CHEMOTHERAPEUTIC DRUG (HCC): ICD-10-CM

## 2025-02-10 DIAGNOSIS — Z17.1 MALIGNANT NEOPLASM OF OVERLAPPING SITES OF RIGHT BREAST IN FEMALE, ESTROGEN RECEPTOR NEGATIVE (HCC): Primary | ICD-10-CM

## 2025-02-10 DIAGNOSIS — G89.29 CHRONIC RIGHT SHOULDER PAIN: ICD-10-CM

## 2025-02-10 DIAGNOSIS — M25.511 CHRONIC RIGHT SHOULDER PAIN: ICD-10-CM

## 2025-02-10 LAB
ALBUMIN SERPL-MCNC: 4.1 G/DL (ref 3.5–5.2)
ALBUMIN/GLOB SERPL: 1.3 {RATIO} (ref 1–2.5)
ALP SERPL-CCNC: 77 U/L (ref 35–104)
ALT SERPL-CCNC: 28 U/L (ref 10–35)
ANION GAP SERPL CALCULATED.3IONS-SCNC: 9 MMOL/L (ref 9–16)
AST SERPL-CCNC: 23 U/L (ref 10–35)
BASOPHILS # BLD: 0.03 K/UL (ref 0–0.2)
BASOPHILS NFR BLD: 1 % (ref 0–2)
BILIRUB SERPL-MCNC: <0.2 MG/DL (ref 0–1.2)
BUN SERPL-MCNC: 18 MG/DL (ref 8–23)
CALCIUM SERPL-MCNC: 9.2 MG/DL (ref 8.8–10.2)
CHLORIDE SERPL-SCNC: 111 MMOL/L (ref 98–107)
CO2 SERPL-SCNC: 24 MMOL/L (ref 20–31)
CREAT SERPL-MCNC: 0.8 MG/DL (ref 0.5–0.9)
EOSINOPHIL # BLD: 0.07 K/UL (ref 0–0.44)
EOSINOPHILS RELATIVE PERCENT: 3 % (ref 1–4)
ERYTHROCYTE [DISTWIDTH] IN BLOOD BY AUTOMATED COUNT: 14.8 % (ref 11.8–14.4)
GFR, ESTIMATED: 84 ML/MIN/1.73M2
GLUCOSE SERPL-MCNC: 85 MG/DL (ref 82–115)
HCT VFR BLD AUTO: 35.4 % (ref 36.3–47.1)
HGB BLD-MCNC: 11.6 G/DL (ref 11.9–15.1)
IMM GRANULOCYTES # BLD AUTO: 0 K/UL (ref 0–0.3)
IMM GRANULOCYTES NFR BLD: 0 %
LYMPHOCYTES NFR BLD: 0.97 K/UL (ref 1.1–3.7)
LYMPHOCYTES RELATIVE PERCENT: 36 % (ref 24–43)
MCH RBC QN AUTO: 28.8 PG (ref 25.2–33.5)
MCHC RBC AUTO-ENTMCNC: 32.8 G/DL (ref 28.4–34.8)
MCV RBC AUTO: 87.8 FL (ref 82.6–102.9)
MONOCYTES NFR BLD: 0.31 K/UL (ref 0.1–1.2)
MONOCYTES NFR BLD: 11 % (ref 3–12)
NEUTROPHILS NFR BLD: 49 % (ref 36–65)
NEUTS SEG NFR BLD: 1.35 K/UL (ref 1.5–8.1)
NRBC BLD-RTO: 0 PER 100 WBC
PLATELET # BLD AUTO: 259 K/UL (ref 138–453)
PMV BLD AUTO: 9.7 FL (ref 8.1–13.5)
POTASSIUM SERPL-SCNC: 3.9 MMOL/L (ref 3.7–5.3)
PROT SERPL-MCNC: 7.2 G/DL (ref 6.6–8.7)
RBC # BLD AUTO: 4.03 M/UL (ref 3.95–5.11)
RBC # BLD: ABNORMAL 10*6/UL
SODIUM SERPL-SCNC: 143 MMOL/L (ref 136–145)
WBC OTHER # BLD: 2.7 K/UL (ref 3.5–11.3)

## 2025-02-10 PROCEDURE — 2500000003 HC RX 250 WO HCPCS: Performed by: INTERNAL MEDICINE

## 2025-02-10 PROCEDURE — 80053 COMPREHEN METABOLIC PANEL: CPT

## 2025-02-10 PROCEDURE — 99214 OFFICE O/P EST MOD 30 MIN: CPT | Performed by: INTERNAL MEDICINE

## 2025-02-10 PROCEDURE — 85025 COMPLETE CBC W/AUTO DIFF WBC: CPT

## 2025-02-10 PROCEDURE — 96413 CHEMO IV INFUSION 1 HR: CPT

## 2025-02-10 PROCEDURE — 2580000003 HC RX 258: Performed by: INTERNAL MEDICINE

## 2025-02-10 PROCEDURE — 6360000002 HC RX W HCPCS: Performed by: INTERNAL MEDICINE

## 2025-02-10 PROCEDURE — 99211 OFF/OP EST MAY X REQ PHY/QHP: CPT | Performed by: INTERNAL MEDICINE

## 2025-02-10 RX ORDER — SODIUM CHLORIDE 9 MG/ML
5-250 INJECTION, SOLUTION INTRAVENOUS PRN
OUTPATIENT
Start: 2025-03-03

## 2025-02-10 RX ORDER — MEPERIDINE HYDROCHLORIDE 50 MG/ML
12.5 INJECTION INTRAMUSCULAR; INTRAVENOUS; SUBCUTANEOUS PRN
OUTPATIENT
Start: 2025-03-03

## 2025-02-10 RX ORDER — SODIUM CHLORIDE 9 MG/ML
INJECTION, SOLUTION INTRAVENOUS CONTINUOUS
OUTPATIENT
Start: 2025-03-03

## 2025-02-10 RX ORDER — ACETAMINOPHEN 325 MG/1
650 TABLET ORAL
OUTPATIENT
Start: 2025-03-03

## 2025-02-10 RX ORDER — HYDROCORTISONE SODIUM SUCCINATE 100 MG/2ML
100 INJECTION INTRAMUSCULAR; INTRAVENOUS
OUTPATIENT
Start: 2025-03-03

## 2025-02-10 RX ORDER — HEPARIN SODIUM (PORCINE) LOCK FLUSH IV SOLN 100 UNIT/ML 100 UNIT/ML
500 SOLUTION INTRAVENOUS PRN
OUTPATIENT
Start: 2025-03-03

## 2025-02-10 RX ORDER — ALBUTEROL SULFATE 90 UG/1
4 INHALANT RESPIRATORY (INHALATION) PRN
OUTPATIENT
Start: 2025-03-03

## 2025-02-10 RX ORDER — SODIUM CHLORIDE 0.9 % (FLUSH) 0.9 %
5-40 SYRINGE (ML) INJECTION PRN
OUTPATIENT
Start: 2025-03-03

## 2025-02-10 RX ORDER — DIPHENHYDRAMINE HYDROCHLORIDE 50 MG/ML
50 INJECTION INTRAMUSCULAR; INTRAVENOUS
OUTPATIENT
Start: 2025-03-03

## 2025-02-10 RX ORDER — SODIUM CHLORIDE 0.9 % (FLUSH) 0.9 %
5-40 SYRINGE (ML) INJECTION PRN
Status: DISCONTINUED | OUTPATIENT
Start: 2025-02-10 | End: 2025-02-11 | Stop reason: HOSPADM

## 2025-02-10 RX ORDER — ONDANSETRON 2 MG/ML
8 INJECTION INTRAMUSCULAR; INTRAVENOUS
OUTPATIENT
Start: 2025-03-03

## 2025-02-10 RX ORDER — EPINEPHRINE 1 MG/ML
0.3 INJECTION, SOLUTION, CONCENTRATE INTRAVENOUS PRN
OUTPATIENT
Start: 2025-03-03

## 2025-02-10 RX ORDER — FAMOTIDINE 10 MG/ML
20 INJECTION, SOLUTION INTRAVENOUS
OUTPATIENT
Start: 2025-03-03

## 2025-02-10 RX ORDER — SODIUM CHLORIDE 9 MG/ML
5-250 INJECTION, SOLUTION INTRAVENOUS PRN
Status: DISCONTINUED | OUTPATIENT
Start: 2025-02-10 | End: 2025-02-11 | Stop reason: HOSPADM

## 2025-02-10 RX ORDER — HEPARIN 100 UNIT/ML
500 SYRINGE INTRAVENOUS PRN
Status: DISCONTINUED | OUTPATIENT
Start: 2025-02-10 | End: 2025-02-11 | Stop reason: HOSPADM

## 2025-02-10 RX ADMIN — SODIUM CHLORIDE 483 MG: 9 INJECTION, SOLUTION INTRAVENOUS at 10:13

## 2025-02-10 RX ADMIN — SODIUM CHLORIDE 50 ML/HR: 9 INJECTION, SOLUTION INTRAVENOUS at 08:32

## 2025-02-10 RX ADMIN — HEPARIN 500 UNITS: 100 SYRINGE at 11:10

## 2025-02-10 RX ADMIN — SODIUM CHLORIDE, PRESERVATIVE FREE 10 ML: 5 INJECTION INTRAVENOUS at 11:10

## 2025-02-10 NOTE — PROGRESS NOTES
Ann Hardy                                                                                                                  2/10/2025  MRN:   [id number]  YOB: 1965  PCP:                           Lisa Segura MD  Referring Physician: No ref. provider found  Treating Physician Name: TOBI PARK MD      Reason for visit:  Chief Complaint   Patient presents with    Chemotherapy    Follow-up     Current problems:  Right breast invasive cancer, HER2 positive ER negative, pathological stage T1b N0 M0-2/2024  Right breast DCIS, ER negative  Chronic arthritis  Medical comorbidities: Mitral valve prolapse, dyslipidemia, hypertension    Active and recent treatments:  S/p right lumpectomy with sentinel lymph node biopsy-5/2024  Weekly Taxol with Herceptin every 3 week-6/2024, ongoing  Adjuvant radiation-10/2024 through 11/2024    Interval history:  Patient presents to the clinic for a follow-up visit and for toxicity check and to review results of her blood work-up.  Continues to have chest wall discomfort.  Denies fever chills.  During this visit patient's allergy, social, medical, surgical history and medications were reviewed and updated.    Summary of Case/History:  Ann Hardy a 59 y.o.female is a patient with biopsy-proven invasive cancer of right breast, triple negative and DCIS, ER negative presents to the clinic to establish care and for further workup and evaluation.She underwent screening mammogram in January 2024 which showed group of calcification in the right breast which was indeterminate.  Patient subsequently underwent diagnostic mammogram of the right breast which showed clustered pleomorphic calcification in the right breast at the 11 o'clock position.  Patient subsequently underwent biopsy of the right breast which came back as invasive carcinoma, triple negative, also showed ER negative DCIS.  Patient now presents to the clinic to establish care and for further workup and

## 2025-02-10 NOTE — PROGRESS NOTES
Patient arrive ambulatory for cycle 4 day 1 treatment   Pt states she has chest pain  that come and goes, last for about 2 minutes and subsided on it own , this has been going for the past 6 months.   Denies complaints or concerns.  Vitals as charted.  Port accessed; specimen sent.  Echo 1/24/25 , EF 60%  Trazimera infused with no sign of adverse reaction; line flushed.   Port flushed and heparinized with intact hollingsworth needle removed per protocol.  Patient  discharge.

## 2025-02-11 ENCOUNTER — TELEPHONE (OUTPATIENT)
Dept: ONCOLOGY | Age: 60
End: 2025-02-11

## 2025-02-11 ENCOUNTER — TELEPHONE (OUTPATIENT)
Dept: SURGERY | Age: 60
End: 2025-02-11

## 2025-02-11 NOTE — TELEPHONE ENCOUNTER
Left voicemail requesting a call back to schedule follow up visit with Dr. Nieves to discuss oncoplastic reconstruction. Patient completed radiation.

## 2025-02-11 NOTE — TELEPHONE ENCOUNTER
Name: Ann Hardy  : 1965  MRN: [id number]    Oncology Navigation Follow-Up Note      Notes: Per Dr Nieves's consult note pt was to f/u with her after radiation was completed. Writer updated her  that pt completed XRT. Office will reach out ot pt for scheduling.       Electronically signed by Delmy Calderon RN on 2025 at 11:32 AM

## 2025-02-13 ENCOUNTER — TELEPHONE (OUTPATIENT)
Dept: ONCOLOGY | Age: 60
End: 2025-02-13

## 2025-02-13 DIAGNOSIS — C50.811 MALIGNANT NEOPLASM OF OVERLAPPING SITES OF RIGHT BREAST IN FEMALE, ESTROGEN RECEPTOR NEGATIVE (HCC): Primary | ICD-10-CM

## 2025-02-13 DIAGNOSIS — Z17.1 MALIGNANT NEOPLASM OF OVERLAPPING SITES OF RIGHT BREAST IN FEMALE, ESTROGEN RECEPTOR NEGATIVE (HCC): Primary | ICD-10-CM

## 2025-02-13 NOTE — TELEPHONE ENCOUNTER
Name: Ann Hardy  : 1965  MRN: [id number]    Oncology Navigation Follow-Up Note    Contact Type:  Telephone    Notes: Pt called stating Dr Grimaldo wanted pt to get back in with O.T for lymphedema.Pt reports she has been having issues with her sleeve and shoulder pains.   She was given lymphedema schedule line and writer will route message to [facility]s.       Electronically signed by Delmy Calderon RN on 2025 at 3:32 PM

## 2025-02-17 ENCOUNTER — HOSPITAL ENCOUNTER (OUTPATIENT)
Age: 60
Discharge: HOME | End: 2025-02-17
Payer: COMMERCIAL

## 2025-02-17 ENCOUNTER — HOSPITAL ENCOUNTER (OUTPATIENT)
Dept: MAMMOGRAPHY | Age: 60
Discharge: HOME | End: 2025-02-19
Attending: SURGERY
Payer: COMMERCIAL

## 2025-02-17 DIAGNOSIS — R73.03 PREDIABETES: ICD-10-CM

## 2025-02-17 DIAGNOSIS — Z17.1 MALIGNANT NEOPLASM OF UPPER-OUTER QUADRANT OF RIGHT BREAST IN FEMALE, ESTROGEN RECEPTOR NEGATIVE (HCC): ICD-10-CM

## 2025-02-17 DIAGNOSIS — I50.30 HYPERTENSIVE HEART DISEASE WITH DIASTOLIC HEART FAILURE (HCC): ICD-10-CM

## 2025-02-17 DIAGNOSIS — E78.5 DYSLIPIDEMIA: ICD-10-CM

## 2025-02-17 DIAGNOSIS — I11.0 HYPERTENSIVE HEART DISEASE WITH DIASTOLIC HEART FAILURE (HCC): ICD-10-CM

## 2025-02-17 DIAGNOSIS — C50.411 MALIGNANT NEOPLASM OF UPPER-OUTER QUADRANT OF RIGHT BREAST IN FEMALE, ESTROGEN RECEPTOR NEGATIVE (HCC): ICD-10-CM

## 2025-02-17 LAB
CHOLEST SERPL-MCNC: 148 MG/DL (ref 0–199)
CHOLESTEROL/HDL RATIO: 3
CREAT UR-MCNC: 70.7 MG/DL (ref 28–217)
EST. AVERAGE GLUCOSE BLD GHB EST-MCNC: 128 MG/DL
HBA1C MFR BLD: 6.1 % (ref 4–6)
HDLC SERPL-MCNC: 50 MG/DL
LDLC SERPL CALC-MCNC: 86 MG/DL (ref 0–100)
MICROALBUMIN UR-MCNC: <12 MG/L (ref 0–20)
MICROALBUMIN/CREAT UR-RTO: NORMAL MCG/MG CREAT (ref 0–25)
TRIGL SERPL-MCNC: 62 MG/DL
VLDLC SERPL CALC-MCNC: 12 MG/DL (ref 1–30)

## 2025-02-17 PROCEDURE — 83036 HEMOGLOBIN GLYCOSYLATED A1C: CPT

## 2025-02-17 PROCEDURE — G0279 TOMOSYNTHESIS, MAMMO: HCPCS

## 2025-02-17 PROCEDURE — 82570 ASSAY OF URINE CREATININE: CPT

## 2025-02-17 PROCEDURE — 36415 COLL VENOUS BLD VENIPUNCTURE: CPT

## 2025-02-17 PROCEDURE — 82043 UR ALBUMIN QUANTITATIVE: CPT

## 2025-02-17 PROCEDURE — 80061 LIPID PANEL: CPT

## 2025-02-18 ENCOUNTER — OFFICE VISIT (OUTPATIENT)
Dept: PRIMARY CARE CLINIC | Age: 60
End: 2025-02-18
Payer: COMMERCIAL

## 2025-02-18 VITALS
OXYGEN SATURATION: 98 % | HEIGHT: 67 IN | SYSTOLIC BLOOD PRESSURE: 136 MMHG | BODY MASS INDEX: 27.94 KG/M2 | DIASTOLIC BLOOD PRESSURE: 84 MMHG | HEART RATE: 77 BPM | WEIGHT: 178 LBS

## 2025-02-18 DIAGNOSIS — E78.5 DYSLIPIDEMIA: ICD-10-CM

## 2025-02-18 DIAGNOSIS — G89.29 CHRONIC BILATERAL LOW BACK PAIN WITH BILATERAL SCIATICA: ICD-10-CM

## 2025-02-18 DIAGNOSIS — Z17.1 MALIGNANT NEOPLASM OF OVERLAPPING SITES OF RIGHT BREAST IN FEMALE, ESTROGEN RECEPTOR NEGATIVE (HCC): ICD-10-CM

## 2025-02-18 DIAGNOSIS — M54.42 CHRONIC BILATERAL LOW BACK PAIN WITH BILATERAL SCIATICA: ICD-10-CM

## 2025-02-18 DIAGNOSIS — M81.0 AGE-RELATED OSTEOPOROSIS WITHOUT CURRENT PATHOLOGICAL FRACTURE: ICD-10-CM

## 2025-02-18 DIAGNOSIS — Z00.01 ENCOUNTER FOR WELL ADULT EXAM WITH ABNORMAL FINDINGS: Primary | ICD-10-CM

## 2025-02-18 DIAGNOSIS — Z12.11 COLON CANCER SCREENING: ICD-10-CM

## 2025-02-18 DIAGNOSIS — R73.03 PREDIABETES: ICD-10-CM

## 2025-02-18 DIAGNOSIS — I50.30 HYPERTENSIVE HEART DISEASE WITH DIASTOLIC HEART FAILURE (HCC): ICD-10-CM

## 2025-02-18 DIAGNOSIS — K59.00 CONSTIPATION, UNSPECIFIED CONSTIPATION TYPE: ICD-10-CM

## 2025-02-18 DIAGNOSIS — I11.0 HYPERTENSIVE HEART DISEASE WITH DIASTOLIC HEART FAILURE (HCC): ICD-10-CM

## 2025-02-18 DIAGNOSIS — C50.811 MALIGNANT NEOPLASM OF OVERLAPPING SITES OF RIGHT BREAST IN FEMALE, ESTROGEN RECEPTOR NEGATIVE (HCC): ICD-10-CM

## 2025-02-18 DIAGNOSIS — Z78.9 NEVER SMOKED TOBACCO: ICD-10-CM

## 2025-02-18 DIAGNOSIS — M54.41 CHRONIC BILATERAL LOW BACK PAIN WITH BILATERAL SCIATICA: ICD-10-CM

## 2025-02-18 PROCEDURE — 99214 OFFICE O/P EST MOD 30 MIN: CPT | Performed by: STUDENT IN AN ORGANIZED HEALTH CARE EDUCATION/TRAINING PROGRAM

## 2025-02-18 PROCEDURE — 99396 PREV VISIT EST AGE 40-64: CPT | Performed by: STUDENT IN AN ORGANIZED HEALTH CARE EDUCATION/TRAINING PROGRAM

## 2025-02-18 SDOH — ECONOMIC STABILITY: FOOD INSECURITY: WITHIN THE PAST 12 MONTHS, THE FOOD YOU BOUGHT JUST DIDN'T LAST AND YOU DIDN'T HAVE MONEY TO GET MORE.: NEVER TRUE

## 2025-02-18 SDOH — ECONOMIC STABILITY: FOOD INSECURITY: WITHIN THE PAST 12 MONTHS, YOU WORRIED THAT YOUR FOOD WOULD RUN OUT BEFORE YOU GOT MONEY TO BUY MORE.: NEVER TRUE

## 2025-02-18 ASSESSMENT — PATIENT HEALTH QUESTIONNAIRE - PHQ9
1. LITTLE INTEREST OR PLEASURE IN DOING THINGS: NOT AT ALL
SUM OF ALL RESPONSES TO PHQ QUESTIONS 1-9: 0
SUM OF ALL RESPONSES TO PHQ9 QUESTIONS 1 & 2: 0
SUM OF ALL RESPONSES TO PHQ QUESTIONS 1-9: 0
2. FEELING DOWN, DEPRESSED OR HOPELESS: NOT AT ALL

## 2025-02-18 ASSESSMENT — ENCOUNTER SYMPTOMS
NAUSEA: 0
COUGH: 0
CONSTIPATION: 1
RHINORRHEA: 1
DIARRHEA: 0
VOMITING: 0
SHORTNESS OF BREATH: 0

## 2025-02-18 NOTE — PATIENT INSTRUCTIONS
You are due for your pneumonia vaccine- its recommended that you get Prevnar 20, and then you will be done with your pneumonia vaccine 'series'.    Remember to call Dr. Ricci's office to schedule an appointment for evaluation of your intermittent chest pain.     Remember to call Dr. Owens' office to schedule an appointment to check in about your low back pain/soreness.    I have generated a referral for the oncology dietitian.  If you have not heard from them in 4 business days, you can call the number on the order sheet and schedule an appointment to get established. If you have any issues getting scheduled, please call the office to let me know.       Advance Care Planning     Advance Care Planning opens a door to talk about and write down your wishes before a sudden accident or illness.  Make your goals, values, and preferences known.     This puts you in the ’s seat and helps others know what matters most to you so they won’t have to guess.      Where can you learn more?    Go to [email or web address]-resources/[email or web address]-[email or web address]   to learn how to:    Name someone you trust to make healthcare decisions for you, only if you can’t. (Healthcare Power of )    Document your wishes for care if you were seriously ill and not expected to recover or are approaching end of life. (Advance Directive or Living Will)    The same page can be found using the QR code below.                Well Visit, Ages 18 to 65: Care Instructions  Well visits can help you stay healthy. Your doctor has checked your overall health and may have suggested ways to take good care of yourself. Your doctor also may have recommended tests. You can help prevent illness with healthy eating, good sleep, vaccinations, regular exercise, and other steps.    Get the tests that you and your doctor decide on. Depending on your age and risks, examples might include screening for diabetes; hepatitis C; HIV; and cervical, breast, lung,

## 2025-02-18 NOTE — PROGRESS NOTES
[facility]  [address] [place] [address].  [place] [address]  Phone: [phone]  Fax: [phone]    Date of Visit: 2025   Patient Name: Ann Hardy   Patient :  1965     ASSESSMENT/PLAN     Ann Hardy is a 59 y.o. female who is here for a complete physical exam. Preventative screenings reviewed with patient today.    1. Encounter for well adult exam with abnormal findings  Comments:  - Due for pneumonia vaccine, Prevnar 20, and COVID-19 vaccine  - cologard ordered  - provided ACP info  2. Prediabetes  Assessment & Plan:  - A1c 6.1, stable prediabetes  - continue behavioral modifications for management  Orders:  -     Luz Marina Grider RD, LD, [facility], [place]  3. Dyslipidemia  Overview:  Currently on Lipitor, managed by PCP.  Assessment & Plan:  - lipid panel values improved from last check. Continue current treatment plan  4. Malignant neoplasm of overlapping sites of right breast in female, estrogen receptor negative (HCC)  Overview:  Right breast invasive cancer, HER2 positive ER negative, pathological stage T1b N0 M0-2024. Right breast DCIS, ER negative.  Condition currently managed by DrsAdrian Grimaldo, Milan Almeida, and Davis Rivero.  Orders:  -     Luz Marina Grider RD, LD, [facility], [place]  5. Age-related osteoporosis without current pathological fracture  Overview:  Currently on alendronate (since 2023), managed by PCP.  Assessment & Plan:  - On alendronate since 2023   - post-lumbar fusion surgery in 2022  - Repeat DEXA scan advised around 2026, will determine if alendronate should be continued for a total of 5yrs or stopped  6. Chronic bilateral low back pain with bilateral sciatica  Overview:  S/p lumbar fusion. Currently managed with stretching and home exercises.  Assessment & Plan:  - Schedule appointment with Dr. Owens  7. Hypertensive heart disease with diastolic heart failure

## 2025-02-18 NOTE — ASSESSMENT & PLAN NOTE
- On alendronate since January 2023   - post-lumbar fusion surgery in November 2022  - Repeat DEXA scan advised around January 2026, will determine if alendronate should be continued for a total of 5yrs or stopped

## 2025-02-24 ENCOUNTER — HOSPITAL ENCOUNTER (OUTPATIENT)
Dept: CT IMAGING | Facility: CLINIC | Age: 60
Discharge: HOME | End: 2025-02-26
Attending: INTERNAL MEDICINE
Payer: COMMERCIAL

## 2025-02-24 ENCOUNTER — TELEPHONE (OUTPATIENT)
Dept: ONCOLOGY | Age: 60
End: 2025-02-24

## 2025-02-24 DIAGNOSIS — R93.89 IMAGING ABNORMALITIES: Primary | ICD-10-CM

## 2025-02-24 DIAGNOSIS — T45.1X5A NEUROPATHY DUE TO CHEMOTHERAPEUTIC DRUG: ICD-10-CM

## 2025-02-24 DIAGNOSIS — C50.811 MALIGNANT NEOPLASM OF OVERLAPPING SITES OF RIGHT BREAST IN FEMALE, ESTROGEN RECEPTOR NEGATIVE (HCC): ICD-10-CM

## 2025-02-24 DIAGNOSIS — Z17.1 MALIGNANT NEOPLASM OF OVERLAPPING SITES OF RIGHT BREAST IN FEMALE, ESTROGEN RECEPTOR NEGATIVE (HCC): ICD-10-CM

## 2025-02-24 DIAGNOSIS — G62.0 NEUROPATHY DUE TO CHEMOTHERAPEUTIC DRUG: ICD-10-CM

## 2025-02-24 DIAGNOSIS — M25.511 CHRONIC RIGHT SHOULDER PAIN: ICD-10-CM

## 2025-02-24 DIAGNOSIS — J43.2 CENTRILOBULAR EMPHYSEMA (HCC): ICD-10-CM

## 2025-02-24 DIAGNOSIS — G89.29 CHRONIC RIGHT SHOULDER PAIN: ICD-10-CM

## 2025-02-24 PROCEDURE — 2500000003 HC RX 250 WO HCPCS: Performed by: INTERNAL MEDICINE

## 2025-02-24 PROCEDURE — 6360000004 HC RX CONTRAST MEDICATION: Performed by: INTERNAL MEDICINE

## 2025-02-24 PROCEDURE — 71260 CT THORAX DX C+: CPT

## 2025-02-24 RX ORDER — 0.9 % SODIUM CHLORIDE 0.9 %
80 INTRAVENOUS SOLUTION INTRAVENOUS ONCE
Status: DISCONTINUED | OUTPATIENT
Start: 2025-02-24 | End: 2025-02-27 | Stop reason: HOSPADM

## 2025-02-24 RX ORDER — SODIUM CHLORIDE 0.9 % (FLUSH) 0.9 %
10 SYRINGE (ML) INJECTION PRN
Status: DISCONTINUED | OUTPATIENT
Start: 2025-02-24 | End: 2025-02-27 | Stop reason: HOSPADM

## 2025-02-24 RX ORDER — IOPAMIDOL 755 MG/ML
75 INJECTION, SOLUTION INTRAVASCULAR
Status: COMPLETED | OUTPATIENT
Start: 2025-02-24 | End: 2025-02-24

## 2025-02-24 RX ADMIN — SODIUM CHLORIDE, PRESERVATIVE FREE 10 ML: 5 INJECTION INTRAVENOUS at 10:08

## 2025-02-24 RX ADMIN — Medication 80 ML: at 10:08

## 2025-02-24 RX ADMIN — IOPAMIDOL 75 ML: 755 INJECTION, SOLUTION INTRAVENOUS at 10:08

## 2025-02-24 NOTE — TELEPHONE ENCOUNTER
[facility] Oncology Nutrition Note:   Referral received from patient's family physician, Dr Segura, re: diet education for pre-diabetes.   Chart reviewed and called patient at number listed in chart. No answer. Detailed message left requesting call back as able. Contact information provided.    Luz Marina Villela RD, LD, [facility]  Oncology Dietitian  [facility]   [phone]

## 2025-02-25 PROBLEM — R93.89 IMAGING ABNORMALITIES: Status: ACTIVE | Noted: 2025-02-25

## 2025-02-25 PROBLEM — J43.2 CENTRILOBULAR EMPHYSEMA (HCC): Status: ACTIVE | Noted: 2025-02-25

## 2025-03-03 ENCOUNTER — HOSPITAL ENCOUNTER (EMERGENCY)
Age: 60
Discharge: HOME | End: 2025-03-03
Attending: EMERGENCY MEDICINE
Payer: COMMERCIAL

## 2025-03-03 ENCOUNTER — APPOINTMENT (OUTPATIENT)
Dept: GENERAL RADIOLOGY | Age: 60
End: 2025-03-03
Payer: COMMERCIAL

## 2025-03-03 ENCOUNTER — HOSPITAL ENCOUNTER (OUTPATIENT)
Facility: MEDICAL CENTER | Age: 60
End: 2025-03-03
Payer: COMMERCIAL

## 2025-03-03 ENCOUNTER — TELEPHONE (OUTPATIENT)
Dept: ONCOLOGY | Age: 60
End: 2025-03-03

## 2025-03-03 ENCOUNTER — HOSPITAL ENCOUNTER (OUTPATIENT)
Dept: INFUSION THERAPY | Facility: MEDICAL CENTER | Age: 60
Discharge: HOME | End: 2025-03-03
Payer: COMMERCIAL

## 2025-03-03 ENCOUNTER — OFFICE VISIT (OUTPATIENT)
Dept: ONCOLOGY | Age: 60
End: 2025-03-03
Payer: COMMERCIAL

## 2025-03-03 VITALS
TEMPERATURE: 97.8 F | WEIGHT: 177 LBS | RESPIRATION RATE: 16 BRPM | HEART RATE: 67 BPM | SYSTOLIC BLOOD PRESSURE: 141 MMHG | HEIGHT: 67 IN | OXYGEN SATURATION: 100 % | DIASTOLIC BLOOD PRESSURE: 95 MMHG | BODY MASS INDEX: 27.78 KG/M2

## 2025-03-03 VITALS
HEART RATE: 75 BPM | BODY MASS INDEX: 27.83 KG/M2 | RESPIRATION RATE: 16 BRPM | SYSTOLIC BLOOD PRESSURE: 146 MMHG | WEIGHT: 177.7 LBS | DIASTOLIC BLOOD PRESSURE: 72 MMHG

## 2025-03-03 DIAGNOSIS — Z17.1 MALIGNANT NEOPLASM OF OVERLAPPING SITES OF RIGHT BREAST IN FEMALE, ESTROGEN RECEPTOR NEGATIVE (HCC): Primary | ICD-10-CM

## 2025-03-03 DIAGNOSIS — G62.0 NEUROPATHY DUE TO CHEMOTHERAPEUTIC DRUG: ICD-10-CM

## 2025-03-03 DIAGNOSIS — R07.9 CHEST PAIN, UNSPECIFIED TYPE: ICD-10-CM

## 2025-03-03 DIAGNOSIS — C50.811 MALIGNANT NEOPLASM OF OVERLAPPING SITES OF RIGHT BREAST IN FEMALE, ESTROGEN RECEPTOR NEGATIVE (HCC): Primary | ICD-10-CM

## 2025-03-03 DIAGNOSIS — R07.9 CHEST PAIN, UNSPECIFIED TYPE: Primary | ICD-10-CM

## 2025-03-03 DIAGNOSIS — T45.1X5A NEUROPATHY DUE TO CHEMOTHERAPEUTIC DRUG: ICD-10-CM

## 2025-03-03 LAB
ALBUMIN SERPL-MCNC: 4 G/DL (ref 3.5–5.2)
ALBUMIN/GLOB SERPL: 1.2 {RATIO} (ref 1–2.5)
ALP SERPL-CCNC: 73 U/L (ref 35–104)
ALT SERPL-CCNC: 27 U/L (ref 10–35)
ANION GAP SERPL CALCULATED.3IONS-SCNC: 9 MMOL/L (ref 9–16)
AST SERPL-CCNC: 23 U/L (ref 10–35)
BASOPHILS # BLD: 0.03 K/UL (ref 0–0.2)
BASOPHILS NFR BLD: 1 % (ref 0–2)
BILIRUB SERPL-MCNC: 0.4 MG/DL (ref 0–1.2)
BUN SERPL-MCNC: 18 MG/DL (ref 8–23)
CALCIUM SERPL-MCNC: 9.2 MG/DL (ref 8.8–10.2)
CHLORIDE SERPL-SCNC: 108 MMOL/L (ref 98–107)
CO2 SERPL-SCNC: 25 MMOL/L (ref 20–31)
CREAT SERPL-MCNC: 0.8 MG/DL (ref 0.5–0.9)
EOSINOPHIL # BLD: 0.08 K/UL (ref 0–0.44)
EOSINOPHILS RELATIVE PERCENT: 3 % (ref 1–4)
ERYTHROCYTE [DISTWIDTH] IN BLOOD BY AUTOMATED COUNT: 15.1 % (ref 11.8–14.4)
GFR, ESTIMATED: 81 ML/MIN/1.73M2
GLUCOSE SERPL-MCNC: 97 MG/DL (ref 82–115)
HCT VFR BLD AUTO: 36.1 % (ref 36.3–47.1)
HGB BLD-MCNC: 11.8 G/DL (ref 11.9–15.1)
IMM GRANULOCYTES # BLD AUTO: 0 K/UL (ref 0–0.3)
IMM GRANULOCYTES NFR BLD: 0 %
LYMPHOCYTES NFR BLD: 0.93 K/UL (ref 1.1–3.7)
LYMPHOCYTES RELATIVE PERCENT: 36 % (ref 24–43)
MCH RBC QN AUTO: 28.3 PG (ref 25.2–33.5)
MCHC RBC AUTO-ENTMCNC: 32.7 G/DL (ref 28.4–34.8)
MCV RBC AUTO: 86.6 FL (ref 82.6–102.9)
MONOCYTES NFR BLD: 0.26 K/UL (ref 0.1–1.2)
MONOCYTES NFR BLD: 10 % (ref 3–12)
NEUTROPHILS NFR BLD: 50 % (ref 36–65)
NEUTS SEG NFR BLD: 1.32 K/UL (ref 1.5–8.1)
NRBC BLD-RTO: 0 PER 100 WBC
PLATELET # BLD AUTO: 276 K/UL (ref 138–453)
PMV BLD AUTO: 9.4 FL (ref 8.1–13.5)
POTASSIUM SERPL-SCNC: 3.7 MMOL/L (ref 3.7–5.3)
PROT SERPL-MCNC: 7.3 G/DL (ref 6.6–8.7)
RBC # BLD AUTO: 4.17 M/UL (ref 3.95–5.11)
RBC # BLD: ABNORMAL 10*6/UL
SODIUM SERPL-SCNC: 142 MMOL/L (ref 136–145)
TROPONIN I SERPL HS-MCNC: 10 NG/L (ref 0–14)
TROPONIN I SERPL HS-MCNC: <6 NG/L (ref 0–14)
TROPONIN I SERPL HS-MCNC: <6 NG/L (ref 0–14)
WBC OTHER # BLD: 2.6 K/UL (ref 3.5–11.3)

## 2025-03-03 PROCEDURE — 96413 CHEMO IV INFUSION 1 HR: CPT

## 2025-03-03 PROCEDURE — 36591 DRAW BLOOD OFF VENOUS DEVICE: CPT

## 2025-03-03 PROCEDURE — 99214 OFFICE O/P EST MOD 30 MIN: CPT | Performed by: INTERNAL MEDICINE

## 2025-03-03 PROCEDURE — 99211 OFF/OP EST MAY X REQ PHY/QHP: CPT | Performed by: INTERNAL MEDICINE

## 2025-03-03 PROCEDURE — 85025 COMPLETE CBC W/AUTO DIFF WBC: CPT

## 2025-03-03 PROCEDURE — 6360000002 HC RX W HCPCS: Performed by: EMERGENCY MEDICINE

## 2025-03-03 PROCEDURE — 84484 ASSAY OF TROPONIN QUANT: CPT

## 2025-03-03 PROCEDURE — 80053 COMPREHEN METABOLIC PANEL: CPT

## 2025-03-03 PROCEDURE — 71045 X-RAY EXAM CHEST 1 VIEW: CPT

## 2025-03-03 PROCEDURE — 93005 ELECTROCARDIOGRAM TRACING: CPT | Performed by: EMERGENCY MEDICINE

## 2025-03-03 RX ORDER — HEPARIN 100 UNIT/ML
500 SYRINGE INTRAVENOUS PRN
Status: DISCONTINUED | OUTPATIENT
Start: 2025-03-03 | End: 2025-03-04 | Stop reason: HOSPADM

## 2025-03-03 RX ORDER — HEPARIN 100 UNIT/ML
500 SYRINGE INTRAVENOUS ONCE
Status: COMPLETED | OUTPATIENT
Start: 2025-03-03 | End: 2025-03-03

## 2025-03-03 RX ORDER — SODIUM CHLORIDE 0.9 % (FLUSH) 0.9 %
5-40 SYRINGE (ML) INJECTION PRN
Status: DISCONTINUED | OUTPATIENT
Start: 2025-03-03 | End: 2025-03-04 | Stop reason: HOSPADM

## 2025-03-03 RX ORDER — GABAPENTIN 300 MG/1
300 CAPSULE ORAL 3 TIMES DAILY
Qty: 270 CAPSULE | Refills: 0 | OUTPATIENT
Start: 2025-03-03

## 2025-03-03 RX ADMIN — HEPARIN 500 UNITS: 100 SYRINGE at 14:16

## 2025-03-03 ASSESSMENT — PAIN SCALES - GENERAL: PAINLEVEL_OUTOF10: 0

## 2025-03-03 ASSESSMENT — LIFESTYLE VARIABLES
HOW MANY STANDARD DRINKS CONTAINING ALCOHOL DO YOU HAVE ON A TYPICAL DAY: PATIENT DOES NOT DRINK
HOW OFTEN DO YOU HAVE A DRINK CONTAINING ALCOHOL: NEVER

## 2025-03-03 ASSESSMENT — PAIN - FUNCTIONAL ASSESSMENT: PAIN_FUNCTIONAL_ASSESSMENT: 0-10

## 2025-03-03 ASSESSMENT — HEART SCORE: ECG: NON-SPECIFC REPOLARIZATION DISTURBANCE/LBTB/PM

## 2025-03-03 NOTE — ED NOTES
Pt [facility] into [facility]. Pt c/o intermittent chest fluttering yesterday and today. Pt denies chest pain. Pt reports that she was at rest when fluttering began. Pt denies any cardiac hx, pt reports hx of hypertenion. Pt is currently on chemo for breast cancer and chemo is cardiac toxic. Pt had Echo done on 1/24/2025.

## 2025-03-03 NOTE — PROGRESS NOTES
program.  While intending to generate a document that actually reflects the content of the visit, the document can still have some errors including those of syntax and sound a like substitutions which may escape proof reading.  It such instances, actual meaning can be extrapolated by contextual diversion.

## 2025-03-03 NOTE — PROGRESS NOTES
Patient arrive ambulatory for cycle 7 day 1 treatment and MD  New left sided chest pain that come and goes frequent has increased , heart feels like it is racing , pressure in the chest, some acid reflux , pain between the shoulder blades .   Vitals as charted.  Port accessed; specimen sent.  Echo 1/24/25 , EF 60%  MD met with pt , hold treatment at take to ER.   1019 Report called to ER (Tiffanie BROUSSARD ) , per Tiffanie harvey pt accessed ; transparent dressing and cap applied.   1025 Pt taken to ER , room 20  Vanessa BROUSSARD present

## 2025-03-03 NOTE — TELEPHONE ENCOUNTER
AMINA HERE FOR MD VISIT & TX  Hold tx until pt cleared by cardiology   Rv with next tx   TRIAGE TO WATCH FOR PT CLEARED BY CARDIOLOGY TO BE PUT ON FOR TX  NOTE FAXED TO [facility] [phone]  MD VISIT TBD  AVS PRINTED W/ INSTRUCTIONS AND GIVEN TO PT ON EXIT

## 2025-03-04 ENCOUNTER — TELEPHONE (OUTPATIENT)
Dept: ONCOLOGY | Age: 60
End: 2025-03-04

## 2025-03-04 NOTE — TELEPHONE ENCOUNTER
Name: Ann Hardy  : 1965  MRN: [id number]    Oncology Navigation Follow-Up Note    Contact Type:  Telephone    Notes: Plan for pt to hold chemo until cleared by cardiology. Pt is currently scheduled to be seen by Dr Ricci on 3/27/25. Writer sent message to his MA, Melina, to see if appt could be moved to a sooner date to try and prevent further delay in cancer care.       Electronically signed by Delmy Calderon RN on 3/4/2025 at 8:46 AM

## 2025-03-07 ENCOUNTER — TELEPHONE (OUTPATIENT)
Age: 60
End: 2025-03-07

## 2025-03-07 NOTE — TELEPHONE ENCOUNTER
Patient needs to be cleared to start chemo again. She is scheduled to see Dr. Ricci on 3/27/2025. They would like to start chemo before then. Can she be cleared or do you wanted her seen first?

## 2025-03-08 LAB — NONINV COLON CA DNA+OCC BLD SCRN STL QL: NEGATIVE

## 2025-03-09 ENCOUNTER — RESULTS FOLLOW-UP (OUTPATIENT)
Dept: PRIMARY CARE CLINIC | Age: 60
End: 2025-03-09

## 2025-03-09 LAB
EKG ATRIAL RATE: 61 BPM
EKG P AXIS: 39 DEGREES
EKG P-R INTERVAL: 186 MS
EKG Q-T INTERVAL: 402 MS
EKG QRS DURATION: 90 MS
EKG QTC CALCULATION (BAZETT): 404 MS
EKG R AXIS: -6 DEGREES
EKG T AXIS: 26 DEGREES
EKG VENTRICULAR RATE: 61 BPM

## 2025-03-10 ENCOUNTER — TELEPHONE (OUTPATIENT)
Dept: ONCOLOGY | Age: 60
End: 2025-03-10

## 2025-03-10 DIAGNOSIS — E78.5 DYSLIPIDEMIA: ICD-10-CM

## 2025-03-10 DIAGNOSIS — C50.919 MALIGNANT NEOPLASM OF FEMALE BREAST, UNSPECIFIED ESTROGEN RECEPTOR STATUS, UNSPECIFIED LATERALITY, UNSPECIFIED SITE OF BREAST (HCC): ICD-10-CM

## 2025-03-10 DIAGNOSIS — I34.1 MITRAL VALVE PROLAPSE: Primary | ICD-10-CM

## 2025-03-10 NOTE — TELEPHONE ENCOUNTER
PER NAVIGATOR NOTE, PT IS CLEARED BY CARDIOLOGY TO RESUME TX. I TRIED TO CALL AMINA TO SCHEDULE HER TX AND HAD TO LEAVE A MESSAGE TO CALL THE OFFICE TO SCHEDULE.

## 2025-03-11 RX ORDER — ATORVASTATIN CALCIUM 20 MG/1
20 TABLET, FILM COATED ORAL DAILY
Qty: 90 TABLET | Refills: 1 | Status: SHIPPED | OUTPATIENT
Start: 2025-03-11

## 2025-03-17 ENCOUNTER — OFFICE VISIT (OUTPATIENT)
Dept: SURGERY | Age: 60
End: 2025-03-17
Payer: COMMERCIAL

## 2025-03-17 ENCOUNTER — TELEPHONE (OUTPATIENT)
Dept: ONCOLOGY | Age: 60
End: 2025-03-17

## 2025-03-17 VITALS
HEART RATE: 51 BPM | SYSTOLIC BLOOD PRESSURE: 157 MMHG | HEIGHT: 67 IN | WEIGHT: 184 LBS | BODY MASS INDEX: 28.88 KG/M2 | DIASTOLIC BLOOD PRESSURE: 85 MMHG

## 2025-03-17 DIAGNOSIS — Z17.1 MALIGNANT NEOPLASM OF UPPER-OUTER QUADRANT OF RIGHT BREAST IN FEMALE, ESTROGEN RECEPTOR NEGATIVE: Primary | ICD-10-CM

## 2025-03-17 DIAGNOSIS — C50.411 MALIGNANT NEOPLASM OF UPPER-OUTER QUADRANT OF RIGHT BREAST IN FEMALE, ESTROGEN RECEPTOR NEGATIVE: Primary | ICD-10-CM

## 2025-03-17 PROCEDURE — 99213 OFFICE O/P EST LOW 20 MIN: CPT | Performed by: SURGERY

## 2025-03-17 ASSESSMENT — ENCOUNTER SYMPTOMS
CHEST TIGHTNESS: 0
STRIDOR: 0
RECTAL PAIN: 0
BACK PAIN: 0
APNEA: 0
SHORTNESS OF BREATH: 0
WHEEZING: 0
DIARRHEA: 0
NAUSEA: 0
BLOOD IN STOOL: 0
CONSTIPATION: 0
VOMITING: 0
ABDOMINAL PAIN: 0
ANAL BLEEDING: 0
ABDOMINAL DISTENTION: 0
COLOR CHANGE: 0
COUGH: 0

## 2025-03-17 NOTE — TELEPHONE ENCOUNTER
[facility] Oncology Nutrition Note:   Referral received from patient's family physician, Dr Segura, re: diet education for pre-diabetes. Attempted to contact patient on 2/24/25; message left.   Writer called again this date. Pt answered but is currently on her way to an appt and asked if she could call writer back later. Writer encouraged call back as able; contact information provided.     Luz Marina Villela RD, LD, CNSC  Registered Dietitian  [facility]  [phone]

## 2025-03-17 NOTE — PROGRESS NOTES
Patient's Name/Date of Birth: Ann Hardy / 1965 (59 y.o.)    Date: March 17, 2025    HPI: Pt is a 59 y.o. female who presents to [facility] for a 6-month follow-up visit right breast cancer treated with right partial mastectomy and sentinel lymph node biopsy on May 8, 2024.  She is completing her Herceptin therapy for her HER2 positive tumor.    In the interim since her last visit on November 11, 2024, she has had the following imaging: Bilateral diagnostic mammogram performed on February 17, 2025.  The impression is below:  IMPRESSION:  Post treatment changes right breast within expected limits.  No evidence of  malignancy either breast.  Advise six-month mammographic follow-up of the  right breast as part of a post lumpectomy monitoring protocol.   BI-RADS 3        Physical Exam:  Vitals:    03/17/25 1419   BP: (!) 157/85   Pulse: 51     General:A & O x3  HEENT:  NCAT  BREAST: The bilateral breasts are symmetric with no skin changes, nipple deformity, discharge, or masses.  There is no tenderness to palpation. Cyst palpable in LUOQ. Right UOQ scar is normal. Mild nipple flattening on the right side when supine.  Lymph Nodes:  There is no lymphadenopathy in the supraclavicular, infraclavicular, or axillary areas bilaterally.  Extremity: Normal, without deformities, edema, or skin discoloration  SKIN: Skin color, texture, turgor normal. No rashes or lesions.             Assessment/Plan:  1. Malignant neoplasm of upper-outer quadrant of right breast in female, estrogen receptor negative (HCC)      Stage: Right breast invasive cancer, HER2 positive ER negative, pathological stage T1b N0 M0-2/2024  Right breast DCIS, ER negative    Ann Hardy is a 59 y.o. female that is seen today for a 6-month follow-up visit right breast cancer treated with right partial mastectomy and sentinel lymph node biopsy on May 8, 2024.  She is completing her Herceptin therapy for her HER2 positive

## 2025-03-17 NOTE — PROGRESS NOTES
Review of Systems   Constitutional:  Negative for activity change, appetite change, chills, diaphoresis, fatigue, fever and unexpected weight change.   Respiratory:  Negative for apnea, cough, chest tightness, shortness of breath, wheezing and stridor.    Cardiovascular:  Positive for chest pain (occasional- works with oncologist). Negative for leg swelling.   Gastrointestinal:  Negative for abdominal distention, abdominal pain, anal bleeding, blood in stool, constipation, diarrhea, nausea, rectal pain and vomiting.   Genitourinary:  Negative for difficulty urinating, dysuria, enuresis, flank pain, frequency, hematuria and urgency.   Musculoskeletal:  Negative for back pain.   Skin:  Negative for color change and pallor.   Allergic/Immunologic: Negative for food allergies and immunocompromised state.   Neurological:  Negative for syncope, speech difficulty, weakness, light-headedness, numbness and headaches.   Hematological:  Negative for adenopathy. Does not bruise/bleed easily.   Psychiatric/Behavioral:  The patient is not nervous/anxious.    All other systems reviewed and are negative.

## 2025-03-19 ENCOUNTER — HOSPITAL ENCOUNTER (OUTPATIENT)
Facility: MEDICAL CENTER | Age: 60
End: 2025-03-19
Payer: COMMERCIAL

## 2025-03-22 ENCOUNTER — HOSPITAL ENCOUNTER (OUTPATIENT)
Facility: MEDICAL CENTER | Age: 60
End: 2025-03-22
Payer: COMMERCIAL

## 2025-03-24 ENCOUNTER — HOSPITAL ENCOUNTER (OUTPATIENT)
Dept: INFUSION THERAPY | Facility: MEDICAL CENTER | Age: 60
Discharge: HOME | End: 2025-03-24
Payer: COMMERCIAL

## 2025-03-24 ENCOUNTER — TELEPHONE (OUTPATIENT)
Dept: ONCOLOGY | Age: 60
End: 2025-03-24

## 2025-03-24 ENCOUNTER — OFFICE VISIT (OUTPATIENT)
Dept: ONCOLOGY | Age: 60
End: 2025-03-24
Payer: COMMERCIAL

## 2025-03-24 VITALS
RESPIRATION RATE: 16 BRPM | HEART RATE: 75 BPM | TEMPERATURE: 97.7 F | BODY MASS INDEX: 28 KG/M2 | SYSTOLIC BLOOD PRESSURE: 140 MMHG | DIASTOLIC BLOOD PRESSURE: 88 MMHG | WEIGHT: 178.8 LBS

## 2025-03-24 DIAGNOSIS — T45.1X5A NEUROPATHY DUE TO CHEMOTHERAPEUTIC DRUG: ICD-10-CM

## 2025-03-24 DIAGNOSIS — D75.9 CYTOPENIA: ICD-10-CM

## 2025-03-24 DIAGNOSIS — G62.0 NEUROPATHY DUE TO CHEMOTHERAPEUTIC DRUG: ICD-10-CM

## 2025-03-24 DIAGNOSIS — Z17.1 MALIGNANT NEOPLASM OF OVERLAPPING SITES OF RIGHT BREAST IN FEMALE, ESTROGEN RECEPTOR NEGATIVE: Primary | ICD-10-CM

## 2025-03-24 DIAGNOSIS — C50.811 MALIGNANT NEOPLASM OF OVERLAPPING SITES OF RIGHT BREAST IN FEMALE, ESTROGEN RECEPTOR NEGATIVE: Primary | ICD-10-CM

## 2025-03-24 LAB
ALBUMIN SERPL-MCNC: 3.8 G/DL (ref 3.5–5.2)
ALBUMIN/GLOB SERPL: 1.2 {RATIO} (ref 1–2.5)
ALP SERPL-CCNC: 76 U/L (ref 35–104)
ALT SERPL-CCNC: 28 U/L (ref 10–35)
ANION GAP SERPL CALCULATED.3IONS-SCNC: 9 MMOL/L (ref 9–16)
AST SERPL-CCNC: 24 U/L (ref 10–35)
BASOPHILS # BLD: 0.04 K/UL (ref 0–0.2)
BASOPHILS NFR BLD: 2 % (ref 0–2)
BILIRUB SERPL-MCNC: 0.3 MG/DL (ref 0–1.2)
BUN SERPL-MCNC: 14 MG/DL (ref 8–23)
CALCIUM SERPL-MCNC: 8.7 MG/DL (ref 8.8–10.2)
CHLORIDE SERPL-SCNC: 108 MMOL/L (ref 98–107)
CO2 SERPL-SCNC: 24 MMOL/L (ref 20–31)
CREAT SERPL-MCNC: 0.8 MG/DL (ref 0.5–0.9)
EOSINOPHIL # BLD: 0.1 K/UL (ref 0–0.44)
EOSINOPHILS RELATIVE PERCENT: 5 % (ref 1–4)
ERYTHROCYTE [DISTWIDTH] IN BLOOD BY AUTOMATED COUNT: 15.8 % (ref 11.8–14.4)
FERRITIN SERPL-MCNC: 84 NG/ML
FOLATE SERPL-MCNC: 9.4 NG/ML (ref 4.8–24.2)
GFR, ESTIMATED: 90 ML/MIN/1.73M2
GLUCOSE SERPL-MCNC: 105 MG/DL (ref 82–115)
HCT VFR BLD AUTO: 33.6 % (ref 36.3–47.1)
HGB BLD-MCNC: 11 G/DL (ref 11.9–15.1)
IMM GRANULOCYTES # BLD AUTO: 0 K/UL (ref 0–0.3)
IMM GRANULOCYTES NFR BLD: 0 %
IRON SATN MFR SERPL: 20 % (ref 20–55)
IRON SERPL-MCNC: 43 UG/DL (ref 37–145)
LYMPHOCYTES NFR BLD: 0.44 K/UL (ref 1.1–3.7)
LYMPHOCYTES RELATIVE PERCENT: 22 % (ref 24–43)
MCH RBC QN AUTO: 28.6 PG (ref 25.2–33.5)
MCHC RBC AUTO-ENTMCNC: 32.7 G/DL (ref 28.4–34.8)
MCV RBC AUTO: 87.5 FL (ref 82.6–102.9)
MONOCYTES NFR BLD: 0.42 K/UL (ref 0.1–1.2)
MONOCYTES NFR BLD: 21 % (ref 3–12)
NEUTROPHILS NFR BLD: 50 % (ref 36–65)
NEUTS SEG NFR BLD: 1 K/UL (ref 1.5–8.1)
NRBC BLD-RTO: 0 PER 100 WBC
PLATELET # BLD AUTO: 213 K/UL (ref 138–453)
PMV BLD AUTO: 9.7 FL (ref 8.1–13.5)
POTASSIUM SERPL-SCNC: 3.5 MMOL/L (ref 3.7–5.3)
PROT SERPL-MCNC: 6.8 G/DL (ref 6.6–8.7)
RBC # BLD AUTO: 3.84 M/UL (ref 3.95–5.11)
SODIUM SERPL-SCNC: 141 MMOL/L (ref 136–145)
TIBC SERPL-MCNC: 216 UG/DL (ref 250–450)
UNSATURATED IRON BINDING CAPACITY: 173 UG/DL (ref 112–347)
VIT B12 SERPL-MCNC: 621 PG/ML (ref 232–1245)
WBC OTHER # BLD: 2 K/UL (ref 3.5–11.3)

## 2025-03-24 PROCEDURE — 82728 ASSAY OF FERRITIN: CPT

## 2025-03-24 PROCEDURE — 6370000000 HC RX 637 (ALT 250 FOR IP): Performed by: INTERNAL MEDICINE

## 2025-03-24 PROCEDURE — 83550 IRON BINDING TEST: CPT

## 2025-03-24 PROCEDURE — 99211 OFF/OP EST MAY X REQ PHY/QHP: CPT | Performed by: INTERNAL MEDICINE

## 2025-03-24 PROCEDURE — 80053 COMPREHEN METABOLIC PANEL: CPT

## 2025-03-24 PROCEDURE — 85025 COMPLETE CBC W/AUTO DIFF WBC: CPT

## 2025-03-24 PROCEDURE — 83540 ASSAY OF IRON: CPT

## 2025-03-24 PROCEDURE — 99214 OFFICE O/P EST MOD 30 MIN: CPT | Performed by: INTERNAL MEDICINE

## 2025-03-24 PROCEDURE — 2500000003 HC RX 250 WO HCPCS: Performed by: INTERNAL MEDICINE

## 2025-03-24 PROCEDURE — 96413 CHEMO IV INFUSION 1 HR: CPT

## 2025-03-24 PROCEDURE — 2580000003 HC RX 258: Performed by: INTERNAL MEDICINE

## 2025-03-24 PROCEDURE — 6360000002 HC RX W HCPCS: Performed by: INTERNAL MEDICINE

## 2025-03-24 PROCEDURE — 82607 VITAMIN B-12: CPT

## 2025-03-24 PROCEDURE — 82746 ASSAY OF FOLIC ACID SERUM: CPT

## 2025-03-24 RX ORDER — DIPHENHYDRAMINE HYDROCHLORIDE 50 MG/ML
50 INJECTION, SOLUTION INTRAMUSCULAR; INTRAVENOUS
OUTPATIENT
Start: 2025-04-14

## 2025-03-24 RX ORDER — SODIUM CHLORIDE 0.9 % (FLUSH) 0.9 %
5-40 SYRINGE (ML) INJECTION PRN
OUTPATIENT
Start: 2025-04-14

## 2025-03-24 RX ORDER — SODIUM CHLORIDE 0.9 % (FLUSH) 0.9 %
5-40 SYRINGE (ML) INJECTION PRN
OUTPATIENT
Start: 2025-03-24

## 2025-03-24 RX ORDER — HEPARIN SODIUM (PORCINE) LOCK FLUSH IV SOLN 100 UNIT/ML 100 UNIT/ML
500 SOLUTION INTRAVENOUS PRN
OUTPATIENT
Start: 2025-04-14

## 2025-03-24 RX ORDER — SODIUM CHLORIDE 9 MG/ML
5-250 INJECTION, SOLUTION INTRAVENOUS PRN
OUTPATIENT
Start: 2025-04-14

## 2025-03-24 RX ORDER — SODIUM CHLORIDE 9 MG/ML
5-250 INJECTION, SOLUTION INTRAVENOUS PRN
Status: DISCONTINUED | OUTPATIENT
Start: 2025-03-24 | End: 2025-03-25 | Stop reason: HOSPADM

## 2025-03-24 RX ORDER — HEPARIN 100 UNIT/ML
500 SYRINGE INTRAVENOUS PRN
Status: DISCONTINUED | OUTPATIENT
Start: 2025-03-24 | End: 2025-03-25 | Stop reason: HOSPADM

## 2025-03-24 RX ORDER — EPINEPHRINE 1 MG/ML
0.3 INJECTION, SOLUTION, CONCENTRATE INTRAVENOUS PRN
OUTPATIENT
Start: 2025-04-14

## 2025-03-24 RX ORDER — MEPERIDINE HYDROCHLORIDE 50 MG/ML
12.5 INJECTION INTRAMUSCULAR; INTRAVENOUS; SUBCUTANEOUS PRN
OUTPATIENT
Start: 2025-04-14

## 2025-03-24 RX ORDER — SODIUM CHLORIDE 9 MG/ML
25 INJECTION, SOLUTION INTRAVENOUS PRN
OUTPATIENT
Start: 2025-03-24

## 2025-03-24 RX ORDER — HEPARIN 100 UNIT/ML
500 SYRINGE INTRAVENOUS PRN
OUTPATIENT
Start: 2025-03-24

## 2025-03-24 RX ORDER — ALBUTEROL SULFATE 90 UG/1
4 INHALANT RESPIRATORY (INHALATION) PRN
OUTPATIENT
Start: 2025-04-14

## 2025-03-24 RX ORDER — SODIUM CHLORIDE 9 MG/ML
INJECTION, SOLUTION INTRAVENOUS CONTINUOUS
OUTPATIENT
Start: 2025-04-14

## 2025-03-24 RX ORDER — FAMOTIDINE 10 MG/ML
20 INJECTION, SOLUTION INTRAVENOUS
OUTPATIENT
Start: 2025-04-14

## 2025-03-24 RX ORDER — ACETAMINOPHEN 325 MG/1
650 TABLET ORAL
OUTPATIENT
Start: 2025-04-14

## 2025-03-24 RX ORDER — ONDANSETRON 2 MG/ML
8 INJECTION INTRAMUSCULAR; INTRAVENOUS
OUTPATIENT
Start: 2025-04-14

## 2025-03-24 RX ORDER — HYDROCORTISONE SODIUM SUCCINATE 100 MG/2ML
100 INJECTION INTRAMUSCULAR; INTRAVENOUS
OUTPATIENT
Start: 2025-04-14

## 2025-03-24 RX ORDER — SODIUM CHLORIDE 0.9 % (FLUSH) 0.9 %
5-40 SYRINGE (ML) INJECTION PRN
Status: DISCONTINUED | OUTPATIENT
Start: 2025-03-24 | End: 2025-03-25 | Stop reason: HOSPADM

## 2025-03-24 RX ORDER — POTASSIUM CHLORIDE 1500 MG/1
40 TABLET, EXTENDED RELEASE ORAL ONCE
Status: COMPLETED | OUTPATIENT
Start: 2025-03-24 | End: 2025-03-24

## 2025-03-24 RX ADMIN — HEPARIN 500 UNITS: 100 SYRINGE at 12:24

## 2025-03-24 RX ADMIN — SODIUM CHLORIDE, PRESERVATIVE FREE 10 ML: 5 INJECTION INTRAVENOUS at 12:24

## 2025-03-24 RX ADMIN — SODIUM CHLORIDE 483 MG: 0.9 INJECTION, SOLUTION INTRAVENOUS at 11:38

## 2025-03-24 RX ADMIN — SODIUM CHLORIDE 20 ML/HR: 0.9 INJECTION, SOLUTION INTRAVENOUS at 09:33

## 2025-03-24 RX ADMIN — SODIUM CHLORIDE, PRESERVATIVE FREE 10 ML: 5 INJECTION INTRAVENOUS at 09:29

## 2025-03-24 RX ADMIN — POTASSIUM CHLORIDE 40 MEQ: 1500 TABLET, EXTENDED RELEASE ORAL at 11:02

## 2025-03-24 NOTE — PROGRESS NOTES
Patient arrived ambulatory per self for C7D1 treatment and MD visit.   Patient complains of congestion and runny nose. Patient has had intermittent chest pain and palpitations but states not as bad as before, see cardiology Thursday.   Port accessed, specimen sent.   Labs and orders reviewed, MD in to see patient, Ok to treat.  Patient premedicated, line flushed.   Trazimera infused without issue, line flushed.   Oral potassium given per orders.   Port flushed and heparinized with intact hollingsworth needle removed, band aid applied.   Patient discharged ambulatory per self, AVS per .    (3) no apparent problem

## 2025-03-24 NOTE — TELEPHONE ENCOUNTER
AMINA HERE FOR MD VISIT & TX  Kcl 40 po x 1   Additional labs ordered for today   Tx today   Rv in 3 weeks   MD VISIT 4/14/25 @ 9AM TX @ 8:30AM  AVS PRINTED W/ INSTRUCTIONS AND GIVEN TO PT ON EXIT

## 2025-03-24 NOTE — PROGRESS NOTES
medical, surgical history and medications were reviewed and updated.    Summary of Case/History:  Ann Hardy a 59 y.o.female is a patient with biopsy-proven invasive cancer of right breast, triple negative and DCIS, ER negative presents to the clinic to establish care and for further workup and evaluation.She underwent screening mammogram in January 2024 which showed group of calcification in the right breast which was indeterminate.  Patient subsequently underwent diagnostic mammogram of the right breast which showed clustered pleomorphic calcification in the right breast at the 11 o'clock position.  Patient subsequently underwent biopsy of the right breast which came back as invasive carcinoma, triple negative, also showed ER negative DCIS.  Patient now presents to the clinic to establish care and for further workup and evaluation.  He has recovered from the biopsy site without any complications.    Past Medical History:   Past Medical History:   Diagnosis Date    Abnormal EKG     Acute pain of right shoulder 03/29/2024    Arthritis     Breast cancer (HCC)     Rt breast. Pt said she will need radiation post surgery    Breast cyst 11/23    Breast mass, right 12/28/2022    Chest pain 2000    only in the chest. Never went to ER, never went to see a cardiologist. Reported them to her PCP only    Chronic bilateral low back pain with bilateral sciatica 02/11/2020    DCIS (ductal carcinoma in situ) 2/24    DDD (degenerative disc disease), lumbar 02/11/2020    Discharge from right nipple 12/28/2022    Dyslipidemia     Elevated BP without diagnosis of hypertension     Hay fever     History of therapeutic radiation 10/24    Hx antineoplastic chemo 6/24    Hyperlipidemia     Hypertension     Lumbosacral spondylosis without myelopathy 04/18/2022    MVP (mitral valve prolapse)     diagnosed by first PCP 20 years ago, just got a new PCP Dr Centeno and patient said she was told she never had MVP    Other spondylosis with

## 2025-03-25 ENCOUNTER — HOSPITAL ENCOUNTER (OUTPATIENT)
Age: 60
Setting detail: THERAPIES SERIES
Discharge: HOME | End: 2025-03-25
Attending: INTERNAL MEDICINE
Payer: COMMERCIAL

## 2025-03-25 PROCEDURE — 97535 SELF CARE MNGMENT TRAINING: CPT

## 2025-03-25 NOTE — CASE COMMUNICATION
Lymphedema Self Massage Using Massage Roller    Complete 10 deep breaths    Gently massage trunk with medium speed back and forth movements of roller covering front, sides, and back of ENTIRE trunk.  If you are unable to reach every area, massage as far as you able able to reach.  Complete for 10 minutes.    Using the same gentle back and forth technique, massage your affected extremity/extremities.  Start with hip/shoulder and work away from the body.  For arms: work from shoulder to elbow to hand for 5 to 10 minutes per arm.  For legs: work from hips to knees to feet for 5 to 10 minutes per leg    Spend extra time in more swollen areas of trunk or extremities.  This will allow more fluid to move out of those areas.    If able, massage directly on the skin for best results.    Massage all extremities, even if only 1 or 2 areas are affected.  This will promote good overall lymphatic circulations.    Complete massage at least once daily.  You can do this technique as often as you like, but a minimum of once daily will be beneficial.    You can purchase the deep tissue massage roller at any sporting [employer] store (like [employer]), in a sporting good department at box stores (like [employer], [employer], [employer], etc), or online (like Amazon).    For online video demonstrations of this technique, go to:  [email or web address]  In the search bar, type: Gertrudis Carter - My Lymphedema Life  Click on her page, on the options on the left side, click on \"videos\"  Choose video named \"Demo For Rolling to Help Your Lymphedema\"                                       Exercise Program Guidelines for Patients with Lymphedema    Perform your exercises while wearing compression garments.   5-10 reps each and increase reps at a comfortable rate.  Perform each movement slowly.  Begin and end each exercise session with 5 deep belly breaths.  Elevate your limb(s) after you exercise for 10-15 minutes.   Do NOT perform any movements that induce

## 2025-03-25 NOTE — DISCHARGE SUMMARY
TREATMENT LOCATION:   [] [facility]  Outpatient Rehabilitation &  Therapy  [address]  P: [phone]  F: [phone] [] [facility]   Outpatient Rehabilitation &  Therapy  [address]  P: [phone]  F: [phone] [x] [facility]  Outpatient Rehabilitation &  Therapy  [address]   P: [phone]  F: [phone]        Lymphedema Services - Treatment and Discharge Note of the Right Upper Extremity    Date:  3/25/2025  Patient: Ann Hardy  : 1965             MRN: [id number]  Referring Provider: Jourdan Grimaldo MD       Phone: [phone]  Fax: [phone]  Insurance:   [employer] [facility] (ID:[id number] ) - Group no:[id number] Group Name [facility]  insurance restrictions  Medical Diagnosis: 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  (Certification Dates: 2024 - 2025)      Overview of Evaluation dated: 2024   Patient is a 58 year old female referred to the Lymphedema Clinic with a RUE Lymphedema s/p right breast CA,  RIGHT BREAST  NEEDLE LOCALIZED @ 1130AM ,PARTIAL MASTECTOMY WITH SENTINEL NODE BX , damaging lymphatics in the RUQ. Pt would benefit from education regarding lymphedema prevention and is fitted today for preventative compression sleeve.    Pt. reports of having pain in R shoulder and down the arm during Chemotherapy which was end of August beginning of Sept. She also noted dimply looking skin at that time.  Pt. does not recall of having any trauma or injury back then which triggered the

## 2025-03-27 ENCOUNTER — OFFICE VISIT (OUTPATIENT)
Age: 60
End: 2025-03-27
Payer: COMMERCIAL

## 2025-03-27 VITALS
HEART RATE: 72 BPM | DIASTOLIC BLOOD PRESSURE: 94 MMHG | SYSTOLIC BLOOD PRESSURE: 147 MMHG | OXYGEN SATURATION: 100 % | WEIGHT: 177.6 LBS | BODY MASS INDEX: 27.82 KG/M2

## 2025-03-27 DIAGNOSIS — I20.9 ANGINA PECTORIS: Primary | ICD-10-CM

## 2025-03-27 PROCEDURE — 99214 OFFICE O/P EST MOD 30 MIN: CPT | Performed by: INTERNAL MEDICINE

## 2025-03-27 NOTE — PROGRESS NOTES
by Davis Rivero MD at [facility] OR    BREAST SURGERY Bilateral     biopsy    CHOLECYSTECTOMY      COLONOSCOPY  12/01/2014    x 1.  Normal    ENDOMETRIAL ABLATION      EYE SURGERY Bilateral     chalazion removed    IR PORT PLACEMENT < 5 YEARS  06/18/2024    IR PORT PLACEMENT < 5 YEARS 6/18/2024 Jacobo Whatley MD [facility] SPECIAL PROCEDURES    LUMBAR FUSION N/A 05/16/2022    LUMBAR L5-S1 DECOMPRESSION FUSION POSTERIOR performed by Salomon Owens MD at [facility] OR    LUMBAR FUSION N/A 11/14/2022    REVISION L5-S1  POSTERIOR  INSTRUMENTED FUSION performed by Salomon Owens MD at [facility] OR    [facility] STEREO BREAST BX W LOC DEVICE 1ST LESION RIGHT Right 02/20/2024    [facility] STEROTACTIC LOC BREAST BIOPSY RIGHT 2/20/2024 [facility] MAMMOGRAPHY    PAIN MANAGEMENT PROCEDURE Right 07/16/2020    RIGHT L4 AND L5 EPIDURAL STEROID INJECTION performed by Garrison Berg MD at [facility] OR    PAIN MANAGEMENT PROCEDURE Right 10/26/2020    EPIDURAL STEROID INJECTION -RIGHT L4 L5 performed by Garrison Berg MD at [facility] OR    TUBAL LIGATION      US PLACE BREAST LOC DEVICE 1ST LESION RIGHT Right 05/08/2024    US GUIDED NEEDLE LOC OF RIGHT BREAST 5/8/2024 [facility] ULTRASOUND    WISDOM TOOTH EXTRACTION         Family History:  Family History   Problem Relation Age of Onset    High Blood Pressure Mother     High Cholesterol Mother     Other Mother         blood clots, denies any known clotting d/o    Clotting Disorder Mother         a couple DVTs    Alcohol Abuse Father     Arthritis Sister     No Known Problems Other        Social History:  Social History     Tobacco Use    Smoking status: Never    Smokeless tobacco: Never    Tobacco comments:     Tried as an adolescent, but never long term   Vaping Use    Vaping status: Never Used   Substance Use Topics    Alcohol use: Not Currently     Alcohol/week: 6.0 standard drinks of alcohol     Comment: used to drink twice a month nothing since diagnosis    Drug use: No        REVIEW OF SYSTEMS:    Constitutional:

## 2025-04-01 ENCOUNTER — OFFICE VISIT (OUTPATIENT)
Dept: ORTHOPEDIC SURGERY | Age: 60
End: 2025-04-01
Payer: COMMERCIAL

## 2025-04-01 VITALS — WEIGHT: 177 LBS | RESPIRATION RATE: 14 BRPM | BODY MASS INDEX: 27.78 KG/M2 | HEIGHT: 67 IN

## 2025-04-01 DIAGNOSIS — Z98.1 HISTORY OF LUMBAR FUSION: ICD-10-CM

## 2025-04-01 DIAGNOSIS — M54.16 LUMBAR RADICULAR PAIN: Primary | ICD-10-CM

## 2025-04-01 PROCEDURE — 99213 OFFICE O/P EST LOW 20 MIN: CPT | Performed by: ORTHOPAEDIC SURGERY

## 2025-04-01 NOTE — PROGRESS NOTES
Patient ID: Ann Hardy is a 59 y.o. female    Chief Compliant:  No chief complaint on file.       Diagnostic imaging:    AP lateral lumbar spine status post L5-S1 PLIF complicated by proximal sacral fracture repeat instrumentation to the pelvis subsequent screw right iliac crest failed patient appears to be forming more of a fusion behind the cage on today's exam there has been no progression everything looks stable minus of broken screw    Assessment and Plan:  1. Lumbar radicular pain    2. History of lumbar fusion        Mild soreness in back, non-debilitating.    She is overall doing very well.    Follow up in 1 year    HPI:  This is a 59 y.o. female who presents to the clinic today for follow up evaluation of low back.     S/p  L5-S1 PLIF 5/16/22.     S/p L5-S1 posterior instrumented fusion, 11/14/22.     She is doing well, but notes soreness.    She states she remains active and goes to the [facility] often to exercise.    Patient last seen in office, 12/12/23. This is due to patient's diagnosis of breast cancer.    Review of Systems   All other systems reviewed and are negative.      Past History:    Current Outpatient Medications:     atorvastatin (LIPITOR) 20 MG tablet, Take 1 tablet by mouth daily, Disp: 90 tablet, Rfl: 1    losartan (COZAAR) 25 MG tablet, Take 1 tablet by mouth daily, Disp: 90 tablet, Rfl: 3    diclofenac (VOLTAREN) 75 MG EC tablet, Take 1 tablet by mouth 2 times daily, Disp: 60 tablet, Rfl: 5    metoprolol succinate (TOPROL XL) 25 MG extended release tablet, Take 1 tablet by mouth daily, Disp: 90 tablet, Rfl: 3    gabapentin (NEURONTIN) 300 MG capsule, Take 1 capsule by mouth 3 times daily for 90 days., Disp: 270 capsule, Rfl: 0    potassium chloride (KLOR-CON M) 20 MEQ extended release tablet, Take 1 tablet by mouth daily, Disp: 90 tablet, Rfl: 1    vitamin D (ERGOCALCIFEROL) 1.25 MG (10365 UT) CAPS capsule, Take 1 capsule by mouth once a week, Disp: 12 capsule, Rfl: 1    b complex vitamins

## 2025-04-04 ENCOUNTER — HOSPITAL ENCOUNTER (OUTPATIENT)
Dept: RADIATION ONCOLOGY | Age: 60
Discharge: HOME | End: 2025-04-04
Payer: COMMERCIAL

## 2025-04-04 VITALS
SYSTOLIC BLOOD PRESSURE: 139 MMHG | TEMPERATURE: 99.1 F | BODY MASS INDEX: 28.44 KG/M2 | HEART RATE: 75 BPM | RESPIRATION RATE: 75 BRPM | DIASTOLIC BLOOD PRESSURE: 83 MMHG | OXYGEN SATURATION: 100 % | WEIGHT: 181.6 LBS

## 2025-04-04 PROCEDURE — 99212 OFFICE O/P EST SF 10 MIN: CPT | Performed by: RADIOLOGY

## 2025-04-04 ASSESSMENT — PAIN SCALES - GENERAL: PAINLEVEL_OUTOF10: 0

## 2025-04-04 NOTE — PROGRESS NOTES
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 female who had a screening mammogram which demonstrated an area of suspicious calcifications involving the right breast at the 11 o'clock position.  Stereotactic guided biopsy of the suspicious calcification was performed and demonstrated the presence of DCIS, small focus of invasive disease was noted measuring more than 1 mm but less than 2 mm.  She is staged as cT1a N0 M0. Patient is status post breast conservation surgery on 2024.  Pathology initiated the presence of invasive ductal carcinoma measuring 1 cm in the greatest dimension, all margins were negative, 4 lymph nodes were sampled and were negative for carcinoma, ER negative, TX weakly positive, HER2/nahum positive.  She then completed a course of adjuvant chemotherapy with Taxol plus Herceptin and is currently on maintenance Herceptin.  Patient also completed a course of adjuvant RT on 2024    Patient is here for posttreatment follow-up.  She is doing well, she did experience soreness in the right breast after completion of radiation therapy, but has since then resolved.  She said that her skin is healing, continues to report hyperpigmentation in the treated area but no skin breakdown.  She denies swelling in the right breast or in the right upper extremity.  She has full 
  3.  Ambulatory: use of assistive devices (walker, cane, off-loading devices),        attached to equipment (IV pole, oxygen) 0     4.  Sensory Limitations: dizziness, vertigo, impaired vision 0     5.  Age less than 65        0     6.  Age 65 or greater 0     7.  Medication: diuretics, strong analgesics, hypnotics, sedatives,        antihypertensive agents 3   8.  Falls:  recent history of falls within the last 3 months (not to include slipping or        tripping) 0   TOTAL 3    If score of 4 or greater was education given? No           TABLE 2   Risk Score Risk Level Plan of Care   0-3 Little or  No Risk 1.  Provide assistance as indicated for ambulation activities  2.  Reorient confused/cognitively impaired patient  3.  Chair/bed in low position, stretcher/bed with siderails up except when performing patient care activities  5.  Educate patient/family/caregiver on falls prevention  6.  Reassess in 12 weeks or with any noted change in patient condition which places them at a risk for a fall   4-6 Moderate Risk 1.  Provide assistance as indicated for ambulation activities  2.  Reorient confused/cognitively impaired patient  3.  Chair/bed in low position, stretcher/bed with siderails up except when performing patient care activities  4.  Educate patient/family/caregiver on falls prevention     7 or   Higher High Risk 1.  Place patient in easily observable treatment room  2.  Patient attended at all times by family member or staff  3.  Provide assistance as indicated for ambulation activities  4.  Reorient confused/cognitively impaired patient  5.  Chair/bed in low position, stretcher/bed with siderails up except when performing patient care activities  6.  Educate patient/family/caregiver on falls prevention         PLAN: Patient is seen today in follow up.  She denies any concerns related to her right breast cancer.  She continues on Herceptin per Dr. Grimaldo and states she continues to follow with Dr. Grimaldo.  Per

## 2025-04-08 ENCOUNTER — OFFICE VISIT (OUTPATIENT)
Dept: PAIN MANAGEMENT | Age: 60
End: 2025-04-08
Payer: COMMERCIAL

## 2025-04-08 VITALS — WEIGHT: 181 LBS | HEIGHT: 67 IN | BODY MASS INDEX: 28.41 KG/M2

## 2025-04-08 DIAGNOSIS — Z79.899 MEDICATION MANAGEMENT: Chronic | ICD-10-CM

## 2025-04-08 DIAGNOSIS — M54.41 CHRONIC BILATERAL LOW BACK PAIN WITH BILATERAL SCIATICA: Primary | ICD-10-CM

## 2025-04-08 DIAGNOSIS — G89.29 CHRONIC BILATERAL LOW BACK PAIN WITH BILATERAL SCIATICA: Primary | ICD-10-CM

## 2025-04-08 DIAGNOSIS — M54.42 CHRONIC BILATERAL LOW BACK PAIN WITH BILATERAL SCIATICA: Primary | ICD-10-CM

## 2025-04-08 DIAGNOSIS — Z98.1 S/P LUMBAR FUSION: ICD-10-CM

## 2025-04-08 PROCEDURE — 99213 OFFICE O/P EST LOW 20 MIN: CPT | Performed by: ANESTHESIOLOGY

## 2025-04-08 RX ORDER — GABAPENTIN 300 MG/1
300 CAPSULE ORAL 3 TIMES DAILY
Qty: 270 CAPSULE | Refills: 0 | Status: SHIPPED | OUTPATIENT
Start: 2025-04-08 | End: 2025-07-07

## 2025-04-08 ASSESSMENT — ENCOUNTER SYMPTOMS
GASTROINTESTINAL NEGATIVE: 1
BACK PAIN: 1
RESPIRATORY NEGATIVE: 1

## 2025-04-08 NOTE — PROGRESS NOTES
HENT: Negative.     Respiratory: Negative.     Cardiovascular: Negative.    Gastrointestinal: Negative.    Musculoskeletal:  Positive for back pain. Negative for gait problem and joint swelling.   Neurological: Negative.  Negative for weakness and numbness.         Objective:  General Appearance:  Comfortable, in no acute distress and in pain.    Vital signs: (most recent): Height 1.702 m (5' 7\"), weight 82.1 kg (181 lb).  Vital signs are normal.  No fever.    Output: Producing urine and producing stool.    Lungs:  Normal effort.  She is not in respiratory distress.    Heart: Normal rate.    Neurological: Patient is alert and oriented to person, place and time.      Assessment & Plan  1. Chronic bilateral low back pain with bilateral sciatica    2. Medication management    3. S/P lumbar fusion        No orders of the defined types were placed in this encounter.     Orders Placed This Encounter   Medications    gabapentin (NEURONTIN) 300 MG capsule     Sig: Take 1 capsule by mouth 3 times daily for 90 days.     Dispense:  270 capsule     Refill:  0            Electronically signed by Garrison Berg MD on 4/8/2025 at 4:07 PM

## 2025-04-11 ENCOUNTER — HOSPITAL ENCOUNTER (OUTPATIENT)
Facility: MEDICAL CENTER | Age: 60
End: 2025-04-11
Payer: COMMERCIAL

## 2025-04-14 ENCOUNTER — OFFICE VISIT (OUTPATIENT)
Dept: ONCOLOGY | Age: 60
End: 2025-04-14
Payer: COMMERCIAL

## 2025-04-14 ENCOUNTER — CLINICAL DOCUMENTATION (OUTPATIENT)
Dept: ONCOLOGY | Age: 60
End: 2025-04-14

## 2025-04-14 ENCOUNTER — TELEPHONE (OUTPATIENT)
Dept: ONCOLOGY | Age: 60
End: 2025-04-14

## 2025-04-14 ENCOUNTER — HOSPITAL ENCOUNTER (OUTPATIENT)
Dept: INFUSION THERAPY | Facility: MEDICAL CENTER | Age: 60
Discharge: HOME | End: 2025-04-14
Payer: COMMERCIAL

## 2025-04-14 VITALS
WEIGHT: 183.2 LBS | BODY MASS INDEX: 28.75 KG/M2 | TEMPERATURE: 98.2 F | HEART RATE: 72 BPM | RESPIRATION RATE: 18 BRPM | HEIGHT: 67 IN | SYSTOLIC BLOOD PRESSURE: 129 MMHG | DIASTOLIC BLOOD PRESSURE: 84 MMHG

## 2025-04-14 DIAGNOSIS — C50.811 MALIGNANT NEOPLASM OF OVERLAPPING SITES OF RIGHT BREAST IN FEMALE, ESTROGEN RECEPTOR NEGATIVE (HCC): Primary | ICD-10-CM

## 2025-04-14 DIAGNOSIS — Z17.1 MALIGNANT NEOPLASM OF OVERLAPPING SITES OF RIGHT BREAST IN FEMALE, ESTROGEN RECEPTOR NEGATIVE: Primary | ICD-10-CM

## 2025-04-14 DIAGNOSIS — C50.811 MALIGNANT NEOPLASM OF OVERLAPPING SITES OF RIGHT BREAST IN FEMALE, ESTROGEN RECEPTOR NEGATIVE: Primary | ICD-10-CM

## 2025-04-14 DIAGNOSIS — G62.0 NEUROPATHY DUE TO CHEMOTHERAPEUTIC DRUG: ICD-10-CM

## 2025-04-14 DIAGNOSIS — Z17.1 MALIGNANT NEOPLASM OF OVERLAPPING SITES OF RIGHT BREAST IN FEMALE, ESTROGEN RECEPTOR NEGATIVE (HCC): Primary | ICD-10-CM

## 2025-04-14 DIAGNOSIS — D75.9 CYTOPENIA: ICD-10-CM

## 2025-04-14 DIAGNOSIS — T45.1X5A NEUROPATHY DUE TO CHEMOTHERAPEUTIC DRUG: ICD-10-CM

## 2025-04-14 LAB
ALBUMIN SERPL-MCNC: 3.8 G/DL (ref 3.5–5.2)
ALBUMIN/GLOB SERPL: 1.3 {RATIO} (ref 1–2.5)
ALP SERPL-CCNC: 76 U/L (ref 35–104)
ALT SERPL-CCNC: 24 U/L (ref 10–35)
ANION GAP SERPL CALCULATED.3IONS-SCNC: 10 MMOL/L (ref 9–16)
AST SERPL-CCNC: 19 U/L (ref 10–35)
BASOPHILS # BLD: 0.03 K/UL (ref 0–0.2)
BASOPHILS NFR BLD: 1 % (ref 0–2)
BILIRUB SERPL-MCNC: 0.3 MG/DL (ref 0–1.2)
BUN SERPL-MCNC: 19 MG/DL (ref 8–23)
CALCIUM SERPL-MCNC: 8.7 MG/DL (ref 8.8–10.2)
CHLORIDE SERPL-SCNC: 107 MMOL/L (ref 98–107)
CO2 SERPL-SCNC: 24 MMOL/L (ref 20–31)
CREAT SERPL-MCNC: 0.8 MG/DL (ref 0.5–0.9)
EOSINOPHIL # BLD: 0.11 K/UL (ref 0–0.44)
EOSINOPHILS RELATIVE PERCENT: 4 % (ref 1–4)
ERYTHROCYTE [DISTWIDTH] IN BLOOD BY AUTOMATED COUNT: 15.7 % (ref 11.8–14.4)
GFR, ESTIMATED: 80 ML/MIN/1.73M2
GLUCOSE SERPL-MCNC: 101 MG/DL (ref 82–115)
HCT VFR BLD AUTO: 33.6 % (ref 36.3–47.1)
HGB BLD-MCNC: 10.8 G/DL (ref 11.9–15.1)
IMM GRANULOCYTES # BLD AUTO: 0.01 K/UL (ref 0–0.3)
IMM GRANULOCYTES NFR BLD: 0 %
LYMPHOCYTES NFR BLD: 0.97 K/UL (ref 1.1–3.7)
LYMPHOCYTES RELATIVE PERCENT: 31 % (ref 24–43)
MCH RBC QN AUTO: 29 PG (ref 25.2–33.5)
MCHC RBC AUTO-ENTMCNC: 32.1 G/DL (ref 28.4–34.8)
MCV RBC AUTO: 90.3 FL (ref 82.6–102.9)
MONOCYTES NFR BLD: 0.36 K/UL (ref 0.1–1.2)
MONOCYTES NFR BLD: 12 % (ref 3–12)
NEUTROPHILS NFR BLD: 52 % (ref 36–65)
NEUTS SEG NFR BLD: 1.62 K/UL (ref 1.5–8.1)
NRBC BLD-RTO: 0 PER 100 WBC
PLATELET # BLD AUTO: 271 K/UL (ref 138–453)
PMV BLD AUTO: 9.4 FL (ref 8.1–13.5)
POTASSIUM SERPL-SCNC: 3.7 MMOL/L (ref 3.7–5.3)
PROT SERPL-MCNC: 6.9 G/DL (ref 6.6–8.7)
RBC # BLD AUTO: 3.72 M/UL (ref 3.95–5.11)
RBC # BLD: ABNORMAL 10*6/UL
SODIUM SERPL-SCNC: 140 MMOL/L (ref 136–145)
WBC OTHER # BLD: 3.1 K/UL (ref 3.5–11.3)

## 2025-04-14 PROCEDURE — 85025 COMPLETE CBC W/AUTO DIFF WBC: CPT

## 2025-04-14 PROCEDURE — 2580000003 HC RX 258: Performed by: INTERNAL MEDICINE

## 2025-04-14 PROCEDURE — 99214 OFFICE O/P EST MOD 30 MIN: CPT | Performed by: INTERNAL MEDICINE

## 2025-04-14 PROCEDURE — 96413 CHEMO IV INFUSION 1 HR: CPT

## 2025-04-14 PROCEDURE — 6360000002 HC RX W HCPCS: Performed by: INTERNAL MEDICINE

## 2025-04-14 PROCEDURE — 80053 COMPREHEN METABOLIC PANEL: CPT

## 2025-04-14 PROCEDURE — 99211 OFF/OP EST MAY X REQ PHY/QHP: CPT | Performed by: INTERNAL MEDICINE

## 2025-04-14 PROCEDURE — 2500000003 HC RX 250 WO HCPCS: Performed by: INTERNAL MEDICINE

## 2025-04-14 RX ORDER — ALBUTEROL SULFATE 90 UG/1
4 INHALANT RESPIRATORY (INHALATION) PRN
OUTPATIENT
Start: 2025-05-05

## 2025-04-14 RX ORDER — ONDANSETRON 2 MG/ML
8 INJECTION INTRAMUSCULAR; INTRAVENOUS
OUTPATIENT
Start: 2025-05-05

## 2025-04-14 RX ORDER — SODIUM CHLORIDE 9 MG/ML
INJECTION, SOLUTION INTRAVENOUS CONTINUOUS
OUTPATIENT
Start: 2025-05-05

## 2025-04-14 RX ORDER — SODIUM CHLORIDE 9 MG/ML
5-250 INJECTION, SOLUTION INTRAVENOUS PRN
OUTPATIENT
Start: 2025-05-05

## 2025-04-14 RX ORDER — EPINEPHRINE 1 MG/ML
0.3 INJECTION, SOLUTION, CONCENTRATE INTRAVENOUS PRN
OUTPATIENT
Start: 2025-05-05

## 2025-04-14 RX ORDER — DIPHENHYDRAMINE HYDROCHLORIDE 50 MG/ML
50 INJECTION, SOLUTION INTRAMUSCULAR; INTRAVENOUS
OUTPATIENT
Start: 2025-05-05

## 2025-04-14 RX ORDER — SODIUM CHLORIDE 0.9 % (FLUSH) 0.9 %
5-40 SYRINGE (ML) INJECTION PRN
Status: DISCONTINUED | OUTPATIENT
Start: 2025-04-14 | End: 2025-04-15 | Stop reason: HOSPADM

## 2025-04-14 RX ORDER — FAMOTIDINE 10 MG/ML
20 INJECTION, SOLUTION INTRAVENOUS
OUTPATIENT
Start: 2025-05-05

## 2025-04-14 RX ORDER — ACETAMINOPHEN 325 MG/1
650 TABLET ORAL
OUTPATIENT
Start: 2025-05-05

## 2025-04-14 RX ORDER — HEPARIN 100 UNIT/ML
500 SYRINGE INTRAVENOUS PRN
Status: DISCONTINUED | OUTPATIENT
Start: 2025-04-14 | End: 2025-04-15 | Stop reason: HOSPADM

## 2025-04-14 RX ORDER — MEPERIDINE HYDROCHLORIDE 50 MG/ML
12.5 INJECTION INTRAMUSCULAR; INTRAVENOUS; SUBCUTANEOUS PRN
OUTPATIENT
Start: 2025-05-05

## 2025-04-14 RX ORDER — SODIUM CHLORIDE 0.9 % (FLUSH) 0.9 %
5-40 SYRINGE (ML) INJECTION PRN
OUTPATIENT
Start: 2025-05-05

## 2025-04-14 RX ORDER — SODIUM CHLORIDE 9 MG/ML
5-250 INJECTION, SOLUTION INTRAVENOUS PRN
Status: DISCONTINUED | OUTPATIENT
Start: 2025-04-14 | End: 2025-04-15 | Stop reason: HOSPADM

## 2025-04-14 RX ORDER — HYDROCORTISONE SODIUM SUCCINATE 100 MG/2ML
100 INJECTION INTRAMUSCULAR; INTRAVENOUS
OUTPATIENT
Start: 2025-05-05

## 2025-04-14 RX ORDER — HEPARIN SODIUM (PORCINE) LOCK FLUSH IV SOLN 100 UNIT/ML 100 UNIT/ML
500 SOLUTION INTRAVENOUS PRN
OUTPATIENT
Start: 2025-05-05

## 2025-04-14 RX ADMIN — SODIUM CHLORIDE, PRESERVATIVE FREE 10 ML: 5 INJECTION INTRAVENOUS at 11:53

## 2025-04-14 RX ADMIN — SODIUM CHLORIDE 483 MG: 0.9 INJECTION, SOLUTION INTRAVENOUS at 10:56

## 2025-04-14 RX ADMIN — HEPARIN 500 UNITS: 100 SYRINGE at 11:53

## 2025-04-14 RX ADMIN — SODIUM CHLORIDE 25 ML/HR: 0.9 INJECTION, SOLUTION INTRAVENOUS at 09:00

## 2025-04-14 NOTE — TELEPHONE ENCOUNTER
AMINA HERE FOR FOLLOW UP & TX   Tx as planned  Rv in 3 weeks with tx   MD VISIT: 5/5/25 @ 1:45PM TX @ 1PM   AVS PRINTED AND GIVEN ON EXIT

## 2025-04-14 NOTE — PROGRESS NOTES
[facility] Oncology Nutrition Note:  Chart reviewed and met with patient while at [facility] center d/t referral from PCP for prediabetes diet education.   Pt reports her appetite/intake is good and that she is tolerating diet well. Pt reports she has started making lifestyle changes recently; has cut down on soda, started eating healthier foods/snacks (ie fruits, nuts), and is exercising/moving 2-3 days/week. Pt does report she still enjoys sweets; writer encouraged having them in moderation.   Writer encouraged consuming a well-balanced diet with limited simple carbohydrates; pt receptive and feels she understands diet well. Advised pt to call with any future nutrition questions/concerns.       Luz Marina Villela RD, LD, CNSC  Registered Dietitian  [facility]   [phone]

## 2025-04-14 NOTE — PROGRESS NOTES
Ann Hardy                                                                                                                  4/14/2025  MRN:   [id number]  YOB: 1965  PCP:                           Lisa Segura MD  Referring Physician: No ref. provider found  Treating Physician Name: TOBI PARK MD      Reason for visit:  Chief Complaint   Patient presents with    Follow-up     Patient has no complaints at this time.     Discuss Labs    Chemotherapy     Current problems:  Right breast invasive cancer, HER2 positive ER negative, pathological stage T1b N0 M0-2/2024  Right breast DCIS, ER negative  Chronic arthritis  Medical comorbidities: Mitral valve prolapse, dyslipidemia, hypertension    Active and recent treatments:  S/p right lumpectomy with sentinel lymph node biopsy-5/2024  Weekly Taxol with Herceptin every 3 week-6/2024, ongoing  Adjuvant radiation-10/2024 through 11/2024    Interval history:  History of Present Illness  The patient presents for toxicity check and discuss treatment plan.      Since last office visit patient went to the ER for evaluation of chest pain.  EKG was unremarkable.  Serial troponins were negative.  Patient has appointment coming up with cardiology later this week.    Intermittent chest pain is reported, which is not associated with physical exertion. Over the past few days, palpitations have been experienced, described as \"fluttering\" and \"racing\" heart, but without any accompanying discomfort. An appointment with a cardiologist is scheduled for this week.    Symptoms of nasal congestion and runny nose began on 03/23/2025. No febrile episodes have been experienced. These symptoms have been managed with Coricidin HBP.    Right shoulder pain has improved, and pool workouts have been resumed, which are believed to be beneficial.    Potassium is not taken every day.     FAMILY HISTORY  Her  has atrial fibrillation.    During this visit patient's allergy,

## 2025-04-14 NOTE — PROGRESS NOTES
Patient presents ambulatory for follow-up and C8D1 treatment. VS and weight as charted. Medications and allergies reviewed. Patient denies any current issues.     Port accessed per protocol, labs obtained, and IV fluids started. Labs and orders reviewed. Last ECHO 1/24/25 with an EF 60%. Physician in to see patient, okay to continue with treatment.     Trazimera administered with no adverse reactions noted, line flushed. Port flushed, heparinized, and de-accessed per protocol. Patient discharged ambulatory and aware to  AVS from front office.

## 2025-04-15 ENCOUNTER — TELEPHONE (OUTPATIENT)
Dept: ONCOLOGY | Age: 60
End: 2025-04-15

## 2025-04-15 NOTE — TELEPHONE ENCOUNTER
Name: Ann Hardy  : 1965  MRN: [id number]    Oncology Navigation Follow-Up Note    Contact Type:  Telephone    Notes: Writer spoke with pt for YUDITH f/noemy. She reports her breast soreness has improved. She reports that she is feeling \"very well.\" We discussed Dr Ezequiel starr/noemy to discuss fat grafting which was mentioned in her consult prior to surgery. Pt would like to hold off at this point and will update writer if she changes her mind. Pt denied needs from YUDITH at this time. Encouraged pt to call navigator as needed with any questions, concerns or barriers. Confirmed pt has navigator's contact information.           Electronically signed by Delmy Calderon RN on 4/15/2025 at 12:23 PM

## 2025-05-01 ENCOUNTER — HOSPITAL ENCOUNTER (OUTPATIENT)
Facility: MEDICAL CENTER | Age: 60
End: 2025-05-01
Payer: COMMERCIAL

## 2025-05-05 ENCOUNTER — HOSPITAL ENCOUNTER (OUTPATIENT)
Dept: INFUSION THERAPY | Facility: MEDICAL CENTER | Age: 60
Discharge: HOME | End: 2025-05-05
Payer: COMMERCIAL

## 2025-05-05 ENCOUNTER — TELEPHONE (OUTPATIENT)
Age: 60
End: 2025-05-05

## 2025-05-05 ENCOUNTER — OFFICE VISIT (OUTPATIENT)
Age: 60
End: 2025-05-05
Payer: COMMERCIAL

## 2025-05-05 VITALS
HEART RATE: 69 BPM | TEMPERATURE: 97.7 F | DIASTOLIC BLOOD PRESSURE: 90 MMHG | RESPIRATION RATE: 18 BRPM | BODY MASS INDEX: 29.51 KG/M2 | SYSTOLIC BLOOD PRESSURE: 122 MMHG | HEIGHT: 67 IN | WEIGHT: 188 LBS

## 2025-05-05 DIAGNOSIS — C50.811 MALIGNANT NEOPLASM OF OVERLAPPING SITES OF RIGHT BREAST IN FEMALE, ESTROGEN RECEPTOR NEGATIVE (HCC): Primary | ICD-10-CM

## 2025-05-05 DIAGNOSIS — Z51.11 CHEMOTHERAPY MANAGEMENT, ENCOUNTER FOR: ICD-10-CM

## 2025-05-05 DIAGNOSIS — Z17.1 MALIGNANT NEOPLASM OF OVERLAPPING SITES OF RIGHT BREAST IN FEMALE, ESTROGEN RECEPTOR NEGATIVE (HCC): Primary | ICD-10-CM

## 2025-05-05 DIAGNOSIS — D75.9 CYTOPENIA: ICD-10-CM

## 2025-05-05 LAB
ALBUMIN SERPL-MCNC: 4.1 G/DL (ref 3.5–5.2)
ALBUMIN/GLOB SERPL: 1.2 {RATIO} (ref 1–2.5)
ALP SERPL-CCNC: 76 U/L (ref 35–104)
ALT SERPL-CCNC: 25 U/L (ref 10–35)
ANION GAP SERPL CALCULATED.3IONS-SCNC: 12 MMOL/L (ref 9–16)
AST SERPL-CCNC: 21 U/L (ref 10–35)
BASOPHILS # BLD: 0.03 K/UL (ref 0–0.2)
BASOPHILS NFR BLD: 1 % (ref 0–2)
BILIRUB SERPL-MCNC: 0.5 MG/DL (ref 0–1.2)
BUN SERPL-MCNC: 14 MG/DL (ref 8–23)
CALCIUM SERPL-MCNC: 8.9 MG/DL (ref 8.8–10.2)
CHLORIDE SERPL-SCNC: 103 MMOL/L (ref 98–107)
CO2 SERPL-SCNC: 24 MMOL/L (ref 20–31)
CREAT SERPL-MCNC: 0.7 MG/DL (ref 0.5–0.9)
EOSINOPHIL # BLD: 0.08 K/UL (ref 0–0.44)
EOSINOPHILS RELATIVE PERCENT: 3 % (ref 1–4)
ERYTHROCYTE [DISTWIDTH] IN BLOOD BY AUTOMATED COUNT: 15.6 % (ref 11.8–14.4)
GFR, ESTIMATED: >90 ML/MIN/1.73M2
GLUCOSE SERPL-MCNC: 83 MG/DL (ref 82–115)
HCT VFR BLD AUTO: 34 % (ref 36.3–47.1)
HGB BLD-MCNC: 11.2 G/DL (ref 11.9–15.1)
IMM GRANULOCYTES # BLD AUTO: 0.01 K/UL (ref 0–0.3)
IMM GRANULOCYTES NFR BLD: 0 %
LYMPHOCYTES NFR BLD: 1.12 K/UL (ref 1.1–3.7)
LYMPHOCYTES RELATIVE PERCENT: 40 % (ref 24–43)
MCH RBC QN AUTO: 29.2 PG (ref 25.2–33.5)
MCHC RBC AUTO-ENTMCNC: 32.9 G/DL (ref 28.4–34.8)
MCV RBC AUTO: 88.8 FL (ref 82.6–102.9)
MONOCYTES NFR BLD: 0.23 K/UL (ref 0.1–1.2)
MONOCYTES NFR BLD: 8 % (ref 3–12)
NEUTROPHILS NFR BLD: 48 % (ref 36–65)
NEUTS SEG NFR BLD: 1.31 K/UL (ref 1.5–8.1)
NRBC BLD-RTO: 0 PER 100 WBC
PLATELET # BLD AUTO: 259 K/UL (ref 138–453)
PMV BLD AUTO: 9.5 FL (ref 8.1–13.5)
POTASSIUM SERPL-SCNC: 3.6 MMOL/L (ref 3.7–5.3)
PROT SERPL-MCNC: 7.6 G/DL (ref 6.6–8.7)
RBC # BLD AUTO: 3.83 M/UL (ref 3.95–5.11)
RBC # BLD: ABNORMAL 10*6/UL
SODIUM SERPL-SCNC: 138 MMOL/L (ref 136–145)
WBC OTHER # BLD: 2.8 K/UL (ref 3.5–11.3)

## 2025-05-05 PROCEDURE — 6360000002 HC RX W HCPCS: Performed by: INTERNAL MEDICINE

## 2025-05-05 PROCEDURE — 99214 OFFICE O/P EST MOD 30 MIN: CPT | Performed by: INTERNAL MEDICINE

## 2025-05-05 PROCEDURE — 85025 COMPLETE CBC W/AUTO DIFF WBC: CPT

## 2025-05-05 PROCEDURE — 2500000003 HC RX 250 WO HCPCS: Performed by: INTERNAL MEDICINE

## 2025-05-05 PROCEDURE — 96413 CHEMO IV INFUSION 1 HR: CPT

## 2025-05-05 PROCEDURE — 80053 COMPREHEN METABOLIC PANEL: CPT

## 2025-05-05 PROCEDURE — 2580000003 HC RX 258: Performed by: INTERNAL MEDICINE

## 2025-05-05 RX ORDER — SODIUM CHLORIDE 9 MG/ML
5-250 INJECTION, SOLUTION INTRAVENOUS PRN
OUTPATIENT
Start: 2025-06-16

## 2025-05-05 RX ORDER — HYDROCORTISONE SODIUM SUCCINATE 100 MG/2ML
100 INJECTION INTRAMUSCULAR; INTRAVENOUS
OUTPATIENT
Start: 2025-06-16

## 2025-05-05 RX ORDER — FAMOTIDINE 10 MG/ML
20 INJECTION, SOLUTION INTRAVENOUS
OUTPATIENT
Start: 2025-05-26

## 2025-05-05 RX ORDER — SODIUM CHLORIDE 0.9 % (FLUSH) 0.9 %
5-40 SYRINGE (ML) INJECTION PRN
OUTPATIENT
Start: 2025-05-26

## 2025-05-05 RX ORDER — MEPERIDINE HYDROCHLORIDE 50 MG/ML
12.5 INJECTION INTRAMUSCULAR; INTRAVENOUS; SUBCUTANEOUS PRN
OUTPATIENT
Start: 2025-06-16

## 2025-05-05 RX ORDER — DIPHENHYDRAMINE HYDROCHLORIDE 50 MG/ML
50 INJECTION, SOLUTION INTRAMUSCULAR; INTRAVENOUS
OUTPATIENT
Start: 2025-06-16

## 2025-05-05 RX ORDER — ONDANSETRON 2 MG/ML
8 INJECTION INTRAMUSCULAR; INTRAVENOUS
OUTPATIENT
Start: 2025-06-16

## 2025-05-05 RX ORDER — ACETAMINOPHEN 325 MG/1
650 TABLET ORAL
OUTPATIENT
Start: 2025-05-26

## 2025-05-05 RX ORDER — SODIUM CHLORIDE 9 MG/ML
5-250 INJECTION, SOLUTION INTRAVENOUS PRN
Status: DISCONTINUED | OUTPATIENT
Start: 2025-05-05 | End: 2025-05-06 | Stop reason: HOSPADM

## 2025-05-05 RX ORDER — SODIUM CHLORIDE 0.9 % (FLUSH) 0.9 %
5-40 SYRINGE (ML) INJECTION PRN
OUTPATIENT
Start: 2025-06-16

## 2025-05-05 RX ORDER — SODIUM CHLORIDE 9 MG/ML
INJECTION, SOLUTION INTRAVENOUS CONTINUOUS
OUTPATIENT
Start: 2025-06-16

## 2025-05-05 RX ORDER — HEPARIN SODIUM (PORCINE) LOCK FLUSH IV SOLN 100 UNIT/ML 100 UNIT/ML
500 SOLUTION INTRAVENOUS PRN
OUTPATIENT
Start: 2025-05-26

## 2025-05-05 RX ORDER — ACETAMINOPHEN 325 MG/1
650 TABLET ORAL
OUTPATIENT
Start: 2025-06-16

## 2025-05-05 RX ORDER — DIPHENHYDRAMINE HYDROCHLORIDE 50 MG/ML
50 INJECTION, SOLUTION INTRAMUSCULAR; INTRAVENOUS
OUTPATIENT
Start: 2025-05-26

## 2025-05-05 RX ORDER — EPINEPHRINE 1 MG/ML
0.3 INJECTION, SOLUTION, CONCENTRATE INTRAVENOUS PRN
OUTPATIENT
Start: 2025-06-16

## 2025-05-05 RX ORDER — HEPARIN 100 UNIT/ML
500 SYRINGE INTRAVENOUS PRN
Status: DISCONTINUED | OUTPATIENT
Start: 2025-05-05 | End: 2025-05-06 | Stop reason: HOSPADM

## 2025-05-05 RX ORDER — ONDANSETRON 2 MG/ML
8 INJECTION INTRAMUSCULAR; INTRAVENOUS
OUTPATIENT
Start: 2025-05-26

## 2025-05-05 RX ORDER — SODIUM CHLORIDE 9 MG/ML
5-250 INJECTION, SOLUTION INTRAVENOUS PRN
OUTPATIENT
Start: 2025-05-26

## 2025-05-05 RX ORDER — SODIUM CHLORIDE 0.9 % (FLUSH) 0.9 %
5-40 SYRINGE (ML) INJECTION PRN
Status: DISCONTINUED | OUTPATIENT
Start: 2025-05-05 | End: 2025-05-06 | Stop reason: HOSPADM

## 2025-05-05 RX ORDER — FAMOTIDINE 10 MG/ML
20 INJECTION, SOLUTION INTRAVENOUS
OUTPATIENT
Start: 2025-06-16

## 2025-05-05 RX ORDER — ALBUTEROL SULFATE 90 UG/1
4 INHALANT RESPIRATORY (INHALATION) PRN
OUTPATIENT
Start: 2025-06-16

## 2025-05-05 RX ORDER — ERGOCALCIFEROL 1.25 MG/1
50000 CAPSULE, LIQUID FILLED ORAL WEEKLY
Qty: 12 CAPSULE | Refills: 1 | Status: SHIPPED | OUTPATIENT
Start: 2025-05-05

## 2025-05-05 RX ORDER — HYDROCORTISONE SODIUM SUCCINATE 100 MG/2ML
100 INJECTION INTRAMUSCULAR; INTRAVENOUS
OUTPATIENT
Start: 2025-05-26

## 2025-05-05 RX ORDER — ALBUTEROL SULFATE 90 UG/1
4 INHALANT RESPIRATORY (INHALATION) PRN
OUTPATIENT
Start: 2025-05-26

## 2025-05-05 RX ORDER — SENNA AND DOCUSATE SODIUM 50; 8.6 MG/1; MG/1
1 TABLET, FILM COATED ORAL 2 TIMES DAILY PRN
Qty: 60 TABLET | Refills: 0 | Status: SHIPPED | OUTPATIENT
Start: 2025-05-05

## 2025-05-05 RX ORDER — SODIUM CHLORIDE 9 MG/ML
INJECTION, SOLUTION INTRAVENOUS CONTINUOUS
OUTPATIENT
Start: 2025-05-26

## 2025-05-05 RX ORDER — EPINEPHRINE 1 MG/ML
0.3 INJECTION, SOLUTION, CONCENTRATE INTRAVENOUS PRN
OUTPATIENT
Start: 2025-05-26

## 2025-05-05 RX ORDER — MEPERIDINE HYDROCHLORIDE 50 MG/ML
12.5 INJECTION INTRAMUSCULAR; INTRAVENOUS; SUBCUTANEOUS PRN
OUTPATIENT
Start: 2025-05-26

## 2025-05-05 RX ORDER — HEPARIN SODIUM (PORCINE) LOCK FLUSH IV SOLN 100 UNIT/ML 100 UNIT/ML
500 SOLUTION INTRAVENOUS PRN
OUTPATIENT
Start: 2025-06-16

## 2025-05-05 RX ADMIN — SODIUM CHLORIDE, PRESERVATIVE FREE 10 ML: 5 INJECTION INTRAVENOUS at 15:46

## 2025-05-05 RX ADMIN — HEPARIN 500 UNITS: 100 SYRINGE at 15:46

## 2025-05-05 RX ADMIN — SODIUM CHLORIDE 25 ML/HR: 0.9 INJECTION, SOLUTION INTRAVENOUS at 13:38

## 2025-05-05 RX ADMIN — SODIUM CHLORIDE 483 MG: 0.9 INJECTION, SOLUTION INTRAVENOUS at 15:07

## 2025-05-05 NOTE — PROGRESS NOTES
Spiritual Health Outpatient Oncology/Hematology Progress Note: [facility]    Situation: Writer visited with Patient in the treatment cubicle of the infusion clinic.     Assessment: Pt shared how she has been doing. Pt spoke of the steps she has taken and the remaining steps she has for treatment. Pt expressed gratitude for her health. Pt shared about her family and how she sees her young grandchildren often.    Intervention: Writer inquired about Pt's coping and needs. Writer explored Pt's sources of support and strength. Writer offered supportive presence and active listening. Writer affirmed Pt's strengths. Writer offered words of support and encouragement.     Outcome:  Pt thanked writer for the visit.    Plan: Spiritual Health Services are available for Patient by phone and/or in person.      05/05/25 1610   Encounter Summary   Encounter Overview/Reason Spiritual/Emotional Needs   Service Provided For Patient   Referral/Consult From [facility]   Support System Family members;Children   Last Encounter  08/20/24   Complexity of Encounter Low   Begin Time 1350   End Time  1400   Total Time Calculated 10 min   Spiritual/Emotional needs   Type Spiritual Support   Assessment/Intervention/Outcome   Assessment Calm;Coping   Intervention Sustaining Presence/Ministry of presence;Active listening;Explored/Affirmed feelings, thoughts, concerns;Explored Coping Skills/Resources;Discussed illness injury and it’s impact;Discussed belief system/Church practices/edgard   Outcome Engaged in conversation;Expressed feelings, needs, and concerns;Coping;Receptive   Plan and Referrals   Plan/Referrals Continue Support (comment)     JOSSY Edmonds  [facility] Spiritual Health   [phone]

## 2025-05-05 NOTE — PROGRESS NOTES
Patient presents ambulatory for follow-up and C9D1 treatment. VS and weight as charted. Medications and allergies reviewed. Patient states she continues to have issues with constipation but denies any other concerns at this time.     Port accessed per protocol, labs obtained, and IV fluids started. Labs and orders reviewed. Physician at bedside to see patient, okay to continue with treatment. Last ECHO 1/24/2025 with EF 60%, patient is to be scheduled for another.     Trazimera administered with no adverse reactions noted and line flushed. Port flushed, heparinized, and de-accessed per protocol. Patient discharged ambulatory and aware to  AVS from front office.    The patient is a 8y6m Male complaining of headache.

## 2025-05-05 NOTE — TELEPHONE ENCOUNTER
AMINA HERE FOR MD VISIT & TX  Please schedule echo , was ordered on last visit  Tx today   Rv in 4 weeks with tx   ECHO IS ON 5/27/25 @ 1:30PM AT [facility] ARRIVAL @ 1:15PM  MD VISIT 6/2/25 @ 1:30PM TX @ 1PM  AVS PRINTED W/ INSTRUCTIONS AND GIVEN TO PT ON EXIT

## 2025-05-05 NOTE — PROGRESS NOTES
Ann Hardy                                                                                                                  5/5/2025  MRN:   [id number]  YOB: 1965  PCP:                           Lisa Segura MD  Referring Physician: No ref. provider found  Treating Physician Name: TOBI PARK MD      Reason for visit:  Chief Complaint   Patient presents with    Follow-up    Chemotherapy     Patient has continued constipation. No other new or worsening symptoms noted.      Current problems:  Right breast invasive cancer, HER2 positive ER negative, pathological stage T1b N0 M0-2/2024  Right breast DCIS, ER negative  Chronic arthritis  Medical comorbidities: Mitral valve prolapse, dyslipidemia, hypertension    Active and recent treatments:  S/p right lumpectomy with sentinel lymph node biopsy-5/2024  Weekly Taxol with Herceptin every 3 week-6/2024, ongoing  Adjuvant radiation-10/2024 through 11/2024    Interval history:  History of Present Illness  The patient presents for toxicity check and discuss treatment plan.  Patient lab workup shows persistent leukopenia.    She reports experiencing mild constipation and is uncertain about the appropriate over-the-counter medication to use. She recalls a previous prescription for a laxative but cannot remember the specific medication. She has been utilizing her HSA for her healthcare needs.    Additionally, she expresses concern regarding her low white and red blood cell counts and inquires about potential interventions to improve these levels. Previous tests for B12, folic acid, and iron levels were within normal ranges. The low cell counts are likely related to her ongoing treatment.    During this visit patient's allergy, social, medical, surgical history and medications were reviewed and updated.    Summary of Case/History:  Ann faye 59 y.o.female is a patient with biopsy-proven invasive cancer of right breast, triple negative and DCIS, ER

## 2025-05-10 DIAGNOSIS — M81.0 AGE-RELATED OSTEOPOROSIS WITHOUT CURRENT PATHOLOGICAL FRACTURE: ICD-10-CM

## 2025-05-13 RX ORDER — ALENDRONATE SODIUM 70 MG/1
70 TABLET ORAL
Qty: 12 TABLET | Refills: 3 | Status: SHIPPED | OUTPATIENT
Start: 2025-05-13

## 2025-05-19 DIAGNOSIS — C50.811 MALIGNANT NEOPLASM OF OVERLAPPING SITES OF RIGHT BREAST IN FEMALE, ESTROGEN RECEPTOR NEGATIVE (HCC): ICD-10-CM

## 2025-05-19 DIAGNOSIS — Z51.11 CHEMOTHERAPY MANAGEMENT, ENCOUNTER FOR: ICD-10-CM

## 2025-05-19 DIAGNOSIS — Z17.1 MALIGNANT NEOPLASM OF OVERLAPPING SITES OF RIGHT BREAST IN FEMALE, ESTROGEN RECEPTOR NEGATIVE (HCC): ICD-10-CM

## 2025-05-19 RX ORDER — VITAMIN B COMPLEX
1 CAPSULE ORAL DAILY
Qty: 90 CAPSULE | Refills: 3 | Status: SHIPPED | OUTPATIENT
Start: 2025-05-19

## 2025-05-24 DIAGNOSIS — M54.42 CHRONIC BILATERAL LOW BACK PAIN WITH BILATERAL SCIATICA: ICD-10-CM

## 2025-05-24 DIAGNOSIS — G89.29 CHRONIC BILATERAL LOW BACK PAIN WITH BILATERAL SCIATICA: ICD-10-CM

## 2025-05-24 DIAGNOSIS — M54.41 CHRONIC BILATERAL LOW BACK PAIN WITH BILATERAL SCIATICA: ICD-10-CM

## 2025-05-27 ENCOUNTER — HOSPITAL ENCOUNTER (OUTPATIENT)
Age: 60
Discharge: HOME | End: 2025-05-29
Attending: INTERNAL MEDICINE
Payer: COMMERCIAL

## 2025-05-27 VITALS — BODY MASS INDEX: 29.51 KG/M2 | HEIGHT: 67 IN | WEIGHT: 188 LBS

## 2025-05-27 DIAGNOSIS — Z17.1 MALIGNANT NEOPLASM OF OVERLAPPING SITES OF RIGHT BREAST IN FEMALE, ESTROGEN RECEPTOR NEGATIVE (HCC): ICD-10-CM

## 2025-05-27 DIAGNOSIS — C50.811 MALIGNANT NEOPLASM OF OVERLAPPING SITES OF RIGHT BREAST IN FEMALE, ESTROGEN RECEPTOR NEGATIVE (HCC): ICD-10-CM

## 2025-05-27 LAB
ECHO AO ROOT DIAM: 2.8 CM
ECHO AO ROOT INDEX: 1.42 CM/M2
ECHO AV MEAN GRADIENT: 5 MMHG
ECHO AV MEAN VELOCITY: 1.1 M/S
ECHO AV PEAK GRADIENT: 11 MMHG
ECHO AV PEAK VELOCITY: 1.7 M/S
ECHO AV VELOCITY RATIO: 0.76
ECHO AV VTI: 38.9 CM
ECHO BSA: 2.01 M2
ECHO EST RA PRESSURE: 3 MMHG
ECHO LA AREA 4C: 21.1 CM2
ECHO LA DIAMETER INDEX: 1.57 CM/M2
ECHO LA DIAMETER: 3.1 CM
ECHO LA MAJOR AXIS: 5.8 CM
ECHO LA TO AORTIC ROOT RATIO: 1.11
ECHO LA VOL MOD A4C: 59 ML (ref 22–52)
ECHO LA VOLUME INDEX MOD A4C: 30 ML/M2 (ref 16–34)
ECHO LV E' LATERAL VELOCITY: 10.8 CM/S
ECHO LV E' SEPTAL VELOCITY: 6.85 CM/S
ECHO LV EF PHYSICIAN: 63 %
ECHO LV EJECTION FRACTION BIPLANE: 63 % (ref 55–100)
ECHO LV FRACTIONAL SHORTENING: 25 % (ref 28–44)
ECHO LV GLOBAL LONGITUDINAL STRAIN (GLS): -17.2 %
ECHO LV INTERNAL DIMENSION DIASTOLE INDEX: 1.83 CM/M2
ECHO LV INTERNAL DIMENSION DIASTOLIC: 3.6 CM (ref 3.9–5.3)
ECHO LV INTERNAL DIMENSION SYSTOLIC INDEX: 1.37 CM/M2
ECHO LV INTERNAL DIMENSION SYSTOLIC: 2.7 CM
ECHO LV IVSD: 1.2 CM (ref 0.6–0.9)
ECHO LV MASS 2D: 124.1 G (ref 67–162)
ECHO LV MASS INDEX 2D: 63 G/M2 (ref 43–95)
ECHO LV POSTERIOR WALL DIASTOLIC: 1 CM (ref 0.6–0.9)
ECHO LV RELATIVE WALL THICKNESS RATIO: 0.56
ECHO LVOT PEAK GRADIENT: 7 MMHG
ECHO LVOT PEAK VELOCITY: 1.3 M/S
ECHO MV A VELOCITY: 0.82 M/S
ECHO MV E DECELERATION TIME (DT): 222 MS
ECHO MV E VELOCITY: 0.97 M/S
ECHO MV E/A RATIO: 1.18
ECHO MV E/E' LATERAL: 8.98
ECHO MV E/E' RATIO (AVERAGED): 11.57
ECHO MV E/E' SEPTAL: 14.16
ECHO RIGHT VENTRICULAR SYSTOLIC PRESSURE (RVSP): 24 MMHG
ECHO TV REGURGITANT MAX VELOCITY: 2.28 M/S
ECHO TV REGURGITANT PEAK GRADIENT: 21 MMHG

## 2025-05-27 PROCEDURE — 93306 TTE W/DOPPLER COMPLETE: CPT

## 2025-05-27 RX ORDER — DICLOFENAC SODIUM 75 MG/1
75 TABLET, DELAYED RELEASE ORAL 2 TIMES DAILY
Qty: 60 TABLET | Refills: 4 | Status: SHIPPED | OUTPATIENT
Start: 2025-05-27

## 2025-06-02 ENCOUNTER — HOSPITAL ENCOUNTER (OUTPATIENT)
Dept: INFUSION THERAPY | Facility: MEDICAL CENTER | Age: 60
Discharge: HOME | End: 2025-06-02
Payer: COMMERCIAL

## 2025-06-02 ENCOUNTER — TELEPHONE (OUTPATIENT)
Age: 60
End: 2025-06-02

## 2025-06-02 ENCOUNTER — OFFICE VISIT (OUTPATIENT)
Age: 60
End: 2025-06-02
Payer: COMMERCIAL

## 2025-06-02 ENCOUNTER — HOSPITAL ENCOUNTER (OUTPATIENT)
Facility: MEDICAL CENTER | Age: 60
End: 2025-06-02
Payer: COMMERCIAL

## 2025-06-02 VITALS
WEIGHT: 179.7 LBS | BODY MASS INDEX: 28.14 KG/M2 | HEART RATE: 68 BPM | TEMPERATURE: 97.7 F | RESPIRATION RATE: 16 BRPM | SYSTOLIC BLOOD PRESSURE: 124 MMHG | DIASTOLIC BLOOD PRESSURE: 96 MMHG

## 2025-06-02 DIAGNOSIS — Z51.11 CHEMOTHERAPY MANAGEMENT, ENCOUNTER FOR: ICD-10-CM

## 2025-06-02 DIAGNOSIS — C50.811 MALIGNANT NEOPLASM OF OVERLAPPING SITES OF RIGHT BREAST IN FEMALE, ESTROGEN RECEPTOR NEGATIVE (HCC): Primary | ICD-10-CM

## 2025-06-02 DIAGNOSIS — Z17.1 MALIGNANT NEOPLASM OF OVERLAPPING SITES OF RIGHT BREAST IN FEMALE, ESTROGEN RECEPTOR NEGATIVE (HCC): Primary | ICD-10-CM

## 2025-06-02 DIAGNOSIS — R12 HEART BURN: ICD-10-CM

## 2025-06-02 LAB
ALBUMIN SERPL-MCNC: 4 G/DL (ref 3.5–5.2)
ALBUMIN/GLOB SERPL: 1.2 {RATIO} (ref 1–2.5)
ALP SERPL-CCNC: 83 U/L (ref 35–104)
ALT SERPL-CCNC: 23 U/L (ref 10–35)
ANION GAP SERPL CALCULATED.3IONS-SCNC: 9 MMOL/L (ref 9–16)
AST SERPL-CCNC: 19 U/L (ref 10–35)
BASOPHILS # BLD: 0.04 K/UL (ref 0–0.2)
BASOPHILS NFR BLD: 1 % (ref 0–2)
BILIRUB SERPL-MCNC: <0.2 MG/DL (ref 0–1.2)
BUN SERPL-MCNC: 15 MG/DL (ref 8–23)
CALCIUM SERPL-MCNC: 8.9 MG/DL (ref 8.8–10.2)
CHLORIDE SERPL-SCNC: 107 MMOL/L (ref 98–107)
CO2 SERPL-SCNC: 25 MMOL/L (ref 20–31)
CREAT SERPL-MCNC: 0.8 MG/DL (ref 0.5–0.9)
EOSINOPHIL # BLD: 0.2 K/UL (ref 0–0.44)
EOSINOPHILS RELATIVE PERCENT: 6 % (ref 1–4)
ERYTHROCYTE [DISTWIDTH] IN BLOOD BY AUTOMATED COUNT: 15 % (ref 11.8–14.4)
GFR, ESTIMATED: 81 ML/MIN/1.73M2
GLUCOSE SERPL-MCNC: 70 MG/DL (ref 82–115)
HCT VFR BLD AUTO: 34 % (ref 36.3–47.1)
HGB BLD-MCNC: 11 G/DL (ref 11.9–15.1)
IMM GRANULOCYTES # BLD AUTO: 0.01 K/UL (ref 0–0.3)
IMM GRANULOCYTES NFR BLD: 0 %
LYMPHOCYTES NFR BLD: 1.14 K/UL (ref 1.1–3.7)
LYMPHOCYTES RELATIVE PERCENT: 33 % (ref 24–43)
MCH RBC QN AUTO: 29.3 PG (ref 25.2–33.5)
MCHC RBC AUTO-ENTMCNC: 32.4 G/DL (ref 28.4–34.8)
MCV RBC AUTO: 90.4 FL (ref 82.6–102.9)
MONOCYTES NFR BLD: 0.41 K/UL (ref 0.1–1.2)
MONOCYTES NFR BLD: 12 % (ref 3–12)
NEUTROPHILS NFR BLD: 48 % (ref 36–65)
NEUTS SEG NFR BLD: 1.66 K/UL (ref 1.5–8.1)
NRBC BLD-RTO: 0 PER 100 WBC
PLATELET # BLD AUTO: 269 K/UL (ref 138–453)
PMV BLD AUTO: 9.4 FL (ref 8.1–13.5)
POTASSIUM SERPL-SCNC: 3.8 MMOL/L (ref 3.7–5.3)
PROT SERPL-MCNC: 7.2 G/DL (ref 6.6–8.7)
RBC # BLD AUTO: 3.76 M/UL (ref 3.95–5.11)
RBC # BLD: ABNORMAL 10*6/UL
SODIUM SERPL-SCNC: 141 MMOL/L (ref 136–145)
WBC OTHER # BLD: 3.5 K/UL (ref 3.5–11.3)

## 2025-06-02 PROCEDURE — 85025 COMPLETE CBC W/AUTO DIFF WBC: CPT

## 2025-06-02 PROCEDURE — 80053 COMPREHEN METABOLIC PANEL: CPT

## 2025-06-02 PROCEDURE — 99214 OFFICE O/P EST MOD 30 MIN: CPT | Performed by: INTERNAL MEDICINE

## 2025-06-02 PROCEDURE — 2500000003 HC RX 250 WO HCPCS: Performed by: INTERNAL MEDICINE

## 2025-06-02 PROCEDURE — 96413 CHEMO IV INFUSION 1 HR: CPT

## 2025-06-02 PROCEDURE — 2580000003 HC RX 258: Performed by: INTERNAL MEDICINE

## 2025-06-02 PROCEDURE — 6360000002 HC RX W HCPCS: Performed by: INTERNAL MEDICINE

## 2025-06-02 RX ORDER — SODIUM CHLORIDE 9 MG/ML
5-250 INJECTION, SOLUTION INTRAVENOUS PRN
Status: DISCONTINUED | OUTPATIENT
Start: 2025-06-02 | End: 2025-06-03 | Stop reason: HOSPADM

## 2025-06-02 RX ORDER — VITAMIN B COMPLEX
1 CAPSULE ORAL DAILY
Qty: 90 CAPSULE | Refills: 0 | Status: SHIPPED | OUTPATIENT
Start: 2025-06-02

## 2025-06-02 RX ORDER — HEPARIN 100 UNIT/ML
500 SYRINGE INTRAVENOUS PRN
Status: DISCONTINUED | OUTPATIENT
Start: 2025-06-02 | End: 2025-06-03 | Stop reason: HOSPADM

## 2025-06-02 RX ORDER — OMEPRAZOLE 20 MG/1
20 TABLET, DELAYED RELEASE ORAL DAILY
Qty: 90 TABLET | Refills: 0 | Status: SHIPPED | OUTPATIENT
Start: 2025-06-02

## 2025-06-02 RX ORDER — SODIUM CHLORIDE 0.9 % (FLUSH) 0.9 %
5-40 SYRINGE (ML) INJECTION PRN
Status: DISCONTINUED | OUTPATIENT
Start: 2025-06-02 | End: 2025-06-03 | Stop reason: HOSPADM

## 2025-06-02 RX ADMIN — HEPARIN 500 UNITS: 100 SYRINGE at 16:10

## 2025-06-02 RX ADMIN — SODIUM CHLORIDE, PRESERVATIVE FREE 10 ML: 5 INJECTION INTRAVENOUS at 16:10

## 2025-06-02 RX ADMIN — SODIUM CHLORIDE 20 ML/HR: 0.9 INJECTION, SOLUTION INTRAVENOUS at 14:56

## 2025-06-02 RX ADMIN — SODIUM CHLORIDE, PRESERVATIVE FREE 10 ML: 5 INJECTION INTRAVENOUS at 14:08

## 2025-06-02 RX ADMIN — SODIUM CHLORIDE 483 MG: 0.9 INJECTION, SOLUTION INTRAVENOUS at 15:23

## 2025-06-02 NOTE — PROGRESS NOTES
Ann Hardy                                                                                                                  6/2/2025  MRN:   [id number]  YOB: 1965  PCP:                           Lisa Segura MD  Referring Physician: No ref. provider found  Treating Physician Name: TOBI PARK MD      Reason for visit:  Chief Complaint   Patient presents with    Chemotherapy    Follow-up     heartburn     Current problems:  Right breast invasive cancer, HER2 positive ER negative, pathological stage T1b N0 M0-2/2024  Right breast DCIS, ER negative  Chronic arthritis  Medical comorbidities: Mitral valve prolapse, dyslipidemia, hypertension    Active and recent treatments:  S/p right lumpectomy with sentinel lymph node biopsy-5/2024  Weekly Taxol with Herceptin every 3 week-6/2024, ongoing  Adjuvant radiation-10/2024 through 11/2024    Interval history:  History of Present Illness  Patient presents to the clinic for a follow-up visit and for toxicity check and to review results of her blood work-up.  Patient has been tolerating treatment without any unexpected or severe side effects.  Denies hospitalization or ER visit.  Appetite is good.  Weight is stable.  Nausea is controlled.    During this visit patient's allergy, social, medical, surgical history and medications were reviewed and updated.    Summary of Case/History:  Ann Hardy a 59 y.o.female is a patient with biopsy-proven invasive cancer of right breast, triple negative and DCIS, ER negative presents to the clinic to establish care and for further workup and evaluation.She underwent screening mammogram in January 2024 which showed group of calcification in the right breast which was indeterminate.  Patient subsequently underwent diagnostic mammogram of the right breast which showed clustered pleomorphic calcification in the right breast at the 11 o'clock position.  Patient subsequently underwent biopsy of the right breast which

## 2025-06-02 NOTE — TELEPHONE ENCOUNTER
AMINA HERE FOR FOLLOW UP & TX   Tx today  Rv in 3 weeks   MD VISIT: 6/23/25 @ 11:30AM TX @ 11AM   AVS PRINTED AND GIVEN ON EXIT

## 2025-06-02 NOTE — PROGRESS NOTES
Patient arrives ambulatory for cycle 10 day 1 treatment & MD visit.  Patient denies new concerns or complaints besides heartburn for which she will begin taking medication for. Denies any cardiac symptoms.  Echo reviewed from 5/27/25 - EF 63%.  Vitals as charted.  Port accessed; specimen sent.  Labs reviewed. MD in room, ok to treat. Patient given snack.  Trazimera infused with no issues; line flushed.  Port flushed and heparinized with intact hollingsworth needle removed per protocol.  Patient discharged with instructions to grab AVS from front office.

## 2025-06-04 ENCOUNTER — TELEPHONE (OUTPATIENT)
Dept: INFUSION THERAPY | Age: 60
End: 2025-06-04

## 2025-06-12 ENCOUNTER — TELEPHONE (OUTPATIENT)
Age: 60
End: 2025-06-12

## 2025-06-12 NOTE — TELEPHONE ENCOUNTER
Name: Ann Hardy  : 1965  MRN: [id number]    Oncology Navigation Follow-Up Note    Contact Type:  Telephone    Notes: Writer spoke with pt who reports she is feeling good. She has two more infusions to go. Pt denied needs from ONN at this time. No barriers voiced. Encouraged pt to call navigator as needed with any questions, concerns or barriers. Confirmed pt has navigator's contact information.          Electronically signed by Delmy Calderon RN on 2025 at 12:41 PM

## 2025-06-16 ENCOUNTER — HOSPITAL ENCOUNTER (OUTPATIENT)
Dept: NUCLEAR MEDICINE | Age: 60
Discharge: HOME | End: 2025-06-18
Attending: INTERNAL MEDICINE
Payer: COMMERCIAL

## 2025-06-16 ENCOUNTER — HOSPITAL ENCOUNTER (OUTPATIENT)
Age: 60
Discharge: HOME | End: 2025-06-18
Attending: INTERNAL MEDICINE
Payer: COMMERCIAL

## 2025-06-16 VITALS — SYSTOLIC BLOOD PRESSURE: 131 MMHG | HEART RATE: 90 BPM | DIASTOLIC BLOOD PRESSURE: 97 MMHG

## 2025-06-16 DIAGNOSIS — I20.9 ANGINA PECTORIS: ICD-10-CM

## 2025-06-16 PROCEDURE — 78452 HT MUSCLE IMAGE SPECT MULT: CPT

## 2025-06-16 PROCEDURE — 93017 CV STRESS TEST TRACING ONLY: CPT

## 2025-06-16 PROCEDURE — A9500 TC99M SESTAMIBI: HCPCS | Performed by: INTERNAL MEDICINE

## 2025-06-16 PROCEDURE — 93016 CV STRESS TEST SUPVJ ONLY: CPT | Performed by: INTERNAL MEDICINE

## 2025-06-16 PROCEDURE — 3430000000 HC RX DIAGNOSTIC RADIOPHARMACEUTICAL: Performed by: INTERNAL MEDICINE

## 2025-06-16 PROCEDURE — 6360000002 HC RX W HCPCS: Performed by: INTERNAL MEDICINE

## 2025-06-16 PROCEDURE — 93018 CV STRESS TEST I&R ONLY: CPT | Performed by: INTERNAL MEDICINE

## 2025-06-16 PROCEDURE — 2500000003 HC RX 250 WO HCPCS: Performed by: INTERNAL MEDICINE

## 2025-06-16 RX ORDER — SODIUM CHLORIDE 0.9 % (FLUSH) 0.9 %
5-40 SYRINGE (ML) INJECTION PRN
Status: ACTIVE | OUTPATIENT
Start: 2025-06-16 | End: 2025-06-16

## 2025-06-16 RX ORDER — METOPROLOL TARTRATE 1 MG/ML
5 INJECTION, SOLUTION INTRAVENOUS EVERY 5 MIN PRN
Status: ACTIVE | OUTPATIENT
Start: 2025-06-16 | End: 2025-06-16

## 2025-06-16 RX ORDER — ATROPINE SULFATE 0.1 MG/ML
0.5 INJECTION INTRAVENOUS EVERY 5 MIN PRN
Status: ACTIVE | OUTPATIENT
Start: 2025-06-16 | End: 2025-06-16

## 2025-06-16 RX ORDER — TETRAKIS(2-METHOXYISOBUTYLISOCYANIDE)COPPER(I) TETRAFLUOROBORATE 1 MG/ML
15.8 INJECTION, POWDER, LYOPHILIZED, FOR SOLUTION INTRAVENOUS
Status: COMPLETED | OUTPATIENT
Start: 2025-06-16 | End: 2025-06-16

## 2025-06-16 RX ORDER — NITROGLYCERIN 0.4 MG/1
0.4 TABLET SUBLINGUAL EVERY 5 MIN PRN
Status: ACTIVE | OUTPATIENT
Start: 2025-06-16 | End: 2025-06-16

## 2025-06-16 RX ORDER — SODIUM CHLORIDE 9 MG/ML
500 INJECTION, SOLUTION INTRAVENOUS CONTINUOUS PRN
Status: ACTIVE | OUTPATIENT
Start: 2025-06-16 | End: 2025-06-16

## 2025-06-16 RX ORDER — REGADENOSON 0.08 MG/ML
0.4 INJECTION, SOLUTION INTRAVENOUS
Status: COMPLETED | OUTPATIENT
Start: 2025-06-16 | End: 2025-06-16

## 2025-06-16 RX ORDER — AMINOPHYLLINE 25 MG/ML
50 INJECTION, SOLUTION INTRAVENOUS PRN
Status: ACTIVE | OUTPATIENT
Start: 2025-06-16 | End: 2025-06-16

## 2025-06-16 RX ORDER — TETRAKIS(2-METHOXYISOBUTYLISOCYANIDE)COPPER(I) TETRAFLUOROBORATE 1 MG/ML
42.9 INJECTION, POWDER, LYOPHILIZED, FOR SOLUTION INTRAVENOUS
Status: COMPLETED | OUTPATIENT
Start: 2025-06-16 | End: 2025-06-16

## 2025-06-16 RX ADMIN — SODIUM CHLORIDE, PRESERVATIVE FREE 10 ML: 5 INJECTION INTRAVENOUS at 08:28

## 2025-06-16 RX ADMIN — Medication 15.8 MILLICURIE: at 07:38

## 2025-06-16 RX ADMIN — REGADENOSON 0.4 MG: 0.08 INJECTION, SOLUTION INTRAVENOUS at 08:29

## 2025-06-16 RX ADMIN — Medication 42.9 MILLICURIE: at 08:29

## 2025-06-16 NOTE — FLOWSHEET NOTE
Copy of AVS & D/C inst. To patient for home. Discharged to nuclear medicine for further imaging with some minor residual nausea from lexiscan. Reinforced to drink some caffeine when done with imaging.

## 2025-06-17 LAB
STRESS BASELINE DIAS BP: 97 MMHG
STRESS BASELINE HR: 62 BPM
STRESS BASELINE SYS BP: 152 MMHG
STRESS ESTIMATED WORKLOAD: 1 METS
STRESS EXERCISE DUR MIN: 1 MIN
STRESS EXERCISE DUR SEC: 0 SEC
STRESS PEAK DIAS BP: 97 MMHG
STRESS PEAK SYS BP: 152 MMHG
STRESS PERCENT HR ACHIEVED: 63 %
STRESS POST PEAK HR: 100 BPM
STRESS RATE PRESSURE PRODUCT: NORMAL BPM*MMHG
STRESS STAGE 1 DURATION: 1 MIN:SEC
STRESS STAGE 1 HR: 78 BPM
STRESS STAGE RECOVERY 1 DURATION: 1 MIN:SEC
STRESS STAGE RECOVERY 1 HR: 95 BPM
STRESS STAGE RECOVERY 2 BP: NORMAL MMHG
STRESS STAGE RECOVERY 2 DURATION: 1 MIN:SEC
STRESS STAGE RECOVERY 2 HR: 87 BPM
STRESS STAGE RECOVERY 3 BP: NORMAL MMHG
STRESS STAGE RECOVERY 3 DURATION: 1 MIN:SEC
STRESS STAGE RECOVERY 3 HR: 93 BPM
STRESS STAGE RECOVERY 4 BP: NORMAL MMHG
STRESS STAGE RECOVERY 4 DURATION: 1 MIN:SEC
STRESS STAGE RECOVERY 4 HR: 90 BPM
STRESS TARGET HR: 160 BPM

## 2025-06-18 ENCOUNTER — OFFICE VISIT (OUTPATIENT)
Dept: PRIMARY CARE CLINIC | Age: 60
End: 2025-06-18
Payer: COMMERCIAL

## 2025-06-18 ENCOUNTER — HOSPITAL ENCOUNTER (OUTPATIENT)
Facility: MEDICAL CENTER | Age: 60
End: 2025-06-18
Payer: COMMERCIAL

## 2025-06-18 VITALS
WEIGHT: 182 LBS | HEART RATE: 69 BPM | BODY MASS INDEX: 28.56 KG/M2 | OXYGEN SATURATION: 99 % | HEIGHT: 67 IN | SYSTOLIC BLOOD PRESSURE: 128 MMHG | DIASTOLIC BLOOD PRESSURE: 88 MMHG

## 2025-06-18 DIAGNOSIS — I11.0 HYPERTENSIVE HEART DISEASE WITH DIASTOLIC HEART FAILURE (HCC): ICD-10-CM

## 2025-06-18 DIAGNOSIS — R73.03 PREDIABETES: ICD-10-CM

## 2025-06-18 DIAGNOSIS — I50.30 HYPERTENSIVE HEART DISEASE WITH DIASTOLIC HEART FAILURE (HCC): ICD-10-CM

## 2025-06-18 DIAGNOSIS — E78.5 DYSLIPIDEMIA: ICD-10-CM

## 2025-06-18 DIAGNOSIS — E66.3 OVERWEIGHT (BMI 25.0-29.9): ICD-10-CM

## 2025-06-18 DIAGNOSIS — Z78.9 NEVER SMOKED TOBACCO: ICD-10-CM

## 2025-06-18 DIAGNOSIS — J43.2 CENTRILOBULAR EMPHYSEMA (HCC): Primary | ICD-10-CM

## 2025-06-18 DIAGNOSIS — K21.9 GASTROESOPHAGEAL REFLUX DISEASE, UNSPECIFIED WHETHER ESOPHAGITIS PRESENT: ICD-10-CM

## 2025-06-18 DIAGNOSIS — I34.0 NONRHEUMATIC MITRAL VALVE REGURGITATION: ICD-10-CM

## 2025-06-18 LAB — HBA1C MFR BLD: 5.9 %

## 2025-06-18 PROCEDURE — 99214 OFFICE O/P EST MOD 30 MIN: CPT | Performed by: STUDENT IN AN ORGANIZED HEALTH CARE EDUCATION/TRAINING PROGRAM

## 2025-06-18 PROCEDURE — 83036 HEMOGLOBIN GLYCOSYLATED A1C: CPT | Performed by: STUDENT IN AN ORGANIZED HEALTH CARE EDUCATION/TRAINING PROGRAM

## 2025-06-18 ASSESSMENT — PATIENT HEALTH QUESTIONNAIRE - PHQ9
SUM OF ALL RESPONSES TO PHQ QUESTIONS 1-9: 0
SUM OF ALL RESPONSES TO PHQ QUESTIONS 1-9: 0
1. LITTLE INTEREST OR PLEASURE IN DOING THINGS: NOT AT ALL
2. FEELING DOWN, DEPRESSED OR HOPELESS: NOT AT ALL
SUM OF ALL RESPONSES TO PHQ QUESTIONS 1-9: 0
SUM OF ALL RESPONSES TO PHQ QUESTIONS 1-9: 0

## 2025-06-18 NOTE — PROGRESS NOTES
[facility]  [address] [place] [address].  [place] [address]  Phone: [phone]  Fax: [phone]    Office Visit Note    Date of Visit: 2025   Patient Name: Ann Hardy   Patient :  1965     ASSESSMENT/PLAN     1. Centrilobular emphysema (HCC)  Overview:  25 CT chest w/ contrast:  Mild centrilobular emphysematous changes in bilateral lungs  Assessment & Plan:  - CT chest in 2025 showed mild centrilobular emphysematous changes, likely due to radiation therapy  - No shortness of breath or exercise intolerance  - Monitor condition, prescribe inhaler if symptoms develop  2. Prediabetes  Assessment & Plan:  - Last A1c was 6.1  - Conduct A1c test today to monitor prediabetes status  - Routine follow-up to check A1c  - Heart and lungs sound good on physical exam  Orders:  -     POCT glycosylated hemoglobin (Hb A1C)  3. Hypertensive heart disease with diastolic heart failure (HCC)  Overview:  10/2/24 echo: EF 57%.  LV size and wall thickness normal.  Grade 1 diastolic dysfunction with normal LAP.  Normal RV systolic function.  2024 EKG: Sinus tachycardia, diffuse nonspecific ST wave abnormality   Currently on Toprol and Losartan, managed by Dr. Keri Ricci.   Assessment & Plan:  - this chronic condition is managed by specialist. No findings today merit changing the current treatment plan.  4. Nonrheumatic mitral valve regurgitation  Overview:  2025 echo: EF 60%.  LA mildly dilated.  Moderate MVR.  Managed by Keri Ricci MD.  Assessment & Plan:  - Echocardiogram in 2025 showed mitral valve regurgitation- updated dx in chart from MVP to MVR  - Occasional palpitations, no significant shortness of breath or exercise intolerance  - this chronic condition is managed by specialist. No findings today merit changing the current treatment plan.  5. Dyslipidemia  Overview:  Currently on Lipitor, managed by PCP.  Assessment & Plan:  - Last lipid panel looked good after

## 2025-06-19 ENCOUNTER — RESULTS FOLLOW-UP (OUTPATIENT)
Age: 60
End: 2025-06-19

## 2025-06-23 ENCOUNTER — HOSPITAL ENCOUNTER (OUTPATIENT)
Dept: INFUSION THERAPY | Facility: MEDICAL CENTER | Age: 60
Discharge: HOME | End: 2025-06-23
Payer: COMMERCIAL

## 2025-06-23 ENCOUNTER — TELEPHONE (OUTPATIENT)
Age: 60
End: 2025-06-23

## 2025-06-23 ENCOUNTER — OFFICE VISIT (OUTPATIENT)
Age: 60
End: 2025-06-23
Payer: COMMERCIAL

## 2025-06-23 VITALS
TEMPERATURE: 98.2 F | DIASTOLIC BLOOD PRESSURE: 83 MMHG | SYSTOLIC BLOOD PRESSURE: 135 MMHG | RESPIRATION RATE: 16 BRPM | HEART RATE: 75 BPM

## 2025-06-23 DIAGNOSIS — Z17.1 MALIGNANT NEOPLASM OF OVERLAPPING SITES OF RIGHT BREAST IN FEMALE, ESTROGEN RECEPTOR NEGATIVE (HCC): Primary | ICD-10-CM

## 2025-06-23 DIAGNOSIS — D70.9 NEUTROPENIA, UNSPECIFIED TYPE: ICD-10-CM

## 2025-06-23 DIAGNOSIS — C50.811 MALIGNANT NEOPLASM OF OVERLAPPING SITES OF RIGHT BREAST IN FEMALE, ESTROGEN RECEPTOR NEGATIVE (HCC): Primary | ICD-10-CM

## 2025-06-23 DIAGNOSIS — Z51.11 CHEMOTHERAPY MANAGEMENT, ENCOUNTER FOR: ICD-10-CM

## 2025-06-23 LAB
ALBUMIN SERPL-MCNC: 3.8 G/DL (ref 3.5–5.2)
ALBUMIN/GLOB SERPL: 1.2 {RATIO} (ref 1–2.5)
ALP SERPL-CCNC: 75 U/L (ref 35–104)
ALT SERPL-CCNC: 26 U/L (ref 10–35)
ANION GAP SERPL CALCULATED.3IONS-SCNC: 10 MMOL/L (ref 9–16)
AST SERPL-CCNC: 20 U/L (ref 10–35)
BASOPHILS # BLD: 0.04 K/UL (ref 0–0.2)
BASOPHILS NFR BLD: 2 % (ref 0–2)
BILIRUB SERPL-MCNC: 0.3 MG/DL (ref 0–1.2)
BUN SERPL-MCNC: 18 MG/DL (ref 8–23)
CALCIUM SERPL-MCNC: 8.9 MG/DL (ref 8.8–10.2)
CHLORIDE SERPL-SCNC: 107 MMOL/L (ref 98–107)
CO2 SERPL-SCNC: 25 MMOL/L (ref 20–31)
CREAT SERPL-MCNC: 0.8 MG/DL (ref 0.5–0.9)
EOSINOPHIL # BLD: 0.12 K/UL (ref 0–0.44)
EOSINOPHILS RELATIVE PERCENT: 5 % (ref 1–4)
ERYTHROCYTE [DISTWIDTH] IN BLOOD BY AUTOMATED COUNT: 14.6 % (ref 11.8–14.4)
GFR, ESTIMATED: 86 ML/MIN/1.73M2
GLUCOSE SERPL-MCNC: 82 MG/DL (ref 82–115)
HCT VFR BLD AUTO: 34.8 % (ref 36.3–47.1)
HGB BLD-MCNC: 11.4 G/DL (ref 11.9–15.1)
IMM GRANULOCYTES # BLD AUTO: 0 K/UL (ref 0–0.3)
IMM GRANULOCYTES NFR BLD: 0 %
LYMPHOCYTES NFR BLD: 0.94 K/UL (ref 1.1–3.7)
LYMPHOCYTES RELATIVE PERCENT: 35 % (ref 24–43)
MCH RBC QN AUTO: 29.4 PG (ref 25.2–33.5)
MCHC RBC AUTO-ENTMCNC: 32.8 G/DL (ref 28.4–34.8)
MCV RBC AUTO: 89.7 FL (ref 82.6–102.9)
MONOCYTES NFR BLD: 0.4 K/UL (ref 0.1–1.2)
MONOCYTES NFR BLD: 15 % (ref 3–12)
NEUTROPHILS NFR BLD: 43 % (ref 36–65)
NEUTS SEG NFR BLD: 1.17 K/UL (ref 1.5–8.1)
NRBC BLD-RTO: 0 PER 100 WBC
PLATELET # BLD AUTO: 218 K/UL (ref 138–453)
PMV BLD AUTO: 9.7 FL (ref 8.1–13.5)
POTASSIUM SERPL-SCNC: 3.6 MMOL/L (ref 3.7–5.3)
PROT SERPL-MCNC: 7.1 G/DL (ref 6.6–8.7)
RBC # BLD AUTO: 3.88 M/UL (ref 3.95–5.11)
RBC # BLD: ABNORMAL 10*6/UL
SODIUM SERPL-SCNC: 142 MMOL/L (ref 136–145)
WBC OTHER # BLD: 2.7 K/UL (ref 3.5–11.3)

## 2025-06-23 PROCEDURE — 96413 CHEMO IV INFUSION 1 HR: CPT

## 2025-06-23 PROCEDURE — 99214 OFFICE O/P EST MOD 30 MIN: CPT | Performed by: INTERNAL MEDICINE

## 2025-06-23 PROCEDURE — 85025 COMPLETE CBC W/AUTO DIFF WBC: CPT

## 2025-06-23 PROCEDURE — 2580000003 HC RX 258: Performed by: INTERNAL MEDICINE

## 2025-06-23 PROCEDURE — 2500000003 HC RX 250 WO HCPCS: Performed by: INTERNAL MEDICINE

## 2025-06-23 PROCEDURE — 80053 COMPREHEN METABOLIC PANEL: CPT

## 2025-06-23 PROCEDURE — 6360000002 HC RX W HCPCS: Performed by: INTERNAL MEDICINE

## 2025-06-23 RX ORDER — EPINEPHRINE 1 MG/ML
0.3 INJECTION, SOLUTION, CONCENTRATE INTRAVENOUS PRN
OUTPATIENT
Start: 2025-07-14

## 2025-06-23 RX ORDER — ACETAMINOPHEN 325 MG/1
650 TABLET ORAL
OUTPATIENT
Start: 2025-07-14

## 2025-06-23 RX ORDER — ALBUTEROL SULFATE 90 UG/1
4 INHALANT RESPIRATORY (INHALATION) PRN
OUTPATIENT
Start: 2025-07-14

## 2025-06-23 RX ORDER — SODIUM CHLORIDE 0.9 % (FLUSH) 0.9 %
5-40 SYRINGE (ML) INJECTION PRN
Status: DISCONTINUED | OUTPATIENT
Start: 2025-06-23 | End: 2025-06-24 | Stop reason: HOSPADM

## 2025-06-23 RX ORDER — SODIUM CHLORIDE 0.9 % (FLUSH) 0.9 %
5-40 SYRINGE (ML) INJECTION PRN
OUTPATIENT
Start: 2025-07-14

## 2025-06-23 RX ORDER — MEPERIDINE HYDROCHLORIDE 50 MG/ML
12.5 INJECTION INTRAMUSCULAR; INTRAVENOUS; SUBCUTANEOUS PRN
OUTPATIENT
Start: 2025-07-14

## 2025-06-23 RX ORDER — HYDROCORTISONE SODIUM SUCCINATE 100 MG/2ML
100 INJECTION INTRAMUSCULAR; INTRAVENOUS
OUTPATIENT
Start: 2025-07-14

## 2025-06-23 RX ORDER — SODIUM CHLORIDE 9 MG/ML
5-250 INJECTION, SOLUTION INTRAVENOUS PRN
OUTPATIENT
Start: 2025-07-14

## 2025-06-23 RX ORDER — FAMOTIDINE 10 MG/ML
20 INJECTION, SOLUTION INTRAVENOUS
OUTPATIENT
Start: 2025-07-14

## 2025-06-23 RX ORDER — DIPHENHYDRAMINE HYDROCHLORIDE 50 MG/ML
50 INJECTION, SOLUTION INTRAMUSCULAR; INTRAVENOUS
OUTPATIENT
Start: 2025-07-14

## 2025-06-23 RX ORDER — HEPARIN SODIUM (PORCINE) LOCK FLUSH IV SOLN 100 UNIT/ML 100 UNIT/ML
500 SOLUTION INTRAVENOUS PRN
OUTPATIENT
Start: 2025-07-14

## 2025-06-23 RX ORDER — ONDANSETRON 2 MG/ML
8 INJECTION INTRAMUSCULAR; INTRAVENOUS
OUTPATIENT
Start: 2025-07-14

## 2025-06-23 RX ORDER — HEPARIN 100 UNIT/ML
500 SYRINGE INTRAVENOUS PRN
Status: DISCONTINUED | OUTPATIENT
Start: 2025-06-23 | End: 2025-06-24 | Stop reason: HOSPADM

## 2025-06-23 RX ORDER — SODIUM CHLORIDE 9 MG/ML
5-250 INJECTION, SOLUTION INTRAVENOUS PRN
Status: DISCONTINUED | OUTPATIENT
Start: 2025-06-23 | End: 2025-06-24 | Stop reason: HOSPADM

## 2025-06-23 RX ORDER — SODIUM CHLORIDE 9 MG/ML
INJECTION, SOLUTION INTRAVENOUS CONTINUOUS
OUTPATIENT
Start: 2025-07-14

## 2025-06-23 RX ADMIN — SODIUM CHLORIDE, PRESERVATIVE FREE 10 ML: 5 INJECTION INTRAVENOUS at 11:40

## 2025-06-23 RX ADMIN — SODIUM CHLORIDE 20 ML/HR: 0.9 INJECTION, SOLUTION INTRAVENOUS at 12:07

## 2025-06-23 RX ADMIN — SODIUM CHLORIDE 483 MG: 0.9 INJECTION, SOLUTION INTRAVENOUS at 13:01

## 2025-06-23 RX ADMIN — HEPARIN 500 UNITS: 100 SYRINGE at 14:12

## 2025-06-23 RX ADMIN — SODIUM CHLORIDE, PRESERVATIVE FREE 10 ML: 5 INJECTION INTRAVENOUS at 14:12

## 2025-06-23 NOTE — PROGRESS NOTES
Patient here for C11D1 Trazimera.  Patient voices no complaints or concerns.  Port accessed with no blood return.  Lab to chairside to draw labs.  Prior to administering Cath Santhosh, brisk blood return noted.  MD in room.  Labs reviewed.  OK to treat.  Echo reviewed.  EF 63%  Tolerated chemo without incident.  Patient declines observation.  Know to watch for reactions at home.  Discharged stable with AVS in hand.

## 2025-06-23 NOTE — TELEPHONE ENCOUNTER
AMINA HERE FOR FOLLOW UP & TX   Tx today   Rv in 3 weeks   MD VISIT: 7/14/25 @ 11:30AM TX @ 11AM   AVS PRINTED AND GIVEN ON EXIT

## 2025-06-23 NOTE — PROGRESS NOTES
Ann Hardy                                                                                                                  6/23/2025  MRN:   [id number]  YOB: 1965  PCP:                           Lisa Segura MD  Referring Physician: No ref. provider found  Treating Physician Name: TOBI PARK MD      Reason for visit:  Chief Complaint   Patient presents with    Chemotherapy    Follow-up     Pnuemococcal vacine     Current problems:  Right breast invasive cancer, HER2 positive ER negative, pathological stage T1b N0 M0-2/2024  Right breast DCIS, ER negative  Chronic arthritis  Medical comorbidities: Mitral valve prolapse, dyslipidemia, hypertension    Active and recent treatments:  S/p right lumpectomy with sentinel lymph node biopsy-5/2024  Weekly Taxol with Herceptin every 3 week-6/2024, ongoing  Adjuvant radiation-10/2024 through 11/2024    Interval history:  History of Present Illness  Patient presents to the clinic for a follow-up visit and for toxicity check and to review results of her blood work-up.  Patient has been tolerating treatment without any unexpected or severe side effects.  Denies hospitalization or ER visit.  Appetite is good.  Weight is stable.  Nausea is controlled.    She experienced severe, stabbing chest pain on the left side last Sunday, which was intense enough to awaken her from sleep. The pain subsided after she changed her position. She underwent a stress test last Monday, the results of which were normal except for some regurgitation. Her primary care physician has recommended a pneumococcal vaccine.    During this visit patient's allergy, social, medical, surgical history and medications were reviewed and updated.    Summary of Case/History:  Ann faye 59 y.o.female is a patient with biopsy-proven invasive cancer of right breast, triple negative and DCIS, ER negative presents to the clinic to establish care and for further workup and evaluation.She

## 2025-06-24 DIAGNOSIS — Z17.1 MALIGNANT NEOPLASM OF OVERLAPPING SITES OF RIGHT BREAST IN FEMALE, ESTROGEN RECEPTOR NEGATIVE (HCC): ICD-10-CM

## 2025-06-24 DIAGNOSIS — C50.811 MALIGNANT NEOPLASM OF OVERLAPPING SITES OF RIGHT BREAST IN FEMALE, ESTROGEN RECEPTOR NEGATIVE (HCC): ICD-10-CM

## 2025-06-24 DIAGNOSIS — Z51.11 CHEMOTHERAPY MANAGEMENT, ENCOUNTER FOR: ICD-10-CM

## 2025-06-24 RX ORDER — POTASSIUM CHLORIDE 1500 MG/1
20 TABLET, EXTENDED RELEASE ORAL DAILY
Qty: 90 TABLET | Refills: 3 | Status: SHIPPED | OUTPATIENT
Start: 2025-06-24

## 2025-06-29 PROBLEM — K21.9 GASTROESOPHAGEAL REFLUX DISEASE: Status: ACTIVE | Noted: 2025-06-29

## 2025-07-07 RX ORDER — GABAPENTIN 300 MG/1
300 CAPSULE ORAL 3 TIMES DAILY
Qty: 270 CAPSULE | Refills: 0 | OUTPATIENT
Start: 2025-07-07

## 2025-07-10 ENCOUNTER — OFFICE VISIT (OUTPATIENT)
Dept: PAIN MANAGEMENT | Age: 60
End: 2025-07-10
Payer: COMMERCIAL

## 2025-07-10 VITALS — HEIGHT: 67 IN | WEIGHT: 182 LBS | BODY MASS INDEX: 28.56 KG/M2

## 2025-07-10 DIAGNOSIS — C50.919 MALIGNANT NEOPLASM OF FEMALE BREAST, UNSPECIFIED ESTROGEN RECEPTOR STATUS, UNSPECIFIED LATERALITY, UNSPECIFIED SITE OF BREAST (HCC): ICD-10-CM

## 2025-07-10 DIAGNOSIS — Z98.1 S/P LUMBAR FUSION: ICD-10-CM

## 2025-07-10 DIAGNOSIS — Z79.899 MEDICATION MANAGEMENT: Primary | ICD-10-CM

## 2025-07-10 DIAGNOSIS — M51.362 DEGENERATION OF INTERVERTEBRAL DISC OF LUMBAR REGION WITH DISCOGENIC BACK PAIN AND LOWER EXTREMITY PAIN: ICD-10-CM

## 2025-07-10 DIAGNOSIS — M54.42 CHRONIC BILATERAL LOW BACK PAIN WITH BILATERAL SCIATICA: ICD-10-CM

## 2025-07-10 DIAGNOSIS — M47.26 OTHER SPONDYLOSIS WITH RADICULOPATHY, LUMBAR REGION: ICD-10-CM

## 2025-07-10 DIAGNOSIS — M54.41 CHRONIC BILATERAL LOW BACK PAIN WITH BILATERAL SCIATICA: ICD-10-CM

## 2025-07-10 DIAGNOSIS — G89.29 CHRONIC BILATERAL LOW BACK PAIN WITH BILATERAL SCIATICA: ICD-10-CM

## 2025-07-10 PROCEDURE — 99213 OFFICE O/P EST LOW 20 MIN: CPT | Performed by: NURSE PRACTITIONER

## 2025-07-10 RX ORDER — GABAPENTIN 300 MG/1
300 CAPSULE ORAL 3 TIMES DAILY
Qty: 270 CAPSULE | Refills: 0 | Status: SHIPPED | OUTPATIENT
Start: 2025-07-10 | End: 2025-10-08

## 2025-07-10 ASSESSMENT — ENCOUNTER SYMPTOMS
SHORTNESS OF BREATH: 0
BACK PAIN: 1
BOWEL INCONTINENCE: 0

## 2025-07-10 NOTE — PROGRESS NOTES
Chief Complaint   Patient presents with    Back Pain    Medication Refill       PMH     Patient is 59-year-old female with c/o low back and right lumbar radicular pain hip buttock area occ numbness in right foot. She has tried LESI  therapy and chiropractic treatment with minimal relief prior to surgery  She is now s/p revision L5-S1 posterior instrumented fusion, 11/14/22 with Dr Owens. She had post op f/u 12/23 with CT myelogram completed 11/2023 showing Previous PLIF L5-S1 including bilateral iliac screws.  The right iliac screw is fractured and there is subtle lucency adjacent to nearly all of the remaining screws. No significant spinal stenosis in the lumbar spine.  Moderate bilateral neural foraminal stenosis L5-S1.  Pt completed #10/13 PT visits 4/2023 that was helpful and continues with HEP    Prescribed gabapentin, taking 2-3 times per day, that is helping with her pain and s/sx control. She would like to continue at current dose.      Recent dx of breast CA had lumpectomy and now in chemo and radiation.  Tells me she has only one treatment of immunotherapy left and is feeling well overall. Denies concerns or changes to PMH.     Back Pain  This is a chronic problem. The current episode started more than 1 year ago. The problem occurs constantly. The problem is unchanged. The pain is present in the lumbar spine. The quality of the pain is described as aching. The pain does not radiate. The pain is at a severity of 2/10. The pain is mild. The pain is Worse during the day. The symptoms are aggravated by lying down and position. Stiffness is present At night and in the morning. Associated symptoms include tingling. Pertinent negatives include no bladder incontinence, bowel incontinence or fever. Risk factors include history of cancer. She has tried heat and chiropractic manipulation for the symptoms. The treatment provided significant relief.       Patient denies any new neurological symptoms. No bowel

## 2025-07-11 ENCOUNTER — HOSPITAL ENCOUNTER (OUTPATIENT)
Facility: MEDICAL CENTER | Age: 60
End: 2025-07-11
Payer: COMMERCIAL

## 2025-07-14 ENCOUNTER — OFFICE VISIT (OUTPATIENT)
Age: 60
End: 2025-07-14
Payer: COMMERCIAL

## 2025-07-14 ENCOUNTER — HOSPITAL ENCOUNTER (OUTPATIENT)
Dept: INFUSION THERAPY | Facility: MEDICAL CENTER | Age: 60
Discharge: HOME | End: 2025-07-14
Payer: COMMERCIAL

## 2025-07-14 ENCOUNTER — TELEPHONE (OUTPATIENT)
Age: 60
End: 2025-07-14

## 2025-07-14 VITALS
HEART RATE: 71 BPM | DIASTOLIC BLOOD PRESSURE: 95 MMHG | BODY MASS INDEX: 28.14 KG/M2 | WEIGHT: 179.7 LBS | TEMPERATURE: 97.9 F | RESPIRATION RATE: 17 BRPM | SYSTOLIC BLOOD PRESSURE: 142 MMHG

## 2025-07-14 DIAGNOSIS — Z17.1 MALIGNANT NEOPLASM OF OVERLAPPING SITES OF RIGHT BREAST IN FEMALE, ESTROGEN RECEPTOR NEGATIVE (HCC): Primary | ICD-10-CM

## 2025-07-14 DIAGNOSIS — J01.90 ACUTE SINUSITIS, RECURRENCE NOT SPECIFIED, UNSPECIFIED LOCATION: ICD-10-CM

## 2025-07-14 DIAGNOSIS — D70.9 NEUTROPENIA, UNSPECIFIED TYPE: ICD-10-CM

## 2025-07-14 DIAGNOSIS — C50.811 MALIGNANT NEOPLASM OF OVERLAPPING SITES OF RIGHT BREAST IN FEMALE, ESTROGEN RECEPTOR NEGATIVE (HCC): Primary | ICD-10-CM

## 2025-07-14 DIAGNOSIS — Z51.11 CHEMOTHERAPY MANAGEMENT, ENCOUNTER FOR: ICD-10-CM

## 2025-07-14 LAB
ALBUMIN SERPL-MCNC: 4.1 G/DL (ref 3.5–5.2)
ALBUMIN/GLOB SERPL: 1.3 {RATIO} (ref 1–2.5)
ALP SERPL-CCNC: 77 U/L (ref 35–104)
ALT SERPL-CCNC: 29 U/L (ref 10–35)
ANION GAP SERPL CALCULATED.3IONS-SCNC: 10 MMOL/L (ref 9–16)
AST SERPL-CCNC: 20 U/L (ref 10–35)
BASOPHILS # BLD: 0.04 K/UL (ref 0–0.2)
BASOPHILS NFR BLD: 1 % (ref 0–2)
BILIRUB SERPL-MCNC: 0.3 MG/DL (ref 0–1.2)
BUN SERPL-MCNC: 16 MG/DL (ref 8–23)
CALCIUM SERPL-MCNC: 9 MG/DL (ref 8.8–10.2)
CHLORIDE SERPL-SCNC: 105 MMOL/L (ref 98–107)
CO2 SERPL-SCNC: 25 MMOL/L (ref 20–31)
CREAT SERPL-MCNC: 0.8 MG/DL (ref 0.5–0.9)
EOSINOPHIL # BLD: 0.26 K/UL (ref 0–0.44)
EOSINOPHILS RELATIVE PERCENT: 8 % (ref 1–4)
ERYTHROCYTE [DISTWIDTH] IN BLOOD BY AUTOMATED COUNT: 13.9 % (ref 11.8–14.4)
GFR, ESTIMATED: 79 ML/MIN/1.73M2
GLUCOSE SERPL-MCNC: 100 MG/DL (ref 82–115)
HCT VFR BLD AUTO: 36 % (ref 36.3–47.1)
HGB BLD-MCNC: 11.7 G/DL (ref 11.9–15.1)
IMM GRANULOCYTES # BLD AUTO: 0 K/UL (ref 0–0.3)
IMM GRANULOCYTES NFR BLD: 0 %
LYMPHOCYTES NFR BLD: 1.18 K/UL (ref 1.1–3.7)
LYMPHOCYTES RELATIVE PERCENT: 37 % (ref 24–43)
MCH RBC QN AUTO: 29 PG (ref 25.2–33.5)
MCHC RBC AUTO-ENTMCNC: 32.5 G/DL (ref 28.4–34.8)
MCV RBC AUTO: 89.1 FL (ref 82.6–102.9)
MONOCYTES NFR BLD: 0.33 K/UL (ref 0.1–1.2)
MONOCYTES NFR BLD: 10 % (ref 3–12)
NEUTROPHILS NFR BLD: 44 % (ref 36–65)
NEUTS SEG NFR BLD: 1.42 K/UL (ref 1.5–8.1)
NRBC BLD-RTO: 0 PER 100 WBC
PLATELET # BLD AUTO: 242 K/UL (ref 138–453)
PMV BLD AUTO: 9.7 FL (ref 8.1–13.5)
POTASSIUM SERPL-SCNC: 3.7 MMOL/L (ref 3.7–5.3)
PROT SERPL-MCNC: 7.4 G/DL (ref 6.6–8.7)
RBC # BLD AUTO: 4.04 M/UL (ref 3.95–5.11)
SODIUM SERPL-SCNC: 140 MMOL/L (ref 136–145)
WBC OTHER # BLD: 3.2 K/UL (ref 3.5–11.3)

## 2025-07-14 PROCEDURE — 6360000002 HC RX W HCPCS: Performed by: INTERNAL MEDICINE

## 2025-07-14 PROCEDURE — 2580000003 HC RX 258: Performed by: INTERNAL MEDICINE

## 2025-07-14 PROCEDURE — 2500000003 HC RX 250 WO HCPCS: Performed by: INTERNAL MEDICINE

## 2025-07-14 PROCEDURE — 99214 OFFICE O/P EST MOD 30 MIN: CPT | Performed by: INTERNAL MEDICINE

## 2025-07-14 PROCEDURE — 96413 CHEMO IV INFUSION 1 HR: CPT

## 2025-07-14 PROCEDURE — 80053 COMPREHEN METABOLIC PANEL: CPT

## 2025-07-14 PROCEDURE — 85025 COMPLETE CBC W/AUTO DIFF WBC: CPT

## 2025-07-14 RX ORDER — EPINEPHRINE 1 MG/ML
0.3 INJECTION, SOLUTION, CONCENTRATE INTRAVENOUS PRN
OUTPATIENT
Start: 2025-08-04

## 2025-07-14 RX ORDER — ALBUTEROL SULFATE 90 UG/1
4 INHALANT RESPIRATORY (INHALATION) PRN
OUTPATIENT
Start: 2025-08-04

## 2025-07-14 RX ORDER — MEPERIDINE HYDROCHLORIDE 50 MG/ML
12.5 INJECTION INTRAMUSCULAR; INTRAVENOUS; SUBCUTANEOUS PRN
OUTPATIENT
Start: 2025-08-04

## 2025-07-14 RX ORDER — FAMOTIDINE 10 MG/ML
20 INJECTION, SOLUTION INTRAVENOUS
OUTPATIENT
Start: 2025-08-04

## 2025-07-14 RX ORDER — HEPARIN SODIUM (PORCINE) LOCK FLUSH IV SOLN 100 UNIT/ML 100 UNIT/ML
500 SOLUTION INTRAVENOUS PRN
OUTPATIENT
Start: 2025-08-04

## 2025-07-14 RX ORDER — SODIUM CHLORIDE 9 MG/ML
5-250 INJECTION, SOLUTION INTRAVENOUS PRN
Status: DISCONTINUED | OUTPATIENT
Start: 2025-07-14 | End: 2025-07-15 | Stop reason: HOSPADM

## 2025-07-14 RX ORDER — ACETAMINOPHEN 325 MG/1
650 TABLET ORAL
OUTPATIENT
Start: 2025-08-04

## 2025-07-14 RX ORDER — ONDANSETRON 2 MG/ML
8 INJECTION INTRAMUSCULAR; INTRAVENOUS
OUTPATIENT
Start: 2025-08-04

## 2025-07-14 RX ORDER — SODIUM CHLORIDE 9 MG/ML
5-250 INJECTION, SOLUTION INTRAVENOUS PRN
OUTPATIENT
Start: 2025-08-04

## 2025-07-14 RX ORDER — DIPHENHYDRAMINE HYDROCHLORIDE 50 MG/ML
50 INJECTION, SOLUTION INTRAMUSCULAR; INTRAVENOUS
OUTPATIENT
Start: 2025-08-04

## 2025-07-14 RX ORDER — HEPARIN 100 UNIT/ML
500 SYRINGE INTRAVENOUS PRN
Status: DISCONTINUED | OUTPATIENT
Start: 2025-07-14 | End: 2025-07-15 | Stop reason: HOSPADM

## 2025-07-14 RX ORDER — HYDROCORTISONE SODIUM SUCCINATE 100 MG/2ML
100 INJECTION INTRAMUSCULAR; INTRAVENOUS
OUTPATIENT
Start: 2025-08-04

## 2025-07-14 RX ORDER — SODIUM CHLORIDE 0.9 % (FLUSH) 0.9 %
5-40 SYRINGE (ML) INJECTION PRN
OUTPATIENT
Start: 2025-08-04

## 2025-07-14 RX ORDER — SODIUM CHLORIDE 0.9 % (FLUSH) 0.9 %
5-40 SYRINGE (ML) INJECTION PRN
Status: DISCONTINUED | OUTPATIENT
Start: 2025-07-14 | End: 2025-07-15 | Stop reason: HOSPADM

## 2025-07-14 RX ORDER — SODIUM CHLORIDE 9 MG/ML
INJECTION, SOLUTION INTRAVENOUS CONTINUOUS
OUTPATIENT
Start: 2025-08-04

## 2025-07-14 RX ADMIN — SODIUM CHLORIDE 483 MG: 0.9 INJECTION, SOLUTION INTRAVENOUS at 13:08

## 2025-07-14 RX ADMIN — HEPARIN 500 UNITS: 100 SYRINGE at 13:48

## 2025-07-14 RX ADMIN — SODIUM CHLORIDE, PRESERVATIVE FREE 10 ML: 5 INJECTION INTRAVENOUS at 12:05

## 2025-07-14 RX ADMIN — SODIUM CHLORIDE 20 ML/HR: 0.9 INJECTION, SOLUTION INTRAVENOUS at 12:09

## 2025-07-14 RX ADMIN — SODIUM CHLORIDE, PRESERVATIVE FREE 10 ML: 5 INJECTION INTRAVENOUS at 13:48

## 2025-07-14 NOTE — TELEPHONE ENCOUNTER
AMINA HERE FOR FOLLOW UP & TX   Tx today   Rv in 3 weeks   MD VISIT: 8/4/25 @ 8:45AM TX @ 8AM   AVS PRINTED AND GIVEN ON EXIT

## 2025-07-14 NOTE — PROGRESS NOTES
Ann Hardy                                                                                                                  7/14/2025  MRN:   [id number]  YOB: 1965  PCP:                           Lisa Segura MD  Referring Physician: No ref. provider found  Treating Physician Name: TOBI PARK MD      Reason for visit:  Toxicity check Discussed treatment plan    Current problems:  Right breast invasive cancer, HER2 positive ER negative, pathological stage T1b N0 M0-2/2024  Right breast DCIS, ER negative  Chronic arthritis  Medical comorbidities: Mitral valve prolapse, dyslipidemia, hypertension    Active and recent treatments:  S/p right lumpectomy with sentinel lymph node biopsy-5/2024  Weekly Taxol with Herceptin every 3 week-6/2024, ongoing  Adjuvant radiation-10/2024 through 11/2024    Interval history:  History of Present Illness  Patient presents to the clinic for a follow-up visit and for toxicity check and to review results of her blood work-up.  Lab workup shows leukopenia and anemia.    She reports an overall improvement in health with a reduction in cardiac symptoms, although no definitive diagnosis has been established. Her leukocyte count has exhibited variability since the initial presentation. She expresses concern regarding a potential decrease in erythrocyte count. The patient is currently undergoing trastuzumab (Herceptin) therapy, with imaging scheduled following her final treatment session on 08/04/2025.    During this visit patient's allergy, social, medical, surgical history and medications were reviewed and updated.    Summary of Case/History:  Ann Hardy a 59 y.o.female is a patient with biopsy-proven invasive cancer of right breast, triple negative and DCIS, ER negative presents to the clinic to establish care and for further workup and evaluation.She underwent screening mammogram in January 2024 which showed group of calcification in the right breast which was

## 2025-07-14 NOTE — PROGRESS NOTES
Patient arrived ambulatory per self for C13D1 treatment and MD visit.   Patient denies complaint or concern.   Port accessed, specimen sent.   Labs and orders reviewed, MD in to see patient, Ok to treat.  Patient premedicated, line flushed.   Trazimera infused without issue, line flushed.   Port flushed and heparinized with intact hollingsworth needle removed, band aid applied.   Patient discharged ambulatory per self, AVS per .

## 2025-07-24 ENCOUNTER — TELEPHONE (OUTPATIENT)
Dept: SURGERY | Age: 60
End: 2025-07-24

## 2025-07-24 NOTE — TELEPHONE ENCOUNTER
Left voicemail to remind patient to schedule her mammogram and that we will need to reschedule her appointment with Dr. Rivero.

## 2025-08-01 ENCOUNTER — HOSPITAL ENCOUNTER (OUTPATIENT)
Facility: MEDICAL CENTER | Age: 60
End: 2025-08-01
Payer: COMMERCIAL

## 2025-08-03 DIAGNOSIS — E78.5 DYSLIPIDEMIA: ICD-10-CM

## 2025-08-04 ENCOUNTER — OFFICE VISIT (OUTPATIENT)
Age: 60
End: 2025-08-04
Payer: COMMERCIAL

## 2025-08-04 ENCOUNTER — HOSPITAL ENCOUNTER (OUTPATIENT)
Dept: INFUSION THERAPY | Facility: MEDICAL CENTER | Age: 60
Discharge: HOME | End: 2025-08-04
Payer: COMMERCIAL

## 2025-08-04 ENCOUNTER — TELEPHONE (OUTPATIENT)
Age: 60
End: 2025-08-04

## 2025-08-04 VITALS
DIASTOLIC BLOOD PRESSURE: 88 MMHG | BODY MASS INDEX: 29.54 KG/M2 | TEMPERATURE: 98.6 F | HEART RATE: 71 BPM | RESPIRATION RATE: 16 BRPM | WEIGHT: 188.6 LBS | SYSTOLIC BLOOD PRESSURE: 134 MMHG

## 2025-08-04 DIAGNOSIS — Z17.1 MALIGNANT NEOPLASM OF OVERLAPPING SITES OF RIGHT BREAST IN FEMALE, ESTROGEN RECEPTOR NEGATIVE (HCC): Primary | ICD-10-CM

## 2025-08-04 DIAGNOSIS — Z51.11 CHEMOTHERAPY MANAGEMENT, ENCOUNTER FOR: ICD-10-CM

## 2025-08-04 DIAGNOSIS — C50.811 MALIGNANT NEOPLASM OF OVERLAPPING SITES OF RIGHT BREAST IN FEMALE, ESTROGEN RECEPTOR NEGATIVE (HCC): Primary | ICD-10-CM

## 2025-08-04 DIAGNOSIS — D70.9 NEUTROPENIA, UNSPECIFIED TYPE: ICD-10-CM

## 2025-08-04 LAB
ALBUMIN SERPL-MCNC: 4 G/DL (ref 3.5–5.2)
ALBUMIN/GLOB SERPL: 1.2 {RATIO} (ref 1–2.5)
ALP SERPL-CCNC: 77 U/L (ref 35–104)
ALT SERPL-CCNC: 27 U/L (ref 10–35)
ANION GAP SERPL CALCULATED.3IONS-SCNC: 11 MMOL/L (ref 9–16)
AST SERPL-CCNC: 22 U/L (ref 10–35)
BASOPHILS # BLD: 0.03 K/UL (ref 0–0.2)
BASOPHILS NFR BLD: 1 % (ref 0–2)
BILIRUB SERPL-MCNC: 0.2 MG/DL (ref 0–1.2)
BUN SERPL-MCNC: 21 MG/DL (ref 8–23)
CALCIUM SERPL-MCNC: 8.8 MG/DL (ref 8.8–10.2)
CHLORIDE SERPL-SCNC: 106 MMOL/L (ref 98–107)
CO2 SERPL-SCNC: 25 MMOL/L (ref 20–31)
CREAT SERPL-MCNC: 0.9 MG/DL (ref 0.5–0.9)
EOSINOPHIL # BLD: 0.17 K/UL (ref 0–0.44)
EOSINOPHILS RELATIVE PERCENT: 5 % (ref 1–4)
ERYTHROCYTE [DISTWIDTH] IN BLOOD BY AUTOMATED COUNT: 14.2 % (ref 11.8–14.4)
GFR, ESTIMATED: 78 ML/MIN/1.73M2
GLUCOSE SERPL-MCNC: 93 MG/DL (ref 82–115)
HCT VFR BLD AUTO: 35.3 % (ref 36.3–47.1)
HGB BLD-MCNC: 11.4 G/DL (ref 11.9–15.1)
IMM GRANULOCYTES # BLD AUTO: 0 K/UL (ref 0–0.3)
IMM GRANULOCYTES NFR BLD: 0 %
LYMPHOCYTES NFR BLD: 1.06 K/UL (ref 1.1–3.7)
LYMPHOCYTES RELATIVE PERCENT: 33 % (ref 24–43)
MCH RBC QN AUTO: 28.7 PG (ref 25.2–33.5)
MCHC RBC AUTO-ENTMCNC: 32.3 G/DL (ref 28.4–34.8)
MCV RBC AUTO: 88.9 FL (ref 82.6–102.9)
MONOCYTES NFR BLD: 0.3 K/UL (ref 0.1–1.2)
MONOCYTES NFR BLD: 9 % (ref 3–12)
NEUTROPHILS NFR BLD: 52 % (ref 36–65)
NEUTS SEG NFR BLD: 1.66 K/UL (ref 1.5–8.1)
NRBC BLD-RTO: 0 PER 100 WBC
PLATELET # BLD AUTO: 255 K/UL (ref 138–453)
PMV BLD AUTO: 9.4 FL (ref 8.1–13.5)
POTASSIUM SERPL-SCNC: 3.9 MMOL/L (ref 3.7–5.3)
PROT SERPL-MCNC: 7.3 G/DL (ref 6.6–8.7)
RBC # BLD AUTO: 3.97 M/UL (ref 3.95–5.11)
SODIUM SERPL-SCNC: 142 MMOL/L (ref 136–145)
WBC OTHER # BLD: 3.2 K/UL (ref 3.5–11.3)

## 2025-08-04 PROCEDURE — 80053 COMPREHEN METABOLIC PANEL: CPT

## 2025-08-04 PROCEDURE — 6360000002 HC RX W HCPCS: Performed by: INTERNAL MEDICINE

## 2025-08-04 PROCEDURE — 85025 COMPLETE CBC W/AUTO DIFF WBC: CPT

## 2025-08-04 PROCEDURE — 2580000003 HC RX 258: Performed by: INTERNAL MEDICINE

## 2025-08-04 PROCEDURE — 96413 CHEMO IV INFUSION 1 HR: CPT

## 2025-08-04 PROCEDURE — 99214 OFFICE O/P EST MOD 30 MIN: CPT | Performed by: INTERNAL MEDICINE

## 2025-08-04 PROCEDURE — 2500000003 HC RX 250 WO HCPCS: Performed by: INTERNAL MEDICINE

## 2025-08-04 RX ORDER — ATORVASTATIN CALCIUM 20 MG/1
20 TABLET, FILM COATED ORAL DAILY
Qty: 90 TABLET | Refills: 0 | Status: SHIPPED | OUTPATIENT
Start: 2025-08-04

## 2025-08-04 RX ORDER — HEPARIN 100 UNIT/ML
500 SYRINGE INTRAVENOUS PRN
Status: DISCONTINUED | OUTPATIENT
Start: 2025-08-04 | End: 2025-08-05 | Stop reason: HOSPADM

## 2025-08-04 RX ORDER — SODIUM CHLORIDE 0.9 % (FLUSH) 0.9 %
5-40 SYRINGE (ML) INJECTION PRN
Status: DISCONTINUED | OUTPATIENT
Start: 2025-08-04 | End: 2025-08-05 | Stop reason: HOSPADM

## 2025-08-04 RX ORDER — ERGOCALCIFEROL 1.25 MG/1
50000 CAPSULE, LIQUID FILLED ORAL WEEKLY
Qty: 12 CAPSULE | Refills: 1 | Status: SHIPPED | OUTPATIENT
Start: 2025-08-04

## 2025-08-04 RX ORDER — SODIUM CHLORIDE 9 MG/ML
5-250 INJECTION, SOLUTION INTRAVENOUS PRN
Status: DISCONTINUED | OUTPATIENT
Start: 2025-08-04 | End: 2025-08-05 | Stop reason: HOSPADM

## 2025-08-04 RX ADMIN — HEPARIN 500 UNITS: 100 SYRINGE at 10:38

## 2025-08-04 RX ADMIN — SODIUM CHLORIDE 483 MG: 0.9 INJECTION, SOLUTION INTRAVENOUS at 09:36

## 2025-08-04 RX ADMIN — SODIUM CHLORIDE, PRESERVATIVE FREE 10 ML: 5 INJECTION INTRAVENOUS at 10:38

## 2025-08-04 RX ADMIN — SODIUM CHLORIDE 20 ML/HR: 0.9 INJECTION, SOLUTION INTRAVENOUS at 09:34

## 2025-08-04 RX ADMIN — SODIUM CHLORIDE, PRESERVATIVE FREE 10 ML: 5 INJECTION INTRAVENOUS at 08:24

## 2025-08-08 DIAGNOSIS — Z51.11 CHEMOTHERAPY MANAGEMENT, ENCOUNTER FOR: ICD-10-CM

## 2025-08-08 DIAGNOSIS — R12 HEART BURN: ICD-10-CM

## 2025-08-08 DIAGNOSIS — Z17.1 MALIGNANT NEOPLASM OF OVERLAPPING SITES OF RIGHT BREAST IN FEMALE, ESTROGEN RECEPTOR NEGATIVE (HCC): ICD-10-CM

## 2025-08-08 DIAGNOSIS — C50.811 MALIGNANT NEOPLASM OF OVERLAPPING SITES OF RIGHT BREAST IN FEMALE, ESTROGEN RECEPTOR NEGATIVE (HCC): ICD-10-CM

## 2025-08-11 ENCOUNTER — TELEPHONE (OUTPATIENT)
Age: 60
End: 2025-08-11

## 2025-08-13 ENCOUNTER — TELEPHONE (OUTPATIENT)
Age: 60
End: 2025-08-13

## 2025-08-14 ENCOUNTER — TELEPHONE (OUTPATIENT)
Dept: INFUSION THERAPY | Age: 60
End: 2025-08-14

## 2025-08-20 ENCOUNTER — HOSPITAL ENCOUNTER (OUTPATIENT)
Dept: MAMMOGRAPHY | Age: 60
Discharge: HOME | End: 2025-08-22
Attending: SURGERY
Payer: COMMERCIAL

## 2025-08-20 DIAGNOSIS — Z17.1 MALIGNANT NEOPLASM OF UPPER-OUTER QUADRANT OF RIGHT BREAST IN FEMALE, ESTROGEN RECEPTOR NEGATIVE (HCC): ICD-10-CM

## 2025-08-20 DIAGNOSIS — C50.411 MALIGNANT NEOPLASM OF UPPER-OUTER QUADRANT OF RIGHT BREAST IN FEMALE, ESTROGEN RECEPTOR NEGATIVE (HCC): ICD-10-CM

## 2025-08-20 PROCEDURE — G0279 TOMOSYNTHESIS, MAMMO: HCPCS

## 2025-08-26 ENCOUNTER — OFFICE VISIT (OUTPATIENT)
Age: 60
End: 2025-08-26
Payer: COMMERCIAL

## 2025-08-26 VITALS
WEIGHT: 190 LBS | TEMPERATURE: 97.5 F | DIASTOLIC BLOOD PRESSURE: 92 MMHG | HEART RATE: 70 BPM | BODY MASS INDEX: 29.82 KG/M2 | HEIGHT: 67 IN | OXYGEN SATURATION: 100 % | SYSTOLIC BLOOD PRESSURE: 149 MMHG

## 2025-08-26 DIAGNOSIS — I34.0 NONRHEUMATIC MITRAL VALVE REGURGITATION: Primary | ICD-10-CM

## 2025-08-26 DIAGNOSIS — C50.919 MALIGNANT NEOPLASM OF FEMALE BREAST, UNSPECIFIED ESTROGEN RECEPTOR STATUS, UNSPECIFIED LATERALITY, UNSPECIFIED SITE OF BREAST (HCC): ICD-10-CM

## 2025-08-26 DIAGNOSIS — I11.0 HYPERTENSIVE HEART DISEASE WITH DIASTOLIC HEART FAILURE (HCC): ICD-10-CM

## 2025-08-26 DIAGNOSIS — E78.5 DYSLIPIDEMIA: ICD-10-CM

## 2025-08-26 DIAGNOSIS — I50.30 HYPERTENSIVE HEART DISEASE WITH DIASTOLIC HEART FAILURE (HCC): ICD-10-CM

## 2025-08-26 PROCEDURE — 99214 OFFICE O/P EST MOD 30 MIN: CPT

## 2025-08-26 RX ORDER — METOPROLOL SUCCINATE 25 MG/1
25 TABLET, EXTENDED RELEASE ORAL DAILY
Qty: 90 TABLET | Refills: 3 | Status: SHIPPED | OUTPATIENT
Start: 2025-08-26

## 2025-08-26 ASSESSMENT — ENCOUNTER SYMPTOMS
CHEST TIGHTNESS: 0
SHORTNESS OF BREATH: 0

## (undated) DEVICE — TOWEL,OR,DSP,ST,BLUE,STD,4/PK,20PK/CS: Brand: MEDLINE

## (undated) DEVICE — SUTURE MONOCRYL SZ 4-0 L27IN ABSRB UD L19MM PS-2 1/2 CIR PRIM Y426H

## (undated) DEVICE — 5.0MM X-COARSE DIAMOND

## (undated) DEVICE — APPLIER LIG CLP M L11IN TI STR RNG HNDL FOR 20 CLP DISP

## (undated) DEVICE — SINGLE DOSE EPI TY

## (undated) DEVICE — GOWN,NON-REINFORCED,3XL: Brand: MEDLINE

## (undated) DEVICE — NEEDLE SPNL 22GA L5IN BLK HUB S STL W/ QNCKE PNT W/OUT

## (undated) DEVICE — SUTURE VCRL + SZ 2-0 L27IN ABSRB UD CP-1 1/2 CIR REV CUT VCP266H

## (undated) DEVICE — SUTURE ETHILON SZ 2-0 L18IN NONABSORBABLE BLK L19MM PS-2 PRIM 593H

## (undated) DEVICE — NEEDLE HYPO 25GA L1.5IN BLU POLYPR HUB S STL REG BVL STR

## (undated) DEVICE — EXTENSION SET IV 12 IN 0.45 CC INFUSION MINIBOR TBNG CLMP

## (undated) DEVICE — Z DISCONTINUED APPLICATOR SURG PREP 0.35OZ 2% CHG 70% ISO ALC W/ HI LT

## (undated) DEVICE — BLANKET WRM W40.2XL55.9IN IORT LO BODY + MISTRAL AIR

## (undated) DEVICE — CONTAINER,SPECIMEN,OR STERILE,4OZ: Brand: MEDLINE

## (undated) DEVICE — GAUZE,SPONGE,FLUFF,6"X6.75",STRL,5/TRAY: Brand: MEDLINE

## (undated) DEVICE — C-ARMOR C-ARM EQUIPMENT COVERS CLEAR STERILE UNIVERSAL FIT 12 PER CASE: Brand: C-ARMOR

## (undated) DEVICE — GLOVE ORTHO 8   MSG9480

## (undated) DEVICE — Device

## (undated) DEVICE — SUTURE PERMA-HAND SZ 2-0 L30IN NONABSORBABLE BLK L26MM SH K833H

## (undated) DEVICE — SOLUTION IRRIG 1000ML 0.9% SOD CHL USP POUR PLAS BTL

## (undated) DEVICE — SUTURE VCRL + SZ 2 L27IN ABSRB UD L40MM CP 1/2 CIR REV CUT VCP195H

## (undated) DEVICE — GLOVE SURG SZ 75 CRM LTX FREE POLYISOPRENE POLYMER BEAD ANTI

## (undated) DEVICE — TOWEL,OR,DSP,ST,NATURAL,DLX,4/PK,20PK/CS: Brand: MEDLINE

## (undated) DEVICE — SINGLE PORT MANIFOLD: Brand: NEPTUNE 2

## (undated) DEVICE — GLOVE SURG SZ 8 L12IN FNGR THK79MIL GRN LTX FREE

## (undated) DEVICE — NEEDLE SPNL L4.5IN OD22GA QNCKE TYP SPINOCAN

## (undated) DEVICE — GLOVE SURG SZ 75 L12IN FNGR THK79MIL GRN LTX FREE

## (undated) DEVICE — SUTURE VICRYL SZ 3-0 L18IN ABSRB UD L26MM SH 1/2 CIR J864D

## (undated) DEVICE — DRAPE,REIN 53X77,STERILE: Brand: MEDLINE

## (undated) DEVICE — BLADE,CARBON-STEEL,15,STRL,DISPOSABLE,TB: Brand: MEDLINE

## (undated) DEVICE — SUTURE VICRYL SZ 3-0 L54IN ABSRB UD LIGAPAK REEL POLYGLACTIN J285G

## (undated) DEVICE — PROVE COVER: Brand: UNBRANDED

## (undated) DEVICE — GOWN,AURORA,NONREINFORCED,LARGE: Brand: MEDLINE

## (undated) DEVICE — DRESSING HYDROCOLLOID BORDER 35X10 IN ALUM PRIMASEAL

## (undated) DEVICE — GLOVE ORTHO 7   MSG9470

## (undated) DEVICE — SUTURE VICRYL SZ 2-0 L18IN ABSRB UD CT-1 L36MM 1/2 CIR J839D

## (undated) DEVICE — BLADE ES ELASTOMERIC COAT INSUL DURABLE BEND UPTO 90DEG

## (undated) DEVICE — 4-PORT MANIFOLD: Brand: NEPTUNE 2

## (undated) DEVICE — 1010 S-DRAPE TOWEL DRAPE 10/BX: Brand: STERI-DRAPE™

## (undated) DEVICE — MONITORING NEURO WO INTERPRETATION SYS PRICING

## (undated) DEVICE — TOWEL,OR,DSP,ST,BLUE,DLX,XR,4/PK,20PK/CS: Brand: MEDLINE

## (undated) DEVICE — DRAPE,T,LAPARO,TRANS,STERILE: Brand: MEDLINE

## (undated) DEVICE — STAZ MAJOR BASIN: Brand: MEDLINE INDUSTRIES, INC.

## (undated) DEVICE — ELECTRODE ES L3IN S STL BLDE INSUL DISP VALLEYLAB EDGE

## (undated) DEVICE — SYRINGE MED 5ML STD CLR PLAS LUERLOCK TIP N CTRL DISP

## (undated) DEVICE — PAD,ABDOMINAL,5"X9",ST,LF,25/BX: Brand: MEDLINE INDUSTRIES, INC.

## (undated) DEVICE — DRAPE,UTILTY,TAPE,15X26, 4EA/PK: Brand: MEDLINE

## (undated) DEVICE — SUTURE VICRYL SZ 0 L36IN ABSRB UD L36MM CT-1 1/2 CIR J946H

## (undated) DEVICE — DISPOSABLE SPECIMEN RADIOGRAPHY SYSTEM WITH LOCALIZING GRID, 4.65" RADIOLUCENT GRID: Brand: ACCUGRID

## (undated) DEVICE — LIQUIBAND RAPID ADHESIVE 36/CS 0.8ML: Brand: MEDLINE

## (undated) DEVICE — STAPLER SKIN REG CLSR N ABSRB S STL 35 COUNT FIX HD HND GRP

## (undated) DEVICE — SET EXTN SM 0.5ML L13IN BOR W/O INJ SITE

## (undated) DEVICE — UNIVERSAL BLOCK TRAY: Brand: MEDLINE INDUSTRIES, INC.

## (undated) DEVICE — DRAIN SURG 19FR 100% SIL RADPQ RND CHN FULL FLUT

## (undated) DEVICE — 3 ML SYRINGE LUER-LOCK TIP: Brand: MONOJECT

## (undated) DEVICE — SUTURE PERMAHAND SZ 3-0 L30IN NONABSORBABLE BLK SH L26MM K832H

## (undated) DEVICE — MARKER,SKIN,WI/RULER AND LABELS: Brand: MEDLINE

## (undated) DEVICE — GLOVE ORANGE PI 7 1/2   MSG9075

## (undated) DEVICE — SUTURE VICRYL + SZ 2-0 L27IN ABSRB WHT SH 1/2 CIR TAPERCUT VCP417H

## (undated) DEVICE — SUTURE VICRYL + SZ 2-0 L36IN ABSRB UD L36MM CT-1 1/2 CIR VCP945H

## (undated) DEVICE — BRA COMPR XL NYL LYCRA SPANDEX WHT CURAD

## (undated) DEVICE — AGENT HEMSTAT 3GM OXIDIZED REGENERATED CELOS ABSRB FOR CONT (ORDER MULTIPLES OF 5EA)

## (undated) DEVICE — SUTURE VICRYL + SZ 3-0 L27IN ABSRB UD L26MM SH 1/2 CIR VCP416H